# Patient Record
Sex: FEMALE | Race: OTHER | HISPANIC OR LATINO | URBAN - METROPOLITAN AREA
[De-identification: names, ages, dates, MRNs, and addresses within clinical notes are randomized per-mention and may not be internally consistent; named-entity substitution may affect disease eponyms.]

---

## 2017-01-09 ENCOUNTER — OUTPATIENT (OUTPATIENT)
Dept: OUTPATIENT SERVICES | Facility: HOSPITAL | Age: 52
LOS: 1 days | Discharge: HOME | End: 2017-01-09
Payer: MEDICARE

## 2017-01-09 DIAGNOSIS — Z98.51 TUBAL LIGATION STATUS: Chronic | ICD-10-CM

## 2017-01-09 DIAGNOSIS — R11.2 NAUSEA WITH VOMITING, UNSPECIFIED: ICD-10-CM

## 2017-01-09 DIAGNOSIS — Z98.89 OTHER SPECIFIED POSTPROCEDURAL STATES: Chronic | ICD-10-CM

## 2017-01-09 DIAGNOSIS — Z12.11 ENCOUNTER FOR SCREENING FOR MALIGNANT NEOPLASM OF COLON: ICD-10-CM

## 2017-01-09 DIAGNOSIS — D64.9 ANEMIA, UNSPECIFIED: ICD-10-CM

## 2017-01-09 PROCEDURE — 45380 COLONOSCOPY AND BIOPSY: CPT

## 2017-01-09 PROCEDURE — 88305 TISSUE EXAM BY PATHOLOGIST: CPT | Mod: 26

## 2017-01-09 PROCEDURE — 88312 SPECIAL STAINS GROUP 1: CPT

## 2017-01-09 PROCEDURE — 43239 EGD BIOPSY SINGLE/MULTIPLE: CPT

## 2017-01-09 PROCEDURE — 88305 TISSUE EXAM BY PATHOLOGIST: CPT

## 2017-01-09 PROCEDURE — 88312 SPECIAL STAINS GROUP 1: CPT | Mod: 26

## 2017-01-12 ENCOUNTER — EMERGENCY (EMERGENCY)
Facility: HOSPITAL | Age: 52
LOS: 1 days | Discharge: HOME | End: 2017-01-12
Attending: EMERGENCY MEDICINE
Payer: MEDICARE

## 2017-01-12 VITALS
HEIGHT: 60 IN | OXYGEN SATURATION: 98 % | HEART RATE: 100 BPM | WEIGHT: 143.08 LBS | DIASTOLIC BLOOD PRESSURE: 88 MMHG | SYSTOLIC BLOOD PRESSURE: 135 MMHG | TEMPERATURE: 99 F | RESPIRATION RATE: 20 BRPM

## 2017-01-12 DIAGNOSIS — M79.7 FIBROMYALGIA: ICD-10-CM

## 2017-01-12 DIAGNOSIS — Z98.51 TUBAL LIGATION STATUS: Chronic | ICD-10-CM

## 2017-01-12 DIAGNOSIS — R19.7 DIARRHEA, UNSPECIFIED: ICD-10-CM

## 2017-01-12 DIAGNOSIS — D64.9 ANEMIA, UNSPECIFIED: ICD-10-CM

## 2017-01-12 DIAGNOSIS — Z98.89 OTHER SPECIFIED POSTPROCEDURAL STATES: Chronic | ICD-10-CM

## 2017-01-12 DIAGNOSIS — M32.9 SYSTEMIC LUPUS ERYTHEMATOSUS, UNSPECIFIED: ICD-10-CM

## 2017-01-12 DIAGNOSIS — K64.8 OTHER HEMORRHOIDS: ICD-10-CM

## 2017-01-12 DIAGNOSIS — K29.50 UNSPECIFIED CHRONIC GASTRITIS WITHOUT BLEEDING: ICD-10-CM

## 2017-01-12 DIAGNOSIS — F32.9 MAJOR DEPRESSIVE DISORDER, SINGLE EPISODE, UNSPECIFIED: ICD-10-CM

## 2017-01-12 DIAGNOSIS — K52.9 NONINFECTIVE GASTROENTERITIS AND COLITIS, UNSPECIFIED: ICD-10-CM

## 2017-01-12 DIAGNOSIS — F41.9 ANXIETY DISORDER, UNSPECIFIED: ICD-10-CM

## 2017-01-12 DIAGNOSIS — K57.30 DIVERTICULOSIS OF LARGE INTESTINE WITHOUT PERFORATION OR ABSCESS WITHOUT BLEEDING: ICD-10-CM

## 2017-01-12 DIAGNOSIS — I25.2 OLD MYOCARDIAL INFARCTION: ICD-10-CM

## 2017-01-12 DIAGNOSIS — E86.0 DEHYDRATION: ICD-10-CM

## 2017-01-12 DIAGNOSIS — I10 ESSENTIAL (PRIMARY) HYPERTENSION: ICD-10-CM

## 2017-01-12 PROCEDURE — 99285 EMERGENCY DEPT VISIT HI MDM: CPT

## 2017-01-12 NOTE — ED ADULT TRIAGE NOTE - CHIEF COMPLAINT QUOTE
biba for c/o severe abdominal pain,diarrhea and states that the diarrhea is bloody  pt had gastric bypass last monday

## 2017-01-13 LAB
ALBUMIN SERPL ELPH-MCNC: 4.1 G/DL — SIGNIFICANT CHANGE UP (ref 3.5–5)
ALP SERPL-CCNC: 55 U/L — SIGNIFICANT CHANGE UP (ref 40–120)
ALT FLD-CCNC: 26 U/L DA — SIGNIFICANT CHANGE UP (ref 10–60)
ANION GAP SERPL CALC-SCNC: 8 MMOL/L — SIGNIFICANT CHANGE UP (ref 5–17)
APPEARANCE UR: CLEAR — SIGNIFICANT CHANGE UP
APTT BLD: 33.6 SEC — SIGNIFICANT CHANGE UP (ref 27.5–37.4)
AST SERPL-CCNC: 23 U/L — SIGNIFICANT CHANGE UP (ref 10–40)
BASOPHILS # BLD AUTO: 0.1 K/UL — SIGNIFICANT CHANGE UP (ref 0–0.2)
BASOPHILS NFR BLD AUTO: 2.7 % — HIGH (ref 0–2)
BILIRUB SERPL-MCNC: 0.5 MG/DL — SIGNIFICANT CHANGE UP (ref 0.2–1.2)
BILIRUB UR-MCNC: NEGATIVE — SIGNIFICANT CHANGE UP
BUN SERPL-MCNC: 5 MG/DL — LOW (ref 7–18)
CALCIUM SERPL-MCNC: 8.7 MG/DL — SIGNIFICANT CHANGE UP (ref 8.4–10.5)
CHLORIDE SERPL-SCNC: 111 MMOL/L — HIGH (ref 96–108)
CO2 SERPL-SCNC: 25 MMOL/L — SIGNIFICANT CHANGE UP (ref 22–31)
COLOR SPEC: YELLOW — SIGNIFICANT CHANGE UP
CREAT SERPL-MCNC: 0.64 MG/DL — SIGNIFICANT CHANGE UP (ref 0.5–1.3)
DIFF PNL FLD: NEGATIVE — SIGNIFICANT CHANGE UP
EOSINOPHIL # BLD AUTO: 0.1 K/UL — SIGNIFICANT CHANGE UP (ref 0–0.5)
EOSINOPHIL NFR BLD AUTO: 1.9 % — SIGNIFICANT CHANGE UP (ref 0–6)
GLUCOSE SERPL-MCNC: 80 MG/DL — SIGNIFICANT CHANGE UP (ref 70–99)
GLUCOSE UR QL: NEGATIVE — SIGNIFICANT CHANGE UP
HCT VFR BLD CALC: 30 % — LOW (ref 34.5–45)
HGB BLD-MCNC: 10.2 G/DL — LOW (ref 11.5–15.5)
INR BLD: 1.23 RATIO — HIGH (ref 0.88–1.16)
KETONES UR-MCNC: ABNORMAL
LACTATE SERPL-SCNC: 0.8 MMOL/L — SIGNIFICANT CHANGE UP (ref 0.7–2)
LEUKOCYTE ESTERASE UR-ACNC: ABNORMAL
LIDOCAIN IGE QN: 73 U/L — SIGNIFICANT CHANGE UP (ref 73–393)
LYMPHOCYTES # BLD AUTO: 2 K/UL — SIGNIFICANT CHANGE UP (ref 1–3.3)
LYMPHOCYTES # BLD AUTO: 53.2 % — HIGH (ref 13–44)
MCHC RBC-ENTMCNC: 31.8 PG — SIGNIFICANT CHANGE UP (ref 27–34)
MCHC RBC-ENTMCNC: 33.9 GM/DL — SIGNIFICANT CHANGE UP (ref 32–36)
MCV RBC AUTO: 93.8 FL — SIGNIFICANT CHANGE UP (ref 80–100)
MONOCYTES # BLD AUTO: 0.4 K/UL — SIGNIFICANT CHANGE UP (ref 0–0.9)
MONOCYTES NFR BLD AUTO: 9.7 % — SIGNIFICANT CHANGE UP (ref 2–14)
NEUTROPHILS # BLD AUTO: 1.2 K/UL — LOW (ref 1.8–7.4)
NEUTROPHILS NFR BLD AUTO: 32.6 % — LOW (ref 43–77)
NITRITE UR-MCNC: NEGATIVE — SIGNIFICANT CHANGE UP
PH UR: 7 — SIGNIFICANT CHANGE UP (ref 4.8–8)
PLATELET # BLD AUTO: 233 K/UL — SIGNIFICANT CHANGE UP (ref 150–400)
POTASSIUM SERPL-MCNC: 2.7 MMOL/L — CRITICAL LOW (ref 3.5–5.3)
POTASSIUM SERPL-SCNC: 2.7 MMOL/L — CRITICAL LOW (ref 3.5–5.3)
PROT SERPL-MCNC: 6.9 G/DL — SIGNIFICANT CHANGE UP (ref 6–8.3)
PROT UR-MCNC: NEGATIVE — SIGNIFICANT CHANGE UP
PROTHROM AB SERPL-ACNC: 13.8 SEC — HIGH (ref 10–13.1)
RBC # BLD: 3.2 M/UL — LOW (ref 3.8–5.2)
RBC # FLD: 11.3 % — SIGNIFICANT CHANGE UP (ref 10.3–14.5)
SODIUM SERPL-SCNC: 144 MMOL/L — SIGNIFICANT CHANGE UP (ref 135–145)
SP GR SPEC: 1.01 — SIGNIFICANT CHANGE UP (ref 1.01–1.02)
UROBILINOGEN FLD QL: NEGATIVE — SIGNIFICANT CHANGE UP
WBC # BLD: 3.8 K/UL — SIGNIFICANT CHANGE UP (ref 3.8–10.5)
WBC # FLD AUTO: 3.8 K/UL — SIGNIFICANT CHANGE UP (ref 3.8–10.5)

## 2017-01-13 PROCEDURE — 99284 EMERGENCY DEPT VISIT MOD MDM: CPT | Mod: 25

## 2017-01-13 PROCEDURE — 83690 ASSAY OF LIPASE: CPT

## 2017-01-13 PROCEDURE — 85610 PROTHROMBIN TIME: CPT

## 2017-01-13 PROCEDURE — 96375 TX/PRO/DX INJ NEW DRUG ADDON: CPT

## 2017-01-13 PROCEDURE — 85027 COMPLETE CBC AUTOMATED: CPT

## 2017-01-13 PROCEDURE — 85730 THROMBOPLASTIN TIME PARTIAL: CPT

## 2017-01-13 PROCEDURE — 80053 COMPREHEN METABOLIC PANEL: CPT

## 2017-01-13 PROCEDURE — 81001 URINALYSIS AUTO W/SCOPE: CPT

## 2017-01-13 PROCEDURE — 96374 THER/PROPH/DIAG INJ IV PUSH: CPT

## 2017-01-13 PROCEDURE — 83605 ASSAY OF LACTIC ACID: CPT

## 2017-01-13 RX ORDER — SODIUM CHLORIDE 9 MG/ML
1000 INJECTION INTRAMUSCULAR; INTRAVENOUS; SUBCUTANEOUS ONCE
Qty: 0 | Refills: 0 | Status: COMPLETED | OUTPATIENT
Start: 2017-01-13 | End: 2017-01-13

## 2017-01-13 RX ORDER — MORPHINE SULFATE 50 MG/1
4 CAPSULE, EXTENDED RELEASE ORAL ONCE
Qty: 0 | Refills: 0 | Status: DISCONTINUED | OUTPATIENT
Start: 2017-01-13 | End: 2017-01-13

## 2017-01-13 RX ORDER — POTASSIUM CHLORIDE 20 MEQ
20 PACKET (EA) ORAL ONCE
Qty: 0 | Refills: 0 | Status: COMPLETED | OUTPATIENT
Start: 2017-01-13 | End: 2017-01-13

## 2017-01-13 RX ORDER — SODIUM CHLORIDE 9 MG/ML
3 INJECTION INTRAMUSCULAR; INTRAVENOUS; SUBCUTANEOUS ONCE
Qty: 0 | Refills: 0 | Status: COMPLETED | OUTPATIENT
Start: 2017-01-13 | End: 2017-01-13

## 2017-01-13 RX ORDER — POTASSIUM CHLORIDE 20 MEQ
10 PACKET (EA) ORAL ONCE
Qty: 0 | Refills: 0 | Status: COMPLETED | OUTPATIENT
Start: 2017-01-13 | End: 2017-01-13

## 2017-01-13 RX ADMIN — SODIUM CHLORIDE 4000 MILLILITER(S): 9 INJECTION INTRAMUSCULAR; INTRAVENOUS; SUBCUTANEOUS at 04:33

## 2017-01-13 RX ADMIN — SODIUM CHLORIDE 4000 MILLILITER(S): 9 INJECTION INTRAMUSCULAR; INTRAVENOUS; SUBCUTANEOUS at 06:19

## 2017-01-13 RX ADMIN — MORPHINE SULFATE 4 MILLIGRAM(S): 50 CAPSULE, EXTENDED RELEASE ORAL at 06:19

## 2017-01-13 RX ADMIN — Medication 100 MILLIEQUIVALENT(S): at 05:22

## 2017-01-13 RX ADMIN — Medication 20 MILLIEQUIVALENT(S): at 05:22

## 2017-01-13 RX ADMIN — SODIUM CHLORIDE 3 MILLILITER(S): 9 INJECTION INTRAMUSCULAR; INTRAVENOUS; SUBCUTANEOUS at 04:33

## 2017-01-13 NOTE — ED PROVIDER NOTE - MEDICAL DECISION MAKING DETAILS
51f with lupus, chronic diarrhea being worked up, dehydrated on exam. labs similar to prior. stool was not black or red. hemoglobin at pt's baseline. discussed with GI doctor Lavelle who did the pt's scope. He shall call her in the morning. Pt feels better. Dc with anticipatory guidance and return precautions.

## 2017-01-13 NOTE — ED PROVIDER NOTE - PMH
Anxiety    Chronic pain    Depression    Fibromyalgia    HTN (hypertension)    Lupus    Myocardial infarction

## 2017-01-13 NOTE — ED PROVIDER NOTE - NS ED MD SCRIBE ATTENDING SCRIBE SECTIONS
VITAL SIGNS( Pullset)/HISTORY OF PRESENT ILLNESS/REVIEW OF SYSTEMS/HIV/PHYSICAL EXAM/PAST MEDICAL/SURGICAL/SOCIAL HISTORY

## 2017-01-13 NOTE — ED PROVIDER NOTE - OBJECTIVE STATEMENT
50 y/o F pt w/ PMHx of chronic abd pain presents to ED c/o diarrhea (black, bean sized clumps) since yesterday night; pt is worried that it may be blood and decided to report to ED. Pt states that she has been previously worked up by GI, her latest intervention was colonoscopy. Pt states that she has been bloated and crampy in abd w/ nausea and diarrhea x6 months. Pt denies fever, chills, or any other complaints. Pt is allergic to estrogen (rash). 52 y/o F pt w/ PMHx of chronic abd pain presents to ED c/o diarrhea (dark green/brown bean sized clumps) since yesterday night; pt is worried that it may be blood and decided to report to ED. Pt states that she has had these sx x 6 months and has been previously worked up by GI, her latest intervention was colonoscopy. Pt states that she has been bloated and crampy in abd w/ nausea and diarrhea x6 months. Pt denies fever, chills, or any other complaints. Pt is allergic to estrogen (rash).

## 2017-01-17 ENCOUNTER — APPOINTMENT (OUTPATIENT)
Dept: SURGERY | Facility: CLINIC | Age: 52
End: 2017-01-17

## 2017-01-31 ENCOUNTER — APPOINTMENT (OUTPATIENT)
Dept: GASTROENTEROLOGY | Facility: CLINIC | Age: 52
End: 2017-01-31

## 2017-01-31 VITALS
DIASTOLIC BLOOD PRESSURE: 72 MMHG | TEMPERATURE: 97.9 F | SYSTOLIC BLOOD PRESSURE: 112 MMHG | BODY MASS INDEX: 27.68 KG/M2 | WEIGHT: 141 LBS | HEIGHT: 60 IN | HEART RATE: 60 BPM | RESPIRATION RATE: 14 BRPM

## 2017-02-07 ENCOUNTER — APPOINTMENT (OUTPATIENT)
Dept: SURGERY | Facility: CLINIC | Age: 52
End: 2017-02-07

## 2017-02-07 VITALS
DIASTOLIC BLOOD PRESSURE: 85 MMHG | HEART RATE: 59 BPM | WEIGHT: 142 LBS | BODY MASS INDEX: 27.88 KG/M2 | HEIGHT: 60 IN | SYSTOLIC BLOOD PRESSURE: 132 MMHG

## 2017-04-25 ENCOUNTER — APPOINTMENT (OUTPATIENT)
Dept: SURGERY | Facility: CLINIC | Age: 52
End: 2017-04-25

## 2017-04-25 VITALS
TEMPERATURE: 98.5 F | HEART RATE: 85 BPM | RESPIRATION RATE: 17 BRPM | WEIGHT: 134 LBS | DIASTOLIC BLOOD PRESSURE: 70 MMHG | BODY MASS INDEX: 26.31 KG/M2 | SYSTOLIC BLOOD PRESSURE: 102 MMHG | HEIGHT: 60 IN

## 2017-07-13 ENCOUNTER — APPOINTMENT (OUTPATIENT)
Dept: GASTROENTEROLOGY | Facility: CLINIC | Age: 52
End: 2017-07-13

## 2017-07-13 VITALS
WEIGHT: 134.03 LBS | DIASTOLIC BLOOD PRESSURE: 64 MMHG | HEART RATE: 94 BPM | SYSTOLIC BLOOD PRESSURE: 132 MMHG | HEIGHT: 60 IN | OXYGEN SATURATION: 98 % | TEMPERATURE: 99.7 F | BODY MASS INDEX: 26.31 KG/M2

## 2017-07-17 LAB
ALBUMIN SERPL ELPH-MCNC: 4.9 G/DL
ALP BLD-CCNC: 74 U/L
ALT SERPL-CCNC: 23 U/L
ANION GAP SERPL CALC-SCNC: 17 MMOL/L
AST SERPL-CCNC: 29 U/L
BASOPHILS # BLD AUTO: 0.07 K/UL
BASOPHILS NFR BLD AUTO: 2.1 %
BILIRUB SERPL-MCNC: 0.2 MG/DL
BUN SERPL-MCNC: 9 MG/DL
CALCIUM SERPL-MCNC: 9.3 MG/DL
CHLORIDE SERPL-SCNC: 104 MMOL/L
CO2 SERPL-SCNC: 23 MMOL/L
CREAT SERPL-MCNC: 0.89 MG/DL
EOSINOPHIL # BLD AUTO: 0.04 K/UL
EOSINOPHIL NFR BLD AUTO: 1.2 %
GLUCOSE SERPL-MCNC: 77 MG/DL
HCT VFR BLD CALC: 34.3 %
HGB BLD-MCNC: 11.2 G/DL
IMM GRANULOCYTES NFR BLD AUTO: 0 %
LYMPHOCYTES # BLD AUTO: 1.24 K/UL
LYMPHOCYTES NFR BLD AUTO: 37.2 %
MAN DIFF?: NORMAL
MCHC RBC-ENTMCNC: 31.3 PG
MCHC RBC-ENTMCNC: 32.7 GM/DL
MCV RBC AUTO: 95.8 FL
MONOCYTES # BLD AUTO: 0.35 K/UL
MONOCYTES NFR BLD AUTO: 10.5 %
NEUTROPHILS # BLD AUTO: 1.63 K/UL
NEUTROPHILS NFR BLD AUTO: 49 %
PLATELET # BLD AUTO: 307 K/UL
POTASSIUM SERPL-SCNC: 4.6 MMOL/L
PROT SERPL-MCNC: 8 G/DL
RBC # BLD: 3.58 M/UL
RBC # FLD: 12 %
SODIUM SERPL-SCNC: 144 MMOL/L
WBC # FLD AUTO: 3.33 K/UL

## 2017-07-27 LAB
C DIFF TOX GENS STL QL NAA+PROBE: NORMAL
CDIFF BY PCR: NOT DETECTED

## 2017-08-01 LAB
BACTERIA STL CULT: NORMAL
DEPRECATED O AND P PREP STL: NORMAL

## 2017-08-03 ENCOUNTER — APPOINTMENT (OUTPATIENT)
Dept: GASTROENTEROLOGY | Facility: CLINIC | Age: 52
End: 2017-08-03
Payer: MEDICARE

## 2017-08-03 VITALS
RESPIRATION RATE: 16 BRPM | DIASTOLIC BLOOD PRESSURE: 86 MMHG | SYSTOLIC BLOOD PRESSURE: 118 MMHG | HEART RATE: 92 BPM | WEIGHT: 132 LBS | BODY MASS INDEX: 25.91 KG/M2 | HEIGHT: 60 IN | TEMPERATURE: 99.1 F

## 2017-08-03 PROCEDURE — 99214 OFFICE O/P EST MOD 30 MIN: CPT

## 2017-09-27 ENCOUNTER — EMERGENCY (EMERGENCY)
Facility: HOSPITAL | Age: 52
LOS: 1 days | Discharge: HOME | End: 2017-09-27
Attending: EMERGENCY MEDICINE
Payer: COMMERCIAL

## 2017-09-27 VITALS
HEART RATE: 91 BPM | TEMPERATURE: 98 F | RESPIRATION RATE: 16 BRPM | OXYGEN SATURATION: 100 % | DIASTOLIC BLOOD PRESSURE: 79 MMHG | SYSTOLIC BLOOD PRESSURE: 102 MMHG | HEIGHT: 60 IN | WEIGHT: 130.07 LBS

## 2017-09-27 DIAGNOSIS — Z98.89 OTHER SPECIFIED POSTPROCEDURAL STATES: Chronic | ICD-10-CM

## 2017-09-27 DIAGNOSIS — Z98.51 TUBAL LIGATION STATUS: Chronic | ICD-10-CM

## 2017-09-27 PROCEDURE — 99284 EMERGENCY DEPT VISIT MOD MDM: CPT

## 2017-09-27 PROCEDURE — 96372 THER/PROPH/DIAG INJ SC/IM: CPT

## 2017-09-27 PROCEDURE — 99283 EMERGENCY DEPT VISIT LOW MDM: CPT | Mod: 25

## 2017-09-27 RX ORDER — KETOROLAC TROMETHAMINE 30 MG/ML
60 SYRINGE (ML) INJECTION ONCE
Qty: 0 | Refills: 0 | Status: DISCONTINUED | OUTPATIENT
Start: 2017-09-27 | End: 2017-09-27

## 2017-09-27 RX ADMIN — Medication 60 MILLIGRAM(S): at 20:10

## 2017-09-27 RX ADMIN — Medication 60 MILLIGRAM(S): at 20:38
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
## 2017-09-27 NOTE — ED PROVIDER NOTE - MEDICAL DECISION MAKING DETAILS
Patient with generalized back pain, was hoping to get spinal injection. I explained she needs to see orthopedics spine specialist for that. received toradol injection. orthopedics followup. ambulatory in Emergency Department.
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
## 2017-10-01 DIAGNOSIS — M32.9 SYSTEMIC LUPUS ERYTHEMATOSUS, UNSPECIFIED: ICD-10-CM

## 2017-10-01 DIAGNOSIS — F41.9 ANXIETY DISORDER, UNSPECIFIED: ICD-10-CM

## 2017-10-01 DIAGNOSIS — M54.89 OTHER DORSALGIA: ICD-10-CM

## 2017-10-01 DIAGNOSIS — I10 ESSENTIAL (PRIMARY) HYPERTENSION: ICD-10-CM

## 2017-10-01 DIAGNOSIS — M79.7 FIBROMYALGIA: ICD-10-CM

## 2017-10-01 DIAGNOSIS — F32.89 OTHER SPECIFIED DEPRESSIVE EPISODES: ICD-10-CM

## 2017-10-01 DIAGNOSIS — G89.29 OTHER CHRONIC PAIN: ICD-10-CM

## 2017-10-01 DIAGNOSIS — I25.2 OLD MYOCARDIAL INFARCTION: ICD-10-CM

## 2017-10-23 ENCOUNTER — EMERGENCY (EMERGENCY)
Facility: HOSPITAL | Age: 52
LOS: 1 days | Discharge: HOME | End: 2017-10-23
Attending: EMERGENCY MEDICINE
Payer: MEDICARE

## 2017-10-23 VITALS
SYSTOLIC BLOOD PRESSURE: 136 MMHG | OXYGEN SATURATION: 99 % | TEMPERATURE: 98 F | RESPIRATION RATE: 17 BRPM | DIASTOLIC BLOOD PRESSURE: 91 MMHG | HEIGHT: 60 IN | WEIGHT: 134.92 LBS | HEART RATE: 80 BPM

## 2017-10-23 DIAGNOSIS — Z98.89 OTHER SPECIFIED POSTPROCEDURAL STATES: Chronic | ICD-10-CM

## 2017-10-23 DIAGNOSIS — Z98.51 TUBAL LIGATION STATUS: Chronic | ICD-10-CM

## 2017-10-23 LAB
ANION GAP SERPL CALC-SCNC: 4 MMOL/L — LOW (ref 5–17)
APPEARANCE UR: CLEAR — SIGNIFICANT CHANGE UP
BILIRUB UR-MCNC: NEGATIVE — SIGNIFICANT CHANGE UP
BUN SERPL-MCNC: 12 MG/DL — SIGNIFICANT CHANGE UP (ref 7–18)
CALCIUM SERPL-MCNC: 8.8 MG/DL — SIGNIFICANT CHANGE UP (ref 8.4–10.5)
CHLORIDE SERPL-SCNC: 108 MMOL/L — SIGNIFICANT CHANGE UP (ref 96–108)
CO2 SERPL-SCNC: 28 MMOL/L — SIGNIFICANT CHANGE UP (ref 22–31)
COLOR SPEC: YELLOW — SIGNIFICANT CHANGE UP
CREAT SERPL-MCNC: 0.83 MG/DL — SIGNIFICANT CHANGE UP (ref 0.5–1.3)
DIFF PNL FLD: NEGATIVE — SIGNIFICANT CHANGE UP
ERYTHROCYTE [SEDIMENTATION RATE] IN BLOOD: 25 MM/HR — HIGH (ref 0–20)
GLUCOSE SERPL-MCNC: 72 MG/DL — SIGNIFICANT CHANGE UP (ref 70–99)
GLUCOSE UR QL: NEGATIVE — SIGNIFICANT CHANGE UP
HCT VFR BLD CALC: 34.4 % — LOW (ref 34.5–45)
HGB BLD-MCNC: 10.8 G/DL — LOW (ref 11.5–15.5)
KETONES UR-MCNC: NEGATIVE — SIGNIFICANT CHANGE UP
LEUKOCYTE ESTERASE UR-ACNC: NEGATIVE — SIGNIFICANT CHANGE UP
MCHC RBC-ENTMCNC: 31.3 PG — SIGNIFICANT CHANGE UP (ref 27–34)
MCHC RBC-ENTMCNC: 31.5 GM/DL — LOW (ref 32–36)
MCV RBC AUTO: 99.1 FL — SIGNIFICANT CHANGE UP (ref 80–100)
NITRITE UR-MCNC: NEGATIVE — SIGNIFICANT CHANGE UP
PH UR: 8 — SIGNIFICANT CHANGE UP (ref 5–8)
PLATELET # BLD AUTO: 271 K/UL — SIGNIFICANT CHANGE UP (ref 150–400)
POTASSIUM SERPL-MCNC: 3.9 MMOL/L — SIGNIFICANT CHANGE UP (ref 3.5–5.3)
POTASSIUM SERPL-SCNC: 3.9 MMOL/L — SIGNIFICANT CHANGE UP (ref 3.5–5.3)
PROT UR-MCNC: NEGATIVE — SIGNIFICANT CHANGE UP
RBC # BLD: 3.47 M/UL — LOW (ref 3.8–5.2)
RBC # FLD: 11 % — SIGNIFICANT CHANGE UP (ref 10.3–14.5)
SODIUM SERPL-SCNC: 140 MMOL/L — SIGNIFICANT CHANGE UP (ref 135–145)
SP GR SPEC: 1.01 — SIGNIFICANT CHANGE UP (ref 1.01–1.02)
UROBILINOGEN FLD QL: NEGATIVE — SIGNIFICANT CHANGE UP
WBC # BLD: 3.4 K/UL — LOW (ref 3.8–10.5)
WBC # FLD AUTO: 3.4 K/UL — LOW (ref 3.8–10.5)

## 2017-10-23 PROCEDURE — 99283 EMERGENCY DEPT VISIT LOW MDM: CPT

## 2017-10-23 PROCEDURE — 85652 RBC SED RATE AUTOMATED: CPT

## 2017-10-23 PROCEDURE — 85027 COMPLETE CBC AUTOMATED: CPT

## 2017-10-23 PROCEDURE — 86038 ANTINUCLEAR ANTIBODIES: CPT

## 2017-10-23 PROCEDURE — 99285 EMERGENCY DEPT VISIT HI MDM: CPT

## 2017-10-23 PROCEDURE — 81003 URINALYSIS AUTO W/O SCOPE: CPT

## 2017-10-23 PROCEDURE — 80048 BASIC METABOLIC PNL TOTAL CA: CPT

## 2017-10-23 RX ORDER — TRAMADOL HYDROCHLORIDE 50 MG/1
50 TABLET ORAL ONCE
Qty: 0 | Refills: 0 | Status: DISCONTINUED | OUTPATIENT
Start: 2017-10-23 | End: 2017-10-23

## 2017-10-23 RX ADMIN — TRAMADOL HYDROCHLORIDE 50 MILLIGRAM(S): 50 TABLET ORAL at 15:07

## 2017-10-23 RX ADMIN — TRAMADOL HYDROCHLORIDE 50 MILLIGRAM(S): 50 TABLET ORAL at 13:46

## 2017-10-23 NOTE — ED ADULT NURSE NOTE - OBJECTIVE STATEMENT
C/O GENERALIZED BODY PAIN , JOINT PAINS X1 MONTH. SEEN BY PAIN MANAGEMENT DOCTOR AND WAS TOLD BLOOD GLUCOSE IS HIGH.STATES SHE FEELS DEPRESSED BUT REFUSED  CONSULT, STATES SHE HAS BEEN SEEING , COUNSELLING

## 2017-10-23 NOTE — ED PROVIDER NOTE - CONSTITUTIONAL, MLM
normal... Chronically ill appearing, well nourished, awake, alert, oriented to person, place, time/situation

## 2017-10-23 NOTE — ED PROVIDER NOTE - MEDICAL DECISION MAKING DETAILS
53 y/o F pt with hx of SLE and fibromyalgia presents with generalized body pain. Will check CBC, Chem 7, and administer analgesics. Will coordinate pt appointment with rheumatologist and pain management doctor. 51 y/o F pt with hx of SLE and fibromyalgia presents with generalized body pain. Will check labs, and administer analgesics. Will coordinate pt appointment with rheumatologist. Pt made appointment with her pain management doctor for 2pm today. 53 y/o F pt with hx of SLE and fibromyalgia presents with generalized body pain. Will check labs, and administer analgesics. Will coordinate pt appointment with rheumatologist- made for this saturday oct 28. Pt made appointment with her pain management doctor for 2pm today.
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
## 2017-10-23 NOTE — ED PROVIDER NOTE - PMH
Anemia    Anxiety    Asthma    Chronic pain    Depression    Fibromyalgia    HTN (hypertension)    Lupus    Myocardial infarction

## 2017-10-25 LAB
ANA PAT FLD IF-IMP: ABNORMAL
ANA TITR SER: ABNORMAL

## 2017-10-27 DIAGNOSIS — M32.9 SYSTEMIC LUPUS ERYTHEMATOSUS, UNSPECIFIED: ICD-10-CM

## 2017-10-27 DIAGNOSIS — F32.89 OTHER SPECIFIED DEPRESSIVE EPISODES: ICD-10-CM

## 2017-10-27 DIAGNOSIS — R52 PAIN, UNSPECIFIED: ICD-10-CM

## 2017-10-27 DIAGNOSIS — I25.2 OLD MYOCARDIAL INFARCTION: ICD-10-CM

## 2017-10-27 DIAGNOSIS — F41.9 ANXIETY DISORDER, UNSPECIFIED: ICD-10-CM

## 2017-10-27 DIAGNOSIS — M79.7 FIBROMYALGIA: ICD-10-CM

## 2017-10-27 DIAGNOSIS — J45.909 UNSPECIFIED ASTHMA, UNCOMPLICATED: ICD-10-CM

## 2017-10-27 DIAGNOSIS — I10 ESSENTIAL (PRIMARY) HYPERTENSION: ICD-10-CM

## 2017-10-27 DIAGNOSIS — R05 COUGH: ICD-10-CM

## 2017-10-27 DIAGNOSIS — Z98.51 TUBAL LIGATION STATUS: ICD-10-CM

## 2017-12-28 ENCOUNTER — APPOINTMENT (OUTPATIENT)
Dept: RHEUMATOLOGY | Facility: CLINIC | Age: 52
End: 2017-12-28
Payer: MEDICARE

## 2017-12-28 VITALS
TEMPERATURE: 98 F | WEIGHT: 137 LBS | HEIGHT: 60 IN | OXYGEN SATURATION: 98 % | BODY MASS INDEX: 26.9 KG/M2 | SYSTOLIC BLOOD PRESSURE: 121 MMHG | HEART RATE: 88 BPM | RESPIRATION RATE: 16 BRPM | DIASTOLIC BLOOD PRESSURE: 84 MMHG

## 2017-12-28 PROCEDURE — 99205 OFFICE O/P NEW HI 60 MIN: CPT

## 2017-12-29 LAB
25(OH)D3 SERPL-MCNC: 78.6 NG/ML
ALBUMIN SERPL ELPH-MCNC: 5 G/DL
ALP BLD-CCNC: 71 U/L
ALT SERPL-CCNC: 8 U/L
ANION GAP SERPL CALC-SCNC: 20 MMOL/L
APPEARANCE: CLEAR
APTT BLD: 25.9 SEC
AST SERPL-CCNC: 23 U/L
B2 GLYCOPROT1 AB SER QL: NEGATIVE
BACTERIA: ABNORMAL
BASOPHILS # BLD AUTO: 0.05 K/UL
BASOPHILS NFR BLD AUTO: 0.9 %
BILIRUB SERPL-MCNC: <0.2 MG/DL
BILIRUBIN URINE: NEGATIVE
BLOOD URINE: NEGATIVE
BUN SERPL-MCNC: 8 MG/DL
C3 SERPL-MCNC: 88 MG/DL
C4 SERPL-MCNC: 18 MG/DL
CALCIUM SERPL-MCNC: 10.3 MG/DL
CARDIOLIPIN AB SER IA-ACNC: NEGATIVE
CHLORIDE SERPL-SCNC: 102 MMOL/L
CK SERPL-CCNC: 161 U/L
CO2 SERPL-SCNC: 22 MMOL/L
COLOR: YELLOW
CREAT SERPL-MCNC: 0.85 MG/DL
CREAT SPEC-SCNC: 80 MG/DL
CREAT/PROT UR: 0.1 RATIO
DEPRECATED KAPPA LC FREE/LAMBDA SER: 1 RATIO
DIRECT COOMBS: NORMAL
DSDNA AB SER-ACNC: <12 IU/ML
ENA RNP AB SER IA-ACNC: <0.2 AL
ENA SM AB SER IA-ACNC: 0.2 AL
ENA SS-A AB SER IA-ACNC: 5.3 AL
ENA SS-B AB SER IA-ACNC: <0.2 AL
EOSINOPHIL # BLD AUTO: 0.02 K/UL
EOSINOPHIL NFR BLD AUTO: 0.3 %
FERRITIN SERPL-MCNC: 99 NG/ML
FOLATE SERPL-MCNC: >20 NG/ML
GLUCOSE QUALITATIVE U: NEGATIVE MG/DL
GLUCOSE SERPL-MCNC: 69 MG/DL
HCT VFR BLD CALC: 31.5 %
HGB BLD-MCNC: 10.2 G/DL
HYALINE CASTS: 7 /LPF
IGA SER QL IEP: 251 MG/DL
IGG SER QL IEP: 1400 MG/DL
IGM SER QL IEP: 94 MG/DL
IMM GRANULOCYTES NFR BLD AUTO: 0 %
INR PPP: 0.98 RATIO
KAPPA LC CSF-MCNC: 1.56 MG/DL
KAPPA LC SERPL-MCNC: 1.56 MG/DL
KETONES URINE: NEGATIVE
LDH SERPL-CCNC: 213 U/L
LEUKOCYTE ESTERASE URINE: NEGATIVE
LYMPHOCYTES # BLD AUTO: 2.17 K/UL
LYMPHOCYTES NFR BLD AUTO: 37.7 %
MAN DIFF?: NORMAL
MCHC RBC-ENTMCNC: 31.1 PG
MCHC RBC-ENTMCNC: 32.4 GM/DL
MCV RBC AUTO: 96 FL
MICROSCOPIC-UA: NORMAL
MONOCYTES # BLD AUTO: 0.52 K/UL
MONOCYTES NFR BLD AUTO: 9 %
NEUTROPHILS # BLD AUTO: 2.99 K/UL
NEUTROPHILS NFR BLD AUTO: 52.1 %
NITRITE URINE: NEGATIVE
PH URINE: 7
PLATELET # BLD AUTO: 342 K/UL
POTASSIUM SERPL-SCNC: 3.8 MMOL/L
PROT SERPL-MCNC: 8.2 G/DL
PROT UR-MCNC: 6 MG/DL
PROTEIN URINE: NEGATIVE MG/DL
PT BLD: 11.1 SEC
RBC # BLD: 3.28 M/UL
RBC # BLD: 3.28 M/UL
RBC # FLD: 11.9 %
RED BLOOD CELLS URINE: 2 /HPF
RETICS # AUTO: 1.3 %
RETICS AGGREG/RBC NFR: 42.6 K/UL
SODIUM SERPL-SCNC: 144 MMOL/L
SPECIFIC GRAVITY URINE: 1.01
SQUAMOUS EPITHELIAL CELLS: 12 /HPF
TSH SERPL-ACNC: 2.86 UIU/ML
UROBILINOGEN URINE: NEGATIVE MG/DL
VIT B12 SERPL-MCNC: 1896 PG/ML
WBC # FLD AUTO: 5.75 K/UL
WHITE BLOOD CELLS URINE: 6 /HPF

## 2017-12-30 LAB
THYROGLOB AB SERPL-ACNC: 3388 IU/ML
THYROPEROXIDASE AB SERPL IA-ACNC: 719 IU/ML

## 2017-12-31 LAB
ANA PAT FLD IF-IMP: ABNORMAL
ANA SER IF-ACNC: ABNORMAL
B2 GLYCOPROT1 IGA SERPL IA-ACNC: 6.7 SAU

## 2018-01-22 ENCOUNTER — APPOINTMENT (OUTPATIENT)
Dept: RHEUMATOLOGY | Facility: CLINIC | Age: 53
End: 2018-01-22
Payer: MEDICARE

## 2018-01-22 VITALS
HEART RATE: 73 BPM | WEIGHT: 137 LBS | BODY MASS INDEX: 26.9 KG/M2 | DIASTOLIC BLOOD PRESSURE: 90 MMHG | RESPIRATION RATE: 13 BRPM | SYSTOLIC BLOOD PRESSURE: 135 MMHG | OXYGEN SATURATION: 97 % | HEIGHT: 60 IN

## 2018-01-22 PROCEDURE — 99213 OFFICE O/P EST LOW 20 MIN: CPT

## 2018-01-22 RX ORDER — METHOCARBAMOL 500 MG/1
500 TABLET, FILM COATED ORAL
Qty: 90 | Refills: 0 | Status: DISCONTINUED | COMMUNITY
Start: 2016-09-27 | End: 2018-01-22

## 2018-01-22 RX ORDER — HYDROXYCHLOROQUINE SULFATE 200 MG/1
200 TABLET, FILM COATED ORAL
Qty: 60 | Refills: 0 | Status: DISCONTINUED | COMMUNITY
Start: 2016-08-30 | End: 2018-01-22

## 2018-01-22 RX ORDER — TIZANIDINE 2 MG/1
2 TABLET ORAL
Qty: 60 | Refills: 0 | Status: DISCONTINUED | COMMUNITY
Start: 2016-10-28 | End: 2018-01-22

## 2018-01-22 RX ORDER — OXYCODONE HYDROCHLORIDE 20 MG/1
20 TABLET, FILM COATED, EXTENDED RELEASE ORAL
Qty: 60 | Refills: 0 | Status: DISCONTINUED | COMMUNITY
Start: 2016-09-29 | End: 2018-01-22

## 2018-01-22 RX ORDER — TOPIRAMATE 50 MG/1
50 TABLET, FILM COATED ORAL
Qty: 60 | Refills: 0 | Status: DISCONTINUED | COMMUNITY
Start: 2016-12-12 | End: 2018-01-22

## 2018-01-22 RX ORDER — TRAZODONE HYDROCHLORIDE 300 MG/1
300 TABLET ORAL
Qty: 30 | Refills: 0 | Status: DISCONTINUED | COMMUNITY
Start: 2016-09-16 | End: 2018-01-22

## 2018-01-22 RX ORDER — MELOXICAM 7.5 MG/1
7.5 TABLET ORAL
Qty: 60 | Refills: 0 | Status: DISCONTINUED | COMMUNITY
Start: 2016-09-27 | End: 2018-01-22

## 2018-01-22 RX ORDER — OXYCODONE HYDROCHLORIDE 30 MG/1
30 TABLET, FILM COATED, EXTENDED RELEASE ORAL
Qty: 30 | Refills: 0 | Status: DISCONTINUED | COMMUNITY
Start: 2016-10-25 | End: 2018-01-22

## 2018-02-19 ENCOUNTER — EMERGENCY (EMERGENCY)
Facility: HOSPITAL | Age: 53
LOS: 1 days | Discharge: HOME | End: 2018-02-19
Attending: EMERGENCY MEDICINE
Payer: MEDICARE

## 2018-02-19 VITALS
DIASTOLIC BLOOD PRESSURE: 87 MMHG | TEMPERATURE: 98 F | HEART RATE: 78 BPM | OXYGEN SATURATION: 100 % | SYSTOLIC BLOOD PRESSURE: 133 MMHG | WEIGHT: 134.04 LBS | RESPIRATION RATE: 16 BRPM

## 2018-02-19 VITALS
TEMPERATURE: 98 F | SYSTOLIC BLOOD PRESSURE: 129 MMHG | OXYGEN SATURATION: 99 % | RESPIRATION RATE: 17 BRPM | HEART RATE: 69 BPM | DIASTOLIC BLOOD PRESSURE: 77 MMHG

## 2018-02-19 DIAGNOSIS — Z98.89 OTHER SPECIFIED POSTPROCEDURAL STATES: Chronic | ICD-10-CM

## 2018-02-19 DIAGNOSIS — Z98.51 TUBAL LIGATION STATUS: Chronic | ICD-10-CM

## 2018-02-19 LAB
ALBUMIN SERPL ELPH-MCNC: 4 G/DL — SIGNIFICANT CHANGE UP (ref 3.5–5)
ALP SERPL-CCNC: 67 U/L — SIGNIFICANT CHANGE UP (ref 40–120)
ALT FLD-CCNC: 25 U/L DA — SIGNIFICANT CHANGE UP (ref 10–60)
ANION GAP SERPL CALC-SCNC: 7 MMOL/L — SIGNIFICANT CHANGE UP (ref 5–17)
APPEARANCE UR: CLEAR — SIGNIFICANT CHANGE UP
AST SERPL-CCNC: 27 U/L — SIGNIFICANT CHANGE UP (ref 10–40)
BASOPHILS # BLD AUTO: 0.1 K/UL — SIGNIFICANT CHANGE UP (ref 0–0.2)
BASOPHILS NFR BLD AUTO: 2.5 % — HIGH (ref 0–2)
BILIRUB SERPL-MCNC: 0.3 MG/DL — SIGNIFICANT CHANGE UP (ref 0.2–1.2)
BILIRUB UR-MCNC: NEGATIVE — SIGNIFICANT CHANGE UP
BUN SERPL-MCNC: 8 MG/DL — SIGNIFICANT CHANGE UP (ref 7–18)
CALCIUM SERPL-MCNC: 8.6 MG/DL — SIGNIFICANT CHANGE UP (ref 8.4–10.5)
CHLORIDE SERPL-SCNC: 108 MMOL/L — SIGNIFICANT CHANGE UP (ref 96–108)
CO2 SERPL-SCNC: 26 MMOL/L — SIGNIFICANT CHANGE UP (ref 22–31)
COLOR SPEC: YELLOW — SIGNIFICANT CHANGE UP
CREAT SERPL-MCNC: 0.87 MG/DL — SIGNIFICANT CHANGE UP (ref 0.5–1.3)
DIFF PNL FLD: NEGATIVE — SIGNIFICANT CHANGE UP
EOSINOPHIL # BLD AUTO: 0.1 K/UL — SIGNIFICANT CHANGE UP (ref 0–0.5)
EOSINOPHIL NFR BLD AUTO: 3.6 % — SIGNIFICANT CHANGE UP (ref 0–6)
GLUCOSE SERPL-MCNC: 73 MG/DL — SIGNIFICANT CHANGE UP (ref 70–99)
GLUCOSE UR QL: NEGATIVE — SIGNIFICANT CHANGE UP
HCG UR QL: NEGATIVE — SIGNIFICANT CHANGE UP
HCT VFR BLD CALC: 32.2 % — LOW (ref 34.5–45)
HGB BLD-MCNC: 10.4 G/DL — LOW (ref 11.5–15.5)
KETONES UR-MCNC: NEGATIVE — SIGNIFICANT CHANGE UP
LEUKOCYTE ESTERASE UR-ACNC: NEGATIVE — SIGNIFICANT CHANGE UP
LYMPHOCYTES # BLD AUTO: 1.7 K/UL — SIGNIFICANT CHANGE UP (ref 1–3.3)
LYMPHOCYTES # BLD AUTO: 46.1 % — HIGH (ref 13–44)
MCHC RBC-ENTMCNC: 31.7 PG — SIGNIFICANT CHANGE UP (ref 27–34)
MCHC RBC-ENTMCNC: 32.3 GM/DL — SIGNIFICANT CHANGE UP (ref 32–36)
MCV RBC AUTO: 98.1 FL — SIGNIFICANT CHANGE UP (ref 80–100)
MONOCYTES # BLD AUTO: 0.4 K/UL — SIGNIFICANT CHANGE UP (ref 0–0.9)
MONOCYTES NFR BLD AUTO: 10.1 % — SIGNIFICANT CHANGE UP (ref 2–14)
NEUTROPHILS # BLD AUTO: 1.4 K/UL — LOW (ref 1.8–7.4)
NEUTROPHILS NFR BLD AUTO: 37.7 % — LOW (ref 43–77)
NITRITE UR-MCNC: NEGATIVE — SIGNIFICANT CHANGE UP
PH UR: 8 — SIGNIFICANT CHANGE UP (ref 5–8)
PLATELET # BLD AUTO: 284 K/UL — SIGNIFICANT CHANGE UP (ref 150–400)
POTASSIUM SERPL-MCNC: 3.6 MMOL/L — SIGNIFICANT CHANGE UP (ref 3.5–5.3)
POTASSIUM SERPL-SCNC: 3.6 MMOL/L — SIGNIFICANT CHANGE UP (ref 3.5–5.3)
PROT SERPL-MCNC: 7.9 G/DL — SIGNIFICANT CHANGE UP (ref 6–8.3)
PROT UR-MCNC: NEGATIVE — SIGNIFICANT CHANGE UP
RBC # BLD: 3.28 M/UL — LOW (ref 3.8–5.2)
RBC # FLD: 11.1 % — SIGNIFICANT CHANGE UP (ref 10.3–14.5)
SODIUM SERPL-SCNC: 141 MMOL/L — SIGNIFICANT CHANGE UP (ref 135–145)
SP GR SPEC: 1.01 — SIGNIFICANT CHANGE UP (ref 1.01–1.02)
UROBILINOGEN FLD QL: NEGATIVE — SIGNIFICANT CHANGE UP
WBC # BLD: 3.7 K/UL — LOW (ref 3.8–10.5)
WBC # FLD AUTO: 3.7 K/UL — LOW (ref 3.8–10.5)

## 2018-02-19 PROCEDURE — 96375 TX/PRO/DX INJ NEW DRUG ADDON: CPT

## 2018-02-19 PROCEDURE — 36415 COLL VENOUS BLD VENIPUNCTURE: CPT

## 2018-02-19 PROCEDURE — 81003 URINALYSIS AUTO W/O SCOPE: CPT

## 2018-02-19 PROCEDURE — 93005 ELECTROCARDIOGRAM TRACING: CPT

## 2018-02-19 PROCEDURE — 81025 URINE PREGNANCY TEST: CPT

## 2018-02-19 PROCEDURE — 80053 COMPREHEN METABOLIC PANEL: CPT

## 2018-02-19 PROCEDURE — 85027 COMPLETE CBC AUTOMATED: CPT

## 2018-02-19 PROCEDURE — 96374 THER/PROPH/DIAG INJ IV PUSH: CPT

## 2018-02-19 PROCEDURE — 71045 X-RAY EXAM CHEST 1 VIEW: CPT

## 2018-02-19 PROCEDURE — 71045 X-RAY EXAM CHEST 1 VIEW: CPT | Mod: 26

## 2018-02-19 PROCEDURE — 99285 EMERGENCY DEPT VISIT HI MDM: CPT | Mod: 25

## 2018-02-19 PROCEDURE — 99284 EMERGENCY DEPT VISIT MOD MDM: CPT | Mod: 25

## 2018-02-19 RX ORDER — MORPHINE SULFATE 50 MG/1
6 CAPSULE, EXTENDED RELEASE ORAL ONCE
Qty: 0 | Refills: 0 | Status: DISCONTINUED | OUTPATIENT
Start: 2018-02-19 | End: 2018-02-19

## 2018-02-19 RX ORDER — SODIUM CHLORIDE 9 MG/ML
1000 INJECTION INTRAMUSCULAR; INTRAVENOUS; SUBCUTANEOUS
Qty: 0 | Refills: 0 | Status: DISCONTINUED | OUTPATIENT
Start: 2018-02-19 | End: 2018-02-23

## 2018-02-19 RX ORDER — SODIUM CHLORIDE 9 MG/ML
3 INJECTION INTRAMUSCULAR; INTRAVENOUS; SUBCUTANEOUS ONCE
Qty: 0 | Refills: 0 | Status: COMPLETED | OUTPATIENT
Start: 2018-02-19 | End: 2018-02-19

## 2018-02-19 RX ORDER — KETOROLAC TROMETHAMINE 30 MG/ML
15 SYRINGE (ML) INJECTION ONCE
Qty: 0 | Refills: 0 | Status: DISCONTINUED | OUTPATIENT
Start: 2018-02-19 | End: 2018-02-19

## 2018-02-19 RX ADMIN — SODIUM CHLORIDE 125 MILLILITER(S): 9 INJECTION INTRAMUSCULAR; INTRAVENOUS; SUBCUTANEOUS at 08:59

## 2018-02-19 RX ADMIN — Medication 125 MILLIGRAM(S): at 10:55

## 2018-02-19 RX ADMIN — Medication 15 MILLIGRAM(S): at 10:15

## 2018-02-19 RX ADMIN — SODIUM CHLORIDE 3 MILLILITER(S): 9 INJECTION INTRAMUSCULAR; INTRAVENOUS; SUBCUTANEOUS at 08:59

## 2018-02-19 RX ADMIN — MORPHINE SULFATE 6 MILLIGRAM(S): 50 CAPSULE, EXTENDED RELEASE ORAL at 13:04

## 2018-02-19 RX ADMIN — Medication 15 MILLIGRAM(S): at 09:46

## 2018-02-19 RX ADMIN — MORPHINE SULFATE 6 MILLIGRAM(S): 50 CAPSULE, EXTENDED RELEASE ORAL at 10:56

## 2018-02-19 NOTE — ED PROVIDER NOTE - OBJECTIVE STATEMENT
51 y/o F pt w/ PMHx of Anemia, Asthma, MI, HTN, Anxiety, Fibromyalgia, Depression and Lupus presents to ED c/o body aches today. Pt states that it has been years since her last Lupus flare. Pt denies fever, chills, nausea/vomiting, or any other complaints. Pt is allergic to Estrogen (Rash).

## 2018-02-19 NOTE — ED PROVIDER NOTE - MEDICAL DECISION MAKING DETAILS
History of lupus w/ generalized body aches. Suspicious for flare. Will get labs, Toradol, steroids, reassess.

## 2018-05-01 ENCOUNTER — APPOINTMENT (OUTPATIENT)
Dept: GASTROENTEROLOGY | Facility: CLINIC | Age: 53
End: 2018-05-01
Payer: MEDICARE

## 2018-05-01 VITALS
HEIGHT: 60 IN | BODY MASS INDEX: 28.07 KG/M2 | DIASTOLIC BLOOD PRESSURE: 82 MMHG | SYSTOLIC BLOOD PRESSURE: 124 MMHG | RESPIRATION RATE: 14 BRPM | TEMPERATURE: 97.9 F | WEIGHT: 143 LBS | HEART RATE: 76 BPM

## 2018-05-01 PROCEDURE — 99214 OFFICE O/P EST MOD 30 MIN: CPT

## 2018-05-22 ENCOUNTER — APPOINTMENT (OUTPATIENT)
Dept: GASTROENTEROLOGY | Facility: CLINIC | Age: 53
End: 2018-05-22
Payer: MEDICARE

## 2018-05-22 VITALS
TEMPERATURE: 98.2 F | SYSTOLIC BLOOD PRESSURE: 124 MMHG | WEIGHT: 143 LBS | DIASTOLIC BLOOD PRESSURE: 78 MMHG | RESPIRATION RATE: 16 BRPM | BODY MASS INDEX: 28.07 KG/M2 | HEIGHT: 60 IN | HEART RATE: 90 BPM

## 2018-05-22 PROCEDURE — 99214 OFFICE O/P EST MOD 30 MIN: CPT

## 2018-05-25 ENCOUNTER — MOBILE ON CALL (OUTPATIENT)
Age: 53
End: 2018-05-25

## 2018-06-02 ENCOUNTER — EMERGENCY (EMERGENCY)
Facility: HOSPITAL | Age: 53
LOS: 1 days | Discharge: HOME | End: 2018-06-02
Attending: EMERGENCY MEDICINE
Payer: MEDICARE

## 2018-06-02 VITALS
TEMPERATURE: 98 F | WEIGHT: 138.01 LBS | DIASTOLIC BLOOD PRESSURE: 81 MMHG | SYSTOLIC BLOOD PRESSURE: 132 MMHG | OXYGEN SATURATION: 98 % | RESPIRATION RATE: 18 BRPM | HEART RATE: 74 BPM | HEIGHT: 59 IN

## 2018-06-02 DIAGNOSIS — Z98.51 TUBAL LIGATION STATUS: Chronic | ICD-10-CM

## 2018-06-02 DIAGNOSIS — Z98.89 OTHER SPECIFIED POSTPROCEDURAL STATES: Chronic | ICD-10-CM

## 2018-06-02 PROCEDURE — 99284 EMERGENCY DEPT VISIT MOD MDM: CPT

## 2018-06-02 PROCEDURE — 73130 X-RAY EXAM OF HAND: CPT | Mod: 26,RT

## 2018-06-02 PROCEDURE — 99284 EMERGENCY DEPT VISIT MOD MDM: CPT | Mod: 25

## 2018-06-02 PROCEDURE — 73130 X-RAY EXAM OF HAND: CPT

## 2018-06-02 RX ORDER — ACETAMINOPHEN 500 MG
975 TABLET ORAL ONCE
Qty: 0 | Refills: 0 | Status: COMPLETED | OUTPATIENT
Start: 2018-06-02 | End: 2018-06-02

## 2018-06-02 RX ADMIN — Medication 975 MILLIGRAM(S): at 21:36

## 2018-06-02 NOTE — ED ADULT NURSE NOTE - CHIEF COMPLAINT QUOTE
as per pt right hand lump since september saw an oncologist  and said it was nothing for the past 3 days its been hurting and it feels hard.

## 2018-06-02 NOTE — ED ADULT NURSE NOTE - OBJECTIVE STATEMENT
pt from home c/o of Rt hand pain with palpable lump x1 year worsening in the last 3 days pt reports pain when moving the Rt 3rd and 4th finger skin dry and intact denies any recent injury
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
## 2018-06-02 NOTE — ED PROVIDER NOTE - MUSCULOSKELETAL, MLM
R hand: full range of motion, tenderness over R MCP, lateral aspect. Not able to note lump, swelling, or masses.

## 2018-06-19 ENCOUNTER — EMERGENCY (EMERGENCY)
Facility: HOSPITAL | Age: 53
LOS: 1 days | Discharge: HOME | End: 2018-06-19
Attending: EMERGENCY MEDICINE
Payer: MEDICARE

## 2018-06-19 VITALS
SYSTOLIC BLOOD PRESSURE: 123 MMHG | TEMPERATURE: 98 F | RESPIRATION RATE: 20 BRPM | OXYGEN SATURATION: 97 % | DIASTOLIC BLOOD PRESSURE: 81 MMHG | HEART RATE: 87 BPM

## 2018-06-19 VITALS
DIASTOLIC BLOOD PRESSURE: 71 MMHG | OXYGEN SATURATION: 100 % | HEART RATE: 72 BPM | RESPIRATION RATE: 16 BRPM | SYSTOLIC BLOOD PRESSURE: 115 MMHG | TEMPERATURE: 98 F

## 2018-06-19 DIAGNOSIS — Z98.51 TUBAL LIGATION STATUS: Chronic | ICD-10-CM

## 2018-06-19 DIAGNOSIS — Z98.89 OTHER SPECIFIED POSTPROCEDURAL STATES: Chronic | ICD-10-CM

## 2018-06-19 PROCEDURE — 96375 TX/PRO/DX INJ NEW DRUG ADDON: CPT

## 2018-06-19 PROCEDURE — 96374 THER/PROPH/DIAG INJ IV PUSH: CPT

## 2018-06-19 PROCEDURE — 99284 EMERGENCY DEPT VISIT MOD MDM: CPT | Mod: 25

## 2018-06-19 PROCEDURE — 96376 TX/PRO/DX INJ SAME DRUG ADON: CPT

## 2018-06-19 PROCEDURE — 99284 EMERGENCY DEPT VISIT MOD MDM: CPT

## 2018-06-19 RX ORDER — MAGNESIUM SULFATE 500 MG/ML
1 VIAL (ML) INJECTION ONCE
Qty: 0 | Refills: 0 | Status: COMPLETED | OUTPATIENT
Start: 2018-06-19 | End: 2018-06-19

## 2018-06-19 RX ORDER — METOCLOPRAMIDE HCL 10 MG
10 TABLET ORAL ONCE
Qty: 0 | Refills: 0 | Status: COMPLETED | OUTPATIENT
Start: 2018-06-19 | End: 2018-06-19

## 2018-06-19 RX ORDER — KETOROLAC TROMETHAMINE 30 MG/ML
30 SYRINGE (ML) INJECTION ONCE
Qty: 0 | Refills: 0 | Status: DISCONTINUED | OUTPATIENT
Start: 2018-06-19 | End: 2018-06-19

## 2018-06-19 RX ORDER — SUMATRIPTAN SUCCINATE 4 MG/.5ML
1 INJECTION, SOLUTION SUBCUTANEOUS
Qty: 14 | Refills: 0 | OUTPATIENT
Start: 2018-06-19 | End: 2018-06-25

## 2018-06-19 RX ORDER — SUMATRIPTAN SUCCINATE 4 MG/.5ML
0 INJECTION, SOLUTION SUBCUTANEOUS
Qty: 0 | Refills: 0 | COMMUNITY

## 2018-06-19 RX ORDER — DIPHENHYDRAMINE HCL 50 MG
25 CAPSULE ORAL ONCE
Qty: 0 | Refills: 0 | Status: COMPLETED | OUTPATIENT
Start: 2018-06-19 | End: 2018-06-19

## 2018-06-19 RX ADMIN — Medication 25 MILLIGRAM(S): at 20:34

## 2018-06-19 RX ADMIN — Medication 30 MILLIGRAM(S): at 18:16

## 2018-06-19 RX ADMIN — Medication 100 GRAM(S): at 20:34

## 2018-06-19 RX ADMIN — Medication 10 MILLIGRAM(S): at 20:34

## 2018-06-19 RX ADMIN — Medication 10 MILLIGRAM(S): at 18:16

## 2018-06-19 RX ADMIN — Medication 30 MILLIGRAM(S): at 20:28

## 2018-06-19 NOTE — ED ADULT NURSE REASSESSMENT NOTE - NS ED NURSE REASSESS COMMENT FT1
RESTING IN STRETCHER , STILL WITH HEADACHE , ALERT AND ORIENTED X3 , SPEECH CLEAR .FULL ROM OF ALL EXTREMITIES.NO FAXCIAL ASYMMETRY.

## 2018-06-19 NOTE — ED PROVIDER NOTE - PROGRESS NOTE DETAILS
feels better after toradol and reglan, but still has residual headache headache resolved after second dose of meds (reglan, benadryl and magnesium). pt eating chinese food. asking to go home

## 2018-06-19 NOTE — ED PROVIDER NOTE - MEDICAL DECISION MAKING DETAILS
analgesia, reassess  pt reports this headache is very similar to previous episodes. ran out of sumatriptan, has appointment for 6/27 with neuro  ha resolved.
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
## 2018-07-24 ENCOUNTER — APPOINTMENT (OUTPATIENT)
Dept: GASTROENTEROLOGY | Facility: CLINIC | Age: 53
End: 2018-07-24
Payer: MEDICARE

## 2018-07-24 VITALS
BODY MASS INDEX: 26.31 KG/M2 | OXYGEN SATURATION: 98 % | DIASTOLIC BLOOD PRESSURE: 76 MMHG | RESPIRATION RATE: 14 BRPM | HEIGHT: 60 IN | HEART RATE: 94 BPM | TEMPERATURE: 98.4 F | WEIGHT: 134 LBS | SYSTOLIC BLOOD PRESSURE: 110 MMHG

## 2018-07-24 PROBLEM — D64.9 ANEMIA, UNSPECIFIED: Chronic | Status: ACTIVE | Noted: 2017-10-23

## 2018-07-24 PROCEDURE — 99214 OFFICE O/P EST MOD 30 MIN: CPT

## 2018-08-06 ENCOUNTER — APPOINTMENT (OUTPATIENT)
Dept: INTERNAL MEDICINE | Facility: CLINIC | Age: 53
End: 2018-08-06
Payer: MEDICARE

## 2018-08-06 ENCOUNTER — LABORATORY RESULT (OUTPATIENT)
Age: 53
End: 2018-08-06

## 2018-08-06 ENCOUNTER — NON-APPOINTMENT (OUTPATIENT)
Age: 53
End: 2018-08-06

## 2018-08-06 VITALS
HEIGHT: 59 IN | OXYGEN SATURATION: 97 % | WEIGHT: 134 LBS | HEART RATE: 92 BPM | DIASTOLIC BLOOD PRESSURE: 80 MMHG | SYSTOLIC BLOOD PRESSURE: 120 MMHG | BODY MASS INDEX: 27.01 KG/M2 | TEMPERATURE: 98.3 F | RESPIRATION RATE: 16 BRPM

## 2018-08-06 DIAGNOSIS — Z86.2 PERSONAL HISTORY OF DISEASES OF THE BLOOD AND BLOOD-FORMING ORGANS AND CERTAIN DISORDERS INVOLVING THE IMMUNE MECHANISM: ICD-10-CM

## 2018-08-06 DIAGNOSIS — Z86.79 PERSONAL HISTORY OF OTHER DISEASES OF THE CIRCULATORY SYSTEM: ICD-10-CM

## 2018-08-06 DIAGNOSIS — Z82.49 FAMILY HISTORY OF ISCHEMIC HEART DISEASE AND OTHER DISEASES OF THE CIRCULATORY SYSTEM: ICD-10-CM

## 2018-08-06 DIAGNOSIS — Z92.89 PERSONAL HISTORY OF OTHER MEDICAL TREATMENT: ICD-10-CM

## 2018-08-06 DIAGNOSIS — Z87.898 PERSONAL HISTORY OF OTHER SPECIFIED CONDITIONS: ICD-10-CM

## 2018-08-06 DIAGNOSIS — Z98.51 TUBAL LIGATION STATUS: ICD-10-CM

## 2018-08-06 DIAGNOSIS — G47.00 INSOMNIA, UNSPECIFIED: ICD-10-CM

## 2018-08-06 PROCEDURE — G0438: CPT

## 2018-08-06 PROCEDURE — 93000 ELECTROCARDIOGRAM COMPLETE: CPT

## 2018-08-06 RX ORDER — OXYCODONE AND ACETAMINOPHEN 5; 325 MG/1; MG/1
5-325 TABLET ORAL
Qty: 10 | Refills: 0 | Status: DISCONTINUED | COMMUNITY
Start: 2018-02-19 | End: 2018-08-06

## 2018-08-06 RX ORDER — ATORVASTATIN CALCIUM 80 MG/1
TABLET, FILM COATED ORAL
Refills: 0 | Status: DISCONTINUED | COMMUNITY
End: 2018-08-06

## 2018-08-06 RX ORDER — RIFAXIMIN 550 MG/1
550 TABLET ORAL EVERY 8 HOURS
Qty: 42 | Refills: 0 | Status: DISCONTINUED | COMMUNITY
Start: 2018-05-01 | End: 2018-08-06

## 2018-08-06 RX ORDER — CONJUGATED ESTROGENS 0.62 MG/G
0.62 CREAM VAGINAL
Qty: 30 | Refills: 0 | Status: DISCONTINUED | COMMUNITY
Start: 2018-02-08 | End: 2018-08-06

## 2018-08-06 RX ORDER — VENLAFAXINE HYDROCHLORIDE 150 MG/1
150 CAPSULE, EXTENDED RELEASE ORAL
Qty: 60 | Refills: 0 | Status: DISCONTINUED | COMMUNITY
Start: 2018-01-12 | End: 2018-08-06

## 2018-08-06 RX ORDER — ASPIRIN 81 MG
81 TABLET, DELAYED RELEASE (ENTERIC COATED) ORAL
Refills: 0 | Status: DISCONTINUED | COMMUNITY
End: 2018-08-06

## 2018-08-06 RX ORDER — DEXLANSOPRAZOLE 30 MG/1
30 CAPSULE, DELAYED RELEASE ORAL
Qty: 30 | Refills: 2 | Status: DISCONTINUED | COMMUNITY
Start: 2018-05-25 | End: 2018-08-06

## 2018-08-06 RX ORDER — AMOXICILLIN 500 MG/1
500 CAPSULE ORAL
Qty: 21 | Refills: 0 | Status: DISCONTINUED | COMMUNITY
Start: 2018-01-19 | End: 2018-08-06

## 2018-08-06 RX ORDER — OXYCODONE HYDROCHLORIDE 10 MG/1
10 TABLET, EXTENDED RELEASE ORAL
Qty: 60 | Refills: 0 | Status: DISCONTINUED | COMMUNITY
Start: 2016-08-17 | End: 2018-08-06

## 2018-08-06 RX ORDER — LINACLOTIDE 145 UG/1
145 CAPSULE, GELATIN COATED ORAL
Qty: 30 | Refills: 2 | Status: DISCONTINUED | COMMUNITY
Start: 2017-01-31 | End: 2018-08-06

## 2018-08-06 NOTE — HISTORY OF PRESENT ILLNESS
[de-identified] : 53 year old female patient with history of stable Hypertension, Hypercholesterolemia with Hypertriglyceridemia, Hypothyroidism, Bipolar Disorder, Fibromyalgia, Migraine, Insomnia, CVA, SLE, history as stated, presented for an initial annual preventative examination. Patient denies any associated symptoms of shortness of breath, chest pain, abdominal pain at this time.\par \par

## 2018-08-06 NOTE — REVIEW OF SYSTEMS
[Fatigue] : fatigue [Muscle Pain] : muscle pain [Dizziness] : dizziness [Depression] : depression [Negative] : Heme/Lymph [FreeTextEntry7] : Possible Sx of IBS

## 2018-08-06 NOTE — HEALTH RISK ASSESSMENT
[Good] : ~his/her~  mood as  good [No falls in past year] : Patient reported no falls in the past year [1] : 2) Feeling down, depressed, or hopeless for several days (1) [Patient reported mammogram was normal] : Patient reported mammogram was normal [Patient reported PAP Smear was normal] : Patient reported PAP Smear was normal [Patient reported colonoscopy was normal] : Patient reported colonoscopy was normal [HIV Test offered] : HIV Test offered [Hepatitis C test offered] : Hepatitis C test offered [None] : None [Single] : single [Feels Safe at Home] : Feels safe at home [Fully functional (bathing, dressing, toileting, transferring, walking, feeding)] : Fully functional (bathing, dressing, toileting, transferring, walking, feeding) [Fully functional (using the telephone, shopping, preparing meals, housekeeping, doing laundry, using] : Fully functional and needs no help or supervision to perform IADLs (using the telephone, shopping, preparing meals, housekeeping, doing laundry, using transportation, managing medications and managing finances) [Smoke Detector] : smoke detector [Carbon Monoxide Detector] : carbon monoxide detector [Seat Belt] :  uses seat belt [Sunscreen] : uses sunscreen [Discussed at today's visit] : Advance Directives Discussed at today's visit [] : No [de-identified] : GI [FreeTextEntry1] : Non-suicidal at this time, Psychiatric f/u as counseled and scheduled. [[id number]] : 2 [Change in mental status noted] : No change in mental status noted [Reports changes in hearing] : Reports no changes in hearing [Reports changes in vision] : Reports no changes in vision [Reports changes in dental health] : Reports no changes in dental health [MammogramDate] : 05/17 [PapSmearDate] : 04/17 [ColonoscopyDate] : 01/17

## 2018-08-06 NOTE — ASSESSMENT
[FreeTextEntry1] : 53 year old female found to have stable Hypertension, Hypercholesterolemia with Hypertriglyceridemia, Hypothyroidism, Bipolar Disorder, Fibromyalgia, Migraine, Insomnia, CVA, without residual Sx, possible SLE,with the current regimen, diet and life style modifications, as counseled. Prior results reviewed and discussed with the patient during today's examination. Plan as ordered.\par EKG showed NSR at the rate of 80 BPM, LAD, known RBBB, non-sp ST-T changes noted.\par TDAp/PNA vaccinations as feasible in the future, as per prior history of immunization.\par

## 2018-08-09 LAB
25(OH)D3 SERPL-MCNC: 42 NG/ML
ALBUMIN SERPL ELPH-MCNC: 4.7 G/DL
ALP BLD-CCNC: 80 U/L
ALT SERPL-CCNC: 15 U/L
ANION GAP SERPL CALC-SCNC: 14 MMOL/L
AST SERPL-CCNC: 24 U/L
BASOPHILS # BLD AUTO: 0.02 K/UL
BASOPHILS NFR BLD AUTO: 0.5 %
BILIRUB SERPL-MCNC: 0.2 MG/DL
BUN SERPL-MCNC: 13 MG/DL
CALCIUM SERPL-MCNC: 9.3 MG/DL
CHLORIDE SERPL-SCNC: 106 MMOL/L
CHOLEST SERPL-MCNC: 159 MG/DL
CHOLEST/HDLC SERPL: 2.4 RATIO
CO2 SERPL-SCNC: 23 MMOL/L
CREAT SERPL-MCNC: 0.98 MG/DL
CRP SERPL-MCNC: <0.1 MG/DL
DSDNA AB SER-ACNC: <12 IU/ML
EOSINOPHIL # BLD AUTO: 0.1 K/UL
EOSINOPHIL NFR BLD AUTO: 2.4 %
ERYTHROCYTE [SEDIMENTATION RATE] IN BLOOD BY WESTERGREN METHOD: 26 MM/HR
FOLATE SERPL-MCNC: 12.1 NG/ML
GGT SERPL-CCNC: 23 U/L
GLUCOSE SERPL-MCNC: 48 MG/DL
HBA1C MFR BLD HPLC: 5.6 %
HBV CORE IGG+IGM SER QL: NONREACTIVE
HBV SURFACE AB SER QL: NONREACTIVE
HBV SURFACE AG SER QL: NONREACTIVE
HCT VFR BLD CALC: 35 %
HCV AB SER QL: NONREACTIVE
HCV S/CO RATIO: 0.12 S/CO
HDLC SERPL-MCNC: 65 MG/DL
HEPATITIS A IGG ANTIBODY: REACTIVE
HGB BLD-MCNC: 11.4 G/DL
HIV1+2 AB SPEC QL IA.RAPID: NONREACTIVE
IMM GRANULOCYTES NFR BLD AUTO: 0.2 %
IRON SATN MFR SERPL: 25 %
IRON SERPL-MCNC: 83 UG/DL
LDLC SERPL CALC-MCNC: 80 MG/DL
LYMPHOCYTES # BLD AUTO: 1.63 K/UL
LYMPHOCYTES NFR BLD AUTO: 39.9 %
MAN DIFF?: NORMAL
MCHC RBC-ENTMCNC: 31.3 PG
MCHC RBC-ENTMCNC: 32.6 GM/DL
MCV RBC AUTO: 96.2 FL
MONOCYTES # BLD AUTO: 0.31 K/UL
MONOCYTES NFR BLD AUTO: 7.6 %
NEUTROPHILS # BLD AUTO: 2.02 K/UL
NEUTROPHILS NFR BLD AUTO: 49.4 %
PLATELET # BLD AUTO: 348 K/UL
POTASSIUM SERPL-SCNC: 3.2 MMOL/L
PROT SERPL-MCNC: 8.2 G/DL
RBC # BLD: 3.64 M/UL
RBC # FLD: 12.2 %
RHEUMATOID FACT SER QL: <10 IU/ML
SODIUM SERPL-SCNC: 143 MMOL/L
T3 SERPL-MCNC: 74 NG/DL
T4 FREE SERPL-MCNC: 1 NG/DL
TIBC SERPL-MCNC: 334 UG/DL
TRIGL SERPL-MCNC: 71 MG/DL
TSH SERPL-ACNC: 5.13 UIU/ML
UIBC SERPL-MCNC: 251 UG/DL
URATE SERPL-MCNC: 2.7 MG/DL
VIT B12 SERPL-MCNC: 766 PG/ML
WBC # FLD AUTO: 4.09 K/UL

## 2018-08-10 DIAGNOSIS — R76.8 OTHER SPECIFIED ABNORMAL IMMUNOLOGICAL FINDINGS IN SERUM: ICD-10-CM

## 2018-08-10 LAB
ANA PAT FLD IF-IMP: ABNORMAL
ANA PATTERN: ABNORMAL
ANA SER IF-ACNC: ABNORMAL
ANA TITER: ABNORMAL

## 2018-08-15 ENCOUNTER — MOBILE ON CALL (OUTPATIENT)
Age: 53
End: 2018-08-15

## 2018-08-15 LAB
APPEARANCE: CLEAR
BILIRUBIN URINE: NEGATIVE
BLOOD URINE: NEGATIVE
COLOR: YELLOW
CREAT SPEC-SCNC: 95 MG/DL
GLUCOSE QUALITATIVE U: NEGATIVE MG/DL
HEMOCCULT STL QL IA: POSITIVE
KETONES URINE: NEGATIVE
LEUKOCYTE ESTERASE URINE: NEGATIVE
MICROALBUMIN 24H UR DL<=1MG/L-MCNC: <1.2 MG/DL
MICROALBUMIN/CREAT 24H UR-RTO: NORMAL
NITRITE URINE: NEGATIVE
PH URINE: 6.5
PROTEIN URINE: NEGATIVE MG/DL
SPECIFIC GRAVITY URINE: 1.01
UROBILINOGEN URINE: NEGATIVE MG/DL

## 2018-08-16 ENCOUNTER — RX RENEWAL (OUTPATIENT)
Age: 53
End: 2018-08-16

## 2018-08-17 ENCOUNTER — CLINICAL ADVICE (OUTPATIENT)
Age: 53
End: 2018-08-17

## 2018-08-27 ENCOUNTER — CLINICAL ADVICE (OUTPATIENT)
Age: 53
End: 2018-08-27

## 2018-08-31 RX ORDER — IBUPROFEN 600 MG/1
600 TABLET, FILM COATED ORAL
Qty: 60 | Refills: 0 | Status: DISCONTINUED | COMMUNITY
Start: 2018-02-12 | End: 2018-08-31

## 2018-08-31 RX ORDER — OMEPRAZOLE 20 MG/1
20 TABLET, DELAYED RELEASE ORAL
Qty: 30 | Refills: 5 | Status: DISCONTINUED | COMMUNITY
End: 2018-08-31

## 2018-09-14 ENCOUNTER — APPOINTMENT (OUTPATIENT)
Dept: INTERNAL MEDICINE | Facility: CLINIC | Age: 53
End: 2018-09-14
Payer: MEDICARE

## 2018-09-14 VITALS
RESPIRATION RATE: 16 BRPM | BODY MASS INDEX: 27.21 KG/M2 | HEIGHT: 59 IN | TEMPERATURE: 98.3 F | OXYGEN SATURATION: 98 % | SYSTOLIC BLOOD PRESSURE: 120 MMHG | HEART RATE: 78 BPM | WEIGHT: 135 LBS | DIASTOLIC BLOOD PRESSURE: 78 MMHG

## 2018-09-14 PROCEDURE — 99214 OFFICE O/P EST MOD 30 MIN: CPT | Mod: 25

## 2018-09-14 PROCEDURE — 90471 IMMUNIZATION ADMIN: CPT

## 2018-09-14 PROCEDURE — 90715 TDAP VACCINE 7 YRS/> IM: CPT

## 2018-09-14 NOTE — ASSESSMENT
[FreeTextEntry1] : 53 year old female found to have stable Hypertension, Hypothyroidism, Hypercholesterolemia with Hypertriglyceridemia, CVA, GERD, Insomnia, Migraine,with the current regimen, diet and life style modifications, as counseled. Prior results reviewed and discussed with the patient during today's examination. Plan as ordered.\par

## 2018-09-14 NOTE — HEALTH RISK ASSESSMENT
[No falls in past year] : Patient reported no falls in the past year [0] : 2) Feeling down, depressed, or hopeless: Not at all (0) [] : No [de-identified] : None [EIC2Lnlmz] : 0

## 2018-09-14 NOTE — HISTORY OF PRESENT ILLNESS
[de-identified] : 53 year old  female patient with history of stable Hypertension, Hypothyroidism, Hypercholesterolemia with Hypertriglyceridemia, CVA, Insomnia, Migraine, GERD, history as stated, presented for follow up examination of Elevated BP checked. Patient is compliant with all medications. Denies shortness of breath, chest pain or abdominal pains at this time. ROS as stated.\par

## 2018-09-14 NOTE — REVIEW OF SYSTEMS
[Diarrhea] : diarrhea [FreeTextEntry7] : Discussed with Dr. Virk, no colonoscopy at this time, Pepto-Bismal PRN, as counseled.

## 2018-10-01 ENCOUNTER — INPATIENT (INPATIENT)
Facility: HOSPITAL | Age: 53
LOS: 0 days | Discharge: HOME | DRG: 313 | End: 2018-10-02
Attending: INTERNAL MEDICINE | Admitting: INTERNAL MEDICINE
Payer: MEDICARE

## 2018-10-01 VITALS
SYSTOLIC BLOOD PRESSURE: 120 MMHG | WEIGHT: 130.07 LBS | HEIGHT: 59 IN | HEART RATE: 88 BPM | OXYGEN SATURATION: 100 % | RESPIRATION RATE: 20 BRPM | TEMPERATURE: 98 F | DIASTOLIC BLOOD PRESSURE: 86 MMHG

## 2018-10-01 DIAGNOSIS — Z98.89 OTHER SPECIFIED POSTPROCEDURAL STATES: Chronic | ICD-10-CM

## 2018-10-01 DIAGNOSIS — M79.7 FIBROMYALGIA: ICD-10-CM

## 2018-10-01 DIAGNOSIS — G43.909 MIGRAINE, UNSPECIFIED, NOT INTRACTABLE, WITHOUT STATUS MIGRAINOSUS: ICD-10-CM

## 2018-10-01 DIAGNOSIS — I10 ESSENTIAL (PRIMARY) HYPERTENSION: ICD-10-CM

## 2018-10-01 DIAGNOSIS — Z29.9 ENCOUNTER FOR PROPHYLACTIC MEASURES, UNSPECIFIED: ICD-10-CM

## 2018-10-01 DIAGNOSIS — D64.9 ANEMIA, UNSPECIFIED: ICD-10-CM

## 2018-10-01 DIAGNOSIS — E78.5 HYPERLIPIDEMIA, UNSPECIFIED: ICD-10-CM

## 2018-10-01 DIAGNOSIS — F41.9 ANXIETY DISORDER, UNSPECIFIED: ICD-10-CM

## 2018-10-01 DIAGNOSIS — R07.9 CHEST PAIN, UNSPECIFIED: ICD-10-CM

## 2018-10-01 DIAGNOSIS — Z98.51 TUBAL LIGATION STATUS: Chronic | ICD-10-CM

## 2018-10-01 LAB
ALBUMIN SERPL ELPH-MCNC: 3.9 G/DL — SIGNIFICANT CHANGE UP (ref 3.5–5)
ALP SERPL-CCNC: 89 U/L — SIGNIFICANT CHANGE UP (ref 40–120)
ALT FLD-CCNC: 32 U/L DA — SIGNIFICANT CHANGE UP (ref 10–60)
ANION GAP SERPL CALC-SCNC: 6 MMOL/L — SIGNIFICANT CHANGE UP (ref 5–17)
APTT BLD: 29 SEC — SIGNIFICANT CHANGE UP (ref 27.5–37.4)
AST SERPL-CCNC: 30 U/L — SIGNIFICANT CHANGE UP (ref 10–40)
BILIRUB SERPL-MCNC: 0.2 MG/DL — SIGNIFICANT CHANGE UP (ref 0.2–1.2)
BUN SERPL-MCNC: 12 MG/DL — SIGNIFICANT CHANGE UP (ref 7–18)
CALCIUM SERPL-MCNC: 8.3 MG/DL — LOW (ref 8.4–10.5)
CHLORIDE SERPL-SCNC: 110 MMOL/L — HIGH (ref 96–108)
CO2 SERPL-SCNC: 28 MMOL/L — SIGNIFICANT CHANGE UP (ref 22–31)
CREAT SERPL-MCNC: 0.9 MG/DL — SIGNIFICANT CHANGE UP (ref 0.5–1.3)
D DIMER BLD IA.RAPID-MCNC: <150 NG/ML DDU — SIGNIFICANT CHANGE UP
GLUCOSE SERPL-MCNC: 77 MG/DL — SIGNIFICANT CHANGE UP (ref 70–99)
HCT VFR BLD CALC: 31.4 % — LOW (ref 34.5–45)
HGB BLD-MCNC: 10 G/DL — LOW (ref 11.5–15.5)
INR BLD: 1.02 RATIO — SIGNIFICANT CHANGE UP (ref 0.88–1.16)
MCHC RBC-ENTMCNC: 31.2 PG — SIGNIFICANT CHANGE UP (ref 27–34)
MCHC RBC-ENTMCNC: 31.9 GM/DL — LOW (ref 32–36)
MCV RBC AUTO: 97.6 FL — SIGNIFICANT CHANGE UP (ref 80–100)
PLATELET # BLD AUTO: 320 K/UL — SIGNIFICANT CHANGE UP (ref 150–400)
POTASSIUM SERPL-MCNC: 4 MMOL/L — SIGNIFICANT CHANGE UP (ref 3.5–5.3)
POTASSIUM SERPL-SCNC: 4 MMOL/L — SIGNIFICANT CHANGE UP (ref 3.5–5.3)
PROT SERPL-MCNC: 7.6 G/DL — SIGNIFICANT CHANGE UP (ref 6–8.3)
PROTHROM AB SERPL-ACNC: 11.1 SEC — SIGNIFICANT CHANGE UP (ref 9.8–12.7)
RBC # BLD: 3.22 M/UL — LOW (ref 3.8–5.2)
RBC # FLD: 11.6 % — SIGNIFICANT CHANGE UP (ref 10.3–14.5)
SODIUM SERPL-SCNC: 144 MMOL/L — SIGNIFICANT CHANGE UP (ref 135–145)
TROPONIN I SERPL-MCNC: <0.015 NG/ML — SIGNIFICANT CHANGE UP (ref 0–0.04)
WBC # BLD: 4.2 K/UL — SIGNIFICANT CHANGE UP (ref 3.8–10.5)
WBC # FLD AUTO: 4.2 K/UL — SIGNIFICANT CHANGE UP (ref 3.8–10.5)

## 2018-10-01 PROCEDURE — 71046 X-RAY EXAM CHEST 2 VIEWS: CPT | Mod: 26

## 2018-10-01 PROCEDURE — 99285 EMERGENCY DEPT VISIT HI MDM: CPT

## 2018-10-01 PROCEDURE — 99223 1ST HOSP IP/OBS HIGH 75: CPT | Mod: GC

## 2018-10-01 RX ORDER — PANTOPRAZOLE SODIUM 20 MG/1
40 TABLET, DELAYED RELEASE ORAL
Qty: 0 | Refills: 0 | Status: DISCONTINUED | OUTPATIENT
Start: 2018-10-01 | End: 2018-10-02

## 2018-10-01 RX ORDER — CARVEDILOL PHOSPHATE 80 MG/1
3.12 CAPSULE, EXTENDED RELEASE ORAL EVERY 12 HOURS
Qty: 0 | Refills: 0 | Status: DISCONTINUED | OUTPATIENT
Start: 2018-10-01 | End: 2018-10-02

## 2018-10-01 RX ORDER — NORTRIPTYLINE HYDROCHLORIDE 10 MG/1
75 CAPSULE ORAL AT BEDTIME
Qty: 0 | Refills: 0 | Status: DISCONTINUED | OUTPATIENT
Start: 2018-10-01 | End: 2018-10-02

## 2018-10-01 RX ORDER — ATORVASTATIN CALCIUM 80 MG/1
20 TABLET, FILM COATED ORAL AT BEDTIME
Qty: 0 | Refills: 0 | Status: DISCONTINUED | OUTPATIENT
Start: 2018-10-01 | End: 2018-10-01

## 2018-10-01 RX ORDER — ATORVASTATIN CALCIUM 80 MG/1
80 TABLET, FILM COATED ORAL AT BEDTIME
Qty: 0 | Refills: 0 | Status: DISCONTINUED | OUTPATIENT
Start: 2018-10-01 | End: 2018-10-02

## 2018-10-01 RX ORDER — RANITIDINE HYDROCHLORIDE 150 MG/1
0 TABLET, FILM COATED ORAL
Qty: 0 | Refills: 0 | COMMUNITY

## 2018-10-01 RX ORDER — ACETAMINOPHEN 500 MG
650 TABLET ORAL ONCE
Qty: 0 | Refills: 0 | Status: COMPLETED | OUTPATIENT
Start: 2018-10-01 | End: 2018-10-01

## 2018-10-01 RX ORDER — ASPIRIN/CALCIUM CARB/MAGNESIUM 324 MG
162 TABLET ORAL ONCE
Qty: 0 | Refills: 0 | Status: DISCONTINUED | OUTPATIENT
Start: 2018-10-01 | End: 2018-10-01

## 2018-10-01 RX ORDER — ASPIRIN/CALCIUM CARB/MAGNESIUM 324 MG
81 TABLET ORAL DAILY
Qty: 0 | Refills: 0 | Status: DISCONTINUED | OUTPATIENT
Start: 2018-10-01 | End: 2018-10-02

## 2018-10-01 RX ORDER — TRAZODONE HCL 50 MG
1 TABLET ORAL
Qty: 0 | Refills: 0 | COMMUNITY

## 2018-10-01 RX ORDER — SUMATRIPTAN SUCCINATE 4 MG/.5ML
50 INJECTION, SOLUTION SUBCUTANEOUS
Qty: 0 | Refills: 0 | Status: DISCONTINUED | OUTPATIENT
Start: 2018-10-01 | End: 2018-10-02

## 2018-10-01 RX ORDER — TOPIRAMATE 25 MG
100 TABLET ORAL
Qty: 0 | Refills: 0 | Status: DISCONTINUED | OUTPATIENT
Start: 2018-10-01 | End: 2018-10-02

## 2018-10-01 RX ORDER — VENLAFAXINE HCL 75 MG
75 CAPSULE, EXT RELEASE 24 HR ORAL
Qty: 0 | Refills: 0 | Status: DISCONTINUED | OUTPATIENT
Start: 2018-10-01 | End: 2018-10-01

## 2018-10-01 RX ORDER — TOPIRAMATE 25 MG
0 TABLET ORAL
Qty: 0 | Refills: 0 | COMMUNITY

## 2018-10-01 RX ORDER — TRAZODONE HCL 50 MG
100 TABLET ORAL AT BEDTIME
Qty: 0 | Refills: 0 | Status: DISCONTINUED | OUTPATIENT
Start: 2018-10-01 | End: 2018-10-02

## 2018-10-01 RX ORDER — CLONAZEPAM 1 MG
0.5 TABLET ORAL
Qty: 0 | Refills: 0 | Status: DISCONTINUED | OUTPATIENT
Start: 2018-10-01 | End: 2018-10-02

## 2018-10-01 RX ORDER — SODIUM CHLORIDE 9 MG/ML
3 INJECTION INTRAMUSCULAR; INTRAVENOUS; SUBCUTANEOUS ONCE
Qty: 0 | Refills: 0 | Status: COMPLETED | OUTPATIENT
Start: 2018-10-01 | End: 2018-10-01

## 2018-10-01 RX ORDER — VENLAFAXINE HCL 75 MG
0 CAPSULE, EXT RELEASE 24 HR ORAL
Qty: 0 | Refills: 0 | COMMUNITY

## 2018-10-01 RX ORDER — VENLAFAXINE HCL 75 MG
75 CAPSULE, EXT RELEASE 24 HR ORAL EVERY 12 HOURS
Qty: 0 | Refills: 0 | Status: DISCONTINUED | OUTPATIENT
Start: 2018-10-01 | End: 2018-10-02

## 2018-10-01 RX ORDER — CYCLOBENZAPRINE HYDROCHLORIDE 10 MG/1
0 TABLET, FILM COATED ORAL
Qty: 0 | Refills: 0 | COMMUNITY

## 2018-10-01 RX ORDER — ATORVASTATIN CALCIUM 80 MG/1
0 TABLET, FILM COATED ORAL
Qty: 0 | Refills: 0 | COMMUNITY

## 2018-10-01 RX ADMIN — Medication 0.5 MILLIGRAM(S): at 22:19

## 2018-10-01 RX ADMIN — ATORVASTATIN CALCIUM 20 MILLIGRAM(S): 80 TABLET, FILM COATED ORAL at 21:25

## 2018-10-01 RX ADMIN — NORTRIPTYLINE HYDROCHLORIDE 75 MILLIGRAM(S): 10 CAPSULE ORAL at 22:19

## 2018-10-01 RX ADMIN — Medication 650 MILLIGRAM(S): at 22:19

## 2018-10-01 RX ADMIN — ATORVASTATIN CALCIUM 80 MILLIGRAM(S): 80 TABLET, FILM COATED ORAL at 22:16

## 2018-10-01 RX ADMIN — SODIUM CHLORIDE 3 MILLILITER(S): 9 INJECTION INTRAMUSCULAR; INTRAVENOUS; SUBCUTANEOUS at 16:16

## 2018-10-01 RX ADMIN — Medication 100 MILLIGRAM(S): at 22:19

## 2018-10-01 NOTE — H&P ADULT - NSHPPHYSICALEXAM_GEN_ALL_CORE
CONSTITUTIONAL: Well appearing, well nourished, awake, alert and in no apparent distress  ENMT: Airway patent, Nasal mucosa clear. Mouth with normal mucosa. Throat has no vesicles, no oropharyngeal exudates and uvula is midline.  EYES: Clear bilaterally, pupils equal, round and reactive to light. EOMI.  CARDIAC: Normal rate, regular rhythm.  Heart sounds S1, S2.  No murmurs, rubs or gallops   RESPIRATORY: Breath sounds clear and equal bilaterally. No wheezes, rhales or rhonchi  MUSCULOSKELETAL: Spine appears normal, range of motion is not limited, no muscle or joint tenderness  EXTREMITIES: No edema, cyanosis or deformity   NEUROLOGICAL: Alert and oriented, no focal deficits, no motor or sensory deficits.  SKIN: No rash, skin turgor

## 2018-10-01 NOTE — ED ADULT NURSE NOTE - ED STAT RN HANDOFF DETAILS
Pt AOX3, in stable condition, placed on cardiac monitor, no signs of distress noted. Endorsed to nurse Dodd

## 2018-10-01 NOTE — H&P ADULT - ATTENDING COMMENTS
53 year old Female patient with PMHx significant for Hypertension, Fibromyalgia, Depression, Insomnia, Asthma, anxiety, HLD, migraine and CAD in 2012 no stents. She came in with c/o chest pain off and on since Friday. Patient reports that she has been having sub-sternal, pressure like  chest pain, 6/10 in intensity, approx. 4-5 episodes in a day lasting for about 30-45 minutes with radiation to the back, associated with sob, dizziness, sweating and palpitations with no aggravating or relieving factors.    FH: MI  SH: Lives with Son, Non smoker, No alcohol use    Vital Signs Last 24 Hrs  T(C): 36.9 (01 Oct 2018 16:45), Max: 36.9 (01 Oct 2018 13:36)  T(F): 98.5 (01 Oct 2018 16:45), Max: 98.5 (01 Oct 2018 16:45)  HR: 84 (01 Oct 2018 16:45) (84 - 88)  BP: 105/67 (01 Oct 2018 16:45) (105/67 - 120/86)  RR: 18 (01 Oct 2018 16:45) (18 - 20)  SpO2: 100% (01 Oct 2018 16:45) (100% - 100%)    P/E: As above  Gen: Middle aged female, in no acute distress with somewhat depressed mood  CVS: S1S2 present, Regular  resp: BLAE+, No wheeze or Rhonchi  GI: Soft, BS+, NT, ND  Extr: No edema or calf tenderness B/L LE    Labs:                        10.0   4.2   )-----------( 320      ( 01 Oct 2018 16:44 )             31.4   10-01    144  |  110<H>  |  12  ----------------------------<  77  4.0   |  28  |  0.90    Ca    8.3<L>      01 Oct 2018 16:33    TPro  7.6  /  Alb  3.9  /  TBili  0.2  /  DBili  x   /  AST  30  /  ALT  32  /  AlkPhos  89  10-01    D/D:  Chest pain in post menopausal women with FH concern for Ischemic heart disease, less likely ACS  Hx Depression with Anxiety and Insomnia  hx Migraine stable currently  Hx Fibromyalgia    Plan: 53 year old Female patient with PMHx significant for Hypertension, Fibromyalgia, Depression, Insomnia, Asthma, anxiety, HLD, migraine and CAD in 2012 no stents. She came in with c/o chest pain off and on since Friday. Patient reports that she has been having sub-sternal, pressure like  chest pain, 6/10 in intensity, approx. 4-5 episodes in a day lasting for about 30-45 minutes with radiation to the back, associated with sob, dizziness, sweating and palpitations with no aggravating or relieving factors.    FH: MI  SH: Lives with Son, Non smoker, No alcohol use    Vital Signs Last 24 Hrs  T(C): 36.9 (01 Oct 2018 16:45), Max: 36.9 (01 Oct 2018 13:36)  T(F): 98.5 (01 Oct 2018 16:45), Max: 98.5 (01 Oct 2018 16:45)  HR: 84 (01 Oct 2018 16:45) (84 - 88)  BP: 105/67 (01 Oct 2018 16:45) (105/67 - 120/86)  RR: 18 (01 Oct 2018 16:45) (18 - 20)  SpO2: 100% (01 Oct 2018 16:45) (100% - 100%)    P/E: As above  Gen: Middle aged female, in no acute distress with somewhat depressed mood  CVS: S1S2 present, Regular  resp: BLAE+, No wheeze or Rhonchi  GI: Soft, BS+, NT, ND  Extr: No edema or calf tenderness B/L LE    Labs:                        10.0   4.2   )-----------( 320      ( 01 Oct 2018 16:44 )             31.4   10-01    144  |  110<H>  |  12  ----------------------------<  77  4.0   |  28  |  0.90    Ca    8.3<L>      01 Oct 2018 16:33    TPro  7.6  /  Alb  3.9  /  TBili  0.2  /  DBili  x   /  AST  30  /  ALT  32  /  AlkPhos  89  10-01    D/D:  Chest pain in post menopausal women with FH concern for Ischemic heart disease, less likely ACS  Possible NSAID induced Gastritis  Normocytic Anemia etiology unclear ?? B12 deficiency  Hx Depression with Anxiety and Insomnia  hx Migraine stable currently  Hx Fibromyalgia/ Migraine Headaches    Plan:  Tele; Serial cardiac enzymes and EKG repeat in AM  2D Echo; Ecotrin and Statin  Cardio eval Dr. Patel in AM  TSH, Lipid panel in AM; HgbA1c; Anemia panel with Iron studies and B12 and folate levels  If Echo WNL, Patient may need stress test Inpatient Vs Outpatient as deemed necessary by Cardio  Patient on multiple medications for her Anxiety and depression, will continue home dose for now.   Continue Topamax  Trail of PPI; If symptoms persist may need EGD to evaluate for H. Pylori as patient also has Anemia      Discussed with Patient findings and likely etiology and also recommended repeat EGD as outpatient if Cardiac w/u negative and symptoms persist  Discussed with PGY1 DEENA Wade and PGY3

## 2018-10-01 NOTE — ED PROVIDER NOTE - PR
tcm call completed in office.  Please see telephone note dated 8/3/17.  TCM is applicable for 8/8/17 appt.  
170

## 2018-10-01 NOTE — H&P ADULT - PROBLEM SELECTOR PLAN 4
-Patient has hx of anxiety and depression.  -She takes venlafaxine, clonazepam, trazodone and  nortriptyline  -Will continue her home medications. -patient has hx of hyperlipidemia.  -She takes statin at home, will continue with atorvastatin 20 mg daily.  -Follow lipid panel  -Diet; DASH

## 2018-10-01 NOTE — ED PROVIDER NOTE - MEDICAL DECISION MAKING DETAILS
Patient with chest discomfort. Cardiac workup, X-ray, labs, admit. Patient with chest discomfort. Cardiac workup, X-ray, labs, admit as HEART score elevated

## 2018-10-01 NOTE — ED PROVIDER NOTE - OBJECTIVE STATEMENT
54 y/o F patient with a significant PMHx of Anemia, Anxiety, Asthma, Chronic pain, Depression, Fibromyalgia, HTN, High Cholesterol, Lupus Myocardial infarction and a significant PSHx of  section x4 and tubal ligation presents to the ED with chest tightness and chest pressure for a couple of days. Patient states she experiences chest tightness and chest pressure without warning. Patient describes chest tightness and chest pressures as "an elephant on my chest". Patient also endorses b/l leg swelling which occurs at night. Patient notes she took x2 doses of Aspirin today to no relief. Patient denies any other complaints. Patient's PCP is Dr. Saba Sales. Allergies: Estrogen (Rash). 54 y/o F patient with a significant PMHx of Anemia, Anxiety, Asthma, Chronic pain, Depression, Fibromyalgia, HTN, High Cholesterol, Lupus Myocardial infarction and a significant PSHx of  section x4 and tubal ligation presents to the ED with intermittent chest tightness and chest pressure for a couple of days. Patient states she experiences chest tightness and chest pressure without warning. Patient describes chest tightness and chest pressures as "an elephant on my chest". Patient also endorses b/l leg swelling which occurs at night. Patient notes she took 2 Aspirin earlier today to no relief. Patient denies any other complaints. Patient's PCP is Dr. Saba Sales. Allergies: Estrogen (Rash).

## 2018-10-01 NOTE — H&P ADULT - ASSESSMENT
53 year old Female patient with PMHx significant for Hypertension, Fibromyalgia, Depression, Insomnia, Asthma, anxiety, HLD, migraine and CAD in 2012 no stents. She came in with c/o chest pain off and on since Friday. Patient reports that she has been having sub-sternal, pressure like  chest pain, 6/10 in intensity, approx. 4-5 episodes in a day lasting for about 30-45 minutes with radiation to the back, associated with sob, dizziness, sweating and palpitations with no aggravating or relieving factors. Patient said that yesterday afternoon, she had another episode of chest pain, called her PCP who recommended her to visit ED. She took 2 aspirins with no relief in her symptoms. She reports that she has not had such symptoms before. She also c/o of sob on climbing 6-7 steps of stairs and can walk 1-2 blocks without being short of breath for past one month. She further added that her last stress test was 7 years ago and is not following with any cardiologist at present.      In ED, patient was seen at bedside had no chest pain at that time, was sitting comfortably.   VS: HR BP TEMP RR SPO2    EKG:     Patient is admitted to r/o ACS 53 year old Female patient with PMHx significant for Hypertension, Fibromyalgia, Depression, Insomnia, Asthma, anxiety, HLD, migraine and CAD in 2012 no stents. She came in with c/o chest pain off and on since Friday. Patient reports that she has been having sub-sternal, pressure like  chest pain, 6/10 in intensity, approx. 4-5 episodes in a day lasting for about 30-45 minutes with radiation to the back, associated with sob, dizziness, sweating and palpitations with no aggravating or relieving factors. Patient said that yesterday afternoon, she had another episode of chest pain, called her PCP who recommended her to visit ED. She took 2 aspirins with no relief in her symptoms. She reports that she has not had such symptoms before. She also c/o of sob on climbing 6-7 steps of stairs and can walk 1-2 blocks without being short of breath for past one month. She further added that her last stress test was 7 years ago and is not following with any cardiologist at present.      In ED, patient was seen at bedside had no chest pain at that time, was sitting comfortably.   VS: HR BP TEMP RR SPO2    EKG: NSR @ 82 bpm with RBB (no change from previous Feb'2018)    Patient is admitted to r/o ACS

## 2018-10-01 NOTE — H&P ADULT - PROBLEM SELECTOR PLAN 3
-patient has hx of hyperlipidemia.  -She takes statin at home, will continue with atorvastatin 20 mg daily.  -Follow lipid panel  -Diet; DASH -Patient has history of hypertension, she takes no medication at home.  -BP is wnl at this time.  -Monitor BP

## 2018-10-01 NOTE — H&P ADULT - PROBLEM SELECTOR PLAN 1
-Patient came in with typical chest pain with significant family history of CAD.  -EKG: NSR with RBBB 9no change from previous EKG Feb'2018  -USHA Score   -HEART Score   -Cardiac enzymes x 1 negative  -Will trend cardiac enzymes X 2  -Continue with aspirin   -ECHO to r/o any structural hear defect.   -Cardiology consult- Dr. Patel -Patient came in with typical chest pain with significant family history of CAD.  -EKG: NSR 2 82bpm with RBBB 9no change from previous EKG Feb'2018  -USHA Score; 3 -13% risk at 14 days of: all-cause mortality, new or recurrent MI, or severe recurrent ischemia requiring urgent revascularization.  -HEART Score:5 Risk of MACE of 12-16.6%.  -Cardiac enzymes x 1 negative  -Will trend cardiac enzymes X 2  -Continue with aspirin   -ECHO to r/o any structural hear defect.   -Cardiology consult- Dr. Patel -Patient came in with typical chest pain with significant family history of CAD.  -EKG: NSR 2 82bpm with RBBB 9no change from previous EKG Feb'2018  -USHA Score; 3 -13% risk at 14 days of: all-cause mortality, new or recurrent MI, or severe recurrent ischemia requiring urgent revascularization.  -HEART Score:5 Risk of MACE of 12-16.6%.  -USHA Score- 3   -chest pain could be secondary to ACS/unstable angina VS gastritis   -Cardiac enzymes x 1 negative  -Will trend cardiac enzymes X 2  -Continue with aspirin   -ECHO to r/o any structural heart defect.   -Cardiology consult- Dr. Patel

## 2018-10-01 NOTE — H&P ADULT - PROBLEM SELECTOR PLAN 6
IMPROVE VTE score:  [ ] Previous VTE                                                3  [ ] Thrombophilia                                             2  [ ] Lower limb paralysis                                  2        (unable to hold up >15 seconds)    [ ] Current Cancer (within 6 months)            2   [x] Immobilization > 24 hrs                              1  [ ] ICU/CCU stay > 24 hours                            1  [] Age > 60                                                         1  IMPROVE Score-1. No need of VTE prophylaxis -patient has hx of migraine headache.  -She takes sumatriptan and topiramate at home.  -Will continue with her home meds.

## 2018-10-01 NOTE — ED ADULT NURSE NOTE - OBJECTIVE STATEMENT
Pt AOx3, ambulatory, intermittent chest pain x 2 days, pt endorses SOB, and dizziness. Denies syncope, LOC, n/v. EKG done, pt placed on cardiac monitor

## 2018-10-01 NOTE — H&P ADULT - PROBLEM SELECTOR PLAN 5
-patient has hx of migraine headache.  -She takes sumatriptan and topiramate at home.  -Will continue with her home meds. -Patient has hx of anxiety and depression.  -She takes venlafaxine, clonazepam, trazodone and  nortriptyline  -Will continue her home medications.

## 2018-10-01 NOTE — H&P ADULT - FAMILY HISTORY
Sibling  Still living? Unknown  Family history of breast cancer in first degree relative, Age at diagnosis: Age Unknown     Father  Still living? No  Family history of acute myocardial infarction, Age at diagnosis: Age Unknown

## 2018-10-01 NOTE — H&P ADULT - NSHPREVIEWOFSYSTEMS_GEN_ALL_CORE
REVIEW OF SYSTEMS:    CONSTITUTIONAL: No weakness, fevers or chills  EYES/ENT: No visual changes;  No vertigo or throat pain   NECK: No pain or stiffness  RESPIRATORY: No cough, wheezing, hemoptysis; shortness of breath +  CARDIOVASCULAR: Chest pain, palpitations.+  GASTROINTESTINAL: No abdominal or epigastric pain. No nausea, vomiting, or hematemesis; No diarrhea or constipation. No melena or hematochezia.  GENITOURINARY: No dysuria, frequency or hematuria  NEUROLOGICAL: No numbness or weakness  SKIN: No itching, burning, rashes, or lesions   All other review of systems is negative unless indicated above.

## 2018-10-01 NOTE — ED ADULT NURSE NOTE - NSIMPLEMENTINTERV_GEN_ALL_ED
Implemented All Fall Risk Interventions:  [place] to call system. Call bell, personal items and telephone within reach. Instruct patient to call for assistance. Room bathroom lighting operational. Non-slip footwear when patient is off stretcher. Physically safe environment: no spills, clutter or unnecessary equipment. Stretcher in lowest position, wheels locked, appropriate side rails in place. Provide visual cue, wrist band, yellow gown, etc. Monitor gait and stability. Monitor for mental status changes and reorient to person, place, and time. Review medications for side effects contributing to fall risk. Reinforce activity limits and safety measures with patient and family.

## 2018-10-01 NOTE — H&P ADULT - PROBLEM SELECTOR PLAN 2
-Patient has history of hypertension, she takes no medication at home.  -BP is wnl at this time.  -Monitor BP -patient's Hb is 10 , her baseline is around 10.9-11  -She denies any black stools, is post-menopausal  -Concern for gastritis in light of chronic use of meloxicam and ibuprofen   -Will follow anemia panel  -Vitamin B12 and folate level  -C/w PPI for now

## 2018-10-01 NOTE — H&P ADULT - HISTORY OF PRESENT ILLNESS
53 year old Female patient with PMHx significant for Hypertension, Fibromyalgia, Depression, Insomnia, Asthma, anxiety, HLD, migraine and CAD in 2012 no stents. She came in with c/o chest pain off and on since Friday. Patient reports that she has been having sub-sternal, pressure like  chest pain, 6/10 in intensity, approx. 4-5 episodes in a day lasting for about 30-45 minutes with radiation to the back, associated with sob, dizziness, sweating and palpitations with no aggravating or relieving factors. Patient said that yesterday afternoon, she had another episode of chest pain, called her PCP who recommended her to visit ED. She took 2 aspirins with no relief in her symptoms. She reports that she has not had such symptoms before. She also c/o of sob on climbing 6-7 steps of stairs and can walk 1-2 blocks without being short of breath for past one month. She further added that her last stress test was 7 years ago and is not following with any cardiologist at present.      Allergy: estrogen (hives)  PMHx: Hypertension, asthma, fibromyalgia, CAD, anxiety, depression, insomnia, HLD  PSH: , tube ligation  Family Hx: Father had MI at the age of 52.   Social Hx; Patient denies using alcohol smoking tobacco use of recreation drugs.

## 2018-10-01 NOTE — H&P ADULT - PROBLEM SELECTOR PLAN 7
IMPROVE VTE score:  [ ] Previous VTE                                                3  [ ] Thrombophilia                                             2  [ ] Lower limb paralysis                                  2        (unable to hold up >15 seconds)    [ ] Current Cancer (within 6 months)            2   [x] Immobilization > 24 hrs                              1  [ ] ICU/CCU stay > 24 hours                            1  [] Age > 60                                                         1  IMPROVE Score-1. No need of VTE prophylaxis

## 2018-10-01 NOTE — ED ADULT NURSE NOTE - PMH
Anemia    Anxiety    Asthma    Chronic pain    Depression    Fibromyalgia    High cholesterol    HTN (hypertension)    Lupus    Myocardial infarction

## 2018-10-02 ENCOUNTER — TRANSCRIPTION ENCOUNTER (OUTPATIENT)
Age: 53
End: 2018-10-02

## 2018-10-02 VITALS
TEMPERATURE: 98 F | DIASTOLIC BLOOD PRESSURE: 69 MMHG | SYSTOLIC BLOOD PRESSURE: 109 MMHG | HEART RATE: 92 BPM | RESPIRATION RATE: 18 BRPM | OXYGEN SATURATION: 100 %

## 2018-10-02 LAB
ANION GAP SERPL CALC-SCNC: 4 MMOL/L — LOW (ref 5–17)
BUN SERPL-MCNC: 13 MG/DL — SIGNIFICANT CHANGE UP (ref 7–18)
CALCIUM SERPL-MCNC: 8.2 MG/DL — LOW (ref 8.4–10.5)
CHLORIDE SERPL-SCNC: 109 MMOL/L — HIGH (ref 96–108)
CHOLEST SERPL-MCNC: 151 MG/DL — SIGNIFICANT CHANGE UP (ref 10–199)
CK MB BLD-MCNC: <0.7 % — SIGNIFICANT CHANGE UP (ref 0–3.5)
CK MB BLD-MCNC: <1 % — SIGNIFICANT CHANGE UP (ref 0–3.5)
CK MB BLD-MCNC: <1 % — SIGNIFICANT CHANGE UP (ref 0–3.5)
CK MB CFR SERPL CALC: <1 NG/ML — SIGNIFICANT CHANGE UP (ref 0–3.6)
CK SERPL-CCNC: 104 U/L — SIGNIFICANT CHANGE UP (ref 21–215)
CK SERPL-CCNC: 105 U/L — SIGNIFICANT CHANGE UP (ref 21–215)
CK SERPL-CCNC: 143 U/L — SIGNIFICANT CHANGE UP (ref 21–215)
CO2 SERPL-SCNC: 27 MMOL/L — SIGNIFICANT CHANGE UP (ref 22–31)
CREAT SERPL-MCNC: 0.88 MG/DL — SIGNIFICANT CHANGE UP (ref 0.5–1.3)
FERRITIN SERPL-MCNC: 49 NG/ML — SIGNIFICANT CHANGE UP (ref 15–150)
FOLATE SERPL-MCNC: 11.8 NG/ML — SIGNIFICANT CHANGE UP
GLUCOSE SERPL-MCNC: 69 MG/DL — LOW (ref 70–99)
HBA1C BLD-MCNC: 5.3 % — SIGNIFICANT CHANGE UP (ref 4–5.6)
HCT VFR BLD CALC: 30 % — LOW (ref 34.5–45)
HDLC SERPL-MCNC: 63 MG/DL — SIGNIFICANT CHANGE UP
HGB BLD-MCNC: 9.6 G/DL — LOW (ref 11.5–15.5)
IRON SATN MFR SERPL: 112 UG/DL — SIGNIFICANT CHANGE UP (ref 40–150)
IRON SATN MFR SERPL: 36 % — SIGNIFICANT CHANGE UP (ref 15–50)
LIPID PNL WITH DIRECT LDL SERPL: 69 MG/DL — SIGNIFICANT CHANGE UP
MCHC RBC-ENTMCNC: 31 PG — SIGNIFICANT CHANGE UP (ref 27–34)
MCHC RBC-ENTMCNC: 32 GM/DL — SIGNIFICANT CHANGE UP (ref 32–36)
MCV RBC AUTO: 96.8 FL — SIGNIFICANT CHANGE UP (ref 80–100)
PLATELET # BLD AUTO: 292 K/UL — SIGNIFICANT CHANGE UP (ref 150–400)
POTASSIUM SERPL-MCNC: 3.8 MMOL/L — SIGNIFICANT CHANGE UP (ref 3.5–5.3)
POTASSIUM SERPL-SCNC: 3.8 MMOL/L — SIGNIFICANT CHANGE UP (ref 3.5–5.3)
RBC # BLD: 3.1 M/UL — LOW (ref 3.8–5.2)
RBC # FLD: 11.8 % — SIGNIFICANT CHANGE UP (ref 10.3–14.5)
SODIUM SERPL-SCNC: 140 MMOL/L — SIGNIFICANT CHANGE UP (ref 135–145)
T3 SERPL-MCNC: 72 NG/DL — LOW (ref 80–200)
T3/T3 UPTAKE INDEX SERPL-RTO: 72 NG/DL — LOW (ref 80–200)
T4 AB SER-ACNC: 7 UG/DL — SIGNIFICANT CHANGE UP (ref 4.6–12)
TIBC SERPL-MCNC: 311 UG/DL — SIGNIFICANT CHANGE UP (ref 250–450)
TOTAL CHOLESTEROL/HDL RATIO MEASUREMENT: 2.4 RATIO — LOW (ref 3.3–7.1)
TRANSFERRIN SERPL-MCNC: 230 MG/DL — SIGNIFICANT CHANGE UP (ref 200–360)
TRIGL SERPL-MCNC: 95 MG/DL — SIGNIFICANT CHANGE UP (ref 10–149)
TROPONIN I SERPL-MCNC: <0.015 NG/ML — SIGNIFICANT CHANGE UP (ref 0–0.04)
TROPONIN I SERPL-MCNC: <0.015 NG/ML — SIGNIFICANT CHANGE UP (ref 0–0.04)
TSH SERPL-MCNC: 7.56 UU/ML — HIGH (ref 0.34–4.82)
UIBC SERPL-MCNC: 199 UG/DL — SIGNIFICANT CHANGE UP (ref 110–370)
VIT B12 SERPL-MCNC: 668 PG/ML — SIGNIFICANT CHANGE UP (ref 232–1245)
WBC # BLD: 3.7 K/UL — LOW (ref 3.8–10.5)
WBC # FLD AUTO: 3.7 K/UL — LOW (ref 3.8–10.5)

## 2018-10-02 PROCEDURE — 99222 1ST HOSP IP/OBS MODERATE 55: CPT | Mod: 25

## 2018-10-02 PROCEDURE — 78452 HT MUSCLE IMAGE SPECT MULT: CPT | Mod: 26

## 2018-10-02 PROCEDURE — 99239 HOSP IP/OBS DSCHRG MGMT >30: CPT

## 2018-10-02 PROCEDURE — 93016 CV STRESS TEST SUPVJ ONLY: CPT

## 2018-10-02 PROCEDURE — 93018 CV STRESS TEST I&R ONLY: CPT

## 2018-10-02 RX ORDER — INFLUENZA VIRUS VACCINE 15; 15; 15; 15 UG/.5ML; UG/.5ML; UG/.5ML; UG/.5ML
0.5 SUSPENSION INTRAMUSCULAR ONCE
Qty: 0 | Refills: 0 | Status: COMPLETED | OUTPATIENT
Start: 2018-10-02 | End: 2018-10-02

## 2018-10-02 RX ORDER — IBUPROFEN 200 MG
600 TABLET ORAL
Qty: 0 | Refills: 0 | COMMUNITY

## 2018-10-02 RX ORDER — LEVOTHYROXINE SODIUM 125 MCG
1 TABLET ORAL
Qty: 30 | Refills: 0 | OUTPATIENT
Start: 2018-10-02 | End: 2018-10-31

## 2018-10-02 RX ORDER — MELOXICAM 15 MG/1
7.5 TABLET ORAL
Qty: 0 | Refills: 0 | COMMUNITY

## 2018-10-02 RX ADMIN — Medication 100 MILLIGRAM(S): at 07:24

## 2018-10-02 RX ADMIN — Medication 75 MILLIGRAM(S): at 07:24

## 2018-10-02 RX ADMIN — PANTOPRAZOLE SODIUM 40 MILLIGRAM(S): 20 TABLET, DELAYED RELEASE ORAL at 07:14

## 2018-10-02 RX ADMIN — CARVEDILOL PHOSPHATE 3.12 MILLIGRAM(S): 80 CAPSULE, EXTENDED RELEASE ORAL at 07:14

## 2018-10-02 RX ADMIN — INFLUENZA VIRUS VACCINE 0.5 MILLILITER(S): 15; 15; 15; 15 SUSPENSION INTRAMUSCULAR at 16:27

## 2018-10-02 RX ADMIN — Medication 0.5 MILLIGRAM(S): at 07:14

## 2018-10-02 RX ADMIN — Medication 650 MILLIGRAM(S): at 01:43

## 2018-10-02 NOTE — CHART NOTE - NSCHARTNOTEFT_GEN_A_CORE
Patient seen and examined earlier today; Had stress test and Echo earlier today.No new complaints. No chest pain or SOB. has a depressed mood at baseline    Vital Signs Last 24 Hrs  T(C): 36.8 (02 Oct 2018 15:55), Max: 36.8 (01 Oct 2018 20:31)  T(F): 98.2 (02 Oct 2018 15:55), Max: 98.3 (01 Oct 2018 20:31)  HR: 92 (02 Oct 2018 15:55) (68 - 92)  BP: 109/69 (02 Oct 2018 15:55) (109/69 - 126/79)  RR: 18 (02 Oct 2018 15:55) (16 - 18)  SpO2: 100% (02 Oct 2018 15:55) (100% - 100%)    P/E: Clinically unchanged from yesterday    Lans:                        9.6    3.7   )-----------( 292      ( 02 Oct 2018 05:34 )             30.0   10-02    140  |  109<H>  |  13  ----------------------------<  69<L>  3.8   |  27  |  0.88    Ca    8.2<L>      02 Oct 2018 05:34    TPro  7.6  /  Alb  3.9  /  TBili  0.2  /  DBili  x   /  AST  30  /  ALT  32  /  AlkPhos  89  10-01    Lipid Profile (10.02.18 @ 05:34)    Total Cholesterol/HDL Ratio Measurement: 2.4 RATIO    Cholesterol, Serum: 151 mg/dL    Triglycerides, Serum: 95 mg/dL    HDL Cholesterol, Serum: 63:     Direct LDL: 69  Thyroid Stimulating Hormone, Serum (10.02.18 @ 05:34)    Thyroid Stimulating Hormone, Serum: 7.56 uU/mL    D/D:  Chest pain negative for ACS/Ischemia  Likely NSAID induced gastritis  Hypothyroid  Normocytic anemia  Depression with Anxiety  Hx Fibromyalgia and Insomnia    Plan:  D/C Tele; d/w Cardio will f/u Echo official report  D/C Home; Continue PPI;   If symptoms persist consider f/u with GI for an EGD to rule out H. Pylori; may also consider a Colonoscopy as patient has some degree of Anemia and had a Colonoscopy in Jn'2018 which was poor prep.  Anemia panel with no significant Iron deficiency but patient was on it in the past and stopped 2 months ago. hence recommended to resume Slow-Fe once daily which has less side effect  Also encouraged green leafy vegetables  Repeat TFTs. patient at discharge said on Synthroid 25 microgm; Advised to continue same dose, empty stomach. If still elevated in 6-8 weeks recommended increase dose to 50 microgm daily    Discussed with Patient all the above  See discharge note for details    Discussed with PGY2 Dr. Richards and RN alesha    (Spend 38 mins with >50% spend counseling and cordination of care and d/w Patient and Inpatient and Outpatient providers)

## 2018-10-02 NOTE — DISCHARGE NOTE ADULT - PATIENT PORTAL LINK FT
You can access the [facility], offered by [facility], by registering with the following website: [email or web address]

## 2018-10-02 NOTE — DISCHARGE NOTE ADULT - ADDITIONAL INSTRUCTIONS
Follow up with PCP Dr. Roe Sales in 1-2 weeks  Repeat Thyroid Function tests in 6-8 weeks  Follow up with Psychiatrist in 2 weeks;

## 2018-10-02 NOTE — DISCHARGE NOTE ADULT - CARE PROVIDER_API CALL
Roe Sales (MD), Internal Medicine  [address] [place] [address]  [address]rd [place] [address]  Phone: [phone]  Fax: [phone] Roe Sales), Internal Medicine  [address] [place] [address]  3rd [place] [address]  Phone: [phone]  Fax: [phone]    Kunal Virk), Gastroenterology; Internal Medicine  [address] [place] [address]  Phone: [phone]  Fax: [phone]

## 2018-10-02 NOTE — DISCHARGE NOTE ADULT - CARE PROVIDERS DIRECT ADDRESSES
[email or web address]

## 2018-10-02 NOTE — DISCHARGE NOTE ADULT - MEDICATION SUMMARY - MEDICATIONS TO TAKE
I will START or STAY ON the medications listed below when I get home from the hospital:    SUMAtriptan  -- 100  by mouth 2 times a day, As Needed  -- Indication: For Migraine    aspirin 81 mg oral delayed release tablet  -- 1 tab(s) by mouth once a day  -- Indication: For Cardiovascular protection    topiramate 100 mg oral tablet  -- 1 tab(s) by mouth 2 times a day  -- Indication: For Migraine    clonazePAM 0.5 mg oral tablet  --  by mouth 2 times a day  -- Indication: For Anxiety    traZODone 150 mg oral tablet, extended release  -- 1 tab(s) by mouth once a day (at bedtime)  -- Indication: For Depression    venlafaxine 75 mg oral capsule, extended release  -- 2 tab(s) by mouth 2 times a day  -- Indication: For Depression    nortriptyline  -- 100 milligram(s) by mouth once a day (at bedtime)  -- Indication: For Depression    atorvastatin 20 mg oral tablet  -- 1 tab(s) by mouth once a day  -- Indication: For Cardiovascular protection    omeprazole 20 mg oral delayed release capsule  -- 1 cap(s) by mouth once a day  -- Indication: For GERD I will START or STAY ON the medications listed below when I get home from the hospital:    SUMAtriptan  -- 100  by mouth 2 times a day, As Needed  -- Indication: For Migraine    aspirin 81 mg oral delayed release tablet  -- 1 tab(s) by mouth once a day  -- Indication: For Cardiovascular protection    topiramate 100 mg oral tablet  -- 1 tab(s) by mouth 2 times a day  -- Indication: For Migraine    clonazePAM 0.5 mg oral tablet  --  by mouth 2 times a day  -- Indication: For Anxiety    traZODone 150 mg oral tablet, extended release  -- 1 tab(s) by mouth once a day (at bedtime)  -- Indication: For Depression    venlafaxine 75 mg oral capsule, extended release  -- 2 tab(s) by mouth 2 times a day  -- Indication: For Depression    nortriptyline  -- 100 milligram(s) by mouth once a day (at bedtime)  -- Indication: For Depression    atorvastatin 20 mg oral tablet  -- 1 tab(s) by mouth once a day  -- Indication: For Cardiovascular protection    omeprazole 20 mg oral delayed release capsule  -- 1 cap(s) by mouth once a day  -- Indication: For GERD    Synthroid 25 mcg (0.025 mg) oral tablet  -- 1 tab(s) by mouth once a day   -- It is very important that you take or use this exactly as directed.  Do not skip doses or discontinue unless directed by your doctor.  Medication should be taken with plenty of water.  Some non-prescription drugs may aggravate your condition.  Read all labels carefully.  If a warning appears, check with your doctor before taking.  Take medication on an empty stomach 1 hour before or 2 to 3 hours after a meal unless otherwise directed by your doctor.    -- Indication: For Thyroid disorder

## 2018-10-02 NOTE — DISCHARGE NOTE ADULT - CARE PLAN
Principal Discharge DX:	ACS (acute coronary syndrome)  Goal:	Prevent chest pain and atherosclerotic heart disease  Assessment and plan of treatment:	You were admitted for chest pain and underwent stress test which was normal. You were also monitored on Telemetry which didn't show any rhythm problems. Keep taking Aspirin and Lipitor 20 mg as your home dose. Echocardiogram was also performed which preliminary ready was normal. Follow up with Dr Sales (PMD) for the official report of your Echo.  Secondary Diagnosis:	Fibromyalgia  Goal:	Prevent tender ponits  Assessment and plan of treatment:	Keep taking Trazodone and Effexor for your tender points secondary to Fibromyalgia  Secondary Diagnosis:	Migraine  Goal:	Prevent vision problems with headache  Assessment and plan of treatment:	Stay compliant with your migraine medications and follow up with your PMD in 2 weeks  Secondary Diagnosis:	Thyroid disorder  Goal:	Prevent constipation and temp insensitivities  Assessment and plan of treatment:	Your TSH level was high around 7.5. Follow up with your PMD for complete thyroid profile in 2 weeks. Principal Discharge DX:	ACS (acute coronary syndrome)  Goal:	Prevent chest pain and atherosclerotic heart disease  Assessment and plan of treatment:	You were admitted for chest pain intermittent and ongoing for 3 days. serial troponin x3 negative. You underwent Nuclear stress test which was normal. You were also monitored on Telemetry which didn't show any rhythm problems. Keep taking Aspirin and Lipitor 20 mg as your home dose. Echocardiogram was also performed which preliminary ready was normal. Follow up with Dr Sales (PMD) for the official report of your Echo. If any significant findings myself or Cardiologist Dr. Patel will call you.  Secondary Diagnosis:	Fibromyalgia  Goal:	Relief of pain  Assessment and plan of treatment:	Continue Amitriptyline  Secondary Diagnosis:	Migraine  Goal:	Prevent vision problems with headache  Assessment and plan of treatment:	Stay compliant with your migraine medications (Topamax)  and follow up with your PMD in 2 weeks  If worsens may follow up with Dr. Rankin at -[address] [place] [address]  Secondary Diagnosis:	Thyroid disorder  Goal:	Prevent constipation and temp insensitivities  Assessment and plan of treatment:	Your TSH level was mildly high around 7.5 which is likely non specific and subclinical. T3 and T4 levels were done and  if abnormal will contact you. Recommend PCP to repeat full Thyroid function test in 6 to 8 weeks.  Secondary Diagnosis:	Depression with anxiety  Goal:	Maintain stable mood.  Assessment and plan of treatment:	Continue Wellbutrin. Follow up with psychiatrist and adjust medication as needed.  Secondary Diagnosis:	Insomnia  Goal:	Get adequate sleep  Assessment and plan of treatment:	Continue Trazadone. Follow up with psychiatrist and adjust medication as needed. Principal Discharge DX:	ACS (acute coronary syndrome)  Goal:	Prevent chest pain and atherosclerotic heart disease  Assessment and plan of treatment:	You were admitted for chest pain intermittent and ongoing for 3 days. serial troponin x3 negative. You underwent Nuclear stress test which was normal. You were also monitored on Telemetry which didn't show any rhythm problems. Keep taking Aspirin and Lipitor 20 mg as your home dose. Echocardiogram was also performed which preliminary ready was normal. Follow up with Dr Sales (PMD) for the official report of your Echo. If any significant findings myself or Cardiologist Dr. Patel will call you.  Secondary Diagnosis:	Fibromyalgia  Goal:	Relief of pain  Assessment and plan of treatment:	Continue Amitriptyline  Secondary Diagnosis:	Migraine  Goal:	Prevent vision problems with headache  Assessment and plan of treatment:	Stay compliant with your migraine medications (Topamax)  and follow up with your PMD in 2 weeks  If worsens may follow up with Dr. Rankin at -[address] [place] [address]  Secondary Diagnosis:	Thyroid disorder  Goal:	Prevent constipation and temp insensitivities  Assessment and plan of treatment:	Your TSH level was mildly high around 7.5 which is likely non specific. You later informed you take Synthroid 25 microgm daily before Breakfast. You will continue to take the same dose.  Recommend PCP to repeat full Thyroid function test in 6 to 8 weeks. If TSH abnormal may consider increasing to 50 microgm daily  Secondary Diagnosis:	Depression with anxiety  Goal:	Maintain stable mood.  Assessment and plan of treatment:	Continue Wellbutrin. Follow up with psychiatrist and adjust medication as needed.  Secondary Diagnosis:	Insomnia  Goal:	Get adequate sleep  Assessment and plan of treatment:	Continue Trazadone. Follow up with psychiatrist and adjust medication as needed. Principal Discharge DX:	ACS (acute coronary syndrome)  Goal:	Prevent chest pain and atherosclerotic heart disease  Assessment and plan of treatment:	You were admitted for chest pain intermittent and ongoing for 3 days. serial troponin x3 negative. You underwent Nuclear stress test which was normal. You were also monitored on Telemetry which didn't show any rhythm problems. Keep taking Aspirin and Lipitor 20 mg as your home dose. Echocardiogram was also performed which preliminary ready was normal. Follow up with Dr Sales (PMD) for the official report of your Echo. If any significant findings myself or Cardiologist Dr. Patel will call you.  Secondary Diagnosis:	Fibromyalgia  Goal:	Relief of pain  Assessment and plan of treatment:	Continue Amitriptyline  Secondary Diagnosis:	Migraine  Goal:	Prevent vision problems with headache  Assessment and plan of treatment:	Stay compliant with your migraine medications (Topamax)  and follow up with your PMD in 2 weeks  If worsens may follow up with Dr. Rankin at -[address] [place] [address]  Secondary Diagnosis:	Thyroid disorder  Goal:	Prevent constipation and temp insensitivities  Assessment and plan of treatment:	Your TSH level was mildly high around 7.5 which is likely non specific. You later informed you take Synthroid 25 microgm daily before Breakfast. You will continue to take the same dose.  Recommend PCP to repeat full Thyroid function test in 6 to 8 weeks. If TSH abnormal may consider increasing to 50 microgm daily  Secondary Diagnosis:	Depression with anxiety  Goal:	Maintain stable mood.  Assessment and plan of treatment:	Continue Wellbutrin. Follow up with psychiatrist and adjust medication as needed.  Secondary Diagnosis:	Insomnia  Goal:	Get adequate sleep  Assessment and plan of treatment:	Continue Trazadone. Follow up with psychiatrist and adjust medication as needed.  Secondary Diagnosis:	Normocytic anemia  Goal:	Keep Hb > 10gm  Assessment and plan of treatment:	You have known history of anemia. You have been taking Iron pills. You can keep taking Slow Fe once daily. Your B12 levels and Iron studies were normal. Follow up with hematologist after discharge. May consider for repeat colonoscopy with GI as you have a prior history of poor prep 2 years ago.

## 2018-10-02 NOTE — CONSULT NOTE ADULT - ASSESSMENT
52 yo F with noted PMH including HTN who presents with chest pain    1. Chest pain: Echocardiogram pending  -Will discuss with patient regarding stress test    2. HTN:     3. HLD:     ***Note that this is a preliminary note and any recommendations should NOT be carried out until this note is finalized. *** 52 yo F with noted PMH including HTN who presents with atypical chest pain    1. Chest pain: Sounds atypical in nature; patient has negative cardiac enzymes and no EKG changes. She is a non-smoker, and her risk factors for CAD are HTN as well as her FH, though she already had negative angiogram in the past (had different chest pain at that time).   -Patient reports she is very anxious about her symptoms; I told her that given her low-intermediate pretest probability of CAD, a nuclear stress test would be reasonable for further risk stratification, as well as reassurance in the case that the test is negative.   -Echocardiogram pending  -Patient already had GI work-up including colonoscopy and EGD  -If stress is negative, would suspect musculoskeletal etiology vs. fibromyalgia  -No events on telemetry and (-) cardiac enzymes; would discontinue telemetry monitoring    2. HTN: Does not appear to be on antihypertensives at home; if the carvedilol is new for patient would discontinue as she ruled out for MI    3. HLD: On atorvastatin

## 2018-10-02 NOTE — DISCHARGE NOTE ADULT - HOSPITAL COURSE
HPI: 53 year old Female patient with PMHx significant for Hypertension, Fibromyalgia, Depression, Insomnia, Asthma, anxiety, HLD, migraine and CAD in 2012 no stents. She came in with c/o chest pain off and on since Friday. Patient reports that she has been having sub-sternal, pressure like chest pain, 6/10 in intensity, approx. 4-5 episodes in a day lasting for about 30-45 minutes with radiation to the back, associated with sob, dizziness, sweating and palpitations with no aggravating or relieving factors.    Pt was admitted for Chest pain which was atypical in nature; patient had negative cardiac enzymes and no EKG changes. Patient reported she was very anxious about her symptoms. She underwent stress test which was normal. Echocardiogram was also performed. Patient already had GI work-up including colonoscopy and EGD which was benign. Pt was monitor on tele monitor for 24 hours which was uneventful. Pt had mildly elevated TSH of 7.5 and she was advised to follow up with her PMD after discharge for complete thyroid profile.     Pt is stable for discharge as per Attending.

## 2018-10-02 NOTE — CONSULT NOTE ADULT - SUBJECTIVE AND OBJECTIVE BOX
CHIEF COMPLAINT: Chest Pain    HPI: 52 yo F with HTN, HLD, and ? CAD (no stents in 2012) who presented with chest pain. Patient reports     PAST MEDICAL & SURGICAL HISTORY:  As above, also Anemia, Asthma, Anxiety, Fibromyalgia, Depression, Lupus  S/P tubal ligation, S/P  section: X 4    Allergies    estrogen (Rash)    MEDICATIONS  (STANDING):  aspirin enteric coated 81 milliGRAM(s) Oral daily  atorvastatin 80 milliGRAM(s) Oral at bedtime  carvedilol 3.125 milliGRAM(s) Oral every 12 hours  clonazePAM Tablet 0.5 milliGRAM(s) Oral two times a day  nortriptyline 75 milliGRAM(s) Oral at bedtime  pantoprazole    Tablet 40 milliGRAM(s) Oral before breakfast  topiramate 100 milliGRAM(s) Oral two times a day  traZODone 100 milliGRAM(s) Oral at bedtime  venlafaxine 75 milliGRAM(s) Oral every 12 hours    MEDICATIONS  (PRN):  SUMAtriptan 50 milliGRAM(s) Oral two times a day PRN Migraine      FAMILY HISTORY:  Family history of breast cancer in first degree relative (Sibling)  Family history of acute myocardial infarction (Father)    No family history of premature coronary artery disease or sudden cardiac death    SOCIAL HISTORY:  Smoking-  Alcohol-  Illicit Drug use-    REVIEW OF SYSTEMS:  Constitutional: [ ] fever, [ ]weight loss,  [ ]fatigue  Eyes: [ ] visual changes  Respiratory: [ ]shortness of breath;  [ ] cough, [ ]wheezing, [ ]chills, [ ]hemoptysis  Cardiovascular: [ ] chest pain, [ ]palpitations, [ ]dizziness,  [ ]leg swelling [ ]syncope  Gastrointestinal: [ ] abdominal pain, [ ]nausea, [ ]vomiting,  [ ]diarrhea   Genitourinary: [ ] dysuria, [ ] hematuria  Neurologic: [ ] headaches [ ] tremors  [ ] weakness [ ] lightheadedness  Skin: [ ] itching, [ ]burning, [ ] rashes  Endocrine: [ ] heat or cold intolerance  Musculoskeletal: [ ] joint pain or swelling; [ ] muscle, back, or extremity pain  Psychiatric: [ ] depression, [ ]anxiety, [ ]mood swings, or [ ]difficulty sleeping  Hematologic: [ ] easy bruising, [ ] bleeding gums       [ x] All others negative	  [ ] Unable to obtain    Vital Signs Last 24 Hrs  T(C): 36.4 (02 Oct 2018 07:27), Max: 36.9 (01 Oct 2018 13:36)  T(F): 97.5 (02 Oct 2018 07:27), Max: 98.5 (01 Oct 2018 16:45)  HR: 74 (02 Oct 2018 07:27) (68 - 88)  BP: 110/69 (02 Oct 2018 07:27) (105/67 - 126/79)  BP(mean): --  RR: 16 (02 Oct 2018 05:25) (16 - 20)  SpO2: 100% (02 Oct 2018 05:25) (100% - 100%)  I&O's Summary      PHYSICAL EXAM:  General: No acute distress  HEENT: EOMI, PERRL  Neck: Supple, No JVD  Lungs: Clear to auscultation bilaterally; No rales or wheezing  Heart: Regular rate and rhythm; No murmurs, rubs, or gallops  Abdomen: Nontender, bowel sounds present  Extremities: No clubbing, cyanosis, or edema  Nervous system:  Alert & Oriented X3, no focal deficits  Psychiatric: Normal affect  Skin: No rashes or lesions      LABS:  10-02    140  |  109<H>  |  13  ----------------------------<  69<L>  3.8   |  27  |  0.88    Ca    8.2<L>      02 Oct 2018 05:34    TPro  7.6  /  Alb  3.9  /  TBili  0.2  /  DBili  x   /  AST  30  /  ALT  32  /  AlkPhos  89  10-01    Creatinine Trend: 0.88<--, 0.90<--                        9.6    3.7   )-----------( 292      ( 02 Oct 2018 05:34 )             30.0     PT/INR - ( 01 Oct 2018 16:33 )   PT: 11.1 sec;   INR: 1.02 ratio         PTT - ( 01 Oct 2018 16:33 )  PTT:29.0 sec    Lipid Panel: Cholesterol, Serum 151  Direct LDL 69  HDL Cholesterol, Serum 63  Triglycerides, Serum 95    Cardiac Enzymes: CARDIAC MARKERS ( 02 Oct 2018 05:34 )  x     / x     / 105 U/L / x     / <1.0 ng/mL  CARDIAC MARKERS ( 02 Oct 2018 02:42 )  <0.015 ng/mL / x     / 143 U/L / x     / <1.0 ng/mL  CARDIAC MARKERS ( 01 Oct 2018 16:33 )  <0.015 ng/mL / x     / x     / x     / x        RADIOLOGY: < from: Xray Chest 2 Views PA/Lat (10.01.18 @ 14:33) >  Impression: No radiographic evidence foracute cardiopulmonary disease.   Stable examination.    < end of copied text >    ECG [my interpretation]:    TELEMETRY:    ECHO: Pending CHIEF COMPLAINT: Chest Pain    HPI: 52 yo F with HTN, HLD, and no known CAD (had reportedly negative angiogram in 2012) who presented with chest pain. Patient reports she was at home yesterday morning watching TV and laughing when she started feeling midsternal chest pain, pressure-like in nature, radiating to her back. This was associated with dyspnea and lightheadedness, no palpitations or syncope. She had similar episodes over past few weeks that would resolve on their own after meditation or listening to music. No relationship between symptoms and activity. Currently, patient reports she still has some chest pressure.      PAST MEDICAL & SURGICAL HISTORY:  As above, also Anemia, Asthma, Anxiety, Fibromyalgia, Depression, equivocal serologies for Lupus but no formal diagnosis per patient  S/P tubal ligation, S/P  section: X 4    Allergies    estrogen (Rash)    MEDICATIONS  (STANDING):  aspirin enteric coated 81 milliGRAM(s) Oral daily  atorvastatin 80 milliGRAM(s) Oral at bedtime  carvedilol 3.125 milliGRAM(s) Oral every 12 hours  clonazePAM Tablet 0.5 milliGRAM(s) Oral two times a day  nortriptyline 75 milliGRAM(s) Oral at bedtime  pantoprazole    Tablet 40 milliGRAM(s) Oral before breakfast  topiramate 100 milliGRAM(s) Oral two times a day  traZODone 100 milliGRAM(s) Oral at bedtime  venlafaxine 75 milliGRAM(s) Oral every 12 hours    MEDICATIONS  (PRN):  SUMAtriptan 50 milliGRAM(s) Oral two times a day PRN Migraine      FAMILY HISTORY:  Family history of breast cancer in first degree relative (Sibling)  Family history of acute myocardial infarction (Father, age 52)    SOCIAL HISTORY:  Smoking-Never  Alcohol-Denies  Illicit Drug use-Denies    REVIEW OF SYSTEMS:  Constitutional: [ ] fever, [ ]weight loss,  [ ]fatigue  Eyes: [ ] visual changes  Respiratory: [x ]shortness of breath;  [ ] cough, [ ]wheezing, [ ]chills, [ ]hemoptysis  Cardiovascular: [x ] chest pain, [ ]palpitations, [ ]dizziness,  [ ]leg swelling [ ]syncope  Gastrointestinal: [ ] abdominal pain, [ ]nausea, [ ]vomiting,  [ ]diarrhea   Genitourinary: [ ] dysuria, [ ] hematuria  Neurologic: [ ] headaches [ ] tremors  [ ] weakness [ ] lightheadedness  Skin: [ ] itching, [ ]burning, [ ] rashes  Endocrine: [ ] heat or cold intolerance  Musculoskeletal: [ ] joint pain or swelling; [ ] muscle, back, or extremity pain  Psychiatric: [ ] depression, [ ]anxiety, [ ]mood swings, or [ ]difficulty sleeping  Hematologic: [ ] easy bruising, [ ] bleeding gums       [ x] All others negative	  [ ] Unable to obtain    Vital Signs Last 24 Hrs  T(C): 36.4 (02 Oct 2018 07:27), Max: 36.9 (01 Oct 2018 13:36)  T(F): 97.5 (02 Oct 2018 07:27), Max: 98.5 (01 Oct 2018 16:45)  HR: 74 (02 Oct 2018 07:27) (68 - 88)  BP: 110/69 (02 Oct 2018 07:27) (105/67 - 126/79)  BP(mean): --  RR: 16 (02 Oct 2018 05:25) (16 - 20)  SpO2: 100% (02 Oct 2018 05:25) (100% - 100%)  I&O's Summary      PHYSICAL EXAM:  General: No acute distress  HEENT: EOMI, PERRL  Neck: Supple, No JVD  Lungs: Clear to auscultation bilaterally; No rales or wheezing  Heart: Regular rate and rhythm; No murmurs, rubs, or gallops  Abdomen: Nontender, bowel sounds present  Extremities: No clubbing, cyanosis, or edema  Nervous system:  Alert & Oriented X3, no focal deficits  Psychiatric: Normal affect  Skin: No rashes or lesions      LABS:  10-02    140  |  109<H>  |  13  ----------------------------<  69<L>  3.8   |  27  |  0.88    Ca    8.2<L>      02 Oct 2018 05:34    TPro  7.6  /  Alb  3.9  /  TBili  0.2  /  DBili  x   /  AST  30  /  ALT  32  /  AlkPhos  89  10-01    Creatinine Trend: 0.88<--, 0.90<--                        9.6    3.7   )-----------( 292      ( 02 Oct 2018 05:34 )             30.0     PT/INR - ( 01 Oct 2018 16:33 )   PT: 11.1 sec;   INR: 1.02 ratio         PTT - ( 01 Oct 2018 16:33 )  PTT:29.0 sec    Lipid Panel: Cholesterol, Serum 151  Direct LDL 69  HDL Cholesterol, Serum 63  Triglycerides, Serum 95    Cardiac Enzymes: CARDIAC MARKERS ( 02 Oct 2018 05:34 )  x     / x     / 105 U/L / x     / <1.0 ng/mL  CARDIAC MARKERS ( 02 Oct 2018 02:42 )  <0.015 ng/mL / x     / 143 U/L / x     / <1.0 ng/mL  CARDIAC MARKERS ( 01 Oct 2018 16:33 )  <0.015 ng/mL / x     / x     / x     / x        RADIOLOGY: < from: Xray Chest 2 Views PA/Lat (10.01.18 @ 14:33) >  Impression: No radiographic evidence foracute cardiopulmonary disease.   Stable examination.    < end of copied text >    ECG [my interpretation]: 10/1/2018 @ 13:41: Sinus rhythm with normal axis, RBBB, unchanged from 2018    TELEMETRY: Occasional PVCs    ECHO: Pending

## 2018-10-02 NOTE — DISCHARGE NOTE ADULT - PLAN OF CARE
Prevent chest pain and atherosclerotic heart disease You were admitted for chest pain and underwent stress test which was normal. You were also monitored on Telemetry which didn't show any rhythm problems. Keep taking Aspirin and Lipitor 20 mg as your home dose. Echocardiogram was also performed which preliminary ready was normal. Follow up with Dr Sales (PMD) for the official report of your Echo. Prevent tender ponits Keep taking Trazodone and Effexor for your tender points secondary to Fibromyalgia Prevent vision problems with headache Stay compliant with your migraine medications and follow up with your PMD in 2 weeks Prevent constipation and temp insensitivities Your TSH level was high around 7.5. Follow up with your PMD for complete thyroid profile in 2 weeks. You were admitted for chest pain intermittent and ongoing for 3 days. serial troponin x3 negative. You underwent Nuclear stress test which was normal. You were also monitored on Telemetry which didn't show any rhythm problems. Keep taking Aspirin and Lipitor 20 mg as your home dose. Echocardiogram was also performed which preliminary ready was normal. Follow up with Dr Sales (PMD) for the official report of your Echo. If any significant findings myself or Cardiologist Dr. Patel will call you. Relief of pain Continue Amitriptyline Stay compliant with your migraine medications (Topamax)  and follow up with your PMD in 2 weeks  If worsens may follow up with Dr. Rankin at [phone], [facility]. Shanelle [place] Your TSH level was mildly high around 7.5 which is likely non specific and subclinical. T3 and T4 levels were done and  if abnormal will contact you. Recommend PCP to repeat full Thyroid function test in 6 to 8 weeks. Maintain stable mood. Continue Wellbutrin. Follow up with psychiatrist and adjust medication as needed. Get adequate sleep Continue Trazadone. Follow up with psychiatrist and adjust medication as needed. Your TSH level was mildly high around 7.5 which is likely non specific. You later informed you take Synthroid 25 microgm daily before Breakfast. You will continue to take the same dose.  Recommend PCP to repeat full Thyroid function test in 6 to 8 weeks. If TSH abnormal may consider increasing to 50 microgm daily Keep Hb > 10gm You have known history of anemia. You have been taking Iron pills. You can keep taking Slow Fe once daily. Your B12 levels and Iron studies were normal. Follow up with hematologist after discharge. May consider for repeat colonoscopy with GI as you have a prior history of poor prep 2 years ago.

## 2018-10-02 NOTE — DISCHARGE NOTE ADULT - MEDICATION SUMMARY - MEDICATIONS TO STOP TAKING
I will STOP taking the medications listed below when I get home from the hospital:    ondansetron 4 mg oral tablet, disintegrating  -- 1 tab(s) by mouth 2 times a day, As Needed -for nausea    oxyCODONE 30 mg oral tablet, extended release  -- 1 tab(s) by mouth every 12 hours    Metoprolol Succinate ER 25 mg oral tablet, extended release  -- 1 tab(s) by mouth once a day    oxyCODONE-acetaminophen 5 mg-325 mg oral tablet  -- 1 tab(s) by mouth every 8 hours, As Needed pain MDD:3  -- Caution federal law prohibits the transfer of this drug to any person other  than the person for whom it was prescribed.  May cause drowsiness.  Alcohol may intensify this effect.  Use care when operating dangerous machinery.  This prescription cannot be refilled.  This product contains acetaminophen.  Do not use  with any other product containing acetaminophen to prevent possible liver damage.  Using more of this medication than prescribed may cause serious breathing problems.    Percocet 5/325 325 mg-5 mg oral tablet  -- 1 tab(s) by mouth every 4 to 6 hours for severe pain as needed MDD:4  -- Caution federal law prohibits the transfer of this drug to any person other  than the person for whom it was prescribed.  May cause drowsiness.  Alcohol may intensify this effect.  Use care when operating dangerous machinery.  This prescription cannot be refilled.  This product contains acetaminophen.  Do not use  with any other product containing acetaminophen to prevent possible liver damage.  Using more of this medication than prescribed may cause serious breathing problems.    raNITIdine    cyclobenzaprine    ibuprofen 600 mg oral tablet  -- 600 milligram(s) by mouth 2 times a day    meloxicam 7.5 mg oral tablet  -- 7.5 milligram(s) by mouth 2 times a day

## 2018-10-03 LAB — T3 SERPL-MCNC: SIGNIFICANT CHANGE UP

## 2018-10-04 ENCOUNTER — OTHER (OUTPATIENT)
Age: 53
End: 2018-10-04

## 2018-10-04 ENCOUNTER — CLINICAL ADVICE (OUTPATIENT)
Age: 53
End: 2018-10-04

## 2018-10-18 PROBLEM — E78.00 PURE HYPERCHOLESTEROLEMIA, UNSPECIFIED: Chronic | Status: ACTIVE | Noted: 2018-10-01

## 2018-10-25 ENCOUNTER — APPOINTMENT (OUTPATIENT)
Dept: DERMATOLOGY | Facility: CLINIC | Age: 53
End: 2018-10-25

## 2018-10-29 ENCOUNTER — APPOINTMENT (OUTPATIENT)
Dept: INTERNAL MEDICINE | Facility: CLINIC | Age: 53
End: 2018-10-29
Payer: MEDICARE

## 2018-10-29 VITALS
TEMPERATURE: 98.1 F | HEART RATE: 86 BPM | RESPIRATION RATE: 16 BRPM | BODY MASS INDEX: 29.03 KG/M2 | WEIGHT: 144 LBS | HEIGHT: 59 IN | SYSTOLIC BLOOD PRESSURE: 120 MMHG | OXYGEN SATURATION: 98 % | DIASTOLIC BLOOD PRESSURE: 80 MMHG

## 2018-10-29 DIAGNOSIS — E04.2 NONTOXIC MULTINODULAR GOITER: ICD-10-CM

## 2018-10-29 PROCEDURE — 90686 IIV4 VACC NO PRSV 0.5 ML IM: CPT

## 2018-10-29 PROCEDURE — G0008: CPT

## 2018-10-29 PROCEDURE — 99214 OFFICE O/P EST MOD 30 MIN: CPT | Mod: 25

## 2018-10-29 RX ORDER — OMEPRAZOLE 20 MG/1
20 CAPSULE, DELAYED RELEASE ORAL
Qty: 30 | Refills: 0 | Status: DISCONTINUED | COMMUNITY
Start: 2018-08-16 | End: 2018-10-29

## 2018-10-29 NOTE — HISTORY OF PRESENT ILLNESS
[de-identified] : 53 year old  female patient with history of stable Hypertension, Hypothyroidism, Hypercholesterolemia with Hypertriglyceridemia, GERD, Bipolar Disorder, Fibromyalgia, Migraine, IBS with Diarrhea,  history as stated, presented for follow up examination of Elevated BP checked. Patient is compliant with all medications. Denies shortness of breath, chest pain or abdominal pains at this time. ROS as stated.\par Patient submitted thyroid sono/labs/Attempted Nuclear Thyroid scan from the past, findings reviewed, Endo f/u, as scheduled and counseled for further evaluation possible interventions.

## 2018-10-29 NOTE — ASSESSMENT
[FreeTextEntry1] : 53 year old female found to have stable  Hypertension, Hypothyroidism, Hypercholesterolemia with Hypertriglyceridemia, GERD, Bipolar Disorder, Fibromyalgia, resolved Multinodular goiter with prominent lymph node on the right side,  Endo f/u, with the current regimen, diet and life style modifications, as counseled. Prior results reviewed and discussed with the patient during today's examination. Plan as ordered.\par Patient was recently hospitalized, findings and recommendations reviewed and discussed with the patient during today's examination.\par

## 2018-10-29 NOTE — HEALTH RISK ASSESSMENT
[Intercurrent hospitalizations] : was admitted to the hospital  [No falls in past year] : Patient reported no falls in the past year [0] : 2) Feeling down, depressed, or hopeless: Not at all (0) [] : No [de-identified] : 10/18 [de-identified] : None [[id number]] : 0

## 2018-10-29 NOTE — PHYSICAL EXAM
[No JVD] : no jugular venous distention [No Lymphadenopathy] : no lymphadenopathy [Thyroid Normal, No Nodules] : the thyroid was normal and there were no nodules present

## 2018-10-30 ENCOUNTER — RX RENEWAL (OUTPATIENT)
Age: 53
End: 2018-10-30

## 2018-10-30 LAB
ALBUMIN SERPL ELPH-MCNC: 4.7 G/DL
ALP BLD-CCNC: 83 U/L
ALT SERPL-CCNC: 16 U/L
ANION GAP SERPL CALC-SCNC: 11 MMOL/L
AST SERPL-CCNC: 26 U/L
BASOPHILS # BLD AUTO: 0.03 K/UL
BASOPHILS NFR BLD AUTO: 0.8 %
BILIRUB SERPL-MCNC: 0.2 MG/DL
BUN SERPL-MCNC: 13 MG/DL
CALCIUM SERPL-MCNC: 9.1 MG/DL
CHLORIDE SERPL-SCNC: 107 MMOL/L
CHOLEST SERPL-MCNC: 147 MG/DL
CHOLEST/HDLC SERPL: 2.5 RATIO
CO2 SERPL-SCNC: 24 MMOL/L
CREAT SERPL-MCNC: 0.87 MG/DL
EOSINOPHIL # BLD AUTO: 0.04 K/UL
EOSINOPHIL NFR BLD AUTO: 1 %
GGT SERPL-CCNC: 34 U/L
GLUCOSE SERPL-MCNC: 91 MG/DL
HBA1C MFR BLD HPLC: 5.3 %
HCT VFR BLD CALC: 30.9 %
HDLC SERPL-MCNC: 59 MG/DL
HGB BLD-MCNC: 9.7 G/DL
IMM GRANULOCYTES NFR BLD AUTO: 0 %
LDLC SERPL CALC-MCNC: 69 MG/DL
LYMPHOCYTES # BLD AUTO: 1.31 K/UL
LYMPHOCYTES NFR BLD AUTO: 34.2 %
MAN DIFF?: NORMAL
MCHC RBC-ENTMCNC: 30.4 PG
MCHC RBC-ENTMCNC: 31.4 GM/DL
MCV RBC AUTO: 96.9 FL
MONOCYTES # BLD AUTO: 0.31 K/UL
MONOCYTES NFR BLD AUTO: 8.1 %
NEUTROPHILS # BLD AUTO: 2.14 K/UL
NEUTROPHILS NFR BLD AUTO: 55.9 %
PLATELET # BLD AUTO: 331 K/UL
POTASSIUM SERPL-SCNC: 4.6 MMOL/L
PROT SERPL-MCNC: 7.7 G/DL
RBC # BLD: 3.19 M/UL
RBC # FLD: 12.9 %
SODIUM SERPL-SCNC: 142 MMOL/L
T3 SERPL-MCNC: 60 NG/DL
T4 FREE SERPL-MCNC: 1 NG/DL
TRIGL SERPL-MCNC: 94 MG/DL
TSH SERPL-ACNC: 4.86 UIU/ML
WBC # FLD AUTO: 3.83 K/UL

## 2018-11-11 PROCEDURE — 99285 EMERGENCY DEPT VISIT HI MDM: CPT | Mod: 25

## 2018-11-11 PROCEDURE — 71046 X-RAY EXAM CHEST 2 VIEWS: CPT

## 2018-11-11 PROCEDURE — 85730 THROMBOPLASTIN TIME PARTIAL: CPT

## 2018-11-11 PROCEDURE — 93005 ELECTROCARDIOGRAM TRACING: CPT

## 2018-11-11 PROCEDURE — 83036 HEMOGLOBIN GLYCOSYLATED A1C: CPT

## 2018-11-11 PROCEDURE — 93017 CV STRESS TEST TRACING ONLY: CPT

## 2018-11-11 PROCEDURE — 80053 COMPREHEN METABOLIC PANEL: CPT

## 2018-11-11 PROCEDURE — 84466 ASSAY OF TRANSFERRIN: CPT

## 2018-11-11 PROCEDURE — A9502: CPT

## 2018-11-11 PROCEDURE — 82746 ASSAY OF FOLIC ACID SERUM: CPT

## 2018-11-11 PROCEDURE — 84436 ASSAY OF TOTAL THYROXINE: CPT

## 2018-11-11 PROCEDURE — 82553 CREATINE MB FRACTION: CPT

## 2018-11-11 PROCEDURE — 84443 ASSAY THYROID STIM HORMONE: CPT

## 2018-11-11 PROCEDURE — 85610 PROTHROMBIN TIME: CPT

## 2018-11-11 PROCEDURE — 80061 LIPID PANEL: CPT

## 2018-11-11 PROCEDURE — 82607 VITAMIN B-12: CPT

## 2018-11-11 PROCEDURE — 85027 COMPLETE CBC AUTOMATED: CPT

## 2018-11-11 PROCEDURE — 84484 ASSAY OF TROPONIN QUANT: CPT

## 2018-11-11 PROCEDURE — 80048 BASIC METABOLIC PNL TOTAL CA: CPT

## 2018-11-11 PROCEDURE — 93306 TTE W/DOPPLER COMPLETE: CPT

## 2018-11-11 PROCEDURE — 78452 HT MUSCLE IMAGE SPECT MULT: CPT

## 2018-11-11 PROCEDURE — 85379 FIBRIN DEGRADATION QUANT: CPT

## 2018-11-11 PROCEDURE — 82550 ASSAY OF CK (CPK): CPT

## 2018-11-11 PROCEDURE — 82728 ASSAY OF FERRITIN: CPT

## 2018-11-11 PROCEDURE — 83550 IRON BINDING TEST: CPT

## 2018-11-11 PROCEDURE — 84480 ASSAY TRIIODOTHYRONINE (T3): CPT

## 2018-11-11 PROCEDURE — 90686 IIV4 VACC NO PRSV 0.5 ML IM: CPT

## 2018-11-14 ENCOUNTER — CLINICAL ADVICE (OUTPATIENT)
Age: 53
End: 2018-11-14

## 2018-11-16 ENCOUNTER — CLINICAL ADVICE (OUTPATIENT)
Age: 53
End: 2018-11-16

## 2019-01-01 NOTE — ED PROVIDER NOTE - CONSTITUTIONAL, MLM
normal... Well appearing, well nourished, awake, alert, oriented to person, place, time/situation and in no apparent distress. Carina Garcia (GAYATRI)

## 2019-01-07 ENCOUNTER — APPOINTMENT (OUTPATIENT)
Dept: INTERNAL MEDICINE | Facility: CLINIC | Age: 54
End: 2019-01-07
Payer: MEDICARE

## 2019-01-07 VITALS
DIASTOLIC BLOOD PRESSURE: 78 MMHG | SYSTOLIC BLOOD PRESSURE: 120 MMHG | HEART RATE: 81 BPM | HEIGHT: 59 IN | TEMPERATURE: 98 F | WEIGHT: 145 LBS | RESPIRATION RATE: 16 BRPM | OXYGEN SATURATION: 98 % | BODY MASS INDEX: 29.23 KG/M2

## 2019-01-07 PROCEDURE — 99214 OFFICE O/P EST MOD 30 MIN: CPT

## 2019-01-07 NOTE — HISTORY OF PRESENT ILLNESS
[de-identified] : 53 year old  female patient with history of stable Hypertension, Hypothyroidism, Hypercholesterolemia with Hypertriglyceridemia, CVA, Insomnia, Migraine, GERD, history as stated, presented for follow up examination of Elevated BP checked. Patient is compliant with all medications. Denies shortness of breath, chest pain or abdominal pains at this time. ROS as stated.\par

## 2019-01-07 NOTE — ASSESSMENT
[FreeTextEntry1] : 53 year old female found to have stable Hypertension, Hypothyroidism, Hypercholesterolemia with Hypertriglyceridemia, CVA, Insomnia, Migraine, GERD,with the current regimen, diet and life style modifications, as counseled. Prior results reviewed and discussed with the patient during today's examination. Plan as ordered.\par Hematology f/u for Anemia, as insisted and counseled.

## 2019-01-07 NOTE — HEALTH RISK ASSESSMENT
[No falls in past year] : Patient reported no falls in the past year [0] : 2) Feeling down, depressed, or hopeless: Not at all (0) [] : No [de-identified] : None [[id number]] : 0

## 2019-01-09 LAB
ALBUMIN SERPL ELPH-MCNC: 4.9 G/DL
ALP BLD-CCNC: 82 U/L
ALT SERPL-CCNC: 14 U/L
ANION GAP SERPL CALC-SCNC: 11 MMOL/L
AST SERPL-CCNC: 23 U/L
BASOPHILS # BLD AUTO: 0.04 K/UL
BASOPHILS NFR BLD AUTO: 1.3 %
BILIRUB SERPL-MCNC: 0.2 MG/DL
BUN SERPL-MCNC: 7 MG/DL
CALCIUM SERPL-MCNC: 9.1 MG/DL
CHLORIDE SERPL-SCNC: 106 MMOL/L
CHOLEST SERPL-MCNC: 151 MG/DL
CHOLEST/HDLC SERPL: 2.5 RATIO
CO2 SERPL-SCNC: 26 MMOL/L
CREAT SERPL-MCNC: 0.99 MG/DL
EOSINOPHIL # BLD AUTO: 0.01 K/UL
EOSINOPHIL NFR BLD AUTO: 0.3 %
GGT SERPL-CCNC: 33 U/L
GLUCOSE SERPL-MCNC: 70 MG/DL
HBA1C MFR BLD HPLC: 5.3 %
HCT VFR BLD CALC: 34.7 %
HDLC SERPL-MCNC: 60 MG/DL
HGB BLD-MCNC: 10.8 G/DL
IMM GRANULOCYTES NFR BLD AUTO: 0 %
IRON SATN MFR SERPL: 17 %
IRON SERPL-MCNC: 63 UG/DL
LDLC SERPL CALC-MCNC: 74 MG/DL
LYMPHOCYTES # BLD AUTO: 1.54 K/UL
LYMPHOCYTES NFR BLD AUTO: 49.2 %
MAN DIFF?: NORMAL
MCHC RBC-ENTMCNC: 30.3 PG
MCHC RBC-ENTMCNC: 31.1 GM/DL
MCV RBC AUTO: 97.5 FL
MONOCYTES # BLD AUTO: 0.27 K/UL
MONOCYTES NFR BLD AUTO: 8.6 %
NEUTROPHILS # BLD AUTO: 1.27 K/UL
NEUTROPHILS NFR BLD AUTO: 40.6 %
PLATELET # BLD AUTO: 309 K/UL
POTASSIUM SERPL-SCNC: 4.2 MMOL/L
PROT SERPL-MCNC: 7.7 G/DL
RBC # BLD: 3.56 M/UL
RBC # FLD: 12.5 %
SODIUM SERPL-SCNC: 143 MMOL/L
T3 SERPL-MCNC: 78 NG/DL
T4 FREE SERPL-MCNC: 0.9 NG/DL
TIBC SERPL-MCNC: 371 UG/DL
TRIGL SERPL-MCNC: 84 MG/DL
TSH SERPL-ACNC: 7.46 UIU/ML
UIBC SERPL-MCNC: 308 UG/DL
WBC # FLD AUTO: 3.13 K/UL

## 2019-01-18 ENCOUNTER — APPOINTMENT (OUTPATIENT)
Dept: ORTHOPEDIC SURGERY | Facility: CLINIC | Age: 54
End: 2019-01-18

## 2019-01-22 ENCOUNTER — APPOINTMENT (OUTPATIENT)
Dept: GASTROENTEROLOGY | Facility: CLINIC | Age: 54
End: 2019-01-22

## 2019-01-26 ENCOUNTER — RX RENEWAL (OUTPATIENT)
Age: 54
End: 2019-01-26

## 2019-01-28 ENCOUNTER — APPOINTMENT (OUTPATIENT)
Dept: ORTHOPEDIC SURGERY | Facility: CLINIC | Age: 54
End: 2019-01-28
Payer: MEDICARE

## 2019-01-28 VITALS — HEIGHT: 59 IN | BODY MASS INDEX: 28.83 KG/M2 | WEIGHT: 143 LBS

## 2019-01-28 PROCEDURE — 73564 X-RAY EXAM KNEE 4 OR MORE: CPT | Mod: LT

## 2019-01-28 PROCEDURE — 99204 OFFICE O/P NEW MOD 45 MIN: CPT

## 2019-01-28 NOTE — PHYSICAL EXAM
[de-identified] : Physical Examination\par General: well nourished, in no acute distress, alert and oriented to person, place and time\par Psychiatric: normal mood and affect, no abnormal movements or speech patterns\par Eyes: vision intact + glasses\par Throat: no thyromegaly\par Lymph: no enlarged nodes, no lymphedema in extremity\par Respiratory: no wheezing, no shortness of breath with ambulation\par Cardiac: no cardiac leg swelling, 2+ peripheral pulses\par Neurology: normal gross sensation in extremities to light touch\par Abdomen: soft, non-tender, tympanic, no masses\par \par Musculoskeletal Examination\par Ambulation	+ antalgic gait, - assistive devices\par \par Knee			Right			Left\par General\par      Swelling/Deformity	normal			normal	\par      Skin			normal			normal\par      Erythema		-			-\par      Standing Alignment	neutral			neutral\par      Effusion		none			none\par Range of Motion\par      Hip			full painless ROM		full painless ROM\par      Knee Flexion		130			130\par      Knee Extension	0			0\par Patella\par      J Sign		-			-\par      Quad Medial/Lateral	1/1 1/1\par      Apprehension		-			-\par      Gualberto's		-			+\par      Grind Sign		-			+\par      Crepitus		-			+\par Palpation\par      Medial Joint Line	-			+\par      Medial Fem Condyle	-			-\par      Lateral Joint Line	-			-\par      Quad Tendon		-			-\par      Patella Tendon	-			-\par      Medial Patella		-			-\par      Lateral Patella 	-			-\par      Posterior Knee	-			+\par Ligamentous\par      Varus @ 0° / 30°	-/-			-/-\par      Valgus @ 0° / 30°	-/-			-/-\par      Lachman		-			-\par      Pivot Shift		-			-\par      Anterior Drawer	-			-\par      Posterior Drawer	-			-\par Meniscus\par      Julian		-			+\par      Flexion Pinch		-			+\par Strength Examination/Atrophy\par      Hip Flexors 		5+			5+\par      Quadriceps		5+			5+\par      Hamstring		5+			5+\par      Tibialis Anterior	5+			5+\par      Achilles/Soleus	5+			5+\par Sensation\par      Deep Peroneal	normal			normal\par      Superficial Peroneal 	normal			normal\par      Sural  		normal			normal\par      Posterior Tibial 	normal			normal\par      Saphneous 		normal			normal\par Pulses\par      DP			2+			2+\par  [de-identified] : 5 views of the affected left knee (standing AP, flexing standing AP, 30degree flexed lateral, 0degree lateral, sunrise view)\par were ordered, obtained and evaluated by myself today and\par demonstrate:\par There is mild lateral weight very asymmetric narrowing\par Trace osteophytic lipping\par Trace suprapatellar effusion\par No patellofemoral joint space loss without evidence of tilt [or] subluxation on sunrise view\par Normal soft tissue density\par Otherwise normal osseous bone structure without fracture or dislocation

## 2019-01-28 NOTE — DISCUSSION/SUMMARY
[de-identified] : Left knee internal derangement\par Left knee patellofemoral syndrome\par \par I discussed my findings on history, exam and radiology.\par \par I reviewed the anatomy and function of the periarticular muscles and tendons, patella, ligaments, menisci and cartilage of the knee using models, images and diagrams. Given the current findings for the patient, I recommend proceeding with advanced imaging to better characterize and diagnose the problem to aid patient care, management and treatment guidance as I suspect an internal injury to the knee not diagnosed on non-diagnostic plain radiographs causing the patients symptoms and physical examination to help provide surgical management planning.\par \par Therefore given the patients continued symptoms despite physical therapy, nonsteroidal anti-inflammatories, activity modification, with continued pain affecting ADLs and inability to do activities limiting quality of life as well as locking and instability significant for patient falls potentially placing the patient at increased risk for exacerbating the injury or causing further injury to the knee, an examination and history consistent with injury to an internal knee derangement and a non-diagnostic radiograph I recommend obtaining an MRI to assess the knee's internal structures for preoperative planning. I discussed with the patient that I am ordering an MRI to assess the soft tissue structures in the joint such as ligaments and tendons, as well as to assess the cartilage and menisci as well as for any bony edema. The test will need insurance approval and will take place at a [facility] or outside facility. If the patient elects to obtain the MRI from an outside facility, the patient understands they have been instructed to bring in both the final radiologist read and a CD/DVD with the MRI images to allow review of the imaging test by myself during the follow up visit. I do not recommend obtaining an open standing MRI as the quality of the images is subpar and makes it difficult to diagnose intra-articular derangements and guide care discussion and decision making. Татьяна Peterson\par The patient was prescribed Diclofenac PO non-steroidal anti-inflammatory medication. 50mg tablets twice daily to be taken for at least 1-2 weeks in a row and then PRN afterwards. Risks and benefits were discussed and include but not limited to renal damage and GI ulceration and bleeding.  They were advised to take with food to limit stomach upset as well as warned to stop the medication if worsening gastric pain or dizziness or other side effects. Also to immediately stop the medication and seek appriate medical attention if any severe stomach ache, gastritis, black/red vomit, black/red stools or any other medical concern.\par \par The patient verifies their understanding the the visit, diagnosis and plan. They agree with the treatment plan and will contact the office with any questions or problems.\par \par FU after MRI is obtained
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
## 2019-01-31 ENCOUNTER — APPOINTMENT (OUTPATIENT)
Dept: RHEUMATOLOGY | Facility: CLINIC | Age: 54
End: 2019-01-31
Payer: MEDICARE

## 2019-01-31 ENCOUNTER — LABORATORY RESULT (OUTPATIENT)
Age: 54
End: 2019-01-31

## 2019-01-31 VITALS
BODY MASS INDEX: 29.43 KG/M2 | HEART RATE: 93 BPM | RESPIRATION RATE: 16 BRPM | DIASTOLIC BLOOD PRESSURE: 83 MMHG | TEMPERATURE: 98.1 F | OXYGEN SATURATION: 99 % | SYSTOLIC BLOOD PRESSURE: 131 MMHG | WEIGHT: 146 LBS | HEIGHT: 59 IN

## 2019-01-31 PROCEDURE — 99205 OFFICE O/P NEW HI 60 MIN: CPT

## 2019-01-31 RX ORDER — LEVOTHYROXINE SODIUM 0.07 MG/1
75 TABLET ORAL
Qty: 90 | Refills: 0 | Status: DISCONTINUED | COMMUNITY
Start: 2019-01-29
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
## 2019-01-31 NOTE — PHYSICAL EXAM
[General Appearance - Alert] : alert [General Appearance - In No Acute Distress] : in no acute distress [Sclera] : the sclera and conjunctiva were normal [Outer Ear] : the ears and nose were normal in appearance [Respiration, Rhythm And Depth] : normal respiratory rhythm and effort [Auscultation Breath Sounds / Voice Sounds] : lungs were clear to auscultation bilaterally [Heart Rate And Rhythm] : heart rate was normal and rhythm regular [Heart Sounds] : normal S1 and S2 [Abdomen Soft] : soft [Abdomen Tenderness] : non-tender [No CVA Tenderness] : no ~M costovertebral angle tenderness [] : no rash [FreeTextEntry1] : left paraspinal tenderness; bilateral costochondral tenderness on upper anterior chest; No significant joint tenderness, pain or swelling on bilateral shoulders, elbows, wrists, hands, or ankles  [Skin Lesions] : no skin lesions [Motor Exam] : the motor exam was normal [Oriented To Time, Place, And Person] : oriented to person, place, and time [Impaired Insight] : insight and judgment were intact

## 2019-01-31 NOTE — REVIEW OF SYSTEMS
[Arthralgias] : arthralgias [Joint Pain] : joint pain [Joint Stiffness] : joint stiffness [Negative] : Endocrine [Fever] : no fever [Chills] : no chills [Eye Pain] : no eye pain [Sore Throat] : no sore throat [Hoarseness] : no hoarseness [Chest Pain] : no chest pain [Palpitations] : no palpitations [Shortness Of Breath] : no shortness of breath [Wheezing] : no wheezing [Cough] : no cough [SOB on Exertion] : no shortness of breath during exertion [Abdominal Pain] : no abdominal pain [Vomiting] : no vomiting [Diarrhea] : no diarrhea [Joint Swelling] : no joint swelling [Dizziness] : no dizziness [Limb Weakness] : no limb weakness [Difficulty Walking] : no difficulty walking [Anxiety] : no anxiety [Depression] : no depression [Muscle Weakness] : no muscle weakness [Feelings Of Weakness] : feelings of weakness [Easy Bleeding] : no tendency for easy bleeding [Easy Bruising] : no tendency for easy bruising

## 2019-01-31 NOTE — CONSULT LETTER
[Dear  ___] : Dear  [unfilled], [Consult Letter:] : I had the pleasure of evaluating your patient, [unfilled]. [Please see my note below.] : Please see my note below. [Consult Closing:] : Thank you very much for allowing me to participate in the care of this patient.  If you have any questions, please do not hesitate to contact me. [Sincerely,] : Sincerely, [FreeTextEntry2] : Roe Sales MD\par [id number] [facility] [FreeTextEntry3] : Tamiko Duff M.D.\par  of Medicine \par [facility] [employer] [facility]/[place]\par \par
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
## 2019-02-01 LAB
C3 SERPL-MCNC: 118 MG/DL
C4 SERPL-MCNC: 24 MG/DL
CRP SERPL-MCNC: <0.1 MG/DL
DSDNA AB SER-ACNC: <12 IU/ML

## 2019-02-02 LAB
ENA RNP AB SER IA-ACNC: <0.2 AL
ENA SM AB SER IA-ACNC: <0.2 AL
ENA SS-A AB SER IA-ACNC: 6.2 AL
ENA SS-B AB SER IA-ACNC: <0.2 AL

## 2019-02-04 ENCOUNTER — MESSAGE (OUTPATIENT)
Age: 54
End: 2019-02-04

## 2019-02-07 LAB
ANA PAT FLD IF-IMP: ABNORMAL
ANA PATTERN: ABNORMAL
ANA SER IF-ACNC: ABNORMAL
ANA TITER: ABNORMAL

## 2019-02-11 ENCOUNTER — INPATIENT (INPATIENT)
Facility: HOSPITAL | Age: 54
LOS: 2 days | Discharge: HOME | DRG: 149 | End: 2019-02-14
Attending: STUDENT IN AN ORGANIZED HEALTH CARE EDUCATION/TRAINING PROGRAM | Admitting: STUDENT IN AN ORGANIZED HEALTH CARE EDUCATION/TRAINING PROGRAM
Payer: MEDICARE

## 2019-02-11 VITALS
HEIGHT: 59 IN | HEART RATE: 77 BPM | TEMPERATURE: 98 F | SYSTOLIC BLOOD PRESSURE: 136 MMHG | WEIGHT: 145.06 LBS | DIASTOLIC BLOOD PRESSURE: 87 MMHG | RESPIRATION RATE: 16 BRPM | OXYGEN SATURATION: 99 %

## 2019-02-11 DIAGNOSIS — Z98.89 OTHER SPECIFIED POSTPROCEDURAL STATES: Chronic | ICD-10-CM

## 2019-02-11 DIAGNOSIS — Z98.51 TUBAL LIGATION STATUS: Chronic | ICD-10-CM

## 2019-02-11 LAB
ALBUMIN SERPL ELPH-MCNC: 3.9 G/DL — SIGNIFICANT CHANGE UP (ref 3.5–5)
ALP SERPL-CCNC: 74 U/L — SIGNIFICANT CHANGE UP (ref 40–120)
ALT FLD-CCNC: 23 U/L DA — SIGNIFICANT CHANGE UP (ref 10–60)
ANION GAP SERPL CALC-SCNC: 5 MMOL/L — SIGNIFICANT CHANGE UP (ref 5–17)
APTT BLD: 30.4 SEC — SIGNIFICANT CHANGE UP (ref 27.5–36.3)
APTT BLD: 33 SEC — SIGNIFICANT CHANGE UP (ref 27.5–36.3)
AST SERPL-CCNC: 17 U/L — SIGNIFICANT CHANGE UP (ref 10–40)
BASOPHILS # BLD AUTO: 0 K/UL — SIGNIFICANT CHANGE UP (ref 0–0.2)
BASOPHILS NFR BLD AUTO: 1.1 % — SIGNIFICANT CHANGE UP (ref 0–2)
BILIRUB SERPL-MCNC: 0.2 MG/DL — SIGNIFICANT CHANGE UP (ref 0.2–1.2)
BUN SERPL-MCNC: 11 MG/DL — SIGNIFICANT CHANGE UP (ref 7–18)
CALCIUM SERPL-MCNC: 9.3 MG/DL — SIGNIFICANT CHANGE UP (ref 8.4–10.5)
CHLORIDE SERPL-SCNC: 108 MMOL/L — SIGNIFICANT CHANGE UP (ref 96–108)
CO2 SERPL-SCNC: 29 MMOL/L — SIGNIFICANT CHANGE UP (ref 22–31)
CREAT SERPL-MCNC: 0.86 MG/DL — SIGNIFICANT CHANGE UP (ref 0.5–1.3)
EOSINOPHIL # BLD AUTO: 0 K/UL — SIGNIFICANT CHANGE UP (ref 0–0.5)
EOSINOPHIL NFR BLD AUTO: 0.2 % — SIGNIFICANT CHANGE UP (ref 0–6)
GLUCOSE SERPL-MCNC: 94 MG/DL — SIGNIFICANT CHANGE UP (ref 70–99)
HCT VFR BLD CALC: 35 % — SIGNIFICANT CHANGE UP (ref 34.5–45)
HCT VFR BLD CALC: 41.2 % — SIGNIFICANT CHANGE UP (ref 34.5–45)
HGB BLD-MCNC: 10.9 G/DL — LOW (ref 11.5–15.5)
HGB BLD-MCNC: 12.9 G/DL — SIGNIFICANT CHANGE UP (ref 11.5–15.5)
INR BLD: 0.96 RATIO — SIGNIFICANT CHANGE UP (ref 0.88–1.16)
INR BLD: 1.17 RATIO — HIGH (ref 0.88–1.16)
LYMPHOCYTES # BLD AUTO: 1.8 K/UL — SIGNIFICANT CHANGE UP (ref 1–3.3)
LYMPHOCYTES # BLD AUTO: 40.5 % — SIGNIFICANT CHANGE UP (ref 13–44)
MCHC RBC-ENTMCNC: 27.6 PG — SIGNIFICANT CHANGE UP (ref 27–34)
MCHC RBC-ENTMCNC: 30.2 PG — SIGNIFICANT CHANGE UP (ref 27–34)
MCHC RBC-ENTMCNC: 31.3 GM/DL — LOW (ref 32–36)
MCHC RBC-ENTMCNC: 31.4 GM/DL — LOW (ref 32–36)
MCV RBC AUTO: 88 FL — SIGNIFICANT CHANGE UP (ref 80–100)
MCV RBC AUTO: 96.7 FL — SIGNIFICANT CHANGE UP (ref 80–100)
MONOCYTES # BLD AUTO: 0.4 K/UL — SIGNIFICANT CHANGE UP (ref 0–0.9)
MONOCYTES NFR BLD AUTO: 9.7 % — SIGNIFICANT CHANGE UP (ref 2–14)
NEUTROPHILS # BLD AUTO: 2.1 K/UL — SIGNIFICANT CHANGE UP (ref 1.8–7.4)
NEUTROPHILS NFR BLD AUTO: 48.5 % — SIGNIFICANT CHANGE UP (ref 43–77)
PLATELET # BLD AUTO: 238 K/UL — SIGNIFICANT CHANGE UP (ref 150–400)
PLATELET # BLD AUTO: 299 K/UL — SIGNIFICANT CHANGE UP (ref 150–400)
POTASSIUM SERPL-MCNC: 3.9 MMOL/L — SIGNIFICANT CHANGE UP (ref 3.5–5.3)
POTASSIUM SERPL-SCNC: 3.9 MMOL/L — SIGNIFICANT CHANGE UP (ref 3.5–5.3)
PROT SERPL-MCNC: 8.2 G/DL — SIGNIFICANT CHANGE UP (ref 6–8.3)
PROTHROM AB SERPL-ACNC: 10.6 SEC — SIGNIFICANT CHANGE UP (ref 10–12.9)
PROTHROM AB SERPL-ACNC: 13 SEC — HIGH (ref 10–12.9)
RBC # BLD: 3.61 M/UL — LOW (ref 3.8–5.2)
RBC # BLD: 4.68 M/UL — SIGNIFICANT CHANGE UP (ref 3.8–5.2)
RBC # FLD: 11.6 % — SIGNIFICANT CHANGE UP (ref 10.3–14.5)
RBC # FLD: 11.6 % — SIGNIFICANT CHANGE UP (ref 10.3–14.5)
SODIUM SERPL-SCNC: 142 MMOL/L — SIGNIFICANT CHANGE UP (ref 135–145)
TROPONIN I SERPL-MCNC: <0.015 NG/ML — SIGNIFICANT CHANGE UP (ref 0–0.04)
WBC # BLD: 4.4 K/UL — SIGNIFICANT CHANGE UP (ref 3.8–10.5)
WBC # BLD: 5.8 K/UL — SIGNIFICANT CHANGE UP (ref 3.8–10.5)
WBC # FLD AUTO: 4.4 K/UL — SIGNIFICANT CHANGE UP (ref 3.8–10.5)
WBC # FLD AUTO: 5.8 K/UL — SIGNIFICANT CHANGE UP (ref 3.8–10.5)

## 2019-02-11 PROCEDURE — 71045 X-RAY EXAM CHEST 1 VIEW: CPT | Mod: 26

## 2019-02-11 RX ORDER — MECLIZINE HCL 12.5 MG
25 TABLET ORAL ONCE
Qty: 0 | Refills: 0 | Status: COMPLETED | OUTPATIENT
Start: 2019-02-11 | End: 2019-02-11

## 2019-02-11 NOTE — ED PROVIDER NOTE - MEDICAL DECISION MAKING DETAILS
3:30a- Pt with persistent vertigo. MAR and Dr. Peralta endorsed. Pt agrees with admission for possible MRI and neuro consult. I had a detailed discussion with the patient and/or guardian regarding the historical points, exam findings, and any diagnostic results supporting the admit diagnosis.

## 2019-02-11 NOTE — ED ADULT NURSE NOTE - ED STAT RN HANDOFF DETAILS
pt.remained   stable.denies  pain.transfer torm 617 B report given to henry armando.pt.not in distress

## 2019-02-11 NOTE — ED PROVIDER NOTE - CRANIAL NERVE AND PUPILLARY EXAM
cranial nerves 2-12 intact/no nystagmus, unsteady gait, reproducible vertigo from upright to supine positions

## 2019-02-11 NOTE — ED PROVIDER NOTE - OBJECTIVE STATEMENT
53 y/o F with a significant PMHx of vertigo and a significant PSHx of C-sections presents to the ED with c/o gradual onset dizziness x 5 days and chest and back pain x 3 days. Pt states she has been having unsteady gait and describes the dizziness as if the room is spinning. Pt notes she has had similar sxs in the past but not that have lasted this long. Pt reports her last episode of vertigo was 1 year ago. Pt is taking Effexor 150 mg, Clonazepam, Topiramate, Omeprazole 20 mg, Trazadone 150 mg, Ibuprofen 600 mg, Meloxicam, Atorvastatin 20 mg, Nortriptyline, and ASA 81 mg. Pt denies fever, vomiting, SOB, or any other complaints. Allergies: estrogen.

## 2019-02-12 DIAGNOSIS — R42 DIZZINESS AND GIDDINESS: ICD-10-CM

## 2019-02-12 DIAGNOSIS — F41.9 ANXIETY DISORDER, UNSPECIFIED: ICD-10-CM

## 2019-02-12 DIAGNOSIS — M79.7 FIBROMYALGIA: ICD-10-CM

## 2019-02-12 DIAGNOSIS — G89.29 OTHER CHRONIC PAIN: ICD-10-CM

## 2019-02-12 DIAGNOSIS — Z29.9 ENCOUNTER FOR PROPHYLACTIC MEASURES, UNSPECIFIED: ICD-10-CM

## 2019-02-12 DIAGNOSIS — D64.9 ANEMIA, UNSPECIFIED: ICD-10-CM

## 2019-02-12 DIAGNOSIS — R53.1 WEAKNESS: ICD-10-CM

## 2019-02-12 LAB
ABO + RH PNL BLD: NORMAL
ALBUMIN SERPL ELPH-MCNC: 4 G/DL — SIGNIFICANT CHANGE UP (ref 3.5–5)
ALP SERPL-CCNC: 79 U/L — SIGNIFICANT CHANGE UP (ref 40–120)
ALT FLD-CCNC: 19 U/L DA — SIGNIFICANT CHANGE UP (ref 10–60)
ANION GAP SERPL CALC-SCNC: 6 MMOL/L — SIGNIFICANT CHANGE UP (ref 5–17)
ANION GAP SERPL CALC-SCNC: 8 MMOL/L — SIGNIFICANT CHANGE UP (ref 5–17)
AST SERPL-CCNC: 24 U/L — SIGNIFICANT CHANGE UP (ref 10–40)
BILIRUB SERPL-MCNC: 0.2 MG/DL — SIGNIFICANT CHANGE UP (ref 0.2–1.2)
BUN SERPL-MCNC: 10 MG/DL — SIGNIFICANT CHANGE UP (ref 7–18)
BUN SERPL-MCNC: 10 MG/DL — SIGNIFICANT CHANGE UP (ref 7–18)
CALCIUM SERPL-MCNC: 8.7 MG/DL — SIGNIFICANT CHANGE UP (ref 8.4–10.5)
CALCIUM SERPL-MCNC: 8.7 MG/DL — SIGNIFICANT CHANGE UP (ref 8.4–10.5)
CHLORIDE SERPL-SCNC: 110 MMOL/L — HIGH (ref 96–108)
CHLORIDE SERPL-SCNC: 110 MMOL/L — HIGH (ref 96–108)
CK MB BLD-MCNC: 0.9 % — SIGNIFICANT CHANGE UP (ref 0–3.5)
CK MB CFR SERPL CALC: 1 NG/ML — SIGNIFICANT CHANGE UP (ref 0–3.6)
CK SERPL-CCNC: 109 U/L — SIGNIFICANT CHANGE UP (ref 21–215)
CO2 SERPL-SCNC: 23 MMOL/L — SIGNIFICANT CHANGE UP (ref 22–31)
CO2 SERPL-SCNC: 24 MMOL/L — SIGNIFICANT CHANGE UP (ref 22–31)
CREAT SERPL-MCNC: 0.91 MG/DL — SIGNIFICANT CHANGE UP (ref 0.5–1.3)
CREAT SERPL-MCNC: 0.95 MG/DL — SIGNIFICANT CHANGE UP (ref 0.5–1.3)
GLUCOSE SERPL-MCNC: 65 MG/DL — LOW (ref 70–99)
GLUCOSE SERPL-MCNC: 74 MG/DL — SIGNIFICANT CHANGE UP (ref 70–99)
HCT VFR BLD CALC: 33.6 % — LOW (ref 34.5–45)
HGB BLD-MCNC: 10.8 G/DL — LOW (ref 11.5–15.5)
MCHC RBC-ENTMCNC: 31.4 PG — SIGNIFICANT CHANGE UP (ref 27–34)
MCHC RBC-ENTMCNC: 32.3 GM/DL — SIGNIFICANT CHANGE UP (ref 32–36)
MCV RBC AUTO: 97.2 FL — SIGNIFICANT CHANGE UP (ref 80–100)
PLATELET # BLD AUTO: 263 K/UL — SIGNIFICANT CHANGE UP (ref 150–400)
POTASSIUM SERPL-MCNC: 3.4 MMOL/L — LOW (ref 3.5–5.3)
POTASSIUM SERPL-MCNC: 3.5 MMOL/L — SIGNIFICANT CHANGE UP (ref 3.5–5.3)
POTASSIUM SERPL-SCNC: 3.4 MMOL/L — LOW (ref 3.5–5.3)
POTASSIUM SERPL-SCNC: 3.5 MMOL/L — SIGNIFICANT CHANGE UP (ref 3.5–5.3)
PROT SERPL-MCNC: 7.8 G/DL — SIGNIFICANT CHANGE UP (ref 6–8.3)
RBC # BLD: 3.45 M/UL — LOW (ref 3.8–5.2)
RBC # FLD: 11.6 % — SIGNIFICANT CHANGE UP (ref 10.3–14.5)
SODIUM SERPL-SCNC: 140 MMOL/L — SIGNIFICANT CHANGE UP (ref 135–145)
SODIUM SERPL-SCNC: 141 MMOL/L — SIGNIFICANT CHANGE UP (ref 135–145)
T3FREE SERPL-MCNC: 2.11 PG/ML — SIGNIFICANT CHANGE UP (ref 1.8–4.6)
T4 FREE SERPL-MCNC: 0.9 NG/DL — SIGNIFICANT CHANGE UP (ref 0.9–1.8)
TROPONIN I SERPL-MCNC: <0.015 NG/ML — SIGNIFICANT CHANGE UP (ref 0–0.04)
TROPONIN I SERPL-MCNC: <0.015 NG/ML — SIGNIFICANT CHANGE UP (ref 0–0.04)
TSH SERPL-MCNC: 3.3 UU/ML — SIGNIFICANT CHANGE UP (ref 0.34–4.82)
WBC # BLD: 3.1 K/UL — LOW (ref 3.8–10.5)
WBC # FLD AUTO: 3.1 K/UL — LOW (ref 3.8–10.5)

## 2019-02-12 PROCEDURE — 99222 1ST HOSP IP/OBS MODERATE 55: CPT

## 2019-02-12 PROCEDURE — 70450 CT HEAD/BRAIN W/O DYE: CPT | Mod: 26

## 2019-02-12 PROCEDURE — 99285 EMERGENCY DEPT VISIT HI MDM: CPT | Mod: 25

## 2019-02-12 RX ORDER — PANTOPRAZOLE SODIUM 20 MG/1
40 TABLET, DELAYED RELEASE ORAL
Qty: 0 | Refills: 0 | Status: DISCONTINUED | OUTPATIENT
Start: 2019-02-12 | End: 2019-02-14

## 2019-02-12 RX ORDER — ACETAMINOPHEN 500 MG
650 TABLET ORAL EVERY 6 HOURS
Qty: 0 | Refills: 0 | Status: DISCONTINUED | OUTPATIENT
Start: 2019-02-12 | End: 2019-02-14

## 2019-02-12 RX ORDER — SUMATRIPTAN SUCCINATE 4 MG/.5ML
100 INJECTION, SOLUTION SUBCUTANEOUS
Qty: 0 | Refills: 0 | COMMUNITY

## 2019-02-12 RX ORDER — MECLIZINE HCL 12.5 MG
12.5 TABLET ORAL EVERY 8 HOURS
Qty: 0 | Refills: 0 | Status: DISCONTINUED | OUTPATIENT
Start: 2019-02-12 | End: 2019-02-14

## 2019-02-12 RX ORDER — POTASSIUM CHLORIDE 20 MEQ
40 PACKET (EA) ORAL ONCE
Qty: 0 | Refills: 0 | Status: COMPLETED | OUTPATIENT
Start: 2019-02-12 | End: 2019-02-12

## 2019-02-12 RX ORDER — ASPIRIN/CALCIUM CARB/MAGNESIUM 324 MG
1 TABLET ORAL
Qty: 0 | Refills: 0 | COMMUNITY

## 2019-02-12 RX ORDER — ATORVASTATIN CALCIUM 80 MG/1
40 TABLET, FILM COATED ORAL AT BEDTIME
Qty: 0 | Refills: 0 | Status: DISCONTINUED | OUTPATIENT
Start: 2019-02-12 | End: 2019-02-14

## 2019-02-12 RX ORDER — ENOXAPARIN SODIUM 100 MG/ML
40 INJECTION SUBCUTANEOUS DAILY
Qty: 0 | Refills: 0 | Status: DISCONTINUED | OUTPATIENT
Start: 2019-02-12 | End: 2019-02-14

## 2019-02-12 RX ORDER — NORTRIPTYLINE HYDROCHLORIDE 10 MG/1
75 CAPSULE ORAL DAILY
Qty: 0 | Refills: 0 | Status: DISCONTINUED | OUTPATIENT
Start: 2019-02-12 | End: 2019-02-14

## 2019-02-12 RX ORDER — TOPIRAMATE 25 MG
100 TABLET ORAL
Qty: 0 | Refills: 0 | Status: DISCONTINUED | OUTPATIENT
Start: 2019-02-12 | End: 2019-02-14

## 2019-02-12 RX ORDER — CLONAZEPAM 1 MG
0.5 TABLET ORAL DAILY
Qty: 0 | Refills: 0 | Status: DISCONTINUED | OUTPATIENT
Start: 2019-02-12 | End: 2019-02-14

## 2019-02-12 RX ORDER — TRAZODONE HCL 50 MG
150 TABLET ORAL DAILY
Qty: 0 | Refills: 0 | Status: DISCONTINUED | OUTPATIENT
Start: 2019-02-12 | End: 2019-02-13

## 2019-02-12 RX ADMIN — Medication 100 MILLIGRAM(S): at 21:13

## 2019-02-12 RX ADMIN — Medication 12.5 MILLIGRAM(S): at 13:45

## 2019-02-12 RX ADMIN — Medication 100 MILLIGRAM(S): at 07:36

## 2019-02-12 RX ADMIN — PANTOPRAZOLE SODIUM 40 MILLIGRAM(S): 20 TABLET, DELAYED RELEASE ORAL at 07:36

## 2019-02-12 RX ADMIN — Medication 40 MILLIEQUIVALENT(S): at 21:17

## 2019-02-12 RX ADMIN — ENOXAPARIN SODIUM 40 MILLIGRAM(S): 100 INJECTION SUBCUTANEOUS at 12:37

## 2019-02-12 RX ADMIN — NORTRIPTYLINE HYDROCHLORIDE 75 MILLIGRAM(S): 10 CAPSULE ORAL at 23:57

## 2019-02-12 RX ADMIN — Medication 25 MILLIGRAM(S): at 00:02

## 2019-02-12 RX ADMIN — Medication 12.5 MILLIGRAM(S): at 21:13

## 2019-02-12 RX ADMIN — Medication 0.5 MILLIGRAM(S): at 13:43

## 2019-02-12 RX ADMIN — Medication 150 MILLIGRAM(S): at 21:16

## 2019-02-12 RX ADMIN — ATORVASTATIN CALCIUM 40 MILLIGRAM(S): 80 TABLET, FILM COATED ORAL at 21:13

## 2019-02-12 NOTE — H&P ADULT - PROBLEM SELECTOR PLAN 3
p/w generalized weakness Motor strength 4/5 could be due to hypothyroidism as patient was discharged on synthyroid but medication list given by patient does not have synthryroid  will start synthyroid  F/u TSH/ T4

## 2019-02-12 NOTE — H&P ADULT - ASSESSMENT
53 year old Female ambulates independently with PMHx significant for Hypertension, Fibromyalgia, Depression, Insomnia, Asthma, anxiety, HLD, migraine and CAD in 2012 no stents, chronic vertigo, thyroid nodule/ hypothyroidism?? with c/o gradual onset dizziness x 5 days and chest and back pain x 3 days.

## 2019-02-12 NOTE — H&P ADULT - PROBLEM SELECTOR PLAN 1
p/w headache worsening dizziness x 5 days, have hx of chronic vertigo aggravated on moving head likely could be due to BPPV/ complicated migraine  has similar complains in past  CT head unremarkable for stroke  started meclizine

## 2019-02-12 NOTE — H&P ADULT - ATTENDING COMMENTS
This is a 53 year old woman with PMH of chronic vertigo, Hypertension, fibromyalgia, clinical depression /anxiety, migraine, CAD, presenting with 5 days of persistent spinning  within the room for the past 5 days. This is in addition to some other nonspecific symptoms. IT is similar to her prior presentation for vertigo but is more severe and lasting longer than expected. She denies any flu like illness or trauma preceding this admission.  Of note, she does not remember the name of the meds used for her vertigo the last time she was admitted for this and appears non compliant with it.  Of note, she was started on treatment for hypothyroidism last admission but has not taken it yet.  ROS not contributory    Vital Signs Last 24 Hrs  T(C): 36.6 (12 Feb 2019 05:56), Max: 36.7 (11 Feb 2019 20:36)  T(F): 97.8 (12 Feb 2019 05:56), Max: 98 (11 Feb 2019 20:36)  HR: 65 (12 Feb 2019 05:56) (65 - 77)  BP: 106/64 (12 Feb 2019 05:56) (106/64 - 136/87)  RR: 16 (12 Feb 2019 05:56) (16 - 16)  SpO2: 97% (12 Feb 2019 05:56) (97% - 99%)    NAD eyes closed,   CTA B/L RRR S1S2  Soft NT ND BS +  NO pedal edema  No focal deficits     Labs   02-11    141  |  110<H>  |  10  ----------------------------<  65<L>  3.5   |  23  |  0.95    Ca    8.7      11 Feb 2019 23:45    T Pro  7.8  /  Alb  4.0  /  TBili  0.2  /  DBili  x   /  AST  24  /  ALT  19  /  Alk Phos  79  02-11                          10.9   4.4   )-----------( 299      ( 11 Feb 2019 23:45 )             35.0     CT  head - no acute finding    Impression  53 year old with multiple medical problems including vertigo ( ?BPPV) presenting with an acute episode which has persisted longer than it normally does.  Will admit and continue meclizine  Fall risk /safety precaution  Gentle IVF infusion X 6 hours  Continue home meds f    Hx of hypothyroidism  -  Recheck tsh/FT4   - Start on low dose synthroid given at discharge

## 2019-02-12 NOTE — H&P ADULT - NSHPPHYSICALEXAM_GEN_ALL_CORE
ICU Vital Signs Last 24 Hrs  T(C): 36.7 (11 Feb 2019 23:45), Max: 36.7 (11 Feb 2019 20:36)  T(F): 98 (11 Feb 2019 23:45), Max: 98 (11 Feb 2019 20:36)  HR: 74 (11 Feb 2019 23:45) (74 - 77)  BP: 117/74 (11 Feb 2019 23:45) (117/74 - 136/87)  BP(mean): --  ABP: --  ABP(mean): --  RR: 16 (11 Feb 2019 23:45) (16 - 16)  SpO2: 98% (11 Feb 2019 23:45) (98% - 99%)

## 2019-02-12 NOTE — H&P ADULT - HISTORY OF PRESENT ILLNESS
53 year old Female patient with PMHx significant for Hypertension, Fibromyalgia, Depression, Insomnia, Asthma, anxiety, HLD, migraine and CAD in 2012 no stents, chronic vertigo with c/o gradual onset dizziness x 5 days and chest and back pain x 3 days. Pt states she has been having unsteady gait and describes the dizziness as if the room is spinning. Pt notes she has had similar sxs in the past but not that have lasted this long. Pt reports her last episode of vertigo was 1 year ago. Pt is taking Effexor 150 mg, Clonazepam, Topiramate, Omeprazole 20 mg, Trazadone 150 mg, Ibuprofen 600 mg, Meloxicam, Atorvastatin 20 mg, Nortriptyline, and ASA 81 mg. Pt denies fever, vomiting, SOB, or any other 53 year old Female ambulates independently with PMHx significant for Hypertension, Fibromyalgia, Depression, Insomnia, Asthma, anxiety, HLD, migraine and CAD in 2012 no stents, chronic vertigo with c/o gradual onset dizziness x 5 days and chest and back pain x 3 days.     Pt states she developed gradual worsening of her vertigo she has been having unsteady gait and describes the dizziness as if the room is spinning. Pt notes she has had similar sxs in the past but not that have lasted this long. Pt reports her last episode of vertigo was 1 year ago. Pt is taking Effexor 150 mg, Clonazepam, Topiramate, Omeprazole 20 mg, Trazadone 150 mg, Ibuprofen 600 mg, Meloxicam, Atorvastatin 20 mg, Nortriptyline, and ASA 81 mg. Pt denies fever, vomiting, SOB, or any other 53 year old Female ambulates independently with PMHx significant for Hypertension, Fibromyalgia, Depression, Insomnia, Asthma, anxiety, HLD, migraine and CAD in 2012 no stents, chronic vertigo, thyroid nodule/ hypothyroidism?? with c/o gradual onset dizziness x 5 days and chest and back pain x 3 days.     Pt states she developed gradual worsening of her vertigo ( room spinning sensation) since 5 days associated with unsteady gait, blurry vision, headache, nausea, weakness ( unable to tightly grab things in hand). Since 3 days she started having intermittent chest pain, back pain and heartburn. She endorsed of having dyspnea on exertion gita after walking 1 block, climbing stairs since 2 weeks. She also has severe chronic cold intolerance x few years.  She denies fever, chills, diarrhea, UR symptoms, hearing loss, dry skin or any other complains.  Pt notes she has had similar sxs in the past but not that have lasted this long. Pt reports her last episode of vertigo was 1 year ago.      In ED, vital signs were stable. Labs were H/H:10.9/35.0. CT head shows " No acute intracranial hemorrhage, mass effect, or CT evidence of a large vascular territory infarct". 53 year old Female ambulates independently with PMHx significant for Hypertension, Fibromyalgia, Depression, Insomnia, Asthma, anxiety, HLD, migraine and CAD in 2012 no stents, chronic vertigo, thyroid nodule/ hypothyroidism?? with c/o gradual onset dizziness x 5 days and chest and back pain x 3 days.     Pt states she developed gradual worsening of her vertigo ( room spinning sensation) since 5 days associated with unsteady gait, blurry vision, headache, nausea, weakness ( unable to tightly grab things in hand). Since 3 days she started having intermittent chest pain, back pain and heartburn. She endorsed of having dyspnea on exertion gita after walking 1 block, climbing stairs since 2 weeks. She also has severe chronic cold intolerance x few years.  She denies fever, chills, diarrhea, UR symptoms, hearing loss, dry skin or any other complains.  Pt notes she has had similar sxs in the past but not that have lasted this long. Pt reports her last episode of vertigo was 1 year ago.  Patient used to do exercise in past but now unable to do due to weakness and fatigue    In ED, vital signs were stable. Labs were H/H:10.9/35.0. CT head shows " No acute intracranial hemorrhage, mass effect, or CT evidence of a large vascular territory infarct".

## 2019-02-12 NOTE — H&P ADULT - PROBLEM SELECTOR PLAN 2
p/w chronic pain of chest pain and abdominal pain likely due to multiple comorbidities of Fibromyalgia, depression,   c/w tyelnol

## 2019-02-12 NOTE — H&P ADULT - FAMILY HISTORY
Family history of acute myocardial infarction     Sibling  Still living? Unknown  Family history of breast cancer in first degree relative, Age at diagnosis: Age Unknown

## 2019-02-13 LAB
ALBUMIN SERPL ELPH-MCNC: 3.8 G/DL — SIGNIFICANT CHANGE UP (ref 3.5–5)
ALP SERPL-CCNC: 77 U/L — SIGNIFICANT CHANGE UP (ref 40–120)
ALT FLD-CCNC: 18 U/L DA — SIGNIFICANT CHANGE UP (ref 10–60)
ANION GAP SERPL CALC-SCNC: 5 MMOL/L — SIGNIFICANT CHANGE UP (ref 5–17)
AST SERPL-CCNC: 20 U/L — SIGNIFICANT CHANGE UP (ref 10–40)
BASOPHILS # BLD AUTO: 0.03 K/UL — SIGNIFICANT CHANGE UP (ref 0–0.2)
BASOPHILS NFR BLD AUTO: 0.6 % — SIGNIFICANT CHANGE UP (ref 0–2)
BILIRUB SERPL-MCNC: 0.2 MG/DL — SIGNIFICANT CHANGE UP (ref 0.2–1.2)
BUN SERPL-MCNC: 9 MG/DL — SIGNIFICANT CHANGE UP (ref 7–18)
CALCIUM SERPL-MCNC: 8.4 MG/DL — SIGNIFICANT CHANGE UP (ref 8.4–10.5)
CHLORIDE SERPL-SCNC: 113 MMOL/L — HIGH (ref 96–108)
CO2 SERPL-SCNC: 24 MMOL/L — SIGNIFICANT CHANGE UP (ref 22–31)
CREAT SERPL-MCNC: 0.94 MG/DL — SIGNIFICANT CHANGE UP (ref 0.5–1.3)
EOSINOPHIL # BLD AUTO: 0.03 K/UL — SIGNIFICANT CHANGE UP (ref 0–0.5)
EOSINOPHIL NFR BLD AUTO: 0.6 % — SIGNIFICANT CHANGE UP (ref 0–6)
GLUCOSE SERPL-MCNC: 85 MG/DL — SIGNIFICANT CHANGE UP (ref 70–99)
HCT VFR BLD CALC: 33.7 % — LOW (ref 34.5–45)
HGB BLD-MCNC: 10.8 G/DL — LOW (ref 11.5–15.5)
IMM GRANULOCYTES NFR BLD AUTO: 0.2 % — SIGNIFICANT CHANGE UP (ref 0–1.5)
LYMPHOCYTES # BLD AUTO: 1.64 K/UL — SIGNIFICANT CHANGE UP (ref 1–3.3)
LYMPHOCYTES # BLD AUTO: 33.6 % — SIGNIFICANT CHANGE UP (ref 13–44)
MAGNESIUM SERPL-MCNC: 2.2 MG/DL — SIGNIFICANT CHANGE UP (ref 1.6–2.6)
MCHC RBC-ENTMCNC: 30.9 PG — SIGNIFICANT CHANGE UP (ref 27–34)
MCHC RBC-ENTMCNC: 32 GM/DL — SIGNIFICANT CHANGE UP (ref 32–36)
MCV RBC AUTO: 96.6 FL — SIGNIFICANT CHANGE UP (ref 80–100)
MONOCYTES # BLD AUTO: 0.42 K/UL — SIGNIFICANT CHANGE UP (ref 0–0.9)
MONOCYTES NFR BLD AUTO: 8.6 % — SIGNIFICANT CHANGE UP (ref 2–14)
NEUTROPHILS # BLD AUTO: 2.75 K/UL — SIGNIFICANT CHANGE UP (ref 1.8–7.4)
NEUTROPHILS NFR BLD AUTO: 56.4 % — SIGNIFICANT CHANGE UP (ref 43–77)
NRBC # BLD: 0 /100 WBCS — SIGNIFICANT CHANGE UP (ref 0–0)
PHOSPHATE SERPL-MCNC: 3.9 MG/DL — SIGNIFICANT CHANGE UP (ref 2.5–4.5)
PLATELET # BLD AUTO: 310 K/UL — SIGNIFICANT CHANGE UP (ref 150–400)
POTASSIUM SERPL-MCNC: 3.8 MMOL/L — SIGNIFICANT CHANGE UP (ref 3.5–5.3)
POTASSIUM SERPL-SCNC: 3.8 MMOL/L — SIGNIFICANT CHANGE UP (ref 3.5–5.3)
PROT SERPL-MCNC: 7.3 G/DL — SIGNIFICANT CHANGE UP (ref 6–8.3)
RBC # BLD: 3.49 M/UL — LOW (ref 3.8–5.2)
RBC # FLD: 12 % — SIGNIFICANT CHANGE UP (ref 10.3–14.5)
SODIUM SERPL-SCNC: 142 MMOL/L — SIGNIFICANT CHANGE UP (ref 135–145)
WBC # BLD: 4.88 K/UL — SIGNIFICANT CHANGE UP (ref 3.8–10.5)
WBC # FLD AUTO: 4.88 K/UL — SIGNIFICANT CHANGE UP (ref 3.8–10.5)

## 2019-02-13 PROCEDURE — 99232 SBSQ HOSP IP/OBS MODERATE 35: CPT | Mod: GC

## 2019-02-13 PROCEDURE — 70553 MRI BRAIN STEM W/O & W/DYE: CPT | Mod: 26

## 2019-02-13 RX ORDER — VENLAFAXINE HCL 75 MG
150 CAPSULE, EXT RELEASE 24 HR ORAL ONCE
Qty: 0 | Refills: 0 | Status: COMPLETED | OUTPATIENT
Start: 2019-02-13 | End: 2019-02-13

## 2019-02-13 RX ORDER — VENLAFAXINE HCL 75 MG
150 CAPSULE, EXT RELEASE 24 HR ORAL DAILY
Qty: 0 | Refills: 0 | Status: DISCONTINUED | OUTPATIENT
Start: 2019-02-13 | End: 2019-02-14

## 2019-02-13 RX ORDER — TRAZODONE HCL 50 MG
150 TABLET ORAL AT BEDTIME
Qty: 0 | Refills: 0 | Status: DISCONTINUED | OUTPATIENT
Start: 2019-02-13 | End: 2019-02-14

## 2019-02-13 RX ORDER — VENLAFAXINE HCL 75 MG
150 CAPSULE, EXT RELEASE 24 HR ORAL ONCE
Qty: 0 | Refills: 0 | Status: DISCONTINUED | OUTPATIENT
Start: 2019-02-13 | End: 2019-02-13

## 2019-02-13 RX ORDER — CELECOXIB 200 MG/1
200 CAPSULE ORAL
Qty: 0 | Refills: 0 | Status: DISCONTINUED | OUTPATIENT
Start: 2019-02-13 | End: 2019-02-14

## 2019-02-13 RX ADMIN — ENOXAPARIN SODIUM 40 MILLIGRAM(S): 100 INJECTION SUBCUTANEOUS at 12:15

## 2019-02-13 RX ADMIN — Medication 100 MILLIGRAM(S): at 18:27

## 2019-02-13 RX ADMIN — Medication 100 MILLIGRAM(S): at 05:28

## 2019-02-13 RX ADMIN — Medication 150 MILLIGRAM(S): at 21:45

## 2019-02-13 RX ADMIN — Medication 150 MILLIGRAM(S): at 22:13

## 2019-02-13 RX ADMIN — Medication 12.5 MILLIGRAM(S): at 05:28

## 2019-02-13 RX ADMIN — PANTOPRAZOLE SODIUM 40 MILLIGRAM(S): 20 TABLET, DELAYED RELEASE ORAL at 05:29

## 2019-02-13 RX ADMIN — NORTRIPTYLINE HYDROCHLORIDE 75 MILLIGRAM(S): 10 CAPSULE ORAL at 13:27

## 2019-02-13 RX ADMIN — ATORVASTATIN CALCIUM 40 MILLIGRAM(S): 80 TABLET, FILM COATED ORAL at 21:45

## 2019-02-13 RX ADMIN — Medication 12.5 MILLIGRAM(S): at 21:45

## 2019-02-13 RX ADMIN — Medication 0.5 MILLIGRAM(S): at 13:28

## 2019-02-13 RX ADMIN — Medication 2 MILLIGRAM(S): at 15:20

## 2019-02-13 RX ADMIN — Medication 12.5 MILLIGRAM(S): at 13:27

## 2019-02-13 NOTE — PROGRESS NOTE ADULT - SUBJECTIVE AND OBJECTIVE BOX
Patient is a 53y old  Female who presents with a chief complaint of Dizziness x 5 days  Chest and back pain x 3 days (12 Feb 2019 03:45)      INTERVAL HPI/OVERNIGHT EVENTS: seen and examined. Vertigo improved.   Patient with stage 2 breat CA currently on chemotherapy. Planing b/l mastectomy as outpatient.      MEDICATIONS  (STANDING):  atorvastatin 40 milliGRAM(s) Oral at bedtime  enoxaparin Injectable 40 milliGRAM(s) SubCutaneous daily  meclizine 12.5 milliGRAM(s) Oral every 8 hours  nortriptyline 75 milliGRAM(s) Oral daily  pantoprazole    Tablet 40 milliGRAM(s) Oral before breakfast  topiramate 100 milliGRAM(s) Oral two times a day  traZODone 150 milliGRAM(s) Oral daily    MEDICATIONS  (PRN):  acetaminophen   Tablet .. 650 milliGRAM(s) Oral every 6 hours PRN Mild Pain (1 - 3), Moderate Pain (4 - 6)  clonazePAM Tablet 0.5 milliGRAM(s) Oral daily PRN Anxiety      Allergies    estrogen (Rash)    Intolerances        REVIEW OF SYSTEMS:  CONSTITUTIONAL: No fever, weight loss, or fatigue  RESPIRATORY: No cough, wheezing, chills or hemoptysis; No shortness of breath  CARDIOVASCULAR: No chest pain, palpitations, dizziness, or leg swelling  GASTROINTESTINAL: No abdominal or epigastric pain. No nausea, vomiting, or hematemesis; No diarrhea or constipation. No melena or hematochezia.  NEUROLOGICAL: No headaches, memory loss, loss of strength, numbness, or tremors  MUSCULOSKELETAL: No joint pain or swelling; No muscle, back, or extremity pain      Vital Signs Last 24 Hrs  T(C): 37.2 (13 Feb 2019 14:25), Max: 37.2 (13 Feb 2019 14:25)  T(F): 98.9 (13 Feb 2019 14:25), Max: 98.9 (13 Feb 2019 14:25)  HR: 88 (13 Feb 2019 14:25) (70 - 96)  BP: 103/70 (13 Feb 2019 14:25) (93/57 - 131/78)  BP(mean): --  RR: 18 (13 Feb 2019 14:25) (16 - 18)  SpO2: 100% (13 Feb 2019 14:25) (97% - 100%)    PHYSICAL EXAM:  GENERAL: NAD, well-groomed, well-developed  HEAD:  Atraumatic, Normocephalic  EYES: EOMI, PERRLA, conjunctiva and sclera clear  NECK: Supple, No JVD, Normal thyroid  NERVOUS SYSTEM:  Alert & Oriented X3, No gross focal deficits  CHEST/LUNG: Clear to percussion bilaterally; No rales, rhonchi, wheezing, or rubs  HEART: Regular rate and rhythm; No murmurs, rubs, or gallops  ABDOMEN: Soft, Nontender, Nondistended; Bowel sounds present  EXTREMITIES:  No clubbing, cyanosis, or edema  SKIN: No rashes or lesions    LABS:                        10.8   4.88  )-----------( 310      ( 13 Feb 2019 06:05 )             33.7     02-13    142  |  113<H>  |  9   ----------------------------<  85  3.8   |  24  |  0.94    Ca    8.4      13 Feb 2019 06:05  Phos  3.9     02-13  Mg     2.2     02-13    TPro  7.3  /  Alb  3.8  /  TBili  0.2  /  DBili  x   /  AST  20  /  ALT  18  /  AlkPhos  77  02-13    PT/INR - ( 11 Feb 2019 23:45 )   PT: 13.0 sec;   INR: 1.17 ratio         PTT - ( 11 Feb 2019 23:45 )  PTT:33.0 sec    CAPILLARY BLOOD GLUCOSE          RADIOLOGY & ADDITIONAL TESTS:    Imaging Personally Reviewed:  [ ] YES  [ ] NO    Consultant(s) Notes Reviewed:  [ ] YES  [ ] NO    Care Discussed with Consultants/Other Providers [ ] YES  [ ] NO    Plan of Care discussed with Housestaff [ ]YES [ ] NO

## 2019-02-13 NOTE — PROGRESS NOTE ADULT - PROBLEM SELECTOR PLAN 3
Normocytic anemia likely secondary to underlying SLE and breast CA   (reports having seronegative lupus)

## 2019-02-13 NOTE — PHARMACOTHERAPY INTERVENTION NOTE - COMMENTS
As ped med rec - patient takes synthroid 25 mcg qd but pt's own med list does not include synthroid. Unknown if pt was really on it or not. TSH/t3/t4 are WNL.   MD said will look into it.

## 2019-02-13 NOTE — PROGRESS NOTE ADULT - PROBLEM SELECTOR PLAN 1
p/w headache worsening dizziness x 5 days, have hx of chronic vertigo   Vertigo improved   Given patients history of Breast ca, will get MRI of brain to rule out any mets  PT evaluation done

## 2019-02-13 NOTE — PHYSICAL THERAPY INITIAL EVALUATION ADULT - DIAGNOSIS, PT EVAL
Overall decline in functional mobility due to generalized pain, weakness, decreased balance and endurance.

## 2019-02-14 ENCOUNTER — TRANSCRIPTION ENCOUNTER (OUTPATIENT)
Age: 54
End: 2019-02-14

## 2019-02-14 VITALS
DIASTOLIC BLOOD PRESSURE: 75 MMHG | HEART RATE: 105 BPM | OXYGEN SATURATION: 96 % | TEMPERATURE: 98 F | RESPIRATION RATE: 18 BRPM | SYSTOLIC BLOOD PRESSURE: 118 MMHG

## 2019-02-14 PROCEDURE — G0378: CPT

## 2019-02-14 PROCEDURE — 80048 BASIC METABOLIC PNL TOTAL CA: CPT

## 2019-02-14 PROCEDURE — 85730 THROMBOPLASTIN TIME PARTIAL: CPT

## 2019-02-14 PROCEDURE — 82550 ASSAY OF CK (CPK): CPT

## 2019-02-14 PROCEDURE — 99285 EMERGENCY DEPT VISIT HI MDM: CPT | Mod: 25

## 2019-02-14 PROCEDURE — 82553 CREATINE MB FRACTION: CPT

## 2019-02-14 PROCEDURE — 83735 ASSAY OF MAGNESIUM: CPT

## 2019-02-14 PROCEDURE — 70553 MRI BRAIN STEM W/O & W/DYE: CPT

## 2019-02-14 PROCEDURE — 85610 PROTHROMBIN TIME: CPT

## 2019-02-14 PROCEDURE — 84484 ASSAY OF TROPONIN QUANT: CPT

## 2019-02-14 PROCEDURE — 80053 COMPREHEN METABOLIC PANEL: CPT

## 2019-02-14 PROCEDURE — 85027 COMPLETE CBC AUTOMATED: CPT

## 2019-02-14 PROCEDURE — 70450 CT HEAD/BRAIN W/O DYE: CPT

## 2019-02-14 PROCEDURE — 86803 HEPATITIS C AB TEST: CPT

## 2019-02-14 PROCEDURE — 84100 ASSAY OF PHOSPHORUS: CPT

## 2019-02-14 PROCEDURE — 84443 ASSAY THYROID STIM HORMONE: CPT

## 2019-02-14 PROCEDURE — 82962 GLUCOSE BLOOD TEST: CPT

## 2019-02-14 PROCEDURE — 93005 ELECTROCARDIOGRAM TRACING: CPT

## 2019-02-14 PROCEDURE — 84481 FREE ASSAY (FT-3): CPT

## 2019-02-14 PROCEDURE — 36415 COLL VENOUS BLD VENIPUNCTURE: CPT

## 2019-02-14 PROCEDURE — 84439 ASSAY OF FREE THYROXINE: CPT

## 2019-02-14 PROCEDURE — 71045 X-RAY EXAM CHEST 1 VIEW: CPT

## 2019-02-14 PROCEDURE — 97161 PT EVAL LOW COMPLEX 20 MIN: CPT

## 2019-02-14 RX ORDER — NORTRIPTYLINE HYDROCHLORIDE 10 MG/1
100 CAPSULE ORAL
Qty: 0 | Refills: 0 | COMMUNITY

## 2019-02-14 RX ORDER — TRAZODONE HCL 50 MG
1 TABLET ORAL
Qty: 0 | Refills: 0 | COMMUNITY
Start: 2019-02-14

## 2019-02-14 RX ORDER — CLONAZEPAM 1 MG
1 TABLET ORAL
Qty: 0 | Refills: 0 | COMMUNITY
Start: 2019-02-14

## 2019-02-14 RX ORDER — VENLAFAXINE HCL 75 MG
1 CAPSULE, EXT RELEASE 24 HR ORAL
Qty: 0 | Refills: 0 | COMMUNITY
Start: 2019-02-14

## 2019-02-14 RX ORDER — NORTRIPTYLINE HYDROCHLORIDE 10 MG/1
1 CAPSULE ORAL
Qty: 0 | Refills: 0 | COMMUNITY
Start: 2019-02-14

## 2019-02-14 RX ORDER — ATORVASTATIN CALCIUM 80 MG/1
1 TABLET, FILM COATED ORAL
Qty: 0 | Refills: 0 | COMMUNITY
Start: 2019-02-14

## 2019-02-14 RX ORDER — ATORVASTATIN CALCIUM 80 MG/1
1 TABLET, FILM COATED ORAL
Qty: 0 | Refills: 0 | COMMUNITY

## 2019-02-14 RX ORDER — TOPIRAMATE 25 MG
1 TABLET ORAL
Qty: 0 | Refills: 0 | COMMUNITY
Start: 2019-02-14

## 2019-02-14 RX ORDER — MECLIZINE HCL 12.5 MG
1 TABLET ORAL
Qty: 60 | Refills: 0 | OUTPATIENT
Start: 2019-02-14 | End: 2019-03-05

## 2019-02-14 RX ADMIN — ENOXAPARIN SODIUM 40 MILLIGRAM(S): 100 INJECTION SUBCUTANEOUS at 11:26

## 2019-02-14 RX ADMIN — NORTRIPTYLINE HYDROCHLORIDE 75 MILLIGRAM(S): 10 CAPSULE ORAL at 11:25

## 2019-02-14 RX ADMIN — PANTOPRAZOLE SODIUM 40 MILLIGRAM(S): 20 TABLET, DELAYED RELEASE ORAL at 05:51

## 2019-02-14 RX ADMIN — Medication 12.5 MILLIGRAM(S): at 05:51

## 2019-02-14 RX ADMIN — Medication 150 MILLIGRAM(S): at 11:25

## 2019-02-14 RX ADMIN — Medication 100 MILLIGRAM(S): at 05:51

## 2019-02-14 RX ADMIN — CELECOXIB 200 MILLIGRAM(S): 200 CAPSULE ORAL at 10:14

## 2019-02-14 RX ADMIN — CELECOXIB 200 MILLIGRAM(S): 200 CAPSULE ORAL at 09:14

## 2019-02-14 RX ADMIN — Medication 0.5 MILLIGRAM(S): at 00:26

## 2019-02-14 RX ADMIN — Medication 12.5 MILLIGRAM(S): at 13:16

## 2019-02-14 NOTE — DISCHARGE NOTE ADULT - CARE PLAN
Principal Discharge DX:	Vertigo  Goal:	resolution of symptoms  Assessment and plan of treatment:	please continue with medications as prescribed   please follow up  with PMD within  2weeks of discharge   please follow up  with out-patient vestibular services  Secondary Diagnosis:	Fibromyalgia  Assessment and plan of treatment:	please continue with medications as prescribed  avoid over use of nsaids  Secondary Diagnosis:	Anemia  Assessment and plan of treatment:	likely chronic   likely secondary  to  underlying lupus

## 2019-02-14 NOTE — DISCHARGE NOTE ADULT - MEDICATION SUMMARY - MEDICATIONS TO TAKE
I will START or STAY ON the medications listed below when I get home from the hospital:    topiramate 100 mg oral tablet  -- 1 tab(s) by mouth 2 times a day  -- Indication: For migrAINE     clonazePAM 0.5 mg oral tablet  -- 1 tab(s) by mouth once a day, As needed, Anxiety  -- Indication: For Anxiety     nortriptyline 75 mg oral capsule  -- 1 cap(s) by mouth once a day  -- Indication: For MIGRAINE     traZODone 150 mg oral tablet  -- 1 tab(s) by mouth once a day (at bedtime)  -- Indication: For AnTIDEPRESSANT     venlafaxine 150 mg oral capsule, extended release  -- 1 cap(s) by mouth once a day  -- Indication: For Anxiety     meclizine 12.5 mg oral tablet  -- 1 tab(s) by mouth every 8 hours  -- Indication: For Vertigo     atorvastatin 40 mg oral tablet  -- 1 tab(s) by mouth once a day (at bedtime)  -- Indication: For hld     omeprazole 20 mg oral delayed release capsule  -- 1 cap(s) by mouth once a day  -- Indication: For Gi  prophylaxsis     Synthroid 25 mcg (0.025 mg) oral tablet  -- 1 tab(s) by mouth once a day   -- It is very important that you take or use this exactly as directed.  Do not skip doses or discontinue unless directed by your doctor.  Medication should be taken with plenty of water.  Some non-prescription drugs may aggravate your condition.  Read all labels carefully.  If a warning appears, check with your doctor before taking.  Take medication on an empty stomach 1 hour before or 2 to 3 hours after a meal unless otherwise directed by your doctor.    -- Indication: For hypothyroidism

## 2019-02-14 NOTE — DISCHARGE NOTE ADULT - PATIENT PORTAL LINK FT
You can access the [facility] Patient Portal, offered by [facility], by registering with the following website: [email or web address]

## 2019-02-14 NOTE — DISCHARGE NOTE ADULT - HOSPITAL COURSE
53 year old Female ambulates independently with PMHx significant for Hypertension, Fibromyalgia, Depression, Insomnia, Asthma, anxiety, HLD, migraine and CAD in 2012 no stents, chronic vertigo, thyroid nodule/ hypothyroidism with c/o gradual onset dizziness and chest and back pain  and was admitted for worsening vertigo and unsteady gait .   ct  scan : . CT head shows " No acute intracranial hemorrhage, mass effect, or CT evidence of a large vascular territory infarct".  MRI  :  no evidence of metastatic disease {  patient has h/o  breast  cancer }  patient had similar complaints in the past and also non -complaint with medications . patient had physical therapy evaluation in house and recommended home with out-patient vestibular services

## 2019-02-14 NOTE — DISCHARGE NOTE ADULT - PLAN OF CARE
resolution of symptoms please continue with medications as prescribed   please follow up  with PMD within  2weeks of discharge   please follow up  with out-patient vestibular services please continue with medications as prescribed  avoid over use of nsaids likely chronic   likely secondary  to  underlying lupus

## 2019-02-15 ENCOUNTER — OTHER (OUTPATIENT)
Age: 54
End: 2019-02-15

## 2019-02-20 ENCOUNTER — APPOINTMENT (OUTPATIENT)
Dept: RHEUMATOLOGY | Facility: CLINIC | Age: 54
End: 2019-02-20
Payer: MEDICARE

## 2019-02-20 VITALS
SYSTOLIC BLOOD PRESSURE: 117 MMHG | HEIGHT: 59 IN | HEART RATE: 79 BPM | OXYGEN SATURATION: 94 % | WEIGHT: 144 LBS | RESPIRATION RATE: 16 BRPM | TEMPERATURE: 98.2 F | DIASTOLIC BLOOD PRESSURE: 82 MMHG | BODY MASS INDEX: 29.03 KG/M2

## 2019-02-20 PROCEDURE — 99215 OFFICE O/P EST HI 40 MIN: CPT

## 2019-02-20 NOTE — PHYSICAL EXAM
[General Appearance - Alert] : alert [General Appearance - In No Acute Distress] : in no acute distress [Sclera] : the sclera and conjunctiva were normal [Examination Of The Oral Cavity] : the lips and gums were normal [Oropharynx] : the oropharynx was normal [Neck Appearance] : the appearance of the neck was normal [No Spinal Tenderness] : no spinal tenderness [Abnormal Walk] : normal gait [Nail Clubbing] : no clubbing  or cyanosis of the fingernails [Musculoskeletal - Swelling] : no joint swelling seen [Motor Tone] : muscle strength and tone were normal [] : no rash [Skin Lesions] : no skin lesions [Motor Exam] : the motor exam was normal [Oriented To Time, Place, And Person] : oriented to person, place, and time [Impaired Insight] : insight and judgment were intact

## 2019-02-21 LAB
APTT BLD: 30.9 SEC
CONFIRM: 26.7 SEC
DRVVT IMM 1:2 NP PPP: NORMAL
DRVVT SCREEN TO CONFIRM RATIO: 0.92 RATIO
SCREEN DRVVT: 29.7 SEC

## 2019-02-22 LAB
CARDIOLIPIN IGM SER-MCNC: 8.7 MPL
CARDIOLIPIN IGM SER-MCNC: <5 GPL
DEPRECATED CARDIOLIPIN IGA SER: <5 APL

## 2019-02-22 RX ORDER — LEVOTHYROXINE SODIUM 0.03 MG/1
25 TABLET ORAL
Qty: 30 | Refills: 0 | Status: DISCONTINUED | COMMUNITY
Start: 2018-08-27

## 2019-02-22 NOTE — HISTORY OF PRESENT ILLNESS
[FreeTextEntry1] : Patient returns for f/u with blood testing reflecting YOKO+ 1:320, homogenous pattern +Ro Abs seen in the past,  with nl inflammatory markers and additional negative YOKO subsets.  She explains recent discharge from hospital for vertigo and tachycardia.  She explains at the time of admission, she felt an ongoing h/a, dizziness and a sense of imbalance.  CT and MR imaging of the brain were non revealing; she c/w baby ASA and statin drug.  She reports hx of BBB in the past and has not had a cardiology f/u for some time.  \par She also notes red streaked sputum , most notable in the am over the last one month.  She denies shortness of breath, cough, fever or systemic symptoms.  She further denies rash, joint swelling or Raynaud's.

## 2019-02-22 NOTE — REVIEW OF SYSTEMS
[Depression] : depression [Fever] : no fever [Chills] : no chills [Eye Pain] : no eye pain [Sore Throat] : no sore throat [Hoarseness] : no hoarseness [Chest Pain] : no chest pain [Palpitations] : no palpitations [Cough] : no cough [SOB on Exertion] : no shortness of breath during exertion [Heartburn] : no heartburn [Arthralgias] : no arthralgias [Joint Pain] : no joint pain [Joint Swelling] : no joint swelling [Joint Stiffness] : no joint stiffness [Skin Lesions] : no skin lesions [Skin Wound] : no skin wound [Limb Weakness] : no limb weakness [Difficulty Walking] : no difficulty walking [Muscle Weakness] : no muscle weakness [Feelings Of Weakness] : no feelings of weakness [Easy Bleeding] : no tendency for easy bleeding [Easy Bruising] : no tendency for easy bruising

## 2019-02-22 NOTE — ASSESSMENT
[FreeTextEntry1] : Patient with +serologies resolving around CTD, blood tinged  recent hospitalization for vertigo and tachycardia:\par \par Cardiology referral given; additional APL Abs drawn today.   Patient w/o typical inflammatory symptoms of CTD at this time.  As for blood tinged sputum, QF gold testing ordered along with cxray.  Patient without  systemic symptoms at this time. Rec decreasing Ibuprofen due to increased risk of bleeding.  \par Patient will benefit from physical therapy to help with joint mobility and muscle strengthening.  Quadriceps strengthening exercises rec.  Weight loss has been encouraged to reduce load over the medial joint line.  Viscosupplementation has been encouraged to provide additional lubrication and joint support. She is in agreement with the above plan and will return in three months' time.\par

## 2019-02-22 NOTE — CONSULT LETTER
[Dear  ___] : Dear  [unfilled], [Courtesy Letter:] : I had the pleasure of seeing your patient, [unfilled], in my office today. [Please see my note below.] : Please see my note below. [Consult Closing:] : Thank you very much for allowing me to participate in the care of this patient.  If you have any questions, please do not hesitate to contact me. [Sincerely,] : Sincerely, [FreeTextEntry2] : Roe Sales MD\par [facility] [FreeTextEntry3] : Tamiko Duff M.D.\par  of Medicine \par [facility] [employer] [facility]\par \par

## 2019-02-25 ENCOUNTER — APPOINTMENT (OUTPATIENT)
Dept: INTERNAL MEDICINE | Facility: CLINIC | Age: 54
End: 2019-02-25
Payer: MEDICARE

## 2019-02-25 ENCOUNTER — APPOINTMENT (OUTPATIENT)
Dept: ORTHOPEDIC SURGERY | Facility: CLINIC | Age: 54
End: 2019-02-25

## 2019-02-25 VITALS
HEART RATE: 94 BPM | TEMPERATURE: 97 F | SYSTOLIC BLOOD PRESSURE: 120 MMHG | WEIGHT: 145 LBS | DIASTOLIC BLOOD PRESSURE: 80 MMHG | BODY MASS INDEX: 29.23 KG/M2 | OXYGEN SATURATION: 99 % | RESPIRATION RATE: 16 BRPM | HEIGHT: 59 IN

## 2019-02-25 LAB
M TB IFN-G BLD-IMP: NEGATIVE
QUANTIFERON TB PLUS MITOGEN MINUS NIL: 6.91 IU/ML
QUANTIFERON TB PLUS NIL: 0.04 IU/ML
QUANTIFERON TB PLUS TB1 MINUS NIL: 0 IU/ML
QUANTIFERON TB PLUS TB2 MINUS NIL: 0.03 IU/ML

## 2019-02-25 PROCEDURE — 99214 OFFICE O/P EST MOD 30 MIN: CPT

## 2019-02-25 NOTE — HEALTH RISK ASSESSMENT
[Intercurrent hospitalizations] : was admitted to the hospital  [No falls in past year] : Patient reported no falls in the past year [0] : 2) Feeling down, depressed, or hopeless: Not at all (0) [] : No [de-identified] : As documented in a message section. [de-identified] : RHEUM [[id number]] : 0

## 2019-02-25 NOTE — HISTORY OF PRESENT ILLNESS
[Post-hospitalization from ___ Hospital] : Post-hospitalization from [unfilled] Hospital [Admitted on: ___] : The patient was admitted on [unfilled] [Discharged on ___] : discharged on [unfilled] [Patient Contacted By: ____] : and contacted by [unfilled] [FreeTextEntry2] : As documented in a message section.\par \par 53 year old  female patient with history of stable Hypertension, Hypothyroidism, Hypercholesterolemia with Hypertriglyceridemia, CVA, Insomnia, Migraine, GERD, history as stated, presented for follow up examination after hospital discharge. Patient is compliant with all medications. Denies shortness of breath, chest pain or abdominal pains at this time. ROS as stated.\par

## 2019-02-25 NOTE — ASSESSMENT
[FreeTextEntry1] : 53 year old female found to have stable Hypertension, Hypothyroidism, Hypercholesterolemia with Hypertriglyceridemia, CVA, Insomnia, Migraine, GERD,with the current regimen, diet and life style modifications, as counseled. Prior results reviewed and discussed with the patient during today's examination. Plan as ordered.\par Patient was recently hospitalized, findings and recommendations reviewed and discussed with the patient during today's examination. Current symptoms are consistent with resolving Vertigo , prescription management and further followup as ordered.\par Patient was recently evaluated by RHEUM , findings and recommendations reviewed with the patient during today's examination.\par

## 2019-02-27 LAB
B2 GLYCOPROT1 IGA SERPL IA-ACNC: 5.8 SAU
B2 GLYCOPROT1 IGG SER-ACNC: <5 SGU
B2 GLYCOPROT1 IGM SER-ACNC: <5 SMU

## 2019-03-01 ENCOUNTER — APPOINTMENT (OUTPATIENT)
Dept: ORTHOPEDIC SURGERY | Facility: CLINIC | Age: 54
End: 2019-03-01
Payer: MEDICARE

## 2019-03-01 PROCEDURE — 99214 OFFICE O/P EST MOD 30 MIN: CPT
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
## 2019-03-01 NOTE — DISCUSSION/SUMMARY
[de-identified] : Left knee complex lateral meniscus tear\par Left knee lateral osteoarthritis\par Left knee patellofemoral syndrome\par \par I discussed the nature of meniscal tears using models, diagrams and drawings as well as the mensci's function. The meniscus is a fibrocartilage that cushions and spreads the contact stresses between the femur and the tibia. It becomes less pliable and more prone to tearing with age. The torn meniscus can be painful. We discussed both the risks and benefits of both operative versus non-operative treatment. Non-operative treatment including activity modification, oral NSAIDS, therapy and corticosteroid injections can also be attempted. In patient failing non-operative conservative treatment, the definitive surgical treatment, depending upon manypatient factors, including but not limited to age, activity level, tear acuity, tear pattern, tissue quality, etc, is arthroscopic debridement versus repair. However, certain a certain subset of patients, they are candidates for a meniscal repair which should be done expediently to maximize reparability of the torn meniscus. There is a chance of progression of knee degenerative arthrosis with a meniscus tear due to the loss of function of the meniscus. Unfortunately there is frequently superimposed degenerative chondromalacia of the chondral surfaces in the knee. These symptoms are frequently not experienced preoperatively or are in addition to the meniscal symptoms. It is very difficult to differentiate the two clinically based on imaging studies, physical exam and history, therefore there are no guarantees as to the outcome, that ultimate improvement is largely based on the extent of degenerative chondromalacia present as well as the extent of meniscal pathology. The patient was instructed to avoid deep knee bends or squatting as this may exacerbate the knee's internal derangement.\par \par no response to non-op. given recent fall recommend surgery vs gel injection.\par will do lateral menisectomy, discussed local OA also means cannot predict pain improvement she understands and wants to proceed\par \par will schedule March 26th lateral menisectomy\par will need medical clearance\par \par \par The patient verifies their understanding the the visit, diagnosis and plan. They agree with the treatment plan and will contact the office with any questions or problems.\par \par Follow up\par 1 week preop

## 2019-03-01 NOTE — PHYSICAL EXAM
[de-identified] : Physical Examination\par General: well nourished, in no acute distress, alert and oriented to person, place and time\par Psychiatric: normal mood and affect, no abnormal movements or speech patterns\par Eyes: vision intact + glasses\par Throat: no thyromegaly\par Lymph: no enlarged nodes, no lymphedema in extremity\par Respiratory: no wheezing, no shortness of breath with ambulation\par Cardiac: no cardiac leg swelling, 2+ peripheral pulses\par Neurology: normal gross sensation in extremities to light touch\par Abdomen: soft, non-tender, tympanic, no masses\par \par Musculoskeletal Examination\par Ambulation	+ antalgic gait, - assistive devices\par \par Knee			Right			Left\par General\par      Swelling/Deformity	normal			normal	\par      Skin			normal			normal\par      Erythema		-			-\par      Standing Alignment	neutral			neutral\par      Effusion		none			none\par Range of Motion\par      Hip			full painless ROM		full painless ROM\par      Knee Flexion		130			130\par      Knee Extension	0			0\par Patella\par      J Sign		-			-\par      Quad Medial/Lateral	1/1 1/1\par      Apprehension		-			-\par      Gualberto's		-			+\par      Grind Sign		-			+\par      Crepitus		-			+\par Palpation\par      Medial Joint Line	-			+\par      Medial Fem Condyle	-			-\par      Lateral Joint Line	-			-\par      Quad Tendon		-			-\par      Patella Tendon	-			-\par      Medial Patella		-			-\par      Lateral Patella 	-			-\par      Posterior Knee	-			+\par Ligamentous\par      Varus @ 0° / 30°	-/-			-/-\par      Valgus @ 0° / 30°	-/-			-/-\par      Lachman		-			-\par      Pivot Shift		-			-\par      Anterior Drawer	-			-\par      Posterior Drawer	-			-\par Meniscus\par      Julian		-			+\par      Flexion Pinch		-			+\par Strength Examination/Atrophy\par      Hip Flexors 		5+			5+\par      Quadriceps		5+			5+\par      Hamstring		5+			5+\par      Tibialis Anterior	5+			5+\par      Achilles/Soleus	5+			5+\par Sensation\par      Deep Peroneal	normal			normal\par      Superficial Peroneal 	normal			normal\par      Sural  		normal			normal\par      Posterior Tibial 	normal			normal\par      Saphneous 		normal			normal\par Pulses\par      DP			2+			2+\par  [de-identified] : 5 views of the affected left knee (standing AP, flexing standing AP, 30degree flexed lateral, 0degree lateral, sunrise view)\par \par demonstrate:\par There is mild lateral weight very asymmetric narrowing\par Trace osteophytic lipping\par Trace suprapatellar effusion\par No patellofemoral joint space loss without evidence of tilt [or] subluxation on sunrise view\par Normal soft tissue density\par Otherwise normal osseous bone structure without fracture or dislocation

## 2019-03-07 ENCOUNTER — APPOINTMENT (OUTPATIENT)
Dept: RHEUMATOLOGY | Facility: CLINIC | Age: 54
End: 2019-03-07

## 2019-03-17 PROBLEM — M17.12 ARTHROPATHY OF LEFT KNEE: Status: RESOLVED | Noted: 2019-01-14 | Resolved: 2019-03-17

## 2019-03-17 NOTE — COUNSELING
[Weight management counseling provided] : Weight management [Healthy eating counseling provided] : healthy eating [None] : None [Activity counseling provided] : activity [Good understanding] : Patient has a good understanding of lifestyle changes and the steps needed to achieve self management goals

## 2019-03-19 ENCOUNTER — APPOINTMENT (OUTPATIENT)
Dept: INTERNAL MEDICINE | Facility: CLINIC | Age: 54
End: 2019-03-19
Payer: MEDICARE

## 2019-03-19 VITALS
BODY MASS INDEX: 28.83 KG/M2 | WEIGHT: 143 LBS | HEIGHT: 59 IN | TEMPERATURE: 97.9 F | RESPIRATION RATE: 16 BRPM | DIASTOLIC BLOOD PRESSURE: 80 MMHG | HEART RATE: 80 BPM | SYSTOLIC BLOOD PRESSURE: 130 MMHG | OXYGEN SATURATION: 98 %

## 2019-03-19 DIAGNOSIS — M17.12 UNILATERAL PRIMARY OSTEOARTHRITIS, LEFT KNEE: ICD-10-CM

## 2019-03-19 PROCEDURE — 99214 OFFICE O/P EST MOD 30 MIN: CPT

## 2019-03-19 NOTE — ASSESSMENT
[Procedure Low Risk] : the procedure risk is low [Patient Intermediate Risk] : the patient is an intermediate risk [As per surgery] : as per surgery [Optimized for Surgery] : the patient is optimized for surgery [FreeTextEntry3] : Hold Aspirin for one week prior to surgery and to restart next day after surgery, as per Surgeon [FreeTextEntry4] : 53 year old female found to have stable Hypertension, Hypothyroidism, Hypercholesterolemia with Hypertriglyceridemia, CVA, Insomnia, Migraine, GERD,with the current regimen, diet and life style modifications, as counseled. Prior results reviewed and discussed with the patient during today's examination. Plan as ordered.\par Possible Left knee meniscectomy for complex tear of left knee lateral meniscus, as per ORTHO.\par 2/11/19 - CXR/EKG and 2/13/19 Labs noted, discussed with the patient and PST department at [facility].

## 2019-03-19 NOTE — HISTORY OF PRESENT ILLNESS
[No Pertinent Cardiac History] : no history of aortic stenosis, atrial fibrillation, coronary artery disease, recent myocardial infarction, or implantable device/pacemaker [Asthma] : asthma [No Adverse Anesthesia Reaction] : no adverse anesthesia reaction in self or family member [(Patient denies any chest pain, claudication, dyspnea on exertion, orthopnea, palpitations or syncope)] : Patient denies any chest pain, claudication, dyspnea on exertion, orthopnea, palpitations or syncope [COPD] : no COPD [Sleep Apnea] : no sleep apnea [Smoker] : not a smoker [Chronic Anticoagulation] : no chronic anticoagulation [Chronic Kidney Disease] : no chronic kidney disease [Diabetes] : no diabetes [FreeTextEntry1] : Left knee meniscectomy [FreeTextEntry2] : 3/26/19 [FreeTextEntry3] : Dr. Loi Fischer [FreeTextEntry4] : 53 year old female with history of stable Hypertension, Hypothyroidism, Hypercholesterolemia with Hypertriglyceridemia, CVA, Insomnia, Migraine, GERD, history as stated, presented for clearance evaluation prior to possible Left knee meniscectomy for complex tear of left knee lateral meniscus, as per ORTHO.\par

## 2019-03-20 ENCOUNTER — OUTPATIENT (OUTPATIENT)
Dept: OUTPATIENT SERVICES | Facility: HOSPITAL | Age: 54
LOS: 1 days | End: 2019-03-20
Payer: MEDICARE

## 2019-03-20 VITALS
WEIGHT: 139.99 LBS | HEART RATE: 88 BPM | HEIGHT: 59 IN | RESPIRATION RATE: 16 BRPM | OXYGEN SATURATION: 98 % | TEMPERATURE: 99 F | DIASTOLIC BLOOD PRESSURE: 80 MMHG | SYSTOLIC BLOOD PRESSURE: 120 MMHG

## 2019-03-20 DIAGNOSIS — Z98.89 OTHER SPECIFIED POSTPROCEDURAL STATES: Chronic | ICD-10-CM

## 2019-03-20 DIAGNOSIS — S83.272A COMPLEX TEAR OF LATERAL MENISCUS, CURRENT INJURY, LEFT KNEE, INITIAL ENCOUNTER: ICD-10-CM

## 2019-03-20 DIAGNOSIS — M17.12 UNILATERAL PRIMARY OSTEOARTHRITIS, LEFT KNEE: ICD-10-CM

## 2019-03-20 DIAGNOSIS — Z98.51 TUBAL LIGATION STATUS: Chronic | ICD-10-CM

## 2019-03-20 DIAGNOSIS — Z01.818 ENCOUNTER FOR OTHER PREPROCEDURAL EXAMINATION: ICD-10-CM

## 2019-03-20 DIAGNOSIS — Z98.890 OTHER SPECIFIED POSTPROCEDURAL STATES: Chronic | ICD-10-CM

## 2019-03-20 PROCEDURE — G0463: CPT

## 2019-03-20 RX ORDER — SODIUM CHLORIDE 9 MG/ML
3 INJECTION INTRAMUSCULAR; INTRAVENOUS; SUBCUTANEOUS EVERY 8 HOURS
Qty: 0 | Refills: 0 | Status: DISCONTINUED | OUTPATIENT
Start: 2019-03-20 | End: 2019-03-28

## 2019-03-20 RX ORDER — OMEPRAZOLE 10 MG/1
1 CAPSULE, DELAYED RELEASE ORAL
Qty: 0 | Refills: 0 | COMMUNITY

## 2019-03-20 RX ORDER — CELECOXIB 200 MG/1
200 CAPSULE ORAL ONCE
Qty: 0 | Refills: 0 | Status: DISCONTINUED | OUTPATIENT
Start: 2019-03-20 | End: 2019-03-28

## 2019-03-20 RX ORDER — ACETAMINOPHEN 500 MG
975 TABLET ORAL ONCE
Qty: 0 | Refills: 0 | Status: DISCONTINUED | OUTPATIENT
Start: 2019-03-20 | End: 2019-03-28

## 2019-03-20 NOTE — H&P PST ADULT - HISTORY OF PRESENT ILLNESS
53 year old Female ambulates independently with PMHx significant for Hypertension, Fibromyalgia, Depression, Insomnia, Lupus, Asthma ( last love ck in January, never intubated or hospitalized) , anxiety, HLD, migraine and CAD in 2012 no stents, chronic vertigo, thyroid nodule/ hypothyroidism, CVA 10/2018 and 2/2019 with loosing balance residual in  presented to PST for evaluation before planned surgery. Patient was diagnosed with complex tear of lateral meniscus current injury, left knee initial encounter and unilateral primary OA of left knee and is scheduled for left knee arthroscopy and lateral meniscus debridement and corticosteroid injection on 3/26/19.
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
## 2019-03-20 NOTE — H&P PST ADULT - NSICDXFAMILYHX_GEN_ALL_CORE_FT
FAMILY HISTORY:  Family history of acute myocardial infarction, father-     Sibling  Still living? Unknown  Family history of breast cancer in first degree relative, Age at diagnosis: Age Unknown

## 2019-03-20 NOTE — H&P PST ADULT - NEGATIVE ENMT SYMPTOMS
no throat pain/no dysphagia/no hearing difficulty no recurrent cold sores/no throat pain/no nasal congestion/no dysphagia/no nasal discharge/no ear pain/no tinnitus/no nasal obstruction/no hearing difficulty/no nose bleeds/no post-nasal discharge/no sinus symptoms/no abnormal taste sensation/no gum bleeding/no dry mouth

## 2019-03-20 NOTE — H&P PST ADULT - NSICDXPROBLEM_GEN_ALL_CORE_FT
PROBLEM DIAGNOSES  Problem: Unilateral primary osteoarthritis, left knee  Assessment and Plan: Patient is scheduled for left knee arthroscopy and lateral meniscus debridement and corticosteroid injection on 3/26/19.       Problem: Complex tear of lateral meniscus of left knee, initial encounter  Assessment and Plan: Patient is scheduled for left knee arthroscopy and lateral meniscus debridement and corticosteroid injection on 3/26/19.

## 2019-03-20 NOTE — H&P PST ADULT - NEGATIVE RESPIRATORY AND THORAX SYMPTOMS
no wheezing/no cough/no pleuritic chest pain no hemoptysis/no wheezing/no cough/no pleuritic chest pain

## 2019-03-20 NOTE — H&P PST ADULT - MAMMOGRAM, RESULTS OF LAST, PROFILE
Principal Discharge DX:	False labor after 37 completed weeks of gestation  Goal:	r/o labor, no cervical change  Instructions for follow-up, activity and diet:	continue prenatal vitamins, f/u with Dr. Olanescu tomorrow as discussed, labor precautions given, kick count instructions given, return to labor room with any maternal.fetal concerns, d/w Dr. Olanescu at bedside normal

## 2019-03-20 NOTE — H&P PST ADULT - ASSESSMENT
53 year old Female presents with complex tear of lateral meniscus current injury, left knee initial encounter and unilateral primary OA of left knee .
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
## 2019-03-20 NOTE — H&P PST ADULT - NEGATIVE OPHTHALMOLOGIC SYMPTOMS
no pain L/no photophobia/no pain R/no lacrimation R/no blurred vision L/no blurred vision R/no diplopia/no discharge R/no lacrimation L/no discharge L

## 2019-03-20 NOTE — H&P PST ADULT - NSICDXPASTSURGICALHX_GEN_ALL_CORE_FT
PAST SURGICAL HISTORY:  History of vascular access device left chest bardport insetion - , removal-    S/P  section X 4- , , ,     S/P lumpectomy, left breast     S/P tubal ligation  and  with  reversal in

## 2019-03-20 NOTE — H&P PST ADULT - MUSCULOSKELETAL
details… detailed exam no calf tenderness/no joint erythema/no joint warmth/decreased ROM due to pain/no joint swelling/diminished strength

## 2019-03-20 NOTE — H&P PST ADULT - NEGATIVE GASTROINTESTINAL SYMPTOMS
no abdominal pain/no melena/no vomiting/no constipation/no diarrhea no melena/no vomiting/no nausea/no flatulence/no abdominal pain/no change in bowel habits/no diarrhea/no constipation

## 2019-03-20 NOTE — H&P PST ADULT - NEGATIVE GENERAL GENITOURINARY SYMPTOMS
no bladder infections/no urinary hesitancy/no flank pain L/no gas in urine/no urine discoloration/no dysuria/no nocturia/no incontinence/normal urinary frequency/no flank pain R/no hematuria/no renal colic

## 2019-03-20 NOTE — H&P PST ADULT - NS PRO AD PATIENT TYPE
Health Care Proxy (HCP) DNR is included by pt in HCP/Do Not Resuscitate (DNR)/Health Care Proxy (HCP)

## 2019-03-20 NOTE — H&P PST ADULT - SKIN/BREAST COMMENTS
hx of left breast cancer 1994 with chemo and lumpectomy, hx of insertion and removal of left chest bardport

## 2019-03-20 NOTE — H&P PST ADULT - MS GEN HX ROS MEA POS PC
arthralgia/arthritis/muscle weakness/myalgia myalgia/muscle weakness/arthralgia/arthritis/leg pain L/leg pain R

## 2019-03-20 NOTE — H&P PST ADULT - NEUROLOGICAL SYMPTOMS
vertigo/headache/weakness/difficulty walking vertigo/difficulty walking/hx of CVA 2018, 02/2019/headache/weakness

## 2019-03-20 NOTE — H&P PST ADULT - NSANTHOSAYNRD_GEN_A_CORE
No. SEAN screening performed.  STOP BANG Legend: 0-2 = LOW Risk; 3-4 = INTERMEDIATE Risk; 5-8 = HIGH Risk

## 2019-03-20 NOTE — H&P PST ADULT - NEGATIVE CARDIOVASCULAR SYMPTOMS
no orthopnea/no paroxysmal nocturnal dyspnea/no palpitations/no peripheral edema no peripheral edema/no orthopnea/no paroxysmal nocturnal dyspnea/no palpitations/no chest pain

## 2019-03-21 PROBLEM — J45.909 UNSPECIFIED ASTHMA, UNCOMPLICATED: Chronic | Status: ACTIVE | Noted: 2017-10-23

## 2019-03-21 PROBLEM — G89.29 OTHER CHRONIC PAIN: Chronic | Status: INACTIVE | Noted: 2017-01-14 | Resolved: 2019-03-20

## 2019-03-21 PROBLEM — H26.9 UNSPECIFIED CATARACT: Chronic | Status: ACTIVE | Noted: 2019-03-20

## 2019-03-21 PROBLEM — I10 ESSENTIAL (PRIMARY) HYPERTENSION: Chronic | Status: ACTIVE | Noted: 2019-03-20

## 2019-03-21 PROBLEM — C50.912 MALIGNANT NEOPLASM OF UNSPECIFIED SITE OF LEFT FEMALE BREAST: Chronic | Status: ACTIVE | Noted: 2019-03-20

## 2019-03-21 PROBLEM — S83.279A COMPLEX TEAR OF LATERAL MENISCUS, CURRENT INJURY, UNSPECIFIED KNEE, INITIAL ENCOUNTER: Chronic | Status: ACTIVE | Noted: 2019-03-20
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
## 2019-03-21 PROBLEM — I63.9 CEREBRAL INFARCTION, UNSPECIFIED: Chronic | Status: ACTIVE | Noted: 2019-03-20

## 2019-03-25 ENCOUNTER — TRANSCRIPTION ENCOUNTER (OUTPATIENT)
Age: 54
End: 2019-03-25

## 2019-03-25 PROBLEM — E04.2 NONTOXIC MULTINODULAR GOITER: Chronic | Status: ACTIVE | Noted: 2019-03-20

## 2019-03-25 PROBLEM — R42 DIZZINESS AND GIDDINESS: Chronic | Status: ACTIVE | Noted: 2019-03-20

## 2019-03-25 PROBLEM — G47.00 INSOMNIA, UNSPECIFIED: Chronic | Status: ACTIVE | Noted: 2019-03-20

## 2019-03-25 PROBLEM — F12.90 CANNABIS USE, UNSPECIFIED, UNCOMPLICATED: Chronic | Status: ACTIVE | Noted: 2019-03-20

## 2019-03-25 PROBLEM — K58.9 IRRITABLE BOWEL SYNDROME, UNSPECIFIED: Chronic | Status: ACTIVE | Noted: 2019-03-20

## 2019-03-25 PROBLEM — F31.9 BIPOLAR DISORDER, UNSPECIFIED: Chronic | Status: ACTIVE | Noted: 2019-03-20

## 2019-03-25 PROBLEM — E03.9 HYPOTHYROIDISM, UNSPECIFIED: Chronic | Status: ACTIVE | Noted: 2019-03-20

## 2019-03-25 PROBLEM — K58.9 IRRITABLE BOWEL SYNDROME WITHOUT DIARRHEA: Chronic | Status: ACTIVE | Noted: 2019-03-20

## 2019-03-25 PROBLEM — G43.909 MIGRAINE, UNSPECIFIED, NOT INTRACTABLE, WITHOUT STATUS MIGRAINOSUS: Chronic | Status: ACTIVE | Noted: 2019-03-20

## 2019-03-25 PROBLEM — M17.12 UNILATERAL PRIMARY OSTEOARTHRITIS, LEFT KNEE: Chronic | Status: ACTIVE | Noted: 2019-03-20

## 2019-03-25 RX ORDER — ACETAMINOPHEN 500 MG
975 TABLET ORAL ONCE
Qty: 0 | Refills: 0 | Status: COMPLETED | OUTPATIENT
Start: 2019-03-26 | End: 2019-03-26

## 2019-03-25 RX ORDER — SODIUM CHLORIDE 9 MG/ML
3 INJECTION INTRAMUSCULAR; INTRAVENOUS; SUBCUTANEOUS EVERY 8 HOURS
Qty: 0 | Refills: 0 | Status: DISCONTINUED | OUTPATIENT
Start: 2019-03-26 | End: 2019-04-03

## 2019-03-25 RX ORDER — CELECOXIB 200 MG/1
200 CAPSULE ORAL ONCE
Qty: 0 | Refills: 0 | Status: COMPLETED | OUTPATIENT
Start: 2019-03-26 | End: 2019-03-26

## 2019-03-26 ENCOUNTER — APPOINTMENT (OUTPATIENT)
Dept: GASTROENTEROLOGY | Facility: CLINIC | Age: 54
End: 2019-03-26

## 2019-03-26 ENCOUNTER — OUTPATIENT (OUTPATIENT)
Dept: OUTPATIENT SERVICES | Facility: HOSPITAL | Age: 54
LOS: 1 days | End: 2019-03-26
Payer: MEDICARE

## 2019-03-26 ENCOUNTER — APPOINTMENT (OUTPATIENT)
Dept: ORTHOPEDIC SURGERY | Facility: HOSPITAL | Age: 54
End: 2019-03-26

## 2019-03-26 ENCOUNTER — RESULT REVIEW (OUTPATIENT)
Age: 54
End: 2019-03-26

## 2019-03-26 VITALS
HEART RATE: 84 BPM | DIASTOLIC BLOOD PRESSURE: 71 MMHG | WEIGHT: 139.99 LBS | OXYGEN SATURATION: 100 % | RESPIRATION RATE: 14 BRPM | TEMPERATURE: 98 F | SYSTOLIC BLOOD PRESSURE: 102 MMHG | HEIGHT: 59 IN

## 2019-03-26 VITALS
SYSTOLIC BLOOD PRESSURE: 115 MMHG | OXYGEN SATURATION: 100 % | HEART RATE: 80 BPM | DIASTOLIC BLOOD PRESSURE: 67 MMHG | RESPIRATION RATE: 15 BRPM | TEMPERATURE: 98 F

## 2019-03-26 DIAGNOSIS — Z98.890 OTHER SPECIFIED POSTPROCEDURAL STATES: Chronic | ICD-10-CM

## 2019-03-26 DIAGNOSIS — M17.12 UNILATERAL PRIMARY OSTEOARTHRITIS, LEFT KNEE: ICD-10-CM

## 2019-03-26 DIAGNOSIS — Z98.89 OTHER SPECIFIED POSTPROCEDURAL STATES: Chronic | ICD-10-CM

## 2019-03-26 DIAGNOSIS — M94.262 CHONDROMALACIA, LEFT KNEE: ICD-10-CM

## 2019-03-26 DIAGNOSIS — Z01.818 ENCOUNTER FOR OTHER PREPROCEDURAL EXAMINATION: ICD-10-CM

## 2019-03-26 DIAGNOSIS — Z98.51 TUBAL LIGATION STATUS: Chronic | ICD-10-CM

## 2019-03-26 DIAGNOSIS — S83.272A COMPLEX TEAR OF LATERAL MENISCUS, CURRENT INJURY, LEFT KNEE, INITIAL ENCOUNTER: ICD-10-CM

## 2019-03-26 PROCEDURE — 29880 ARTHRS KNE SRG MNISECTMY M&L: CPT | Mod: LT

## 2019-03-26 PROCEDURE — 29881 ARTHRS KNE SRG MNISECTMY M/L: CPT | Mod: AS,LT

## 2019-03-26 PROCEDURE — 29881 ARTHRS KNE SRG MNISECTMY M/L: CPT | Mod: LT

## 2019-03-26 PROCEDURE — 97162 PT EVAL MOD COMPLEX 30 MIN: CPT

## 2019-03-26 RX ORDER — OXYCODONE AND ACETAMINOPHEN 5; 325 MG/1; MG/1
1 TABLET ORAL EVERY 4 HOURS
Qty: 0 | Refills: 0 | Status: DISCONTINUED | OUTPATIENT
Start: 2019-03-26 | End: 2019-03-26

## 2019-03-26 RX ORDER — SODIUM CHLORIDE 9 MG/ML
1000 INJECTION INTRAMUSCULAR; INTRAVENOUS; SUBCUTANEOUS
Qty: 0 | Refills: 0 | Status: DISCONTINUED | OUTPATIENT
Start: 2019-03-26 | End: 2019-04-03

## 2019-03-26 RX ORDER — ASPIRIN/CALCIUM CARB/MAGNESIUM 324 MG
1 TABLET ORAL
Qty: 0 | Refills: 0 | COMMUNITY

## 2019-03-26 RX ORDER — SODIUM CHLORIDE 9 MG/ML
1000 INJECTION, SOLUTION INTRAVENOUS
Qty: 0 | Refills: 0 | Status: DISCONTINUED | OUTPATIENT
Start: 2019-03-26 | End: 2019-03-26

## 2019-03-26 RX ORDER — OXYCODONE AND ACETAMINOPHEN 5; 325 MG/1; MG/1
2 TABLET ORAL EVERY 6 HOURS
Qty: 0 | Refills: 0 | Status: DISCONTINUED | OUTPATIENT
Start: 2019-03-26 | End: 2019-03-26

## 2019-03-26 RX ORDER — FENTANYL CITRATE 50 UG/ML
25 INJECTION INTRAVENOUS
Qty: 0 | Refills: 0 | Status: DISCONTINUED | OUTPATIENT
Start: 2019-03-26 | End: 2019-03-26

## 2019-03-26 RX ORDER — IBUPROFEN 200 MG
1 TABLET ORAL
Qty: 0 | Refills: 0 | COMMUNITY

## 2019-03-26 RX ORDER — SODIUM CHLORIDE 9 MG/ML
500 INJECTION INTRAMUSCULAR; INTRAVENOUS; SUBCUTANEOUS
Qty: 0 | Refills: 0 | Status: DISCONTINUED | OUTPATIENT
Start: 2019-03-26 | End: 2019-04-03

## 2019-03-26 RX ORDER — ACETAMINOPHEN 500 MG
650 TABLET ORAL EVERY 6 HOURS
Qty: 0 | Refills: 0 | Status: DISCONTINUED | OUTPATIENT
Start: 2019-03-26 | End: 2019-04-03

## 2019-03-26 RX ORDER — ASPIRIN/CALCIUM CARB/MAGNESIUM 324 MG
1 TABLET ORAL
Qty: 30 | Refills: 0
Start: 2019-03-26 | End: 2019-04-24

## 2019-03-26 RX ADMIN — FENTANYL CITRATE 25 MICROGRAM(S): 50 INJECTION INTRAVENOUS at 12:49

## 2019-03-26 RX ADMIN — SODIUM CHLORIDE 500 MILLILITER(S): 9 INJECTION INTRAMUSCULAR; INTRAVENOUS; SUBCUTANEOUS at 14:51

## 2019-03-26 RX ADMIN — Medication 975 MILLIGRAM(S): at 10:17

## 2019-03-26 RX ADMIN — FENTANYL CITRATE 25 MICROGRAM(S): 50 INJECTION INTRAVENOUS at 12:41

## 2019-03-26 RX ADMIN — SODIUM CHLORIDE 3 MILLILITER(S): 9 INJECTION INTRAMUSCULAR; INTRAVENOUS; SUBCUTANEOUS at 10:18

## 2019-03-26 RX ADMIN — CELECOXIB 200 MILLIGRAM(S): 200 CAPSULE ORAL at 10:18

## 2019-03-26 NOTE — PHYSICAL THERAPY INITIAL EVALUATION ADULT - PASSIVE RANGE OF MOTION EXAMINATION, REHAB EVAL
Lt. knee ~ 0 -90 deg flx/bilateral lower extremity Passive ROM was WFL (within functional limits)/bilateral upper extremity Passive ROM was WFL (within functional limits)

## 2019-03-26 NOTE — PHYSICAL THERAPY INITIAL EVALUATION ADULT - ACTIVE RANGE OF MOTION EXAMINATION, REHAB EVAL
bilateral upper extremity Active ROM was WFL (within functional limits)/bilateral  lower extremity Active ROM was WFL (within functional limits)/lt. knee 0 -85 deg flx, lt. hip 3/4 range

## 2019-03-26 NOTE — ASU PATIENT PROFILE, ADULT - PSH
History of vascular access device  left chest bardport insetion - , removal-  S/P  section  X 4- , , ,   S/P lumpectomy, left breast    S/P tubal ligation   and  with  reversal in

## 2019-03-26 NOTE — BRIEF OPERATIVE NOTE - NSICDXBRIEFPOSTOP_GEN_ALL_CORE_FT
POST-OP DIAGNOSIS:  Chondromalacia, left knee 26-Mar-2019 12:49:38  Loi Fischer  Lateral meniscus derangement, left 26-Mar-2019 12:49:24  Loi Fischer

## 2019-03-26 NOTE — ASU DISCHARGE PLAN (ADULT/PEDIATRIC) - CALL YOUR DOCTOR IF YOU HAVE ANY OF THE FOLLOWING:
Bleeding that does not stop/Fever greater than (need to indicate Fahrenheit or Celsius)/Wound/Surgical Site with redness, or foul smelling discharge or pus/Numbness, tingling, color or temperature change to extremity Swelling that gets worse/Numbness, tingling, color or temperature change to extremity/Bleeding that does not stop/Fever greater than (need to indicate Fahrenheit or Celsius)/Wound/Surgical Site with redness, or foul smelling discharge or pus

## 2019-03-26 NOTE — BRIEF OPERATIVE NOTE - NSICDXBRIEFPREOP_GEN_ALL_CORE_FT
PRE-OP DIAGNOSIS:  Chondromalacia, left knee 26-Mar-2019 12:48:24  Loi Fischer  Degenerative tear of lateral meniscus, left 26-Mar-2019 12:48:07  Loi Fischer

## 2019-03-26 NOTE — PHYSICAL THERAPY INITIAL EVALUATION ADULT - CRITERIA FOR SKILLED THERAPEUTIC INTERVENTIONS
impairments found/functional limitations in following categories/risk reduction/prevention/anticipated discharge recommendation/anticipated equipment needs at discharge/rehab potential/predicted duration of therapy intervention/therapy frequency

## 2019-03-26 NOTE — PHYSICAL THERAPY INITIAL EVALUATION ADULT - GENERAL OBSERVATIONS, REHAB EVAL
Consult received, chart reviewed. Patient received seated at EOB, NAD. Patient agreed to EVALUATION from Physical Therapist.

## 2019-03-26 NOTE — ASU DISCHARGE PLAN (ADULT/PEDIATRIC) - CARE PROVIDER_API CALL
Loi Fischer)  Surgery  Orthopedic  [address] [facility] [address], [address] [place] [address]  Phone: [phone]  Fax: [phone]  Follow Up Time:
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
## 2019-04-15 ENCOUNTER — APPOINTMENT (OUTPATIENT)
Age: 54
End: 2019-04-15

## 2019-04-22 ENCOUNTER — APPOINTMENT (OUTPATIENT)
Dept: ORTHOPEDIC SURGERY | Facility: CLINIC | Age: 54
End: 2019-04-22

## 2019-04-25 NOTE — ED PROVIDER NOTE - ATTESTATION, MLM
Detail Level: Detailed I have reviewed and confirmed nurses' notes for patient's medications, allergies, medical history, and surgical history.

## 2019-05-04 ENCOUNTER — INPATIENT (INPATIENT)
Facility: HOSPITAL | Age: 54
LOS: 1 days | Discharge: HOME | DRG: 313 | End: 2019-05-06
Attending: HOSPITALIST | Admitting: HOSPITALIST
Payer: MEDICARE

## 2019-05-04 VITALS
WEIGHT: 145.06 LBS | TEMPERATURE: 98 F | SYSTOLIC BLOOD PRESSURE: 134 MMHG | OXYGEN SATURATION: 98 % | DIASTOLIC BLOOD PRESSURE: 90 MMHG | RESPIRATION RATE: 17 BRPM | HEART RATE: 88 BPM

## 2019-05-04 DIAGNOSIS — Z98.51 TUBAL LIGATION STATUS: Chronic | ICD-10-CM

## 2019-05-04 DIAGNOSIS — Z98.890 OTHER SPECIFIED POSTPROCEDURAL STATES: Chronic | ICD-10-CM

## 2019-05-04 DIAGNOSIS — Z98.89 OTHER SPECIFIED POSTPROCEDURAL STATES: Chronic | ICD-10-CM

## 2019-05-04 LAB
ALBUMIN SERPL ELPH-MCNC: 4.2 G/DL — SIGNIFICANT CHANGE UP (ref 3.5–5)
ALP SERPL-CCNC: 73 U/L — SIGNIFICANT CHANGE UP (ref 40–120)
ALT FLD-CCNC: 20 U/L DA — SIGNIFICANT CHANGE UP (ref 10–60)
ANION GAP SERPL CALC-SCNC: 6 MMOL/L — SIGNIFICANT CHANGE UP (ref 5–17)
AST SERPL-CCNC: 32 U/L — SIGNIFICANT CHANGE UP (ref 10–40)
BILIRUB SERPL-MCNC: 0.4 MG/DL — SIGNIFICANT CHANGE UP (ref 0.2–1.2)
BUN SERPL-MCNC: 11 MG/DL — SIGNIFICANT CHANGE UP (ref 7–18)
CALCIUM SERPL-MCNC: 8.6 MG/DL — SIGNIFICANT CHANGE UP (ref 8.4–10.5)
CHLORIDE SERPL-SCNC: 109 MMOL/L — HIGH (ref 96–108)
CO2 SERPL-SCNC: 24 MMOL/L — SIGNIFICANT CHANGE UP (ref 22–31)
CREAT SERPL-MCNC: 0.9 MG/DL — SIGNIFICANT CHANGE UP (ref 0.5–1.3)
GLUCOSE SERPL-MCNC: 82 MG/DL — SIGNIFICANT CHANGE UP (ref 70–99)
HCT VFR BLD CALC: 31.2 % — LOW (ref 34.5–45)
HGB BLD-MCNC: 10 G/DL — LOW (ref 11.5–15.5)
LIDOCAIN IGE QN: 78 U/L — SIGNIFICANT CHANGE UP (ref 73–393)
MCHC RBC-ENTMCNC: 31.4 PG — SIGNIFICANT CHANGE UP (ref 27–34)
MCHC RBC-ENTMCNC: 32.1 GM/DL — SIGNIFICANT CHANGE UP (ref 32–36)
MCV RBC AUTO: 98.1 FL — SIGNIFICANT CHANGE UP (ref 80–100)
NRBC # BLD: 0 /100 WBCS — SIGNIFICANT CHANGE UP (ref 0–0)
NT-PROBNP SERPL-SCNC: 21 PG/ML — SIGNIFICANT CHANGE UP (ref 0–125)
PLATELET # BLD AUTO: 296 K/UL — SIGNIFICANT CHANGE UP (ref 150–400)
POTASSIUM SERPL-MCNC: 4.2 MMOL/L — SIGNIFICANT CHANGE UP (ref 3.5–5.3)
POTASSIUM SERPL-SCNC: 4.2 MMOL/L — SIGNIFICANT CHANGE UP (ref 3.5–5.3)
PROT SERPL-MCNC: 7.9 G/DL — SIGNIFICANT CHANGE UP (ref 6–8.3)
RBC # BLD: 3.18 M/UL — LOW (ref 3.8–5.2)
RBC # FLD: 12.2 % — SIGNIFICANT CHANGE UP (ref 10.3–14.5)
SODIUM SERPL-SCNC: 139 MMOL/L — SIGNIFICANT CHANGE UP (ref 135–145)
TROPONIN I SERPL-MCNC: <0.015 NG/ML — SIGNIFICANT CHANGE UP (ref 0–0.04)
TROPONIN I SERPL-MCNC: <0.015 NG/ML — SIGNIFICANT CHANGE UP (ref 0–0.04)
WBC # BLD: 4.65 K/UL — SIGNIFICANT CHANGE UP (ref 3.8–10.5)
WBC # FLD AUTO: 4.65 K/UL — SIGNIFICANT CHANGE UP (ref 3.8–10.5)

## 2019-05-04 PROCEDURE — 99285 EMERGENCY DEPT VISIT HI MDM: CPT | Mod: 25

## 2019-05-04 PROCEDURE — 93970 EXTREMITY STUDY: CPT | Mod: 26

## 2019-05-04 PROCEDURE — 71275 CT ANGIOGRAPHY CHEST: CPT | Mod: 26

## 2019-05-04 RX ORDER — IBUPROFEN 200 MG
600 TABLET ORAL ONCE
Qty: 0 | Refills: 0 | Status: COMPLETED | OUTPATIENT
Start: 2019-05-04 | End: 2019-05-04

## 2019-05-04 RX ADMIN — Medication 600 MILLIGRAM(S): at 23:42

## 2019-05-04 NOTE — ED PROVIDER NOTE - PHYSICAL EXAMINATION
General: pt lying in stretcher, appears stated age and is not in distress  HEENT: AT/NC, pink conjunctiva, anicteric sclerae, EOMI, PERRLA, TMs smooth, grey, intact, with normal landmarks, nasal mucosa pink, no discharge, turbinates not enlarged; moist mucus membranes, tongue well-papillated, good dentition; posterior pharynx shows no erythema or exudates;   Neck: supple, full ROM, trachea midline, no JVD, no cervical LAD, no midline ttp or stepoffs  Lungs: symmetric excursion, b/l clear vesicular breath sounds with no wheezes, crackles, or rhonchi  Heart: rrr, S1, S2 normal; no S3 or S4; no murmurs or rubs  Abd: normal bowel sounds; soft, nontender; negative McBurney's point tenderness, negative Giraldo's sign, no rebound, no guarding, spleen non-palpable; no hepatomegaly, no masses  Back: no midline spinal tenderness or stepoffs, no costovertebral angle tenderness  Extremities: no clubbing, cyanosis, or edema; no palpable deformities or fractures  Skin: good turgor; no rashes, petechiae, ecchymoses, or jaundice  Pulses: radial, posterior tibialis (PT), dorsalis pedis (DP) all 2+ & symmetric  Neuro: awake, alert, responsive; oriented to person, place and time; cranial nerves intact, EOMI, intact jaw movement, intact facial sensation, no facial asymmetry, hearing intact; no nystagmus, tongue midline; Motor: Normal tone in upper and lower extremities bilaterally strength 5/5; Sensory: intact to pinprick and light touch; Cerebellar: finger-to-nose intact; normal steady gait; negative Romberg’s sign; DTR: biceps, triceps, patellar, 2+, no pronator drift General: pt lying in stretcher, appears stated age and is not in distress, speaking w/ full clear sentences  HEENT: AT/NC, pink conjunctiva, anicteric sclerae, EOMI, PERRLA, TMs smooth, grey, intact, with normal landmarks, nasal mucosa pink, no discharge, turbinates not enlarged; moist mucus membranes, tongue well-papillated, good dentition; posterior pharynx shows no erythema or exudates;   Neck: supple, full ROM, trachea midline, no JVD, no cervical LAD, no midline ttp or stepoffs  Lungs: symmetric excursion, b/l clear vesicular breath sounds with no wheezes, crackles, or rhonchi  Heart: rrr, S1, S2 normal; no S3 or S4; no murmurs or rubs  Abd: normal bowel sounds; soft, nontender; negative McBurney's point tenderness, negative Giraldo's sign, no rebound, no guarding, spleen non-palpable; no hepatomegaly, no masses  Back: no midline spinal tenderness or stepoffs, no costovertebral angle tenderness  Extremities: no clubbing, cyanosis, or edema; no palpable deformities or fractures; left knee w/ no swelling or tenderness and old surgical scars healing no redness or sign of infection  Skin: good turgor; no rashes, petechiae, ecchymoses, or jaundice  Pulses: radial, posterior tibialis (PT), dorsalis pedis (DP) all 2+ & symmetric  Neuro: awake, alert, responsive; oriented to person, place and time; cranial nerves intact, EOMI, intact jaw movement, intact facial sensation, no facial asymmetry, hearing intact; no nystagmus, tongue midline; Motor: Normal tone in upper and lower extremities bilaterally; Sensory: intact

## 2019-05-04 NOTE — ED PROVIDER NOTE - CLINICAL SUMMARY MEDICAL DECISION MAKING FREE TEXT BOX
Pt w/ aforementioned presentation concerning for but not limited to PE versus ACS versus infection. Will get labs, CT, monitor and likely admit.

## 2019-05-04 NOTE — ED PROVIDER NOTE - OBJECTIVE STATEMENT
52 y/o F patient w/ PMHx asthma, thyroid disease, fibromyalgia, CVA w/ right handed weakness, and lupus and a significant PSHx of L knee surgery on 4/26/2019 presents to the ED w/ intermittent shortness of breath that began x3 days ago. Patient takes prescribed aspirin for CVA w/ right handed weakness. Patient reports L knee and L leg are increasingly stiff. Patient states she has a decrease exercise tolerance. Patient denies chills, fever, night sweats, cough, and any other complaints. Allergies: Estrogen (Hives and Swelling). 54 y/o F patient w/ PMHx asthma, thyroid disease, fibromyalgia, CVA w/ right handed weakness, and lupus and a significant PSHx of L knee surgery on 4/26/2019 presents to the ED w/ intermittent shortness of breath that began x3 days ago. Patient takes prescribed aspirin and has CVA w/ residual right handed weakness. Patient reports L knee and L leg are increasingly stiff when she walks. Patient states she has a decrease exercise tolerance down to 2 blocks limited by dyspnea. Patient denies chills, fever, night sweats, cough, and any other complaints. Allergies: Estrogen (Hives and Swelling).

## 2019-05-04 NOTE — ED ADULT NURSE REASSESSMENT NOTE - NS ED NURSE REASSESS COMMENT FT1
Pt remains stable, AOX4, no s/s of any distress noted. IV line in place, no signs of infiltration noted. Tolerating diet. VS WNL. Call bell in reach, bed in lowest position. Safety maintained, hourly rounding performed. Endorsed to nurse Ju./

## 2019-05-05 DIAGNOSIS — G43.909 MIGRAINE, UNSPECIFIED, NOT INTRACTABLE, WITHOUT STATUS MIGRAINOSUS: ICD-10-CM

## 2019-05-05 DIAGNOSIS — F41.9 ANXIETY DISORDER, UNSPECIFIED: ICD-10-CM

## 2019-05-05 DIAGNOSIS — J45.909 UNSPECIFIED ASTHMA, UNCOMPLICATED: ICD-10-CM

## 2019-05-05 DIAGNOSIS — Z29.9 ENCOUNTER FOR PROPHYLACTIC MEASURES, UNSPECIFIED: ICD-10-CM

## 2019-05-05 DIAGNOSIS — R07.89 OTHER CHEST PAIN: ICD-10-CM

## 2019-05-05 DIAGNOSIS — F32.9 MAJOR DEPRESSIVE DISORDER, SINGLE EPISODE, UNSPECIFIED: ICD-10-CM

## 2019-05-05 DIAGNOSIS — M79.606 PAIN IN LEG, UNSPECIFIED: ICD-10-CM

## 2019-05-05 DIAGNOSIS — R06.02 SHORTNESS OF BREATH: ICD-10-CM

## 2019-05-05 LAB
ANION GAP SERPL CALC-SCNC: 5 MMOL/L — SIGNIFICANT CHANGE UP (ref 5–17)
APPEARANCE UR: CLEAR — SIGNIFICANT CHANGE UP
BASOPHILS # BLD AUTO: 0.06 K/UL — SIGNIFICANT CHANGE UP (ref 0–0.2)
BASOPHILS NFR BLD AUTO: 1.5 % — SIGNIFICANT CHANGE UP (ref 0–2)
BILIRUB UR-MCNC: NEGATIVE — SIGNIFICANT CHANGE UP
BUN SERPL-MCNC: 10 MG/DL — SIGNIFICANT CHANGE UP (ref 7–18)
CALCIUM SERPL-MCNC: 8.3 MG/DL — LOW (ref 8.4–10.5)
CHLORIDE SERPL-SCNC: 110 MMOL/L — HIGH (ref 96–108)
CHOLEST SERPL-MCNC: 216 MG/DL — HIGH (ref 10–199)
CK MB BLD-MCNC: <0.9 % — SIGNIFICANT CHANGE UP (ref 0–3.5)
CK MB CFR SERPL CALC: <1 NG/ML — SIGNIFICANT CHANGE UP (ref 0–3.6)
CK SERPL-CCNC: 113 U/L — SIGNIFICANT CHANGE UP (ref 21–215)
CO2 SERPL-SCNC: 27 MMOL/L — SIGNIFICANT CHANGE UP (ref 22–31)
COLOR SPEC: YELLOW — SIGNIFICANT CHANGE UP
CREAT SERPL-MCNC: 0.9 MG/DL — SIGNIFICANT CHANGE UP (ref 0.5–1.3)
DIFF PNL FLD: NEGATIVE — SIGNIFICANT CHANGE UP
EOSINOPHIL # BLD AUTO: 0.14 K/UL — SIGNIFICANT CHANGE UP (ref 0–0.5)
EOSINOPHIL NFR BLD AUTO: 3.6 % — SIGNIFICANT CHANGE UP (ref 0–6)
FERRITIN SERPL-MCNC: 54 NG/ML — SIGNIFICANT CHANGE UP (ref 15–150)
FLU A RESULT: SIGNIFICANT CHANGE UP
FLU A RESULT: SIGNIFICANT CHANGE UP
FLUAV AG NPH QL: SIGNIFICANT CHANGE UP
FLUBV AG NPH QL: SIGNIFICANT CHANGE UP
GLUCOSE SERPL-MCNC: 77 MG/DL — SIGNIFICANT CHANGE UP (ref 70–99)
GLUCOSE UR QL: NEGATIVE — SIGNIFICANT CHANGE UP
HBA1C BLD-MCNC: 5.3 % — SIGNIFICANT CHANGE UP (ref 4–5.6)
HCT VFR BLD CALC: 32.4 % — LOW (ref 34.5–45)
HDLC SERPL-MCNC: 61 MG/DL — SIGNIFICANT CHANGE UP
HGB BLD-MCNC: 10 G/DL — LOW (ref 11.5–15.5)
IMM GRANULOCYTES NFR BLD AUTO: 0 % — SIGNIFICANT CHANGE UP (ref 0–1.5)
IRON SATN MFR SERPL: 28 % — SIGNIFICANT CHANGE UP (ref 15–50)
IRON SATN MFR SERPL: 93 UG/DL — SIGNIFICANT CHANGE UP (ref 40–150)
KETONES UR-MCNC: NEGATIVE — SIGNIFICANT CHANGE UP
LEUKOCYTE ESTERASE UR-ACNC: ABNORMAL
LIPID PNL WITH DIRECT LDL SERPL: 146 MG/DL — SIGNIFICANT CHANGE UP
LYMPHOCYTES # BLD AUTO: 1.7 K/UL — SIGNIFICANT CHANGE UP (ref 1–3.3)
LYMPHOCYTES # BLD AUTO: 43.8 % — SIGNIFICANT CHANGE UP (ref 13–44)
MAGNESIUM SERPL-MCNC: 2.4 MG/DL — SIGNIFICANT CHANGE UP (ref 1.6–2.6)
MCHC RBC-ENTMCNC: 30.3 PG — SIGNIFICANT CHANGE UP (ref 27–34)
MCHC RBC-ENTMCNC: 30.9 GM/DL — LOW (ref 32–36)
MCV RBC AUTO: 98.2 FL — SIGNIFICANT CHANGE UP (ref 80–100)
MONOCYTES # BLD AUTO: 0.36 K/UL — SIGNIFICANT CHANGE UP (ref 0–0.9)
MONOCYTES NFR BLD AUTO: 9.3 % — SIGNIFICANT CHANGE UP (ref 2–14)
NEUTROPHILS # BLD AUTO: 1.62 K/UL — LOW (ref 1.8–7.4)
NEUTROPHILS NFR BLD AUTO: 41.8 % — LOW (ref 43–77)
NITRITE UR-MCNC: NEGATIVE — SIGNIFICANT CHANGE UP
NRBC # BLD: 0 /100 WBCS — SIGNIFICANT CHANGE UP (ref 0–0)
PH UR: 7 — SIGNIFICANT CHANGE UP (ref 5–8)
PHOSPHATE SERPL-MCNC: 4.2 MG/DL — SIGNIFICANT CHANGE UP (ref 2.5–4.5)
PLATELET # BLD AUTO: 304 K/UL — SIGNIFICANT CHANGE UP (ref 150–400)
POTASSIUM SERPL-MCNC: 3.8 MMOL/L — SIGNIFICANT CHANGE UP (ref 3.5–5.3)
POTASSIUM SERPL-SCNC: 3.8 MMOL/L — SIGNIFICANT CHANGE UP (ref 3.5–5.3)
PROT UR-MCNC: NEGATIVE — SIGNIFICANT CHANGE UP
RBC # BLD: 3.3 M/UL — LOW (ref 3.8–5.2)
RBC # FLD: 12.2 % — SIGNIFICANT CHANGE UP (ref 10.3–14.5)
RSV RESULT: SIGNIFICANT CHANGE UP
RSV RNA RESP QL NAA+PROBE: SIGNIFICANT CHANGE UP
SODIUM SERPL-SCNC: 142 MMOL/L — SIGNIFICANT CHANGE UP (ref 135–145)
SP GR SPEC: 1 — LOW (ref 1.01–1.02)
TIBC SERPL-MCNC: 338 UG/DL — SIGNIFICANT CHANGE UP (ref 250–450)
TOTAL CHOLESTEROL/HDL RATIO MEASUREMENT: 3.5 RATIO — SIGNIFICANT CHANGE UP (ref 3.3–7.1)
TRANSFERRIN SERPL-MCNC: 266 MG/DL — SIGNIFICANT CHANGE UP (ref 200–360)
TRIGL SERPL-MCNC: 46 MG/DL — SIGNIFICANT CHANGE UP (ref 10–149)
TROPONIN I SERPL-MCNC: <0.015 NG/ML — SIGNIFICANT CHANGE UP (ref 0–0.04)
TSH SERPL-MCNC: 11.7 UU/ML — HIGH (ref 0.34–4.82)
UIBC SERPL-MCNC: 245 UG/DL — SIGNIFICANT CHANGE UP (ref 110–370)
UROBILINOGEN FLD QL: NEGATIVE — SIGNIFICANT CHANGE UP
WBC # BLD: 3.88 K/UL — SIGNIFICANT CHANGE UP (ref 3.8–10.5)
WBC # FLD AUTO: 3.88 K/UL — SIGNIFICANT CHANGE UP (ref 3.8–10.5)

## 2019-05-05 PROCEDURE — 99223 1ST HOSP IP/OBS HIGH 75: CPT | Mod: GC

## 2019-05-05 RX ORDER — ASPIRIN/CALCIUM CARB/MAGNESIUM 324 MG
81 TABLET ORAL DAILY
Qty: 0 | Refills: 0 | Status: DISCONTINUED | OUTPATIENT
Start: 2019-05-05 | End: 2019-05-06

## 2019-05-05 RX ORDER — IPRATROPIUM/ALBUTEROL SULFATE 18-103MCG
3 AEROSOL WITH ADAPTER (GRAM) INHALATION EVERY 6 HOURS
Qty: 0 | Refills: 0 | Status: DISCONTINUED | OUTPATIENT
Start: 2019-05-05 | End: 2019-05-06

## 2019-05-05 RX ORDER — VENLAFAXINE HCL 75 MG
100 CAPSULE, EXT RELEASE 24 HR ORAL EVERY 12 HOURS
Qty: 0 | Refills: 0 | Status: DISCONTINUED | OUTPATIENT
Start: 2019-05-05 | End: 2019-05-05

## 2019-05-05 RX ORDER — TRAZODONE HCL 50 MG
100 TABLET ORAL AT BEDTIME
Qty: 0 | Refills: 0 | Status: DISCONTINUED | OUTPATIENT
Start: 2019-05-05 | End: 2019-05-06

## 2019-05-05 RX ORDER — VENLAFAXINE HCL 75 MG
150 CAPSULE, EXT RELEASE 24 HR ORAL DAILY
Qty: 0 | Refills: 0 | Status: DISCONTINUED | OUTPATIENT
Start: 2019-05-05 | End: 2019-05-06

## 2019-05-05 RX ORDER — VENLAFAXINE HCL 75 MG
150 CAPSULE, EXT RELEASE 24 HR ORAL
Qty: 0 | Refills: 0 | Status: DISCONTINUED | OUTPATIENT
Start: 2019-05-05 | End: 2019-05-05

## 2019-05-05 RX ORDER — CLONAZEPAM 1 MG
1 TABLET ORAL AT BEDTIME
Qty: 0 | Refills: 0 | Status: DISCONTINUED | OUTPATIENT
Start: 2019-05-05 | End: 2019-05-06

## 2019-05-05 RX ORDER — NORTRIPTYLINE HYDROCHLORIDE 10 MG/1
50 CAPSULE ORAL AT BEDTIME
Qty: 0 | Refills: 0 | Status: DISCONTINUED | OUTPATIENT
Start: 2019-05-05 | End: 2019-05-06

## 2019-05-05 RX ORDER — METOPROLOL TARTRATE 50 MG
12.5 TABLET ORAL
Qty: 0 | Refills: 0 | Status: DISCONTINUED | OUTPATIENT
Start: 2019-05-05 | End: 2019-05-06

## 2019-05-05 RX ORDER — LEVOTHYROXINE SODIUM 125 MCG
75 TABLET ORAL DAILY
Qty: 0 | Refills: 0 | Status: DISCONTINUED | OUTPATIENT
Start: 2019-05-05 | End: 2019-05-06

## 2019-05-05 RX ORDER — CLONAZEPAM 1 MG
1 TABLET ORAL
Qty: 0 | Refills: 0 | COMMUNITY

## 2019-05-05 RX ORDER — ATORVASTATIN CALCIUM 80 MG/1
40 TABLET, FILM COATED ORAL AT BEDTIME
Qty: 0 | Refills: 0 | Status: DISCONTINUED | OUTPATIENT
Start: 2019-05-05 | End: 2019-05-06

## 2019-05-05 RX ORDER — ENOXAPARIN SODIUM 100 MG/ML
40 INJECTION SUBCUTANEOUS DAILY
Qty: 0 | Refills: 0 | Status: DISCONTINUED | OUTPATIENT
Start: 2019-05-05 | End: 2019-05-06

## 2019-05-05 RX ORDER — ACETAMINOPHEN 500 MG
650 TABLET ORAL EVERY 6 HOURS
Qty: 0 | Refills: 0 | Status: DISCONTINUED | OUTPATIENT
Start: 2019-05-05 | End: 2019-05-06

## 2019-05-05 RX ORDER — TOPIRAMATE 25 MG
100 TABLET ORAL
Qty: 0 | Refills: 0 | Status: DISCONTINUED | OUTPATIENT
Start: 2019-05-05 | End: 2019-05-06

## 2019-05-05 RX ORDER — TOPIRAMATE 25 MG
1 TABLET ORAL
Qty: 0 | Refills: 0 | COMMUNITY

## 2019-05-05 RX ADMIN — Medication 150 MILLIGRAM(S): at 11:35

## 2019-05-05 RX ADMIN — Medication 1 MILLIGRAM(S): at 22:27

## 2019-05-05 RX ADMIN — Medication 100 MILLIGRAM(S): at 17:16

## 2019-05-05 RX ADMIN — Medication 100 MILLIGRAM(S): at 06:07

## 2019-05-05 RX ADMIN — Medication 12.5 MILLIGRAM(S): at 06:06

## 2019-05-05 RX ADMIN — NORTRIPTYLINE HYDROCHLORIDE 50 MILLIGRAM(S): 10 CAPSULE ORAL at 22:27

## 2019-05-05 RX ADMIN — Medication 3 MILLILITER(S): at 06:06

## 2019-05-05 RX ADMIN — Medication 81 MILLIGRAM(S): at 11:35

## 2019-05-05 RX ADMIN — Medication 100 MILLIGRAM(S): at 22:30

## 2019-05-05 RX ADMIN — Medication 600 MILLIGRAM(S): at 01:20

## 2019-05-05 RX ADMIN — Medication 75 MICROGRAM(S): at 22:27

## 2019-05-05 RX ADMIN — ENOXAPARIN SODIUM 40 MILLIGRAM(S): 100 INJECTION SUBCUTANEOUS at 11:35

## 2019-05-05 RX ADMIN — ATORVASTATIN CALCIUM 40 MILLIGRAM(S): 80 TABLET, FILM COATED ORAL at 22:27

## 2019-05-05 RX ADMIN — Medication 12.5 MILLIGRAM(S): at 17:16

## 2019-05-05 RX ADMIN — Medication 3 MILLILITER(S): at 22:28

## 2019-05-05 NOTE — H&P ADULT - PROBLEM SELECTOR PLAN 4
Not currently on exacerbation  -Start medications as needed Not currently on exacerbation, no wheezing on physical exam.  -Start medications as needed; duoneb PRN.

## 2019-05-05 NOTE — H&P ADULT - HISTORY OF PRESENT ILLNESS
52 y/o F patient w/ PMHx asthma, thyroid disease, fibromyalgia, CVA w/ right handed weakness, and lupus and a significant PSHx of L knee surgery on 4/26/2019 presents to the ED w/ intermittent shortness of breath that began x3 days ago. Patient takes prescribed aspirin and has CVA w/ residual right handed weakness. Patient reports L knee and L leg are increasingly stiff when she walks. Patient states she has a decrease exercise tolerance down to 2 blocks limited by dyspnea. Patient denies chills, fever.    On a note, pt was admitted recently to [facility] on past February for dizziness, likely from BPPV, at the time showed anemia with Hb in 10s. In ED, vitals are stable. Labs are significant for Hb 10 (baseline Hb in 10s), Tr neg x2, PRo-BNP 21. CTA negative US BL LE neg. 52 y/o F patient w/ PMHx asthma, thyroid disease, fibromyalgia, CVA w/ right handed weakness, and lupus and a significant PSHx of L knee surgery on 4/26/2019 presents to the ED w/ progressive chronic shortness of breath that has been worsening for the past 3 days. CP when occurs usually over L breast, sharp/dull in quality, 9/10 in severity intermittent. SOB has worsened to the point where pt can only walk for 1-2 blocks before having to rest. Pt also has 3 pillow orthopnea. Started having progressively worsening SOB worsened with activities and lying flat since 2010 with intermittent chest pain, not related to activities, since 2010. Presented to hospital in 2011 for evaluation as symptoms worsened. TTE and stress test done, results were "abnormal," angiography was performed, which found blockage of "small coronary artery", but no stent placement was done per her cardiologist. She has been put on ASA-81 and statin since then and has been following up with her cardiologist at [facility] on a regular basis, last cardiologist f/u 1 month ago when stress test done for worsening SOB and CP, but test could not be completed because of CP.     On a note, pt admitted recently to [facility] on past February for dizziness, likely from BPPV, at the time showed anemia with Hb in 10s. . pt had L meniscectomy on 4/26/2019 and c/o stable knee pain, controlled with pain medications. Patient denies fever, chills, NS, N/V, abdominal pain, palpitations. In ED, vitals are stable. Labs are significant for Hb 10 (baseline Hb in 10s), Tr neg x2, Pro-BNP 21. CTA negative US BL LE neg. Allergic to estrogen. No home O2 use. 52 y/o F patient w/ PMHx asthma, thyroid disease, fibromyalgia, CVA w/ right handed weakness, and lupus and a significant PSHx of L knee surgery on 4/26/2019 presents to the ED w/ progressive chronic shortness of breath and intermittent CP that has been worsening for the past 3 days. CP when occurs usually over L breast, sharp/dull in quality, 9/10 in severity, intermittent, non-radiating. SOB has been worsening to the point where pt can only walk for 1-2 blocks before having to sit down to rest. Pt also reports 3 pillow orthopnea.     Started having progressive SOB worsened with activities and lying flat since 2010 with intermittent chest pain, not related to activities since 2010. Presented to hospital in 2011 for evaluation as  symptoms significantly worsened. TTE and stress test done, results were "abnormal." Angiography was performed, which found blockage of a "small coronary artery", but no stent placement was done per her cardiologist. She was started on ASA-81 and statin and has been following up with her cardiologist at [facility] on a regular basis, last cardiologist f/u 1 month ago when stress test done for worsening SOB and CP, but test could not be completed because of CP.     On a note, pt admitted recently to [facility] on past February for dizziness, likely from BPPV, at the time showed anemia with Hb in 10s. Pt had L meniscectomy on 4/26/2019 and c/o stable knee pain, controlled with pain medications. Patient denies fever, chills, NS, N/V, abdominal pain, palpitations. In ED, vitals are stable. Labs are significant for Hb 10 (baseline Hb in 10s), Tr neg x2, Pro-BNP 21. CTA negative US BL LE neg. Allergic to estrogen. No home O2 use. 54 y/o F patient w/ PMHx asthma, thyroid disease, fibromyalgia, CVA w/ right handed weakness, and lupus and a significant PSHx of L knee surgery on 4/26/2019 presents to the ED w/ progressive chronic shortness of breath and intermittent CP that have been worsening for the past 3 days. CP when occurs usually over L breast, sharp/dull in quality, 9/10 in severity, intermittent, non-radiating. SOB has been worsening to the point where pt can only walk for 1-2 blocks before having to sit down to rest. Pt also reports 3 pillow orthopnea.     Started having progressive SOB worsened with activities and when lying flat since 2010 with intermittent chest pain not related to activities since 2010. Presented to hospital in 2011 for evaluation as  symptoms significantly worsened. TTE and stress test done, results were "abnormal." Angiography was performed, found blockage of a "small coronary artery", but no stent placement was done per her cardiologist. She was started on ASA-81 and statin and has been following up with her cardiologist at [facility] on a regular basis, last cardiologist f/u 1 month ago when stress test done for worsening SOB and CP, but test could not be completed because of CP.     On a note, pt admitted recently to [facility] on past February for dizziness, likely from BPPV, at the time showed anemia with Hb in 10s. Pt had L meniscectomy on 4/26/2019 and currently c/o L knee pain, controlled with pain medications. Patient denies fever, chills, NS, N/V, abdominal pain, or palpitations. In ED, vitals are stable. Labs are significant for Hb 10 (baseline Hb in 10s per last admission in February), Tr neg x2, Pro-BNP 21. CTA negative. US BL LE neg. Allergic to estrogen. No home O2 use. 52 y/o F patient w/ PMHx asthma, thyroid disease, fibromyalgia, CVA w/ right handed weakness, and lupus and a significant PSHx of L knee surgery on 4/26/2019 presents to the ED w/ progressive chronic shortness of breath and intermittent CP that have been worsening for the past 3 days. CP when occurs usually over L breast, sharp/dull in quality, 9/10 in severity, intermittent, non-radiating. SOB has been worsening to the point where pt can only walk for 1-2 blocks before having to sit down to rest. Pt also reports 3 pillow orthopnea.     Started having progressive SOB worsened with activities and when lying flat since 2010 with intermittent chest pain not related to activities since 2010. Presented to hospital in 2011 for evaluation as  symptoms significantly worsened. TTE and stress test done, results were "abnormal." Angiography was performed, found blockage of a "small coronary artery", but no stent placement was done per her cardiologist. She was started on ASA-81 and statin and has been following up with her cardiologist at [facility] on a regular basis, last cardiologist f/u 1 month ago when stress test done for worsening SOB and CP, but test could not be completed because of CP.     On a note, pt admitted recently to [facility] on past February for dizziness, likely from BPPV, at the time showed anemia with Hb in 10s. Pt had L meniscectomy on 4/26/2019 and currently c/o L knee pain, controlled with pain medications. Patient denies fever, chills, NS, N/V, abdominal pain, or palpitations. In ED, vitals are stable. Labs are significant for Hb 10 (baseline Hb in 10s per last admission in February), Tr neg x2, Pro-BNP 21. CTA negative. US BL LE neg. Allergic to estrogen. No home O2 use.    ****Pt brought most of meds she is taking at home but not all. Pt gets her meds from [facility] [phone]. Primary team to please call the pharmacy to complete med recs.
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
## 2019-05-05 NOTE — H&P ADULT - COMMENTS
CONSTITUTIONAL: No fever  EYES: No acute visual disturbances  NECK: No pain or stiffness  RESPIRATORY: No cough; SOB per HPI  CARDIOVASCULAR: No chest pain, no palpitations  GASTROINTESTINAL: No pain. No nausea or vomiting; No diarrhea   NEUROLOGICAL: No headache or numbness, no tremors  MUSCULOSKELETAL: L knee, leg pain with stiffness  GENITOURINARY: No dysuria, no frequency, no hesitancy

## 2019-05-05 NOTE — H&P ADULT - ATTENDING COMMENTS
Patient seen and examined; Agree with PGY1 A/P above with editing as needed. Discussed with DEENA Rebollar earlier this morning    54 y/o F patient w/ PMHx asthma, thyroid disease, fibromyalgia, CVA w/ right handed weakness, and lupus and a significant PSHx of L knee surgery on 4/26/2019 presents to the ED w/ progressive chronic shortness of breath and intermittent CP that have been worsening for the past 3 days. CP when occurs usually over L breast, sharp/dull in quality, 9/10 in severity, intermittent, non-radiating. SOB has been worsening to the point where pt can only walk for 1-2 blocks before having to sit down to rest. Pt also reports 3 pillow orthopnea.     Hx Mild CAD in 2011 on Angiogram not needing stent. FH significant for Heart disease in Father's side multiple family members with MI and Mother had stroke in 70s. She was attempted a stress test in the recent past but could not complete as she felt heart racing     FH/SH/ROS: As above    Vital Signs Last 24 Hrs  T(C): 36.8 (05 May 2019 07:38), Max: 36.9 (04 May 2019 15:07)  T(F): 98.3 (05 May 2019 07:38), Max: 98.4 (04 May 2019 15:07)  HR: 70 (05 May 2019 07:38) (69 - 88)  BP: 142/82 (05 May 2019 07:38) (103/68 - 142/82)  RR: 18 (05 May 2019 07:38) (17 - 18)  SpO2: 100% (05 May 2019 07:38) (98% - 100%)    P/E:  Neuro: AAO x3; No focal deficits  CVS: S1S2 present, Regular  Resp: BLAE+, No wheeze or Rhonchi  GI: Soft, BS+, NT, ND  Extr: No edema or calf tenderness B/L LE    Labs:                        10.0   3.88  )-----------( 304      ( 05 May 2019 07:18 )             32.4   05-05    142  |  110<H>  |  10  ----------------------------<  77  3.8   |  27  |  0.90    Ca    8.3<L>      05 May 2019 07:18  Phos  4.2     05-05  Mg     2.4     05-05    TPro  7.9  /  Alb  4.2  /  TBili  0.4  /  DBili  x   /  AST  32  /  ALT  20  /  AlkPhos  73  05-04    EKG: Sinus Rhythm @ 80 bpm; RBBB; No acute St, T changes    CT Angio Chest w/ IV Cont (05.04.19 @ 19:33)    FINDINGS:    Pulmonary arteries: Normal. No pulmonary emboli.    Aorta: Normal. No aortic aneurysm. No aortic dissection.    Lungs: Normal. No consolidation. No masses.    Pleural space: Normal. No pneumothorax. No pleural effusion.    Heart: Normal. No cardiomegaly. No pericardial effusion.    Lymph nodes: Unremarkable. No enlarged lymph nodes.    Bones/joints: Unremarkable. No acute fracture.    Soft tissues: Unremarkable.     IMPRESSION:  No acute findings.  I have reviewed the examination and the above report. In addition to the above reported findings, there is a 7 mm sized low density within the   liver that cannot be characterized.    PATRICIO BARNES M.D., ATTENDING RADIOLOGIST  This document has been electronically signed. May  5 2019  8:23AM    D/D:  SOB and chest pain progressively worse with exertion concerning for Valvular Heart disease/ Ischemic heart disease  Hypothyroid inadequately Rx  Hx Chronic Migrainous Headaches/ Depression    Plan:  Tele; 2D Echo  Serial Cardiac enzymes  Ecotrin; Statin; Lovenox therapeutic for now  Will discuss with Cardio consult Dr. Jones to see if patient benefit from Cath once Echo results are available  Continue all home meds    Discussed with patient findings and plan of care  Discussed with PGY 1 MAR Dr. Rebollar  Discussed with ED Hold NP Patient seen and examined; Agree with PGY1 A/P above with editing as needed. Discussed with DEENA Rebollar earlier this morning    52 y/o F patient w/ PMHx asthma, thyroid disease, fibromyalgia, CVA w/ right handed weakness, and lupus and a significant PSHx of L knee surgery on 4/26/2019 presents to the ED w/ progressive chronic shortness of breath and intermittent CP that have been worsening for the past 3 days. CP when occurs usually over L breast, sharp/dull in quality, 9/10 in severity, intermittent, non-radiating. SOB has been worsening to the point where pt can only walk for 1-2 blocks before having to sit down to rest. Pt also reports 3 pillow orthopnea.     Hx Mild CAD in 2011 on Angiogram not needing stent. FH significant for Heart disease in Father's side multiple family members with MI and Mother had stroke in 70s. She was attempted a stress test in the recent past but could not complete as she felt heart racing     TSH found to be elevated; Patient says not taking Levothyroxine empty stomach.     FH/SH/ROS: As above    Vital Signs Last 24 Hrs  T(C): 36.8 (05 May 2019 07:38), Max: 36.9 (04 May 2019 15:07)  T(F): 98.3 (05 May 2019 07:38), Max: 98.4 (04 May 2019 15:07)  HR: 70 (05 May 2019 07:38) (69 - 88)  BP: 142/82 (05 May 2019 07:38) (103/68 - 142/82)  RR: 18 (05 May 2019 07:38) (17 - 18)  SpO2: 100% (05 May 2019 07:38) (98% - 100%)    P/E:  Neuro: AAO x3; No focal deficits  CVS: S1S2 present, Regular  Resp: BLAE+, No wheeze or Rhonchi  GI: Soft, BS+, NT, ND  Extr: No edema or calf tenderness B/L LE    Labs:                        10.0   3.88  )-----------( 304      ( 05 May 2019 07:18 )             32.4   05-05    142  |  110<H>  |  10  ----------------------------<  77  3.8   |  27  |  0.90    Ca    8.3<L>      05 May 2019 07:18  Phos  4.2     05-05  Mg     2.4     05-05    TPro  7.9  /  Alb  4.2  /  TBili  0.4  /  DBili  x   /  AST  32  /  ALT  20  /  AlkPhos  73  05-04    EKG: Sinus Rhythm @ 80 bpm; RBBB; No acute St, T changes    CT Angio Chest w/ IV Cont (05.04.19 @ 19:33)    FINDINGS:    Pulmonary arteries: Normal. No pulmonary emboli.    Aorta: Normal. No aortic aneurysm. No aortic dissection.    Lungs: Normal. No consolidation. No masses.    Pleural space: Normal. No pneumothorax. No pleural effusion.    Heart: Normal. No cardiomegaly. No pericardial effusion.    Lymph nodes: Unremarkable. No enlarged lymph nodes.    Bones/joints: Unremarkable. No acute fracture.    Soft tissues: Unremarkable.     IMPRESSION:  No acute findings.  I have reviewed the examination and the above report. In addition to the above reported findings, there is a 7 mm sized low density within the   liver that cannot be characterized.    PATRICIO BARNES M.D., ATTENDING RADIOLOGIST  This document has been electronically signed. May  5 2019  8:23AM    D/D:  SOB and chest pain progressively worse with exertion concerning for Valvular Heart disease/ Ischemic heart disease  Hypothyroid inadequately Rx  Hx Chronic Migrainous Headaches/ Depression    Plan:  Tele; 2D Echo  Serial Cardiac enzymes  Ecotrin; Statin; Lovenox therapeutic for now  Will discuss with Cardio consult Dr. Jones to see if patient benefit from Cath once Echo results are available  Continue all home meds    Discussed with patient findings and plan of care  Discussed with PGY 1 MAR Dr. Rebollar  Discussed with ED Hold NP Patient seen and examined; Agree with PGY1 A/P above with editing as needed. Discussed with DEENA Rebollar earlier this morning    52 y/o F patient w/ PMHx asthma, thyroid disease, fibromyalgia, CVA w/ right handed weakness, and lupus and a significant PSHx of L knee surgery on 4/26/2019 presents to the ED w/ progressive chronic shortness of breath and intermittent CP that have been worsening for the past 3 days. CP when occurs usually over L breast, sharp/dull in quality, 9/10 in severity, intermittent, non-radiating. SOB has been worsening to the point where pt can only walk for 1-2 blocks before having to sit down to rest. Pt also reports 3 pillow orthopnea.     Hx Mild CAD in 2011 on Angiogram not needing stent. FH significant for Heart disease in Father's side multiple family members with MI and Mother had stroke in 70s. She was attempted a stress test in the recent past but could not complete as she felt heart racing     TSH found to be elevated; Patient says not taking Levothyroxine empty stomach.     FH/SH/ROS: As above    Vital Signs Last 24 Hrs  T(C): 36.8 (05 May 2019 07:38), Max: 36.9 (04 May 2019 15:07)  T(F): 98.3 (05 May 2019 07:38), Max: 98.4 (04 May 2019 15:07)  HR: 70 (05 May 2019 07:38) (69 - 88)  BP: 142/82 (05 May 2019 07:38) (103/68 - 142/82)  RR: 18 (05 May 2019 07:38) (17 - 18)  SpO2: 100% (05 May 2019 07:38) (98% - 100%)    P/E:  Neuro: AAO x3; No focal deficits  CVS: S1S2 present, Regular  Resp: BLAE+, No wheeze or Rhonchi  GI: Soft, BS+, NT, ND  Extr: No edema or calf tenderness B/L LE    Labs:                        10.0   3.88  )-----------( 304      ( 05 May 2019 07:18 )             32.4   05-05    142  |  110<H>  |  10  ----------------------------<  77  3.8   |  27  |  0.90    Ca    8.3<L>      05 May 2019 07:18  Phos  4.2     05-05  Mg     2.4     05-05    TPro  7.9  /  Alb  4.2  /  TBili  0.4  /  DBili  x   /  AST  32  /  ALT  20  /  AlkPhos  73  05-04    EKG: Sinus Rhythm @ 80 bpm; RBBB; No acute St, T changes    CT Angio Chest w/ IV Cont (05.04.19 @ 19:33)    FINDINGS:    Pulmonary arteries: Normal. No pulmonary emboli.    Aorta: Normal. No aortic aneurysm. No aortic dissection.    Lungs: Normal. No consolidation. No masses.    Pleural space: Normal. No pneumothorax. No pleural effusion.    Heart: Normal. No cardiomegaly. No pericardial effusion.    Lymph nodes: Unremarkable. No enlarged lymph nodes.    Bones/joints: Unremarkable. No acute fracture.    Soft tissues: Unremarkable.     IMPRESSION:  No acute findings.  I have reviewed the examination and the above report. In addition to the above reported findings, there is a 7 mm sized low density within the   liver that cannot be characterized.    PATRICIO BARNES M.D., ATTENDING RADIOLOGIST  This document has been electronically signed. May  5 2019  8:23AM    D/D:  SOB and chest pain progressively worse with exertion concerning for Valvular Heart disease/ Ischemic heart disease  Hypothyroid inadequately Rx  Anemia etiology unclear  Hx Chronic Migrainous Headaches/ Depression    Plan:  Tele; 2D Echo  Serial Cardiac enzymes  Ecotrin; Statin; Lovenox therapeutic for now  Will discuss with Cardio consult Dr. Jones to see if patient benefit from Cath once Echo results are available  Iron studies; Vitamin D, B12 and folate levels  Continue all home meds    Discussed with patient findings and plan of care  Discussed with PGY 1 DEENA Rebollar  Discussed with ED Hold NP

## 2019-05-05 NOTE — H&P ADULT - ASSESSMENT
52 y/o F patient w/ PMHx asthma, thyroid disease, fibromyalgia, CVA w/ right handed weakness, and lupus and a significant PSHx of L knee surgery on 4/26/2019 presents to the ED w/ intermittent shortness of breath that began x3 days ago. Patient takes prescribed aspirin and has CVA w/ residual right handed weakness. Patient reports L knee and L leg are increasingly stiff when she walks. Patient states she has a decrease exercise tolerance down to 2 blocks limited by dyspnea. Patient denies chills, fever.    In ED, vitals are stable. Labs are significant for Hb 10 (baseline Hb in 10s), Tr neg x2, PRo-BNP 21. CTA negative US BL LE neg. 52 y/o F patient w/ PMHx asthma, thyroid disease, fibromyalgia, CVA w/ right handed weakness, and lupus and a significant PSHx of L knee surgery on 4/26/2019 presents to the ED w/ progressive chronic shortness of breath that has been worsening for the past 3 days. CP when occurs usually over L breast, sharp/dull in quality, 9/10 in severity intermittent. SOB has worsened to the point where pt can only walk for 1-2 blocks before having to rest. Pt also has 3 pillow orthopnea. Started having progressively worsening SOB worsened with activities and lying flat since 2010 with intermittent chest pain, not related to activities, since 2010. Presented to hospital in 2011 for evaluation as symptoms worsened. TTE and stress test done, results were "abnormal," angiography was performed, which found blockage of "small coronary artery", but no stent placement was done per her cardiologist. She has been put on ASA-81 and statin since then and has been following up with her cardiologist at [facility] on a regular basis, last cardiologist f/u 1 month ago when stress test done for worsening SOB and CP, but test could not be completed because of CP.     On a note, pt admitted recently to [facility] on past February for dizziness, likely from BPPV, at the time showed anemia with Hb in 10s. . pt had L meniscectomy on 4/26/2019 and c/o stable knee pain, controlled with pain medications. Patient denies fever, chills, NS, N/V, abdominal pain, palpitations. In ED, vitals are stable. Labs are significant for Hb 10 (baseline Hb in 10s), Tr neg x2, Pro-BNP 21. CTA negative US BL LE neg. Allergic to estrogen. No home O2 use. 54 y/o F patient w/ PMHx asthma, thyroid disease, fibromyalgia, CVA w/ right handed weakness, and lupus and a significant PSHx of L knee surgery on 4/26/2019 presents to the ED w/ progressive chronic shortness of breath and intermittent CP that has been worsening for the past 3 days. CP when occurs usually over L breast, sharp/dull in quality, 9/10 in severity, intermittent, non-radiating. SOB has been worsening to the point where pt can only walk for 1-2 blocks before having to sit down to rest. Pt also reports 3 pillow orthopnea.     Started having progressive SOB worsened with activities and lying flat since 2010 with intermittent chest pain, not related to activities since 2010. Presented to hospital in 2011 for evaluation as  symptoms significantly worsened. TTE and stress test done, results were "abnormal." Angiography was performed, which found blockage of a "small coronary artery", but no stent placement was done per her cardiologist. She was started on ASA-81 and statin and has been following up with her cardiologist at [facility] on a regular basis, last cardiologist f/u 1 month ago when stress test done for worsening SOB and CP, but test could not be completed because of CP.     On a note, pt admitted recently to [facility] on past February for dizziness, likely from BPPV, at the time showed anemia with Hb in 10s. Pt had L meniscectomy on 4/26/2019 and c/o stable knee pain, controlled with pain medications. Patient denies fever, chills, NS, N/V, abdominal pain, palpitations. In ED, vitals are stable. Labs are significant for Hb 10 (baseline Hb in 10s), Tr neg x2, Pro-BNP 21. CTA negative US BL LE neg. Allergic to estrogen. No home O2 use. 52 y/o F patient w/ PMHx asthma, thyroid disease, fibromyalgia, CVA w/ right handed weakness, and lupus and a significant PSHx of L knee surgery on 4/26/2019 presents to the ED w/ progressive chronic shortness of breath and intermittent CP that have been worsening for the past 3 days. CP when occurs usually over L breast, sharp/dull in quality, 9/10 in severity, intermittent, non-radiating. SOB has been worsening to the point where pt can only walk for 1-2 blocks before having to sit down to rest. Pt also reports 3 pillow orthopnea.     Started having progressive SOB worsened with activities and when lying flat since 2010 with intermittent chest pain not related to activities since 2010. Presented to hospital in 2011 for evaluation as  symptoms significantly worsened. TTE and stress test done, results were "abnormal." Angiography was performed, found blockage of a "small coronary artery", but no stent placement was done per her cardiologist. She was started on ASA-81 and statin and has been following up with her cardiologist at [facility] on a regular basis, last cardiologist f/u 1 month ago when stress test done for worsening SOB and CP, but test could not be completed because of CP.     On a note, pt admitted recently to [facility] on past February for dizziness, likely from BPPV, at the time showed anemia with Hb in 10s. Pt had L meniscectomy on 4/26/2019 and currently c/o L knee pain, controlled with pain medications. Patient denies fever, chills, NS, N/V, abdominal pain, or palpitations. In ED, vitals are stable. Labs are significant for Hb 10 (baseline Hb in 10s per last admission in February), Tr neg x2, Pro-BNP 21. CTA negative. US BL LE neg. Allergic to estrogen. No home O2 use.

## 2019-05-05 NOTE — H&P ADULT - PROBLEM SELECTOR PLAN 1
Pt admitted recently to [facility] on past February for dizziness, likely from BPPV, at the time showed anemia with Hb in 10s. No anemia panel done at the time as Hb remained stable. SOB likely 2/2 asthma exacerbation vs anemia. Given negative pro-BNP, CE, CXR, no significant past cardiac history, unlikely acute CHF.   -F/u Cardiology  -F/u Echocardiogram  -F/u anemia workup  -Telemetry monitoring not needed at this time Pt p/w progressive chronic shortness of breath since 2010 that has been particularly worsening for the past 3 days. SOB has worsened to the point where pt can only walk for 1-2 blocks before having to rest. Pt also has 3 pillow orthopnea. Pt also admitted recently to [facility] on past February for dizziness, likely from BPPV, at the time showed anemia with Hb in 10s. No anemia panel done at the time as Hb remained stable. Anemia/asthma could be contributing to SOB. Although negative pro-BNP, CE, CT chest, no significant past cardiac history, acute CHF cannot be ruled out given clinical presentations. CP could be from stable/unstable angina.  -F/u Cardiology  -F/u Echocardiogram  -F/u anemia panel  -Telemetry monitoring not needed at this time Pt p/w progressive chronic shortness of breath since 2010 that has been particularly worsening for the past 3 days. SOB has worsened to the point where pt can only walk for 1-2 blocks before having to rest. Pt also has 3 pillow orthopnea. Pt also admitted recently to [facility] on past February for dizziness, likely from BPPV, at the time showed anemia with Hb in 10s. No anemia panel done at the time as Hb remained stable. Anemia/asthma could be contributing to SOB. Although negative pro-BNP, CE, CT chest, no significant past cardiac history, acute CHF cannot be ruled out given clinical presentations. CP could be from stable/unstable angina.  -F/u Cardiology  -F/u Echocardiogram  -F/u anemia panel  -Telemetry monitoring not needed at this time  -Primary team to please get outpatient cardiac records from [facility] -Pt p/w progressive chronic shortness of breath since 2010 that has been particularly worsening for the past 3 days. Also has intermittent CP.   -Pt also admitted recently to [facility] on past February for dizziness, likely from BPPV, at the time showed anemia with Hb in 10s. No anemia panel done at the time as Hb remained stable(labs from 10/2018; not NII, B12/folate WNL, likely anemia of chronic disease). Anemia/asthma could be contributing to SOB.   -Although negative pro-BNP, CE, CT chest, no significant past cardiac history, acute CHF cannot be ruled out given clinical presentations. CP could be from stable/unstable angina.  -F/u Cardiology  -Prelim CT angio: negative for PE, f/u final result.  -Given low level of pro-BNP(21), low suspicion for CHF however as CHF is clinical diagnosis and patient has h/o orthopnea and CAD, will check w/ Echocardiogram.  -Telemetry monitoring, f/u 3rd sets of cardiac enzyme(first and 2nd set negative).  -Aspirin, statin, and low dose metoprolol until ACS is ruled out as a cause of worsening SOB/possible new onset CHF.  -Primary team to please get outpatient cardiac records from [facility] -Pt p/w progressive chronic shortness of breath since 2010 that has been particularly worsening for the past 3 days. Also has intermittent CP.   -Pt also admitted recently to [facility] on past February for dizziness, likely from BPPV, at the time showed anemia with Hb in 10s. No anemia panel done at the time as Hb remained stable(labs from 10/2018; not NII, B12/folate WNL, likely anemia of chronic disease). Anemia/asthma could be contributing to SOB.   -Although negative pro-BNP, CE, CT chest, no significant past cardiac history, acute CHF cannot be ruled out given clinical presentations. CP could be from stable/unstable angina.  -F/u Cardiology  -Prelim CT angio: negative for PE, f/u final result.  -Given low level of pro-BNP(21), low suspicion for CHF however as CHF is clinical diagnosis and patient has h/o orthopnea and CAD, will check w/ Echocardiogram.  -Telemetry monitoring, f/u 3rd sets of cardiac enzyme(first and 2nd set negative).  -Aspirin, statin, and low dose metoprolol until ACS is ruled out as a cause of worsening SOB/possible new onset CHF.  -Primary team to please get outpatient cardiac records from [facility]  -USHA score 2, 8% risk at 14 days of: all-cause mortality, new or recurrent MI, or severe recurrent ischemia requiring urgent revascularization.   -HEART score of 3, Risk of MACE of 0.9-1.7%. -Pt p/w progressive chronic shortness of breath since 2010 that has been particularly worsening for the past 3 days. Also has intermittent CP.   -Pt also admitted recently to [facility] on past February for dizziness, likely from BPPV, at the time showed anemia with Hb in 10s. No anemia panel done at the time as Hb remained stable(labs from 10/2018; not NII, B12/folate WNL, likely anemia of chronic disease). Anemia/asthma could be contributing to SOB.   -Although negative pro-BNP, CE, CT chest, no significant past cardiac history, acute CHF cannot be ruled out given clinical presentations. CP could be from stable/unstable angina.  -EKG: NSR with RBBB, no ST elevation  -F/u Cardiology  -Prelim CT angio: negative for PE, f/u final result.  -Given low level of pro-BNP(21), low suspicion for CHF however as CHF is clinical diagnosis and patient has h/o orthopnea and CAD, will check w/ Echocardiogram.  -Telemetry monitoring, f/u 3rd sets of cardiac enzyme(first and 2nd set negative).  -Aspirin, statin, and low dose metoprolol until ACS is ruled out as a cause of worsening SOB/possible new onset CHF.  -Primary team to please get outpatient cardiac records from [facility]  -USHA score 2, 8% risk at 14 days of: all-cause mortality, new or recurrent MI, or severe recurrent ischemia requiring urgent revascularization.   -HEART score of 3, Risk of MACE of 0.9-1.7%.  -Per last discharge note, pt was prescribed full dose aspirin, however pt is not sure if she is taking full/baby dose(poor historian). Likely post knee surgery prophylaxis temporarily. According to 2/2019 admission records, patient was on baby aspirin. Therefore will give low dose aspirin for now.

## 2019-05-05 NOTE — H&P ADULT - PROBLEM SELECTOR PLAN 7
[] Previous VTE                                           3  [] Thrombophilia                                        2  [] Lower limb paralysis                              2   [] Current Cancer                                       2   [x] Immobilization > 24 hrs                        1  [] ICU/CCU stay > 24 hours                       1  [x] Age > 60                                                   1    IMPROVE VTE Score: 2    Patient is indicated for chemical DVT prophylaxis; Lovenox 40mg subcu daily

## 2019-05-05 NOTE — H&P ADULT - PROBLEM SELECTOR PLAN 2
Patient reports L knee and L leg are increasingly stiff when she walks, likely from age-related chronic degenerative changes. No recent hx trauma.  -XR knee/tibia/fistula if pain becomes worse  -Pain management for now -Patient reports L knee and L leg are increasingly stiff when she walks, likely from age-related chronic degenerative changes. No recent hx trauma.  -XR knee/tibia/fistula if pain becomes worse  -PRN pain management for now

## 2019-05-05 NOTE — H&P ADULT - NSHPPHYSICALEXAM_GEN_ALL_CORE
GENERAL: NAD  HEENT: Normocephalic;  conjunctivae and sclerae clear; moist mucous membranes;   NECK : supple  CHEST/LUNG: Clear to auscultation bilaterally with good air entry   HEART: S1 S2  regular; no murmurs, gallops or rubs  ABDOMEN: Soft, Nontender, Nondistended; Bowel sounds present  EXTREMITIES: no cyanosis; no edema; no calf tenderness  SKIN: warm and dry; no rash  NERVOUS SYSTEM:  Awake and alert; no new deficits

## 2019-05-05 NOTE — H&P ADULT - PROBLEM SELECTOR PLAN 3
[] Previous VTE                                           3  [] Thrombophilia                                        2  [] Lower limb paralysis                              2   [] Current Cancer                                       2   [x] Immobilization > 24 hrs                        1  [] ICU/CCU stay > 24 hours                       1  [x] Age > 60                                                   1    IMPROVE VTE Score: 2    Patient is indicated for chemical DVT prophylaxis [] Previous VTE                                           3  [] Thrombophilia                                        2  [] Lower limb paralysis                              2   [] Current Cancer                                       2   [x] Immobilization > 24 hrs                        1  [] ICU/CCU stay > 24 hours                       1  [x] Age > 60                                                   1    IMPROVE VTE Score: 2    Patient is indicated for chemical DVT prophylaxis; Lovenox 40mg subcu daily Pt on Clonazepam  -C/w home meds

## 2019-05-05 NOTE — CONSULT NOTE ADULT - SUBJECTIVE AND OBJECTIVE BOX
CARDIOLOGY ATTENDING      HISTORY OF PRESENT ILLNESS: She is a pleasant 52 y/o female PMH lupus and prior stroke with recurrent admissions for atypical chest pain. An echo and NST in Oct 2018 here at  were unremarkable. In addition she had a cath at [facility] in  which she says showed minimal non-obstructive CAD. CTA unremarkable for PE. Here there are no new EKG changes (just old RBBB) and cardiac markers are negative.    PAST MEDICAL & SURGICAL HISTORY:  IBS (irritable bowel syndrome)  CVA  Migraine  Vertigo  Bipolar disorder  Multiple thyroid nodules  Hypothyroidism  Breast cancer, left: dx , s/p lumpectomy , last chemo   Hypertension: controlled with diet and lowering stress  Unilateral primary osteoarthritis, left knee  Complex tear of lateral meniscus of knee as current injury: left knee  Cataract: bilateral eyes  High cholesterol  Asthma: last attack January, never intubated , never hospitalized  Lupus    S/P lumpectomy, left breast:   History of vascular access device: left chest bardport insetion - , removal-  S/P tubal ligation:  and  with  reversal in   S/P  section: X 4- , , ,         MEDICATIONS  (STANDING):  aspirin  chewable 81 milliGRAM(s) Oral daily  atorvastatin 40 milliGRAM(s) Oral at bedtime  clonazePAM Tablet 1 milliGRAM(s) Oral at bedtime  enoxaparin Injectable 40 milliGRAM(s) SubCutaneous daily  levothyroxine 75 MICROGram(s) Oral daily  metoprolol tartrate 12.5 milliGRAM(s) Oral two times a day  nortriptyline 50 milliGRAM(s) Oral at bedtime  topiramate 100 milliGRAM(s) Oral two times a day  traZODone 100 milliGRAM(s) Oral at bedtime  venlafaxine XR. 150 milliGRAM(s) Oral daily      Allergies    estrogen (Hives; Swelling)    Intolerances        FAMILY HISTORY:  Family history of breast cancer in first degree relative (Sibling): sister-   Family history of acute myocardial infarction: father-     Non-contributary for premature coronary disease or sudden cardiac death    SOCIAL HISTORY:    [x ] Non-smoker  [ ] Smoker  [ ] Alcohol      REVIEW OF SYSTEMS:  [x ]chest pain  [ x ]shortness of breath  [  ]palpitations  [  ]syncope  [ ]near syncope [ ]upper extremity weakness   [ ] lower extremity weakness  [  ]diplopia  [  ]altered mental status   [  ]fevers  [ ]chills [ ]nausea  [ ]vomitting  [  ]dysphagia    [ ]abdominal pain  [ ]melena  [ ]BRBPR    [  ]epistaxis  [  ]rash    [ ]lower extremity edema        [x] All others negative	  [ ] Unable to obtain    PHYSICAL EXAM:  T(C): 37.1 (19 @ 11:45), Max: 37.1 (19 @ 11:45)  HR: 63 (19 @ 11:45) (63 - 88)  BP: 109/73 (19 @ 11:45) (103/68 - 142/82)  RR: 17 (19 @ 11:45) (17 - 18)  SpO2: 100% (19 @ 11:45) (98% - 100%)  Wt(kg): --    Appearance: Normal	  HEENT:   Normal oral mucosa, PERRL, EOMI	  Lymphatic: No lymphadenopathy , no edema  Cardiovascular: Normal S1 S2, No JVD, No murmurs , Peripheral pulses palpable 2+ bilaterally  Respiratory: Lungs clear to auscultation, normal effort 	  Gastrointestinal:  Soft, Non-tender, + BS	  Skin: No rashes, No ecchymoses, No cyanosis, warm to touch  Musculoskeletal: Normal range of motion, normal strength  Psychiatry:  Mood & affect appropriate      TELEMETRY: 	    ECG:  	    Echo:  NST:  Cath:  	  	  LABS:	 	                          10.0   3.88  )-----------( 304      ( 05 May 2019 07:18 )             32.4     -    142  |  110<H>  |  10  ----------------------------<  77  3.8   |  27  |  0.90    Ca    8.3<L>      05 May 2019 07:18  Phos  4.2     -  Mg     2.4         TPro  7.9  /  Alb  4.2  /  TBili  0.4  /  DBili  x   /  AST  32  /  ALT  20  /  AlkPhos  73      proBNP: Serum Pro-Brain Natriuretic Peptide: 21 pg/mL ( @ 18:17)    Lipid Profile:   HgA1c: Hemoglobin A1C, Whole Blood: 5.3 % ( @ 11:33)    TSH: Thyroid Stimulating Hormone, Serum: 11.70 uU/mL ( @ 07:18)      A/P) She is a pleasant 52 y/o female PMH lupus and prior stroke with recurrent admissions for atypical chest pain. An echo and NST in Oct 2018 here at  were unremarkable. In addition she had a cath at [facility] in  which she says showed minimal non-obstructive CAD. CTA unremarkable for PE. Here there are no new EKG changes (just old RBBB) and cardiac markers are negative.    -given her atypical symptoms, unchanged EKG and negative cardiac markers I do not suspect her symptoms are from cardiac ischemia  -f/u repeat echo  -final cardiology reccs regarding repeat NST pending echo  -no indication for urgent repeat cath at this time  -get prior cath report from [facility]      Kurt Jones M.D., [facility]  Cardiac Electrophysiology  [facility]  [address] [place] [address], [address] [place] [address]  [email or web address]    office [phone]  pager [phone]

## 2019-05-06 ENCOUNTER — TRANSCRIPTION ENCOUNTER (OUTPATIENT)
Age: 54
End: 2019-05-06

## 2019-05-06 ENCOUNTER — APPOINTMENT (OUTPATIENT)
Dept: INTERNAL MEDICINE | Facility: CLINIC | Age: 54
End: 2019-05-06

## 2019-05-06 VITALS
SYSTOLIC BLOOD PRESSURE: 105 MMHG | DIASTOLIC BLOOD PRESSURE: 85 MMHG | OXYGEN SATURATION: 100 % | RESPIRATION RATE: 18 BRPM | TEMPERATURE: 100 F | HEART RATE: 86 BPM

## 2019-05-06 DIAGNOSIS — R06.09 OTHER FORMS OF DYSPNEA: ICD-10-CM

## 2019-05-06 LAB
24R-OH-CALCIDIOL SERPL-MCNC: 28.2 NG/ML — LOW (ref 30–80)
ANION GAP SERPL CALC-SCNC: 6 MMOL/L — SIGNIFICANT CHANGE UP (ref 5–17)
BASOPHILS # BLD AUTO: 0.07 K/UL — SIGNIFICANT CHANGE UP (ref 0–0.2)
BASOPHILS NFR BLD AUTO: 1.8 % — SIGNIFICANT CHANGE UP (ref 0–2)
BUN SERPL-MCNC: 12 MG/DL — SIGNIFICANT CHANGE UP (ref 7–18)
CALCIUM SERPL-MCNC: 8.6 MG/DL — SIGNIFICANT CHANGE UP (ref 8.4–10.5)
CHLORIDE SERPL-SCNC: 110 MMOL/L — HIGH (ref 96–108)
CO2 SERPL-SCNC: 24 MMOL/L — SIGNIFICANT CHANGE UP (ref 22–31)
CREAT SERPL-MCNC: 0.83 MG/DL — SIGNIFICANT CHANGE UP (ref 0.5–1.3)
EOSINOPHIL # BLD AUTO: 0.13 K/UL — SIGNIFICANT CHANGE UP (ref 0–0.5)
EOSINOPHIL NFR BLD AUTO: 3.3 % — SIGNIFICANT CHANGE UP (ref 0–6)
GLUCOSE SERPL-MCNC: 69 MG/DL — LOW (ref 70–99)
HCT VFR BLD CALC: 35.1 % — SIGNIFICANT CHANGE UP (ref 34.5–45)
HGB BLD-MCNC: 11.3 G/DL — LOW (ref 11.5–15.5)
IMM GRANULOCYTES NFR BLD AUTO: 0 % — SIGNIFICANT CHANGE UP (ref 0–1.5)
LYMPHOCYTES # BLD AUTO: 2.15 K/UL — SIGNIFICANT CHANGE UP (ref 1–3.3)
LYMPHOCYTES # BLD AUTO: 54.3 % — HIGH (ref 13–44)
MAGNESIUM SERPL-MCNC: 2.3 MG/DL — SIGNIFICANT CHANGE UP (ref 1.6–2.6)
MCHC RBC-ENTMCNC: 31.4 PG — SIGNIFICANT CHANGE UP (ref 27–34)
MCHC RBC-ENTMCNC: 32.2 GM/DL — SIGNIFICANT CHANGE UP (ref 32–36)
MCV RBC AUTO: 97.5 FL — SIGNIFICANT CHANGE UP (ref 80–100)
MONOCYTES # BLD AUTO: 0.36 K/UL — SIGNIFICANT CHANGE UP (ref 0–0.9)
MONOCYTES NFR BLD AUTO: 9.1 % — SIGNIFICANT CHANGE UP (ref 2–14)
NEUTROPHILS # BLD AUTO: 1.25 K/UL — LOW (ref 1.8–7.4)
NEUTROPHILS NFR BLD AUTO: 31.5 % — LOW (ref 43–77)
NRBC # BLD: 0 /100 WBCS — SIGNIFICANT CHANGE UP (ref 0–0)
PHOSPHATE SERPL-MCNC: 4.2 MG/DL — SIGNIFICANT CHANGE UP (ref 2.5–4.5)
PLATELET # BLD AUTO: 303 K/UL — SIGNIFICANT CHANGE UP (ref 150–400)
POTASSIUM SERPL-MCNC: 3.4 MMOL/L — LOW (ref 3.5–5.3)
POTASSIUM SERPL-SCNC: 3.4 MMOL/L — LOW (ref 3.5–5.3)
RBC # BLD: 3.6 M/UL — LOW (ref 3.8–5.2)
RBC # FLD: 12.1 % — SIGNIFICANT CHANGE UP (ref 10.3–14.5)
SODIUM SERPL-SCNC: 140 MMOL/L — SIGNIFICANT CHANGE UP (ref 135–145)
WBC # BLD: 3.96 K/UL — SIGNIFICANT CHANGE UP (ref 3.8–10.5)
WBC # FLD AUTO: 3.96 K/UL — SIGNIFICANT CHANGE UP (ref 3.8–10.5)

## 2019-05-06 PROCEDURE — 80061 LIPID PANEL: CPT

## 2019-05-06 PROCEDURE — 85027 COMPLETE CBC AUTOMATED: CPT

## 2019-05-06 PROCEDURE — 83550 IRON BINDING TEST: CPT

## 2019-05-06 PROCEDURE — 93970 EXTREMITY STUDY: CPT

## 2019-05-06 PROCEDURE — 80048 BASIC METABOLIC PNL TOTAL CA: CPT

## 2019-05-06 PROCEDURE — 93306 TTE W/DOPPLER COMPLETE: CPT

## 2019-05-06 PROCEDURE — 82550 ASSAY OF CK (CPK): CPT

## 2019-05-06 PROCEDURE — 82306 VITAMIN D 25 HYDROXY: CPT

## 2019-05-06 PROCEDURE — 99239 HOSP IP/OBS DSCHRG MGMT >30: CPT | Mod: GC

## 2019-05-06 PROCEDURE — 83735 ASSAY OF MAGNESIUM: CPT

## 2019-05-06 PROCEDURE — G0378: CPT

## 2019-05-06 PROCEDURE — 84100 ASSAY OF PHOSPHORUS: CPT

## 2019-05-06 PROCEDURE — 71275 CT ANGIOGRAPHY CHEST: CPT

## 2019-05-06 PROCEDURE — 99285 EMERGENCY DEPT VISIT HI MDM: CPT | Mod: 25

## 2019-05-06 PROCEDURE — 82728 ASSAY OF FERRITIN: CPT

## 2019-05-06 PROCEDURE — 84484 ASSAY OF TROPONIN QUANT: CPT

## 2019-05-06 PROCEDURE — 83036 HEMOGLOBIN GLYCOSYLATED A1C: CPT

## 2019-05-06 PROCEDURE — 87631 RESP VIRUS 3-5 TARGETS: CPT

## 2019-05-06 PROCEDURE — 83690 ASSAY OF LIPASE: CPT

## 2019-05-06 PROCEDURE — 93005 ELECTROCARDIOGRAM TRACING: CPT

## 2019-05-06 PROCEDURE — 94640 AIRWAY INHALATION TREATMENT: CPT

## 2019-05-06 PROCEDURE — 36415 COLL VENOUS BLD VENIPUNCTURE: CPT

## 2019-05-06 PROCEDURE — 80053 COMPREHEN METABOLIC PANEL: CPT

## 2019-05-06 PROCEDURE — 83540 ASSAY OF IRON: CPT

## 2019-05-06 PROCEDURE — 84443 ASSAY THYROID STIM HORMONE: CPT

## 2019-05-06 PROCEDURE — 84466 ASSAY OF TRANSFERRIN: CPT

## 2019-05-06 PROCEDURE — 82553 CREATINE MB FRACTION: CPT

## 2019-05-06 PROCEDURE — 83880 ASSAY OF NATRIURETIC PEPTIDE: CPT

## 2019-05-06 PROCEDURE — 81001 URINALYSIS AUTO W/SCOPE: CPT

## 2019-05-06 RX ORDER — NORTRIPTYLINE HYDROCHLORIDE 10 MG/1
1 CAPSULE ORAL
Qty: 0 | Refills: 0 | COMMUNITY

## 2019-05-06 RX ORDER — TOPIRAMATE 25 MG
1 TABLET ORAL
Qty: 0 | Refills: 0 | DISCHARGE
Start: 2019-05-06

## 2019-05-06 RX ORDER — MECLIZINE HCL 12.5 MG
1 TABLET ORAL
Qty: 0 | Refills: 0 | COMMUNITY

## 2019-05-06 RX ORDER — PROCHLORPERAZINE MALEATE 5 MG
1 TABLET ORAL
Qty: 0 | Refills: 0 | COMMUNITY

## 2019-05-06 RX ORDER — POTASSIUM CHLORIDE 20 MEQ
40 PACKET (EA) ORAL ONCE
Qty: 0 | Refills: 0 | Status: COMPLETED | OUTPATIENT
Start: 2019-05-06 | End: 2019-05-06

## 2019-05-06 RX ORDER — CLONAZEPAM 1 MG
1 TABLET ORAL
Qty: 0 | Refills: 0 | COMMUNITY

## 2019-05-06 RX ORDER — TOPIRAMATE 25 MG
1 TABLET ORAL
Qty: 0 | Refills: 0 | COMMUNITY

## 2019-05-06 RX ORDER — ERGOCALCIFEROL 1.25 MG/1
50000 CAPSULE ORAL
Qty: 0 | Refills: 0 | Status: DISCONTINUED | OUTPATIENT
Start: 2019-05-06 | End: 2019-05-06

## 2019-05-06 RX ORDER — ERGOCALCIFEROL 1.25 MG/1
1 CAPSULE ORAL
Qty: 6 | Refills: 0
Start: 2019-05-06 | End: 2019-06-04

## 2019-05-06 RX ORDER — NORTRIPTYLINE HYDROCHLORIDE 10 MG/1
1 CAPSULE ORAL
Qty: 0 | Refills: 0 | DISCHARGE
Start: 2019-05-06

## 2019-05-06 RX ADMIN — Medication 75 MICROGRAM(S): at 05:32

## 2019-05-06 RX ADMIN — ENOXAPARIN SODIUM 40 MILLIGRAM(S): 100 INJECTION SUBCUTANEOUS at 11:54

## 2019-05-06 RX ADMIN — Medication 40 MILLIEQUIVALENT(S): at 11:53

## 2019-05-06 RX ADMIN — Medication 100 MILLIGRAM(S): at 05:32

## 2019-05-06 RX ADMIN — Medication 150 MILLIGRAM(S): at 11:53

## 2019-05-06 RX ADMIN — Medication 3 MILLILITER(S): at 05:32

## 2019-05-06 RX ADMIN — ERGOCALCIFEROL 50000 UNIT(S): 1.25 CAPSULE ORAL at 11:53

## 2019-05-06 RX ADMIN — Medication 81 MILLIGRAM(S): at 11:53

## 2019-05-06 RX ADMIN — Medication 30 MILLILITER(S): at 15:47

## 2019-05-06 NOTE — DISCHARGE NOTE NURSING/CASE MANAGEMENT/SOCIAL WORK - NSDCDPATPORTLINK_GEN_ALL_CORE
You can access the [facility] Patient Portal, offered by [facility], by registering with the following website: [email or web address]

## 2019-05-06 NOTE — DISCHARGE NOTE PROVIDER - CARE PROVIDER_API CALL
Kunal Virk)  Gastroenterology; Internal Medicine  [address] [place] [address]  Phone: [phone]  Fax: [phone]  Follow Up Time:     pati stover  Address: [address], [place] [address]    Phone: [phone]  Phone: (   )    -  Fax: (   )    -  Follow Up Time:

## 2019-05-06 NOTE — DISCHARGE NOTE PROVIDER - CARE PROVIDERS DIRECT ADDRESSES
[email or web address]

## 2019-05-06 NOTE — PROGRESS NOTE ADULT - SUBJECTIVE AND OBJECTIVE BOX
Patient is a 53y old  Female who presents with a chief complaint of SOB (06 May 2019 13:50)      INTERVAL HPI/OVERNIGHT EVENTS:    Allergies    estrogen (Hives; Swelling)    Intolerances        REVIEW OF SYSTEMS:  CONSTITUTIONAL: No fever, weight loss, or fatigue  EYES: No eye pain, visual disturbances, or discharge  RESPIRATORY: No cough, wheezing or shortness of breath  CARDIOVASCULAR: No chest pain, feeling of heart beats  GASTROINTESTINAL: No abdominal or epigastric pain. No nausea, vomiting; No diarrhea or constipation.  GENITOURINARY: No dysuria, frequency, hematuria  NEUROLOGICAL: No headaches  MUSCULOSKELETAL: No joint pain  PSYCHIATRIC: No depression or anxiety  HEME/LYMPH: No easy bruising, or bleeding gums  ALLERGY AND IMMUNOLOGIC: No hives or eczema    Medications:  acetaminophen   Tablet .. 650 milliGRAM(s) Oral every 6 hours PRN Mild Pain (1 - 3)  ALBUTerol/ipratropium for Nebulization 3 milliLiter(s) Nebulizer every 6 hours PRN Shortness of Breath and/or Wheezing  aspirin  chewable 81 milliGRAM(s) Oral daily  atorvastatin 40 milliGRAM(s) Oral at bedtime  clonazePAM Tablet 1 milliGRAM(s) Oral at bedtime  enoxaparin Injectable 40 milliGRAM(s) SubCutaneous daily  ergocalciferol 77068 Unit(s) Oral <User Schedule>  levothyroxine 75 MICROGram(s) Oral daily  metoprolol tartrate 12.5 milliGRAM(s) Oral two times a day  nortriptyline 50 milliGRAM(s) Oral at bedtime  topiramate 100 milliGRAM(s) Oral two times a day  traZODone 100 milliGRAM(s) Oral at bedtime  venlafaxine XR. 150 milliGRAM(s) Oral daily      PHYSICAL EXAM:  Vital Signs Last 24 Hrs  T(C): 37.1 (06 May 2019 11:30), Max: 37.3 (05 May 2019 15:00)  T(F): 98.7 (06 May 2019 11:30), Max: 99.2 (05 May 2019 15:00)  HR: 70 (06 May 2019 11:30) (57 - 73)  BP: 114/65 (06 May 2019 11:30) (93/57 - 150/86)  BP(mean): --  RR: 18 (06 May 2019 11:30) (17 - 18)  SpO2: 98% (06 May 2019 11:30) (98% - 100%)    GENERAL: NAD  HEAD:  Atraumatic, Normocephalic  EYES: EOMI, PERRLA, conjunctiva and sclera clear  ENT: moist mucous membranes  NECK: Supple, No JVD, Normal thyroid  NERVOUS SYSTEM:  Alert & Oriented X3, Good concentration; Motor Strength 5/5 B/L upper and lower extremities; DTRs 2+ intact and symmetric  CHEST/LUNG: No rales, rhonchi, wheezing, or rubs  HEART: Regular rate and rhythm; No murmurs, rubs, or gallops  ABDOMEN: Soft, Nontender, Nondistended; Bowel sounds present  EXTREMITIES:  2+ Peripheral Pulses, No clubbing, cyanosis, or edema  SKIN: No rashes or lesions    LABS:                        11.3   3.96  )-----------( 303      ( 06 May 2019 07:08 )             35.1     05-06    140  |  110<H>  |  12  ----------------------------<  69<L>  3.4<L>   |  24  |  0.83    Ca    8.6      06 May 2019 07:08  Phos  4.2     05-06  Mg     2.3     05-06    TPro  7.9  /  Alb  4.2  /  TBili  0.4  /  DBili  x   /  AST  32  /  ALT  20  /  AlkPhos  73  05-04    LIVER FUNCTIONS - ( 04 May 2019 18:17 )  Alb: 4.2 g/dL / Pro: 7.9 g/dL / ALK PHOS: 73 U/L / ALT: 20 U/L DA / AST: 32 U/L / GGT: x               CARDIAC MARKERS ( 05 May 2019 07:18 )  <0.015 ng/mL / x     / 113 U/L / x     / <1.0 ng/mL  CARDIAC MARKERS ( 04 May 2019 22:29 )  <0.015 ng/mL / x     / x     / x     / x      CARDIAC MARKERS ( 04 May 2019 18:17 )  <0.015 ng/mL / x     / x     / x     / x          Urinalysis Basic - ( 05 May 2019 03:08 )    Color: Yellow / Appearance: Clear / S.005 / pH: x  Gluc: x / Ketone: Negative  / Bili: Negative / Urobili: Negative   Blood: x / Protein: Negative / Nitrite: Negative   Leuk Esterase: Trace / RBC: 0-2 /HPF / WBC 3-5 /HPF   Sq Epi: x / Non Sq Epi: Moderate /HPF / Bacteria: Few /HPF      Culture & Sensitivity:   CAPILLARY BLOOD GLUCOSE            RADIOLOGY & ADDITIONAL TESTS:  Radiology testing independently reviewed:     Consultant(s) Notes Reviewed:  [ x] YES  [ ] NO    Care Discussed with Consultants/Other Providers [ x] YES  [ ] NO

## 2019-05-06 NOTE — PROGRESS NOTE ADULT - ASSESSMENT
52 y/o F patient w/ PMHx asthma, thyroid disease, fibromyalgia, CVA w/ right handed weakness, and lupus and a significant PSHx of L knee surgery on 4/26/2019 presents to the ED w/ progressive chronic shortness of breath and intermittent CP that have been worsening for the past 3 days. CP when occurs usually over L breast, sharp/dull in quality, 9/10 in severity, intermittent, non-radiating. SOB has been worsening to the point where pt can only walk for 1-2 blocks before having to sit down to rest. Pt also reports 3 pillow orthopnea.     Started having progressive SOB worsened with activities and when lying flat since 2010 with intermittent chest pain not related to activities since 2010. Presented to hospital in 2011 for evaluation as  symptoms significantly worsened. TTE and stress test done, results were "abnormal." Angiography was performed, found blockage of a "small coronary artery", but no stent placement was done per her cardiologist. She was started on ASA-81 and statin and has been following up with her cardiologist at [facility] on a regular basis, last cardiologist f/u 1 month ago when stress test done for worsening SOB and CP, but test could not be completed because of CP.     On a note, pt admitted recently to [facility] on past February for dizziness, likely from BPPV, at the time showed anemia with Hb in 10s. Pt had L meniscectomy on 4/26/2019 and currently c/o L knee pain, controlled with pain medications. Patient denies fever, chills, NS, N/V, abdominal pain, or palpitations. In ED, vitals are stable. Labs are significant for Hb 10 (baseline Hb in 10s per last admission in February), Tr neg x2, Pro-BNP 21. CTA negative. US BL LE neg. Allergic to estrogen. No home O2 use.

## 2019-05-06 NOTE — DISCHARGE NOTE PROVIDER - HOSPITAL COURSE
54 y/o F patient w/ PMHx asthma, thyroid disease, fibromyalgia, CVA w/ right handed weakness, and lupus and a significant PSHx of L knee surgery on 4/26/2019 presents to the ED w/ progressive chronic shortness of breath and intermittent CP that have been worsening for the past 3 days. CP when occurs usually over L breast, sharp/dull in quality, 9/10 in severity, intermittent, non-radiating. SOB has been worsening to the point where pt can only walk for 1-2 blocks before having to sit down to rest. Pt also reports 3 pillow orthopnea.         Started having progressive SOB worsened with activities and when lying flat since 2010 with intermittent chest pain not related to activities since 2010. Presented to hospital in 2011 for evaluation as  symptoms significantly worsened. TTE and stress test done, results were "abnormal." Angiography was performed, found blockage of a "small coronary artery", but no stent placement was done per her cardiologist. She was started on ASA-81 and statin and has been following up with her cardiologist at [facility] on a regular basis, last cardiologist f/u 1 month ago when stress test done for worsening SOB and CP, but test could not be completed because of CP.         On a note, pt admitted recently to [facility] on past February for dizziness, likely from BPPV, at the time showed anemia with Hb in 10s. Pt had L meniscectomy on 4/26/2019 and currently c/o L knee pain, controlled with pain medications. Patient denies fever, chills, NS, N/V, abdominal pain, or palpitations. In ED, vitals are stable. Labs are significant for Hb 10 (baseline Hb in 10s per last admission in February), Tr neg x2, Pro-BNP 21. CTA negative. US BL LE neg. Allergic to estrogen. No home O2 use.        ****Pt brought most of meds she is taking at home but not all. Pt gets her meds from [facility] [phone]. Primary team to please call the pharmacy to complete med recs. 52 y/o F patient w/ PMHx asthma, thyroid disease, fibromyalgia, CVA w/ right handed weakness, and lupus and a significant PSHx of L knee surgery on 4/26/2019 presents to the ED w/ progressive chronic shortness of breath and intermittent CP that have been worsening for the past 3 days. CP when occurs usually over L breast, sharp/dull in quality, 9/10 in severity, intermittent, non-radiating. SOB has been worsening to the point where pt can only walk for 1-2 blocks before having to sit down to rest. Pt also reports 3 pillow orthopnea.         Started having progressive SOB worsened with activities and when lying flat since 2010 with intermittent chest pain not related to activities since 2010. Presented to hospital in 2011 for evaluation as  symptoms significantly worsened. TTE and stress test done, results were "abnormal." Angiography was performed, found blockage of a "small coronary artery", but no stent placement was done per her cardiologist. She was started on ASA-81 and statin and has been following up with her cardiologist at [facility] on a regular basis, last cardiologist f/u 1 month ago when stress test done for worsening SOB and CP, but test could not be completed because of CP.         On a note, pt admitted recently to [facility] on past February for dizziness, likely from BPPV, at the time showed anemia with Hb in 10s. Pt had L meniscectomy on 4/26/2019 and currently c/o L knee pain, controlled with pain medications. Patient denies fever, chills, NS, N/V, abdominal pain, or palpitations. In ED, vitals are stable. Labs are significant for Hb 10 (baseline Hb in 10s per last admission in February), Tr neg x2, Pro-BNP 21. CTA negative. US BL LE neg. Allergic to estrogen. No home O2 use.        Admitted for cp r/o acs. Seen by cardiology. An echo and NST in Oct 2018 here at  were unremarkable. In addition she had a cath at [facility] in 2011 which she says showed minimal non-obstructive CAD. CTA unremarkable for PE. Here there are no new EKG changes (just old RBBB) and cardiac markers are negative. Given her non anginal sxs (the pain is not associated with exertion) and no interval changes in echo no need for repeat ischemic  w/u.    She has ongoing dyspepsia for which she is advised outpatient follow up with dr. Valderrama- already scheduled on 5/9/2019.         Discharge home with outpatient follow up and resumption of hha.

## 2019-05-06 NOTE — DISCHARGE NOTE PROVIDER - NSDCCPCAREPLAN_GEN_ALL_CORE_FT
PRINCIPAL DISCHARGE DIAGNOSIS  Diagnosis: Other chest pain  Assessment and Plan of Treatment: likely gastric related. NO cardiac work up indicated.  Follow up wt dr. Valderrama as previously scheduled on 5/9/2019.      SECONDARY DISCHARGE DIAGNOSES  Diagnosis: Depression  Assessment and Plan of Treatment: continue home medications. Follow up with your primary care doctor in 1 week. PRINCIPAL DISCHARGE DIAGNOSIS  Diagnosis: Other chest pain  Assessment and Plan of Treatment: likely gastric related. NO cardiac work up indicated.  Follow up [facility] dr. Valderrama as previously scheduled on 5/9/2019.      SECONDARY DISCHARGE DIAGNOSES  Diagnosis: Vitamin D deficiency  Assessment and Plan of Treatment: your vitamin D level are low. Supplementation provided.    Diagnosis: Depression  Assessment and Plan of Treatment: continue home medications. Follow up with your primary care doctor in 1 week.

## 2019-05-06 NOTE — PROGRESS NOTE ADULT - SUBJECTIVE AND OBJECTIVE BOX
Patient denies chest pain or shortness of breath.   Review of systems otherwise (-)  	  MEDICATIONS:  MEDICATIONS  (STANDING):  aspirin  chewable 81 milliGRAM(s) Oral daily  atorvastatin 40 milliGRAM(s) Oral at bedtime  clonazePAM Tablet 1 milliGRAM(s) Oral at bedtime  enoxaparin Injectable 40 milliGRAM(s) SubCutaneous daily  ergocalciferol 05686 Unit(s) Oral <User Schedule>  levothyroxine 75 MICROGram(s) Oral daily  metoprolol tartrate 12.5 milliGRAM(s) Oral two times a day  nortriptyline 50 milliGRAM(s) Oral at bedtime  topiramate 100 milliGRAM(s) Oral two times a day  traZODone 100 milliGRAM(s) Oral at bedtime  venlafaxine XR. 150 milliGRAM(s) Oral daily      LABS:	 	    CARDIAC MARKERS:  CARDIAC MARKERS ( 05 May 2019 07:18 )  <0.015 ng/mL / x     / 113 U/L / x     / <1.0 ng/mL  CARDIAC MARKERS ( 04 May 2019 22:29 )  <0.015 ng/mL / x     / x     / x     / x      CARDIAC MARKERS ( 04 May 2019 18:17 )  <0.015 ng/mL / x     / x     / x     / x                                    11.3   3.96  )-----------( 303      ( 06 May 2019 07:08 )             35.1     Hemoglobin: 11.3 g/dL (05-06 @ 07:08)  Hemoglobin: 10.0 g/dL (05-05 @ 07:18)  Hemoglobin: 10.0 g/dL (05-04 @ 18:17)      05-06    140  |  110<H>  |  12  ----------------------------<  69<L>  3.4<L>   |  24  |  0.83    Ca    8.6      06 May 2019 07:08  Phos  4.2     05-06  Mg     2.3     05-06    TPro  7.9  /  Alb  4.2  /  TBili  0.4  /  DBili  x   /  AST  32  /  ALT  20  /  AlkPhos  73  05-04    Creatinine Trend: 0.83<--, 0.90<--, 0.90<--      PHYSICAL EXAM:  T(C): 37.1 (05-06-19 @ 11:30), Max: 37.3 (05-05-19 @ 15:00)  HR: 70 (05-06-19 @ 11:30) (57 - 73)  BP: 114/65 (05-06-19 @ 11:30) (93/57 - 150/86)  RR: 18 (05-06-19 @ 11:30) (17 - 18)  SpO2: 98% (05-06-19 @ 11:30) (98% - 100%)  Wt(kg): --  I&O's Summary        Gen: Appears well in NAD  HEENT:  (-)icterus (-)pallor  CV: N S1 S2 1/6 VINITA (+)2 Pulses B/l  Resp:  Clear to ausculatation B/L, normal effort  GI: (+) BS Soft, NT, ND  Lymph:  (-)Edema, (-)obvious lymphadenopathy  Skin: Warm to touch, Normal turgor  Psych: Appropriate mood and affect      TELEMETRY: 	  sinus        ASSESSMENT/PLAN: 	53y  Female PMH fibromyalgia,  lupus and prior stroke with recurrent admissions for nonanginal chest pain. An echo and NST in Oct 2018 here at  were unremarkable. In addition she had a cath at [facility] in 2011 which she says showed minimal non-obstructive CAD. CTA unremarkable for PE. Here there are no new EKG changes (just old RBBB) and cardiac markers are negative.      - Given her non anginal sxs (the pain is not associuated with exertion) and no interval changes in echo no need for repeat ischemic  w/u  - Consider GI eval given heart burn Like sxs. She is known to Dr. Valderrama  - misael D/C tele    Shai Muñoz MD, [facility]  BEEPER [phone]

## 2019-05-06 NOTE — DISCHARGE NOTE PROVIDER - PROVIDER TOKENS
PROVIDER:[TOKEN:[[id number]]],FREE:[LAST:[stover],FIRST:[pati],PHONE:[(   )    -],FAX:[(   )    -],ADDRESS:[Address: [address] , [place] [address]    Phone: [phone]]]

## 2019-05-06 NOTE — PROGRESS NOTE ADULT - PROBLEM SELECTOR PLAN 1
atypical chest pain, radiating to the back, had history of gastric ulcer and follows with dr. Valderrama, scheduled to see him on May 9th.   Discussed with Dr. Muñoz, pain is atyical, patient had recent stress test which was negative, had a cath in 2011, pertinent for non obstructive CAD.   Cleared from cardio stand point.   Discussed with GI, recommending outpatient f.u

## 2019-05-07 ENCOUNTER — OTHER (OUTPATIENT)
Age: 54
End: 2019-05-07

## 2019-05-09 ENCOUNTER — APPOINTMENT (OUTPATIENT)
Dept: GASTROENTEROLOGY | Facility: CLINIC | Age: 54
End: 2019-05-09
Payer: MEDICARE

## 2019-05-09 VITALS
HEIGHT: 59 IN | OXYGEN SATURATION: 100 % | WEIGHT: 139 LBS | DIASTOLIC BLOOD PRESSURE: 84 MMHG | BODY MASS INDEX: 28.02 KG/M2 | HEART RATE: 99 BPM | TEMPERATURE: 98.4 F | RESPIRATION RATE: 14 BRPM | SYSTOLIC BLOOD PRESSURE: 129 MMHG

## 2019-05-09 PROCEDURE — 99215 OFFICE O/P EST HI 40 MIN: CPT

## 2019-05-09 NOTE — ASSESSMENT
[FreeTextEntry1] : 53 year old female with IBS-M- functional dyspepsia, now with atypical chest pain. Likely an extension of her functional dyspepsia. Will rule out an esophageal dysfunction with dynamic testing such  as esophagram. Consider manometry pending esophagram.  PPI daily \par Call for results.

## 2019-05-09 NOTE — PHYSICAL EXAM
[General Appearance - Alert] : alert [General Appearance - In No Acute Distress] : in no acute distress [Sclera] : the sclera and conjunctiva were normal [PERRL With Normal Accommodation] : pupils were equal in size, round, and reactive to light [Extraocular Movements] : extraocular movements were intact [Outer Ear] : the ears and nose were normal in appearance [Oropharynx] : the oropharynx was normal [Neck Appearance] : the appearance of the neck was normal [Neck Cervical Mass (___cm)] : no neck mass was observed [Thyroid Diffuse Enlargement] : the thyroid was not enlarged [Jugular Venous Distention Increased] : there was no jugular-venous distention [Thyroid Nodule] : there were no palpable thyroid nodules [Auscultation Breath Sounds / Voice Sounds] : lungs were clear to auscultation bilaterally [Heart Rate And Rhythm] : heart rate was normal and rhythm regular [Heart Sounds] : normal S1 and S2 [Murmurs] : no murmurs [Heart Sounds Gallop] : no gallops [Bowel Sounds] : normal bowel sounds [Heart Sounds Pericardial Friction Rub] : no pericardial rub [Abdomen Soft] : soft [Abdomen Tenderness] : non-tender [Abdomen Mass (___ Cm)] : no abdominal mass palpated [Cervical Lymph Nodes Enlarged Posterior Bilaterally] : posterior cervical [Cervical Lymph Nodes Enlarged Anterior Bilaterally] : anterior cervical [No CVA Tenderness] : no ~M costovertebral angle tenderness [Supraclavicular Lymph Nodes Enlarged Bilaterally] : supraclavicular [Nail Clubbing] : no clubbing  or cyanosis of the fingernails [] : no rash [Sensation] : the sensory exam was normal to light touch and pinprick [Impaired Insight] : insight and judgment were intact [Oriented To Time, Place, And Person] : oriented to person, place, and time [Affect] : the affect was normal

## 2019-05-09 NOTE — HISTORY OF PRESENT ILLNESS
[de-identified] : 53 year old female presents for follow up. She was recently hospitalized for atypical chest pain. Cardiac etiology was ruled out. She has had a wide range of workup up done by me including CT scans, EGD and lab workup. She also complains of nausea and episodes of vomiting when waking in the morning.

## 2019-05-12 RX ORDER — BUTALBITAL, ACETAMINOPHEN AND CAFFEINE 325; 50; 40 MG/1; MG/1; MG/1
50-325-40 TABLET ORAL
Qty: 5 | Refills: 0 | Status: DISCONTINUED | COMMUNITY
Start: 2018-11-14

## 2019-05-12 RX ORDER — LEVOTHYROXINE SODIUM 0.05 MG/1
50 TABLET ORAL
Qty: 30 | Refills: 0 | Status: DISCONTINUED | COMMUNITY
Start: 2018-10-30

## 2019-05-12 RX ORDER — PROCHLORPERAZINE MALEATE 10 MG/1
10 TABLET ORAL
Qty: 30 | Refills: 0 | Status: DISCONTINUED | COMMUNITY
Start: 2019-01-16

## 2019-05-12 RX ORDER — CLONAZEPAM 1 MG/1
1 TABLET ORAL
Qty: 60 | Refills: 0 | Status: DISCONTINUED | COMMUNITY
Start: 2019-01-15

## 2019-05-14 ENCOUNTER — APPOINTMENT (OUTPATIENT)
Dept: INTERNAL MEDICINE | Facility: CLINIC | Age: 54
End: 2019-05-14

## 2019-05-15 ENCOUNTER — APPOINTMENT (OUTPATIENT)
Dept: ORTHOPEDIC SURGERY | Facility: CLINIC | Age: 54
End: 2019-05-15
Payer: MEDICARE

## 2019-05-15 PROCEDURE — 99024 POSTOP FOLLOW-UP VISIT: CPT

## 2019-05-15 NOTE — HISTORY OF PRESENT ILLNESS
[de-identified] : Patient is status post Left knee Arthroscopy on 3-26-19\par Lateral meniscus meniscectomy and corticosteroid injection\par \par The patient is doing well with improved pain postoperatively, is not taking narcotics for pain. Pain is resolved. She now has soreness in the suprapatellar region and in the medial aspect of the knee when it rains and cold.\par They have been doing postoperative icing, elevation, compressive dressing and exercises as directed with improving swelling, pain and motion.\par They are taking ASA as directed for postoperative DVT ppx\par \par The patient denies shortness of breath, chest pain, numbness tingling, worsening calf pain or swelling.\par \par Left Lower Ext\par \par Incisions are intact and healed\par There is no erythema induration warmth or tenderness about the incisions\par There is no effusion\par Knee ROM is from [ 0 - 120 painless ]\par Motor is intact knee/ankle/toe flexor/extensor\par Sensory intact deep+superficial peroneal/posterior tibial/sural/saphenous\par Palpable DP with brisk capillary refill\par Mild quadriceps muscle weakness and decreased tone\par \par \par Assessment Plan\par 7 weeks status post Left knee Arthroscopy on 3-26-19\par Lateral meniscus meniscectomy and corticosteroid injection\par \par Continue WBAT LLE\par \par Begin Physical Therapy with Prescription and Protocol Provided\par Return to activities gradually as tolerated\par \par Surgery and arthroscopic images reviewed and provided for patient\par \par Follow up:\par After completion of physical therapy

## 2019-05-18 ENCOUNTER — APPOINTMENT (OUTPATIENT)
Dept: INTERNAL MEDICINE | Facility: CLINIC | Age: 54
End: 2019-05-18

## 2019-05-22 ENCOUNTER — APPOINTMENT (OUTPATIENT)
Dept: GASTROENTEROLOGY | Facility: CLINIC | Age: 54
End: 2019-05-22
Payer: MEDICARE

## 2019-05-22 VITALS
SYSTOLIC BLOOD PRESSURE: 112 MMHG | WEIGHT: 139 LBS | OXYGEN SATURATION: 99 % | HEIGHT: 59 IN | BODY MASS INDEX: 28.02 KG/M2 | HEART RATE: 95 BPM | TEMPERATURE: 98.3 F | DIASTOLIC BLOOD PRESSURE: 77 MMHG

## 2019-05-22 PROCEDURE — 99214 OFFICE O/P EST MOD 30 MIN: CPT

## 2019-05-22 PROCEDURE — 99204 OFFICE O/P NEW MOD 45 MIN: CPT

## 2019-05-22 NOTE — ASSESSMENT
[FreeTextEntry1] : Abdominal Pain: The patient complains of abdominal pain. The patient is to avoid nonsteroidal anti-inflammatory drugs and aspirin. I recommend a trial of Pantoprazole 40 mg once a day for 3 months for the symptoms.  \par Dyspepsia: The patient complains of dyspeptic symptoms.  The patient was advised to abide by an anti-gas diet.  The patient was given a pamphlet for anti-gas.  The patient and I reviewed the anti-gas diet at length. The patient is to start on a trial of Phazyme one tablet 3 times a day p.r.n. abdominal pain and gas.\par GERD: The patient was advised to avoid late-night meals and dietary indiscretions.  The patient was advised to avoid fried and fatty foods.  The patient was advised to abide by an anti-GERD diet. The patient was given a pamphlet for anti-GERD.  The patient and I reviewed the anti-GERD diet at length. I recommend a trial of Pantoprazole 40 mg once a day x 3 months for the symptoms.\par Dysphagia: The patient complains of dysphagia of unclear etiology.   The dysphagia is worse with meals but not with liquids.   If the symptoms persist despite medications and if the upper endoscopy is unremarkable, the patient may require motility studies to assess the etiology of the dysphagia.  The patient agrees and will follow-up for further work-up and treatment.\par Diarrhea: I recommend a low residue diet. The patient is to avoid fiber supplementation. The patient is to consider a trial of a probiotic such as Align once a day.  I recommend a colonoscopy to assess the diarrhea to rule out colitis. The patient was told of the risks and benefits of the procedure. The patient was told of the risks of perforation, emergency surgery, bleeding, infections and missed lesions. The patient agreed and will followup with a colonoscopy.\par Atypical Chest Pain: The patient complains of atypical chest pain of unclear etiology.  The patient was advised to follow up with the PMD regarding evaluation for the atypical chest pain. The patient was told of possible etiologies such as cardiac, pulmonary, GI, musculoskeletal, stress and other causes for the atypical chest pain.  The patient agrees and will follow-up with the PMD. \par Nausea: The patient complains of nausea. If the symptoms of nausea persists, the patient may require a trial of Zofran 8 mg twice a day. \par Occult Blood in the Stool: The patient was found to have occult blood in the stool on physical exam.  The patient denies any bright red blood per rectum, melena or hematemesis.  The patient was found to be guaiac-positive by her PMD.  I recommend an upper endoscopy and colonoscopy to assess the occult blood in the stool.  The patient was told of the risks and benefits of the procedure.  The patient was told of the risks of perforation, emergency surgery, bleeding, infections and missed lesions.  The patient agreed and will schedule for the procedure. The patient is to be n.p.o. after midnight and bowel prep was given.  The patient is to return for the procedure.  \par Follow-up: The patient is to follow-up in the office in 2 to 3 weeks to reassess the symptoms. The patient was told to call the office if any further problems.\par \par \par \par \par \par

## 2019-05-22 NOTE — HISTORY OF PRESENT ILLNESS
[Yellow Skin Or Eyes (Jaundice)] : denies jaundice [Rectal Pain] : denies rectal pain [Heartburn] : heartburn [Nausea] : nausea [Vomiting] : vomiting [Diarrhea] : diarrhea [Constipation] : constipation [Abdominal Pain] : abdominal pain [Abdominal Swelling] : abdominal swelling [GERD] : gastroesophageal reflux disease [Hiatus Hernia] : no hiatus hernia [Peptic Ulcer Disease] : no peptic ulcer disease [Pancreatitis] : no pancreatitis [Cholelithiasis] : no cholelithiasis [Kidney Stone] : no kidney stone [Inflammatory Bowel Disease] : no inflammatory bowel disease [Irritable Bowel Syndrome] : no irritable bowel syndrome [Diverticulitis] : no diverticulitis [Alcohol Abuse] : no alcohol abuse [Malignancy] : no malignancy [Abdominal Surgery] : no abdominal surgery [Appendectomy] : no appendectomy [Cholecystectomy] : no cholecystectomy [de-identified] : The patient is a 53-year-old  female with past medical history significant for Lupus, fibromyalgia, depression, hypertension, hypercholesterolemia, hyporthyroidism who was referred to my office by Dr. Roe Sales for abdominal pain, dyspepsia, gastroesophageal reflux disease, dysphagia, atypical chest pain, nausea/vomiting. The patient also admits to having  diarrhea/constipation, change in bowel habits and change in caliber of stool. I was asked to render an opinion for consultation for the above complaints.   The patient states that she is feeling uncomfortable.  The patient was recently hospitalized for atypical chest pain. The cardiac etiology was ruled out. She has had a wide range of workup up performed by Dr. Valderrama that  included CT scans, EGD and lab workup. The patient complains of abdominal pain.  The patient describes the abdominal pain as a sharp, crampy, constant midepigastric discomfort that radiates to the back.  The abdominal pain is unrelated to passing gas or having bowel movements.  The abdominal pain is worse with meals.  The abdominal pain is described as being mild to moderate in nature.  The abdominal pain occurs at night and in the morning.  The abdominal pain can occur at any time.   The abdominal pain has awakened the patient from sleep.  The abdominal pain is not relieved with any medications.  The abdominal pain is associated with abdominal gas and bloating.  The patient complains of nausea and vomiting.  The patient complains of gastroesophageal reflux disease and dysphagia. The dysphagia is worse with solids but unrelated to liquids. The gastroesophageal reflux disease is worse after meals and late at night and in the early morning. The gastroesophageal reflux disease is not improved with proton pump inhibitors, H2 blockers and antacids.  The patient complains of atypical chest pain but denies any shortness of breath or palpitations.  The chest pain is described as a sharp, crampy, burning, pressure, intermittent constant substernal discomfort that is nonradiating in nature that occasionally radiates to the back.  The patient admits to occasional episodes of diaphoresis.  The chest pain is described as being 9 out of 10 in intensity.  The chest pain can occur at any time.  The chest pain is worse at night and early morning.  The chest pain is worse after meals.  The chest pain is unrelated to passing gas.  The chest pain awakened the patient from sleep.  The patient complains of alternating diarrhea/constipation.  The patient has 1 to 4 bowel movements a day.  The patient complains of a change in bowel habits.  The patient complains of a change in caliber of stool.   The diarrhea is described as soft to watery in nature.  The patient admits to having mucus discharge with the bowel movements.  The patient denies any bright red blood per rectum, melena or hematemesis.  The patient was found to have occult blood in the stool.    The patient denies any rectal pain or rectal pruritus. The patient complains of anorexia but denies any weight loss.  She complains of chills but denies any fevers.  The patient complains of pruritus but denies any jaundice.  The patient complains of chronic lower back pain.  The patient admits to having a prior upper endoscopy and colonoscopy performed by another gastroenterologist.  The upper endoscopic findings were unknown to the patient.  The colonoscopic findings revealed a poor prep colonoscopy.  The patient's last menstrual period was age 41.  The patient is a .  The patient's first menstrual period was at age 7. The patient admits to a family history of GI problems.  The patient’s brother and father had a history of bleeding peptic ulcer disease.

## 2019-05-22 NOTE — REVIEW OF SYSTEMS
[Feeling Poorly] : feeling poorly [Feeling Tired] : feeling tired [Eyesight Problems] : eyesight problems [Earache] : earache [Chest Pain] : chest pain [Shortness Of Breath] : shortness of breath [Cough] : cough [SOB on Exertion] : shortness of breath during exertion [Heartburn] : heartburn [Sleep Disturbances] : sleep disturbances [Anxiety] : anxiety [Depression] : depression [Feelings Of Weakness] : feelings of weakness

## 2019-05-23 ENCOUNTER — INBOUND DOCUMENT (OUTPATIENT)
Age: 54
End: 2019-05-23

## 2019-05-23 LAB — HEMOCCULT STL QL: NEGATIVE

## 2019-06-12 ENCOUNTER — RX RENEWAL (OUTPATIENT)
Age: 54
End: 2019-06-12

## 2019-06-12 LAB
ALBUMIN SERPL ELPH-MCNC: 4.9 G/DL
ALP BLD-CCNC: 72 U/L
ALT SERPL-CCNC: 13 U/L
ANION GAP SERPL CALC-SCNC: 11 MMOL/L
AST SERPL-CCNC: 14 U/L
BASOPHILS # BLD AUTO: 0.07 K/UL
BASOPHILS NFR BLD AUTO: 1.6 %
BILIRUB SERPL-MCNC: 0.2 MG/DL
BUN SERPL-MCNC: 14 MG/DL
CALCIUM SERPL-MCNC: 9.4 MG/DL
CHLORIDE SERPL-SCNC: 106 MMOL/L
CHOLEST SERPL-MCNC: 224 MG/DL
CHOLEST/HDLC SERPL: 4.7 RATIO
CO2 SERPL-SCNC: 24 MMOL/L
CREAT SERPL-MCNC: 0.91 MG/DL
EOSINOPHIL # BLD AUTO: 0.08 K/UL
EOSINOPHIL NFR BLD AUTO: 1.8 %
ESTIMATED AVERAGE GLUCOSE: 108 MG/DL
GGT SERPL-CCNC: 32 U/L
GLUCOSE SERPL-MCNC: 84 MG/DL
HBA1C MFR BLD HPLC: 5.4 %
HCT VFR BLD CALC: 33.7 %
HDLC SERPL-MCNC: 48 MG/DL
HGB BLD-MCNC: 10.4 G/DL
IMM GRANULOCYTES NFR BLD AUTO: 0.2 %
LDLC SERPL CALC-MCNC: 144 MG/DL
LYMPHOCYTES # BLD AUTO: 1.4 K/UL
LYMPHOCYTES NFR BLD AUTO: 32 %
MAN DIFF?: NORMAL
MCHC RBC-ENTMCNC: 30.9 GM/DL
MCHC RBC-ENTMCNC: 31.5 PG
MCV RBC AUTO: 102.1 FL
MONOCYTES # BLD AUTO: 0.36 K/UL
MONOCYTES NFR BLD AUTO: 8.2 %
NEUTROPHILS # BLD AUTO: 2.45 K/UL
NEUTROPHILS NFR BLD AUTO: 56.2 %
PLATELET # BLD AUTO: 363 K/UL
POTASSIUM SERPL-SCNC: 4.5 MMOL/L
PROT SERPL-MCNC: 7.7 G/DL
RBC # BLD: 3.3 M/UL
RBC # FLD: 12.1 %
SODIUM SERPL-SCNC: 141 MMOL/L
T3 SERPL-MCNC: 50 NG/DL
T4 FREE SERPL-MCNC: 0.9 NG/DL
TRIGL SERPL-MCNC: 160 MG/DL
TSH SERPL-ACNC: 16 UIU/ML
WBC # FLD AUTO: 4.37 K/UL

## 2019-06-12 RX ORDER — PANTOPRAZOLE 20 MG/1
20 TABLET, DELAYED RELEASE ORAL DAILY
Qty: 30 | Refills: 3 | Status: DISCONTINUED | COMMUNITY
Start: 2019-05-09 | End: 2019-06-12

## 2019-06-18 ENCOUNTER — APPOINTMENT (OUTPATIENT)
Dept: INTERNAL MEDICINE | Facility: CLINIC | Age: 54
End: 2019-06-18
Payer: MEDICARE

## 2019-06-18 VITALS
TEMPERATURE: 98.9 F | SYSTOLIC BLOOD PRESSURE: 120 MMHG | RESPIRATION RATE: 16 BRPM | DIASTOLIC BLOOD PRESSURE: 80 MMHG | HEIGHT: 59 IN | HEART RATE: 107 BPM | OXYGEN SATURATION: 97 % | BODY MASS INDEX: 28.63 KG/M2 | WEIGHT: 142 LBS

## 2019-06-18 PROCEDURE — 99214 OFFICE O/P EST MOD 30 MIN: CPT

## 2019-06-18 RX ORDER — PANTOPRAZOLE 40 MG/1
40 TABLET, DELAYED RELEASE ORAL DAILY
Qty: 30 | Refills: 3 | Status: DISCONTINUED | COMMUNITY
Start: 2019-05-22 | End: 2019-06-18

## 2019-06-18 RX ORDER — DICLOFENAC SODIUM 50 MG/1
50 TABLET, DELAYED RELEASE ORAL
Qty: 30 | Refills: 0 | Status: DISCONTINUED | COMMUNITY
Start: 2019-01-28 | End: 2019-06-18

## 2019-06-18 NOTE — HISTORY OF PRESENT ILLNESS
[FreeTextEntry2] : As documented in a message section.\par \par 54 year old  female patient with history of stable , history as stated, presented for follow up examination after hospital discharge. Patient is compliant with all medications. Denies shortness of breath, chest pain or abdominal pains at this time. ROS as stated.\par

## 2019-06-18 NOTE — ASSESSMENT
[FreeTextEntry1] : 54 year old female found to have stable Hypertension, Hypothyroidism, Hypercholesterolemia with Hypertriglyceridemia, CVA, Insomnia, Migraine, improving, stable GERD,with the current regimen, diet and life style modifications, as counseled. Prior results reviewed and discussed with the patient during today's examination. Plan as ordered.\par Patient was recently hospitalized, findings and recommendations reviewed and discussed with the patient during today's examination.\par

## 2019-06-18 NOTE — HEALTH RISK ASSESSMENT
[] : No [de-identified] : GI [[id number]] : 0 [de-identified] : As documented in a message section.

## 2019-06-18 NOTE — PHYSICAL EXAM
[Normal Outer Ear/Nose] : the outer ears and nose were normal in appearance [Normal Oropharynx] : the oropharynx was normal [Normal TMs] : both tympanic membranes were normal

## 2019-06-24 ENCOUNTER — APPOINTMENT (OUTPATIENT)
Dept: ORTHOPEDIC SURGERY | Facility: CLINIC | Age: 54
End: 2019-06-24
Payer: MEDICARE

## 2019-06-24 PROCEDURE — 99214 OFFICE O/P EST MOD 30 MIN: CPT | Mod: 24

## 2019-06-24 PROCEDURE — 73564 X-RAY EXAM KNEE 4 OR MORE: CPT | Mod: RT

## 2019-06-24 NOTE — PHYSICAL EXAM
[de-identified] : Physical Examination\par General: well nourished, in no acute distress, alert and oriented to person, place and time\par Psychiatric: normal mood and affect, no abnormal movements or speech patterns\par Eyes: vision intact + glasses\par Throat: no thyromegaly\par Lymph: no enlarged nodes, no lymphedema in extremity\par Respiratory: no wheezing, no shortness of breath with ambulation\par Cardiac: no cardiac leg swelling, 2+ peripheral pulses\par Neurology: normal gross sensation in extremities to light touch\par Abdomen: soft, non-tender, tympanic, no masses\par \par Musculoskeletal Examination\par Ambulation	+ antalgic gait, - assistive devices\par \par Knee			Right			Left\par General\par      Swelling/Deformity	normal			normal	\par      Skin			normal			healed incisions\par      Erythema		-			-\par      Standing Alignment	neutral			neutral\par      Effusion		none			none\par Range of Motion\par      Hip			full painless ROM		full painless ROM\par      Knee Flexion		130			130\par      Knee Extension	0			0\par Patella\par      J Sign		-			-\par      Quad Medial/Lateral	1/1 1/1\par      Apprehension		-			-\par      Gualberto's		-			+\par      Grind Sign		-			+\par      Crepitus		-			+\par Palpation\par      Medial Joint Line	++			-\par      Medial Fem Condyle	-			-\par      Lateral Joint Line	-			-\par      Quad Tendon		-			-\par      Patella Tendon	-			-\par      Medial Patella		-			-\par      Lateral Patella 	-			-\par      Posterior Knee	-			-\par Ligamentous\par      Varus @ 0° / 30°	-/-			-/-\par      Valgus @ 0° / 30°	-/-			-/-\par      Lachman		-			-\par      Pivot Shift		-			-\par      Anterior Drawer	-			-\par      Posterior Drawer	-			-\par Meniscus\par      Julian		+ medial pain			-\par      Flexion Pinch		+ posterior pain			-\par Strength Examination/Atrophy\par      Hip Flexors 		5+			5+\par      Quadriceps		5+			5+\par      Hamstring		5+			5+\par      Tibialis Anterior	5+			5+\par      Achilles/Soleus	5+			5+\par Sensation\par      Deep Peroneal	normal			normal\par      Superficial Peroneal 	normal			normal\par      Sural  		normal			normal\par      Posterior Tibial 	normal			normal\par      Saphneous 		normal			normal\par Pulses\par      DP			2+			2+\par  [de-identified] : 5 views of the affected left knee (standing AP, flexing standing AP, 30degree flexed lateral, 0degree lateral, sunrise view)\par \par demonstrate:\par There is mild lateral weight very asymmetric narrowing\par Trace osteophytic lipping\par Trace suprapatellar effusion\par No patellofemoral joint space loss without evidence of tilt [or] subluxation on sunrise view\par Normal soft tissue density\par Otherwise normal osseous bone structure without fracture or dislocation\par \par \par MRI Left knee from [facility] on 2-16-19\par My impression of the images:\par Quality of the MRI is good\par Medial Meniscus mild degenerative tear\par Lateral Meniscus flipped body/anterior horn tear\par There is mild to moderate chondral loss in the trial compartments\par There is [Not] bone marrow edema[/subchondral cysts] in the tricompartments\par LCL is intact\par MCL is intact\par ACL is intact\par PCL is intact \par Quadriceps Tendon is intact\par Patella Tendon is intact\par \par The Final Radiologist Impression:\par Flap tear lateral meniscus body/anterior horn\par Tricompartmental degenerative arthritis most pronounced in the lateral tibiofemoral compartment.\par Zero point for similar lateral patellar position with respect to the trochlear groove, TT-TG distance approximately 1.6 cm\par Distal quadriceps, patellar and semimembranosus tendinopathy, popliteus tendinopathy.\par Evidence of remote prior MCL injury.\par Moderate joint effusion with popliteal cyst and edematous quadriceps fat pad\par \par 5 views of the affected Right knee (standing AP, flexing standing AP, 30degree flexed lateral, 0degree lateral, sunrise view)\par were ordered, obtained and evaluated by myself today and\par demonstrate:\par There is trace medial weightbearing asymmetric narrowing\par no osteophytic lipping\par Trace suprapatellar effusion\par no patellofemoral joint space loss without evidence of tilt [or] subluxation on sunrise view\par Normal soft tissue density\par Otherwise normal osseous bone structure without fracture or dislocation

## 2019-06-24 NOTE — DISCUSSION/SUMMARY
[de-identified] : 3 months status post Left knee Arthroscopy on 3-26-19\par Lateral meniscus meniscectomy and corticosteroid injection\par \par Right knee internal derangement\par \par left knee doing well\par \par in regards to the right knee I discussed my findings on history, exam and radiology.\par \par I reviewed the anatomy and function of the periarticular muscles and tendons, patella, ligaments, menisci and cartilage of the knee using models, images and diagrams. Given the current findings for the patient, I recommend proceeding with advanced imaging to better characterize and diagnose the problem to aid patient care, management and treatment guidance as I suspect an internal injury to the knee not diagnosed on non-diagnostic plain radiographs causing the patients symptoms and physical examination to help provide surgical management planning.\par \par Therefore given the patients continued symptoms despite 6 week home exercises, nsaids, activity modificaiton with continued pain affecting ADLs and inability to do activities limiting quality of life as well as locking and instability significant for patient falls potentially placing the patient at increased risk for exacerbating the injury or causing further injury to the knee, an examination and history consistent with injury to an internal knee derangement and a non-diagnostic radiograph I recommend obtaining an MRI to assess the knee's internal structures for preoperative planning. I discussed with the patient that I am ordering an MRI to assess the soft tissue structures in the joint such as ligaments and tendons, as well as to assess the cartilage and menisci as well as for any bony edema. The test will need insurance approval and will take place at a [facility] facility or outside facility. If the patient elects to obtain the MRI from an outside facility, the patient understands they have been instructed to bring in both the final radiologist read and a CD/DVD with the MRI images to allow review of the imaging test by myself during the follow up visit. I do not recommend obtaining an open standing MRI as the quality of the images is subpar and makes it difficult to diagnose intra-articular derangements and guide care discussion and decision making.\par \par The patient verifies their understanding the the visit, diagnosis and plan. They agree with the treatment plan and will contact the office with any questions or problems.\par \par FU after MRI is obtained

## 2019-06-24 NOTE — HISTORY OF PRESENT ILLNESS
[de-identified] : CC left knee postop right knee pain\par \par HPI 54 yo female left HD presents for acute onset of most 2 years of constant pain in the medial right knee. The pain is worse, and rated a 10 out of 10, described as throbbing, without radiation. Ice makes the pain better and walking especially upstairs makes the pain worse. The patient reports associated symptoms of pop click locking with instability causing falls swelling and weakness. Patient reports she walks unstably because she is so unstable. The patient - pain at night affecting sleep, and - similar pain previously.\par \par The patient has tried the following treatments:\par Activity modification	+\par Ice/Compression  	+\par Braces    		-\par Nsaids    		+ 6 weeks no improvement\par Physical Therapy 	+ 6weeks home exercises along with postop PT for left knee\par Cortisone Injection	-\par Visco Injection		-\par Arthroscopy		-\par Patient is status post Left knee Arthroscopy on 3-26-19\par Lateral meniscus meniscectomy and corticosteroid injection\par \par The patient is doing well with much improved pain. right knee worse\par \par \par Review of Systems is positive for the above musculoskeletal symptoms and is otherwise non-contributory for general, constitutional, psychiatric, neurologic, HEENT, cardiac, respiratory, gastrointestinal, reproductive, lymphatic, and dermatologic complaints.\par \par Consult by Dr Roe Sales\par \par

## 2019-07-18 ENCOUNTER — RESULT REVIEW (OUTPATIENT)
Age: 54
End: 2019-07-18

## 2019-07-18 ENCOUNTER — OUTPATIENT (OUTPATIENT)
Dept: OUTPATIENT SERVICES | Facility: HOSPITAL | Age: 54
LOS: 1 days | End: 2019-07-18
Payer: MEDICARE

## 2019-07-18 ENCOUNTER — APPOINTMENT (OUTPATIENT)
Dept: GASTROENTEROLOGY | Facility: HOSPITAL | Age: 54
End: 2019-07-18

## 2019-07-18 DIAGNOSIS — Z98.89 OTHER SPECIFIED POSTPROCEDURAL STATES: Chronic | ICD-10-CM

## 2019-07-18 DIAGNOSIS — Z98.890 OTHER SPECIFIED POSTPROCEDURAL STATES: Chronic | ICD-10-CM

## 2019-07-18 DIAGNOSIS — R19.7 DIARRHEA, UNSPECIFIED: ICD-10-CM

## 2019-07-18 DIAGNOSIS — K21.9 GASTRO-ESOPHAGEAL REFLUX DISEASE WITHOUT ESOPHAGITIS: ICD-10-CM

## 2019-07-18 DIAGNOSIS — D64.9 ANEMIA, UNSPECIFIED: ICD-10-CM

## 2019-07-18 DIAGNOSIS — Z98.51 TUBAL LIGATION STATUS: Chronic | ICD-10-CM

## 2019-07-18 PROCEDURE — 43239 EGD BIOPSY SINGLE/MULTIPLE: CPT

## 2019-07-18 PROCEDURE — 88305 TISSUE EXAM BY PATHOLOGIST: CPT

## 2019-07-18 PROCEDURE — 45378 DIAGNOSTIC COLONOSCOPY: CPT

## 2019-07-18 PROCEDURE — 88312 SPECIAL STAINS GROUP 1: CPT | Mod: 26

## 2019-07-18 PROCEDURE — 88312 SPECIAL STAINS GROUP 1: CPT

## 2019-07-18 PROCEDURE — 88305 TISSUE EXAM BY PATHOLOGIST: CPT | Mod: 26

## 2019-07-22 LAB — SURGICAL PATHOLOGY STUDY: SIGNIFICANT CHANGE UP

## 2019-07-24 ENCOUNTER — APPOINTMENT (OUTPATIENT)
Dept: GASTROENTEROLOGY | Facility: CLINIC | Age: 54
End: 2019-07-24
Payer: MEDICARE

## 2019-07-24 VITALS
WEIGHT: 142 LBS | HEIGHT: 59 IN | DIASTOLIC BLOOD PRESSURE: 88 MMHG | SYSTOLIC BLOOD PRESSURE: 135 MMHG | BODY MASS INDEX: 28.63 KG/M2 | TEMPERATURE: 99.1 F | OXYGEN SATURATION: 100 % | HEART RATE: 83 BPM

## 2019-07-24 PROCEDURE — 99213 OFFICE O/P EST LOW 20 MIN: CPT

## 2019-07-24 NOTE — ASSESSMENT
[FreeTextEntry1] : Abdominal Pain: The patient complains of abdominal pain. The patient is to avoid nonsteroidal anti-inflammatory drugs and aspirin.  I recommend a trial of Carafate 1 gram PO 4 times a day for 3 months for the symptoms.\par Dyspepsia: The patient complains of dyspeptic symptoms.  The patient was advised to abide by an anti-gas diet.  The patient was given a pamphlet for anti-gas.  The patient and I reviewed the anti-gas diet at length. The patient is to start on a trial of Phazyme one tablet 3 times a day p.r.n. abdominal pain and gas.\par GERD: The patient was advised to avoid late-night meals and dietary indiscretions.  The patient was advised to avoid fried and fatty foods.  The patient was advised to abide by an anti-GERD diet. The patient was given a pamphlet for anti-GERD.  The patient and I reviewed the anti-GERD diet at length. I recommend a trial of Carafate 1 gram 4 times a day x 3 months for the symptoms.\par Nausea: The patient complains of nausea. If the symptoms of nausea persists, the patient may require a trial of Zofran 8 mg twice a day. \par Constipation: The patient complains of constipation. I recommend a high-fiber diet. I recommend a trial of a probiotic such as Align once a day. I recommend a trial of Metamucil once a day for fiber supplementation. I recommend a trial of Linzess 145 mcg once a day for the constipation. If the symptoms persist, the patient may require a colonoscopy to assess the symptoms.  The patient was told of the risks and benefits of the procedure.  The patient was told of the risks of perforation, emergency surgery, bleeding, infections and missed lesions.  The patient agreed and will followup to reassess the symptoms.  \par Imaging Study: I recommend an imaging study to assess the symptoms. I recommend an abdominal and pelvic ultrasound to assess the liver parenchyma and for liver lesions.\par Hiatal Hernia:  The patient was advised to avoid late-night meals and dietary indiscretions.  The patient was advised to avoid fried and fatty foods.  The patient was advised to abide by an anti-GERD diet. The patient was given a pamphlet for anti-GERD.  The patient and I reviewed the anti-GERD diet at length. I recommend a trial of Pantoprazole 40 mg once a day for 3 months for the symptoms.\par \par Gastritis: The patient has a history of gastritis. The patient is to avoid nonsteroidal anti-inflammatory drugs and aspirin. I recommend a trial of pantoprazole 40 mg once a day for 3 months for the symptoms. \par Diverticulosis: I recommend a high-fiber diet and avoid seeds. The patient is to consider a trial of Metamucil once a day for fiber supplementation. The patient is to also consider a trial of a probiotic such as Align once a day. \par Internal Hemorrhoids: The patient is to be started on a trial of Anusol H. C. suppositories one per rectum nightly and Anusol HC2 .5% cream apply to affected area twice a day p.r.n. hemorrhoidal bleeding or pain. \par Poor Prep Colonoscopy: The patient had a recent poor prep colonoscopy. I recommend a repeat colonoscopy in < 6 months to 1 year to reassess for colonic polyps secondary to the poor prep.  The patient was told of the risks and benefits of the procedure.  The patient was told of the risks of perforation, emergency surgery, bleeding, infections and missed lesions. The patient agreed and will return for the procedure.\par Occult Blood in the Stool: The patient was found to have occult blood in the stool on physical exam. The patient denies any bright red blood per rectum, melena or hematemesis. The patient was found to be guaiac-positive by her PMD. \par Follow-up: The patient is to follow-up in the office in 2 to 3 weeks to reassess the symptoms. The patient was told to call the office if any further problems.\par \par \par \par

## 2019-07-24 NOTE — HISTORY OF PRESENT ILLNESS
[None] : had no significant interval events [Diarrhea] : denies diarrhea [Yellow Skin Or Eyes (Jaundice)] : denies jaundice [Rectal Pain] : denies rectal pain [Wt Gain ___ Lbs] : no recent weight gain [Wt Loss ___ Lbs] : no recent weight loss [Heartburn] : heartburn [Nausea] : nausea [Vomiting] : vomiting [Constipation] : constipation [Abdominal Pain] : abdominal pain [Abdominal Swelling] : abdominal swelling [GERD] : gastroesophageal reflux disease [Hiatus Hernia] : no hiatus hernia [Peptic Ulcer Disease] : no peptic ulcer disease [Pancreatitis] : no pancreatitis [Cholelithiasis] : no cholelithiasis [Kidney Stone] : no kidney stone [Inflammatory Bowel Disease] : no inflammatory bowel disease [Irritable Bowel Syndrome] : no irritable bowel syndrome [Diverticulitis] : no diverticulitis [Alcohol Abuse] : no alcohol abuse [Malignancy] : no malignancy [Abdominal Surgery] : no abdominal surgery [Appendectomy] : no appendectomy [Cholecystectomy] : no cholecystectomy [de-identified] : The patient states that she is feeling uncomfortable. The patient denies any jaundice or pruritus.  The patient complains of chronic lower back pain. The patient complains of abdominal pain.  The patient describes the abdominal pain as a crampy, intermittent diffuse abdominal discomfort that occasionally radiates to the back.  The abdominal pain is unrelated to passing gas or having bowel movements.  The abdominal pain is worse with meals.  The abdominal pain is described as being mild to moderate in nature.  The abdominal pain occurs at night and in the morning.  The abdominal pain can occur at any time.   The abdominal pain has awakened the patient from sleep.  The abdominal pain is not relieved with medication.  The abdominal pain is associated with abdominal gas and bloating.  The patient complains of nausea and vomiting.  The patient complains of gastroesophageal reflux disease but denies any dysphagia.  The gastroesophageal reflux disease is worse after meals and late at night and in the early morning. The gastroesophageal reflux disease is not improved with proton pump inhibitors, H2 blockers and antacids.   The patient complains of shortness of breath but denies any atypical chest pain or palpitations.  The patient admits to occasional episodes of diaphoresis.  The patient complains of constipation.  The patient has 1 to 4 bowel movements a day.  The patient complains of a change in bowel habits.  The patient complains of a change in caliber of stool.   The patient denies having mucus discharge with the bowel movements.  The patient denies any bright red blood per rectum, melena or hematemesis.  The patient denies any rectal pain or rectal pruritus.  The patient denies any weight loss or anorexia.  She denies any fevers or chills.  The patient had an upper endoscopy and colonoscopy to the cecum performed at the [facility] on July 18, 2019. The upper endoscopy was performed up to the level of the second portion of the duodenum. The upper endoscopy revealed a small hiatal hernia and mild diffuse bile gastritis. Biopsies were taken of the distal esophagus, antrum, body of stomach and duodenum to assess for esophagitis, gastritis and duodenitis. The pathology revealed distal esophagus with unremarkable squamous mucosa, gastric antral and body mucosa with chronic inactive gastritis with severe lymphoid follicles that was negative for Helicobacter pylori and unremarkable duodenal mucosa.  The colonoscopy to the cecum revealed moderate left sided diverticulosis, a poor prep colonoscopy and small internal hemorrhoids.  The study was limited secondary to retained liquid and solid stool scattered throughout the colon but no gross lesions were noted.  Unable to comment on small colonic polyps or masses secondary to the poor prep.  There were no polyps, masses, diverticulosis, AVMs or colitis noted.  The patient tolerated the procedures well.  The patient was found to have occult blood in the stool. The patient admits to having a prior upper endoscopy and colonoscopy performed by another gastroenterologist. The upper endoscopic findings were unknown to the patient. The colonoscopic findings revealed a poor prep colonoscopy. The patient admits to a family history of GI problems. The patient’s brother and father had a history of bleeding peptic ulcer disease.

## 2019-07-31 ENCOUNTER — APPOINTMENT (OUTPATIENT)
Dept: ORTHOPEDIC SURGERY | Facility: CLINIC | Age: 54
End: 2019-07-31

## 2019-08-05 ENCOUNTER — APPOINTMENT (OUTPATIENT)
Dept: ULTRASOUND IMAGING | Facility: HOSPITAL | Age: 54
End: 2019-08-05

## 2019-08-07 ENCOUNTER — APPOINTMENT (OUTPATIENT)
Dept: CARDIOLOGY | Facility: CLINIC | Age: 54
End: 2019-08-07

## 2019-08-26 ENCOUNTER — APPOINTMENT (OUTPATIENT)
Dept: ORTHOPEDIC SURGERY | Facility: CLINIC | Age: 54
End: 2019-08-26
Payer: MEDICARE

## 2019-08-26 PROCEDURE — 99214 OFFICE O/P EST MOD 30 MIN: CPT | Mod: 25

## 2019-08-26 PROCEDURE — 20611 DRAIN/INJ JOINT/BURSA W/US: CPT | Mod: RT

## 2019-08-28 ENCOUNTER — APPOINTMENT (OUTPATIENT)
Dept: ULTRASOUND IMAGING | Facility: HOSPITAL | Age: 54
End: 2019-08-28

## 2019-09-17 ENCOUNTER — APPOINTMENT (OUTPATIENT)
Dept: INTERNAL MEDICINE | Facility: CLINIC | Age: 54
End: 2019-09-17
Payer: MEDICARE

## 2019-09-17 VITALS
DIASTOLIC BLOOD PRESSURE: 80 MMHG | RESPIRATION RATE: 16 BRPM | SYSTOLIC BLOOD PRESSURE: 130 MMHG | HEART RATE: 110 BPM | TEMPERATURE: 98.4 F | WEIGHT: 139 LBS | BODY MASS INDEX: 28.02 KG/M2 | OXYGEN SATURATION: 98 % | HEIGHT: 59 IN

## 2019-09-17 PROCEDURE — 99214 OFFICE O/P EST MOD 30 MIN: CPT | Mod: 25

## 2019-09-17 PROCEDURE — G0008: CPT

## 2019-09-17 PROCEDURE — 90471 IMMUNIZATION ADMIN: CPT

## 2019-09-17 PROCEDURE — 90674 CCIIV4 VAC NO PRSV 0.5 ML IM: CPT

## 2019-09-17 NOTE — ASSESSMENT
[FreeTextEntry1] : 54 year old female found to have stable Hypertension, Hypothyroidism, Hypercholesterolemia with Hypertriglyceridemia, CVA, Insomnia, Migraine, GERD,with the current regimen, diet and life style modifications, as counseled. Prior results reviewed and discussed with the patient during today's examination. Plan as ordered.\par Hematology f/u for Anemia, as insisted and counseled.

## 2019-09-17 NOTE — HISTORY OF PRESENT ILLNESS
[de-identified] : 54 year old  female patient with history of stable Hypertension, Hypothyroidism, Hypercholesterolemia with Hypertriglyceridemia, CVA, Insomnia, Migraine, GERD, history as stated, presented for follow up examination of Elevated BP checked. Patient is compliant with all medications. Denies shortness of breath, chest pain or abdominal pains at this time. ROS as stated.\par

## 2019-09-17 NOTE — HEALTH RISK ASSESSMENT
[No] : In the past 12 months have you used drugs other than those required for medical reasons? No [No falls in past year] : Patient reported no falls in the past year [1] : 2) Feeling down, depressed, or hopeless for several days (1) [] : No [de-identified] : None [FreeTextEntry1] : Non-suicidal at this time. [[id number]] : 2

## 2019-09-27 NOTE — ED ADULT TRIAGE NOTE - MODE OF ARRIVAL
Patient:   JUNIOR            MRN: [id number]            FIN: [id number]              Age:   84 years     Sex:  FEMALE     :  30   Associated Diagnoses:   None   Author:   LAMAR SANTANA     Chief Complaint   Additional Info   Reason for Consult: Pneumonia     History of Present Illness             The patient presents with Patient reports an intermittent cough. Difficulty expectorating.  Reports trouble ambulating even with walker..       Review of Systems   Constitutional:  Weakness, Fatigue, No fever, No chills, No sweats.   Eye:  Negative.    Ear/Nose/Mouth/Throat:  Negative.    Cardiovascular:  Chest pain.    Respiratory:  Shortness of breath, Cough, Sputum production, No hemoptysis, No wheezing.   Gastrointestinal:  No nausea, No vomiting.    Genitourinary:  Negative except as documented in history of present illness.   Musculoskeletal:  Negative.    Integumentary:  Negative.    Hematology/Lymphatics:  Negative.    Neurologic:  Negative.    Endocrine:  Negative.    Allergy/Immunologic:  Negative.    Psychiatric:  Negative.      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  sigmoidoscopy in 2019 at 85 Years.  angioplasty in  the month of 8/2019 at 84 Years.  Angiogram ([id number]) on 01/29/2018 at 83 Years.  Comments:  03/02/2018 12:36 - Lisa Diaz  stent in Right SFA  03/02/2018 10:57 - Lisa Diaz  stent  CABG - Coronary artery bypass graft ([id number]) in 2009 at 75 Years.  hysteroscopy in 2009 at 75 Years.  ORIF - Open reduction and internal fixation of fracture ([id number]).  Comments:  05/08/2014 09:17 - Corrie Vinson  tibia  Cholecystectomy ([id number]).  Amputation of toe ([id number]).  Comments:  06/03/2019 10:38 - Bonnie Brooks  portion of right foot amputated  05/08/2014 09:19 - Corrie Vinson  3 left foot  Cataract extraction, insertion of intraocular lens and trabeculectomy ([id number]).  Tonsillectomy ([id number]).  Coronary angiography ([id number]).  Knee replacement ([id number]).  Colonoscopy ([id number]).  Comments:  10/19/2015 09:04 - Jayde Fisher  2-3 years ago per patient   Social History       Alcohol  Details: Use: None.  Details: Use: Past.  If current Alcohol user: More than 5 (M) or 4 (F) drinks within a couple of hours? No.  IF YES, have you consumed this quantity 5 times or more in the last 30 Days? No.  Details: Alcohol Abuse in Household: No.  Use: None.  Details: None  Exercise  Details: Exercise: Never.  Details: Excercise: Occasionally.  Home/Environment  Details: Alcohol Abuse in Household: No.  Substance Abuse in Household: No.  Smoker in Household: No.  Patient Lives With: Daughter.  Substance Abuse  Details: Use: None.  Details: None  Tobacco  Details: Smoked/Smokeless Tobacco Last 30 Days: No.  Smoking Tobacco Use: Never smoker.  Smokeless Tobacco Use Never.  Details: Smoked/Smokeless Tobacco Last 30 Days: No.  Smoking Tobacco Use: Former smoker.  Smokeless Tobacco Use Never.  Details: Smoked/Smokeless Tobacco Last 30 Days: No.  Smoking Tobacco Use: Former smoker.  Smokeless Tobacco Use Never.  Type: Cigarettes.  Started Age: 25.0 Years.  Stopped Age: 43 Years.   Details: Smoker in [facility]: No.  Smoked/Smokeless Tobacco Last 30 Days: No.  Smoking Tobacco Use: Former smoker.  Smokeless Tobacco Use Former smokeless tobacco user, quit more than 30 days ago.; Comment(s): Quit 40 years ago  Details: No, Never smoker  Cultural/Baptism Practices  Details: Baptism or Cultural Practices: Sabianist.  Baptism or Cultural Practices While in Hospital: Yes.  Details: Baptism or Cultural Practices While in Hospital: No.  .       Health Status   Allergies:    Allergic Reactions (All)  Severity Not Documented  Byetta Prefilled Pen- Itching.  Januvia- Action status unknown.  Sulfa drugs- Hives.  Nonallergic Reactions (All)  Severity Not Documented  Statins (HMG-CoA reductase inhibitors)- Paralysis/weakness.  Canceled/Inactive Reactions (All)  1  Alcohol- Dyspnea.  Severity Not Documented  Statins- No reactions were documented.   Current medications:  (Selected)   Inpatient Medications  Ordered  Protonix oral 40 mg DR tablet: 40 mg = 1 tab, Oral, Daily [before breakfast], Routine, Order Start: 09/19/19 7:00:00 CDT, Tab DR  Vancomycin IV Dosing - Pharmacist to Dose COMMUNICATION.: COMMUNICATION ORDER, Order Start: 09/19/19 8:00:00 CDT  [RESTRICTED] DAPTOmycin  IVPB (Cubicin): 350 mg, IVPB, As Directed PRN, PRN Other (see order comments), Rationale: Therapeutic (documented infection), Indication: Osteomyelitis/Prosthetic/Septic joint, RATE: 100 mL/hr, Infuse over 0.5 hr, 50 mL TOTAL Volume, Routine, Order Start: 09/06/19 9:...  acetaminophen oral 325 mg tablet (Tylenol): 650 mg = 2 tab, Oral, Q4H, PRN pain moderate, Routine, Order Start: 09/18/19 16:51:00 CDT, Tab  albuterol-ipratropium inhalation 2.5-0.5 mg/3 mL solution (DuoNeb).: 3 mL, Nebulizer, Q4H Awake x4, Routine, Order Start: 09/18/19 19:00:00 CDT, Inhalation  cefepime intermittent infusion [30 minute] (Maxipime).: 1 gm, IVPB, Q24H, Rationale: Therapeutic (documented infection), Indication: Pneumonia, RATE: 200 mL/hr, Infuse  over 30 minutes, mL TOTAL Volume 100, Routine, Order Start: 09/19/19 13:00:00 CDT, x 5 days, Order Stop: 09/23/19 13:00:00 CDT, CrCl LESS t...  clopidogrel oral 75 mg tablet: 75 mg = 1 tab, Oral, Daily, Routine, Order Start: 09/19/19 9:00:00 CDT, Tab  dextrose (glucose) injection 50%.: 12.5 gm = 25 mL, IV Push, As Directed PRN, PRN low blood glucose, Routine, Order Start: 09/20/19 9:41:00 CDT, Injection, Give 12.5 grams first.  If still needed give an additional 12.5 grams.  dextrose (glucose) oral.: 15 gm, Oral, As Directed PRN, PRN low blood glucose, Routine, Order Start: 09/20/19 9:41:00 CDT, Gel  donepezil oral 5 mg tablet: 5 mg = 1 tab, Oral, Q Bedtime, Routine, Order Start: 09/18/19 21:00:00 CDT, Tab  furosemide injection 10 mg/mL (Lasix): 20 mg = 2 mL, Slow IV Push, BID, Routine, Order Start: 09/19/19 17:00:00 CDT, Injection  glucagon (GlucaGen).: 1 mg = 1 mL, IM, As Directed PRN, PRN low blood glucose, Routine, Order Start: 09/20/19 9:41:00 CDT, Injection  guaiFENesin oral 100 mg/5 mL liquid (Robitussin): 100 mg = 5 mL, Oral, Q6H, PRN cough, Routine, Order Start: 09/18/19 17:45:00 CDT, Liquid  insulin lispro (HumaLOG) sliding scale: 2-10 unit, Subcutaneous, QID [with meals & HS], Routine, Order Start: 09/18/19 17:00:00 CDT, Injection  isosorbide mononitrate oral 30 mg ER tablet (Imdur): 15 mg = 0.5 tab, Oral, QAM, Routine, Order Start: 09/20/19 9:40:00 CDT, Tab ER  metFORMIN oral 500 mg tablet: 500 mg = 1 tab, Oral, BID [with breakfast & dinner], Routine, Order Start: 09/20/19 17:00:00 CDT, Tab  metoprolol succinate oral 25 mg XL tablet: 12.5 mg = 0.5 tab, Oral, Q12H, Hold for HR less than 50, and/or SBP less than 90, Routine, Order Start: 09/19/19 9:24:00 CDT, Tab XL  ondansetron oral 8 mg tablet: 4 mg = 0.5 tab, Oral, Q Bedtime, Routine, Order Start: 09/18/19 21:00:00 CDT, Tab  pramipexole oral 0.25 mg tablet: 0.75 mg = 1.5 tab, Oral, Q Bedtime, Routine, Order Start: 09/18/19 21:00:00 CDT, Tab   spironolactone oral 25 mg tablet: 25 mg = 1 tab, Oral, Daily, Routine, Order Start: 09/19/19 9:00:00 CDT, Tab  vancomycin: 1,250 mg, IVPB, Q24H, Rationale: Therapeutic (documented infection), Indication: Pneumonia, RATE: 125 mL/hr, Infuse over 2 hr, 250 mL TOTAL Volume, Routine, Order Start: 09/19/19 14:00:00 CDT  Documented Medications  Documented  DAPTOmycin 350 mg intravenous injection: 350 mg, IV, Daily, Maintenance  HumaLOG (insulin lispro) injection 100 unit/mL: 15 unit, Subcutaneous, BID [before lunch & dinner], Solution, Maintenance  HumuLIN N (insulin isophane) 100 units/mL subcutaneous suspension: 35 unit, Subcutaneous, BID [before breakfast & dinner], Suspension, Maintenance  Metoprolol Succinate ER 25 mg oral tablet, extended release: 12.5 mg = 0.5 tab, Oral, Daily, Tab ER, Maintenance  Protonix oral 40 mg DR tablet: 40 mg = 1 tab, Oral, Daily [before breakfast], Tab DR, Maintenance  Repatha 140 mg/mL subcutaneous solution: 140 mg, Subcutaneous, Q14 Days, Maintenance  clopidogrel oral 75 mg tablet: 75 mg = 1 tab, Oral, Daily, Tab, Maintenance  donepezil oral 5 mg tablet: 5 mg = 1 tab, Oral, Q Bedtime, Tab, Maintenance  furosemide oral 20 mg tablet: 10 mg = 0.5 tab, Oral, Daily, Tab, Maintenance  insulin lispro (HumaLOG) injection 100 unit/mL: 10 unit, Subcutaneous, Daily [before breakfast], Injection, Maintenance  metFORMIN oral 500 mg ER tablet: 500 mg = 1 tab, Oral, BID, Tab ER, Maintenance  ondansetron 4 mg oral tablet: 4 mg = 1 tab, Oral, Q Bedtime, Maintenance  pramipexole 0.75 mg oral tablet: 0.75 mg = 1 tab, Oral, Q Bedtime, Tab, Maintenance  spironolactone oral 25 mg tablet: 25 mg = 1 tab, Oral, Daily, Tab, Maintenance,   Medications (20) Active  Scheduled: (15)  *Antibiotic Dosing Communication  1 each, N/A, Daily  Albuterol-ipratropium 2.5-0.5 mg/3 mL nebulizer soln  3 mL, Nebulizer, Q4H Awake x4  cefepime  1 gm, IVPB, Q24H  Clopidogrel 75 mg tab  75 mg 1 tab, Oral, Daily  Donepezil 5 mg tab  5  mg 1 tab, Oral, Q Bedtime  Furosemide 20 mg/2 mL inj  20 mg 2 mL, Slow IV Push, BID  Insulin human lispro 1 unit/0.01 mL inj  2-10 unit, Subcutaneous, QID [with meals & HS]  Isosorbide mononitrate 30 mg ER tab  15 mg 0.5 tab, Oral, QAM  MetFORMIN 500 mg tab  500 mg 1 tab, Oral, BID [with breakfast & dinner]  Metoprolol succinate 25 mg XL tab  12.5 mg 0.5 tab, Oral, Q12H  Ondansetron 8 mg tab  4 mg 0.5 tab, Oral, Q Bedtime  Pantoprazole 40 mg DR tab  40 mg 1 tab, Oral, Daily [before breakfast]  Pramipexole 0.5 mg tab  0.75 mg 1.5 tab, Oral, Q Bedtime  Spironolactone 25 mg tab  25 mg 1 tab, Oral, Daily  vancomycin  1,250 mg, IVPB, Q24H  Continuous: (0)  PRN: (5)  Acetaminophen 325 mg tab  650 mg 2 tab, Oral, Q4H  Dextrose (glucose) 40% 15 gm/37.5 gm oral gel UD  15 gm, Oral, As Directed PRN  Dextrose (glucose) 50% 25 gm/50 mL syringe  12.5 gm 25 mL, IV Push, As Directed PRN  Glucagon 1 mg/1 mL emergency kit SDV  1 mg 1 mL, IM, As Directed PRN  GuaiFENesin 200 mg/10 mL oral liquid repack  100 mg 5 mL, Oral, Q6H      Physical Examination   VS/Measurements   Vital Signs   09/20/19 11:09 CDT Heart/Pulse Rate 78 beats/min  Normal    SpO2 100 %  Normal   09/20/19 11:09 CDT Respiration Rate 16 breaths/min  Normal   09/20/19 11:09 CDT Temperature - VS 36.4 deg_C  Normal    Temperature Source - VS Oral   09/20/19 11:09 CDT NIBP Systolic 126 mm Hg  Normal    NIBP Diastolic 51 mm Hg  LOW    NIBP Source Right Arm    NIBP MAP Manual Entry 76     ,    On 2L NC O2, Measurements from flowsheet : Height and Weight   09/20/19 05:46 CDT       CLINICALWEIGHT            81.0 kg       General:  Alert and oriented, No acute distress.    Eye:  Extraocular movements are intact, Normal conjunctiva.    HENT:  Oral mucosa is moist, No pharyngeal erythema.    Neck:  Supple, No jugular venous distention.    Respiratory:  Crackles at bases Left>right.         Breath sounds: Diminished.    Cardiovascular:  Normal rate, Regular rhythm, No murmur, No  significant edema.   Gastrointestinal:  Soft, Non-tender, Non-distended, Normal bowel sounds, No organomegaly, No masses, obese.   Genitourinary:  No costovertebral angle tenderness.    Musculoskeletal:  No tenderness, No swelling, Right foot amputation.   Integumentary:  Warm, Dry.    Neurologic:  Alert, Oriented, No focal deficits.    Cognition and Speech:  Speech clear and coherent.    Psychiatric:  Appropriate mood & affect.      Review / Management   Laboratory results:      , Last 3 Days Lab Results : LABORATORY   09/20/19 06:49 CDT CrCl (estimated) 40.10 mL/min   09/20/19 05:40 CDT Sodium 135 mmol/L    Potassium 4.1 mmol/L    Chloride 103 mmol/L    Carbon Dioxide (CO2) 25 mmol/L    Anion Gap 11 mmol/L    BUN 14 mg/dL    Creatinine 0.79 mg/dL    BUN/Creatinine Ratio 18    GFR ESTIMATE  79    GFR ESTIMATE NON  68    Calcium 8.7 mg/dL    Magnesium 1.8 mg/dL    Glucose Level 294 mg/dL  HI    GOT/AST 12 unit/L    GPT/ALT 16 unit/L    Alk Phosphatase 103 unit/L    Bilirubin Total 0.5 mg/dL    Protein, Total 7.2 gm/dL    Albumin 2.4 gm/dL  LOW    Globulin 4.8 gm/dL  HI    A/G Ratio 0.5  LOW    WBC 9.5 THOUSAND/mcL    RBC 2.71 MILLION/mcL  LOW    Hemoglobin 7.7 gm/dL  LOW    Hematocrit 25.7 %  LOW    MCV 94.8 fL    MCH 28.4 pg    MCHC 30.0 gm/dL  LOW    RDW-CV 16.5 %  HI    Platelet 308 THOUSAND/mcL    Neutrophils 78 %    Abs Neut 7.4 THOUSAND/mcL    Segs NOT APPLICABLE    Lymph 12 %    Absolute Lymph 1.1 THOUSAND/mcL    Monocytes 5 %    Abs Mono 0.4 THOUSAND/mcL    Eosinophils 4 %    Absolute Eos 0.4 THOUSAND/mcL    Basophils 0 %    Absolute Baso 0.0 THOUSAND/mcL    Analyzer ANC NOT APPLICABLE    Percent Immature Granulocytes 1 %    Absolute Immature Granulocytes 0.1 THOUSAND/mcL    NRBC 0 /100 WBC     .    Radiology results              Result Type: CT CHEST WO CON  Result Date: September 19, 2019 19:38 CDT  IMPRESSION:  1.  Small bilateral pleural effusions with changes of  intralobular septal thickening at the lung bases which are suggestive of mild pulmonary vascular congestion.  Recommend clinical correlation regarding signs/symptoms of congestive heart failure/fluid overload.  2.  New small pulmonary nodule involving the peripheral right upper lobe towards the lung apex measuring 7.7 mm.  Consider follow-up CT of the chest in 6-12 months to assess stability.  Result title:  XR CHEST 2V  Result status:  Final  Verified by:  LANEY ALVAREZ on 09/19/2019 08:19  IMPRESSION: Stable mild bibasilar opacities, which may represent atelectasis or consolidation.  Small underlying pleural effusions cannot be excluded.  Result title:  XR CHEST PORTABLE 1V  Result status:  Final  Verified by:  KASHIF NIÑO on 09/18/2019 12:54  IMPRESSION:  1.  Interval development of patchy bibasilar opacities.  Correlate for pneumonia.  2.  Suggestion of small right pleural effusion.  3.  Interval placement of a left upper extremity PICC with tip at the cavoatrial junction.     Charting performed by patricia James for Dr. Jonathan Porter   All medical record entries made by the scribe were at my direction. I have reviewed the chart and agree that the record accurately reflects my personal performance of the history, physical exam, hospital course, and assessment and plan. .           Lines and Tubes:    I & O between:  19-SEP-2019 15:28 TO 20-SEP-2019 15:28  Med Dosing Weight:  84.3  kg   18-SEP-2019  24 Hour Intake:   754.00  ( 8.94 mL/kg )  24 Hour Output:   1900.00           24 Hour Urine/Stool Output:   0.0  24 Hour Balance:   -1146.00           24 Hour Urine Output:   1900.00  ( 0.94 mL/kg/hr )  .      Impression and Plan   Dx and Plan:     Diagnosis     DIAGNOSES:  Possible pneumonia given recent chest imaging and clinical condition. Dyspnea, cough and fatigue onset one night PTA. Right foot amputation July 2019. Patient may have aspirated perioperatively and we are still seeing a mild  inflammatory response.  She has been relatively sedentary since surgery; this may be the contributing factor to atelectasis.  Possible underlying COPD given former 20 yr smoking history.  Mild cardiac decompensation mixed explain some of her radiographic findings of the chest  History of ISABELLA.  History of CAD, DM..     .         Course: Progressing as expected.    Orders     PLAN:  Patient may tolerate a little bit of extra diuretic temporarily.  Continue current course of ABX; On day 3 Cefepime, day 2 Vancomycin.  Bronchodilators by nebulizer.  Keep O2 sat 92-95%  Continue incentive spirometer.  DVT PPx: SCDs   .     Charting performed by patricia Elder for Dr. Jonathan Porter.  All medical record entries made by the patricia were at my direction. I have reviewed the chart and agree that the record accurately reflects my personal performance of the history, physical exam, hospital course, and assessment and plan. .    .    Education and Follow-up:       Counseled: Patient.   EMS

## 2019-10-01 ENCOUNTER — EMERGENCY (EMERGENCY)
Facility: HOSPITAL | Age: 54
LOS: 1 days | Discharge: HOME | End: 2019-10-01
Attending: EMERGENCY MEDICINE
Payer: MEDICARE

## 2019-10-01 VITALS
TEMPERATURE: 99 F | HEART RATE: 80 BPM | SYSTOLIC BLOOD PRESSURE: 124 MMHG | WEIGHT: 138.89 LBS | OXYGEN SATURATION: 100 % | DIASTOLIC BLOOD PRESSURE: 84 MMHG | RESPIRATION RATE: 16 BRPM

## 2019-10-01 VITALS
HEART RATE: 64 BPM | DIASTOLIC BLOOD PRESSURE: 82 MMHG | RESPIRATION RATE: 18 BRPM | SYSTOLIC BLOOD PRESSURE: 129 MMHG | TEMPERATURE: 99 F | OXYGEN SATURATION: 100 %

## 2019-10-01 DIAGNOSIS — Z98.890 OTHER SPECIFIED POSTPROCEDURAL STATES: Chronic | ICD-10-CM

## 2019-10-01 DIAGNOSIS — Z98.89 OTHER SPECIFIED POSTPROCEDURAL STATES: Chronic | ICD-10-CM

## 2019-10-01 DIAGNOSIS — Z98.51 TUBAL LIGATION STATUS: Chronic | ICD-10-CM

## 2019-10-01 LAB
ALBUMIN SERPL ELPH-MCNC: 3.8 G/DL — SIGNIFICANT CHANGE UP (ref 3.5–5)
ALP SERPL-CCNC: 86 U/L — SIGNIFICANT CHANGE UP (ref 40–120)
ALT FLD-CCNC: 17 U/L DA — SIGNIFICANT CHANGE UP (ref 10–60)
ANION GAP SERPL CALC-SCNC: 5 MMOL/L — SIGNIFICANT CHANGE UP (ref 5–17)
APPEARANCE UR: CLEAR — SIGNIFICANT CHANGE UP
APTT BLD: 29 SEC — SIGNIFICANT CHANGE UP (ref 27.5–36.3)
AST SERPL-CCNC: 18 U/L — SIGNIFICANT CHANGE UP (ref 10–40)
BASOPHILS # BLD AUTO: 0.06 K/UL — SIGNIFICANT CHANGE UP (ref 0–0.2)
BASOPHILS NFR BLD AUTO: 1.7 % — SIGNIFICANT CHANGE UP (ref 0–2)
BILIRUB SERPL-MCNC: 0.2 MG/DL — SIGNIFICANT CHANGE UP (ref 0.2–1.2)
BILIRUB UR-MCNC: NEGATIVE — SIGNIFICANT CHANGE UP
BUN SERPL-MCNC: 8 MG/DL — SIGNIFICANT CHANGE UP (ref 7–18)
CALCIUM SERPL-MCNC: 8.3 MG/DL — LOW (ref 8.4–10.5)
CHLORIDE SERPL-SCNC: 109 MMOL/L — HIGH (ref 96–108)
CO2 SERPL-SCNC: 26 MMOL/L — SIGNIFICANT CHANGE UP (ref 22–31)
COLOR SPEC: YELLOW — SIGNIFICANT CHANGE UP
CREAT SERPL-MCNC: 0.91 MG/DL — SIGNIFICANT CHANGE UP (ref 0.5–1.3)
D DIMER BLD IA.RAPID-MCNC: 180 NG/ML DDU — SIGNIFICANT CHANGE UP
DIFF PNL FLD: NEGATIVE — SIGNIFICANT CHANGE UP
EOSINOPHIL # BLD AUTO: 0.09 K/UL — SIGNIFICANT CHANGE UP (ref 0–0.5)
EOSINOPHIL NFR BLD AUTO: 2.5 % — SIGNIFICANT CHANGE UP (ref 0–6)
GLUCOSE SERPL-MCNC: 93 MG/DL — SIGNIFICANT CHANGE UP (ref 70–99)
GLUCOSE UR QL: NEGATIVE — SIGNIFICANT CHANGE UP
HCT VFR BLD CALC: 32.4 % — LOW (ref 34.5–45)
HGB BLD-MCNC: 10.1 G/DL — LOW (ref 11.5–15.5)
IMM GRANULOCYTES NFR BLD AUTO: 0 % — SIGNIFICANT CHANGE UP (ref 0–1.5)
INR BLD: 1.06 RATIO — SIGNIFICANT CHANGE UP (ref 0.88–1.16)
KETONES UR-MCNC: NEGATIVE — SIGNIFICANT CHANGE UP
LEUKOCYTE ESTERASE UR-ACNC: ABNORMAL
LYMPHOCYTES # BLD AUTO: 1.38 K/UL — SIGNIFICANT CHANGE UP (ref 1–3.3)
LYMPHOCYTES # BLD AUTO: 38.3 % — SIGNIFICANT CHANGE UP (ref 13–44)
MCHC RBC-ENTMCNC: 30.9 PG — SIGNIFICANT CHANGE UP (ref 27–34)
MCHC RBC-ENTMCNC: 31.2 GM/DL — LOW (ref 32–36)
MCV RBC AUTO: 99.1 FL — SIGNIFICANT CHANGE UP (ref 80–100)
MONOCYTES # BLD AUTO: 0.35 K/UL — SIGNIFICANT CHANGE UP (ref 0–0.9)
MONOCYTES NFR BLD AUTO: 9.7 % — SIGNIFICANT CHANGE UP (ref 2–14)
NEUTROPHILS # BLD AUTO: 1.72 K/UL — LOW (ref 1.8–7.4)
NEUTROPHILS NFR BLD AUTO: 47.8 % — SIGNIFICANT CHANGE UP (ref 43–77)
NITRITE UR-MCNC: NEGATIVE — SIGNIFICANT CHANGE UP
NRBC # BLD: 0 /100 WBCS — SIGNIFICANT CHANGE UP (ref 0–0)
PH UR: 8 — SIGNIFICANT CHANGE UP (ref 5–8)
PLATELET # BLD AUTO: 303 K/UL — SIGNIFICANT CHANGE UP (ref 150–400)
POTASSIUM SERPL-MCNC: 3.4 MMOL/L — LOW (ref 3.5–5.3)
POTASSIUM SERPL-SCNC: 3.4 MMOL/L — LOW (ref 3.5–5.3)
PROT SERPL-MCNC: 7.2 G/DL — SIGNIFICANT CHANGE UP (ref 6–8.3)
PROT UR-MCNC: NEGATIVE — SIGNIFICANT CHANGE UP
PROTHROM AB SERPL-ACNC: 11.8 SEC — SIGNIFICANT CHANGE UP (ref 10–12.9)
RBC # BLD: 3.27 M/UL — LOW (ref 3.8–5.2)
RBC # FLD: 11.9 % — SIGNIFICANT CHANGE UP (ref 10.3–14.5)
SODIUM SERPL-SCNC: 140 MMOL/L — SIGNIFICANT CHANGE UP (ref 135–145)
SP GR SPEC: 1.01 — SIGNIFICANT CHANGE UP (ref 1.01–1.02)
T4 FREE+ TSH PNL SERPL: 5.24 UU/ML — HIGH (ref 0.34–4.82)
TROPONIN I SERPL-MCNC: <0.015 NG/ML — SIGNIFICANT CHANGE UP (ref 0–0.04)
UROBILINOGEN FLD QL: NEGATIVE — SIGNIFICANT CHANGE UP
WBC # BLD: 3.6 K/UL — LOW (ref 3.8–10.5)
WBC # FLD AUTO: 3.6 K/UL — LOW (ref 3.8–10.5)

## 2019-10-01 PROCEDURE — 71046 X-RAY EXAM CHEST 2 VIEWS: CPT | Mod: 26

## 2019-10-01 PROCEDURE — 99285 EMERGENCY DEPT VISIT HI MDM: CPT

## 2019-10-01 RX ORDER — SODIUM CHLORIDE 9 MG/ML
3 INJECTION INTRAMUSCULAR; INTRAVENOUS; SUBCUTANEOUS ONCE
Refills: 0 | Status: COMPLETED | OUTPATIENT
Start: 2019-10-01 | End: 2019-10-01

## 2019-10-01 RX ORDER — KETOROLAC TROMETHAMINE 30 MG/ML
30 SYRINGE (ML) INJECTION ONCE
Refills: 0 | Status: DISCONTINUED | OUTPATIENT
Start: 2019-10-01 | End: 2019-10-01

## 2019-10-01 RX ADMIN — SODIUM CHLORIDE 3 MILLILITER(S): 9 INJECTION INTRAMUSCULAR; INTRAVENOUS; SUBCUTANEOUS at 22:43

## 2019-10-01 RX ADMIN — Medication 30 MILLIGRAM(S): at 23:40

## 2019-10-01 NOTE — ED PROVIDER NOTE - CLINICAL SUMMARY MEDICAL DECISION MAKING FREE TEXT BOX
Trop x 2 neg, EKG unchanged. Pt in no distress.    States + urinary frequency which is malodorous. I will rx Cefitn for UTI.   Pt is well appearing walking with normal gait, stable for discharge and follow up with medical doctor. Pt educated on care and need for follow up. Discussed anticipatory guidance and return precautions. Questions answered. I had a detailed discussion with the patient and/or guardian regarding the historical points, exam findings, and any diagnostic results supporting the discharge diagnosis.

## 2019-10-01 NOTE — ED PROVIDER NOTE - PATIENT PORTAL LINK FT
You can access the FollowMyHealth Patient Portal offered by [facility] by registering at the following website: [email or web address]. By joining [employer]’s FollowMyHealth portal, you will also be able to view your health information using other applications (apps) compatible with our system.

## 2019-10-01 NOTE — ED PROVIDER NOTE - CARE PROVIDERS DIRECT ADDRESSES
[email or web address]

## 2019-10-01 NOTE — ED PROVIDER NOTE - CARE PLAN
Principal Discharge DX:	Palpitation  Secondary Diagnosis:	Joint pain  Secondary Diagnosis:	UTI (urinary tract infection)

## 2019-10-01 NOTE — ED PROVIDER NOTE - CARE PROVIDER_API CALL
Roe Sales (MD)  Internal Medicine  [address], [address] [place] [address]  Phone: [phone]  Fax: [phone]  Follow Up Time:

## 2019-10-01 NOTE — ED PROVIDER NOTE - NSFOLLOWUPINSTRUCTIONS_ED_ALL_ED_FT
Return to ER immediately if your chest pain returns, if you have pain with radiation to arms, back or neck, if you feel short of breath, feel weak, feel fast or pounding heart beating, if you have any fever or vomiting.  If you need assistance with follow up appointments our Care Coordinator can be reached at [phone]. In addition the [facility] is located at [address] [place], [place] [address], tel: [phone]. Return to ER immediately if your chest pain returns, if you have pain with radiation to arms, back or neck, if you feel short of breath, feel weak, feel fast or pounding heart beating, if you have any fever or vomiting. Return if fever, pain, difficulty urinating, any concerns.  If you need assistance with follow up appointments our Care Coordinator can be reached at [phone]. In addition the [facility] is located at [address] [place] [address], tel: [phone].

## 2019-10-01 NOTE — ED PROVIDER NOTE - OBJECTIVE STATEMENT
Chief complaint chest palpitation and chest pain with associated shortness of breath x4 days. No recorded fever or vomiting. Current medication: Klonopin Chief complaint chest palpitation and chest pain with associated shortness of breath x4 days. No reported fever or vomiting. Current medication: Klonopin

## 2019-10-01 NOTE — ED PROVIDER NOTE - PROGRESS NOTE DETAILS
Pt complaining of joint pains states has hx of RA and fibromyalgia. Toradol ordered. Pt's Bun/Cr is wnl .

## 2019-10-02 ENCOUNTER — APPOINTMENT (OUTPATIENT)
Dept: GASTROENTEROLOGY | Facility: CLINIC | Age: 54
End: 2019-10-02

## 2019-10-02 LAB — TROPONIN I SERPL-MCNC: <0.015 NG/ML — SIGNIFICANT CHANGE UP (ref 0–0.04)

## 2019-10-02 PROCEDURE — 81001 URINALYSIS AUTO W/SCOPE: CPT

## 2019-10-02 PROCEDURE — 80053 COMPREHEN METABOLIC PANEL: CPT

## 2019-10-02 PROCEDURE — 85379 FIBRIN DEGRADATION QUANT: CPT

## 2019-10-02 PROCEDURE — 85730 THROMBOPLASTIN TIME PARTIAL: CPT

## 2019-10-02 PROCEDURE — 84484 ASSAY OF TROPONIN QUANT: CPT

## 2019-10-02 PROCEDURE — 71046 X-RAY EXAM CHEST 2 VIEWS: CPT

## 2019-10-02 PROCEDURE — 96374 THER/PROPH/DIAG INJ IV PUSH: CPT

## 2019-10-02 PROCEDURE — 99284 EMERGENCY DEPT VISIT MOD MDM: CPT | Mod: 25

## 2019-10-02 PROCEDURE — 84443 ASSAY THYROID STIM HORMONE: CPT

## 2019-10-02 PROCEDURE — 93005 ELECTROCARDIOGRAM TRACING: CPT

## 2019-10-02 PROCEDURE — 85610 PROTHROMBIN TIME: CPT

## 2019-10-02 PROCEDURE — 36415 COLL VENOUS BLD VENIPUNCTURE: CPT

## 2019-10-02 PROCEDURE — 85027 COMPLETE CBC AUTOMATED: CPT

## 2019-10-02 RX ORDER — CEFUROXIME AXETIL 250 MG
1 TABLET ORAL
Qty: 14 | Refills: 0
Start: 2019-10-02 | End: 2019-10-08

## 2019-10-02 RX ORDER — CEFUROXIME AXETIL 250 MG
500 TABLET ORAL ONCE
Refills: 0 | Status: COMPLETED | OUTPATIENT
Start: 2019-10-02 | End: 2019-10-02

## 2019-10-02 RX ADMIN — Medication 500 MILLIGRAM(S): at 01:55

## 2019-10-02 NOTE — ED ADULT NURSE NOTE - OBJECTIVE STATEMENT
pt c/o of palpitation and bilateral joint pain. Pt has hx of lupus. Currently denies dizziness, palpitation, and SOB

## 2019-10-07 ENCOUNTER — APPOINTMENT (OUTPATIENT)
Dept: ORTHOPEDIC SURGERY | Facility: CLINIC | Age: 54
End: 2019-10-07
Payer: MEDICARE

## 2019-10-07 PROCEDURE — 73564 X-RAY EXAM KNEE 4 OR MORE: CPT | Mod: LT

## 2019-10-07 PROCEDURE — 99214 OFFICE O/P EST MOD 30 MIN: CPT

## 2019-10-07 NOTE — HISTORY OF PRESENT ILLNESS
[de-identified] : CC left knee postop right knee pain\par \par HPI 52 yo female left HD presents for MRI FU of acute onset of most 2 years of constant pain in the medial right knee. The pain is worse, and rated a 10 out of 10, described as throbbing, without radiation. Ice makes the pain better and walking especially upstairs makes the pain worse. The patient reports associated symptoms of pop click locking with instability causing falls swelling and weakness. Patient reports she walks unstably because she is so unstable. The patient - pain at night affecting sleep, and - similar pain previously.\par \par The patient has tried the following treatments:\par Activity modification	+\par Ice/Compression  	+\par Braces    		-\par Nsaids    		+ 6 weeks no improvement\par Physical Therapy 	+ 6weeks home exercises along with postop PT for left knee\par Cortisone Injection	-\par Visco Injection		-\par Arthroscopy		-\par Patient is status post Left knee Arthroscopy on 3-26-19\par Lateral meniscus meniscectomy and corticosteroid injection\par \par The patient is doing well with much improved pain on L knee, now mostly anterior. right knee worse\par \par \par Review of Systems is positive for the above musculoskeletal symptoms and is otherwise non-contributory for general, constitutional, psychiatric, neurologic, HEENT, cardiac, respiratory, gastrointestinal, reproductive, lymphatic, and dermatologic complaints.\par \par Consult by Dr Roe Sales\par \par

## 2019-10-07 NOTE — PHYSICAL EXAM
[de-identified] : Physical Examination\par General: well nourished, in no acute distress, alert and oriented to person, place and time\par Psychiatric: normal mood and affect, no abnormal movements or speech patterns\par Eyes: vision intact + glasses\par Throat: no thyromegaly\par Lymph: no enlarged nodes, no lymphedema in extremity\par Respiratory: no wheezing, no shortness of breath with ambulation\par Cardiac: no cardiac leg swelling, 2+ peripheral pulses\par Neurology: normal gross sensation in extremities to light touch\par Abdomen: soft, non-tender, tympanic, no masses\par \par Musculoskeletal Examination\par Ambulation	+ antalgic gait, - assistive devices\par \par Knee			Right			Left\par General\par      Swelling/Deformity	normal			normal	\par      Skin			normal			healed incisions\par      Erythema		-			-\par      Standing Alignment	neutral			neutral\par      Effusion		none			none\par Range of Motion\par      Hip			full painless ROM		full painless ROM\par      Knee Flexion		130			130\par      Knee Extension	0			0\par Patella\par      J Sign		-			-\par      Quad Medial/Lateral	1/1 1/1\par      Apprehension		-			-\par      Gualberto's		-			+\par      Grind Sign		-			+\par      Crepitus		-			+\par Palpation\par      Medial Joint Line	++			-\par      Medial Fem Condyle	-			-\par      Lateral Joint Line	-			-\par      Quad Tendon		-			-\par      Patella Tendon	-			-\par      Medial Patella		-			-\par      Lateral Patella 	-			-\par      Posterior Knee	-			-\par Ligamentous\par      Varus @ 0° / 30°	-/-			-/-\par      Valgus @ 0° / 30°	-/-			-/-\par      Lachman		-			-\par      Pivot Shift		-			-\par      Anterior Drawer	-			-\par      Posterior Drawer	-			-\par Meniscus\par      Julian		+ medial pain			-\par      Flexion Pinch		+ posterior pain			-\par Strength Examination/Atrophy\par      Hip Flexors 		5+			5+\par      Quadriceps		5+			5+\par      Hamstring		5+			5+\par      Tibialis Anterior	5+			5+\par      Achilles/Soleus	5+			5+\par Sensation\par      Deep Peroneal	normal			normal\par      Superficial Peroneal 	normal			normal\par      Sural  		normal			normal\par      Posterior Tibial 	normal			normal\par      Saphneous 		normal			normal\par Pulses\par      DP			2+			2+\par  [de-identified] : 5 views of the affected left knee (standing AP, flexing standing AP, 30degree flexed lateral, 0degree lateral, sunrise view)\par \par demonstrate:\par There is mild lateral weight very asymmetric narrowing\par Trace osteophytic lipping\par Trace suprapatellar effusion\par No patellofemoral joint space loss without evidence of tilt [or] subluxation on sunrise view\par Normal soft tissue density\par Otherwise normal osseous bone structure without fracture or dislocation\par \par \par MRI Left knee from [facility] on 2-16-19\par My impression of the images:\par Quality of the MRI is good\par Medial Meniscus mild degenerative tear\par Lateral Meniscus flipped body/anterior horn tear\par There is mild to moderate chondral loss in the trial compartments\par There is [Not] bone marrow edema[/subchondral cysts] in the tricompartments\par LCL is intact\par MCL is intact\par ACL is intact\par PCL is intact \par Quadriceps Tendon is intact\par Patella Tendon is intact\par \par The Final Radiologist Impression:\par Flap tear lateral meniscus body/anterior horn\par Tricompartmental degenerative arthritis most pronounced in the lateral tibiofemoral compartment.\par Zero point for similar lateral patellar position with respect to the trochlear groove, TT-TG distance approximately 1.6 cm\par Distal quadriceps, patellar and semimembranosus tendinopathy, popliteus tendinopathy.\par Evidence of remote prior MCL injury.\par Moderate joint effusion with popliteal cyst and edematous quadriceps fat pad\par \par 5 views of the affected Right knee (standing AP, flexing standing AP, 30degree flexed lateral, 0degree lateral, sunrise view)\par 6-24-19\par demonstrate:\par There is trace medial weightbearing asymmetric narrowing\par no osteophytic lipping\par Trace suprapatellar effusion\par no patellofemoral joint space loss without evidence of tilt [or] subluxation on sunrise view\par Normal soft tissue density\par Otherwise normal osseous bone structure without fracture or dislocation\par \par \par MRI Right knee from [facility] on 7-19-19\par My impression of the images:\par Quality of the MRI is ok\par Medial Meniscus ok\par Lateral Meniscus intra-meniscal signal in the body with possible anterior edge extension\par There is mild to moderate chondral loss in the tricompartments\par There is [Not] bone marrow edema[/subchondral cysts] in the tricompartments\par LCL is intact\par MCL is intact\par ACL is intact\par PCL is intact \par Quadriceps Tendon is intact\par Patella Tendon is intact\par \par The Final Radiologist Impression:\par Small joint effusion with synovitis.\par Lateral tilt and 0.5 cm lateral patella positioning with respect to the trochlear groove. Mild-moderate tricompartmental osteoarthritis, including chondromalacia and osteophytosis. Subcentimeter subcortical cystic and edematous foci subjacent to the tibial spine, likely traction related. No recent fracture or marrow infiltrative process.\par Distal quadriceps tendon, patellar tendon, anterior cruciate ligament, posterior cruciate ligament, popliteus, medial collateral ligament, lateral collateral ligament, iliotibial band and biceps femoris intact. Origin tendinopathy medial head gastrocnemius.\par No evidence of medial meniscal tear. Oblique linear intrasubstance signal lateral meniscal body-posterior horn junction, approaching however not definitively communicating with the inferior meniscal surface.\par Small fluid popliteal bursa. No soft tissue mass.\par IMPRESSION:\par 1. Small joint effusion with synovitis and fluid popliteal bursa.\par 2. Mild-moderate tricompartmental osteoarthritis with lateral tilt and 0.5 cm lateral patella positioning with respect to the trochlear groove. Correlate clinically for patella subluxation.\par 3. Oblique linear intrasubstance signal lateral meniscal body-posterior horn junction (series 4 image 19), approaching however not definitively communicating with the inferior meniscal surface. Cannot include underlying closed flap tear.\par 4. Origin tendinopathy medial head gastrocnemius.\par

## 2019-10-07 NOTE — DISCUSSION/SUMMARY
[de-identified] : 3 months status post Left knee Arthroscopy on 3-26-19\par Lateral meniscus meniscectomy and corticosteroid injection\par left knee doing well\par \par Right knee moderate OA with possible lateral meniscal body tear\par \par \par The patient and I discussed the causes and progression of degenerative joint disease of the knee. Models, diagrams and drawings were used in the discussion. Treatment can include conservative non-operative management and surgical options. Conservative management includes weight loss, activity modification, physical therapy to improve motion and strength in the muscles around the knee and the body's core, PO and topical NSAIDs, corticosteroid and/or viscosupplementation intra-articular injections. If the patient fails to improve with non-operative management, surgical management is possible. Depending upon the patient's age, BMI, activity level, degree and location of arthrosis different surgical options are possible including arthroscopic debridement with chondroplasty, high-tibial osteotomy, unicondylar/partial arthroplasty, and total joint arthroplasty.\par \par Patient declined corticosteroid injection but she said multiple injections without lasting improvement\par \par Patient declining physical therapy as she is doing home exercises\par \par Due to failure of prior non-operative modalities of treatment including physical therapy, activity modification, non-steroidal anti-inflammatory medications, and weight-loss with failure of multiple prior corticosteroid injections without appropriate improvement in symptoms and concern for too many frequent injections causing increased risk for adverse events, I am ordering a viscosupplementation injection Synvisc one on the left knee and will obtain insurance authorization. The patient will be contacted by our authorization department over its status, copayment and when available for injection.\par \par The patient verifies their understanding the the visit, diagnosis and plan. They agree with the treatment plan and will contact the office with any questions or problems.\par Follow up\par When injection available

## 2019-10-07 NOTE — DISCUSSION/SUMMARY
[de-identified] : 3 months status post Left knee Arthroscopy on 3-26-19\par Lateral meniscus meniscectomy and corticosteroid injection\par left knee doing well\par \par Right knee moderate OA with possible lateral meniscal body tear\par \par \par I discussed the nature of meniscal tears using models, diagrams and drawings as well as the mensci's function. The meniscus is a fibrocartilage that cushions and spreads the contact stresses between the femur and the tibia. It becomes less pliable and more prone to tearing with age. The torn meniscus can be painful. We discussed both the risks and benefits of both operative versus non-operative treatment. Non-operative treatment including activity modification, oral NSAIDS, therapy and corticosteroid injections can also be attempted. In patient failing non-operative conservative treatment, the definitive surgical treatment, depending upon manypatient factors, including but not limited to age, activity level, tear acuity, tear pattern, tissue quality, etc, is arthroscopic debridement versus repair. However, certain a certain subset of patients, they are candidates for a meniscal repair which should be done expediently to maximize reparability of the torn meniscus. There is a chance of progression of knee degenerative arthrosis with a meniscus tear due to the loss of function of the meniscus. Unfortunately there is frequently superimposed degenerative chondromalacia of the chondral surfaces in the knee. These symptoms are frequently not experienced preoperatively or are in addition to the meniscal symptoms. It is very difficult to differentiate the two clinically based on imaging studies, physical exam and history, therefore there are no guarantees as to the outcome, that ultimate improvement is largely based on the extent of degenerative chondromalacia present as well as the extent of meniscal pathology. The patient was instructed to avoid deep knee bends or squatting as this may exacerbate the knee's internal derangement.\par \par Physical therapy prescribed for knee ROM exercises, quad/hamstring/vmo/hip abductor strengthening exercises, modalities PRN, home exercise program\par The patient was prescribed Diclofenac PO non-steroidal anti-inflammatory medication. 50mg tablets twice daily to be taken for at least 1-2 weeks in a row and then PRN afterwards. Risks and benefits were discussed and include but not limited to renal damage and GI ulceration and bleeding.  They were advised to take with food to limit stomach upset as well as warned to stop the medication if worsening gastric pain or dizziness or other side effects. Also to immediately stop the medication and seek appriate medical attention if any severe stomach ache, gastritis, black/red vomit, black/red stools or any other medical concern.\par \par Procedure Note:\par \par Verbal consent was obtained for an arthrocentesis and intra-articular corticosteroid injection of the RIGHT knee, after the risks and benefits were discussed with the patient. Potential adverse effects were discussed including but not limited to bleeding, skin/joint infection, local skin reactions including bleaching, bruising, stiffness, soreness, vasovagal episodes, transient hyperglycemia, avascular necrosis, pseudo-septic type reactions, post injection joint pain, allergic reaction to product or anesthetic and other rare but potential adverse effects along with benefits including decreased pain and improved stability prior to obtaining verbal informed consent. It was also discussed that for some patients the treatment is ineffective and there are no guarantees that the patient will experience improvement as the result of the injection. In rare occasions the injection can cause worsening of pain.\par \par The use of a Sonosite 15-6 MHz linear transducer with live ultrasound guidance of the knee was necessary given the patient's BMI and local body habitus overlying and obscuring the accurate identification of normal body bony anatomy used to identify the injection site and the depth of soft tissue envelope necessitating a longer than normal needle to reach the joint space, and to confirm the location of the needle tip and intra-articular delivery of the medication. Without the use of live ultrasound guidance the injection would have been more difficult and place the patient's neurovascular structures at risk from the longer needle needed to traverse the soft tissue envelope.\par \par A 6 inch bolster was placed underneath the knee to place the knee in a slightly flexed position. The superolateral injection site was identified using the ultrasound probe to identify the suprapatellar space and superior boarder of the patella first longitudinally and then transversely. The injection site was marked and prepped with a ChloraPrep swab and anesthetized with ethylchloride skin anesthesia. Using sterile technique a 20g 3 1/2 in spinal needle with 3 cc total of 1cc 1% lidocaine without epinephrine, 1cc 0.25% Marcaine without epinephrine and 1cc of 40mg/mL Kenalog was passed through the injection site towards the suprapatellar space under live ultrasound guidance and noted to penetrate the joint capsule. The medication was injected without resistance under live ultrasound visualization and noted to flow into the suprapatellar joint space. The injection site was sterilely dressed, there was minimal blood loss. The patient tolerated this procedure without any complications done by myself. Images were recorded and saved.\par \par The patient has been advised that if they notice any worsening of symptoms or any problems to contact me and seek care from a qualified medical professional. The patient was instructed to ice the knee and take NSAID medication on an as needed basis if the patient feels discomfort.\par \par The patient verifies their understanding the the visit, diagnosis and plan. They agree with the treatment plan and will contact the office with any questions or problems.\par \par Follow up\par After completion of PT

## 2019-10-07 NOTE — PHYSICAL EXAM
[de-identified] : 5 views of the affected left knee (standing AP, flexing standing AP, 30degree flexed lateral, 0degree lateral, sunrise view)\par \par demonstrate:\par There is mild lateral weight very asymmetric narrowing\par Trace osteophytic lipping\par Trace suprapatellar effusion\par No patellofemoral joint space loss without evidence of tilt [or] subluxation on sunrise view\par Normal soft tissue density\par Otherwise normal osseous bone structure without fracture or dislocation\par \par \par MRI Left knee from [facility] on 2-16-19\par My impression of the images:\par Quality of the MRI is good\par Medial Meniscus mild degenerative tear\par Lateral Meniscus flipped body/anterior horn tear\par There is mild to moderate chondral loss in the trial compartments\par There is [Not] bone marrow edema[/subchondral cysts] in the tricompartments\par LCL is intact\par MCL is intact\par ACL is intact\par PCL is intact \par Quadriceps Tendon is intact\par Patella Tendon is intact\par \par The Final Radiologist Impression:\par Flap tear lateral meniscus body/anterior horn\par Tricompartmental degenerative arthritis most pronounced in the lateral tibiofemoral compartment.\par Zero point for similar lateral patellar position with respect to the trochlear groove, TT-TG distance approximately 1.6 cm\par Distal quadriceps, patellar and semimembranosus tendinopathy, popliteus tendinopathy.\par Evidence of remote prior MCL injury.\par Moderate joint effusion with popliteal cyst and edematous quadriceps fat pad\par \par 5 views of the affected Right knee (standing AP, flexing standing AP, 30degree flexed lateral, 0degree lateral, sunrise view)\par 6-24-19\par demonstrate:\par There is trace medial weightbearing asymmetric narrowing\par no osteophytic lipping\par Trace suprapatellar effusion\par no patellofemoral joint space loss without evidence of tilt [or] subluxation on sunrise view\par Normal soft tissue density\par Otherwise normal osseous bone structure without fracture or dislocation\par \par \par MRI Right knee from [facility] on 7-19-19\par My impression of the images:\par Quality of the MRI is ok\par Medial Meniscus ok\par Lateral Meniscus intra-meniscal signal in the body with possible anterior edge extension\par There is mild to moderate chondral loss in the tricompartments\par There is [Not] bone marrow edema[/subchondral cysts] in the tricompartments\par LCL is intact\par MCL is intact\par ACL is intact\par PCL is intact \par Quadriceps Tendon is intact\par Patella Tendon is intact\par \par The Final Radiologist Impression:\par Small joint effusion with synovitis.\par Lateral tilt and 0.5 cm lateral patella positioning with respect to the trochlear groove. Mild-moderate tricompartmental osteoarthritis, including chondromalacia and osteophytosis. Subcentimeter subcortical cystic and edematous foci subjacent to the tibial spine, likely traction related. No recent fracture or marrow infiltrative process.\par Distal quadriceps tendon, patellar tendon, anterior cruciate ligament, posterior cruciate ligament, popliteus, medial collateral ligament, lateral collateral ligament, iliotibial band and biceps femoris intact. Origin tendinopathy medial head gastrocnemius.\par No evidence of medial meniscal tear. Oblique linear intrasubstance signal lateral meniscal body-posterior horn junction, approaching however not definitively communicating with the inferior meniscal surface.\par Small fluid popliteal bursa. No soft tissue mass.\par IMPRESSION:\par 1. Small joint effusion with synovitis and fluid popliteal bursa.\par 2. Mild-moderate tricompartmental osteoarthritis with lateral tilt and 0.5 cm lateral patella positioning with respect to the trochlear groove. Correlate clinically for patella subluxation.\par 3. Oblique linear intrasubstance signal lateral meniscal body-posterior horn junction (series 4 image 19), approaching however not definitively communicating with the inferior meniscal surface. Cannot include underlying closed flap tear.\par 4. Origin tendinopathy medial head gastrocnemius.\par \par \par 5 views of the affected Left knee (standing AP, flexing standing AP, 30degree flexed lateral, 0degree lateral, sunrise view)\par were ordered, obtained and evaluated by myself today and\par demonstrate:\par There is moderate to severe lateral weightbearing asymmetric narrowing\par Small to moderate osteophytic lipping\par Trace suprapatellar effusion\par Mild patellofemoral joint space loss without evidence of tilt [or] subluxation on sunrise view\par Normal soft tissue density\par Otherwise normal osseous bone structure without fracture or dislocation [de-identified] : Physical Examination\par General: well nourished, in no acute distress, alert and oriented to person, place and time\par Psychiatric: normal mood and affect, no abnormal movements or speech patterns\par Eyes: vision intact + glasses\par Throat: no thyromegaly\par Lymph: no enlarged nodes, no lymphedema in extremity\par Respiratory: no wheezing, no shortness of breath with ambulation\par Cardiac: no cardiac leg swelling, 2+ peripheral pulses\par Neurology: normal gross sensation in extremities to light touch\par Abdomen: soft, non-tender, tympanic, no masses\par \par Musculoskeletal Examination\par Ambulation	+ antalgic gait, - assistive devices\par \par Knee			Right			Left\par General\par      Swelling/Deformity	normal			normal	\par      Skin			normal			healed incisions\par      Erythema		-			-\par      Standing Alignment	neutral			valgus\par      Effusion		none			none\par Range of Motion\par      Hip			full painless ROM		full painless ROM\par      Knee Flexion		130			120\par      Knee Extension	0			0\par Patella\par      J Sign		-			-\par      Quad Medial/Lateral	1/1 1/1\par      Apprehension		-			-\par      Gualberto's		-			+\par      Grind Sign		-			+\par      Crepitus		-			+\par Palpation\par      Medial Joint Line	++			-\par      Medial Fem Condyle	-			-\par      Lateral Joint Line	-			+\par      Quad Tendon		-			-\par      Patella Tendon	-			-\par      Medial Patella		-			-\par      Lateral Patella 	-			-\par      Posterior Knee	-			-\par Ligamentous\par      Varus @ 0° / 30°	-/-			-/-\par      Valgus @ 0° / 30°	-/-			-/-\par      Lachman		-			-\par      Pivot Shift		-			-\par      Anterior Drawer	-			-\par      Posterior Drawer	-			-\par Meniscus\par      Julian		+ medial pain			-\par      Flexion Pinch		+ posterior pain			-\par Strength Examination/Atrophy\par      Hip Flexors 		5+			5+\par      Quadriceps		5+			5+\par      Hamstring		5+			5+\par      Tibialis Anterior	5+			5+\par      Achilles/Soleus	5+			5+\par Sensation\par      Deep Peroneal	normal			normal\par      Superficial Peroneal 	normal			normal\par      Sural  		normal			normal\par      Posterior Tibial 	normal			normal\par      Saphneous 		normal			normal\par Pulses\par      DP			2+			2+\par

## 2019-10-08 LAB
25(OH)D3 SERPL-MCNC: 29.7 NG/ML
ALBUMIN SERPL ELPH-MCNC: 4.7 G/DL
ALP BLD-CCNC: 88 U/L
ALT SERPL-CCNC: 13 U/L
ANION GAP SERPL CALC-SCNC: 13 MMOL/L
APPEARANCE: CLEAR
AST SERPL-CCNC: 22 U/L
BASOPHILS # BLD AUTO: 0.06 K/UL
BASOPHILS NFR BLD AUTO: 1.7 %
BILIRUB SERPL-MCNC: <0.2 MG/DL
BILIRUBIN URINE: NEGATIVE
BLOOD URINE: NEGATIVE
BUN SERPL-MCNC: 11 MG/DL
CALCIUM SERPL-MCNC: 9 MG/DL
CHLORIDE SERPL-SCNC: 105 MMOL/L
CHOLEST SERPL-MCNC: 166 MG/DL
CHOLEST/HDLC SERPL: 2.5 RATIO
CO2 SERPL-SCNC: 24 MMOL/L
COLOR: NORMAL
CREAT SERPL-MCNC: 0.89 MG/DL
CREAT SPEC-SCNC: 62 MG/DL
EOSINOPHIL # BLD AUTO: 0.1 K/UL
EOSINOPHIL NFR BLD AUTO: 2.9 %
ERYTHROCYTE [SEDIMENTATION RATE] IN BLOOD BY WESTERGREN METHOD: 28 MM/HR
ESTIMATED AVERAGE GLUCOSE: 108 MG/DL
FOLATE SERPL-MCNC: 11.1 NG/ML
GGT SERPL-CCNC: 25 U/L
GLUCOSE QUALITATIVE U: NEGATIVE
GLUCOSE SERPL-MCNC: 77 MG/DL
HBA1C MFR BLD HPLC: 5.4 %
HCT VFR BLD CALC: 31.6 %
HDLC SERPL-MCNC: 66 MG/DL
HEMOCCULT STL QL IA: POSITIVE
HGB BLD-MCNC: 9.7 G/DL
IMM GRANULOCYTES NFR BLD AUTO: 0.3 %
IRON SATN MFR SERPL: 12 %
IRON SERPL-MCNC: 38 UG/DL
KETONES URINE: NEGATIVE
LDLC SERPL CALC-MCNC: 82 MG/DL
LEUKOCYTE ESTERASE URINE: NEGATIVE
LYMPHOCYTES # BLD AUTO: 1.37 K/UL
LYMPHOCYTES NFR BLD AUTO: 39.7 %
MAN DIFF?: NORMAL
MCHC RBC-ENTMCNC: 30.7 GM/DL
MCHC RBC-ENTMCNC: 30.9 PG
MCV RBC AUTO: 100.6 FL
MICROALBUMIN 24H UR DL<=1MG/L-MCNC: <1.2 MG/DL
MICROALBUMIN/CREAT 24H UR-RTO: NORMAL MG/G
MONOCYTES # BLD AUTO: 0.4 K/UL
MONOCYTES NFR BLD AUTO: 11.6 %
NEUTROPHILS # BLD AUTO: 1.51 K/UL
NEUTROPHILS NFR BLD AUTO: 43.8 %
NITRITE URINE: NEGATIVE
PH URINE: 7
PLATELET # BLD AUTO: 302 K/UL
POTASSIUM SERPL-SCNC: 4 MMOL/L
PROT SERPL-MCNC: 7.1 G/DL
PROTEIN URINE: NEGATIVE
RBC # BLD: 3.14 M/UL
RBC # FLD: 12.2 %
SODIUM SERPL-SCNC: 141 MMOL/L
SPECIFIC GRAVITY URINE: 1.01
T3 SERPL-MCNC: 84 NG/DL
T4 FREE SERPL-MCNC: 0.9 NG/DL
TIBC SERPL-MCNC: 327 UG/DL
TRIGL SERPL-MCNC: 88 MG/DL
TSH SERPL-ACNC: 4.19 UIU/ML
UIBC SERPL-MCNC: 289 UG/DL
UROBILINOGEN URINE: NORMAL
VIT B12 SERPL-MCNC: 478 PG/ML
WBC # FLD AUTO: 3.45 K/UL

## 2019-10-10 ENCOUNTER — RX CHANGE (OUTPATIENT)
Age: 54
End: 2019-10-10

## 2019-10-11 ENCOUNTER — APPOINTMENT (OUTPATIENT)
Dept: ULTRASOUND IMAGING | Facility: HOSPITAL | Age: 54
End: 2019-10-11
Payer: MEDICARE

## 2019-10-16 ENCOUNTER — APPOINTMENT (OUTPATIENT)
Dept: ORTHOPEDIC SURGERY | Facility: CLINIC | Age: 54
End: 2019-10-16
Payer: MEDICARE

## 2019-10-16 PROCEDURE — 20611 DRAIN/INJ JOINT/BURSA W/US: CPT | Mod: LT

## 2019-10-16 NOTE — PROCEDURE
[de-identified] : Chief complaint:     Left knee pain\par \par Diagnosis:              Left knee osteoarthritis\par \par \par Injection:\par Synvisc one\par [id number] expiration July 2021\par \par \par Procedure Note:\par \par Risks and benefits of an arthrocentesis and intraarticular hyaluronic injection of the LEFT knee were discussed with the patient. Potential adverse effects were discussed including but not limited to bleeding, skin/ joint infection, local skin reactions including bleaching, bruising, stiffness, soreness, vasovagal episodes, transient hyperglycemia, avascular necrosis, pseudo-septic type reactions, post injection joint pain, allergic reaction to product or anesthetic and other rare but potential adverse effects along with benefits including decreased pain and improved stability prior to obtaining verbal informed consent. It was also discussed that for some patients the treatment is ineffective and there are no guarantees that the patient will experience improvement as the result of the injection. In rare occasions the injection can cause worsening of pain.\par \par The use of a Sonosite 15-6 MHz linear transducer with live ultrasound guidance of the knee was necessary given the patient's BMI and local body habitus overlying and obscuring the accurate identification of normal body bony anatomy used to identify the injection site and the depth of soft tissue envelope necessitating a longer than normal needle to reach the joint space, and to confirm the location of the needle tip and intra-articular delivery of the medication. Without the use of live ultrasound guidance the injection would have been more difficult and place the patient's neurovascular structures at risk from the longer needle needed to traverse the soft tissue envelope.\par \par A 6 inch bolster was placed underneath the knee to place the LEFT knee in a slightly flexed position. The superolateral injection site was identified using the ultrasound probe to identify the suprapatellar space and superior boarder of the patella first longitudinally and then transversely. The injection site was marked and prepped with a ChloraPrep swab and anesthetized with ethylchloride skin anesthesia. Under sterile technique a 20g 3 1/2 in spinal needle with a preloaded viscosupplementation syringe was passed through the injection site towards the suprapatellar space under live ultrasound guidance and noted to penetrate the joint capsule. The medication was injected without resistance under live ultrasound visualization and noted to flow into the suprapatellar joint space. The injection site was sterilely dressed, there was minimal blood loss.\par \par The patient tolerated this procedure without any complications done by myself. Images were recorded and saved.\par \par The patient has been advised that if they notice any worsening of symptoms or any problems to contact me and seek care from a qualified medical professional. The patient was instructed to ice the knee and take NSAID medication on an as needed basis if the patient feels discomfort.\par \par Followup injection [6 months]

## 2019-10-18 ENCOUNTER — OUTPATIENT (OUTPATIENT)
Dept: OUTPATIENT SERVICES | Facility: HOSPITAL | Age: 54
LOS: 1 days | End: 2019-10-18
Payer: MEDICARE

## 2019-10-18 ENCOUNTER — APPOINTMENT (OUTPATIENT)
Dept: ULTRASOUND IMAGING | Facility: HOSPITAL | Age: 54
End: 2019-10-18

## 2019-10-18 DIAGNOSIS — R10.84 GENERALIZED ABDOMINAL PAIN: ICD-10-CM

## 2019-10-18 DIAGNOSIS — Z98.89 OTHER SPECIFIED POSTPROCEDURAL STATES: Chronic | ICD-10-CM

## 2019-10-18 DIAGNOSIS — Z98.51 TUBAL LIGATION STATUS: Chronic | ICD-10-CM

## 2019-10-18 DIAGNOSIS — Z98.890 OTHER SPECIFIED POSTPROCEDURAL STATES: Chronic | ICD-10-CM

## 2019-10-18 PROCEDURE — 76830 TRANSVAGINAL US NON-OB: CPT | Mod: 26

## 2019-10-18 PROCEDURE — 76856 US EXAM PELVIC COMPLETE: CPT | Mod: 26

## 2019-10-18 PROCEDURE — 76856 US EXAM PELVIC COMPLETE: CPT

## 2019-10-18 PROCEDURE — 76700 US EXAM ABDOM COMPLETE: CPT | Mod: 26

## 2019-10-18 PROCEDURE — 76700 US EXAM ABDOM COMPLETE: CPT

## 2019-10-18 PROCEDURE — 76830 TRANSVAGINAL US NON-OB: CPT

## 2019-10-29 ENCOUNTER — APPOINTMENT (OUTPATIENT)
Dept: INTERNAL MEDICINE | Facility: CLINIC | Age: 54
End: 2019-10-29
Payer: MEDICARE

## 2019-10-29 VITALS
OXYGEN SATURATION: 98 % | HEART RATE: 86 BPM | DIASTOLIC BLOOD PRESSURE: 80 MMHG | TEMPERATURE: 97.6 F | BODY MASS INDEX: 28.22 KG/M2 | SYSTOLIC BLOOD PRESSURE: 130 MMHG | WEIGHT: 140 LBS | HEIGHT: 59 IN | RESPIRATION RATE: 16 BRPM

## 2019-10-29 PROCEDURE — 99214 OFFICE O/P EST MOD 30 MIN: CPT

## 2019-10-29 NOTE — HISTORY OF PRESENT ILLNESS
[de-identified] : 54 year old  female patient with history of stable Hypertension, Hypothyroidism, Hypercholesterolemia with Hypertriglyceridemia, CVA, Insomnia, Migraine, vertigo, GERD, Major Depression in partial remission, history as stated, presented for follow up examination of Elevated BP checked. Patient is compliant with all medications. Denies shortness of breath, chest pain or abdominal pains at this time. ROS as stated.\par

## 2019-10-29 NOTE — ASSESSMENT
[FreeTextEntry1] : 54 year old female found to have stable Hypertension, Hypothyroidism, Hypercholesterolemia with Hypertriglyceridemia, CVA, Insomnia, Migraine, vertigo,  GERD,with the current regimen, diet and life style modifications, as counseled. Prior results reviewed and discussed with the patient during today's examination. Plan as ordered.\par Supplemental history was obtained from [facility], who is accompanying the patient for today's examination.\par

## 2019-10-29 NOTE — HEALTH RISK ASSESSMENT
[No] : In the past 12 months have you used drugs other than those required for medical reasons? No [No falls in past year] : Patient reported no falls in the past year [1] : 2) Feeling down, depressed, or hopeless for several days (1) [] : No [de-identified] : None [FreeTextEntry1] : Non-suicidal at this time. [[id number]] : 2

## 2019-10-30 ENCOUNTER — EMERGENCY (EMERGENCY)
Facility: HOSPITAL | Age: 54
LOS: 1 days | Discharge: HOME | End: 2019-10-30
Attending: EMERGENCY MEDICINE
Payer: MEDICARE

## 2019-10-30 VITALS
TEMPERATURE: 99 F | HEART RATE: 106 BPM | SYSTOLIC BLOOD PRESSURE: 134 MMHG | RESPIRATION RATE: 18 BRPM | DIASTOLIC BLOOD PRESSURE: 86 MMHG | OXYGEN SATURATION: 100 % | WEIGHT: 138.01 LBS

## 2019-10-30 VITALS
SYSTOLIC BLOOD PRESSURE: 128 MMHG | TEMPERATURE: 98 F | OXYGEN SATURATION: 100 % | DIASTOLIC BLOOD PRESSURE: 78 MMHG | HEART RATE: 70 BPM | RESPIRATION RATE: 18 BRPM

## 2019-10-30 DIAGNOSIS — Z98.89 OTHER SPECIFIED POSTPROCEDURAL STATES: Chronic | ICD-10-CM

## 2019-10-30 DIAGNOSIS — Z98.890 OTHER SPECIFIED POSTPROCEDURAL STATES: Chronic | ICD-10-CM

## 2019-10-30 DIAGNOSIS — Z98.51 TUBAL LIGATION STATUS: Chronic | ICD-10-CM

## 2019-10-30 LAB
ALBUMIN SERPL ELPH-MCNC: 3.6 G/DL — SIGNIFICANT CHANGE UP (ref 3.5–5)
ALP SERPL-CCNC: 85 U/L — SIGNIFICANT CHANGE UP (ref 40–120)
ALT FLD-CCNC: 28 U/L DA — SIGNIFICANT CHANGE UP (ref 10–60)
ANION GAP SERPL CALC-SCNC: 3 MMOL/L — LOW (ref 5–17)
APPEARANCE UR: ABNORMAL
AST SERPL-CCNC: 26 U/L — SIGNIFICANT CHANGE UP (ref 10–40)
BACTERIA # UR AUTO: ABNORMAL /HPF
BASOPHILS # BLD AUTO: 0.06 K/UL — SIGNIFICANT CHANGE UP (ref 0–0.2)
BASOPHILS NFR BLD AUTO: 1.7 % — SIGNIFICANT CHANGE UP (ref 0–2)
BILIRUB SERPL-MCNC: 0.2 MG/DL — SIGNIFICANT CHANGE UP (ref 0.2–1.2)
BILIRUB UR-MCNC: NEGATIVE — SIGNIFICANT CHANGE UP
BUN SERPL-MCNC: 13 MG/DL — SIGNIFICANT CHANGE UP (ref 7–18)
CALCIUM SERPL-MCNC: 8.7 MG/DL — SIGNIFICANT CHANGE UP (ref 8.4–10.5)
CHLORIDE SERPL-SCNC: 114 MMOL/L — HIGH (ref 96–108)
CO2 SERPL-SCNC: 26 MMOL/L — SIGNIFICANT CHANGE UP (ref 22–31)
COLOR SPEC: YELLOW — SIGNIFICANT CHANGE UP
COMMENT - URINE: SIGNIFICANT CHANGE UP
CREAT SERPL-MCNC: 0.9 MG/DL — SIGNIFICANT CHANGE UP (ref 0.5–1.3)
DIFF PNL FLD: ABNORMAL
EOSINOPHIL # BLD AUTO: 0.12 K/UL — SIGNIFICANT CHANGE UP (ref 0–0.5)
EOSINOPHIL NFR BLD AUTO: 3.4 % — SIGNIFICANT CHANGE UP (ref 0–6)
EPI CELLS # UR: SIGNIFICANT CHANGE UP /HPF
ERYTHROCYTE [SEDIMENTATION RATE] IN BLOOD: 23 MM/HR — HIGH (ref 0–20)
GLUCOSE SERPL-MCNC: 74 MG/DL — SIGNIFICANT CHANGE UP (ref 70–99)
GLUCOSE UR QL: NEGATIVE — SIGNIFICANT CHANGE UP
HCT VFR BLD CALC: 33.3 % — LOW (ref 34.5–45)
HGB BLD-MCNC: 10.6 G/DL — LOW (ref 11.5–15.5)
IMM GRANULOCYTES NFR BLD AUTO: 0.3 % — SIGNIFICANT CHANGE UP (ref 0–1.5)
KETONES UR-MCNC: NEGATIVE — SIGNIFICANT CHANGE UP
LEUKOCYTE ESTERASE UR-ACNC: ABNORMAL
LYMPHOCYTES # BLD AUTO: 1.07 K/UL — SIGNIFICANT CHANGE UP (ref 1–3.3)
LYMPHOCYTES # BLD AUTO: 30.4 % — SIGNIFICANT CHANGE UP (ref 13–44)
MCHC RBC-ENTMCNC: 31.1 PG — SIGNIFICANT CHANGE UP (ref 27–34)
MCHC RBC-ENTMCNC: 31.8 GM/DL — LOW (ref 32–36)
MCV RBC AUTO: 97.7 FL — SIGNIFICANT CHANGE UP (ref 80–100)
MONOCYTES # BLD AUTO: 0.29 K/UL — SIGNIFICANT CHANGE UP (ref 0–0.9)
MONOCYTES NFR BLD AUTO: 8.2 % — SIGNIFICANT CHANGE UP (ref 2–14)
NEUTROPHILS # BLD AUTO: 1.97 K/UL — SIGNIFICANT CHANGE UP (ref 1.8–7.4)
NEUTROPHILS NFR BLD AUTO: 56 % — SIGNIFICANT CHANGE UP (ref 43–77)
NITRITE UR-MCNC: POSITIVE
NRBC # BLD: 0 /100 WBCS — SIGNIFICANT CHANGE UP (ref 0–0)
PH UR: 7 — SIGNIFICANT CHANGE UP (ref 5–8)
PLATELET # BLD AUTO: 345 K/UL — SIGNIFICANT CHANGE UP (ref 150–400)
POTASSIUM SERPL-MCNC: 3.8 MMOL/L — SIGNIFICANT CHANGE UP (ref 3.5–5.3)
POTASSIUM SERPL-SCNC: 3.8 MMOL/L — SIGNIFICANT CHANGE UP (ref 3.5–5.3)
PROT SERPL-MCNC: 7.3 G/DL — SIGNIFICANT CHANGE UP (ref 6–8.3)
PROT UR-MCNC: NEGATIVE — SIGNIFICANT CHANGE UP
RBC # BLD: 3.41 M/UL — LOW (ref 3.8–5.2)
RBC # FLD: 11.9 % — SIGNIFICANT CHANGE UP (ref 10.3–14.5)
RBC CASTS # UR COMP ASSIST: ABNORMAL /HPF (ref 0–2)
SODIUM SERPL-SCNC: 143 MMOL/L — SIGNIFICANT CHANGE UP (ref 135–145)
SP GR SPEC: 1.01 — SIGNIFICANT CHANGE UP (ref 1.01–1.02)
UROBILINOGEN FLD QL: NEGATIVE — SIGNIFICANT CHANGE UP
WBC # BLD: 3.52 K/UL — LOW (ref 3.8–10.5)
WBC # FLD AUTO: 3.52 K/UL — LOW (ref 3.8–10.5)
WBC UR QL: ABNORMAL /HPF (ref 0–5)

## 2019-10-30 PROCEDURE — 99285 EMERGENCY DEPT VISIT HI MDM: CPT | Mod: 25

## 2019-10-30 PROCEDURE — 99285 EMERGENCY DEPT VISIT HI MDM: CPT

## 2019-10-30 PROCEDURE — 70450 CT HEAD/BRAIN W/O DYE: CPT | Mod: 26

## 2019-10-30 PROCEDURE — 87186 SC STD MICRODIL/AGAR DIL: CPT

## 2019-10-30 PROCEDURE — 96374 THER/PROPH/DIAG INJ IV PUSH: CPT

## 2019-10-30 PROCEDURE — 87086 URINE CULTURE/COLONY COUNT: CPT

## 2019-10-30 PROCEDURE — 85652 RBC SED RATE AUTOMATED: CPT

## 2019-10-30 PROCEDURE — 36415 COLL VENOUS BLD VENIPUNCTURE: CPT

## 2019-10-30 PROCEDURE — 96375 TX/PRO/DX INJ NEW DRUG ADDON: CPT

## 2019-10-30 PROCEDURE — 80053 COMPREHEN METABOLIC PANEL: CPT

## 2019-10-30 PROCEDURE — 85027 COMPLETE CBC AUTOMATED: CPT

## 2019-10-30 PROCEDURE — 70450 CT HEAD/BRAIN W/O DYE: CPT

## 2019-10-30 PROCEDURE — 81001 URINALYSIS AUTO W/SCOPE: CPT

## 2019-10-30 RX ORDER — SODIUM CHLORIDE 9 MG/ML
1000 INJECTION INTRAMUSCULAR; INTRAVENOUS; SUBCUTANEOUS
Refills: 0 | Status: DISCONTINUED | OUTPATIENT
Start: 2019-10-30 | End: 2019-11-04

## 2019-10-30 RX ORDER — CEFTRIAXONE 500 MG/1
1000 INJECTION, POWDER, FOR SOLUTION INTRAMUSCULAR; INTRAVENOUS ONCE
Refills: 0 | Status: COMPLETED | OUTPATIENT
Start: 2019-10-30 | End: 2019-10-30

## 2019-10-30 RX ORDER — KETOROLAC TROMETHAMINE 30 MG/ML
30 SYRINGE (ML) INJECTION ONCE
Refills: 0 | Status: DISCONTINUED | OUTPATIENT
Start: 2019-10-30 | End: 2019-10-30

## 2019-10-30 RX ORDER — CEFUROXIME AXETIL 250 MG
1 TABLET ORAL
Qty: 14 | Refills: 0
Start: 2019-10-30 | End: 2019-11-05

## 2019-10-30 RX ORDER — DIPHENHYDRAMINE HCL 50 MG
25 CAPSULE ORAL ONCE
Refills: 0 | Status: COMPLETED | OUTPATIENT
Start: 2019-10-30 | End: 2019-10-30

## 2019-10-30 RX ORDER — MAGNESIUM SULFATE 500 MG/ML
1 VIAL (ML) INJECTION ONCE
Refills: 0 | Status: COMPLETED | OUTPATIENT
Start: 2019-10-30 | End: 2019-10-30

## 2019-10-30 RX ORDER — METOCLOPRAMIDE HCL 10 MG
10 TABLET ORAL ONCE
Refills: 0 | Status: COMPLETED | OUTPATIENT
Start: 2019-10-30 | End: 2019-10-30

## 2019-10-30 RX ORDER — ACETAMINOPHEN 500 MG
1000 TABLET ORAL ONCE
Refills: 0 | Status: COMPLETED | OUTPATIENT
Start: 2019-10-30 | End: 2019-10-30

## 2019-10-30 RX ADMIN — Medication 30 MILLIGRAM(S): at 20:25

## 2019-10-30 RX ADMIN — Medication 100 GRAM(S): at 20:25

## 2019-10-30 RX ADMIN — Medication 25 MILLIGRAM(S): at 16:55

## 2019-10-30 RX ADMIN — Medication 104 MILLIGRAM(S): at 16:55

## 2019-10-30 RX ADMIN — CEFTRIAXONE 100 MILLIGRAM(S): 500 INJECTION, POWDER, FOR SOLUTION INTRAMUSCULAR; INTRAVENOUS at 20:25

## 2019-10-30 RX ADMIN — SODIUM CHLORIDE 150 MILLILITER(S): 9 INJECTION INTRAMUSCULAR; INTRAVENOUS; SUBCUTANEOUS at 17:13

## 2019-10-30 RX ADMIN — Medication 400 MILLIGRAM(S): at 16:54

## 2019-10-30 RX ADMIN — Medication 1000 MILLIGRAM(S): at 17:07

## 2019-10-30 NOTE — ED PROVIDER NOTE - PHYSICAL EXAMINATION
.  GENERAL: Well developed, Middle aged female, in moderate pain   HEENT:  Normocephalic/Atraumatic, reactive light reflex, moist mucous membranes  NECK: Supple, no JVD  RESP: Symmetric movement of the chest, clear to auscultation bilaterally  CVS: + tachy, S1 and S2 audible, no murmur, rubs or gallops noted  GI: Normal active bowel sounds present, abdomen soft, lower abdominal tender  EXTREMITIES:  No edema, no clubbing, cyanosis  MSK: 5/5 strength bilateral upper and lower extremities, intact sensations to light & crude touch  PSYCH: Normal mood, anxious   NEURO: Alert and oriented x 3

## 2019-10-30 NOTE — ED PROVIDER NOTE - CLINICAL SUMMARY MEDICAL DECISION MAKING FREE TEXT BOX
54 Female with history of migraines, depression and anxiety came to hospital for severe migraine attack with photophobia.

## 2019-10-30 NOTE — ED PROVIDER NOTE - OBJECTIVE STATEMENT
54 female with PMH of Migraines, Anxiety, depression came to hospital for severe headache associated with photophobia for 3 days. Pt usually gets these attacks every few months when she gets angry and resolve with medications. This episode lasted longer without improvement with home meds. Her pain is sharp, starts from the back of neck, goes to frontal side and then bilaterally. Exacerbates with noise and alleviates with dark room. She lives alone and has HHA six hours a day. Denies any sick contacts, recent travel, problems with bowel. Her refractory episodes resolve with IV shot of medications given to her by her primary neurologist in clinic (not sure of name).

## 2019-10-30 NOTE — ED PROVIDER NOTE - ATTENDING CONTRIBUTION TO CARE
54 y.o. female with h/o migraine HA BIBA c/o pain behind his ulisses ears radiated to bitemporal area constant for past 3 days, photophobia, phonophobia, nausea, no vomiting, fever, pt also c/o foul smelling urine for past 2 days, urinary frequency, pt was treated for UTI beginning of mo. with improvement.  PE: Moderate distress, EOMI, Heart- S1S2RR, Lungs- clear, Abd- soft NT, neuro- no nuchal rigidity, sinus-NT to percussion.  Pt with recurrent migraine HA, will get labs, ESR to r/o Temporal arteritis which is unlikely.  Will give treatment

## 2019-10-30 NOTE — ED PROVIDER NOTE - PATIENT PORTAL LINK FT
You can access the FollowMyHealth Patient Portal offered by [facility] by registering at the following website: [email or web address]. By joining [facility]’s FollowMyHealth portal, you will also be able to view your health information using other applications (apps) compatible with our system.

## 2019-11-22 ENCOUNTER — APPOINTMENT (OUTPATIENT)
Dept: GASTROENTEROLOGY | Facility: CLINIC | Age: 54
End: 2019-11-22
Payer: MEDICARE

## 2019-11-22 VITALS
SYSTOLIC BLOOD PRESSURE: 136 MMHG | HEIGHT: 59 IN | BODY MASS INDEX: 29.03 KG/M2 | WEIGHT: 144 LBS | TEMPERATURE: 97.8 F | OXYGEN SATURATION: 100 % | HEART RATE: 80 BPM | DIASTOLIC BLOOD PRESSURE: 93 MMHG

## 2019-11-22 DIAGNOSIS — K64.8 OTHER HEMORRHOIDS: ICD-10-CM

## 2019-11-22 PROCEDURE — 99213 OFFICE O/P EST LOW 20 MIN: CPT

## 2019-11-22 NOTE — ASSESSMENT
[FreeTextEntry1] : Abdominal Pain: The patient complains of abdominal pain. The patient is to avoid nonsteroidal anti-inflammatory drugs and aspirin.  I recommend a trial of Carafate 1 gram PO 4 times a day for 3 months for the symptoms.\par Dyspepsia: The patient complains of dyspeptic symptoms.  The patient was advised to abide by an anti-gas diet.  The patient was given a pamphlet for anti-gas.  The patient and I reviewed the anti-gas diet at length. The patient is to start on a trial of Phazyme one tablet 3 times a day p.r.n. abdominal pain and gas.\par Dysphagia: The patient complains of dysphagia of unclear etiology.   The dysphagia is worse with meals but not with liquids.   If the symptoms persist despite medications and if the upper endoscopy is unremarkable, the patient may require motility studies to assess the etiology of the dysphagia.  The patient agrees and will follow-up for further work-up and treatment.\par GERD: The patient was advised to avoid late-night meals and dietary indiscretions.  The patient was advised to avoid fried and fatty foods.  The patient was advised to abide by an anti-GERD diet. The patient was given a pamphlet foranti-GERD.  The patient and I reviewed the anti-GERD diet at length. I recommend a trial of Carafate 1 gram 4 times a day x 3 months for the symptoms.\par Diarrhea: The patient complains of diarrhea.  I recommend a low residue diet. The patient is to avoid fiber supplementation. The patient is to consider starting a trial of a probiotic such as Align once a day.   The symptoms are worse after meals.  The patient has a history of cholecystectomy.  I recommend a trial of cholestyramine one packet once a day for possible bile induced diarrhea.  The patient agreed and will follow-up to reassess the symptoms.  \par Blood Work: I recommend blood work to assess the patient's symptoms. I recommend a CBC, SMA 24, amylase, lipase, ESR, TFTs, YOKO, rheumatoid factor, celiac sprue panel, IgA, profile for hepatitis A, B, C. ,iron, TIBC, ferritin level and lipid profile.  I also recommend obtaining the recent blood work performed by the patient's PMD.\par Imaging Study: I recommend an imaging study to assess the symptoms. I recommend a CAT scan of the abdomen and pelvis with and without IV contrast to assess the liver parenchyma and for liver lesions. to assess the pancreas for pancreatic cystic lesions.\par Diverticulitis: The patient has symptoms suggestive of acute diverticulitis. The patient may require an emergency room evaluation for diverticulitis if the symptoms persist.  The patient may require treatment with antibiotics if the symptoms persist. The patient may require treatment with a trial of Metronidazole 500 mg 3 times a day and Ciprofloxin 500 mg twice a day for 2 weeks for the presumed acute diverticulitis.     I recommend a low residue diet.  I recommend a CAT scan of the abdomen and pelvis with IV contrast to assess the acute diverticulitis and possible complications. The patient was advised to go to the hospital if fever, chills, worsening abdominal pain or GI bleeding recurs.  The patient agrees.\par I reviewed the abdominal and pelvic ultrasound to assess the liver parenchyma and for liver lesions.\par Hiatal Hernia: The patient was advised to avoid late-night meals and dietary indiscretions. The patient was advised to avoid fried and fatty foods. The patient was advised to abide by an anti-GERD diet. The patient was given a pamphlet for anti-GERD. The patient and I reviewed the anti-GERD diet at length.  I recommend a trial of Carafate 1 gram PO 4 times a day for 3 months for the symptoms.\par Gastritis: The patient has a history of gastritis. The patient is to avoid nonsteroidal anti-inflammatory drugs and aspirin.  I recommend a trial of Carafate 1 gram PO 4 times a day for 3 months for the symptoms.\par Diverticulosis: I recommend a high-fiber diet and avoid seeds. The patient is to consider a trial of Metamucil once a day for fiber supplementation. The patient is to also consider a trial of a probiotic such as Align once a day. \par Internal Hemorrhoids: The patient is to be started on a trial of Anusol H. C. suppositories one per rectum nightly and Anusol HC2.5% cream apply to affected area twice a day p.r.n. hemorrhoidal bleeding or pain. \par Poor Prep Colonoscopy: The patient had a recent poor prep colonoscopy. I recommend a repeat colonoscopy in < 6 months to reassess for colonic polyps secondary to the poor prep. The patient was told of the risks and benefits of the procedure. The patient was told of the risks of perforation, emergency surgery, bleeding, infections and missed lesions. The patient agreed and will return for the procedure.\par Occult Blood in the Stool: The patient was found to have occult blood in the stool on physical exam. The patient denies any bright red blood per rectum, melena or hematemesis. The patient was previously found to be guaiac-positive by her PMD. \par Follow-up: The patient is to follow-up in the office in 4 weeks to reassess the symptoms. The patient was told to call the office if any further problems.\par

## 2019-11-22 NOTE — HISTORY OF PRESENT ILLNESS
[None] : had no significant interval events [Vomiting] : denies vomiting [Yellow Skin Or Eyes (Jaundice)] : denies jaundice [Abdominal Pain] : denies abdominal pain [Rectal Pain] : denies rectal pain [Heartburn] : heartburn [Nausea] : nausea [Diarrhea] : diarrhea [Constipation] : constipation [Abdominal Swelling] : abdominal swelling [GERD] : gastroesophageal reflux disease [Hiatus Hernia] : hiatus hernia [Wt Gain ___ Lbs] : no recent weight gain [Pancreatitis] : no pancreatitis [Wt Loss ___ Lbs] : no recent weight loss [Peptic Ulcer Disease] : no peptic ulcer disease [Cholelithiasis] : no cholelithiasis [Kidney Stone] : no kidney stone [Inflammatory Bowel Disease] : no inflammatory bowel disease [Irritable Bowel Syndrome] : no irritable bowel syndrome [Malignancy] : no malignancy [Alcohol Abuse] : no alcohol abuse [Diverticulitis] : no diverticulitis [Abdominal Surgery] : no abdominal surgery [Appendectomy] : no appendectomy [Cholecystectomy] : no cholecystectomy [de-identified] : The patient states that she is feeling uncomfortable. The patient denies any jaundice or pruritus.  The patient complains of chronic lower back pain. The patient complains of abdominal pain.  The patient describes the abdominal pain as a crampy, intermittent diffuse abdominal discomfort that occasionally radiates to the back.  The abdominal pain is worse with stress.  The abdominal pain is worse with meals and improves with passing gas or having a bowel movement.  The abdominal pain is described as being severe in nature.  The abdominal pain occurs at night and in the morning.  The abdominal pain can occur at any time.   The abdominal pain has awakened the patient from sleep.  The abdominal pain is not relieved with medication.  The abdominal pain is associated with abdominal gas and bloating.   The patient denies any nausea or vomiting.  The patient complains of gastroesophageal reflux disease and dysphagia.  The dysphagia is worse with solids but unrelated to liquids. The gastroesophageal reflux disease is worse after meals and late at night and in the early morning. The gastroesophageal reflux disease is slightly improved with proton pump inhibitors, H2 blockers and antacids.   The patient complains of occasional shortness of breath and palpitations but denies any atypical chest pain.  The patient denies any diaphoresis. The patient complains of alternating diarrhea/constipation.  The patient has 3 to 5 bowel movements a day.  The patient complains of a change in bowel habits.  The patient complains of a change in caliber of stool.   The diarrhea is described as soft to watery in nature.  The patient denies having mucus discharge with the bowel movements.  The patient denies any bright red blood per rectum, melena or hematemesis.  The patient complains of rectal pruritus but denies any rectal pain.  The patient complains of fluctuating weight but denies any anorexia.   She denies any fevers or chills.  The patient had an upper endoscopy and colonoscopy to the cecum performed at the [facility] end[facility] on July 18, 2019. The upper endoscopy was performed up to the level of the second portion of the duodenum. The upper endoscopy revealed a small hiatal hernia and mild diffuse bile gastritis. Biopsies were taken of the distal esophagus, antrum, body of stomach and duodenum to assess for esophagitis, gastritis and duodenitis. The pathology revealed distal esophagus with unremarkable squamous mucosa, gastric antral and body mucosa with chronic inactive gastritis with severe lymphoid follicles that was negative for Helicobacter pylori and unremarkable duodenal mucosa. The colonoscopy to the cecum revealed moderate left sided diverticulosis, a poor prep colonoscopy and small internal hemorrhoids. The study was limited secondary to retained liquid and solid stool scattered throughout the colon but no gross lesions were noted. Unable to comment on small colonic polyps or masses secondary to the poor prep. There were no polyps, masses, diverticulosis, AVMs or colitis noted. The patient tolerated the procedures well. The patient was found to have occult blood in the stool.  The patient had an abdominal and pelvic ultrasound performed on October 18, 2019 to assess the symptoms. The abdominal ultrasound performed on October 18, 2019 revealed no gallstones or pericholecystic fluid. The contracted state of the gallbladder limits assessment for gallbladder wall thickening. There is no evidence for intrahepatic or extrahepatic biliary duct dilatation. The pelvic ultrasound performed on October 18, 2019 revealed no evidence for an ovarian lesion.  The patient admits to having a prior upper endoscopy and colonoscopy performed by another gastroenterologist. The upper endoscopic findings were unknown to the patient. The colonoscopic findings revealed a poor prep colonoscopy. The patient admits to a family history of GI problems. The patient’s brother and father had a history of bleeding peptic ulcer disease.

## 2019-11-26 ENCOUNTER — LABORATORY RESULT (OUTPATIENT)
Age: 54
End: 2019-11-26

## 2019-11-26 ENCOUNTER — MESSAGE (OUTPATIENT)
Age: 54
End: 2019-11-26

## 2019-11-26 LAB
ALBUMIN SERPL ELPH-MCNC: 5 G/DL
ALP BLD-CCNC: 84 U/L
ALT SERPL-CCNC: 10 U/L
AMYLASE/CREAT SERPL: 58 U/L
ANION GAP SERPL CALC-SCNC: 12 MMOL/L
AST SERPL-CCNC: 18 U/L
BASOPHILS # BLD AUTO: 0.05 K/UL
BASOPHILS NFR BLD AUTO: 1.5 %
BILIRUB SERPL-MCNC: 0.2 MG/DL
BUN SERPL-MCNC: 10 MG/DL
CALCIUM SERPL-MCNC: 9.6 MG/DL
CANCER AG19-9 SERPL-ACNC: 15 U/ML
CEA SERPL-MCNC: 4.8 NG/ML
CHLORIDE SERPL-SCNC: 105 MMOL/L
CO2 SERPL-SCNC: 25 MMOL/L
CREAT SERPL-MCNC: 0.8 MG/DL
EOSINOPHIL # BLD AUTO: 0.09 K/UL
EOSINOPHIL NFR BLD AUTO: 2.7 %
GGT SERPL-CCNC: 34 U/L
GLUCOSE SERPL-MCNC: 93 MG/DL
HCT VFR BLD CALC: 34.6 %
HGB BLD-MCNC: 10.5 G/DL
IMM GRANULOCYTES NFR BLD AUTO: 0.3 %
LPL SERPL-CCNC: 37 U/L
LYMPHOCYTES # BLD AUTO: 1.09 K/UL
LYMPHOCYTES NFR BLD AUTO: 33.1 %
MAN DIFF?: NORMAL
MCHC RBC-ENTMCNC: 30.3 GM/DL
MCHC RBC-ENTMCNC: 30.5 PG
MCV RBC AUTO: 100.6 FL
MONOCYTES # BLD AUTO: 0.28 K/UL
MONOCYTES NFR BLD AUTO: 8.5 %
NEUTROPHILS # BLD AUTO: 1.77 K/UL
NEUTROPHILS NFR BLD AUTO: 53.9 %
PLATELET # BLD AUTO: 363 K/UL
POTASSIUM SERPL-SCNC: 4.4 MMOL/L
PROT SERPL-MCNC: 7.5 G/DL
RBC # BLD: 3.44 M/UL
RBC # FLD: 12.4 %
SODIUM SERPL-SCNC: 142 MMOL/L
WBC # FLD AUTO: 3.29 K/UL

## 2019-11-27 ENCOUNTER — MESSAGE (OUTPATIENT)
Age: 54
End: 2019-11-27

## 2019-11-27 LAB — ERYTHROCYTE [SEDIMENTATION RATE] IN BLOOD BY WESTERGREN METHOD: 22 MM/HR

## 2019-12-04 LAB
CELIAC DISEASE INTERPRETATION: NORMAL
CELIAC GENE PAIRS PRESENT: YES
DQ ALPHA 1: NORMAL
DQ BETA 1: NORMAL
IMMUNOGLOBULIN A (IGA): 253 MG/DL

## 2019-12-09 ENCOUNTER — APPOINTMENT (OUTPATIENT)
Dept: ORTHOPEDIC SURGERY | Facility: CLINIC | Age: 54
End: 2019-12-09
Payer: MEDICARE

## 2019-12-09 PROCEDURE — 99214 OFFICE O/P EST MOD 30 MIN: CPT | Mod: 25

## 2019-12-09 PROCEDURE — 20611 DRAIN/INJ JOINT/BURSA W/US: CPT | Mod: LT

## 2019-12-09 NOTE — DISCUSSION/SUMMARY
[de-identified] : 8+ months status post Left knee Arthroscopy on 3-26-19\par Lateral meniscus meniscectomy and corticosteroid injection\par with increased swelling and pain\par \par Right knee moderate OA with possible lateral meniscal body tear - doing well post injection\par \par The patient and I discussed the causes and progression of degenerative joint disease of the knee. Models, diagrams and drawings were used in the discussion. Treatment can include conservative non-operative management and surgical options. Conservative management includes weight loss, activity modification, physical therapy to improve motion and strength in the muscles around the knee and the body's core, PO and topical NSAIDs, corticosteroid and/or viscosupplementation intra-articular injections. If the patient fails to improve with non-operative management, surgical management is possible. Depending upon the patient's age, BMI, activity level, degree and location of arthrosis different surgical options are possible including arthroscopic debridement with chondroplasty, high-tibial osteotomy, unicondylar/partial arthroplasty, and total joint arthroplasty.\par \par Due to failure of prior non-operative modalities of treatment including physical therapy, activity modification, non-steroidal anti-inflammatory medications, and weight-loss with failure of multiple prior corticosteroid injections without appropriate improvement in symptoms and concern for too many frequent injections causing increased risk for adverse events, I am ordering a time release cortisone injection Zilretta LEFT and will obtain insurance authorization. The patient will be contacted by our authorization department over its status, copayment and when available for injection.\par \par Patient declining physical therapy as she is doing home exercises\par \par Procedure Note:\par \par Verbal consent was obtained for an arthrocentesis and intra-articular corticosteroid injection of the LEFT knee, after the risks and benefits were discussed with the patient. Potential adverse effects were discussed including but not limited to bleeding, skin/joint infection, local skin reactions including bleaching, bruising, stiffness, soreness, vasovagal episodes, transient hyperglycemia, avascular necrosis, pseudo-septic type reactions, post injection joint pain, allergic reaction to product or anesthetic and other rare but potential adverse effects along with benefits including decreased pain and improved stability prior to obtaining verbal informed consent. It was also discussed that for some patients the treatment is ineffective and there are no guarantees that the patient will experience improvement as the result of the injection. In rare occasions the injection can cause worsening of pain.\par \par The use of a Sonosite 15-6 MHz linear transducer with live ultrasound guidance of the knee was necessary given the patient's BMI and local body habitus overlying and obscuring the accurate identification of normal body bony anatomy used to identify the injection site and the depth of soft tissue envelope necessitating a longer than normal needle to reach the joint space, and to confirm the location of the needle tip and intra-articular delivery of the medication. Without the use of live ultrasound guidance the injection would have been more difficult and place the patient's neurovascular structures at risk from the longer needle needed to traverse the soft tissue envelope.\par \par A 6 inch bolster was placed underneath the knee to place the knee in a slightly flexed position. The superolateral injection site was identified using the ultrasound probe to identify the suprapatellar space and superior boarder of the patella first longitudinally and then transversely. The injection site was marked and prepped with a ChloraPrep swab and anesthetized with 3cc lidocaine for skin anesthesia using a 22 g needle. Using sterile technique a 20g 3 1/2 in spinal needle was passed through the injection site towards the suprapatellar space under live ultrasound guidance and noted to penetrate the joint capsule. 40 cc of clear straw colored fluid was aspirated under live ultrasound visualization with resolution of joint effusion. The medication, 3 cc total of 1cc 1% lidocaine without epinephrine, 1cc 0.25% Marcaine without epinephrine and 1cc of 40mg/mL Kenalog, was injected without resistance under live ultrasound visualization and noted to flow into the suprapatellar joint space. The injection site was sterilely dressed, there was minimal blood loss. The patient tolerated this procedure without any complications done by myself. Images were recorded and saved.\par \par Aspirated fluid was sent for cell count, crystal and culture.\par \par The patient has been advised that if they notice any worsening of symptoms or any problems to contact me and seek care from a qualified medical professional. The patient was instructed to ice the knee and take NSAID medication on an as needed basis if the patient feels discomfort.\par \par The patient verifies their understanding the the visit, diagnosis and plan. They agree with the treatment plan and will contact the office with any questions or problems.\par \par Follow up\par When injection available

## 2019-12-09 NOTE — PHYSICAL EXAM
[de-identified] : Physical Examination\par General: well nourished, in no acute distress, alert and oriented to person, place and time\par Psychiatric: normal mood and affect, no abnormal movements or speech patterns\par Eyes: vision intact + glasses\par Throat: no thyromegaly\par Lymph: no enlarged nodes, no lymphedema in extremity\par Respiratory: no wheezing, no shortness of breath with ambulation\par Cardiac: no cardiac leg swelling, 2+ peripheral pulses\par Neurology: normal gross sensation in extremities to light touch\par Abdomen: soft, non-tender, tympanic, no masses\par \par Musculoskeletal Examination\par Ambulation	+ antalgic gait, - assistive devices\par \par Knee			Right			Left\par General\par      Swelling/Deformity	normal			normal	\par      Skin			normal			healed incisions\par      Erythema		-			-\par      Standing Alignment	neutral			valgus\par      Effusion		none			moderate\par Range of Motion\par      Hip			full painless ROM		full painless ROM\par      Knee Flexion		130			120\par      Knee Extension	0			0\par Patella\par      J Sign		-			-\par      Quad Medial/Lateral	1/1 1/1\par      Apprehension		-			-\par      Gualberto's		-			+\par      Grind Sign		-			+\par      Crepitus		-			+\par Palpation\par      Medial Joint Line	++			-\par      Medial Fem Condyle	-			-\par      Lateral Joint Line	-			+\par      Quad Tendon		-			-\par      Patella Tendon	-			-\par      Medial Patella		-			-\par      Lateral Patella 	-			-\par      Posterior Knee	-			-\par Ligamentous\par      Varus @ 0° / 30°	-/-			-/-\par      Valgus @ 0° / 30°	-/-			-/-\par      Lachman		-			-\par      Pivot Shift		-			-\par      Anterior Drawer	-			-\par      Posterior Drawer	-			-\par Meniscus\par      Julian		+ medial pain			-\par      Flexion Pinch		+ posterior pain			+anterior pain\par Strength Examination/Atrophy\par      Hip Flexors 		5+			5+\par      Quadriceps		5+			5+\par      Hamstring		5+			5+\par      Tibialis Anterior	5+			5+\par      Achilles/Soleus	5+			5+\par Sensation\par      Deep Peroneal	normal			normal\par      Superficial Peroneal 	normal			normal\par      Sural  		normal			normal\par      Posterior Tibial 	normal			normal\par      Saphneous 		normal			normal\par Pulses\par      DP			2+			2+\par  [de-identified] : 5 views of the affected left knee (standing AP, flexing standing AP, 30degree flexed lateral, 0degree lateral, sunrise view)\par \par demonstrate:\par There is mild lateral weight very asymmetric narrowing\par Trace osteophytic lipping\par Trace suprapatellar effusion\par No patellofemoral joint space loss without evidence of tilt [or] subluxation on sunrise view\par Normal soft tissue density\par Otherwise normal osseous bone structure without fracture or dislocation\par \par \par MRI Left knee from [facility] on 2-16-19\par My impression of the images:\par Quality of the MRI is good\par Medial Meniscus mild degenerative tear\par Lateral Meniscus flipped body/anterior horn tear\par There is mild to moderate chondral loss in the trial compartments\par There is [Not] bone marrow edema[/subchondral cysts] in the tricompartments\par LCL is intact\par MCL is intact\par ACL is intact\par PCL is intact \par Quadriceps Tendon is intact\par Patella Tendon is intact\par \par The Final Radiologist Impression:\par Flap tear lateral meniscus body/anterior horn\par Tricompartmental degenerative arthritis most pronounced in the lateral tibiofemoral compartment.\par Zero point for similar lateral patellar position with respect to the trochlear groove, TT-TG distance approximately 1.6 cm\par Distal quadriceps, patellar and semimembranosus tendinopathy, popliteus tendinopathy.\par Evidence of remote prior MCL injury.\par Moderate joint effusion with popliteal cyst and edematous quadriceps fat pad\par \par 5 views of the affected Right knee (standing AP, flexing standing AP, 30degree flexed lateral, 0degree lateral, sunrise view)\par 6-24-19\par demonstrate:\par There is trace medial weightbearing asymmetric narrowing\par no osteophytic lipping\par Trace suprapatellar effusion\par no patellofemoral joint space loss without evidence of tilt [or] subluxation on sunrise view\par Normal soft tissue density\par Otherwise normal osseous bone structure without fracture or dislocation\par \par \par MRI Right knee from [facility] on 7-19-19\par My impression of the images:\par Quality of the MRI is ok\par Medial Meniscus ok\par Lateral Meniscus intra-meniscal signal in the body with possible anterior edge extension\par There is mild to moderate chondral loss in the tricompartments\par There is [Not] bone marrow edema[/subchondral cysts] in the tricompartments\par LCL is intact\par MCL is intact\par ACL is intact\par PCL is intact \par Quadriceps Tendon is intact\par Patella Tendon is intact\par \par The Final Radiologist Impression:\par Small joint effusion with synovitis.\par Lateral tilt and 0.5 cm lateral patella positioning with respect to the trochlear groove. Mild-moderate tricompartmental osteoarthritis, including chondromalacia and osteophytosis. Subcentimeter subcortical cystic and edematous foci subjacent to the tibial spine, likely traction related. No recent fracture or marrow infiltrative process.\par Distal quadriceps tendon, patellar tendon, anterior cruciate ligament, posterior cruciate ligament, popliteus, medial collateral ligament, lateral collateral ligament, iliotibial band and biceps femoris intact. Origin tendinopathy medial head gastrocnemius.\par No evidence of medial meniscal tear. Oblique linear intrasubstance signal lateral meniscal body-posterior horn junction, approaching however not definitively communicating with the inferior meniscal surface.\par Small fluid popliteal bursa. No soft tissue mass.\par IMPRESSION:\par 1. Small joint effusion with synovitis and fluid popliteal bursa.\par 2. Mild-moderate tricompartmental osteoarthritis with lateral tilt and 0.5 cm lateral patella positioning with respect to the trochlear groove. Correlate clinically for patella subluxation.\par 3. Oblique linear intrasubstance signal lateral meniscal body-posterior horn junction (series 4 image 19), approaching however not definitively communicating with the inferior meniscal surface. Cannot include underlying closed flap tear.\par 4. Origin tendinopathy medial head gastrocnemius.\par \par \par 5 views of the affected Left knee (standing AP, flexing standing AP, 30degree flexed lateral, 0degree lateral, sunrise view)\par were ordered, obtained and evaluated by myself today and\par demonstrate:\par There is moderate to severe lateral weightbearing asymmetric narrowing\par Small to moderate osteophytic lipping\par Trace suprapatellar effusion\par Mild patellofemoral joint space loss without evidence of tilt [or] subluxation on sunrise view\par Normal soft tissue density\par Otherwise normal osseous bone structure without fracture or dislocation

## 2019-12-09 NOTE — HISTORY OF PRESENT ILLNESS
[de-identified] : CC left knee postop right knee pain\par \par HPI 52 yo female left HD presents for 8 weeks Synvisc 1 FU of acute onset of most 2 years of constant pain in the medial right knee. The pain is worse, and rated a 10 out of 10, described as throbbing, without radiation. Ice makes the pain better and walking especially upstairs makes the pain worse. The patient reports associated symptoms of pop click locking with instability causing falls swelling and weakness. Patient reports she walks unstably because she is so unstable. The patient - pain at night affecting sleep, and - similar pain previously.\par \par The patient has tried the following treatments:\par Activity modification	+\par Ice/Compression  	+\par Braces    		-\par Nsaids    		+ 6 weeks no improvement\par Physical Therapy 	+ 6 weeks home exercises along with postop PT for left knee\par Cortisone Injection	+\par Visco Injection		+ Synvisc 1 10- 25% relief for 3 weeks\par Arthroscopy		-\par Patient is status post Left knee Arthroscopy on 3-26-19 Lateral meniscus meniscectomy and corticosteroid injection\par \par left knee medial pain 8/10 also complaining of swelling.  denies any recent falls.\par \par right knee pain resolved post injection\par \par Review of Systems is positive for the above musculoskeletal symptoms and is otherwise non-contributory for general, constitutional, psychiatric, neurologic, HEENT, cardiac, respiratory, gastrointestinal, reproductive, lymphatic, and dermatologic complaints.\par \par Consult by Dr Roe Sales\par \par

## 2019-12-10 LAB
B PERT IGG+IGM PNL SER: CLEAR
COLOR FLD: YELLOW
FLUID INTAKE SUBSTANCE CLASS: NORMAL
LYMPHOCYTES # FLD MANUAL: 11 %
MESOTHL CELL NFR FLD: 4 %
MONOS+MACROS NFR FLD MANUAL: 77 %
NEUTS SEG # FLD MANUAL: 8 %
RBC # FLD MANUAL: 1000 /UL
SYCRY CLARITY: CLEAR
SYCRY COLOR: YELLOW
SYCRY ID: NORMAL
SYCRY TUBE: NORMAL
TOTAL CELLS COUNTED FLD: 435 /UL
TUBE TYPE: NORMAL

## 2019-12-16 ENCOUNTER — APPOINTMENT (OUTPATIENT)
Dept: CT IMAGING | Facility: HOSPITAL | Age: 54
End: 2019-12-16
Payer: MEDICARE

## 2019-12-16 ENCOUNTER — APPOINTMENT (OUTPATIENT)
Dept: INTERNAL MEDICINE | Facility: CLINIC | Age: 54
End: 2019-12-16
Payer: MEDICARE

## 2019-12-16 ENCOUNTER — OUTPATIENT (OUTPATIENT)
Dept: OUTPATIENT SERVICES | Facility: HOSPITAL | Age: 54
LOS: 1 days | End: 2019-12-16
Payer: MEDICARE

## 2019-12-16 VITALS
BODY MASS INDEX: 28.83 KG/M2 | DIASTOLIC BLOOD PRESSURE: 80 MMHG | HEART RATE: 90 BPM | OXYGEN SATURATION: 98 % | HEIGHT: 59 IN | SYSTOLIC BLOOD PRESSURE: 130 MMHG | RESPIRATION RATE: 12 BRPM | TEMPERATURE: 98.4 F | WEIGHT: 143 LBS

## 2019-12-16 DIAGNOSIS — Z98.51 TUBAL LIGATION STATUS: Chronic | ICD-10-CM

## 2019-12-16 DIAGNOSIS — Z98.89 OTHER SPECIFIED POSTPROCEDURAL STATES: Chronic | ICD-10-CM

## 2019-12-16 DIAGNOSIS — Z98.890 OTHER SPECIFIED POSTPROCEDURAL STATES: Chronic | ICD-10-CM

## 2019-12-16 DIAGNOSIS — R10.84 GENERALIZED ABDOMINAL PAIN: ICD-10-CM

## 2019-12-16 PROCEDURE — 74177 CT ABD & PELVIS W/CONTRAST: CPT

## 2019-12-16 PROCEDURE — 99214 OFFICE O/P EST MOD 30 MIN: CPT

## 2019-12-16 PROCEDURE — 74177 CT ABD & PELVIS W/CONTRAST: CPT | Mod: 26

## 2019-12-16 RX ORDER — POLYETHYLENE GLYCOL 3350, SODIUM CHLORIDE, SODIUM BICARBONATE AND POTASSIUM CHLORIDE WITH LEMON FLAVOR 420; 11.2; 5.72; 1.48 G/4L; G/4L; G/4L; G/4L
420 POWDER, FOR SOLUTION ORAL
Qty: 1 | Refills: 0 | Status: DISCONTINUED | COMMUNITY
Start: 2019-05-22 | End: 2019-12-16

## 2019-12-16 RX ORDER — HYLAN G-F 20 16MG/2ML
48 SYRINGE (ML) INTRAARTICULAR
Qty: 1 | Refills: 0 | Status: DISCONTINUED | OUTPATIENT
Start: 2019-10-07 | End: 2019-12-16

## 2019-12-16 RX ORDER — DICLOFENAC SODIUM 50 MG/1
50 TABLET, DELAYED RELEASE ORAL
Qty: 60 | Refills: 1 | Status: DISCONTINUED | COMMUNITY
Start: 2019-05-15 | End: 2019-12-16

## 2019-12-16 RX ORDER — OMEPRAZOLE 40 MG/1
40 CAPSULE, DELAYED RELEASE ORAL
Qty: 30 | Refills: 5 | Status: DISCONTINUED | COMMUNITY
Start: 2018-08-16 | End: 2019-12-16

## 2019-12-16 RX ORDER — CHOLESTYRAMINE 4 G/9G
4 POWDER, FOR SUSPENSION ORAL DAILY
Qty: 30 | Refills: 3 | Status: DISCONTINUED | COMMUNITY
Start: 2019-11-22 | End: 2019-12-16

## 2019-12-16 RX ORDER — DICYCLOMINE HYDROCHLORIDE 10 MG/1
10 CAPSULE ORAL 3 TIMES DAILY
Qty: 90 | Refills: 3 | Status: DISCONTINUED | COMMUNITY
Start: 2019-07-24 | End: 2019-12-16

## 2019-12-16 RX ORDER — SUCRALFATE 1 G/1
1 TABLET ORAL 4 TIMES DAILY
Qty: 120 | Refills: 3 | Status: DISCONTINUED | COMMUNITY
Start: 2019-07-24 | End: 2019-12-16

## 2019-12-16 NOTE — HISTORY OF PRESENT ILLNESS
[de-identified] : 54 year old  female patient with history of stable Hypertension, Hypothyroidism, Hypercholesterolemia with Hypertriglyceridemia, CVA, Insomnia, Migraine, vertigo, GERD, Major Depression in partial remission, history as stated, presented for follow up examination with C/O Abdominal discomfort, S/P GI F/U, as counseled. Patient is compliant with all medications. Denies shortness of breath, chest pain at this time. ROS as stated.\par

## 2019-12-16 NOTE — ASSESSMENT
[FreeTextEntry1] : 54 year old female found to have stable Hypertension, Hypothyroidism, Hypercholesterolemia with Hypertriglyceridemia, Insomnia, Migraine, vertigo,  GERD,with the current regimen, diet and life style modifications, as counseled. Prior results reviewed and discussed with the patient during today's examination. Plan as ordered.\par Patient was recently evaluated by ORTHO/GI , findings and recommendations reviewed with the patient during today's examination.\par

## 2019-12-16 NOTE — HEALTH RISK ASSESSMENT
[Intercurrent ED visits] : went to ED [No] : In the past 12 months have you used drugs other than those required for medical reasons? No [No falls in past year] : Patient reported no falls in the past year [1] : 2) Feeling down, depressed, or hopeless for several days (1) [] : No [[id number]] : 2 [de-identified] : 10/19 [de-identified] : ORTHO/GI [FreeTextEntry1] : Non-suicidal at this time.

## 2019-12-18 ENCOUNTER — OTHER (OUTPATIENT)
Age: 54
End: 2019-12-18

## 2019-12-19 NOTE — ED PROVIDER NOTE - NS ED MD DISPO DISCHARGE CCDA
Rifampin Counseling: I discussed with the patient the risks of rifampin including but not limited to liver damage, kidney damage, red-orange body fluids, nausea/vomiting and severe allergy. Patient/Caregiver provided printed discharge information.

## 2019-12-24 LAB — BACTERIA FLD CULT: NORMAL

## 2019-12-27 ENCOUNTER — APPOINTMENT (OUTPATIENT)
Dept: ULTRASOUND IMAGING | Facility: HOSPITAL | Age: 54
End: 2019-12-27

## 2019-12-27 ENCOUNTER — OUTPATIENT (OUTPATIENT)
Dept: OUTPATIENT SERVICES | Facility: HOSPITAL | Age: 54
LOS: 1 days | End: 2019-12-27
Payer: MEDICARE

## 2019-12-27 DIAGNOSIS — R93.89 ABNORMAL FINDINGS ON DIAGNOSTIC IMAGING OF OTHER SPECIFIED BODY STRUCTURES: ICD-10-CM

## 2019-12-27 DIAGNOSIS — Z98.89 OTHER SPECIFIED POSTPROCEDURAL STATES: Chronic | ICD-10-CM

## 2019-12-27 DIAGNOSIS — Z98.51 TUBAL LIGATION STATUS: Chronic | ICD-10-CM

## 2019-12-27 DIAGNOSIS — Z98.890 OTHER SPECIFIED POSTPROCEDURAL STATES: Chronic | ICD-10-CM

## 2019-12-27 PROCEDURE — 93970 EXTREMITY STUDY: CPT

## 2019-12-27 PROCEDURE — 93970 EXTREMITY STUDY: CPT | Mod: 26

## 2020-01-03 ENCOUNTER — APPOINTMENT (OUTPATIENT)
Dept: GASTROENTEROLOGY | Facility: CLINIC | Age: 55
End: 2020-01-03
Payer: MEDICARE

## 2020-01-03 VITALS
TEMPERATURE: 98.2 F | BODY MASS INDEX: 28.83 KG/M2 | DIASTOLIC BLOOD PRESSURE: 78 MMHG | OXYGEN SATURATION: 100 % | HEART RATE: 82 BPM | WEIGHT: 143 LBS | HEIGHT: 59 IN | SYSTOLIC BLOOD PRESSURE: 111 MMHG

## 2020-01-03 PROCEDURE — 99213 OFFICE O/P EST LOW 20 MIN: CPT

## 2020-01-03 NOTE — HISTORY OF PRESENT ILLNESS
[Heartburn] : denies heartburn [None] : had no significant interval events [Vomiting] : denies vomiting [Nausea] : denies nausea [Constipation] : denies constipation [Yellow Skin Or Eyes (Jaundice)] : denies jaundice [Abdominal Pain] : denies abdominal pain [Diarrhea] : diarrhea [Abdominal Swelling] : abdominal swelling [Rectal Pain] : rectal pain [Hiatus Hernia] : hiatus hernia [Wt Gain ___ Lbs] : no recent weight gain [Wt Loss ___ Lbs] : no recent weight loss [Peptic Ulcer Disease] : no peptic ulcer disease [GERD] : no gastroesophageal reflux disease [Cholelithiasis] : no cholelithiasis [Pancreatitis] : no pancreatitis [Kidney Stone] : no kidney stone [Inflammatory Bowel Disease] : no inflammatory bowel disease [Irritable Bowel Syndrome] : no irritable bowel syndrome [Diverticulitis] : no diverticulitis [Malignancy] : no malignancy [Abdominal Surgery] : no abdominal surgery [Alcohol Abuse] : no alcohol abuse [Cholecystectomy] : no cholecystectomy [Appendectomy] : no appendectomy [de-identified] : The patient states that she is feeling the same. The patient denies any jaundice or pruritus.  The patient complains of chronic lower back pain. The patient complains of abdominal pain.  The patient describes the abdominal pain as a crampy, intermittent midepigastric and left upper quadrant discomfort that occasionally radiates to the back.  The abdominal pain is unrelated to meals or passing gas or having bowel movements.  The abdominal pain is worse with stress.  The abdominal pain is described as being mild to moderate in nature.  The abdominal pain occurs at night and in the morning.  The abdominal pain can occur at any time.   The abdominal pain has awakened the patient from sleep.  The abdominal pain is not relieved with medication.  The abdominal pain is associated with abdominal gas and bloating.  The patient denies any nausea or vomiting.  The patient complains of dysphagia but denies any gastroesophageal reflux disease.  The dysphagia is worse with solids but unrelated to liquids. The patient denies taking medications for the dysphagia.  The patient denies any atypical chest pain, shortness of breath or palpitations.  The patient denies any diaphoresis. The patient  complains of occasional diarrhea but  denies any constipation.  The patient has 3 to 6 bowel movements a day.  The patient complains of a change in bowel habits.  The patient complains of a change in caliber of stool.   The diarrhea is described as soft to watery in nature.  The patient admits to having mucus discharge with the bowel movements.  The patient denies any bright red blood per rectum, melena or hematemesis.  The patient denies any rectal pain or rectal pruritus.  The patient complains of fluctuating weight but denies any anorexia.  She denies any fevers or chills.  The patient had an upper endoscopy and colonoscopy to the cecum performed at the [facility] GI endoscopy suite on July 18, 2019. The upper endoscopy was performed up to the level of the second portion of the duodenum. The upper endoscopy revealed a small hiatal hernia and mild diffuse bile gastritis. Biopsies were taken of the distal esophagus, antrum, body of stomach and duodenum to assess for esophagitis, gastritis and duodenitis. The pathology revealed distal esophagus with unremarkable squamous mucosa, gastric antral and body mucosa with chronic inactive gastritis with severe lymphoid follicles that was negative for Helicobacter pylori and unremarkable duodenal mucosa. The colonoscopy to the cecum revealed moderate left sided diverticulosis, a poor prep colonoscopy and small internal hemorrhoids. The study was limited secondary to retained liquid and solid stool scattered throughout the colon but no gross lesions were noted. Unable to comment on small colonic polyps or masses secondary to the poor prep. There were no polyps, masses, diverticulosis, AVMs or colitis noted. The patient tolerated the procedures well. The patient was found to have occult blood in the stool.  The CAT scan of the abdomen and pelvis with IV contrast performed on December 16, 2019 revealed heterogeneous attenuation in the external iliac and common femoral veins is likely related to mixing artifact. Also noted was constipation, sigmoid diverticulosis without diverticulitis, trace bilateral hydronephrosis likely related to distended urinary bladder.  The doppler ultrasound of the lower extremities performed on December 27, 2019 revealed no evidence of deep venous thrombosis in either lower extremity. The patient had an abdominal and pelvic ultrasound performed on October 18, 2019 to assess the symptoms. The abdominal ultrasound performed on October 18, 2019 revealed no gallstones or pericholecystic fluid. The contracted state of the gallbladder limits assessment for gallbladder wall thickening. There is no evidence for intrahepatic or extrahepatic biliary duct dilatation. The pelvic ultrasound performed on October 18, 2019 revealed no evidence for an ovarian lesion.   The patient had blood work performed to assess the symptoms. The blood work performed on November 25, 2019 revealed an elevated CEA of 4.8 ng/ml, an elevated ESR of 22 mm/hr, anemia with Hgb of 10.5 and low WBC count of 3.29.   The patient admits to having a prior upper endoscopy and colonoscopy performed by another gastroenterologist. The upper endoscopic findings were unknown to the patient. The colonoscopic findings revealed a poor prep colonoscopy. The patient admits to a family history of GI problems. The patient’s brother and father had a history of bleeding peptic ulcer disease.

## 2020-01-03 NOTE — ASSESSMENT
[FreeTextEntry1] : Abdominal Pain: The patient complains of abdominal pain. The patient is to avoid nonsteroidal anti-inflammatory drugs and aspirin.  I recommend a low FOD-MAP diet.  I recommend restarting on a trial of Dicyclomine 10 mg tablet PO 3 times a day PRN for the abdominal pain.\par Dyspepsia: The patient complains of dyspeptic symptoms.  The patient was advised to abide by an anti-gas diet.  The patient was given a pamphlet for anti-gas.  The patient and I reviewed the anti-gas diet at length. The patient is to restart on a trial of Phazyme one tablet 3 times a day p.r.n. abdominal pain and gas.\par Diarrhea: The patient complains of diarrhea.  I recommend a low residue diet. The patient is to avoid fiber supplementation. The patient is to consider starting a trial of a probiotic such as Align once a day.  The patient agreed and will follow-up to reassess the symptoms.  \par History of Diverticulitis: The patient has symptoms suggestive of acute diverticulitis. The patient may require an emergency room evaluation for diverticulitis if the symptoms persist. The patient may require treatment with antibiotics if the symptoms persist. The patient may require treatment with a trial of Metronidazole 500 mg 3 times a day and Ciprofloxin 500 mg twice a day for 2 weeks for the presumed acute diverticulitis. I recommend a low residue diet. The CAT scan of the abdomen and pelvis with IV contrast performed on December 16, 2019 revealed heterogeneous attenuation in the external iliac and common femoral veins is likely related to mixing artifact. Also noted was constipation, sigmoid diverticulosis without diverticulitis, trace bilateral hydronephrosis likely related to distended urinary bladder.   The patient was advised to go to the hospital if fever, chills, worsening abdominal pain or GI bleeding recurs. The patient agrees.\par Abnormal Imaging Study: The CAT scan of the abdomen and pelvis with IV contrast performed on December 16, 2019 revealed heterogeneous attenuation in the external iliac and common femoral veins is likely related to mixing artifact. Also noted was constipation, sigmoid diverticulosis without diverticulitis, trace bilateral hydronephrosis likely related to distended urinary bladder.  The doppler ultrasound of the lower extremities performed on December 27, 2019 revealed no evidence of deep venous thrombosis in either lower extremity. \par Hiatal Hernia: The patient was advised to avoid late-night meals and dietary indiscretions. The patient was advised to avoid fried and fatty foods. The patient was advised to abide by an anti-GERD diet. The patient was given a pamphlet for anti-GERD. The patient and I reviewed the anti-GERD diet at length. I recommend restarting a trial of Pantoprazole 40 mg once a day for 3 months for the symptoms.\par Gastritis: The patient has a history of gastritis. The patient is to avoid nonsteroidal anti-inflammatory drugs and aspirin. I recommend restarting a trial of Pantoprazole 40 mg once a day for 3 months for the symptoms.\par Diverticulosis: I recommend a high-fiber diet and avoid seeds. The patient is to consider a trial of Metamucil once a day for fiber supplementation. The patient is to also consider a trial of a probiotic such as Align once a day. \par Internal Hemorrhoids: The patient is to be started on a trial of Anusol H. C. suppositories one per rectum nightly and Anusol HC2.5% cream apply to affected area twice a day p.r.n. hemorrhoidal bleeding or pain. \par Poor Prep Colonoscopy: The patient had a recent poor prep colonoscopy. I recommend a repeat colonoscopy to reassess for colonic polyps secondary to the poor prep. The patient was told of the risks and benefits of the procedure. The patient was told of the risks of perforation, emergency surgery, bleeding, infections and missed lesions. The patient agreed and will return for the procedure.\par Occult Blood in the Stool: The patient was previously found to have occult blood in the stool. The patient denies any bright red blood per rectum, melena or hematemesis. The patient was previously found to be guaiac-positive by her PMD. I recommend proceed with the colonoscopy to assess the occult blood in the stool.\par Follow-up: The patient is to follow-up in the office in 4 weeks to reassess the symptoms. The patient was told to call the office if any further problems.\par \par \par \par \par

## 2020-01-08 ENCOUNTER — RX RENEWAL (OUTPATIENT)
Age: 55
End: 2020-01-08

## 2020-01-11 ENCOUNTER — EMERGENCY (EMERGENCY)
Facility: HOSPITAL | Age: 55
LOS: 1 days | Discharge: HOME | End: 2020-01-11
Attending: EMERGENCY MEDICINE
Payer: MEDICARE

## 2020-01-11 VITALS
OXYGEN SATURATION: 100 % | HEART RATE: 85 BPM | DIASTOLIC BLOOD PRESSURE: 77 MMHG | SYSTOLIC BLOOD PRESSURE: 119 MMHG | HEIGHT: 59 IN | RESPIRATION RATE: 19 BRPM | TEMPERATURE: 98 F | WEIGHT: 143.08 LBS

## 2020-01-11 DIAGNOSIS — Z98.89 OTHER SPECIFIED POSTPROCEDURAL STATES: Chronic | ICD-10-CM

## 2020-01-11 DIAGNOSIS — Z98.890 OTHER SPECIFIED POSTPROCEDURAL STATES: Chronic | ICD-10-CM

## 2020-01-11 DIAGNOSIS — Z98.51 TUBAL LIGATION STATUS: Chronic | ICD-10-CM

## 2020-01-11 LAB
ALBUMIN SERPL ELPH-MCNC: 4 G/DL — SIGNIFICANT CHANGE UP (ref 3.5–5)
ALP SERPL-CCNC: 102 U/L — SIGNIFICANT CHANGE UP (ref 40–120)
ALT FLD-CCNC: 39 U/L DA — SIGNIFICANT CHANGE UP (ref 10–60)
ANION GAP SERPL CALC-SCNC: 5 MMOL/L — SIGNIFICANT CHANGE UP (ref 5–17)
APPEARANCE UR: CLEAR — SIGNIFICANT CHANGE UP
AST SERPL-CCNC: 31 U/L — SIGNIFICANT CHANGE UP (ref 10–40)
BACTERIA # UR AUTO: ABNORMAL /HPF
BASOPHILS # BLD AUTO: 0.05 K/UL — SIGNIFICANT CHANGE UP (ref 0–0.2)
BASOPHILS NFR BLD AUTO: 1.2 % — SIGNIFICANT CHANGE UP (ref 0–2)
BILIRUB SERPL-MCNC: <0.1 MG/DL — LOW (ref 0.2–1.2)
BILIRUB UR-MCNC: NEGATIVE — SIGNIFICANT CHANGE UP
BUN SERPL-MCNC: 15 MG/DL — SIGNIFICANT CHANGE UP (ref 7–18)
CALCIUM SERPL-MCNC: 8.5 MG/DL — SIGNIFICANT CHANGE UP (ref 8.4–10.5)
CHLORIDE SERPL-SCNC: 110 MMOL/L — HIGH (ref 96–108)
CO2 SERPL-SCNC: 26 MMOL/L — SIGNIFICANT CHANGE UP (ref 22–31)
COLOR SPEC: YELLOW — SIGNIFICANT CHANGE UP
CREAT SERPL-MCNC: 1.02 MG/DL — SIGNIFICANT CHANGE UP (ref 0.5–1.3)
DIFF PNL FLD: NEGATIVE — SIGNIFICANT CHANGE UP
EOSINOPHIL # BLD AUTO: 0.11 K/UL — SIGNIFICANT CHANGE UP (ref 0–0.5)
EOSINOPHIL NFR BLD AUTO: 2.5 % — SIGNIFICANT CHANGE UP (ref 0–6)
EPI CELLS # UR: ABNORMAL /HPF
GLUCOSE SERPL-MCNC: 76 MG/DL — SIGNIFICANT CHANGE UP (ref 70–99)
GLUCOSE UR QL: NEGATIVE — SIGNIFICANT CHANGE UP
HCT VFR BLD CALC: 34.3 % — LOW (ref 34.5–45)
HGB BLD-MCNC: 10.5 G/DL — LOW (ref 11.5–15.5)
IMM GRANULOCYTES NFR BLD AUTO: 0.2 % — SIGNIFICANT CHANGE UP (ref 0–1.5)
KETONES UR-MCNC: NEGATIVE — SIGNIFICANT CHANGE UP
LEUKOCYTE ESTERASE UR-ACNC: ABNORMAL
LIDOCAIN IGE QN: 115 U/L — SIGNIFICANT CHANGE UP (ref 73–393)
LYMPHOCYTES # BLD AUTO: 1.76 K/UL — SIGNIFICANT CHANGE UP (ref 1–3.3)
LYMPHOCYTES # BLD AUTO: 40.7 % — SIGNIFICANT CHANGE UP (ref 13–44)
MCHC RBC-ENTMCNC: 30.2 PG — SIGNIFICANT CHANGE UP (ref 27–34)
MCHC RBC-ENTMCNC: 30.6 GM/DL — LOW (ref 32–36)
MCV RBC AUTO: 98.6 FL — SIGNIFICANT CHANGE UP (ref 80–100)
MONOCYTES # BLD AUTO: 0.38 K/UL — SIGNIFICANT CHANGE UP (ref 0–0.9)
MONOCYTES NFR BLD AUTO: 8.8 % — SIGNIFICANT CHANGE UP (ref 2–14)
NEUTROPHILS # BLD AUTO: 2.01 K/UL — SIGNIFICANT CHANGE UP (ref 1.8–7.4)
NEUTROPHILS NFR BLD AUTO: 46.6 % — SIGNIFICANT CHANGE UP (ref 43–77)
NITRITE UR-MCNC: NEGATIVE — SIGNIFICANT CHANGE UP
NRBC # BLD: 0 /100 WBCS — SIGNIFICANT CHANGE UP (ref 0–0)
PH UR: 6 — SIGNIFICANT CHANGE UP (ref 5–8)
PLATELET # BLD AUTO: 317 K/UL — SIGNIFICANT CHANGE UP (ref 150–400)
POTASSIUM SERPL-MCNC: 4.2 MMOL/L — SIGNIFICANT CHANGE UP (ref 3.5–5.3)
POTASSIUM SERPL-SCNC: 4.2 MMOL/L — SIGNIFICANT CHANGE UP (ref 3.5–5.3)
PROT SERPL-MCNC: 7.9 G/DL — SIGNIFICANT CHANGE UP (ref 6–8.3)
PROT UR-MCNC: NEGATIVE — SIGNIFICANT CHANGE UP
RBC # BLD: 3.48 M/UL — LOW (ref 3.8–5.2)
RBC # FLD: 12.8 % — SIGNIFICANT CHANGE UP (ref 10.3–14.5)
RBC CASTS # UR COMP ASSIST: SIGNIFICANT CHANGE UP /HPF (ref 0–2)
SODIUM SERPL-SCNC: 141 MMOL/L — SIGNIFICANT CHANGE UP (ref 135–145)
SP GR SPEC: 1.01 — SIGNIFICANT CHANGE UP (ref 1.01–1.02)
UROBILINOGEN FLD QL: NEGATIVE — SIGNIFICANT CHANGE UP
WBC # BLD: 4.32 K/UL — SIGNIFICANT CHANGE UP (ref 3.8–10.5)
WBC # FLD AUTO: 4.32 K/UL — SIGNIFICANT CHANGE UP (ref 3.8–10.5)
WBC UR QL: ABNORMAL /HPF (ref 0–5)

## 2020-01-11 PROCEDURE — 83690 ASSAY OF LIPASE: CPT

## 2020-01-11 PROCEDURE — 36415 COLL VENOUS BLD VENIPUNCTURE: CPT

## 2020-01-11 PROCEDURE — 96374 THER/PROPH/DIAG INJ IV PUSH: CPT

## 2020-01-11 PROCEDURE — 99284 EMERGENCY DEPT VISIT MOD MDM: CPT

## 2020-01-11 PROCEDURE — 99284 EMERGENCY DEPT VISIT MOD MDM: CPT | Mod: 25

## 2020-01-11 PROCEDURE — 85027 COMPLETE CBC AUTOMATED: CPT

## 2020-01-11 PROCEDURE — 96375 TX/PRO/DX INJ NEW DRUG ADDON: CPT

## 2020-01-11 PROCEDURE — 81001 URINALYSIS AUTO W/SCOPE: CPT

## 2020-01-11 PROCEDURE — 80053 COMPREHEN METABOLIC PANEL: CPT

## 2020-01-11 RX ORDER — SODIUM CHLORIDE 9 MG/ML
1000 INJECTION INTRAMUSCULAR; INTRAVENOUS; SUBCUTANEOUS ONCE
Refills: 0 | Status: COMPLETED | OUTPATIENT
Start: 2020-01-11 | End: 2020-01-11

## 2020-01-11 RX ORDER — FAMOTIDINE 10 MG/ML
20 INJECTION INTRAVENOUS ONCE
Refills: 0 | Status: COMPLETED | OUTPATIENT
Start: 2020-01-11 | End: 2020-01-11

## 2020-01-11 RX ORDER — LIDOCAINE 4 G/100G
10 CREAM TOPICAL ONCE
Refills: 0 | Status: COMPLETED | OUTPATIENT
Start: 2020-01-11 | End: 2020-01-11

## 2020-01-11 RX ORDER — ONDANSETRON 8 MG/1
4 TABLET, FILM COATED ORAL ONCE
Refills: 0 | Status: COMPLETED | OUTPATIENT
Start: 2020-01-11 | End: 2020-01-11

## 2020-01-11 RX ADMIN — ONDANSETRON 4 MILLIGRAM(S): 8 TABLET, FILM COATED ORAL at 23:25

## 2020-01-11 RX ADMIN — Medication 10 MILLILITER(S): at 23:25

## 2020-01-11 RX ADMIN — SODIUM CHLORIDE 1000 MILLILITER(S): 9 INJECTION INTRAMUSCULAR; INTRAVENOUS; SUBCUTANEOUS at 23:23

## 2020-01-11 RX ADMIN — FAMOTIDINE 20 MILLIGRAM(S): 10 INJECTION INTRAVENOUS at 23:25

## 2020-01-11 RX ADMIN — Medication 20 MILLIGRAM(S): at 23:25

## 2020-01-11 RX ADMIN — LIDOCAINE 10 MILLILITER(S): 4 CREAM TOPICAL at 23:26

## 2020-01-11 NOTE — ED PROVIDER NOTE - OBJECTIVE STATEMENT
54 year old female with PMHx of 4 c-sections and tubal ligation presenting to the ED with complaints of abd pain for the last couple of years. Patient follows up with GI doctor and has had multiple work ups that show a hernia. Patient was reported that he has a diaphragmatic hernia. Last CAT scan done in 2019 did not show a hernia at the time. Denies fevers, nausea, vomiting, chest pain, shortness of breath, dysuria, hematuria, diarrhea, constipation, or any other acute complaints. 54 year old female with PMHx of 4 c-sections and tubal ligation presenting to the ED with complaints of abd pain for the last couple of years. Patient follows up with GI doctor and has had multiple work ups that show a hernia. Patient was reported that she has a diaphragmatic hernia. Last CAT scan done in 2019 did not show a hernia at the time. Denies fevers, nausea, vomiting, chest pain, shortness of breath, dysuria, hematuria, diarrhea, constipation, or any other acute complaints.

## 2020-01-11 NOTE — ED PROVIDER NOTE - CARE PROVIDER_API CALL
Shan Lamar)  Gastroenterology  [id number] [address] [place] [id number]  Phone: [phone]  Fax: [phone]  Follow Up Time: 4-6 Days

## 2020-01-11 NOTE — ED PROVIDER NOTE - PATIENT PORTAL LINK FT
You can access the FollowMyHealth Patient Portal offered by [facility] by registering at the following website: [email or web address]. By joining [facility] [employer] [facility]’s FollowMyHealth portal, you will also be able to view your health information using other applications (apps) compatible with our system.

## 2020-01-11 NOTE — ED PROVIDER NOTE - CLINICAL SUMMARY MEDICAL DECISION MAKING FREE TEXT BOX
55 yo F with exacerbation of chronic abdominal pain. Minimal tenderness on exam. Known hernia. Pain improved with GI cocktail. Labs unremarkable. Patient tolerated po without difficulty. Has established GI doctor and will follow up. Nontoxic and medically stable for discharge. Return precautions provided and patient understands to return to the ED for worsening signs and symptoms. Instructed to follow up with primary care physician/specialist and agreeable. Patient's questions answered and given copies of lab results.

## 2020-01-11 NOTE — ED PROVIDER NOTE - NSFOLLOWUPINSTRUCTIONS_ED_ALL_ED_FT
You were seen today for your belly pain. Your labs did not show acute abnormalities. Please follow up with Dr. Lamar for further evaluation. Please return to the Emergency Department for worsening signs or symptoms.    Chronic Abdominal Pain    WHAT YOU NEED TO KNOW:    The cause of chronic abdominal pain may not be found. You may be given medicine to manage symptoms. You may need therapy to help manage anxiety or stress that is causing abdominal pain. Rarely, surgery is needed if there is a problem with an organ in your abdomen.     DISCHARGE INSTRUCTIONS:    Return to the emergency department if:     Your abdominal pain gets worse, and spreads to your back.       You vomit blood or what looks like coffee grounds.       You have blood or mucus in your bowel movement.       You cannot stop vomiting.       You have diarrhea for more than 1 week.       You feel weak, dizzy, or faint.       Your abdomen is larger than usual, more painful, and hard.     Contact your healthcare provider if:     You have a fever or chills.      You have new symptoms.       You lose weight without trying.       Your pain prevents you from doing your daily activities.       You have questions or concerns about your condition or care.    Medicines:     Medicines may be given to decrease pain and manage your symptoms. Medicines may also be given to manage anxiety.       Take your medicine as directed. Contact your healthcare provider if you think your medicine is not helping or if you have side effects. Tell him of her if you are allergic to any medicine. Keep a list of the medicines, vitamins, and herbs you take. Include the amounts, and when and why you take them. Bring the list or the pill bottles to follow-up visits. Carry your medicine list with you in case of an emergency.    Self-care:     Apply heat on your abdomen for 20 to 30 minutes every 2 hours for as many days as directed. Heat helps decrease pain and muscle spasms.       Make changes to the food you eat as directed. Do not eat foods that cause abdominal pain or other symptoms. Eat small meals more often.   Eat more high-fiber foods if you are constipated. High-fiber foods include fruits, vegetables, whole-grain foods, and legumes.       Do not eat foods that cause gas if you have bloating. Examples include broccoli, cabbage, and cauliflower. Do not drink soda or carbonated drinks, because these may also cause gas.       Do not eat foods or drinks that contain sorbitol or fructose if you have diarrhea and bloating. Some examples are fruit juices, candy, jelly, and sugar-free gum.       Do not eat high-fat foods, such as fried foods, cheeseburgers, hot dogs, and desserts.      Limit or do not drink caffeine. Caffeine may make symptoms, such as heart burn or nausea, worse.       Drink plenty of liquids to prevent dehydration from diarrhea or vomiting. Ask your healthcare provider how much liquid to drink each day and which liquids are best for you.       Keep a diary of your abdominal pain. A diary may help your healthcare provider learn what is causing your abdominal pain. Include when the pain happens, how long it lasts, and what the pain feels like. Write down any other symptoms you have with abdominal pain. Also write down what you eat, and what symptoms you have after you eat.       Manage your stress. Stress may cause abdominal pain. Your healthcare provider may recommend relaxation techniques and deep breathing exercises to help decrease your stress. Your healthcare provider may recommend you talk to someone about your stress or anxiety, such as a counselor or a trusted friend. Get plenty of sleep and exercise regularly.       Limit or do not drink alcohol. Alcohol can make your abdominal pain worse. Ask your healthcare provider if it is safe for you to drink alcohol. Also ask how much is safe for you to drink.       Do not smoke. Nicotine and other chemicals in cigarettes can damage your esophagus and stomach. Ask your healthcare provider for information if you currently smoke and need help to quit. E-cigarettes or smokeless tobacco still contain nicotine. Talk to your healthcare provider before you use these products.     Follow up with your healthcare provider as directed: You may need more tests. Write down your questions so you remember to ask them during your visits.

## 2020-01-11 NOTE — ED PROVIDER NOTE - CHPI ED SYMPTOMS NEG
no dysuria/no fever/no hematuria/no diarrhea/no nausea/no chest pain, no shortness of breath, no constipation/no vomiting

## 2020-01-11 NOTE — ED ADULT NURSE NOTE - NSIMPLEMENTINTERV_GEN_ALL_ED
Implemented All Universal Safety Interventions:  [place] to call system. Call bell, personal items and telephone within reach. Instruct patient to call for assistance. Room bathroom lighting operational. Non-slip footwear when patient is off stretcher. Physically safe environment: no spills, clutter or unnecessary equipment. Stretcher in lowest position, wheels locked, appropriate side rails in place.

## 2020-01-12 VITALS
OXYGEN SATURATION: 100 % | TEMPERATURE: 98 F | SYSTOLIC BLOOD PRESSURE: 121 MMHG | DIASTOLIC BLOOD PRESSURE: 76 MMHG | RESPIRATION RATE: 18 BRPM | HEART RATE: 78 BPM

## 2020-01-13 ENCOUNTER — APPOINTMENT (OUTPATIENT)
Dept: GASTROENTEROLOGY | Facility: CLINIC | Age: 55
End: 2020-01-13
Payer: MEDICARE

## 2020-01-13 VITALS — WEIGHT: 143 LBS | BODY MASS INDEX: 28.83 KG/M2 | HEIGHT: 59 IN

## 2020-01-13 VITALS
HEART RATE: 75 BPM | DIASTOLIC BLOOD PRESSURE: 77 MMHG | SYSTOLIC BLOOD PRESSURE: 121 MMHG | TEMPERATURE: 98.4 F | OXYGEN SATURATION: 100 %

## 2020-01-13 PROCEDURE — 99213 OFFICE O/P EST LOW 20 MIN: CPT

## 2020-01-13 NOTE — HISTORY OF PRESENT ILLNESS
[ER Visit] : was seen in the Emergency Department [Heartburn] : denies heartburn [Constipation] : denies constipation [Yellow Skin Or Eyes (Jaundice)] : denies jaundice [Rectal Pain] : denies rectal pain [Vomiting] : vomiting [Nausea] : nausea [Abdominal Pain] : abdominal pain [Diarrhea] : diarrhea [Hiatus Hernia] : hiatus hernia [Wt Gain ___ Lbs] : no recent weight gain [Wt Loss ___ Lbs] : no recent weight loss [Peptic Ulcer Disease] : no peptic ulcer disease [Pancreatitis] : no pancreatitis [GERD] : no gastroesophageal reflux disease [Inflammatory Bowel Disease] : no inflammatory bowel disease [Cholelithiasis] : no cholelithiasis [Kidney Stone] : no kidney stone [Diverticulitis] : no diverticulitis [Irritable Bowel Syndrome] : no irritable bowel syndrome [Alcohol Abuse] : no alcohol abuse [Malignancy] : no malignancy [Appendectomy] : no appendectomy [Cholecystectomy] : no cholecystectomy [Abdominal Surgery] : no abdominal surgery [de-identified] : The patient states that she is feeling poorly.   The patient went to dinner on Saturday night.  She ate chicken broth.  The patient had episode of nausea and vomiting.  The patient developed constant upper abdominal pain radiating to the back.  The patient developed diarrhea.   The patient was recently evaluated at the [facility] emergency room on January 10, 2020 for abdominal pain.  The patient was treated with medications and discharged.  The blood work performed on January 11, 2020 revealed an elevated chloride level of 110 mmol/L, anemia with a hemoglobin/hematocrit level 10.5/34.3, respectively and a normalization of the WBC count of 4.32.  The patient was observed with resolution of the symptoms and was discharged to followup in the office.   The patient denies any jaundice or pruritus.  The patient complains of chronic lower back pain. The patient complains of abdominal pain.  The patient describes the abdominal pain as a sharp, crampy, constant upper abdominal discomfort that radiates to the back.  The abdominal pain is unrelated to passing gas or having bowel movements.  The abdominal pain is worse with meals.  The abdominal pain is described as being moderate to severe in nature.  The abdominal pain occurs at night and in the morning.  The abdominal pain can occur at any time.   The abdominal pain has awakened the patient from sleep.  The abdominal pain is not relieved with medication.  The abdominal pain is associated with abdominal gas and bloating.  The patient complains of nausea and vomiting.  The patient denies any gastroesophageal reflux disease or dysphagia.  The patient denies any atypical chest pain, shortness of breath or palpitations.  The patient admits to occasional episodes of diaphoresis.  The patient complains of diarrhea but denies any constipation.  The patient has > 5 bowel movements a day.  The patient complains of a change in bowel habits.  The patient complains of a change in caliber of stool.   The diarrhea is described as soft in nature.  The patient denies having mucus discharge with the bowel movements.  The patient denies any bright red blood per rectum, melena or hematemesis.  The patient denies any rectal pain or rectal pruritus.  The patient complains of anorexia but denies any weight loss.  She denies any fevers or chills.  The patient had an upper endoscopy and colonoscopy to the cecum performed at the [facility] GI endoscopy suite on July 18, 2019. The upper endoscopy was performed up to the level of the second portion of the duodenum. The upper endoscopy revealed a small hiatal hernia and mild diffuse bile gastritis. Biopsies were taken of the distal esophagus, antrum, body of stomach and duodenum to assess for esophagitis, gastritis and duodenitis. The pathology revealed distal esophagus with unremarkable squamous mucosa, gastric antral and body mucosa with chronic inactive gastritis with severe lymphoid follicles that was negative for Helicobacter pylori and unremarkable duodenal mucosa. The colonoscopy to the cecum revealed moderate left sided diverticulosis, a poor prep colonoscopy and small internal hemorrhoids. The study was limited secondary to retained liquid and solid stool scattered throughout the colon but no gross lesions were noted. Unable to comment on small colonic polyps or masses secondary to the poor prep. There were no polyps, masses, diverticulosis, AVMs or colitis noted. The patient tolerated the procedures well. The patient was previously found to have occult blood in the stool. The CAT scan of the abdomen and pelvis with IV contrast performed on December 16, 2019 revealed heterogeneous attenuation in the external iliac and common femoral veins is likely related to mixing artifact. Also noted was constipation, sigmoid diverticulosis without diverticulitis, trace bilateral hydronephrosis likely related to distended urinary bladder. The doppler ultrasound of the lower extremities performed on December 27, 2019 revealed no evidence of deep venous thrombosis in either lower extremity. The patient had an abdominal and pelvic ultrasound performed on October 18, 2019 to assess the symptoms. The abdominal ultrasound performed on October 18, 2019 revealed no gallstones or pericholecystic fluid. The contracted state of the gallbladder limits assessment for gallbladder wall thickening. There is no evidence for intrahepatic or extrahepatic biliary duct dilatation. The pelvic ultrasound performed on October 18, 2019 revealed no evidence for an ovarian lesion. The blood work performed on November 25, 2019 revealed an elevated CEA of 4.8 ng/ml, an elevated ESR of 22 mm/hr, anemia with Hgb of 10.5 and low WBC count of 3.29. The patient admits to having a prior upper endoscopy and colonoscopy performed by another gastroenterologist. The upper endoscopic findings were unknown to the patient. The colonoscopic findings revealed a poor prep colonoscopy. The patient admits to a family history of GI problems. The patient’s brother and father had a history of bleeding peptic ulcer disease.

## 2020-01-13 NOTE — ASSESSMENT
[FreeTextEntry1] : Abdominal Pain: The patient complains of abdominal pain. The patient is to avoid nonsteroidal anti-inflammatory drugs and aspirin.  I recommend a low FOD-MAP diet.  I recommend a trial of Dicyclomine 10 mg tablet PO 3 times a day PRN for the abdominal pain. I recommend holding the pantoprazole at this time.  I recommend a trial of Carafate suspension 1 gram/10 cc PO 4 times a day for 3 months for the symptoms.\par Dyspepsia: The patient complains of dyspeptic symptoms.  The patient was advised to abide by an anti-gas diet.  The patient was given a pamphlet for anti-gas.  The patient and I reviewed the anti-gas diet at length. The patient is to start on a trial of Phazyme one tablet 3 times a day p.r.n. abdominal pain and gas.\par Diarrhea: The patient complains of diarrhea.  I recommend a low residue diet. The patient is to avoid fiber supplementation. The patient is to consider starting a trial of a probiotic such as Align once a day.   If the symptoms persist, the patient may require sending stool studies for C+S, O+P x3, and C. difficile to assess for an infectious etiology of the diarrhea.  The symptoms are worse after meals.   The patient agreed and will follow-up to reassess the symptoms.  \par Nausea/Vomiting:  The patient complains of nausea/vomiting. If the symptoms of nausea persists, the patient may require a trial of Zofran 4 mg twice a day. \par History of Diverticulitis: The patient had a prior history of acute diverticulitis. The patient may require an emergency room evaluation for diverticulitis if the symptoms persist. The patient may require treatment with antibiotics if the symptoms persist. The patient may require treatment with a trial of Metronidazole 500 mg 3 times a day and Ciprofloxin 500 mg twice a day for 2 weeks for the presumed acute diverticulitis. I recommend a low residue diet. The CAT scan of the abdomen and pelvis with IV contrast performed on December 16, 2019 revealed heterogeneous attenuation in the external iliac and common femoral veins is likely related to mixing artifact. Also noted was constipation, sigmoid diverticulosis without diverticulitis, trace bilateral hydronephrosis likely related to distended urinary bladder. The patient was advised to go to the hospital if fever, chills, worsening abdominal pain or GI bleeding recurs. The patient agrees.\par Abnormal Imaging Study: The CAT scan of the abdomen and pelvis with IV contrast performed on December 16, 2019 revealed heterogeneous attenuation in the external iliac and common femoral veins is likely related to mixing artifact. Also noted was constipation, sigmoid diverticulosis without diverticulitis, trace bilateral hydronephrosis likely related to distended urinary bladder. The doppler ultrasound of the lower extremities performed on December 27, 2019 revealed no evidence of deep venous thrombosis in either lower extremity. \par Hiatal Hernia: The patient was advised to avoid late-night meals and dietary indiscretions. The patient was advised to avoid fried and fatty foods. The patient was advised to abide by an anti-GERD diet. The patient was given a pamphlet for anti-GERD. The patient and I reviewed the anti-GERD diet at length. I recommend starting a trial of Carafate suspension 1 g per 10 cc 4 times a day for 3 months for the symptoms.\par Gastritis: The patient has a history of gastritis. The patient is to avoid nonsteroidal anti-inflammatory drugs and aspirin. I recommend restarting a trial of Carafate suspension 1 g per 10 cc 4 times a day for 3 months for the symptoms.\par Diverticulosis: I recommend a high-fiber diet and avoid seeds. The patient is to consider a trial of Metamucil once a day for fiber supplementation. The patient is to also consider a trial of a probiotic such as Align once a day. \par Internal Hemorrhoids: The patient is to consider a trial of Anusol H. C. suppositories one per rectum nightly and Anusol HC2.5% cream apply to affected area twice a day p.r.n. hemorrhoidal bleeding or pain. \par Poor Prep Colonoscopy: The patient had a recent poor prep colonoscopy. I recommend a repeat colonoscopy to reassess for colonic polyps secondary to the poor prep. The patient was told of the risks and benefits of the procedure. The patient was told of the risks of perforation, emergency surgery, bleeding, infections and missed lesions. The patient agreed and will return for the procedure.\par Occult Blood in the Stool: The patient was previously found to have occult blood in the stool. The patient denies any bright red blood per rectum, melena or hematemesis. The patient was previously found to be guaiac-positive by her PMD. I recommend proceed with the colonoscopy to assess the occult blood in the stool.\par Imaging Study: I recommend an imaging study to assess the symptoms. I recommend a repeat abdominal ultrasound to reassess the gallbladder because the prior study the gallbladder was contracted.\par Follow-up: The patient is to follow-up in the office in 4 weeks to reassess the symptoms. The patient was told to call the office if any further problems.\par \par \par

## 2020-01-24 ENCOUNTER — EMERGENCY (EMERGENCY)
Facility: HOSPITAL | Age: 55
LOS: 1 days | Discharge: HOME | End: 2020-01-24
Attending: STUDENT IN AN ORGANIZED HEALTH CARE EDUCATION/TRAINING PROGRAM
Payer: MEDICARE

## 2020-01-24 ENCOUNTER — APPOINTMENT (OUTPATIENT)
Dept: ULTRASOUND IMAGING | Facility: HOSPITAL | Age: 55
End: 2020-01-24

## 2020-01-24 VITALS
WEIGHT: 143.08 LBS | DIASTOLIC BLOOD PRESSURE: 86 MMHG | SYSTOLIC BLOOD PRESSURE: 131 MMHG | HEART RATE: 90 BPM | TEMPERATURE: 98 F | OXYGEN SATURATION: 98 % | RESPIRATION RATE: 17 BRPM

## 2020-01-24 DIAGNOSIS — Z98.890 OTHER SPECIFIED POSTPROCEDURAL STATES: Chronic | ICD-10-CM

## 2020-01-24 DIAGNOSIS — Z98.89 OTHER SPECIFIED POSTPROCEDURAL STATES: Chronic | ICD-10-CM

## 2020-01-24 DIAGNOSIS — Z98.51 TUBAL LIGATION STATUS: Chronic | ICD-10-CM

## 2020-01-24 LAB
ALBUMIN SERPL ELPH-MCNC: 4.1 G/DL — SIGNIFICANT CHANGE UP (ref 3.5–5)
ALP SERPL-CCNC: 109 U/L — SIGNIFICANT CHANGE UP (ref 40–120)
ALT FLD-CCNC: 39 U/L DA — SIGNIFICANT CHANGE UP (ref 10–60)
ANION GAP SERPL CALC-SCNC: 4 MMOL/L — LOW (ref 5–17)
APPEARANCE UR: CLEAR — SIGNIFICANT CHANGE UP
AST SERPL-CCNC: 33 U/L — SIGNIFICANT CHANGE UP (ref 10–40)
BASOPHILS # BLD AUTO: 0.06 K/UL — SIGNIFICANT CHANGE UP (ref 0–0.2)
BASOPHILS NFR BLD AUTO: 1.2 % — SIGNIFICANT CHANGE UP (ref 0–2)
BILIRUB SERPL-MCNC: 0.1 MG/DL — LOW (ref 0.2–1.2)
BILIRUB UR-MCNC: NEGATIVE — SIGNIFICANT CHANGE UP
BUN SERPL-MCNC: 9 MG/DL — SIGNIFICANT CHANGE UP (ref 7–18)
CALCIUM SERPL-MCNC: 8.6 MG/DL — SIGNIFICANT CHANGE UP (ref 8.4–10.5)
CHLORIDE SERPL-SCNC: 110 MMOL/L — HIGH (ref 96–108)
CO2 SERPL-SCNC: 26 MMOL/L — SIGNIFICANT CHANGE UP (ref 22–31)
COLOR SPEC: YELLOW — SIGNIFICANT CHANGE UP
CREAT SERPL-MCNC: 0.93 MG/DL — SIGNIFICANT CHANGE UP (ref 0.5–1.3)
DIFF PNL FLD: NEGATIVE — SIGNIFICANT CHANGE UP
EOSINOPHIL # BLD AUTO: 0.1 K/UL — SIGNIFICANT CHANGE UP (ref 0–0.5)
EOSINOPHIL NFR BLD AUTO: 2 % — SIGNIFICANT CHANGE UP (ref 0–6)
GLUCOSE SERPL-MCNC: 62 MG/DL — LOW (ref 70–99)
GLUCOSE UR QL: NEGATIVE — SIGNIFICANT CHANGE UP
HCG SERPL-ACNC: 4 MIU/ML — SIGNIFICANT CHANGE UP
HCT VFR BLD CALC: 32.5 % — LOW (ref 34.5–45)
HGB BLD-MCNC: 10.2 G/DL — LOW (ref 11.5–15.5)
IMM GRANULOCYTES NFR BLD AUTO: 0.2 % — SIGNIFICANT CHANGE UP (ref 0–1.5)
KETONES UR-MCNC: NEGATIVE — SIGNIFICANT CHANGE UP
LEUKOCYTE ESTERASE UR-ACNC: ABNORMAL
LIDOCAIN IGE QN: 107 U/L — SIGNIFICANT CHANGE UP (ref 73–393)
LYMPHOCYTES # BLD AUTO: 1.99 K/UL — SIGNIFICANT CHANGE UP (ref 1–3.3)
LYMPHOCYTES # BLD AUTO: 39.9 % — SIGNIFICANT CHANGE UP (ref 13–44)
MCHC RBC-ENTMCNC: 30.7 PG — SIGNIFICANT CHANGE UP (ref 27–34)
MCHC RBC-ENTMCNC: 31.4 GM/DL — LOW (ref 32–36)
MCV RBC AUTO: 97.9 FL — SIGNIFICANT CHANGE UP (ref 80–100)
MONOCYTES # BLD AUTO: 0.54 K/UL — SIGNIFICANT CHANGE UP (ref 0–0.9)
MONOCYTES NFR BLD AUTO: 10.8 % — SIGNIFICANT CHANGE UP (ref 2–14)
NEUTROPHILS # BLD AUTO: 2.29 K/UL — SIGNIFICANT CHANGE UP (ref 1.8–7.4)
NEUTROPHILS NFR BLD AUTO: 45.9 % — SIGNIFICANT CHANGE UP (ref 43–77)
NITRITE UR-MCNC: NEGATIVE — SIGNIFICANT CHANGE UP
NRBC # BLD: 0 /100 WBCS — SIGNIFICANT CHANGE UP (ref 0–0)
PH UR: 7 — SIGNIFICANT CHANGE UP (ref 5–8)
PLATELET # BLD AUTO: 331 K/UL — SIGNIFICANT CHANGE UP (ref 150–400)
POTASSIUM SERPL-MCNC: 3.7 MMOL/L — SIGNIFICANT CHANGE UP (ref 3.5–5.3)
POTASSIUM SERPL-SCNC: 3.7 MMOL/L — SIGNIFICANT CHANGE UP (ref 3.5–5.3)
PROT SERPL-MCNC: 7.9 G/DL — SIGNIFICANT CHANGE UP (ref 6–8.3)
PROT UR-MCNC: NEGATIVE — SIGNIFICANT CHANGE UP
RBC # BLD: 3.32 M/UL — LOW (ref 3.8–5.2)
RBC # FLD: 12.5 % — SIGNIFICANT CHANGE UP (ref 10.3–14.5)
SODIUM SERPL-SCNC: 140 MMOL/L — SIGNIFICANT CHANGE UP (ref 135–145)
SP GR SPEC: 1.01 — SIGNIFICANT CHANGE UP (ref 1.01–1.02)
UROBILINOGEN FLD QL: NEGATIVE — SIGNIFICANT CHANGE UP
WBC # BLD: 4.99 K/UL — SIGNIFICANT CHANGE UP (ref 3.8–10.5)
WBC # FLD AUTO: 4.99 K/UL — SIGNIFICANT CHANGE UP (ref 3.8–10.5)

## 2020-01-24 PROCEDURE — 80053 COMPREHEN METABOLIC PANEL: CPT

## 2020-01-24 PROCEDURE — 83690 ASSAY OF LIPASE: CPT

## 2020-01-24 PROCEDURE — 87086 URINE CULTURE/COLONY COUNT: CPT

## 2020-01-24 PROCEDURE — 84702 CHORIONIC GONADOTROPIN TEST: CPT

## 2020-01-24 PROCEDURE — 99284 EMERGENCY DEPT VISIT MOD MDM: CPT | Mod: 25

## 2020-01-24 PROCEDURE — 74177 CT ABD & PELVIS W/CONTRAST: CPT | Mod: 26

## 2020-01-24 PROCEDURE — 74177 CT ABD & PELVIS W/CONTRAST: CPT

## 2020-01-24 PROCEDURE — 85027 COMPLETE CBC AUTOMATED: CPT

## 2020-01-24 PROCEDURE — 96361 HYDRATE IV INFUSION ADD-ON: CPT

## 2020-01-24 PROCEDURE — 96375 TX/PRO/DX INJ NEW DRUG ADDON: CPT

## 2020-01-24 PROCEDURE — 99285 EMERGENCY DEPT VISIT HI MDM: CPT

## 2020-01-24 PROCEDURE — 96374 THER/PROPH/DIAG INJ IV PUSH: CPT | Mod: XU

## 2020-01-24 PROCEDURE — 36415 COLL VENOUS BLD VENIPUNCTURE: CPT

## 2020-01-24 PROCEDURE — 81001 URINALYSIS AUTO W/SCOPE: CPT

## 2020-01-24 RX ORDER — MORPHINE SULFATE 50 MG/1
2 CAPSULE, EXTENDED RELEASE ORAL ONCE
Refills: 0 | Status: DISCONTINUED | OUTPATIENT
Start: 2020-01-24 | End: 2020-01-24

## 2020-01-24 RX ORDER — FAMOTIDINE 10 MG/ML
20 INJECTION INTRAVENOUS ONCE
Refills: 0 | Status: COMPLETED | OUTPATIENT
Start: 2020-01-24 | End: 2020-01-24

## 2020-01-24 RX ORDER — KETOROLAC TROMETHAMINE 30 MG/ML
30 SYRINGE (ML) INJECTION ONCE
Refills: 0 | Status: DISCONTINUED | OUTPATIENT
Start: 2020-01-24 | End: 2020-01-24

## 2020-01-24 RX ORDER — SODIUM CHLORIDE 9 MG/ML
1000 INJECTION INTRAMUSCULAR; INTRAVENOUS; SUBCUTANEOUS ONCE
Refills: 0 | Status: COMPLETED | OUTPATIENT
Start: 2020-01-24 | End: 2020-01-24

## 2020-01-24 RX ADMIN — SODIUM CHLORIDE 1000 MILLILITER(S): 9 INJECTION INTRAMUSCULAR; INTRAVENOUS; SUBCUTANEOUS at 03:57

## 2020-01-24 RX ADMIN — MORPHINE SULFATE 2 MILLIGRAM(S): 50 CAPSULE, EXTENDED RELEASE ORAL at 04:53

## 2020-01-24 RX ADMIN — MORPHINE SULFATE 2 MILLIGRAM(S): 50 CAPSULE, EXTENDED RELEASE ORAL at 04:54

## 2020-01-24 RX ADMIN — Medication 30 MILLIGRAM(S): at 04:54

## 2020-01-24 RX ADMIN — SODIUM CHLORIDE 1000 MILLILITER(S): 9 INJECTION INTRAMUSCULAR; INTRAVENOUS; SUBCUTANEOUS at 04:54

## 2020-01-24 RX ADMIN — FAMOTIDINE 20 MILLIGRAM(S): 10 INJECTION INTRAVENOUS at 03:55

## 2020-01-24 RX ADMIN — Medication 30 MILLIGRAM(S): at 03:55

## 2020-01-24 NOTE — ED PROVIDER NOTE - CLINICAL SUMMARY MEDICAL DECISION MAKING FREE TEXT BOX
Patient presenting with abd pain, appears chronic. will obtain lab, ct r/o gall stone, kidney stone, appendicits, colitis. ed obs and reassess

## 2020-01-24 NOTE — ED PROVIDER NOTE - PROGRESS NOTE DETAILS
patient pain controlled. abd nontender on exam. ct negative. has appoitnment with her surgeon. instructed to f.u pmd, return precaution provided

## 2020-01-24 NOTE — ED PROVIDER NOTE - OBJECTIVE STATEMENT
54 y.o presenting with 2 month of abd pain, endorses right sided, radiating to flank. denies n, v, fever, diarrhea, dysuria. history hysterectomy. endorses she was seen 2 weeks ago for the same, pain persist, was told she needs a gb sono.

## 2020-01-24 NOTE — ED PROVIDER NOTE - PATIENT PORTAL LINK FT
You can access the FollowMyHealth Patient Portal offered by [facility] by registering at the following website: [email or web address]. By joining [employer]’s FollowMyHealth portal, you will also be able to view your health information using other applications (apps) compatible with our system.

## 2020-01-25 LAB
CULTURE RESULTS: SIGNIFICANT CHANGE UP
SPECIMEN SOURCE: SIGNIFICANT CHANGE UP

## 2020-02-05 ENCOUNTER — APPOINTMENT (OUTPATIENT)
Dept: ORTHOPEDIC SURGERY | Facility: CLINIC | Age: 55
End: 2020-02-05

## 2020-02-12 ENCOUNTER — APPOINTMENT (OUTPATIENT)
Dept: OTOLARYNGOLOGY | Facility: CLINIC | Age: 55
End: 2020-02-12
Payer: MEDICARE

## 2020-02-12 VITALS
HEART RATE: 81 BPM | SYSTOLIC BLOOD PRESSURE: 119 MMHG | BODY MASS INDEX: 27.82 KG/M2 | DIASTOLIC BLOOD PRESSURE: 82 MMHG | HEIGHT: 59 IN | WEIGHT: 138 LBS

## 2020-02-12 PROCEDURE — 31231 NASAL ENDOSCOPY DX: CPT

## 2020-02-12 PROCEDURE — 99203 OFFICE O/P NEW LOW 30 MIN: CPT | Mod: 25

## 2020-02-12 RX ORDER — SUCRALFATE 1 G/10ML
1 SUSPENSION ORAL
Qty: 1200 | Refills: 3 | Status: DISCONTINUED | COMMUNITY
Start: 2020-01-13 | End: 2020-02-12

## 2020-02-12 RX ORDER — CEFUROXIME AXETIL 250 MG/1
250 TABLET ORAL
Qty: 14 | Refills: 0 | Status: DISCONTINUED | COMMUNITY
Start: 2019-10-31 | End: 2020-02-12

## 2020-02-12 RX ORDER — SUCRALFATE 1 G/1
1 TABLET ORAL
Qty: 360 | Refills: 3 | Status: DISCONTINUED | COMMUNITY
Start: 2020-01-15 | End: 2020-02-12

## 2020-02-12 RX ORDER — B-COMPLEX WITH VITAMIN C
500-200 TABLET ORAL
Qty: 60 | Refills: 5 | Status: DISCONTINUED | COMMUNITY
Start: 2016-07-26 | End: 2020-02-12

## 2020-02-12 RX ORDER — IBUPROFEN 800 MG/1
800 TABLET, FILM COATED ORAL
Qty: 30 | Refills: 0 | Status: DISCONTINUED | COMMUNITY
Start: 2019-08-14 | End: 2020-02-12

## 2020-02-12 NOTE — PHYSICAL EXAM
[Nasal Endoscopy Performed] : nasal endoscopy was performed, see procedure section for findings [] : septum deviated to the right [Midline] : trachea located in midline position [Normal] : no rashes [de-identified] : poor dentition, missing several teeth

## 2020-02-12 NOTE — CONSULT LETTER
[Dear  ___] : Dear  [unfilled], [Consult Closing:] : Thank you very much for allowing me to participate in the care of this patient.  If you have any questions, please do not hesitate to contact me. [Consult Letter:] : I had the pleasure of evaluating your patient, [unfilled]. [Please see my note below.] : Please see my note below. [Sincerely,] : Sincerely, [FreeTextEntry3] : Boo Scott MD\par Department of Otolaryngology- Head & Neck Surgery\par [facility]\par [facility]\par \par Phone: [phone]\par Fax: [phone]\par

## 2020-02-12 NOTE — HISTORY OF PRESENT ILLNESS
[de-identified] : 54 year old female referred by Dr. Roe Sales, PCP for difficulty breathing through nose. Reports having deviated septum, unable to breath through the right nostril. First noticed 10yr ago. Has been getting worse for the past year. Reports sleeping on 4 pillows because she "palpitates when laying flat." Reportedly has been worked up by cardiologist. Reports constant nasal congestion, R>L, yellow nasal discharge from L nostril. Reports cough at night. Denies sinus infections. Use of nasal spays with temporary relief.  Reports intermittent voice hoarseness and throat clearing, worse in the morning. Reports loud snoring but denies being told she pauses, gasps. Last sleep study 15 years for RLS but was told she didn't have SEAN.\par \par PMH: HLD, SLE, RA, migraines (takes injectable medicine), fibromyalgia\par PSH: T&A, cardiac stent,  x 4, ex-lap

## 2020-02-12 NOTE — REASON FOR VISIT
[Initial Consultation] : an initial consultation for [FreeTextEntry2] : referred by Dr. Roe Sales, PCP for difficulty breathing through nose

## 2020-02-12 NOTE — REVIEW OF SYSTEMS
[Nasal Congestion] : nasal congestion [As Noted in HPI] : as noted in HPI [Hoarseness] : hoarseness [Throat Clearing] : throat clearing [Throat Dryness] : throat dryness [Negative] : Heme/Lymph

## 2020-02-13 ENCOUNTER — RESULT REVIEW (OUTPATIENT)
Age: 55
End: 2020-02-13

## 2020-02-13 ENCOUNTER — OUTPATIENT (OUTPATIENT)
Dept: OUTPATIENT SERVICES | Facility: HOSPITAL | Age: 55
LOS: 1 days | End: 2020-02-13
Payer: MEDICARE

## 2020-02-13 ENCOUNTER — APPOINTMENT (OUTPATIENT)
Dept: GASTROENTEROLOGY | Facility: HOSPITAL | Age: 55
End: 2020-02-13

## 2020-02-13 DIAGNOSIS — Z98.890 OTHER SPECIFIED POSTPROCEDURAL STATES: Chronic | ICD-10-CM

## 2020-02-13 DIAGNOSIS — Z98.89 OTHER SPECIFIED POSTPROCEDURAL STATES: Chronic | ICD-10-CM

## 2020-02-13 DIAGNOSIS — R19.7 DIARRHEA, UNSPECIFIED: ICD-10-CM

## 2020-02-13 DIAGNOSIS — Z98.51 TUBAL LIGATION STATUS: Chronic | ICD-10-CM

## 2020-02-13 DIAGNOSIS — K57.30 DIVERTICULOSIS OF LARGE INTESTINE WITHOUT PERFORATION OR ABSCESS WITHOUT BLEEDING: ICD-10-CM

## 2020-02-13 PROCEDURE — 45380 COLONOSCOPY AND BIOPSY: CPT

## 2020-02-13 PROCEDURE — 88305 TISSUE EXAM BY PATHOLOGIST: CPT

## 2020-02-13 PROCEDURE — 88305 TISSUE EXAM BY PATHOLOGIST: CPT | Mod: 26

## 2020-02-16 VITALS
OXYGEN SATURATION: 100 % | SYSTOLIC BLOOD PRESSURE: 125 MMHG | DIASTOLIC BLOOD PRESSURE: 77 MMHG | TEMPERATURE: 98 F | HEART RATE: 89 BPM | HEIGHT: 59 IN | WEIGHT: 143.08 LBS | RESPIRATION RATE: 16 BRPM

## 2020-02-16 LAB
ALBUMIN SERPL ELPH-MCNC: 3.7 G/DL — SIGNIFICANT CHANGE UP (ref 3.5–5)
ALP SERPL-CCNC: 90 U/L — SIGNIFICANT CHANGE UP (ref 40–120)
ALT FLD-CCNC: 29 U/L DA — SIGNIFICANT CHANGE UP (ref 10–60)
ANION GAP SERPL CALC-SCNC: 4 MMOL/L — LOW (ref 5–17)
APTT BLD: 32 SEC — SIGNIFICANT CHANGE UP (ref 27.5–36.3)
AST SERPL-CCNC: 54 U/L — HIGH (ref 10–40)
BILIRUB SERPL-MCNC: 0.1 MG/DL — LOW (ref 0.2–1.2)
BUN SERPL-MCNC: 13 MG/DL — SIGNIFICANT CHANGE UP (ref 7–18)
CALCIUM SERPL-MCNC: 8.6 MG/DL — SIGNIFICANT CHANGE UP (ref 8.4–10.5)
CHLORIDE SERPL-SCNC: 109 MMOL/L — HIGH (ref 96–108)
CO2 SERPL-SCNC: 23 MMOL/L — SIGNIFICANT CHANGE UP (ref 22–31)
CREAT SERPL-MCNC: 0.95 MG/DL — SIGNIFICANT CHANGE UP (ref 0.5–1.3)
GLUCOSE SERPL-MCNC: 75 MG/DL — SIGNIFICANT CHANGE UP (ref 70–99)
HCT VFR BLD CALC: 30.7 % — LOW (ref 34.5–45)
HGB BLD-MCNC: 9.9 G/DL — LOW (ref 11.5–15.5)
INR BLD: 1.12 RATIO — SIGNIFICANT CHANGE UP (ref 0.88–1.16)
LIDOCAIN IGE QN: 56 U/L — LOW (ref 73–393)
MCHC RBC-ENTMCNC: 30.9 PG — SIGNIFICANT CHANGE UP (ref 27–34)
MCHC RBC-ENTMCNC: 32.2 GM/DL — SIGNIFICANT CHANGE UP (ref 32–36)
MCV RBC AUTO: 95.9 FL — SIGNIFICANT CHANGE UP (ref 80–100)
NRBC # BLD: 0 /100 WBCS — SIGNIFICANT CHANGE UP (ref 0–0)
PLATELET # BLD AUTO: 307 K/UL — SIGNIFICANT CHANGE UP (ref 150–400)
POTASSIUM SERPL-MCNC: 5.2 MMOL/L — SIGNIFICANT CHANGE UP (ref 3.5–5.3)
POTASSIUM SERPL-SCNC: 5.2 MMOL/L — SIGNIFICANT CHANGE UP (ref 3.5–5.3)
PROT SERPL-MCNC: 7.8 G/DL — SIGNIFICANT CHANGE UP (ref 6–8.3)
PROTHROM AB SERPL-ACNC: 12.5 SEC — SIGNIFICANT CHANGE UP (ref 10–12.9)
RBC # BLD: 3.2 M/UL — LOW (ref 3.8–5.2)
RBC # FLD: 12.6 % — SIGNIFICANT CHANGE UP (ref 10.3–14.5)
SODIUM SERPL-SCNC: 136 MMOL/L — SIGNIFICANT CHANGE UP (ref 135–145)
TROPONIN I SERPL-MCNC: <0.015 NG/ML — SIGNIFICANT CHANGE UP (ref 0–0.04)
WBC # BLD: 3.98 K/UL — SIGNIFICANT CHANGE UP (ref 3.8–10.5)
WBC # FLD AUTO: 3.98 K/UL — SIGNIFICANT CHANGE UP (ref 3.8–10.5)

## 2020-02-16 PROCEDURE — 71046 X-RAY EXAM CHEST 2 VIEWS: CPT | Mod: 26

## 2020-02-16 PROCEDURE — 93010 ELECTROCARDIOGRAM REPORT: CPT

## 2020-02-16 RX ORDER — ONDANSETRON 8 MG/1
4 TABLET, FILM COATED ORAL ONCE
Refills: 0 | Status: COMPLETED | OUTPATIENT
Start: 2020-02-16 | End: 2020-02-16

## 2020-02-16 RX ORDER — DIPHENHYDRAMINE HCL 50 MG
25 CAPSULE ORAL ONCE
Refills: 0 | Status: COMPLETED | OUTPATIENT
Start: 2020-02-16 | End: 2020-02-16

## 2020-02-16 RX ORDER — MORPHINE SULFATE 50 MG/1
2 CAPSULE, EXTENDED RELEASE ORAL ONCE
Refills: 0 | Status: DISCONTINUED | OUTPATIENT
Start: 2020-02-16 | End: 2020-02-16

## 2020-02-16 RX ADMIN — Medication 25 MILLIGRAM(S): at 23:46

## 2020-02-16 RX ADMIN — ONDANSETRON 4 MILLIGRAM(S): 8 TABLET, FILM COATED ORAL at 23:19

## 2020-02-16 RX ADMIN — MORPHINE SULFATE 2 MILLIGRAM(S): 50 CAPSULE, EXTENDED RELEASE ORAL at 23:19

## 2020-02-16 NOTE — ED PROVIDER NOTE - CLINICAL SUMMARY MEDICAL DECISION MAKING FREE TEXT BOX
53 y/o F pt presents with chest pain radiating to RLQ abdomen. Will obtain CTA chest, abdomen and pelvis. Will check blood work, UA, give pain and nausea medication and reassess.

## 2020-02-16 NOTE — ED PROVIDER NOTE - PROGRESS NOTE DETAILS
Pt had slight swelling which appeared like urticarial wheals adjacent to the right antecubital IV heplock site, onset just after the CT tech flushed the IV to prepare for the CTA.  Pt had no itching or pain or any other symptoms.  It did appear to be allergic in nature though, so Benadryl PO given and it improved.  No shortness of breath, no swelling of any other areas.  IV flushed with normal saline after that by me and no resistance or signs of infiltration, so will go ahead with the CTA. patient signedout to me by DR. Mena at 12am. Awaiting CTA.  CTA shows No evidence of an aortic dissection or PE. Discussed above with patient. Patient agrees with plan for admission as had been recommended by Dr. Mena. MERA Simpson MD

## 2020-02-16 NOTE — ED ADULT TRIAGE NOTE - CHIEF COMPLAINT QUOTE
I have pain in my stomach and my chest since Friday, I threw up few times today and I got dizzy, I gt the pain in the groin. I had a colonoscopy on the Feb 13

## 2020-02-16 NOTE — ED ADULT TRIAGE NOTE - NS ED TRIAGE AVPU SCALE
Alert-The patient is alert, awake and responds to voice. The patient is oriented to time, place, and person. The triage nurse is able to obtain subjective information. Admitting MD

## 2020-02-16 NOTE — ED PROVIDER NOTE - OBJECTIVE STATEMENT
55 y/o F pt with an extensive PMHx including asthma, bipolar disorder, breast cancer (in 1994), CVA, HLD, HTN, lupus, and a significant PSHx of lumpectomy, tubal ligation, presents to the ED with complaints of substernal chest pain radiating to right abdominal pain start 2 days ago. Patient endorses vomiting 3 times today, feeling dizzy and chills. Reports her lupus symptoms included joint pain and her body freezing completely. Notes she had a colonoscopy on 2/13/2020 due to having stomach pain for 2 months. Patient denies fever, LOC, diarrhea, constipation, dysuria, hematuria or any other symptoms. States she ws a former drug user of marijuana. No hx of gallstones, DVT or PE. Allergies: Estrogen.

## 2020-02-17 ENCOUNTER — INPATIENT (INPATIENT)
Facility: HOSPITAL | Age: 55
LOS: 2 days | Discharge: HOME | DRG: 392 | End: 2020-02-20
Attending: STUDENT IN AN ORGANIZED HEALTH CARE EDUCATION/TRAINING PROGRAM | Admitting: STUDENT IN AN ORGANIZED HEALTH CARE EDUCATION/TRAINING PROGRAM
Payer: MEDICARE

## 2020-02-17 DIAGNOSIS — Z98.890 OTHER SPECIFIED POSTPROCEDURAL STATES: Chronic | ICD-10-CM

## 2020-02-17 DIAGNOSIS — F32.9 MAJOR DEPRESSIVE DISORDER, SINGLE EPISODE, UNSPECIFIED: ICD-10-CM

## 2020-02-17 DIAGNOSIS — Z98.89 OTHER SPECIFIED POSTPROCEDURAL STATES: Chronic | ICD-10-CM

## 2020-02-17 DIAGNOSIS — I10 ESSENTIAL (PRIMARY) HYPERTENSION: ICD-10-CM

## 2020-02-17 DIAGNOSIS — D64.9 ANEMIA, UNSPECIFIED: ICD-10-CM

## 2020-02-17 DIAGNOSIS — I20.0 UNSTABLE ANGINA: ICD-10-CM

## 2020-02-17 DIAGNOSIS — R10.13 EPIGASTRIC PAIN: ICD-10-CM

## 2020-02-17 DIAGNOSIS — M32.9 SYSTEMIC LUPUS ERYTHEMATOSUS, UNSPECIFIED: ICD-10-CM

## 2020-02-17 DIAGNOSIS — E03.9 HYPOTHYROIDISM, UNSPECIFIED: ICD-10-CM

## 2020-02-17 DIAGNOSIS — Z98.51 TUBAL LIGATION STATUS: Chronic | ICD-10-CM

## 2020-02-17 DIAGNOSIS — Z29.9 ENCOUNTER FOR PROPHYLACTIC MEASURES, UNSPECIFIED: ICD-10-CM

## 2020-02-17 DIAGNOSIS — M79.7 FIBROMYALGIA: ICD-10-CM

## 2020-02-17 LAB
ALBUMIN SERPL ELPH-MCNC: 3.8 G/DL — SIGNIFICANT CHANGE UP (ref 3.5–5)
ALP SERPL-CCNC: 90 U/L — SIGNIFICANT CHANGE UP (ref 40–120)
ALT FLD-CCNC: 27 U/L DA — SIGNIFICANT CHANGE UP (ref 10–60)
ANION GAP SERPL CALC-SCNC: 3 MMOL/L — LOW (ref 5–17)
AST SERPL-CCNC: 23 U/L — SIGNIFICANT CHANGE UP (ref 10–40)
BASOPHILS # BLD AUTO: 0.06 K/UL — SIGNIFICANT CHANGE UP (ref 0–0.2)
BASOPHILS NFR BLD AUTO: 1.6 % — SIGNIFICANT CHANGE UP (ref 0–2)
BILIRUB SERPL-MCNC: 0.3 MG/DL — SIGNIFICANT CHANGE UP (ref 0.2–1.2)
BUN SERPL-MCNC: 11 MG/DL — SIGNIFICANT CHANGE UP (ref 7–18)
CALCIUM SERPL-MCNC: 8.4 MG/DL — SIGNIFICANT CHANGE UP (ref 8.4–10.5)
CHLORIDE SERPL-SCNC: 109 MMOL/L — HIGH (ref 96–108)
CHOLEST SERPL-MCNC: 157 MG/DL — SIGNIFICANT CHANGE UP (ref 10–199)
CK MB BLD-MCNC: 1 % — SIGNIFICANT CHANGE UP (ref 0–3.5)
CK MB CFR SERPL CALC: 1.3 NG/ML — SIGNIFICANT CHANGE UP (ref 0–3.6)
CK SERPL-CCNC: 125 U/L — SIGNIFICANT CHANGE UP (ref 21–215)
CO2 SERPL-SCNC: 27 MMOL/L — SIGNIFICANT CHANGE UP (ref 22–31)
CREAT SERPL-MCNC: 0.92 MG/DL — SIGNIFICANT CHANGE UP (ref 0.5–1.3)
EOSINOPHIL # BLD AUTO: 0.13 K/UL — SIGNIFICANT CHANGE UP (ref 0–0.5)
EOSINOPHIL NFR BLD AUTO: 3.6 % — SIGNIFICANT CHANGE UP (ref 0–6)
FERRITIN SERPL-MCNC: 19 NG/ML — SIGNIFICANT CHANGE UP (ref 15–150)
GLUCOSE SERPL-MCNC: 64 MG/DL — LOW (ref 70–99)
HBA1C BLD-MCNC: 5.7 % — HIGH (ref 4–5.6)
HCT VFR BLD CALC: 32.3 % — LOW (ref 34.5–45)
HDLC SERPL-MCNC: 63 MG/DL — SIGNIFICANT CHANGE UP
HGB BLD-MCNC: 10 G/DL — LOW (ref 11.5–15.5)
IMM GRANULOCYTES NFR BLD AUTO: 0.3 % — SIGNIFICANT CHANGE UP (ref 0–1.5)
IRON SATN MFR SERPL: 13 % — LOW (ref 15–50)
IRON SATN MFR SERPL: 48 UG/DL — SIGNIFICANT CHANGE UP (ref 40–150)
LIPID PNL WITH DIRECT LDL SERPL: 83 MG/DL — SIGNIFICANT CHANGE UP
LYMPHOCYTES # BLD AUTO: 1.83 K/UL — SIGNIFICANT CHANGE UP (ref 1–3.3)
LYMPHOCYTES # BLD AUTO: 50.1 % — HIGH (ref 13–44)
MAGNESIUM SERPL-MCNC: 2.1 MG/DL — SIGNIFICANT CHANGE UP (ref 1.6–2.6)
MCHC RBC-ENTMCNC: 29.8 PG — SIGNIFICANT CHANGE UP (ref 27–34)
MCHC RBC-ENTMCNC: 31 GM/DL — LOW (ref 32–36)
MCV RBC AUTO: 96.1 FL — SIGNIFICANT CHANGE UP (ref 80–100)
MONOCYTES # BLD AUTO: 0.46 K/UL — SIGNIFICANT CHANGE UP (ref 0–0.9)
MONOCYTES NFR BLD AUTO: 12.6 % — SIGNIFICANT CHANGE UP (ref 2–14)
NEUTROPHILS # BLD AUTO: 1.16 K/UL — LOW (ref 1.8–7.4)
NEUTROPHILS NFR BLD AUTO: 31.8 % — LOW (ref 43–77)
NRBC # BLD: 0 /100 WBCS — SIGNIFICANT CHANGE UP (ref 0–0)
PHOSPHATE SERPL-MCNC: 3.6 MG/DL — SIGNIFICANT CHANGE UP (ref 2.5–4.5)
PLATELET # BLD AUTO: 296 K/UL — SIGNIFICANT CHANGE UP (ref 150–400)
POTASSIUM SERPL-MCNC: 3.9 MMOL/L — SIGNIFICANT CHANGE UP (ref 3.5–5.3)
POTASSIUM SERPL-SCNC: 3.9 MMOL/L — SIGNIFICANT CHANGE UP (ref 3.5–5.3)
PROT SERPL-MCNC: 7.5 G/DL — SIGNIFICANT CHANGE UP (ref 6–8.3)
RBC # BLD: 3.36 M/UL — LOW (ref 3.8–5.2)
RBC # FLD: 12.6 % — SIGNIFICANT CHANGE UP (ref 10.3–14.5)
SODIUM SERPL-SCNC: 139 MMOL/L — SIGNIFICANT CHANGE UP (ref 135–145)
TIBC SERPL-MCNC: 382 UG/DL — SIGNIFICANT CHANGE UP (ref 250–450)
TOTAL CHOLESTEROL/HDL RATIO MEASUREMENT: 2.5 RATIO — LOW (ref 3.3–7.1)
TRANSFERRIN SERPL-MCNC: 317 MG/DL — SIGNIFICANT CHANGE UP (ref 200–360)
TRIGL SERPL-MCNC: 54 MG/DL — SIGNIFICANT CHANGE UP (ref 10–149)
TROPONIN I SERPL-MCNC: <0.015 NG/ML — SIGNIFICANT CHANGE UP (ref 0–0.04)
TROPONIN I SERPL-MCNC: <0.015 NG/ML — SIGNIFICANT CHANGE UP (ref 0–0.04)
TSH SERPL-MCNC: 10.7 UU/ML — HIGH (ref 0.34–4.82)
UIBC SERPL-MCNC: 334 UG/DL — SIGNIFICANT CHANGE UP (ref 110–370)
VIT B12 SERPL-MCNC: 399 PG/ML — SIGNIFICANT CHANGE UP (ref 232–1245)
WBC # BLD: 3.65 K/UL — LOW (ref 3.8–10.5)
WBC # FLD AUTO: 3.65 K/UL — LOW (ref 3.8–10.5)

## 2020-02-17 PROCEDURE — 99223 1ST HOSP IP/OBS HIGH 75: CPT

## 2020-02-17 PROCEDURE — 71275 CT ANGIOGRAPHY CHEST: CPT | Mod: 26

## 2020-02-17 PROCEDURE — 74174 CTA ABD&PLVS W/CONTRAST: CPT | Mod: 26

## 2020-02-17 PROCEDURE — 99285 EMERGENCY DEPT VISIT HI MDM: CPT

## 2020-02-17 RX ORDER — PANTOPRAZOLE SODIUM 20 MG/1
40 TABLET, DELAYED RELEASE ORAL ONCE
Refills: 0 | Status: COMPLETED | OUTPATIENT
Start: 2020-02-17 | End: 2020-02-17

## 2020-02-17 RX ORDER — ACETAMINOPHEN 500 MG
650 TABLET ORAL EVERY 6 HOURS
Refills: 0 | Status: DISCONTINUED | OUTPATIENT
Start: 2020-02-17 | End: 2020-02-20

## 2020-02-17 RX ORDER — SENNA PLUS 8.6 MG/1
2 TABLET ORAL AT BEDTIME
Refills: 0 | Status: DISCONTINUED | OUTPATIENT
Start: 2020-02-17 | End: 2020-02-20

## 2020-02-17 RX ORDER — NORTRIPTYLINE HYDROCHLORIDE 10 MG/1
50 CAPSULE ORAL AT BEDTIME
Refills: 0 | Status: DISCONTINUED | OUTPATIENT
Start: 2020-02-17 | End: 2020-02-20

## 2020-02-17 RX ORDER — SODIUM CHLORIDE 9 MG/ML
1000 INJECTION INTRAMUSCULAR; INTRAVENOUS; SUBCUTANEOUS
Refills: 0 | Status: DISCONTINUED | OUTPATIENT
Start: 2020-02-17 | End: 2020-02-17

## 2020-02-17 RX ORDER — KETOROLAC TROMETHAMINE 30 MG/ML
30 SYRINGE (ML) INJECTION ONCE
Refills: 0 | Status: DISCONTINUED | OUTPATIENT
Start: 2020-02-17 | End: 2020-02-17

## 2020-02-17 RX ORDER — SIMETHICONE 80 MG/1
80 TABLET, CHEWABLE ORAL EVERY 6 HOURS
Refills: 0 | Status: DISCONTINUED | OUTPATIENT
Start: 2020-02-17 | End: 2020-02-20

## 2020-02-17 RX ORDER — TOPIRAMATE 25 MG
100 TABLET ORAL
Refills: 0 | Status: DISCONTINUED | OUTPATIENT
Start: 2020-02-17 | End: 2020-02-20

## 2020-02-17 RX ORDER — HEPARIN SODIUM 5000 [USP'U]/ML
5000 INJECTION INTRAVENOUS; SUBCUTANEOUS EVERY 12 HOURS
Refills: 0 | Status: DISCONTINUED | OUTPATIENT
Start: 2020-02-17 | End: 2020-02-20

## 2020-02-17 RX ORDER — SUCRALFATE 1 G
1 TABLET ORAL
Refills: 0 | Status: DISCONTINUED | OUTPATIENT
Start: 2020-02-17 | End: 2020-02-17

## 2020-02-17 RX ORDER — ATORVASTATIN CALCIUM 80 MG/1
40 TABLET, FILM COATED ORAL AT BEDTIME
Refills: 0 | Status: DISCONTINUED | OUTPATIENT
Start: 2020-02-17 | End: 2020-02-20

## 2020-02-17 RX ORDER — LEVOTHYROXINE SODIUM 125 MCG
75 TABLET ORAL DAILY
Refills: 0 | Status: DISCONTINUED | OUTPATIENT
Start: 2020-02-17 | End: 2020-02-20

## 2020-02-17 RX ORDER — ASPIRIN/CALCIUM CARB/MAGNESIUM 324 MG
81 TABLET ORAL DAILY
Refills: 0 | Status: DISCONTINUED | OUTPATIENT
Start: 2020-02-17 | End: 2020-02-20

## 2020-02-17 RX ORDER — POLYETHYLENE GLYCOL 3350 17 G/17G
17 POWDER, FOR SOLUTION ORAL DAILY
Refills: 0 | Status: DISCONTINUED | OUTPATIENT
Start: 2020-02-17 | End: 2020-02-17

## 2020-02-17 RX ORDER — PANTOPRAZOLE SODIUM 20 MG/1
40 TABLET, DELAYED RELEASE ORAL
Refills: 0 | Status: DISCONTINUED | OUTPATIENT
Start: 2020-02-17 | End: 2020-02-17

## 2020-02-17 RX ORDER — CLONAZEPAM 1 MG
0.5 TABLET ORAL
Refills: 0 | Status: DISCONTINUED | OUTPATIENT
Start: 2020-02-17 | End: 2020-02-20

## 2020-02-17 RX ORDER — SODIUM CHLORIDE 9 MG/ML
1000 INJECTION, SOLUTION INTRAVENOUS
Refills: 0 | Status: DISCONTINUED | OUTPATIENT
Start: 2020-02-17 | End: 2020-02-19

## 2020-02-17 RX ORDER — SUCRALFATE 1 G
1 TABLET ORAL
Refills: 0 | Status: DISCONTINUED | OUTPATIENT
Start: 2020-02-17 | End: 2020-02-20

## 2020-02-17 RX ORDER — VENLAFAXINE HCL 75 MG
150 CAPSULE, EXT RELEASE 24 HR ORAL DAILY
Refills: 0 | Status: DISCONTINUED | OUTPATIENT
Start: 2020-02-17 | End: 2020-02-20

## 2020-02-17 RX ORDER — PANTOPRAZOLE SODIUM 20 MG/1
40 TABLET, DELAYED RELEASE ORAL DAILY
Refills: 0 | Status: DISCONTINUED | OUTPATIENT
Start: 2020-02-17 | End: 2020-02-20

## 2020-02-17 RX ADMIN — Medication 1 GRAM(S): at 05:15

## 2020-02-17 RX ADMIN — Medication 100 MILLIGRAM(S): at 17:57

## 2020-02-17 RX ADMIN — SODIUM CHLORIDE 75 MILLILITER(S): 9 INJECTION, SOLUTION INTRAVENOUS at 18:21

## 2020-02-17 RX ADMIN — Medication 1 GRAM(S): at 12:08

## 2020-02-17 RX ADMIN — Medication 30 MILLIGRAM(S): at 18:17

## 2020-02-17 RX ADMIN — PANTOPRAZOLE SODIUM 40 MILLIGRAM(S): 20 TABLET, DELAYED RELEASE ORAL at 18:17

## 2020-02-17 RX ADMIN — Medication 81 MILLIGRAM(S): at 12:08

## 2020-02-17 RX ADMIN — Medication 1 GRAM(S): at 17:57

## 2020-02-17 RX ADMIN — Medication 10 MILLIGRAM(S): at 17:58

## 2020-02-17 RX ADMIN — Medication 30 MILLIGRAM(S): at 01:51

## 2020-02-17 RX ADMIN — SODIUM CHLORIDE 75 MILLILITER(S): 9 INJECTION, SOLUTION INTRAVENOUS at 05:25

## 2020-02-17 RX ADMIN — ATORVASTATIN CALCIUM 40 MILLIGRAM(S): 80 TABLET, FILM COATED ORAL at 21:52

## 2020-02-17 RX ADMIN — PANTOPRAZOLE SODIUM 40 MILLIGRAM(S): 20 TABLET, DELAYED RELEASE ORAL at 12:08

## 2020-02-17 RX ADMIN — HEPARIN SODIUM 5000 UNIT(S): 5000 INJECTION INTRAVENOUS; SUBCUTANEOUS at 05:15

## 2020-02-17 RX ADMIN — SENNA PLUS 2 TABLET(S): 8.6 TABLET ORAL at 21:52

## 2020-02-17 RX ADMIN — Medication 75 MICROGRAM(S): at 05:21

## 2020-02-17 RX ADMIN — Medication 30 MILLIGRAM(S): at 18:44

## 2020-02-17 RX ADMIN — Medication 150 MILLIGRAM(S): at 17:57

## 2020-02-17 RX ADMIN — NORTRIPTYLINE HYDROCHLORIDE 50 MILLIGRAM(S): 10 CAPSULE ORAL at 21:52

## 2020-02-17 NOTE — H&P ADULT - PROBLEM SELECTOR PLAN 6
pt. unsure of dose of synthroid  will start synthroid 75mcg as per previous record  PRIMARY TEAM TO GET MED LIST FROM PHARMACY   FU TSH PRIMARY TEAM TO GET MED LIST FROM PHARMACY  Home meds: pt. unsure of med  monitor BP and adjust meds

## 2020-02-17 NOTE — H&P ADULT - PROBLEM SELECTOR PLAN 1
p/w epigastric pain radiating to Rt. lower quadrant since friday with vomiting X 3 today with dizziness  vitals stable  CT angio chest/abd. pelvis only reveals Colonic diverticulosis without diverticulitis  EKG shows NSR@89bpm with RBBB(unchanged from prior)  NST on Oct. 2018 revealed no abnormalities  T1, T2 negative  with history of chronic abd. pain, epigastric pain likely GI origin like IBS vs. gastric ulcer vs due to severe constipation, unlikely cardiac  pt. unsure of her meds  as per previous records, will start dicyclomine, Protonix, sucralfate  senna and dulcolax for constipation  will give suppository as last BM was on wednesday  consider enema if pt. does not pass stool  FU orthostatics for dizziness p/w epigastric pain radiating to Rt. lower quadrant since friday with vomiting X 3 today with dizziness  vitals stable  CT angio chest/abd. pelvis only reveals Colonic diverticulosis without diverticulitis  EKG shows NSR@89bpm with RBBB(unchanged from prior)  NST on Oct. 2018 revealed no abnormalities  T1, T2 negative  with history of chronic abd. pain, epigastric pain likely GI origin like IBS vs. gastric ulcer vs due to severe constipation, unlikely cardiac  pt. unsure of her meds  as per previous records, will start dicyclomine, Protonix, sucralfate  senna and dulcolax for constipation  will give suppository as last BM was on wednesday  consider enema if pt. does not pass stool  FU ECHO  FU orthostatics for dizziness  Dr. Perez consulted

## 2020-02-17 NOTE — H&P ADULT - PROBLEM SELECTOR PLAN 2
pt. unsure of med  PRIMARY TEAM TO GET MED LIST FROM PHARMACY p/w Hb 9.9, normocytic normochromic  baseline Hb ~10 as per previous records  likely has anemia of chronic diease  FU iron profile

## 2020-02-17 NOTE — H&P ADULT - PROBLEM SELECTOR PLAN 5
PRIMARY TEAM TO GET MED LIST FROM PHARMACY  Home meds: pt. unsure of med  monitor BP and adjust meds pt. unsure of med  PRIMARY TEAM TO GET MED LIST FROM PHARMACY  not in flare

## 2020-02-17 NOTE — H&P ADULT - ASSESSMENT
54F from home, PMH of asthma, HTN, hypothyroid, lupus, , breast cancer s/p lumpectomy, depression, fibromyalgia presented to ED c/o epigastric pain radiating to Rt. lower quadrant since friday. Admitted for evaluation of epigastric pain.

## 2020-02-17 NOTE — H&P ADULT - PROBLEM SELECTOR PLAN 4
pt. unsure of med  PRIMARY TEAM TO GET MED LIST FROM PHARMACY  not in flare pt. unsure of med  PRIMARY TEAM TO GET MED LIST FROM PHARMACY

## 2020-02-17 NOTE — CONSULT NOTE ADULT - ASSESSMENT
54F from home, PMH of IBS, asthma, HTN, hypothyroid, lupus, CVA, breast cancer s/p lumpectomy, depression, fibromyalgia presented to ED c/o epigastric pain radiating to Rt. lower quadrant since friday.       Problem/Plan - 1:  ·  Problem: Epigastric pain.  Plan: p/w epigastric pain radiating to Rt. lower quadrant since friday with vomiting X 3 today with dizziness  vitals stable  CT angio chest/abd. pelvis only reveals Colonic diverticulosis without diverticulitis  EKG shows NSR@89bpm with RBBB(unchanged from prior)  NST on Oct. 2018 revealed no abnormalities  T1, T2,T3  negative  -Non cardiac pain  -Cardiac status stable to discharge        Problem/Plan - 2:  ·  Problem: Anemia.  Plan: p/w Hb 9.9, normocytic normochromic  baseline Hb ~10 as per previous records  likely has anemia of chronic diease        Problem/Plan - 3:  ·  Problem: Fibromyalgia.  Plan: pt. unsure of med
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
## 2020-02-17 NOTE — H&P ADULT - PROBLEM SELECTOR PLAN 7
IMPROVE VTE Individual Risk Assessment  RISK                                                                Points  [  ] Previous VTE                                                  3  [  ] Thrombophilia                                               2  [  ] Lower limb paralysis                                      2        (unable to hold up >15 seconds)    [  ] Current Cancer                                              2         (within 6 months)  [x  ] Immobilization > 24 hrs                                1  [  ] ICU/CCU stay > 24 hours                              1  [x  ] Age > 60                                                      1  IMPROVE VTE Score _________2, heparin for DVT proph pt. unsure of dose of synthroid  will start synthroid 75mcg as per previous record  PRIMARY TEAM TO GET MED LIST FROM PHARMACY   FU TSH

## 2020-02-17 NOTE — H&P ADULT - NSHPPHYSICALEXAM_GEN_ALL_CORE
Vital Signs Last 24 Hrs  T(C): 36.7 (17 Feb 2020 02:30), Max: 36.8 (16 Feb 2020 19:59)  T(F): 98 (17 Feb 2020 02:30), Max: 98.3 (16 Feb 2020 19:59)  HR: 70 (17 Feb 2020 02:30) (70 - 89)  BP: 137/86 (17 Feb 2020 02:30) (125/77 - 137/86)  BP(mean): --  RR: 16 (17 Feb 2020 02:30) (16 - 16)  SpO2: 100% (17 Feb 2020 02:30) (100% - 100%)    PHYSICAL EXAM:  GENERAL: NAD  HEENT: Normocephalic;  conjunctivae and sclerae clear; moist mucous membranes;   NECK : supple  CHEST/LUNG: Clear to auscultation bilaterally with good air entry   HEART: S1 S2  regular; no murmurs, gallops or rubs  ABDOMEN: Soft, + epigastric tender, Nondistended; Bowel sounds present  EXTREMITIES: no cyanosis; no edema; no calf tenderness  SKIN: warm and dry; no rash  NERVOUS SYSTEM:  Awake and alert; Oriented  to place, person and time ; no new deficits

## 2020-02-17 NOTE — CONSULT NOTE ADULT - ATTENDING COMMENTS
Patient was seen and examined ,interim events noted,Laboratory and Imaging studies were reviewed.  Thank you for the courtesy of the consultation,I would be available for any further discussion if needed.  Thomas Welsh MD,FACC.  [address] [place] [address] [phone]

## 2020-02-17 NOTE — H&P ADULT - NSHPREVIEWOFSYSTEMS_GEN_ALL_CORE
REVIEW OF SYSTEMS:  CONSTITUTIONAL: No fever,   EYES: no acute visual disturbances  NECK: No pain or stiffness  RESPIRATORY: No cough; No shortness of breath  CARDIOVASCULAR: No chest pain, no palpitations  GASTROINTESTINAL: +epigastric pain. No nausea or vomiting; No diarrhea   NEUROLOGICAL: No headache or numbness, no tremors  MUSCULOSKELETAL: No joint pain, no muscle pain  GENITOURINARY: no dysuria, no frequency, no hesitancy  PSYCHIATRY: no depression , no anxiety  ALL OTHER  ROS negative

## 2020-02-17 NOTE — H&P ADULT - HISTORY OF PRESENT ILLNESS
54F from home, PMH of asthma, HTN, hypothyroid, lupus, , breast cancer s/p lumpectomy, depression, fibromyalgia presented to ED c/o epigastric pain radiating to Rt. lower quadrant since friday. Pt. states she had colonoscopy on thursday in [facility] by Dr. Lamar and was told to have diverticulosis. Colonoscopy was done due to chronic abd. pain and constipation. She had some soup after colonoscopy and was in her usual state of health. On friday AM, at around 3:15AM , she states she woke up wiith 9/10 cramping epigastric pain, radiating to the Rt. lower quadrant 54F from home, PMH of asthma, HTN, hypothyroid, lupus, , breast cancer s/p lumpectomy, depression, fibromyalgia presented to ED c/o epigastric pain radiating to Rt. lower quadrant since friday. Pt. states she had colonoscopy on thursday in [facility] by Dr. Lamar and was told to have diverticulosis. Colonoscopy was done due to chronic abd. pain and constipation. She had some soup after colonoscopy and was in her usual state of health. On friday AM, at around 3:15AM , she states she woke up wiith 9/10 cramping epigastric pain, radiating to the Rt. lower quadrant, She went to bathroom to pass stool, but she could not and only urinated. She was having intermittent pain all friday and saturday, with mild relief with tums and tylenol. On sunday evening, she went out for shopping and vomited in the store, and felt dizzy, she ate some watermelon after that but she vomited again. She was very dizzy, explain as room spinning sensation, she could not stand on her own, hence the  called ambulance.   She states she has been having similar pain for 2 yrs and had colonoscopy on Jan 2018 which was incomplete due to improper prep and EGD on Jan 2020, which showed stomach antral ulcer, pt. unaware if she had H.pylori or the result of biopsy. She has next appointment with Dr. Lamar after 6weeks. She is usually constipated and passes stool every 3 days, last BM was on wednesday. She avoids dairy, meats and breads, has seen nutritionist and followed strict diet with no relief. 54F from home, PMH of asthma, HTN, hypothyroid, lupus, , breast cancer s/p lumpectomy, depression, fibromyalgia presented to ED c/o epigastric pain radiating to Rt. lower quadrant since friday. Pt. states she had colonoscopy on thursday in [facility] by Dr. Lamar and was told to have diverticulosis. Colonoscopy was done due to chronic abd. pain and constipation. She had some soup after colonoscopy and was in her usual state of health. On friday AM, at around 3:15AM , she states she woke up wiith 9/10 cramping epigastric pain, radiating to the Rt. lower quadrant, She went to bathroom to pass stool, but she could not and only urinated. She was having intermittent pain all friday and saturday, with mild relief with tums and tylenol. On sunday evening, she went out for shopping and vomited in the store, and felt dizzy, she ate some watermelon after that but she vomited again. She was very dizzy, explain as room spinning sensation, she could not stand on her own, hence the  called ambulance.     She states she has been having similar pain for 2 yrs and had colonoscopy on Jan 2018 which was incomplete due to improper prep and EGD on Jan 2020, which showed stomach antral ulcer, pt. unaware if she had H.pylori or the result of biopsy. She has next appointment with Dr. Lamar after 6weeks. She is usually constipated and passes stool every 3 days, last BM was on wednesday. She avoids dairy, meats and breads, has seen nutritionist and followed strict diet with no relief. She denies any significant changes in her weight. 54F from home, PMH of IBS, asthma, HTN, hypothyroid, lupus, CVA, breast cancer s/p lumpectomy, depression, fibromyalgia presented to ED c/o epigastric pain radiating to Rt. lower quadrant since friday. Pt. states she had colonoscopy on thursday in [facility] by Dr. Lamar and was told to have diverticulosis. Colonoscopy was done due to chronic abd. pain and constipation. She had some soup after colonoscopy and was in her usual state of health. On friday AM, at around 3:15AM , she states she woke up wiith 9/10 cramping epigastric pain, radiating to the Rt. lower quadrant, She went to bathroom to pass stool, but she could not and only urinated. She was having intermittent pain all friday and saturday, with mild relief with tums and tylenol. On sunday evening, she went out for shopping and vomited in the store, and felt dizzy, she ate some watermelon after that but she vomited again. She was very dizzy, explain as room spinning sensation, she could not stand on her own, hence the  called ambulance.     She states she has been having similar pain for 2 yrs and had colonoscopy on Jan 2018 which was incomplete due to improper prep and EGD on Jan 2020, which showed stomach antral ulcer, pt. unaware if she had H.pylori or the result of biopsy. She has next appointment with Dr. Lamar after 6weeks. She is usually constipated and passes stool every 3 days, last BM was on wednesday. She avoids dairy, meats and breads, has seen nutritionist and followed strict diet with no relief. She was diagnosed with IBS few years back by Lavelle Almanza, at that time she only had diarrhea, but no abd. pain or constipation. She denies any significant changes in her weight, any steroid/NSAID use, GI bleed. 54F from home, PMH of IBS, asthma, HTN, hypothyroid, lupus, CVA, breast cancer s/p lumpectomy, depression, fibromyalgia presented to ED c/o epigastric pain radiating to Rt. lower quadrant since friday. Pt. states she had colonoscopy on thursday in [facility] by Dr. Lamar and was told to have diverticulosis. Colonoscopy was done due to chronic abd. pain and constipation. She had some soup after colonoscopy and was in her usual state of health. On friday AM, at around 3:15AM , she states she woke up wiith 9/10 cramping epigastric pain, radiating to the Rt. lower quadrant, She went to bathroom to pass stool, but she could not and only urinated. She was having intermittent pain all friday and saturday, with mild relief with tums and tylenol. On sunday evening, she went out for shopping and vomited in the store, and felt dizzy, she ate some watermelon after that but she vomited again. She was very dizzy, explain as room spinning sensation, she could not stand on her own, hence the  called ambulance.     She states she has been having similar pain for 2 yrs and had colonoscopy on Jan 2018 which was incomplete due to improper prep and EGD on Jan 2020, which showed stomach antral ulcer, pt. unaware if she had H.pylori or the result of biopsy. She has next appointment with Dr. Lamar after 6weeks. She is usually constipated and passes stool every 3 days, last BM was on wednesday. She avoids dairy, meats and breads, has seen nutritionist and followed strict diet with no relief. She was diagnosed with IBS few years back by Lavelle Almanza, at that time she only had diarrhea, but no abd. pain or constipation. She denies any significant changes in her weight, any steroid/NSAID use, GI bleed, denies loss of appetite.

## 2020-02-17 NOTE — H&P ADULT - ATTENDING COMMENTS
Patient seen and examined ; case was discussed with the admitting resident    ROS: as in the HPI; all other ROS negative    SH and family history as above    Vital Signs Last 24 Hrs  T(C): 36.7 (17 Feb 2020 02:30), Max: 36.8 (16 Feb 2020 19:59)  T(F): 98 (17 Feb 2020 02:30), Max: 98.3 (16 Feb 2020 19:59)  HR: 70 (17 Feb 2020 02:30) (70 - 89)  BP: 137/86 (17 Feb 2020 02:30) (125/77 - 137/86)  BP(mean): --  RR: 16 (17 Feb 2020 02:30) (16 - 16)  SpO2: 100% (17 Feb 2020 02:30) (100% - 100%)    GEN: NAD  HEENT- normocephalic; mouth moist  CVS- S1S2+, precordium tender to palpation   LUNGS- clear to auscultation; no wheezing  ABD: Soft , nontender, nondistended, Bowel sounds are present  EXTREMITY: no calf tenderness, no cyanosis, no edema  NEURO: AAOx3; non focal neurologic exam; cranial nerves grossly intact  PSYCH: normal affect and behavior  BACK: no swelling or mass;   VASCULAR: ++ distal peripheral pulses  SKIN: warm and dry.       Labs Reviewed:                         10.0   3.65  )-----------( 296      ( 17 Feb 2020 05:56 )             32.3     02-16    136  |  109<H>  |  13  ----------------------------<  75  5.2   |  23  |  0.95    Ca    8.6      16 Feb 2020 21:58  Mg     2.1     02-17    TPro  7.8  /  Alb  3.7  /  TBili  0.1<L>  /  DBili  x   /  AST  54<H>  /  ALT  29  /  AlkPhos  90  02-16    CARDIAC MARKERS ( 17 Feb 2020 01:46 )  <0.015 ng/mL / x     / x     / x     / x      CARDIAC MARKERS ( 16 Feb 2020 21:58 )  <0.015 ng/mL / x     / x     / x     / x            PT/INR - ( 16 Feb 2020 23:19 )   PT: 12.5 sec;   INR: 1.12 ratio         PTT - ( 16 Feb 2020 23:19 )  PTT:32.0 sec  BNP:   MEDICATIONS  (STANDING):  dextrose 5% + sodium chloride 0.9%. 1000 milliLiter(s) (75 mL/Hr) IV Continuous <Continuous>  dicyclomine 10 milliGRAM(s) Oral daily  heparin  Injectable 5000 Unit(s) SubCutaneous every 12 hours  levothyroxine 75 MICROGram(s) Oral daily  pantoprazole  Injectable 40 milliGRAM(s) IV Push daily  senna 2 Tablet(s) Oral at bedtime  sucralfate 1 Gram(s) Oral four times a day    bisacodyl Suppository 10 milliGRAM(s) Rectal daily PRN Constipation  CXR reviewed  EKG Reviewed- rbb without significant changes   CTA Chest and abd reviewed   55 y/o F with SLE, IBS admitted with chest pain and gastritis.     1. Chest pain, atypical- trend trop, echo within last year, no ekg changes. Monitor on tele short term, cardiology eval appreciated   2. Gastritis- ppi, carafate, f/u with gi once acs r/o   3. Anemia of chronic disease- denies overt bleeding   4. SLE- denies sx of acute disease- no arthralgia, rash, joint pain, oral ulcers etc   5. Hypothyroid  6. HTN  7. Fibromyalgia     Plan of care discussed with patient ;  all questions and concerns were addressed.

## 2020-02-17 NOTE — ED ADULT NURSE NOTE - NSIMPLEMENTINTERV_GEN_ALL_ED
Implemented All Universal Safety Interventions:  [place] to call system. Call bell, personal items and telephone within reach. Instruct patient to call for assistance. Room bathroom lighting operational. Non-slip footwear when patient is off stretcher. Physically safe environment: no spills, clutter or unnecessary equipment. Stretcher in lowest position, wheels locked, appropriate side rails in place.

## 2020-02-18 ENCOUNTER — TRANSCRIPTION ENCOUNTER (OUTPATIENT)
Age: 55
End: 2020-02-18

## 2020-02-18 LAB
ANION GAP SERPL CALC-SCNC: 5 MMOL/L — SIGNIFICANT CHANGE UP (ref 5–17)
APPEARANCE UR: CLEAR — SIGNIFICANT CHANGE UP
BILIRUB UR-MCNC: NEGATIVE — SIGNIFICANT CHANGE UP
BUN SERPL-MCNC: 11 MG/DL — SIGNIFICANT CHANGE UP (ref 7–18)
CALCIUM SERPL-MCNC: 8.9 MG/DL — SIGNIFICANT CHANGE UP (ref 8.4–10.5)
CHLORIDE SERPL-SCNC: 110 MMOL/L — HIGH (ref 96–108)
CO2 SERPL-SCNC: 25 MMOL/L — SIGNIFICANT CHANGE UP (ref 22–31)
COLOR SPEC: YELLOW — SIGNIFICANT CHANGE UP
CREAT SERPL-MCNC: 0.8 MG/DL — SIGNIFICANT CHANGE UP (ref 0.5–1.3)
DIFF PNL FLD: NEGATIVE — SIGNIFICANT CHANGE UP
GLUCOSE SERPL-MCNC: 81 MG/DL — SIGNIFICANT CHANGE UP (ref 70–99)
GLUCOSE UR QL: NEGATIVE — SIGNIFICANT CHANGE UP
HCT VFR BLD CALC: 33.4 % — LOW (ref 34.5–45)
HGB BLD-MCNC: 10.6 G/DL — LOW (ref 11.5–15.5)
KETONES UR-MCNC: NEGATIVE — SIGNIFICANT CHANGE UP
LEUKOCYTE ESTERASE UR-ACNC: ABNORMAL
MCHC RBC-ENTMCNC: 30.1 PG — SIGNIFICANT CHANGE UP (ref 27–34)
MCHC RBC-ENTMCNC: 31.7 GM/DL — LOW (ref 32–36)
MCV RBC AUTO: 94.9 FL — SIGNIFICANT CHANGE UP (ref 80–100)
NITRITE UR-MCNC: NEGATIVE — SIGNIFICANT CHANGE UP
NRBC # BLD: 0 /100 WBCS — SIGNIFICANT CHANGE UP (ref 0–0)
PH UR: 7 — SIGNIFICANT CHANGE UP (ref 5–8)
PLATELET # BLD AUTO: 325 K/UL — SIGNIFICANT CHANGE UP (ref 150–400)
POTASSIUM SERPL-MCNC: 3.2 MMOL/L — LOW (ref 3.5–5.3)
POTASSIUM SERPL-SCNC: 3.2 MMOL/L — LOW (ref 3.5–5.3)
PROT UR-MCNC: NEGATIVE — SIGNIFICANT CHANGE UP
RBC # BLD: 3.52 M/UL — LOW (ref 3.8–5.2)
RBC # FLD: 12.6 % — SIGNIFICANT CHANGE UP (ref 10.3–14.5)
SODIUM SERPL-SCNC: 140 MMOL/L — SIGNIFICANT CHANGE UP (ref 135–145)
SP GR SPEC: 1 — LOW (ref 1.01–1.02)
SURGICAL PATHOLOGY STUDY: SIGNIFICANT CHANGE UP
UROBILINOGEN FLD QL: NEGATIVE — SIGNIFICANT CHANGE UP
WBC # BLD: 4.48 K/UL — SIGNIFICANT CHANGE UP (ref 3.8–10.5)
WBC # FLD AUTO: 4.48 K/UL — SIGNIFICANT CHANGE UP (ref 3.8–10.5)

## 2020-02-18 PROCEDURE — 99233 SBSQ HOSP IP/OBS HIGH 50: CPT | Mod: GC

## 2020-02-18 PROCEDURE — 99221 1ST HOSP IP/OBS SF/LOW 40: CPT

## 2020-02-18 RX ORDER — SENNA PLUS 8.6 MG/1
2 TABLET ORAL
Qty: 30 | Refills: 0
Start: 2020-02-18 | End: 2020-03-03

## 2020-02-18 RX ORDER — POTASSIUM CHLORIDE 20 MEQ
40 PACKET (EA) ORAL ONCE
Refills: 0 | Status: COMPLETED | OUTPATIENT
Start: 2020-02-18 | End: 2020-02-18

## 2020-02-18 RX ORDER — KETOROLAC TROMETHAMINE 30 MG/ML
15 SYRINGE (ML) INJECTION ONCE
Refills: 0 | Status: DISCONTINUED | OUTPATIENT
Start: 2020-02-18 | End: 2020-02-18

## 2020-02-18 RX ORDER — ONDANSETRON 8 MG/1
4 TABLET, FILM COATED ORAL ONCE
Refills: 0 | Status: COMPLETED | OUTPATIENT
Start: 2020-02-18 | End: 2020-02-18

## 2020-02-18 RX ORDER — ACETAMINOPHEN 500 MG
1000 TABLET ORAL ONCE
Refills: 0 | Status: COMPLETED | OUTPATIENT
Start: 2020-02-18 | End: 2020-02-18

## 2020-02-18 RX ADMIN — Medication 10 MILLIGRAM(S): at 06:04

## 2020-02-18 RX ADMIN — PANTOPRAZOLE SODIUM 40 MILLIGRAM(S): 20 TABLET, DELAYED RELEASE ORAL at 11:44

## 2020-02-18 RX ADMIN — Medication 1 GRAM(S): at 11:44

## 2020-02-18 RX ADMIN — HEPARIN SODIUM 5000 UNIT(S): 5000 INJECTION INTRAVENOUS; SUBCUTANEOUS at 06:04

## 2020-02-18 RX ADMIN — Medication 1000 MILLIGRAM(S): at 22:05

## 2020-02-18 RX ADMIN — NORTRIPTYLINE HYDROCHLORIDE 50 MILLIGRAM(S): 10 CAPSULE ORAL at 21:45

## 2020-02-18 RX ADMIN — Medication 1 GRAM(S): at 06:04

## 2020-02-18 RX ADMIN — Medication 10 MILLIGRAM(S): at 11:44

## 2020-02-18 RX ADMIN — Medication 1 GRAM(S): at 17:38

## 2020-02-18 RX ADMIN — Medication 100 MILLIGRAM(S): at 06:04

## 2020-02-18 RX ADMIN — Medication 100 MILLIGRAM(S): at 17:37

## 2020-02-18 RX ADMIN — ATORVASTATIN CALCIUM 40 MILLIGRAM(S): 80 TABLET, FILM COATED ORAL at 21:45

## 2020-02-18 RX ADMIN — SENNA PLUS 2 TABLET(S): 8.6 TABLET ORAL at 21:45

## 2020-02-18 RX ADMIN — ONDANSETRON 4 MILLIGRAM(S): 8 TABLET, FILM COATED ORAL at 22:08

## 2020-02-18 RX ADMIN — Medication 40 MILLIEQUIVALENT(S): at 11:44

## 2020-02-18 RX ADMIN — Medication 10 MILLIGRAM(S): at 17:37

## 2020-02-18 RX ADMIN — Medication 1 GRAM(S): at 00:31

## 2020-02-18 RX ADMIN — Medication 81 MILLIGRAM(S): at 11:44

## 2020-02-18 RX ADMIN — Medication 150 MILLIGRAM(S): at 17:37

## 2020-02-18 RX ADMIN — Medication 0.5 MILLIGRAM(S): at 21:56

## 2020-02-18 RX ADMIN — HEPARIN SODIUM 5000 UNIT(S): 5000 INJECTION INTRAVENOUS; SUBCUTANEOUS at 17:37

## 2020-02-18 RX ADMIN — Medication 75 MICROGRAM(S): at 06:04

## 2020-02-18 RX ADMIN — Medication 400 MILLIGRAM(S): at 21:44

## 2020-02-18 NOTE — CONSULT NOTE ADULT - SUBJECTIVE AND OBJECTIVE BOX
The patient is a 54-year-old  female with past medical history significant for Lupus, fibromyalgia, depression, hypertension, hypercholesterolemia, CVA, hypothyroidism, asthma, irritable bowel syndrome and left breast cancer, s/p lumpectomy who was admitted to [facility] on 2020 with 1 week history of abdominal pain.  I was asked to render an opinion for consultation for the above complaints. The patient states that she is feeling uncomfortable x 1 week. The patient was hospitalized at the [facility] on 2020 for abdominal pain.   The patient was in the normal state of health until 1 week prior to admission. The abdominal pain was described as severe crampy, pressure midepigastric discomfort radiating to the right lower quadrant and back.  The abdominal pain was worse with meals but unrelated to meals.  She complains of abdominal gas and bloating.  The patient also complains of nausea and vomiting x 3 and constipation.  The patient denies any gastroesophageal reflux disease, dysphagia, atypical chest pain, shortness of breath, palpitations, diarrhea, bright red blood per rectum, melena, hematemesis, weight loss, anorexia, fever or chills.  The blood work performed on 2020 revealed an elevated ALT of 56 U/L. anemia with Hgb/Hct level of 10.0/32.3, respectively, an elevated Hgb A-1c of 5.7%, an elevated chloride of 109 mmol/L, an elevated TSH of 10.7 uIU/ml.  The CAT scan of the chest, abdomen and pelvis with IV contrast performed on 2020 revealed no evidence of an aortic dissection.  The colonoscopy to the terminal ileum performed on 2020 revealed moderate left sided diverticulosis. The patient had an upper endoscopy and colonoscopy to the cecum performed at the [facility] GI endoscopy suite on 2019. The upper endoscopy revealed a small hiatal hernia and mild diffuse bile gastritis. The pathology revealed distal esophagus with unremarkable squamous mucosa, gastric antral and body mucosa with chronic inactive gastritis with severe lymphoid follicles that was negative for Helicobacter pylori and unremarkable duodenal mucosa. The colonoscopy to the cecum revealed moderate left sided diverticulosis, a poor prep colonoscopy and small internal hemorrhoids. The patient tolerated the procedures well. The patient was previously found to have occult blood in the stool. The CAT scan of the abdomen and pelvis with IV contrast performed on 2019 revealed heterogeneous attenuation in the external iliac and common femoral veins is likely related to mixing artifact. Also noted was constipation, sigmoid diverticulosis without diverticulitis, trace bilateral hydronephrosis likely related to distended urinary bladder. The doppler ultrasound of the lower extremities performed on 2019 revealed no evidence of deep venous thrombosis in either lower extremity. The patient had an abdominal and pelvic ultrasound performed on 2019 to assess the symptoms. The abdominal ultrasound performed on 2019 revealed no gallstones or pericholecystic fluid. The contracted state of the gallbladder limits assessment for gallbladder wall thickening. There is no evidence for intrahepatic or extrahepatic biliary duct dilatation. The pelvic ultrasound performed on 2019 revealed no evidence for an ovarian lesion. The blood work performed on 2019 revealed an elevated CEA of 4.8 ng/ml, an elevated ESR of 22 mm/hr, anemia with Hgb of 10.5 and low WBC count of 3.29. The patient admits to having a prior upper endoscopy and colonoscopy performed by another gastroenterologist. The upper endoscopic findings were unknown to the patient. The colonoscopic findings revealed a poor prep colonoscopy.  The patient's last menstrual period was age 41. The patient is a . The patient's first menstrual period was at age 7. The patient admits to a family history of GI problems. The patient’s brother and father had a history of bleeding peptic ulcer disease.   PMH: Lupus, fibromyalgia, depression, hypertension, hypercholesterolemia, CVA, hypothyroidism, asthma, irritable bowel syndrome and breast cancer, s/p lumpectomy  PSH: lumpectomy for left breast cancer, tubal ligation x 2,  x 4, insertion and removal of port  Allergies: NKDA  Social Hx: (-) smoking, (-) ETOH, (-) IVDA  Family Hx. Mother:  of dementia age 92  Father:  of MI age71  3 Brothers:   of AIDS, suicide and drug overdose, 4 Sisters:  breast cancer, ovarian cancer, thyroid cancer, unknown cause Maternal Grandparents grandmother  of dementia , grandfather  unknown cause:  Paternal Grandparents:  unknown causes  MEDICATIONS  (STANDING):  aspirin  chewable 81 milliGRAM(s) Oral daily  atorvastatin 40 milliGRAM(s) Oral at bedtime  dextrose 5% + sodium chloride 0.9%. 1000 milliLiter(s) (75 mL/Hr) IV Continuous <Continuous>  dicyclomine 10 milliGRAM(s) Oral three times a day before meals  heparin  Injectable 5000 Unit(s) SubCutaneous every 12 hours  levothyroxine 75 MICROGram(s) Oral daily  nortriptyline 50 milliGRAM(s) Oral at bedtime  pantoprazole  Injectable 40 milliGRAM(s) IV Push daily  senna 2 Tablet(s) Oral at bedtime  sucralfate 1 Gram(s) Oral four times a day  topiramate 100 milliGRAM(s) Oral two times a day  venlafaxine XR. 150 milliGRAM(s) Oral daily    MEDICATIONS  (PRN):  acetaminophen   Tablet .. 650 milliGRAM(s) Oral every 6 hours PRN Mild Pain (1 - 3)  bisacodyl Suppository 10 milliGRAM(s) Rectal daily PRN Constipation  clonazePAM  Tablet 0.5 milliGRAM(s) Oral two times a day PRN anxierty  simethicone 80 milliGRAM(s) Chew every 6 hours PRN Dyspepsia    Review of Symptoms:     	  	NORMAL	ABNORMAL	  Constitutional	?	?weight loss	?anorexia	?fever	?weakness	?sweats	  Eyes	?	?blurred vision	?diplopia	?eyepain	?red eye		  ENT	?	?hearing loss	?tinnitus	?vertigo	?tongue burning	?hoarseness	  CVS	?	?chest pain	?palpitations	?syncope	?dizziness		  Respir	?	?cough	?breathless	?wheezing	?sputum		  GI	?	?nausea	?heartburn	?dysphagia	?abd distension		  	?	?dysuria	?polyuria	?hematuria	?nocturia	?impotence	  Musculo	?	?joint pain	?muscle pain	?joint stiffness	?joint swelling		  Integument	?	?rash	?lesion	?photosensitivity			  Neurol	?	?headache	?dizziness	?seizure	?syncope		  Psychiatry	?	?depression	?suicidal	?anxiety disorder	?sleep disorder		  Endocrin	?	?heat intol	?goiter	?irrad neck	?polyuria		  Hematol	?	?anemia	?spont bleed	?post-op bleeding	?LN swelling		  Allergy	?	?seasonal allergies	?rhin	?itchy eyes			  	    Vital Signs Last 24 Hrs  T(C): 36.9 (2020 04:50), Max: 37.4 (2020 19:51)  T(F): 98.4 (2020 04:50), Max: 99.4 (2020 19:51)  HR: 75 (2020 04:50) (66 - 77)  BP: 121/80 (2020 04:50) (109/68 - 132/76)  BP(mean): --  RR: 17 (2020 04:50) (16 - 18)  SpO2: 97% (2020 04:50) (97% - 100%)    Physical Examination:   General: well developed, well-nourished  female in no acute distress  Eyes: No icteric sclera, conjunctiva clear.  Ears/Nose/Throat: Oropharynx not erythematous, no enlarged tonsils, nose not congested, ears unremarkable  Head: normal cephalic, nontraumatic  Neck: Neck supple, no masses, thyroid not palpable, no JVD  Chest: normal appearance, normal symmetrical,  Respiratory: Coarse breath sounds bilaterally, no wheezing no rales or rhonchi   Cardiovascular: S1-S2 audible, no murmur, no rubs or gallops, no palpable masses  Abdomen: (+) BS, soft, tender diffusely, no rebound or guarding or masses noted. No hepatosplenomegaly  Back: (-) CVAT, no spinal tenderness  Rectal: guaiac (-) no masses  Extremities: full range of motion in all extremities, (-) c, (-) c, (-) e  Musculoskeletal: Good motor strength, good range of motion, normal appearing lower extremities  Neuro: A+O x 3, no ataxia,  Psych: Flat affect, (+) depressed, (+) anxiety  Skin: normal, no jaundice    CBC Full  -  ( 2020 05:56 )  WBC Count : 3.65 K/uL  RBC Count : 3.36 M/uL  Hemoglobin : 10.0 g/dL  Hematocrit : 32.3 %  Platelet Count - Automated : 296 K/uL  Mean Cell Volume : 96.1 fl  Mean Cell Hemoglobin : 29.8 pg  Mean Cell Hemoglobin Concentration : 31.0 gm/dL  Auto Neutrophil # : 1.16 K/uL  Auto Lymphocyte # : 1.83 K/uL  Auto Monocyte # : 0.46 K/uL  Auto Eosinophil # : 0.13 K/uL  Auto Basophil # : 0.06 K/uL  Auto Neutrophil % : 31.8 %  Auto Lymphocyte % : 50.1 %  Auto Monocyte % : 12.6 %  Auto Eosinophil % : 3.6 %  Auto Basophil % : 1.6 %  Transferrin, Serum (20 @ 11:11)    Transferrin, Serum: 317 mg/dL    Ferritin, Serum (20 @ 11:11)    Ferritin, Serum: 19 ng/mL    Hemoglobin A1C, Whole Blood (20 @ 11:08)    Hemoglobin A1C, Whole Blood: 5.7: Method: Immunoassay       Reference Range                4.0-5.6%       High risk (prediabetic)        5.7-6.4%       Diabetic, diagnostic             >=6.5%       ADA diabetic treatment goal       <7.0%  The Hemoglobin A1c testing is [facility]-certified.Reference ranges are based  upon the 2010 recommendations of  the American Diabetes Association.  Interpretation may vary for children  and adolescents. %    Iron with Total Binding Capacity (20 @ 05:56)    Iron - Total Binding Capacity.: 382 ug/dL    % Saturation, Iron: 13 %    Iron Total, Serum: 48 ug/dL    Unsaturated Iron Binding Capacity: 334 ug/dL    Troponin I, Serum (20 05:)    Troponin I, Serum: <0.015: The new reference range for Troponin-I performed on the Siemens Vista  system is 0.015-0.045 ng/mL, which includes the 99th percentile of a  healthy reference population. Studies have shown that elevated troponin  levels above the 99th percentile cutoff are associated with an increased  risk for adverse cardiac events, with the risk increasing as troponin  levels increase. As per a joint committee of the American College of  Cardiology and European Society of Cardiology, diagnosis of classic MI is  based upon the detection of a rise or fall of cardiac troponin values,  with at least one value above the 99th percentile upper reference limit,  in the appropriate clinical context.  Troponin-I (ng/mL) Interpretation  0.00-0.045 Normal range (includes the 99th percentile of a healthy  reference population)  >0.045 Elevated troponin level indicating increased risk  Note: Troponin-I and Troponin-T cannot be used interchangeably in serial  measurements. Minimally elevated Troponin results should be interpreted  in the context of clinical findings and risk factors. ng/mL    Creatine Kinase, Serum (20 @ 05:56)    Creatine Kinase, Serum: 125 U/L  Magnesium, Serum (20 @ 05:56)    Magnesium, Serum: 2.1 mg/dL    Lipid Profile (20 05:56)    Total Cholesterol/HDL Ratio Measurement: 2.5 RATIO    Cholesterol, Serum: 157 mg/dL    Triglycerides, Serum: 54 mg/dL    HDL Cholesterol, Serum: 63: HDL Levels >/= 60 mg/dL are considered beneficial and a "negative" risk  factor.  Effective 08/15/2018: New reference range and interpretive comment. mg/dL    Direct LDL: 83: LDL Cholesterol (mg/dL) --- Interpretive Comment (for adults 18 and over)  Optimal LDL Level may vary based on clinical situation  Below 70                   Ideal for people at very high risk of heart  disease  Below 100                  Ideal for people at risk of heart disease  100 - 129                    [place]  130 - 159                    Borderline high  160 - 189                    High  190 and Above           Very high mg/dL    Comprehensive Metabolic Panel (20 @ 05:56)    Sodium, Serum: 139 mmol/L    Potassium, Serum: 3.9: Results verified.  Not hemolyzed. mmol/L    Chloride, Serum: 109 mmol/L    Carbon Dioxide, Serum: 27 mmol/L    Anion Gap, Serum: 3 mmol/L    Blood Urea Nitrogen, Serum: 11 mg/dL    Creatinine, Serum: 0.92 mg/dL    Glucose, Serum: 64 mg/dL    Calcium, Total Serum: 8.4 mg/dL    Protein Total, Serum: 7.5 g/dL    Albumin, Serum: 3.8 g/dL    Bilirubin Total, Serum: 0.3 mg/dL    Alkaline Phosphatase, Serum: 90 U/L    Aspartate Aminotransferase (AST/SGOT): 23 U/L    Alanine Aminotransferase (ALT/SGPT): 27 U/L DA    eGFR if Non : 70: Interpretative comment  The units for eGFR are mL/min/1.73M2 (normalized body surface area). The  eGFR is calculated from a serum creatinine using the CKD-EPI equation.  Other variables required for calculation are race, age and sex. Among  patients with chronic kidney disease (CKD), the eGFR is useful in  determining the stage of disease according to KDOQI CKD classification.  All eGFR results are reported numerically with the following  interpretation.          GFR                    With                 Without     (ml/min/1.73 m2)    Kidney Damage       Kidney Damage        >= 90                    Stage 1                     Normal        60-89                    Stage 2                     Decreased GFR        30-59     Stage 3                     Stage 3        15-29                    Stage 4                     Stage 4        < 15                      Stage 5                     Stage 5  Each stage of CKD assumes that the associated GFR level has been in  effect for at least 3 months. Determination of stages one and two (with  eGFR > 59 ml/min/m2) requires estimation of kidney damage for at least 3  months as defined by structural or functional abnormalities.  Limitations: All estimates of GFR will be less accurate for patients at  extremes of muscle mass (including but not limited to frail elderly,  critically ill, or cancer patients), those with unusual diets, and those  with conditions associated with reduced secretion or extrarenal  elimination of creatinine. The eGFR equation is not recommended for use  in patients with unstable creatinine levels. mL/min/1.73M2    eGFR if African American: 81 mL/min/1.73M2    Activated Partial Thromboplastin Time in AM (20 @ 23:19)    Activated Partial Thromboplastin Time: 32.0: The recommended therapeutic heparin range (full dose) is 58-99 seconds.  Recommended therapeutic Argatroban range is 1.5 to 3.0 times the baseline  APTT value, not to exceed 100 seconds. Recommended therapeutic Refludan  range is 1.5 to 2.5 times thebaseline APTT.  Effective 2018 the reference range has changed. sec    Prothrombin Time and INR, Plasma in AM (20 @ 23:19)    Prothrombin Time, Plasma: 12.5: Effective 2018 the reference range for PT has changed. sec    INR: 1.12: Recommended ranges for therapeutic INR:    2.0-3.0 for most medical and surgical thromboembolic states    2.0-3.0 for atrial fibrillation    2.0-3.0 for bileaflet mechanical valve in aortic position    2.5-3.5 for mechanical heart valves    Chest 2004;[id number]  The presence of direct thrombin inhibitors (argatroban, refludan)  may falsely increase results. ratio    Lipase, Serum: 56 U/L (20 @ 21:58)    Culture Results:   <10,000 CFU/mL Normal Urogenital Uma (20 @ 08:29)      EXAM:  CT ANGIO ABD PELV (W)AW IC                          EXAM:  CT ANGIO CHEST (W)AW IC                            PROCEDURE DATE:  2020          INTERPRETATION:  CLINICAL INFORMATION: Chest and right abdominal pain. Rule out aortic dissection.    COMPARISON: CT pulmonary angiogram from 2019. CT of the abdomen and pelvis from 2020.    PROCEDURE:   CT Angiography of the Chest, Abdomen and Pelvis.   Precontrast imaging was performed through the chest followed by arterial phase imaging of the chest, abdomen and pelvis.  Intravenous contrast: 90 ml Omnipaque 350. 10 ml discarded.  Oral contrast: None.  Sagittal and coronal reformats were performed as well as 3D (MIP) reconstructions.    FINDINGS:    CHEST:     LUNGS AND LARGE AIRWAYS: Patent central airways. No pulmonary nodules, masses or consolidation. Subsegmental, dependent atelectasis.  PLEURA: No pleural effusion.  VESSELS: No pulmonary embolism. No aortic dissection. No intramural hematoma.  HEART: Heart size is normal. No pericardial effusion.  MEDIASTINUM AND JACQUELINE: No lymphadenopathy.  CHEST WALL AND LOWER NECK: Within normal limits.    ABDOMEN AND PELVIS:    LIVER: Within normal limits.  BILE DUCTS: Normal caliber.  GALLBLADDER: Within normal limits.  SPLEEN: Within normal limits.  PANCREAS: Within normal limits.  ADRENALS: Within normal limits.  KIDNEYS/URETERS: Within normal limits.    BLADDER: Within normal limits.  REPRODUCTIVE ORGANS: Uterus and ovaries within normal limits. Bilateral fallopian tube closure devices.    BOWEL: No bowel obstruction or inflammation. Colonic diverticulosis without diverticulitis. Cecum is located in the right upper quadrant. Appendix within normal limits.  PERITONEUM: No ascites.  VESSELS: Within normal limits.  RETROPERITONEUM/LYMPH NODES: No lymphadenopathy.    ABDOMINAL WALL: Within normal limits.  BONES: Mild degenerative changes of the spine.    IMPRESSION:     No evidence of an aortic dissection.                    PATRICIA PATEL M.D., ATTENDING RADIOLOGIST  This document has been electronically signed. 2020  2:08AM        EXAM:  XR CHEST PA LAT 2V                            PROCEDURE DATE:  2020          INTERPRETATION:  Chest radiographs     CPT 62026    CLINICAL INFORMATION: Chest pain of unknown severity since today.    TECHNIQUE:  Frontal and lateral viewsof the chest were obtained.    FINDINGS:  Prior study dated 10/1/2019 was available for review.    The lungs are clear.  No pleural abnormality is seen.      The heart and mediastinum appear intact.      IMPRESSION:  No evidence of active chest disease.                  NELIDA GROSSMAN M.D., ATTENDING RADIOLOGIST  This document has been electronically signed. 2020  9:44AM

## 2020-02-18 NOTE — PROGRESS NOTE ADULT - SUBJECTIVE AND OBJECTIVE BOX
PGY1 Note discussed with supervising resident and primary attending.    Patient is a 54y old  Female who presents with a chief complaint of Epigastric pain with vomiting and dizziness (18 Feb 2020 06:49)      INTERVAL HPI/OVERNIGHT EVENTS: Patient seen and examined at bedside. Pt c/o epigastric pain and constipation.     MEDICATIONS  (STANDING):  aspirin  chewable 81 milliGRAM(s) Oral daily  atorvastatin 40 milliGRAM(s) Oral at bedtime  dextrose 5% + sodium chloride 0.9%. 1000 milliLiter(s) (75 mL/Hr) IV Continuous <Continuous>  dicyclomine 10 milliGRAM(s) Oral three times a day before meals  heparin  Injectable 5000 Unit(s) SubCutaneous every 12 hours  levothyroxine 75 MICROGram(s) Oral daily  nortriptyline 50 milliGRAM(s) Oral at bedtime  pantoprazole  Injectable 40 milliGRAM(s) IV Push daily  senna 2 Tablet(s) Oral at bedtime  sucralfate 1 Gram(s) Oral four times a day  topiramate 100 milliGRAM(s) Oral two times a day  venlafaxine XR. 150 milliGRAM(s) Oral daily    MEDICATIONS  (PRN):  acetaminophen   Tablet .. 650 milliGRAM(s) Oral every 6 hours PRN Mild Pain (1 - 3)  bisacodyl Suppository 10 milliGRAM(s) Rectal daily PRN Constipation  clonazePAM  Tablet 0.5 milliGRAM(s) Oral two times a day PRN anxierty  simethicone 80 milliGRAM(s) Chew every 6 hours PRN Dyspepsia      Allergies    estrogen (Hives; Swelling)    Intolerances        REVIEW OF SYSTEMS:  CONSTITUTIONAL: No fever, weight loss, or fatigue  RESPIRATORY: No cough, wheezing, chills or hemoptysis; No shortness of breath  CARDIOVASCULAR: No chest pain, palpitations, dizziness, or leg swelling  GASTROINTESTINAL: Epigastric pain and nausea present. Constipation present.  No vomiting, or hematemesis  NEUROLOGICAL: No headaches, memory loss, loss of strength, numbness, or tremors  SKIN: No itching, burning, rashes, or lesions     Vital Signs Last 24 Hrs  T(C): 36.6 (18 Feb 2020 07:26), Max: 37.4 (17 Feb 2020 19:51)  T(F): 97.8 (18 Feb 2020 07:26), Max: 99.4 (17 Feb 2020 19:51)  HR: 62 (18 Feb 2020 07:26) (62 - 77)  BP: 107/68 (18 Feb 2020 07:26) (107/68 - 132/76)  BP(mean): --  RR: 16 (18 Feb 2020 07:26) (16 - 18)  SpO2: 99% (18 Feb 2020 07:26) (97% - 99%)    PHYSICAL EXAM:  GENERAL: NAD, well-groomed, well-developed  HEAD:  Atraumatic, Normocephalic  EYES: EOMI, PERRLA, conjunctiva and sclera clear  NECK: Supple, No JVD, Normal thyroid  CHEST/LUNG: Clear to percussion bilaterally; No rales, rhonchi, wheezing, or rubs  HEART: Regular rate and rhythm; No murmurs, rubs, or gallops  ABDOMEN: Soft, Nontender, Nondistended; Bowel sounds present  NERVOUS SYSTEM:  Alert & Oriented X3, Good concentration; Motor Strength 5/5 B/L   EXTREMITIES:  2+ Peripheral Pulses, No clubbing, cyanosis, or edema  SKIN;    LABS:                        10.6   4.48  )-----------( 325      ( 18 Feb 2020 07:55 )             33.4     02-18    140  |  110<H>  |  11  ----------------------------<  81  3.2<L>   |  25  |  0.80    Ca    8.9      18 Feb 2020 07:55  Phos  3.6     02-17  Mg     2.1     02-17    TPro  7.5  /  Alb  3.8  /  TBili  0.3  /  DBili  x   /  AST  23  /  ALT  27  /  AlkPhos  90  02-17    PT/INR - ( 16 Feb 2020 23:19 )   PT: 12.5 sec;   INR: 1.12 ratio         PTT - ( 16 Feb 2020 23:19 )  PTT:32.0 sec    CAPILLARY BLOOD GLUCOSE          RADIOLOGY & ADDITIONAL TESTS:    Imaging Personally Reviewed:  [ ] YES  [ ] NO    Consultant(s) Notes Reviewed:  [ ] YES  [ ] NO

## 2020-02-18 NOTE — CONSULT NOTE ADULT - ASSESSMENT
A/P: Abdominal Pain, Nausea/Vomiting, Anemia, constipation. Irritable Bowel Syndrome,   The patient is a 54-year-old  female with past medical history significant for Lupus, fibromyalgia, depression, hypertension, hypercholesterolemia, CVA, hypothyroidism, asthma, irritable bowel syndrome and left breast cancer, s/p lumpectomy who was admitted to [facility] [place] on February 16, 2020 with 1 week history of abdominal pain.  The patient has a history of abdominal pain of unclear etiology. The etiology of the abdominal pain may be secondary to irritable bowel syndrome. The prior workup that included upper endoscopy, colonoscopy, CAT scan of the abdomen and pelvis with IV contrast and abdominal ultrasound have been inconclusive in identifying the cause of the abdominal pain.   I recommend following to hemoglobin/hematocrit level. . The last hemoglobin/hematocrit level was 10/32.3, respectively.  I recommend continue on a trial of pantoprazole 40 mg once a day for the symptoms.  I recommend a trial of Dicyclomine 10 mg TID PRN abdominal pain.  I recommend an outpatient MR enterography to assess for small bowel pathology.  I also recommend an abdominal ultrasound to assess for biliary colic.  The case was discussed with the patient at length.  The case was also discussed with the emergency room physician.  Continue present care.  Will follow the patient.

## 2020-02-18 NOTE — DISCHARGE NOTE PROVIDER - NSDCFUSCHEDAPPT_GEN_ALL_CORE_FT
CONSUELO GUTIERREZ ; 02/19/2020 ; NPP Orthosurg [address] [facility]  CONSUELO GUTIERREZ ; 03/16/2020 ; NPP Med [address] [facility]  COSNUELO GUTIERREZ ; 04/03/2020 ; NPP OtoLaryng [address] [place] [address] CONSUELO GUTIERREZ ; 03/16/2020 ; NPP Med [address] [facility]  CONSUELO GUTIERREZ ; 04/03/2020 ; NPP OtoLaryng [address] [place] [address] CONSUELO GUTIERREZ ; 03/16/2020 ; NPP Med [address]  CONSUELO GUTIERREZ ; 04/03/2020 ; NPP OtoLaryng [address] [place] [address]

## 2020-02-18 NOTE — DISCHARGE NOTE PROVIDER - HOSPITAL COURSE
54F from home, PMH of IBS, asthma, HTN, hypothyroid, lupus, CVA, breast cancer s/p lumpectomy, depression, fibromyalgia presented to ED c/o epigastric pain radiating to Rt. lower quadrant since friday.    The patient has a history of abdominal pain of unclear etiology. The etiology of the abdominal pain may be secondary to irritable bowel syndrome. The prior workup that included upper endoscopy, colonoscopy, CAT scan of the abdomen and pelvis with IV contrast and abdominal ultrasound have been inconclusive in identifying the cause of the abdominal pain.    Pt is on a trial of pantoprazole 40 mg once a day for the symptoms and a trial of Dicyclomine 10 mg TID PRN for abdominal pain. Gastroenterologist Dr. Lamar recommended outpatient MR enterography to assess for small bowel pathology.  The CAT scan of the chest, abdomen and pelvis with IV contrast performed on February 17, 2020 revealed no evidence of an aortic dissection.  The colonoscopy to the terminal ileum performed on February 13, 2020 revealed moderate left sided diverticulosis. The patient had an upper endoscopy and colonoscopy to the cecum performed on July 18, 2019. The upper endoscopy revealed a small hiatal hernia and mild diffuse bile gastritis. The pathology revealed distal esophagus with unremarkable squamous mucosa, gastric antral and body mucosa with chronic inactive gastritis with severe lymphoid follicles that was negative for Helicobacter pylori and unremarkable duodenal mucosa. The colonoscopy to the cecum revealed moderate left sided diverticulosis, a poor prep colonoscopy and small internal hemorrhoids. The patient was previously found to have occult blood in the stool. The CAT scan of the abdomen and pelvis with IV contrast performed on December 16, 2019 revealed heterogeneous attenuation in the external iliac and common femoral veins is likely related to mixing artifact. Also noted was constipation, sigmoid diverticulosis without diverticulitis, trace bilateral hydronephrosis likely related to distended urinary bladder. The doppler ultrasound of the lower extremities performed on December 27, 2019 revealed no evidence of deep venous thrombosis in either lower extremity. The patient had an abdominal and pelvic ultrasound performed on October 18, 2019 to assess the symptoms. The abdominal ultrasound performed on October 18, 2019 revealed no gallstones or pericholecystic fluid. There is no evidence for intrahepatic or extrahepatic biliary duct dilatation. The pelvic ultrasound performed on October 18, 2019 revealed no evidence for an ovarian lesion. The blood work performed on November 25, 2019 revealed an elevated CEA of 4.8 ng/ml, an elevated ESR of 22 mm/hr, anemia with Hgb of 10.5 and low WBC count of 3.29. The upper endoscopic findings were unknown to the patient. The colonoscopic findings revealed a poor prep colonoscopy.    Cardiac causes of epigastric pain were ruled out. EKG showed NSR@89bpm with RBBB(unchanged from prior)    NST on Oct. 2018 revealed no abnormalities    T1, T2,T3  negative    Overall condition improved. Pt ready for discharge .Discussed with the attending.

## 2020-02-18 NOTE — PROGRESS NOTE ADULT - PROBLEM SELECTOR PLAN 2
p/w Hb 9.9, normocytic normochromic  baseline Hb ~10 as per previous records  likely has anemia of chronic diease  FU iron profile p/w Hb 9.9, normocytic normochromic  baseline Hb ~10 as per previous records  likely has anemia of chronic diease  Iron studies normal except for low saturation

## 2020-02-18 NOTE — DISCHARGE NOTE PROVIDER - NSDCMRMEDTOKEN_GEN_ALL_CORE_FT
albuterol 2.5 mg/3 mL (0.083%) inhalation solution: 3 milliliter(s) inhaled every 6 hours, As Needed  aspirin 325 mg oral tablet: 1 tab(s) orally once a day   atorvastatin 20 mg oral tablet: 1 tab(s) orally once a day (at bedtime)  Bentyl 10 mg oral capsule: 1 cap(s) orally 3 times a day  Carafate 1 g oral tablet: 1 tab(s) orally 4 times a day (before meals and at bedtime)  clonazePAM 1 mg oral tablet: 1 tab(s) orally 2 times a day, As Needed  Effexor  mg oral capsule, extended release: 1 cap(s) orally 2 times a day  ergocalciferol 50,000 intl units (1.25 mg) oral capsule: 1 cap(s) orally once a week  levothyroxine 75 mcg (0.075 mg) oral tablet: 1 tab(s) orally once a day  Mobic 15 mg oral tablet: 1 tab(s) orally once a day  nortriptyline 50 mg oral capsule: 1 cap(s) orally once a day (at bedtime)  Protonix 40 mg oral delayed release tablet: 1 tab(s) orally once a day  senna oral tablet: 2 tab(s) orally once a day (at bedtime)  topiramate 100 mg oral tablet: 1 tab(s) orally 2 times a day  traZODone 150 mg oral tablet: 1 tab(s) orally once a day (at bedtime)

## 2020-02-18 NOTE — PROGRESS NOTE ADULT - PROBLEM SELECTOR PLAN 1
p/w epigastric pain radiating to Rt. lower quadrant since friday with vomiting X 3 today with dizziness  vitals stable  CT angio chest/abd. pelvis only reveals Colonic diverticulosis without diverticulitis  EKG shows NSR@89bpm with RBBB(unchanged from prior)  NST on Oct. 2018 revealed no abnormalities  T1, T2 negative  with history of chronic abd. pain, epigastric pain likely GI origin like IBS vs. gastric ulcer vs due to severe constipation, unlikely cardiac  pt. unsure of her meds  as per previous records, will start dicyclomine, Protonix, sucralfate  senna and dulcolax for constipation  will give suppository as last BM was on wednesday  consider enema if pt. does not pass stool  FU ECHO  FU orthostatics for dizziness  Dr. Perez consulted  Dr. Lamar consulted p/w epigastric pain radiating to Rt. lower quadrant since friday with vomiting X 3 and dizziness  Pt has H/o Irritable bowel syndrome and has episodes of chronic abdo pain since 2 years  vitals stable  CT angio chest/abd. pelvis :Colonic diverticulosis without diverticulitis  EKG shows NSR@89bpm with RBBB(unchanged from prior)  NST on Oct. 2018 revealed no abnormalities  T1, T2 negative  epigastric pain likely GI origin like IBS vs. gastric ulcer vs due to severe constipation, unlikely cardiac  EGD in July 2019: chronic antral gastritis  on dicyclomine, Protonix, sucralfate  senna and dulcolax for constipation  consider enema if pt. does not pass stool  f/u ECHO  f/u US abdomen to r/o biliary colic  Dr. Perez consulted  Dr. Lamar consulted

## 2020-02-18 NOTE — DISCHARGE NOTE PROVIDER - NSDCCPCAREPLAN_GEN_ALL_CORE_FT
PRINCIPAL DISCHARGE DIAGNOSIS  Diagnosis: Epigastric pain  Assessment and Plan of Treatment: You came to the hospital due to epigastric pain radiating to the right lower quarant and nausea and dizziness.  Your epigastric pain is probably due to Irritrable bowel syndrome and gastritis. CT Angio Chest/abdomen showed left colonic diverticulossis. Cardiac causes of chest pain were ruled out. Echocardiogram was normal. Stress test in October 2018 was normal. Cardiac enzymes i.e troponins were normal. Colonoscopy performed on February 13, 2020 revealed moderate left sided diverticulosis. Endoscopy performed on July 18, 2019 revealed a small hiatal hernia and mild diffuse bile gastritis. Gastroenterologist Dr. Lamar advised trial of protonix and dicylomine for abdominal pain. You are advised to follow iwth your gastroenterologist as an outppatient within 1 week of discharge.      SECONDARY DISCHARGE DIAGNOSES  Diagnosis: Irritable bowel syndrome  Assessment and Plan of Treatment: Continue with carafate and bentyl. Follow with your gastroenetrologist as an outpatient.    Diagnosis: Depression  Assessment and Plan of Treatment: Continue with clonazepam, norttriptyline, topiramate and venlafaxine.    Diagnosis: Lupus (systemic lupus erythematosus)  Assessment and Plan of Treatment: Your lupus is not in flare. Follow with your PCP if you get any flare up of lupus symptoms.    Diagnosis: Fibromyalgia  Assessment and Plan of Treatment: Continue with your medications. Follow with your PCP if you get any worsening of your symptoms.    Diagnosis: Hypothyroidism  Assessment and Plan of Treatment: Continue witrh levothyroxine. Get your TSH levels checked regularly. Follow ith your PCP. PRINCIPAL DISCHARGE DIAGNOSIS  Diagnosis: Epigastric pain  Assessment and Plan of Treatment: You came to the hospital due to epigastric pain radiating to the right lower quarant and nausea and dizziness.  Your epigastric pain is probably due to Irritrable bowel syndrome and gastritis. CT Angio Chest/abdomen showed left colonic diverticulossis. Cardiac causes of chest pain were ruled out. Echocardiogram was normal. Stress test in October 2018 was normal. Cardiac enzymes i.e troponins were normal. Colonoscopy performed on February 13, 2020 revealed moderate left sided diverticulosis. Endoscopy performed on July 18, 2019 revealed a small hiatal hernia and mild diffuse bile gastritis. Endoscopy done on 2/20/2020 showed mild diffuse bile gastritis and small hiatal hernia. Gastroenterologist Dr. Lamar advised trial of protonix and dicylomine for abdominal pain. You are advised to follow iwth your gastroenterologist as an outppatient within 1 week of discharge.      SECONDARY DISCHARGE DIAGNOSES  Diagnosis: Irritable bowel syndrome  Assessment and Plan of Treatment: Continue with carafate and bentyl. Follow with your gastroenetrologist as an outpatient.    Diagnosis: Depression  Assessment and Plan of Treatment: Continue with clonazepam, norttriptyline, topiramate and venlafaxine.    Diagnosis: Lupus (systemic lupus erythematosus)  Assessment and Plan of Treatment: Your lupus is not in flare. Follow with your PCP if you get any flare up of lupus symptoms.    Diagnosis: Fibromyalgia  Assessment and Plan of Treatment: Continue with your medications. Follow with your PCP if you get any worsening of your symptoms.    Diagnosis: Hypothyroidism  Assessment and Plan of Treatment: Continue witrh levothyroxine. Get your TSH levels checked regularly. Follow ith your PCP.

## 2020-02-19 ENCOUNTER — APPOINTMENT (OUTPATIENT)
Dept: ORTHOPEDIC SURGERY | Facility: CLINIC | Age: 55
End: 2020-02-19

## 2020-02-19 LAB
ALBUMIN SERPL ELPH-MCNC: 3.9 G/DL — SIGNIFICANT CHANGE UP (ref 3.5–5)
ALP SERPL-CCNC: 87 U/L — SIGNIFICANT CHANGE UP (ref 40–120)
ALT FLD-CCNC: 21 U/L DA — SIGNIFICANT CHANGE UP (ref 10–60)
ANION GAP SERPL CALC-SCNC: 5 MMOL/L — SIGNIFICANT CHANGE UP (ref 5–17)
AST SERPL-CCNC: 16 U/L — SIGNIFICANT CHANGE UP (ref 10–40)
BASOPHILS # BLD AUTO: 0.05 K/UL — SIGNIFICANT CHANGE UP (ref 0–0.2)
BASOPHILS NFR BLD AUTO: 1.3 % — SIGNIFICANT CHANGE UP (ref 0–2)
BILIRUB SERPL-MCNC: 0.3 MG/DL — SIGNIFICANT CHANGE UP (ref 0.2–1.2)
BUN SERPL-MCNC: 11 MG/DL — SIGNIFICANT CHANGE UP (ref 7–18)
CALCIUM SERPL-MCNC: 9.2 MG/DL — SIGNIFICANT CHANGE UP (ref 8.4–10.5)
CHLORIDE SERPL-SCNC: 110 MMOL/L — HIGH (ref 96–108)
CO2 SERPL-SCNC: 24 MMOL/L — SIGNIFICANT CHANGE UP (ref 22–31)
CREAT SERPL-MCNC: 0.93 MG/DL — SIGNIFICANT CHANGE UP (ref 0.5–1.3)
EOSINOPHIL # BLD AUTO: 0.11 K/UL — SIGNIFICANT CHANGE UP (ref 0–0.5)
EOSINOPHIL NFR BLD AUTO: 2.8 % — SIGNIFICANT CHANGE UP (ref 0–6)
GLUCOSE BLDC GLUCOMTR-MCNC: 82 MG/DL — SIGNIFICANT CHANGE UP (ref 70–99)
GLUCOSE SERPL-MCNC: 77 MG/DL — SIGNIFICANT CHANGE UP (ref 70–99)
HCT VFR BLD CALC: 34.4 % — LOW (ref 34.5–45)
HGB BLD-MCNC: 10.9 G/DL — LOW (ref 11.5–15.5)
IMM GRANULOCYTES NFR BLD AUTO: 0.3 % — SIGNIFICANT CHANGE UP (ref 0–1.5)
LYMPHOCYTES # BLD AUTO: 2.23 K/UL — SIGNIFICANT CHANGE UP (ref 1–3.3)
LYMPHOCYTES # BLD AUTO: 56.3 % — HIGH (ref 13–44)
MCHC RBC-ENTMCNC: 29.9 PG — SIGNIFICANT CHANGE UP (ref 27–34)
MCHC RBC-ENTMCNC: 31.7 GM/DL — LOW (ref 32–36)
MCV RBC AUTO: 94.5 FL — SIGNIFICANT CHANGE UP (ref 80–100)
MONOCYTES # BLD AUTO: 0.37 K/UL — SIGNIFICANT CHANGE UP (ref 0–0.9)
MONOCYTES NFR BLD AUTO: 9.3 % — SIGNIFICANT CHANGE UP (ref 2–14)
NEUTROPHILS # BLD AUTO: 1.19 K/UL — LOW (ref 1.8–7.4)
NEUTROPHILS NFR BLD AUTO: 30 % — LOW (ref 43–77)
NRBC # BLD: 0 /100 WBCS — SIGNIFICANT CHANGE UP (ref 0–0)
PLATELET # BLD AUTO: 352 K/UL — SIGNIFICANT CHANGE UP (ref 150–400)
POTASSIUM SERPL-MCNC: 3.4 MMOL/L — LOW (ref 3.5–5.3)
POTASSIUM SERPL-SCNC: 3.4 MMOL/L — LOW (ref 3.5–5.3)
PROT SERPL-MCNC: 7.6 G/DL — SIGNIFICANT CHANGE UP (ref 6–8.3)
RBC # BLD: 3.64 M/UL — LOW (ref 3.8–5.2)
RBC # FLD: 12.8 % — SIGNIFICANT CHANGE UP (ref 10.3–14.5)
SODIUM SERPL-SCNC: 139 MMOL/L — SIGNIFICANT CHANGE UP (ref 135–145)
T3FREE SERPL-MCNC: 2.13 PG/ML — SIGNIFICANT CHANGE UP (ref 1.8–4.6)
T4 FREE SERPL-MCNC: 1 NG/DL — SIGNIFICANT CHANGE UP (ref 0.9–1.8)
WBC # BLD: 3.96 K/UL — SIGNIFICANT CHANGE UP (ref 3.8–10.5)
WBC # FLD AUTO: 3.96 K/UL — SIGNIFICANT CHANGE UP (ref 3.8–10.5)

## 2020-02-19 PROCEDURE — 76700 US EXAM ABDOM COMPLETE: CPT | Mod: 26

## 2020-02-19 PROCEDURE — 99232 SBSQ HOSP IP/OBS MODERATE 35: CPT | Mod: GC

## 2020-02-19 PROCEDURE — 99231 SBSQ HOSP IP/OBS SF/LOW 25: CPT

## 2020-02-19 RX ORDER — POTASSIUM CHLORIDE 20 MEQ
10 PACKET (EA) ORAL
Refills: 0 | Status: COMPLETED | OUTPATIENT
Start: 2020-02-19 | End: 2020-02-19

## 2020-02-19 RX ORDER — SODIUM CHLORIDE 9 MG/ML
1000 INJECTION, SOLUTION INTRAVENOUS
Refills: 0 | Status: COMPLETED | OUTPATIENT
Start: 2020-02-19 | End: 2020-02-19

## 2020-02-19 RX ORDER — POLYETHYLENE GLYCOL 3350 17 G/17G
17 POWDER, FOR SOLUTION ORAL
Refills: 0 | Status: COMPLETED | OUTPATIENT
Start: 2020-02-19 | End: 2020-02-20

## 2020-02-19 RX ORDER — TRAZODONE HCL 50 MG
150 TABLET ORAL AT BEDTIME
Refills: 0 | Status: DISCONTINUED | OUTPATIENT
Start: 2020-02-19 | End: 2020-02-20

## 2020-02-19 RX ADMIN — Medication 1 GRAM(S): at 00:42

## 2020-02-19 RX ADMIN — Medication 1 GRAM(S): at 23:21

## 2020-02-19 RX ADMIN — Medication 150 MILLIGRAM(S): at 01:05

## 2020-02-19 RX ADMIN — Medication 81 MILLIGRAM(S): at 12:25

## 2020-02-19 RX ADMIN — Medication 150 MILLIGRAM(S): at 21:03

## 2020-02-19 RX ADMIN — Medication 150 MILLIGRAM(S): at 12:25

## 2020-02-19 RX ADMIN — Medication 100 MILLIGRAM(S): at 06:26

## 2020-02-19 RX ADMIN — Medication 100 MILLIGRAM(S): at 17:45

## 2020-02-19 RX ADMIN — SENNA PLUS 2 TABLET(S): 8.6 TABLET ORAL at 21:03

## 2020-02-19 RX ADMIN — ATORVASTATIN CALCIUM 40 MILLIGRAM(S): 80 TABLET, FILM COATED ORAL at 21:03

## 2020-02-19 RX ADMIN — Medication 10 MILLIGRAM(S): at 06:26

## 2020-02-19 RX ADMIN — Medication 1 GRAM(S): at 17:45

## 2020-02-19 RX ADMIN — POLYETHYLENE GLYCOL 3350 17 GRAM(S): 17 POWDER, FOR SOLUTION ORAL at 17:44

## 2020-02-19 RX ADMIN — Medication 10 MILLIGRAM(S): at 12:25

## 2020-02-19 RX ADMIN — Medication 75 MICROGRAM(S): at 06:26

## 2020-02-19 RX ADMIN — SIMETHICONE 80 MILLIGRAM(S): 80 TABLET, CHEWABLE ORAL at 17:52

## 2020-02-19 RX ADMIN — SODIUM CHLORIDE 1000 MILLILITER(S): 9 INJECTION, SOLUTION INTRAVENOUS at 09:30

## 2020-02-19 RX ADMIN — Medication 1 GRAM(S): at 06:26

## 2020-02-19 RX ADMIN — PANTOPRAZOLE SODIUM 40 MILLIGRAM(S): 20 TABLET, DELAYED RELEASE ORAL at 12:25

## 2020-02-19 RX ADMIN — Medication 0.5 MILLIGRAM(S): at 17:51

## 2020-02-19 RX ADMIN — Medication 1 GRAM(S): at 12:25

## 2020-02-19 RX ADMIN — NORTRIPTYLINE HYDROCHLORIDE 50 MILLIGRAM(S): 10 CAPSULE ORAL at 21:03

## 2020-02-19 NOTE — PROGRESS NOTE ADULT - ASSESSMENT
A/P: Abdominal Pain, Nausea/Vomiting, Anemia, constipation. Irritable Bowel Syndrome  The patient is a 54-year-old  female with past medical history significant for Lupus, fibromyalgia, depression, hypertension, hypercholesterolemia, CVA, hypothyroidism, asthma, irritable bowel syndrome and left breast cancer, s/p lumpectomy who was admitted to [facility] on February 16, 2020 with 1 week history of abdominal pain.  The patient has a history of abdominal pain of unclear etiology. The etiology of the abdominal pain may be secondary to irritable bowel syndrome. The prior workup that included upper endoscopy, colonoscopy, CAT scan of the abdomen and pelvis with IV contrast and abdominal ultrasound have been inconclusive in identifying the cause of the abdominal pain.   I recommend continue following the hemoglobin/hematocrit level. . The last hemoglobin/hematocrit level was 10/32.3, respectively.  I recommend continue on a trial of pantoprazole 40 mg once a day for the symptoms.  I recommend continue on a trial of Dicyclomine 10 mg TID PRN abdominal pain.  The patient is s/p abdominal ultrasound yesterday.  I am awaiting for the results.  If the abdominal ultrasound is inconclusive, I recommend a repeat upper endoscopy To assess for peptic ulcer disease.  The patient was told of the risks and benefits of the procedure.  The patient was told of the risks of perforation, emergency surgery, bleeding, infections and missed lesions.  The patient agreed and will proceed with the  procedure.  I recommend keeping the patient n.p.o. after midnight for possible upper endoscopy tomorrow February 20, 2020.  I also recommend an outpatient MR enterography to assess for small bowel pathology.   The case was again discussed with the patient at length.  Continue present care.  Will continue to follow the patient.

## 2020-02-19 NOTE — PROGRESS NOTE ADULT - PROBLEM SELECTOR PLAN 1
p/w epigastric pain radiating to Rt. lower quadrant since friday with vomiting X 3 and dizziness  Pt has H/o Irritable bowel syndrome and has episodes of chronic abdo pain since 2 years  vitals stable  CT angio chest/abd. pelvis :Colonic diverticulosis without diverticulitis  EKG shows NSR@89bpm with RBBB(unchanged from prior)  NST on Oct. 2018 revealed no abnormalities  T1, T2 negative  epigastric pain likely GI origin like IBS vs. gastric ulcer vs due to severe constipation, unlikely cardiac  EGD in July 2019: chronic antral gastritis  on dicyclomine, Protonix, sucralfate  senna and dulcolax for constipation  consider enema if pt. does not pass stool  ECHO: normal  f/u US abdomen to r/o biliary colic  f/u EGD  Dr. Perez consulted  Dr. Lamar consulted

## 2020-02-19 NOTE — PROGRESS NOTE ADULT - SUBJECTIVE AND OBJECTIVE BOX
PGY1 Note discussed with supervising resident and primary attending.    Patient is a 54y old  Female who presents with a chief complaint of Epigastric pain with vomiting and dizziness (2020 06:30)      INTERVAL HPI/OVERNIGHT EVENTS: Patient seen and examined at bedside. Pt c/o mild abdominal Pian. EGD today    MEDICATIONS  (STANDING):  aspirin  chewable 81 milliGRAM(s) Oral daily  atorvastatin 40 milliGRAM(s) Oral at bedtime  dicyclomine 10 milliGRAM(s) Oral three times a day before meals  heparin  Injectable 5000 Unit(s) SubCutaneous every 12 hours  lactated ringers. 1000 milliLiter(s) (1000 mL/Hr) IV Continuous <Continuous>  levothyroxine 75 MICROGram(s) Oral daily  nortriptyline 50 milliGRAM(s) Oral at bedtime  pantoprazole  Injectable 40 milliGRAM(s) IV Push daily  senna 2 Tablet(s) Oral at bedtime  sucralfate 1 Gram(s) Oral four times a day  topiramate 100 milliGRAM(s) Oral two times a day  traZODone 150 milliGRAM(s) Oral at bedtime  venlafaxine XR. 150 milliGRAM(s) Oral daily    MEDICATIONS  (PRN):  acetaminophen   Tablet .. 650 milliGRAM(s) Oral every 6 hours PRN Mild Pain (1 - 3)  bisacodyl Suppository 10 milliGRAM(s) Rectal daily PRN Constipation  clonazePAM  Tablet 0.5 milliGRAM(s) Oral two times a day PRN anxierty  simethicone 80 milliGRAM(s) Chew every 6 hours PRN Dyspepsia      Allergies    estrogen (Hives; Swelling)    Intolerances        REVIEW OF SYSTEMS:  CONSTITUTIONAL: No fever, weight loss, or fatigue  RESPIRATORY: No cough, wheezing, chills or hemoptysis; No shortness of breath  CARDIOVASCULAR: No chest pain, palpitations, dizziness, or leg swelling  GASTROINTESTINAL: Epigastric pain present. No nausea, vomiting, or hematemesis; No diarrhea or constipation. No melena or hematochezia.  NEUROLOGICAL: No headaches, memory loss, loss of strength, numbness, or tremors  SKIN: No itching, burning, rashes, or lesions     Vital Signs Last 24 Hrs  T(C): 36.7 (2020 07:27), Max: 36.9 (2020 15:12)  T(F): 98 (2020 07:27), Max: 98.5 (2020 19:03)  HR: 72 (2020 07:27) (72 - 88)  BP: 90/57 (2020 07:27) (90/57 - 117/79)  BP(mean): --  RR: 18 (2020 07:27) (16 - 18)  SpO2: 98% (2020 07:27) (95% - 100%)    PHYSICAL EXAM:  GENERAL: NAD, well-groomed, well-developed  HEAD:  Atraumatic, Normocephalic  EYES: EOMI, PERRLA, conjunctiva and sclera clear  NECK: Supple, No JVD, Normal thyroid  CHEST/LUNG: Clear to percussion bilaterally; No rales, rhonchi, wheezing, or rubs  HEART: Regular rate and rhythm; No murmurs, rubs, or gallops  ABDOMEN: Soft, Nontender, Nondistended; Bowel sounds present  NERVOUS SYSTEM:  Alert & Oriented X3, Good concentration; Motor Strength 5/5 B/L   EXTREMITIES:  2+ Peripheral Pulses, No clubbing, cyanosis, or edema  SKIN;    LABS:                        10.9   3.96  )-----------( 352      ( 2020 07:43 )             34.4     02-19    139  |  110<H>  |  11  ----------------------------<  77  3.4<L>   |  24  |  0.93    Ca    9.2      2020 07:43    TPro  7.6  /  Alb  3.9  /  TBili  0.3  /  DBili  x   /  AST  16  /  ALT  21  /  AlkPhos  87  02-19      Urinalysis Basic - ( 2020 18:48 )    Color: Yellow / Appearance: Clear / S.005 / pH: x  Gluc: x / Ketone: Negative  / Bili: Negative / Urobili: Negative   Blood: x / Protein: Negative / Nitrite: Negative   Leuk Esterase: Trace / RBC: 0-2 /HPF / WBC 6-10 /HPF   Sq Epi: x / Non Sq Epi: Moderate /HPF / Bacteria: Few /HPF      CAPILLARY BLOOD GLUCOSE          RADIOLOGY & ADDITIONAL TESTS:    Imaging Personally Reviewed:  [ ] YES  [ ] NO    Consultant(s) Notes Reviewed:  [ ] YES  [ ] NO

## 2020-02-19 NOTE — PROGRESS NOTE ADULT - SUBJECTIVE AND OBJECTIVE BOX
The patient is a 54-year-old  female with past medical history significant for Lupus, fibromyalgia, depression, hypertension, hypercholesterolemia, CVA, hypothyroidism, asthma, irritable bowel syndrome and left breast cancer, s/p lumpectomy who [place] [facility] [place] 1 week history of abdominal pain.  The patient is lying in bed, resting comfortably, in no acute distress.  The patient states that she still has abdominal pain in the midepigastric area.  The patient also complains of , abdominal gas and bloating, nausea, , gastroesophageal reflux disease and constipation.  The patient denies any vomiting, dysphagia, atypical chest pain, shortness of breath, palpitations, diarrhea, bright red blood per rectum, melena, hematemesis, weight loss, anorexia, fever or chills.   The blood work performed on 2020 revealed anemia with Hgb/Hct level of 10.6/33.4, respectively,an elevated chloride of 110 mmol/L and low potassium of 3.2 mmol/L.  The CAT scan of the chest, abdomen and pelvis with IV contrast performed on 2020 revealed no evidence of an aortic dissection.  The colonoscopy to the terminal ileum performed on 2020 revealed moderate left sided diverticulosis. The patient had an upper endoscopy and colonoscopy to the cecum [place] [facility] [place] [place]. The upper endoscopy revealed a small hiatal hernia and mild diffuse bile gastritis. The pathology revealed distal esophagus with unremarkable squamous mucosa, gastric antral and body mucosa with chronic inactive gastritis with severe lymphoid follicles that was negative for Helicobacter pylori and unremarkable duodenal mucosa. The colonoscopy to the cecum revealed moderate left sided diverticulosis, a poor prep colonoscopy and small internal hemorrhoids. The patient tolerated the procedures well. The patient admits to a family history of GI problems. The patient’s brother and father had a history of bleeding peptic ulcer disease.   PMH: Lupus, fibromyalgia, depression, hypertension, hypercholesterolemia, CVA, hypothyroidism, asthma, irritable bowel syndrome and breast cancer, s/p lumpectomy  PSH: lumpectomy for left breast cancer, tubal ligation x 2,  x 4, insertion and removal of port  Allergies: NKDA  Social Hx: (-) smoking, (-) ETOH, (-) IVDA  Family Hx. Mother:  of dementia age 92  Father:  of MI age71  3 Brothers:   of AIDS, suicide and drug overdose, 4 Sisters:  breast cancer, ovarian cancer, thyroid cancer, unknown cause Maternal Grandparents grandmother  of dementia , grandfather  unknown cause:  Paternal Grandparents:  unknown causes  MEDICATIONS  (STANDING):  aspirin  chewable 81 milliGRAM(s) Oral daily  atorvastatin 40 milliGRAM(s) Oral at bedtime  dextrose 5% + sodium chloride 0.9%. 1000 milliLiter(s) (75 mL/Hr) IV Continuous <Continuous>  dicyclomine 10 milliGRAM(s) Oral three times a day before meals  heparin  Injectable 5000 Unit(s) SubCutaneous every 12 hours  levothyroxine 75 MICROGram(s) Oral daily  nortriptyline 50 milliGRAM(s) Oral at bedtime  pantoprazole  Injectable 40 milliGRAM(s) IV Push daily  senna 2 Tablet(s) Oral at bedtime  sucralfate 1 Gram(s) Oral four times a day  topiramate 100 milliGRAM(s) Oral two times a day  traZODone 150 milliGRAM(s) Oral at bedtime  venlafaxine XR. 150 milliGRAM(s) Oral daily    MEDICATIONS  (PRN):  acetaminophen   Tablet .. 650 milliGRAM(s) Oral every 6 hours PRN Mild Pain (1 - 3)  bisacodyl Suppository 10 milliGRAM(s) Rectal daily PRN Constipation  clonazePAM  Tablet 0.5 milliGRAM(s) Oral two times a day PRN anxierty  simethicone 80 milliGRAM(s) Chew every 6 hours PRN Dyspepsia    Vital Signs Last 24 Hrs  T(C): 36.7 (2020 04:58), Max: 36.9 (2020 15:12)  T(F): 98.1 (2020 04:58), Max: 98.5 (2020 19:03)  HR: 88 (2020 04:58) (62 - 88)  BP: 95/62 (2020 04:58) (95/62 - 117/79)  BP(mean): --  RR: 18 (2020 04:58) (16 - 18)  SpO2: 95% (2020 04:58) (95% - 100%)    Physical Examination:   General: well developed, well-nourished ,  white fe-male in no acute distress  Eyes: No icteric sclera, conjunctiva clear.  Ears/Nose/Throat: Oropharynx not erythematous, no enlarged tonsils, nose not congested, ears unremarkable  Head: normal cephalic, nontraumatic  Chest: normal appearance, normal symmetrical,  Respiratory: Coarse breath sounds bilaterally, no wheezing no rales or rhonchi   Cardiovascular: S1-S2 audible, no murmur, no rubs or gallops, no palpable masses  Abdomen: (+) BS, soft, tender in the midepigastric and right upper quadrant, no rebound or guarding or masses noted. No hepatosplenomegaly  Back: (-) CVAT, no spinal tenderness  Extremities: full range of motion in all extremities, (-) c, (-) c, (-) e  Musculoskeletal: Good motor strength, good range of motion, normal appearing lower extremities  Neuro: A+O x 3, no ataxia,  Psych: good affect, not depressed, no anxiety  Skin: normal, no jaundice  CBC Full  -  ( 2020 07:55 )  WBC Count : 4.48 K/uL  RBC Count : 3.52 M/uL  Hemoglobin : 10.6 g/dL  Hematocrit : 33.4 %  Platelet Count - Automated : 325 K/uL  Mean Cell Volume : 94.9 fl  Mean Cell Hemoglobin : 30.1 pg  Mean Cell Hemoglobin Concentration : 31.7 gm/dL  Auto Neutrophil # : x  Auto Lymphocyte # : x  Auto Monocyte # : x  Auto Eosinophil # : x  Auto Basophil # : x  Auto Neutrophil % : x  Auto Lymphocyte % : x  Auto Monocyte % : x  Auto Eosinophil % : x  Auto Basophil % : x    Urine Microscopic-Add On (NC) (20 @ 18:48)    Comment - Urine: Few Transitional Epithelial Cells Present 20 19:08    Epithelial Cells: Moderate /HPF    Bacteria: Few /HPF    White Blood Cell - Urine: 6-10 /HPF    Red Blood Cell - Urine: 0-2 /HPF    Urinalysis (20 @ 18:48)    pH Urine: 7.0    Glucose Qualitative, Urine: Negative    Blood, Urine: Negative    Color: Yellow    Urine Appearance: Clear    Bilirubin: Negative    Ketone - Urine: Negative    Specific Gravity: 1.005    Protein, Urine: Negative    Urobilinogen: Negative    Nitrite: Negative    Leukocyte Esterase Concentration: Trace    Basic Metabolic Panel in AM (20 @ 07:55)    Sodium, Serum: 140 mmol/L    Potassium, Serum: 3.2 mmol/L    Chloride, Serum: 110 mmol/L    Carbon Dioxide, Serum: 25 mmol/L    Anion Gap, Serum: 5 mmol/L    Blood Urea Nitrogen, Serum: 11 mg/dL    Creatinine, Serum: 0.80 mg/dL    Glucose, Serum: 81 mg/dL    Calcium, Total Serum: 8.9 mg/dL    eGFR if Non : 82: Interpretative comment  The units for eGFR are mL/min/1.73M2 (normalized body surface area). The  eGFR is calculated from a serum creatinine using the CKD-EPI equation.  Other variables required for calculation are race, age and sex. Among  patients with chronic kidney disease (CKD), the eGFR is useful in  determining the stage of disease according to KDOQI CKD classification.  All eGFR results are reported numerically with the following  interpretation.          GFR                    With                 Without     (ml/min/1.73 m2)    Kidney Damage       Kidney Damage        >= 90                    Stage 1                     Normal        60-89                    Stage 2                     Decreased GFR        30-59     Stage 3                     Stage 3        15-29                    Stage 4                     Stage 4        < 15                      Stage 5                     Stage 5  Each stage of CKD assumes that the associated GFR level has been in  effect for at least 3 months. Determination of stages one and two (with  eGFR > 59 ml/min/m2) requires estimation of kidney damage for at least 3  months as defined by structural or functional abnormalities.  Limitations: All estimates of GFR will be less accurate for patients at  extremes of muscle mass (including but not limited to frail elderly,  critically ill, or cancer patients), those with unusual diets, and those  with conditions associated with reduced secretion or extrarenal  elimination of creatinine. The eGFR equation is not recommended for use  in patients with unstable creatinine levels. mL/min/1.73M2    eGFR if African American: 96 mL/min/1.73M2

## 2020-02-19 NOTE — PROGRESS NOTE ADULT - PROBLEM SELECTOR PLAN 6
pt. unsure of dose of synthroid  will start synthroid 75mcg as per previous record  TSH: 10  f/u T3 and T4

## 2020-02-19 NOTE — PROGRESS NOTE ADULT - PROBLEM SELECTOR PLAN 2
p/w Hb 9.9, normocytic normochromic  baseline Hb ~10 as per previous records  likely has anemia of chronic diease  Iron studies normal except for low saturation

## 2020-02-20 ENCOUNTER — TRANSCRIPTION ENCOUNTER (OUTPATIENT)
Age: 55
End: 2020-02-20

## 2020-02-20 VITALS
RESPIRATION RATE: 16 BRPM | HEART RATE: 72 BPM | TEMPERATURE: 98 F | DIASTOLIC BLOOD PRESSURE: 70 MMHG | OXYGEN SATURATION: 96 % | SYSTOLIC BLOOD PRESSURE: 99 MMHG

## 2020-02-20 LAB
ANION GAP SERPL CALC-SCNC: 8 MMOL/L — SIGNIFICANT CHANGE UP (ref 5–17)
BASOPHILS # BLD AUTO: 0.05 K/UL — SIGNIFICANT CHANGE UP (ref 0–0.2)
BASOPHILS NFR BLD AUTO: 1.2 % — SIGNIFICANT CHANGE UP (ref 0–2)
BUN SERPL-MCNC: 12 MG/DL — SIGNIFICANT CHANGE UP (ref 7–18)
CALCIUM SERPL-MCNC: 9.1 MG/DL — SIGNIFICANT CHANGE UP (ref 8.4–10.5)
CHLORIDE SERPL-SCNC: 109 MMOL/L — HIGH (ref 96–108)
CO2 SERPL-SCNC: 24 MMOL/L — SIGNIFICANT CHANGE UP (ref 22–31)
CREAT SERPL-MCNC: 0.91 MG/DL — SIGNIFICANT CHANGE UP (ref 0.5–1.3)
EOSINOPHIL # BLD AUTO: 0.08 K/UL — SIGNIFICANT CHANGE UP (ref 0–0.5)
EOSINOPHIL NFR BLD AUTO: 1.9 % — SIGNIFICANT CHANGE UP (ref 0–6)
GLUCOSE SERPL-MCNC: 82 MG/DL — SIGNIFICANT CHANGE UP (ref 70–99)
HCT VFR BLD CALC: 33 % — LOW (ref 34.5–45)
HGB BLD-MCNC: 10.4 G/DL — LOW (ref 11.5–15.5)
IMM GRANULOCYTES NFR BLD AUTO: 0.2 % — SIGNIFICANT CHANGE UP (ref 0–1.5)
LYMPHOCYTES # BLD AUTO: 1.99 K/UL — SIGNIFICANT CHANGE UP (ref 1–3.3)
LYMPHOCYTES # BLD AUTO: 46.1 % — HIGH (ref 13–44)
MCHC RBC-ENTMCNC: 29.9 PG — SIGNIFICANT CHANGE UP (ref 27–34)
MCHC RBC-ENTMCNC: 31.5 GM/DL — LOW (ref 32–36)
MCV RBC AUTO: 94.8 FL — SIGNIFICANT CHANGE UP (ref 80–100)
MONOCYTES # BLD AUTO: 0.38 K/UL — SIGNIFICANT CHANGE UP (ref 0–0.9)
MONOCYTES NFR BLD AUTO: 8.8 % — SIGNIFICANT CHANGE UP (ref 2–14)
NEUTROPHILS # BLD AUTO: 1.81 K/UL — SIGNIFICANT CHANGE UP (ref 1.8–7.4)
NEUTROPHILS NFR BLD AUTO: 41.8 % — LOW (ref 43–77)
NRBC # BLD: 0 /100 WBCS — SIGNIFICANT CHANGE UP (ref 0–0)
PLATELET # BLD AUTO: 321 K/UL — SIGNIFICANT CHANGE UP (ref 150–400)
POTASSIUM SERPL-MCNC: 3.7 MMOL/L — SIGNIFICANT CHANGE UP (ref 3.5–5.3)
POTASSIUM SERPL-SCNC: 3.7 MMOL/L — SIGNIFICANT CHANGE UP (ref 3.5–5.3)
RBC # BLD: 3.48 M/UL — LOW (ref 3.8–5.2)
RBC # FLD: 12.5 % — SIGNIFICANT CHANGE UP (ref 10.3–14.5)
SODIUM SERPL-SCNC: 141 MMOL/L — SIGNIFICANT CHANGE UP (ref 135–145)
WBC # BLD: 4.32 K/UL — SIGNIFICANT CHANGE UP (ref 3.8–10.5)
WBC # FLD AUTO: 4.32 K/UL — SIGNIFICANT CHANGE UP (ref 3.8–10.5)

## 2020-02-20 PROCEDURE — 71275 CT ANGIOGRAPHY CHEST: CPT

## 2020-02-20 PROCEDURE — 84100 ASSAY OF PHOSPHORUS: CPT

## 2020-02-20 PROCEDURE — 81001 URINALYSIS AUTO W/SCOPE: CPT

## 2020-02-20 PROCEDURE — 82553 CREATINE MB FRACTION: CPT

## 2020-02-20 PROCEDURE — 36415 COLL VENOUS BLD VENIPUNCTURE: CPT

## 2020-02-20 PROCEDURE — 84481 FREE ASSAY (FT-3): CPT

## 2020-02-20 PROCEDURE — 85610 PROTHROMBIN TIME: CPT

## 2020-02-20 PROCEDURE — 83540 ASSAY OF IRON: CPT

## 2020-02-20 PROCEDURE — 76700 US EXAM ABDOM COMPLETE: CPT

## 2020-02-20 PROCEDURE — 84443 ASSAY THYROID STIM HORMONE: CPT

## 2020-02-20 PROCEDURE — 84484 ASSAY OF TROPONIN QUANT: CPT

## 2020-02-20 PROCEDURE — 99285 EMERGENCY DEPT VISIT HI MDM: CPT

## 2020-02-20 PROCEDURE — 82607 VITAMIN B-12: CPT

## 2020-02-20 PROCEDURE — 80048 BASIC METABOLIC PNL TOTAL CA: CPT

## 2020-02-20 PROCEDURE — 85027 COMPLETE CBC AUTOMATED: CPT

## 2020-02-20 PROCEDURE — 80053 COMPREHEN METABOLIC PANEL: CPT

## 2020-02-20 PROCEDURE — 83690 ASSAY OF LIPASE: CPT

## 2020-02-20 PROCEDURE — 84466 ASSAY OF TRANSFERRIN: CPT

## 2020-02-20 PROCEDURE — 96375 TX/PRO/DX INJ NEW DRUG ADDON: CPT

## 2020-02-20 PROCEDURE — 71046 X-RAY EXAM CHEST 2 VIEWS: CPT

## 2020-02-20 PROCEDURE — 93306 TTE W/DOPPLER COMPLETE: CPT

## 2020-02-20 PROCEDURE — 80061 LIPID PANEL: CPT

## 2020-02-20 PROCEDURE — 83735 ASSAY OF MAGNESIUM: CPT

## 2020-02-20 PROCEDURE — 96374 THER/PROPH/DIAG INJ IV PUSH: CPT

## 2020-02-20 PROCEDURE — 43239 EGD BIOPSY SINGLE/MULTIPLE: CPT

## 2020-02-20 PROCEDURE — 85730 THROMBOPLASTIN TIME PARTIAL: CPT

## 2020-02-20 PROCEDURE — 83550 IRON BINDING TEST: CPT

## 2020-02-20 PROCEDURE — 82728 ASSAY OF FERRITIN: CPT

## 2020-02-20 PROCEDURE — 74174 CTA ABD&PLVS W/CONTRAST: CPT

## 2020-02-20 PROCEDURE — 84439 ASSAY OF FREE THYROXINE: CPT

## 2020-02-20 PROCEDURE — 82550 ASSAY OF CK (CPK): CPT

## 2020-02-20 PROCEDURE — 93005 ELECTROCARDIOGRAM TRACING: CPT

## 2020-02-20 PROCEDURE — 83036 HEMOGLOBIN GLYCOSYLATED A1C: CPT

## 2020-02-20 PROCEDURE — 82962 GLUCOSE BLOOD TEST: CPT

## 2020-02-20 RX ORDER — MAGNESIUM HYDROXIDE 400 MG/1
30 TABLET, CHEWABLE ORAL DAILY
Refills: 0 | Status: DISCONTINUED | OUTPATIENT
Start: 2020-02-20 | End: 2020-02-20

## 2020-02-20 RX ADMIN — Medication 150 MILLIGRAM(S): at 11:19

## 2020-02-20 RX ADMIN — Medication 100 MILLIGRAM(S): at 05:05

## 2020-02-20 RX ADMIN — Medication 10 MILLIGRAM(S): at 11:19

## 2020-02-20 RX ADMIN — Medication 1 GRAM(S): at 11:19

## 2020-02-20 RX ADMIN — Medication 75 MICROGRAM(S): at 05:05

## 2020-02-20 RX ADMIN — Medication 81 MILLIGRAM(S): at 11:19

## 2020-02-20 RX ADMIN — POLYETHYLENE GLYCOL 3350 17 GRAM(S): 17 POWDER, FOR SOLUTION ORAL at 05:05

## 2020-02-20 RX ADMIN — PANTOPRAZOLE SODIUM 40 MILLIGRAM(S): 20 TABLET, DELAYED RELEASE ORAL at 11:19

## 2020-02-20 RX ADMIN — Medication 1 GRAM(S): at 05:05

## 2020-02-20 NOTE — DISCHARGE NOTE NURSING/CASE MANAGEMENT/SOCIAL WORK - PATIENT PORTAL LINK FT
You can access the FollowMyHealth Patient Portal offered by [facility] by registering at the following website: [email or web address]. By joining [employer]’s FollowMyHealth portal, you will also be able to view your health information using other applications (apps) compatible with our system.

## 2020-02-20 NOTE — PROGRESS NOTE ADULT - PROBLEM SELECTOR PLAN 6
pt. unsure of dose of synthroid  will start synthroid 75mcg as per previous record  TSH: 10  T3 and T4 normal

## 2020-02-20 NOTE — PROGRESS NOTE ADULT - SUBJECTIVE AND OBJECTIVE BOX
PGY1 Note discussed with supervising resident and primary attending.    Patient is a 54y old  Female who presents with a chief complaint of Epigastric pain with vomiting and dizziness (2020 06:45)      INTERVAL HPI/OVERNIGHT EVENTS: Patient seen and examined at bedside. Pt c/o abdominal pain and constipation.     MEDICATIONS  (STANDING):  aspirin  chewable 81 milliGRAM(s) Oral daily  atorvastatin 40 milliGRAM(s) Oral at bedtime  dicyclomine 10 milliGRAM(s) Oral three times a day before meals  heparin  Injectable 5000 Unit(s) SubCutaneous every 12 hours  levothyroxine 75 MICROGram(s) Oral daily  nortriptyline 50 milliGRAM(s) Oral at bedtime  pantoprazole  Injectable 40 milliGRAM(s) IV Push daily  senna 2 Tablet(s) Oral at bedtime  sucralfate 1 Gram(s) Oral four times a day  topiramate 100 milliGRAM(s) Oral two times a day  traZODone 150 milliGRAM(s) Oral at bedtime  venlafaxine XR. 150 milliGRAM(s) Oral daily    MEDICATIONS  (PRN):  acetaminophen   Tablet .. 650 milliGRAM(s) Oral every 6 hours PRN Mild Pain (1 - 3)  bisacodyl Suppository 10 milliGRAM(s) Rectal daily PRN Constipation  clonazePAM  Tablet 0.5 milliGRAM(s) Oral two times a day PRN anxierty  simethicone 80 milliGRAM(s) Chew every 6 hours PRN Dyspepsia      Allergies    estrogen (Hives; Swelling)    Intolerances        REVIEW OF SYSTEMS:  CONSTITUTIONAL: No fever, weight loss, or fatigue  RESPIRATORY: No cough, wheezing, chills or hemoptysis; No shortness of breath  CARDIOVASCULAR: No chest pain, palpitations, dizziness, or leg swelling  GASTROINTESTINAL: Epigastric pain present. No nausea, vomiting, or hematemesis; No diarrhea or constipation. No melena or hematochezia.  NEUROLOGICAL: No headaches, memory loss, loss of strength, numbness, or tremors  SKIN: No itching, burning, rashes, or lesions     Vital Signs Last 24 Hrs  T(C): 36.7 (2020 07:15), Max: 37 (2020 15:23)  T(F): 98.1 (2020 07:15), Max: 98.6 (2020 15:23)  HR: 73 (2020 07:15) (63 - 86)  BP: 96/84 (2020 07:15) (96/84 - 117/72)  BP(mean): --  RR: 16 (2020 07:15) (16 - 18)  SpO2: 99% (2020 07:15) (96% - 99%)    PHYSICAL EXAM:  GENERAL: NAD, well-groomed, well-developed  HEAD:  Atraumatic, Normocephalic  EYES: EOMI, PERRLA, conjunctiva and sclera clear  NECK: Supple, No JVD, Normal thyroid  CHEST/LUNG: Clear to percussion bilaterally; No rales, rhonchi, wheezing, or rubs  HEART: Regular rate and rhythm; No murmurs, rubs, or gallops  ABDOMEN: Soft, Nontender, Nondistended; Bowel sounds present  NERVOUS SYSTEM:  Alert & Oriented X3, Good concentration; Motor Strength 5/5 B/L   EXTREMITIES:  2+ Peripheral Pulses, No clubbing, cyanosis, or edema  SKIN;    LABS:                        10.4   4.32  )-----------( 321      ( 2020 06:22 )             33.0     02-20    141  |  109<H>  |  12  ----------------------------<  82  3.7   |  24  |  0.91    Ca    9.1      2020 06:22    TPro  7.6  /  Alb  3.9  /  TBili  0.3  /  DBili  x   /  AST  16  /  ALT  21  /  AlkPhos  87  02-19      Urinalysis Basic - ( 2020 18:48 )    Color: Yellow / Appearance: Clear / S.005 / pH: x  Gluc: x / Ketone: Negative  / Bili: Negative / Urobili: Negative   Blood: x / Protein: Negative / Nitrite: Negative   Leuk Esterase: Trace / RBC: 0-2 /HPF / WBC 6-10 /HPF   Sq Epi: x / Non Sq Epi: Moderate /HPF / Bacteria: Few /HPF      CAPILLARY BLOOD GLUCOSE      POCT Blood Glucose.: 82 mg/dL (2020 10:23)      RADIOLOGY & ADDITIONAL TESTS:    Imaging Personally Reviewed:  [ ] YES  [ ] NO    Consultant(s) Notes Reviewed:  [ ] YES  [ ] NO

## 2020-02-20 NOTE — PROGRESS NOTE ADULT - ASSESSMENT
A/P: Abdominal Pain, Nausea/Vomiting, Anemia, constipation. Irritable Bowel Syndrome  The patient is a 54-year-old  female with past medical history significant for Lupus, fibromyalgia, depression, hypertension, hypercholesterolemia, CVA, hypothyroidism, asthma, irritable bowel syndrome and left breast cancer, s/p lumpectomy who was admitted to [facility] [place] on February 16, 2020 with 1 week history of abdominal pain.  The etiology of the abdominal pain remains etiology but may be secondary to irritable bowel syndrome. The prior workup that included upper endoscopy, colonoscopy, CAT scan of the abdomen and pelvis with IV contrast and abdominal ultrasound had been inconclusive in identifying the cause of the abdominal pain.   I recommend continue following the hemoglobin/hematocrit level.  The blood work performed on February 19, 2020 revealed anemia with Hgb/Hct level of 10.9/34.4, respectively, an elevated chloride of 110 mmol/L and low potassium of 3.4 mmol/L.  The abdominal ultrasound performed on February 19, 2020 revealed no gallstones, gallbladder wall thickening or pericholecystic fluid.    I recommend continue on a trial of pantoprazole 40 mg once a day for the symptoms.  I recommend continue on a trial of Dicyclomine 10 mg TID PRN abdominal pain.  I recommend a repeat upper endoscopy to reassess for peptic ulcer disease.  The patient was told of the risks and benefits of the procedure.  The patient was told of the risks of perforation, emergency surgery, bleeding, infections and missed lesions.  The patient agreed and signed the consent for the procedure.  I will proceed with the procedure.  The patient remains n.p.o. since midnight for possible upper endoscopy today, February 20, 2020.  If the upper endoscopy is incomnclusive, the patient will require an outpatient MR enterography to assess for small bowel pathology.   The case was again discussed with the patient at length.  The case was also discussed with the medical housestaff over the phone, yesterday. Continue present care.  I will continue to follow the patient.

## 2020-02-20 NOTE — PROGRESS NOTE ADULT - REASON FOR ADMISSION
Epigastric pain with vomiting and dizziness

## 2020-02-20 NOTE — PROGRESS NOTE ADULT - SUBJECTIVE AND OBJECTIVE BOX
The patient is a 54-year-old  female with past medical history significant for Lupus, fibromyalgia, depression, hypertension, hypercholesterolemia, CVA, hypothyroidism, asthma, irritable bowel syndrome and left breast cancer, s/p lumpectomy who was admitted to [facility] on 2020 with 1 week history of abdominal pain.  The patient is lying in bed, resting comfortably, in no acute distress.  The patient states that she has abdominal pain in the midepigastric area and right upper quadrant.  The patient also complains of abdominal gas and bloating and constipation.  The patient denies any , nausea,  vomiting, gastroesophageal reflux disease, dysphagia, atypical chest pain, shortness of breath, palpitations, diarrhea, bright red blood per rectum, melena, hematemesis, weight loss, anorexia, fever or chills.   The blood work performed on 2020 revealed anemia with Hgb/Hct level of 10.9/34.4, respectively, an elevated chloride of 110 mmol/L and low potassium of 3.4 mmol/L.  The abdominal ultrasound performed on 2020 revealed no gallstones, gallbladder wall thickening or pericholecystic fluid.  The CAT scan of the chest, abdomen and pelvis with IV contrast performed on 2020 revealed no evidence of an aortic dissection.  The colonoscopy to the terminal ileum performed on 2020 revealed moderate left sided diverticulosis.  The pathology revealed mild patchy colitis without architectural changes or evidence of chronicity (cecum), small intestinal mucosa with preserved villous pattern bruce lympocytic aggregates (terminal ileum) and benign colonic mucosa with focal hyperplastic change (rectum).   The patient had an upper endoscopy and colonoscopy to the cecum performed at the [facility] GI endoscopy suite on 2019. The upper endoscopy revealed a small hiatal hernia and mild diffuse bile gastritis. The pathology revealed distal esophagus with unremarkable squamous mucosa, gastric antral and body mucosa with chronic inactive gastritis with severe lymphoid follicles that was negative for Helicobacter pylori and unremarkable duodenal mucosa. The colonoscopy to the cecum revealed moderate left sided diverticulosis, a poor prep colonoscopy and small internal hemorrhoids. The patient tolerated the procedures well. The patient admits to a family history of GI problems. The patient’s brother and father had a history of bleeding peptic ulcer disease.  The patient is currently NPO for upper endoscopy today.      PMH: Lupus, fibromyalgia, depression, hypertension, hypercholesterolemia, CVA, hypothyroidism, asthma, irritable bowel syndrome and breast cancer, s/p lumpectomy  PSH: lumpectomy for left breast cancer, tubal ligation x 2,  x 4, insertion and removal of port  Allergies: NKDA  Social Hx: (-) smoking, (-) ETOH, (-) IVDA  Family Hx. Mother:  of dementia age 92  Father:  of MI age71  3 Brothers:   of AIDS, suicide and drug overdose, 4 Sisters:  breast cancer, ovarian cancer, thyroid cancer, unknown cause Maternal Grandparents grandmother  of dementia , grandfather  unknown cause:  Paternal Grandparents:  unknown causes  MEDICATIONS  (STANDING):  aspirin  chewable 81 milliGRAM(s) Oral daily  atorvastatin 40 milliGRAM(s) Oral at bedtime  dicyclomine 10 milliGRAM(s) Oral three times a day before meals  heparin  Injectable 5000 Unit(s) SubCutaneous every 12 hours  levothyroxine 75 MICROGram(s) Oral daily  nortriptyline 50 milliGRAM(s) Oral at bedtime  pantoprazole  Injectable 40 milliGRAM(s) IV Push daily  senna 2 Tablet(s) Oral at bedtime  sucralfate 1 Gram(s) Oral four times a day  topiramate 100 milliGRAM(s) Oral two times a day  traZODone 150 milliGRAM(s) Oral at bedtime  venlafaxine XR. 150 milliGRAM(s) Oral daily    MEDICATIONS  (PRN):  acetaminophen   Tablet .. 650 milliGRAM(s) Oral every 6 hours PRN Mild Pain (1 - 3)  bisacodyl Suppository 10 milliGRAM(s) Rectal daily PRN Constipation  clonazePAM  Tablet 0.5 milliGRAM(s) Oral two times a day PRN anxierty  simethicone 80 milliGRAM(s) Chew every 6 hours PRN Dyspepsia    Vital Signs Last 24 Hrs  T(C): 36.8 (2020 04:46), Max: 37 (2020 15:23)  T(F): 98.2 (2020 04:46), Max: 98.6 (2020 15:23)  HR: 72 (2020 04:46) (63 - 86)  BP: 113/74 (2020 04:46) (90/57 - 117/72)  BP(mean): --  RR: 18 (2020 04:46) (16 - 18)  SpO2: 97% (2020 04:46) (96% - 98%)    Physical Examination:   General: well developed, well-nourished , female in no acute distress  Eyes: No icteric sclera, conjunctiva clear.  Ears/Nose/Throat: nose not congested, ears unremarkable  Head: normal cephalic, nontraumatic  Chest: normal appearance, normal symmetrical,  Respiratory: Coarse breath sounds bilaterally, no wheezing no rales or rhonchi   Cardiovascular: S1-S2 audible, no murmur, no rubs or gallops, no palpable masses  Abdomen: (+) BS, soft, tender in the midepigastric and right upper quadrant, no rebound or guarding or masses noted. No hepatosplenomegaly  Back: (-) CVAT, no spinal tenderness  Extremities: full range of motion in all extremities, (-) c, (-) c, (-) e  Musculoskeletal: Good motor strength, good range of motion, normal appearing lower extremities  Neuro: A+O x 3, no ataxia,  Psych: good affect, not depressed, no anxiety  Skin: normal, no jaundice  CBC Full  -  ( 2020 07:43 )  WBC Count : 3.96 K/uL  RBC Count : 3.64 M/uL  Hemoglobin : 10.9 g/dL  Hematocrit : 34.4 %  Platelet Count - Automated : 352 K/uL  Mean Cell Volume : 94.5 fl  Mean Cell Hemoglobin : 29.9 pg  Mean Cell Hemoglobin Concentration : 31.7 gm/dL  Auto Neutrophil # : 1.19 K/uL  Auto Lymphocyte # : 2.23 K/uL  Auto Monocyte # : 0.37 K/uL  Auto Eosinophil # : 0.11 K/uL  Auto Basophil # : 0.05 K/uL  Auto Neutrophil % : 30.0 %  Auto Lymphocyte % : 56.3 %  Auto Monocyte % : 9.3 %  Auto Eosinophil % : 2.8 %  Auto Basophil % : 1.3 %    POCT  Blood Glucose (20 @ 10:23)    POCT Blood Glucose.: 82 mg/dL    Free Thyroxine, Serum in AM (20 @ 09:08)    Free Thyroxine, Serum: 1.0 ng/dL    Free Triiodothyronine, Serum in AM (20 @ 09:08)    Free Triiodothyronine, Serum: 2.13 pg/mL    Comprehensive Metabolic Panel in AM (20 @ 07:43)    Sodium, Serum: 139 mmol/L    Potassium, Serum: 3.4 mmol/L    Chloride, Serum: 110 mmol/L    Carbon Dioxide, Serum: 24 mmol/L    Anion Gap, Serum: 5 mmol/L    Blood Urea Nitrogen, Serum: 11 mg/dL    Creatinine, Serum: 0.93 mg/dL    Glucose, Serum: 77 mg/dL    Calcium, Total Serum: 9.2 mg/dL    Protein Total, Serum: 7.6 g/dL    Albumin, Serum: 3.9 g/dL    Bilirubin Total, Serum: 0.3 mg/dL    Alkaline Phosphatase, Serum: 87 U/L    Aspartate Aminotransferase (AST/SGOT): 16 U/L    Alanine Aminotransferase (ALT/SGPT): 21 U/L DA    eGFR if Non : 69: Interpretative comment  The units for eGFR are mL/min/1.73M2 (normalized body surface area). The  eGFR is calculated from a serum creatinine using the CKD-EPI equation.  Other variables required for calculation are race, age and sex. Among  patients with chronic kidney disease (CKD), the eGFR is useful in  determining the stage of disease according to KDOQI CKD classification.  All eGFR results are reported numerically with the following  interpretation.          GFR                    With                 Without     (ml/min/1.73 m2)    Kidney Damage       Kidney Damage        >= 90                    Stage 1                     Normal        60-89                    Stage 2                     Decreased GFR        30-59     Stage 3                     Stage 3        15-29                    Stage 4                     Stage 4        < 15                      Stage 5                     Stage 5  Each stage of CKD assumes that the associated GFR level has been in  effect for at least 3 months. Determination of stages one and two (with  eGFR > 59 ml/min/m2) requires estimation of kidney damage for at least 3  months as defined by structural or functional abnormalities.  Limitations: All estimates of GFR will be less accurate for patients at  extremes of muscle mass (including but not limited to frail elderly,  critically ill, or cancer patients), those with unusual diets, and those  with conditions associated with reduced secretion or extrarenal  elimination of creatinine. The eGFR equation is not recommended for use  in patients with unstable creatinine levels. mL/min/1.73M2    eGFR if African American: 80 mL/min/1.73M2    EXAM:  US ABDOMEN COMPLETE                            PROCEDURE DATE:  2020          INTERPRETATION:  CLINICAL INFORMATION: Abdominal pain. Biliary colic.    COMPARISON: CTA abdomen/pelvis 2020.    TECHNIQUE: Sonography of the abdomen.     FINDINGS:    Liver: Normal in size. Increased echogenicity of the hepatic parenchyma suggests hepatic parenchymal fatty infiltration.    Bile ducts: Normal caliber. Common bile duct measures 5 mm.     Gallbladder: Within normal limits. No gallstones, gallbladder wall thickening or pericholecystic fluid.    Pancreas: Visualized portions are within normal limits.    Spleen: 8.9 cm. Within normal limits.    Right kidney: 10.8 cm. No hydronephrosis. No renal calculi. No space-occupying lesion.    Left kidney: 10.4 cm.  No hydronephrosis. No renal calculi. No space-occupying lesion.    Ascites: None.    Aorta and IVC: Visualized portions are within normal limits.    IMPRESSION:     No gallstones, gallbladder wall thickening or pericholecystic fluid.                  RUSTY ATWOOD   This document has been electronically signed. 2020  2:57PM

## 2020-02-20 NOTE — PROGRESS NOTE ADULT - ATTENDING COMMENTS
Patient left home before I saw her.   She had EGD today, found to have gastritis and gastroparesis.   She will follow up with Dr. Willard CANO as outpatient.
Patient seen and examined. Still complaining of pain which is not improved by pantoprazole
Patient seen and examined. Still c/o epigastric pain.   All cardiac workup negative   The pain is likely gastric etiology.   Vital signs and labs reviewed   Vital Signs Last 24 Hrs  T(C): 36.9 (18 Feb 2020 15:12), Max: 37.4 (17 Feb 2020 19:51)  T(F): 98.4 (18 Feb 2020 15:12), Max: 99.4 (17 Feb 2020 19:51)  HR: 84 (18 Feb 2020 15:12) (62 - 84)  BP: 102/79 (18 Feb 2020 15:12) (102/79 - 132/76)  BP(mean): --  RR: 16 (18 Feb 2020 15:12) (16 - 17)  SpO2: 100% (18 Feb 2020 15:12) (97% - 100%)    1. Epigastric pain is likely gastric etiology. Pt has history of gastritis.   Spoke to Dr. Virk: plan for EGD in AM   Keep NPO after midnight   c/w Pantoprazole     2. Normocytic anemia is likely anemia of chronic disease   3. Hx of Sepsis   4. HTN: BP controlled off medications   5. Depression   6. DVT prophylaxis

## 2020-03-03 ENCOUNTER — APPOINTMENT (OUTPATIENT)
Dept: CT IMAGING | Facility: IMAGING CENTER | Age: 55
End: 2020-03-03

## 2020-03-03 ENCOUNTER — OUTPATIENT (OUTPATIENT)
Dept: OUTPATIENT SERVICES | Facility: HOSPITAL | Age: 55
LOS: 1 days | End: 2020-03-03
Payer: MEDICARE

## 2020-03-03 DIAGNOSIS — Z98.890 OTHER SPECIFIED POSTPROCEDURAL STATES: Chronic | ICD-10-CM

## 2020-03-03 DIAGNOSIS — J32.9 CHRONIC SINUSITIS, UNSPECIFIED: ICD-10-CM

## 2020-03-03 DIAGNOSIS — Z98.89 OTHER SPECIFIED POSTPROCEDURAL STATES: Chronic | ICD-10-CM

## 2020-03-03 DIAGNOSIS — Z98.51 TUBAL LIGATION STATUS: Chronic | ICD-10-CM

## 2020-03-03 PROCEDURE — 70486 CT MAXILLOFACIAL W/O DYE: CPT

## 2020-03-03 PROCEDURE — 70486 CT MAXILLOFACIAL W/O DYE: CPT | Mod: 26

## 2020-03-06 ENCOUNTER — APPOINTMENT (OUTPATIENT)
Dept: CT IMAGING | Facility: IMAGING CENTER | Age: 55
End: 2020-03-06

## 2020-03-16 ENCOUNTER — APPOINTMENT (OUTPATIENT)
Dept: INTERNAL MEDICINE | Facility: CLINIC | Age: 55
End: 2020-03-16
Payer: MEDICARE

## 2020-03-16 VITALS
RESPIRATION RATE: 16 BRPM | BODY MASS INDEX: 29.64 KG/M2 | OXYGEN SATURATION: 99 % | SYSTOLIC BLOOD PRESSURE: 116 MMHG | HEIGHT: 59 IN | DIASTOLIC BLOOD PRESSURE: 79 MMHG | TEMPERATURE: 97.9 F | WEIGHT: 147 LBS | HEART RATE: 94 BPM

## 2020-03-16 DIAGNOSIS — G62.9 POLYNEUROPATHY, UNSPECIFIED: ICD-10-CM

## 2020-03-16 PROCEDURE — 99214 OFFICE O/P EST MOD 30 MIN: CPT

## 2020-03-16 RX ORDER — DICYCLOMINE HYDROCHLORIDE 10 MG/1
10 CAPSULE ORAL 3 TIMES DAILY
Qty: 90 | Refills: 3 | Status: DISCONTINUED | COMMUNITY
Start: 2019-11-22 | End: 2020-03-16

## 2020-03-16 RX ORDER — TRAZODONE HYDROCHLORIDE 50 MG/1
50 TABLET ORAL
Qty: 30 | Refills: 0 | Status: DISCONTINUED | COMMUNITY
Start: 2019-10-18 | End: 2020-03-16

## 2020-03-16 RX ORDER — POLYETHYLENE GLYCOL 3350, SODIUM CHLORIDE, SODIUM BICARBONATE AND POTASSIUM CHLORIDE WITH LEMON FLAVOR 420; 11.2; 5.72; 1.48 G/4L; G/4L; G/4L; G/4L
420 POWDER, FOR SOLUTION ORAL
Qty: 1 | Refills: 0 | Status: DISCONTINUED | COMMUNITY
Start: 2020-01-03 | End: 2020-03-16

## 2020-03-16 RX ORDER — CLOTRIMAZOLE AND BETAMETHASONE DIPROPIONATE 10; .5 MG/G; MG/G
1-0.05 CREAM TOPICAL
Qty: 45 | Refills: 0 | Status: DISCONTINUED | COMMUNITY
Start: 2019-07-25 | End: 2020-03-16

## 2020-03-16 RX ORDER — PANTOPRAZOLE 40 MG/1
40 TABLET, DELAYED RELEASE ORAL DAILY
Qty: 30 | Refills: 5 | Status: DISCONTINUED | COMMUNITY
Start: 2019-12-16 | End: 2020-03-16

## 2020-03-16 RX ORDER — GALCANEZUMAB 120 MG/ML
120 INJECTION, SOLUTION SUBCUTANEOUS
Qty: 1 | Refills: 0 | Status: DISCONTINUED | COMMUNITY
Start: 2019-12-17 | End: 2020-03-16

## 2020-03-16 RX ORDER — DICYCLOMINE HYDROCHLORIDE 10 MG/1
10 CAPSULE ORAL 3 TIMES DAILY
Qty: 90 | Refills: 3 | Status: DISCONTINUED | COMMUNITY
Start: 2020-01-03 | End: 2020-03-16

## 2020-03-16 RX ORDER — FLUCONAZOLE 150 MG/1
150 TABLET ORAL
Qty: 1 | Refills: 0 | Status: DISCONTINUED | COMMUNITY
Start: 2019-07-25 | End: 2020-03-16

## 2020-03-16 NOTE — ASSESSMENT
[FreeTextEntry1] : 54 year old female found to have stable Hypertension, Hypothyroidism, Hypercholesterolemia with Hypertriglyceridemia, Insomnia, Migraine, vertigo,  GERD,with the current regimen, diet and life style modifications, as counseled. Prior results reviewed and discussed with the patient during today's examination. Plan as ordered.\par Patient was recently hospitalized, findings and recommendations reviewed and discussed with the patient during today's examination.\par

## 2020-03-16 NOTE — HISTORY OF PRESENT ILLNESS
[de-identified] : 54 year old  female patient with history of stable Hypertension, Hypothyroidism, Hypercholesterolemia with Hypertriglyceridemia, CVA, Insomnia, Migraine, vertigo, GERD, Major Depression in partial remission, history as stated, presented for follow up examination. Patient is compliant with all medications. Denies shortness of breath, chest pain at this time. ROS as stated.\par

## 2020-03-16 NOTE — HEALTH RISK ASSESSMENT
[Intercurrent hospitalizations] : was admitted to the hospital  [No] : In the past 12 months have you used drugs other than those required for medical reasons? No [No falls in past year] : Patient reported no falls in the past year [1] : 2) Feeling down, depressed, or hopeless for several days (1) [] : No [de-identified] : As documented in a message section. [de-identified] : GI [FreeTextEntry1] : Non-suicidal at this time. [[id number]] : 2

## 2020-04-03 ENCOUNTER — APPOINTMENT (OUTPATIENT)
Dept: OTOLARYNGOLOGY | Facility: CLINIC | Age: 55
End: 2020-04-03

## 2020-04-08 ENCOUNTER — APPOINTMENT (OUTPATIENT)
Dept: GASTROENTEROLOGY | Facility: CLINIC | Age: 55
End: 2020-04-08
Payer: MEDICARE

## 2020-04-08 PROCEDURE — 99443: CPT

## 2020-04-17 ENCOUNTER — APPOINTMENT (OUTPATIENT)
Dept: ORTHOPEDIC SURGERY | Facility: CLINIC | Age: 55
End: 2020-04-17

## 2020-04-24 ENCOUNTER — APPOINTMENT (OUTPATIENT)
Dept: OTOLARYNGOLOGY | Facility: CLINIC | Age: 55
End: 2020-04-24
Payer: MEDICARE

## 2020-04-24 ENCOUNTER — APPOINTMENT (OUTPATIENT)
Dept: OTOLARYNGOLOGY | Facility: CLINIC | Age: 55
End: 2020-04-24

## 2020-04-24 PROCEDURE — 99442: CPT

## 2020-04-25 NOTE — BRIEF OPERATIVE NOTE - ANTIBIOTIC PROTOCOL
Followed protocol Epistaxis    Epistaxis is the medical term for a nosebleed. Nosebleeds are common and can be caused by many conditions, such as injury, infections, dry environments, medicines, nose picking, and home heating and cooling systems. Try controlling your nosebleed by pinching your nose continuously for at least 10 minutes. Avoid lying down while you are having a nosebleed. Sit up and lean forward. Avoid blowing or sniffing your nose for a number of hours after having a nosebleed. Resume your normal activities as you are able, but avoid straining, lifting, or bending at the waist for several days. Maintain humidity in your home by using less air conditioning or by using a humidifier.     If your nose was packed by your health care provider, keep the packing inside of your nose until a health care provider removes it. If a balloon catheter was used to pack your nose, do not cut or remove it unless your health care provider has instructed you to do that.     Aspirin and blood thinners make bleeding more likely. If you are prescribed these medicines and you suffer from nosebleeds, ask your health care provider if you should stop taking the medicines or adjust the dose. Do not stop medicines unless directed by your health care provider.    SEEK IMMEDIATE MEDICAL CARE IF YOU HAVE ANY OF THE FOLLOWING SYMPTOMS: nosebleed lasting longer than 20 minutes, unusual bleeding from or bruising on other parts of your body, dizziness or lightheadedness, fainting, nosebleed occurring after a head injury, or fever.

## 2020-05-11 ENCOUNTER — EMERGENCY (EMERGENCY)
Facility: HOSPITAL | Age: 55
LOS: 1 days | Discharge: HOME | End: 2020-05-11
Attending: EMERGENCY MEDICINE
Payer: COMMERCIAL

## 2020-05-11 VITALS
HEART RATE: 78 BPM | DIASTOLIC BLOOD PRESSURE: 83 MMHG | RESPIRATION RATE: 18 BRPM | TEMPERATURE: 99 F | OXYGEN SATURATION: 100 % | SYSTOLIC BLOOD PRESSURE: 124 MMHG

## 2020-05-11 VITALS
HEIGHT: 59 IN | DIASTOLIC BLOOD PRESSURE: 74 MMHG | OXYGEN SATURATION: 100 % | SYSTOLIC BLOOD PRESSURE: 129 MMHG | TEMPERATURE: 98 F | RESPIRATION RATE: 18 BRPM | WEIGHT: 138.01 LBS | HEART RATE: 80 BPM

## 2020-05-11 DIAGNOSIS — Z98.890 OTHER SPECIFIED POSTPROCEDURAL STATES: Chronic | ICD-10-CM

## 2020-05-11 DIAGNOSIS — Z98.51 TUBAL LIGATION STATUS: Chronic | ICD-10-CM

## 2020-05-11 DIAGNOSIS — Z98.89 OTHER SPECIFIED POSTPROCEDURAL STATES: Chronic | ICD-10-CM

## 2020-05-11 LAB
ALBUMIN SERPL ELPH-MCNC: 4.9 G/DL — SIGNIFICANT CHANGE UP (ref 3.3–5)
ALP SERPL-CCNC: 88 U/L — SIGNIFICANT CHANGE UP (ref 40–120)
ALT FLD-CCNC: 14 U/L — SIGNIFICANT CHANGE UP (ref 10–45)
ANION GAP SERPL CALC-SCNC: 14 MMOL/L — SIGNIFICANT CHANGE UP (ref 5–17)
APTT BLD: 30.4 SEC — SIGNIFICANT CHANGE UP (ref 27.5–36.3)
AST SERPL-CCNC: 23 U/L — SIGNIFICANT CHANGE UP (ref 10–40)
BASOPHILS # BLD AUTO: 0.05 K/UL — SIGNIFICANT CHANGE UP (ref 0–0.2)
BASOPHILS NFR BLD AUTO: 1.1 % — SIGNIFICANT CHANGE UP (ref 0–2)
BILIRUB SERPL-MCNC: 0.2 MG/DL — SIGNIFICANT CHANGE UP (ref 0.2–1.2)
BUN SERPL-MCNC: 11 MG/DL — SIGNIFICANT CHANGE UP (ref 7–23)
CALCIUM SERPL-MCNC: 9.3 MG/DL — SIGNIFICANT CHANGE UP (ref 8.4–10.5)
CHLORIDE SERPL-SCNC: 104 MMOL/L — SIGNIFICANT CHANGE UP (ref 96–108)
CO2 SERPL-SCNC: 24 MMOL/L — SIGNIFICANT CHANGE UP (ref 22–31)
CREAT SERPL-MCNC: 0.81 MG/DL — SIGNIFICANT CHANGE UP (ref 0.5–1.3)
EOSINOPHIL # BLD AUTO: 0.04 K/UL — SIGNIFICANT CHANGE UP (ref 0–0.5)
EOSINOPHIL NFR BLD AUTO: 0.9 % — SIGNIFICANT CHANGE UP (ref 0–6)
GLUCOSE SERPL-MCNC: 108 MG/DL — HIGH (ref 70–99)
HCT VFR BLD CALC: 34.3 % — LOW (ref 34.5–45)
HGB BLD-MCNC: 10.8 G/DL — LOW (ref 11.5–15.5)
IMM GRANULOCYTES NFR BLD AUTO: 0.2 % — SIGNIFICANT CHANGE UP (ref 0–1.5)
INR BLD: 1.02 RATIO — SIGNIFICANT CHANGE UP (ref 0.88–1.16)
LYMPHOCYTES # BLD AUTO: 2.15 K/UL — SIGNIFICANT CHANGE UP (ref 1–3.3)
LYMPHOCYTES # BLD AUTO: 47.5 % — HIGH (ref 13–44)
MCHC RBC-ENTMCNC: 30.8 PG — SIGNIFICANT CHANGE UP (ref 27–34)
MCHC RBC-ENTMCNC: 31.5 GM/DL — LOW (ref 32–36)
MCV RBC AUTO: 97.7 FL — SIGNIFICANT CHANGE UP (ref 80–100)
MONOCYTES # BLD AUTO: 0.49 K/UL — SIGNIFICANT CHANGE UP (ref 0–0.9)
MONOCYTES NFR BLD AUTO: 10.8 % — SIGNIFICANT CHANGE UP (ref 2–14)
NEUTROPHILS # BLD AUTO: 1.79 K/UL — LOW (ref 1.8–7.4)
NEUTROPHILS NFR BLD AUTO: 39.5 % — LOW (ref 43–77)
NRBC # BLD: 0 /100 WBCS — SIGNIFICANT CHANGE UP (ref 0–0)
PLATELET # BLD AUTO: 363 K/UL — SIGNIFICANT CHANGE UP (ref 150–400)
POTASSIUM SERPL-MCNC: 3.4 MMOL/L — LOW (ref 3.5–5.3)
POTASSIUM SERPL-SCNC: 3.4 MMOL/L — LOW (ref 3.5–5.3)
PROT SERPL-MCNC: 7.9 G/DL — SIGNIFICANT CHANGE UP (ref 6–8.3)
PROTHROM AB SERPL-ACNC: 11.7 SEC — SIGNIFICANT CHANGE UP (ref 10–12.9)
RBC # BLD: 3.51 M/UL — LOW (ref 3.8–5.2)
RBC # FLD: 13.1 % — SIGNIFICANT CHANGE UP (ref 10.3–14.5)
SARS-COV-2 RNA SPEC QL NAA+PROBE: SIGNIFICANT CHANGE UP
SODIUM SERPL-SCNC: 142 MMOL/L — SIGNIFICANT CHANGE UP (ref 135–145)
TROPONIN T, HIGH SENSITIVITY RESULT: <6 NG/L — SIGNIFICANT CHANGE UP (ref 0–51)
TSH SERPL-MCNC: 16.5 UIU/ML — HIGH (ref 0.27–4.2)
WBC # BLD: 4.53 K/UL — SIGNIFICANT CHANGE UP (ref 3.8–10.5)
WBC # FLD AUTO: 4.53 K/UL — SIGNIFICANT CHANGE UP (ref 3.8–10.5)

## 2020-05-11 PROCEDURE — 93005 ELECTROCARDIOGRAM TRACING: CPT

## 2020-05-11 PROCEDURE — 96374 THER/PROPH/DIAG INJ IV PUSH: CPT

## 2020-05-11 PROCEDURE — 85730 THROMBOPLASTIN TIME PARTIAL: CPT

## 2020-05-11 PROCEDURE — 71045 X-RAY EXAM CHEST 1 VIEW: CPT

## 2020-05-11 PROCEDURE — 99284 EMERGENCY DEPT VISIT MOD MDM: CPT | Mod: 25

## 2020-05-11 PROCEDURE — 71045 X-RAY EXAM CHEST 1 VIEW: CPT | Mod: 26

## 2020-05-11 PROCEDURE — 99285 EMERGENCY DEPT VISIT HI MDM: CPT

## 2020-05-11 PROCEDURE — 85027 COMPLETE CBC AUTOMATED: CPT

## 2020-05-11 PROCEDURE — 71275 CT ANGIOGRAPHY CHEST: CPT

## 2020-05-11 PROCEDURE — 93010 ELECTROCARDIOGRAM REPORT: CPT | Mod: NC

## 2020-05-11 PROCEDURE — 80053 COMPREHEN METABOLIC PANEL: CPT

## 2020-05-11 PROCEDURE — 85610 PROTHROMBIN TIME: CPT

## 2020-05-11 PROCEDURE — 84443 ASSAY THYROID STIM HORMONE: CPT

## 2020-05-11 PROCEDURE — 71275 CT ANGIOGRAPHY CHEST: CPT | Mod: 26

## 2020-05-11 PROCEDURE — 84484 ASSAY OF TROPONIN QUANT: CPT

## 2020-05-11 RX ORDER — ONDANSETRON 8 MG/1
4 TABLET, FILM COATED ORAL ONCE
Refills: 0 | Status: COMPLETED | OUTPATIENT
Start: 2020-05-11 | End: 2020-05-11

## 2020-05-11 RX ORDER — ASPIRIN/CALCIUM CARB/MAGNESIUM 324 MG
243 TABLET ORAL ONCE
Refills: 0 | Status: COMPLETED | OUTPATIENT
Start: 2020-05-11 | End: 2020-05-11

## 2020-05-11 RX ADMIN — ONDANSETRON 4 MILLIGRAM(S): 8 TABLET, FILM COATED ORAL at 10:47

## 2020-05-11 RX ADMIN — Medication 243 MILLIGRAM(S): at 08:33

## 2020-05-11 NOTE — ED PROVIDER NOTE - PROGRESS NOTE DETAILS
Maicol Dumont, PGY 2: results discussed with the patient, VSS, low suspicion for dissection w.  and will d/c home. will refer to our cardiologist

## 2020-05-11 NOTE — ED ADULT NURSE NOTE - CAS DISCH CONDITION
Subjective    no chest pain still c/o weakness       MEDICATIONS  (STANDING):  spironolactone 12.5 milliGRAM(s) Oral daily  aspirin enteric coated 81 milliGRAM(s) Oral daily  losartan 25 milliGRAM(s) Oral daily  gabapentin 300 milliGRAM(s) Oral every 8 hours  allopurinol 300 milliGRAM(s) Oral daily  atorvastatin 20 milliGRAM(s) Oral at bedtime  metoprolol succinate ER 25 milliGRAM(s) Oral daily  buDESOnide 160 MICROgram(s)/formoterol 4.5 MICROgram(s) Inhaler 2 Puff(s) Inhalation two times a day  tiotropium 18 MICROgram(s) Capsule 1 Capsule(s) Inhalation daily  furosemide    Tablet 40 milliGRAM(s) Oral daily  dicyclomine 10 milliGRAM(s) Oral two times a day before meals  docusate sodium 100 milliGRAM(s) Oral three times a day  senna 2 Tablet(s) Oral at bedtime  sucralfate 1 Gram(s) Oral four times a day  latanoprost 0.005% Ophthalmic Solution 1 Drop(s) Both EYES at bedtime  pantoprazole    Tablet 40 milliGRAM(s) Oral before breakfast  levothyroxine 50 MICROGram(s) Oral daily  enoxaparin Injectable 40 milliGRAM(s) SubCutaneous daily  amiodarone    Tablet 400 milliGRAM(s) Oral daily  nitrofurantoin (MACROBID) 100 milliGRAM(s) Oral two times a day with meals  amitriptyline 25 milliGRAM(s) Oral at bedtime    MEDICATIONS  (PRN):  polyethylene glycol 3350 17 Gram(s) Oral daily PRN Constipation  acetaminophen   Tablet. 650 milliGRAM(s) Oral every 6 hours PRN Mild and Moderate pain  oxyCODONE    5 mG/acetaminophen 325 mG 1 Tablet(s) Oral every 6 hours PRN Severe Pain (7 - 10)      LABS:                        11.8   6.08  )-----------( 224      ( 20 Aug 2017 06:44 )             36.7     Hemoglobin: 11.8 g/dL (08-20 @ 06:44)  Hemoglobin: 12.6 g/dL (08-19 @ 06:24)  Hemoglobin: 12.6 g/dL (08-17 @ 22:25)    08-20    138  |  100  |  14  ----------------------------<  84  4.1   |  27  |  0.57    Ca    9.8      20 Aug 2017 06:44    TPro  6.4  /  Alb  3.4  /  TBili  0.3  /  DBili  x   /  AST  27  /  ALT  18  /  AlkPhos  94  08-20    Creatinine Trend: 0.57<--, 0.66<--, 0.67<--, 0.76<--     CARDIAC MARKERS ( 17 Aug 2017 22:25 )  x     / < 0.06 ng/mL / 223 u/L / 1.42 ng/mL / x            PHYSICAL EXAM  Vital Signs Last 24 Hrs  T(C): 36.6 (20 Aug 2017 06:55), Max: 37.5 (19 Aug 2017 21:12)  T(F): 97.9 (20 Aug 2017 06:55), Max: 99.5 (19 Aug 2017 21:12)  HR: 62 (20 Aug 2017 06:55) (62 - 75)  BP: 109/56 (20 Aug 2017 06:55) (97/54 - 120/53)  BP(mean): --  RR: 18 (20 Aug 2017 06:55) (18 - 18)  SpO2: 98% (20 Aug 2017 06:55) (97% - 100%)      Cardiovascular: Normal S1 S2 RRR , No JVD, soft systolic murmur, No edema  Respiratory: Lungs clear to auscultation	  Gastrointestinal:  Soft, Non-tender, + BS	  Extremities: , No clubbing, cyanosis or edema        A/P) 76 y/o female PMH NICM s/p medtronic BiV-ICD, VT managed with amio, hypothyroidism, lymphoma, admitted with neuropathy.    -continue current medical therapy for stable NICM and prior VT  -no further cardiac workup needed at this time Stable

## 2020-05-11 NOTE — ED PROVIDER NOTE - CARE PLAN
Assessment/Plan:    36 y/o male with: hypertension, migraines, CKD and hyperlipidemia along with vitamin-D deficiency  Discussed supportive care and return parameters  Continue current meds  Restart anti-hypertensives, PAPDMP reviewed and refilled  Follow-up in 1 month  Encouraged continued lifestyle modifications  No problem-specific Assessment & Plan notes found for this encounter  Diagnoses and all orders for this visit:    Vitamin D deficiency  -     Cholecalciferol (VITAMIN D3) 1 25 MG (93638 UT) CAPS; Take 1 capsule (50,000 Units total) by mouth once a week    Essential hypertension  -     lisinopril-hydrochlorothiazide (PRINZIDE,ZESTORETIC) 10-12 5 MG per tablet; Take 1 tablet by mouth daily    Abdominal migraine, not intractable  -     traMADol (ULTRAM) 50 mg tablet; Take 1 tablet (50 mg total) by mouth daily as needed for moderate pain    Chronic kidney disease, stage 3 ([address] )  -     Ambulatory referral to Nephrology; Future    Cervicalgia  -     Ambulatory referral to Physical Therapy; Future          Subjective:     Chief Complaint   Patient presents with    Follow-up     on general health conditions and go over blood work   Medication Refill     Patient requesting Tramadol for headaches  Patient ID: Neida Strickland is a 37 y o  male  Patient is a 25-year-old male who presents for follow-up on hypertension, migraines, CKD and hyperlipidemia along with vitamin-D deficiency  He admits that he is having some headaches in general but overall he says he is feeling well  No fevers chills nausea vomiting  Tolerating p o  Intake  No chest pain shortness of breath      The following portions of the patient's history were reviewed and updated as appropriate: allergies, current medications, past family history, past medical history, past social history, past surgical history and problem list     Review of Systems   Constitutional: Negative  HENT: Negative  Eyes: Negative  Respiratory: Negative  Cardiovascular: Negative  Gastrointestinal: Negative  Endocrine: Negative  Genitourinary: Negative  Musculoskeletal: Negative  Allergic/Immunologic: Negative  Neurological: Positive for headaches  Hematological: Negative  Psychiatric/Behavioral: Negative  All other systems reviewed and are negative  Objective:      BP (!) 150/104   Temp 97 6 °F (36 4 °C)   Ht 5' 3" (1 6 m)   Wt 51 3 kg (113 lb)   BMI 20 02 kg/m²          Physical Exam   Constitutional: He is oriented to person, place, and time  He appears well-developed and well-nourished  HENT:   Head: Atraumatic  Right Ear: External ear normal    Left Ear: External ear normal    Eyes: Pupils are equal, round, and reactive to light  Conjunctivae and EOM are normal    Neck: Normal range of motion  Cardiovascular: Normal rate, regular rhythm and normal heart sounds  Pulmonary/Chest: Effort normal and breath sounds normal  No respiratory distress  Abdominal: Soft  He exhibits no distension  There is no tenderness  There is no rebound and no guarding  Musculoskeletal: Normal range of motion  Neurological: He is alert and oriented to person, place, and time  No cranial nerve deficit  Skin: Skin is warm and dry  Psychiatric: He has a normal mood and affect   His behavior is normal  Judgment and thought content normal  Principal Discharge DX:	Chest pain

## 2020-05-11 NOTE — ED ADULT NURSE NOTE - OBJECTIVE STATEMENT
54 y.o. Female presents to the ED c/o midsternal chest pain radiating to back and throat. Hx L breast CA with L mastectomy in 1994, MI 4 years ago, fibromyalgia. Pt reports pain woke her up at 0158am today. Pt reports taking 81mg aspiring today. Pt states she felt palpitations and thought she had a panic attack. Pt currently states the chest pain is a dull ache. A&Ox3. Ambulatory. NSR on cardiac monitor. VSS. Dr. Dumont at bedside for assessment.

## 2020-05-11 NOTE — ED PROVIDER NOTE - PATIENT PORTAL LINK FT
You can access the FollowMyHealth Patient Portal offered by [facility] by registering at the following website: [email or web address]. By joining [facility] [employer]’s FollowMyHealth portal, you will also be able to view your health information using other applications (apps) compatible with our system.

## 2020-05-11 NOTE — ED PROVIDER NOTE - NSFOLLOWUPINSTRUCTIONS_ED_ALL_ED_FT
Thank you for visiting our Emergency Department, it has been a pleasure taking part in your healthcare. Please follow up with your primary doctor within x48 hours.    Your discharge diagnosis is: chest pain   Please see your cardiologist and continue all your medication.     Return precautions to the Emergency Department include but are not limited to: unrelenting nausea, vomiting, fever, chills, chest pain, shortness of breath, dizziness, abdominal pain, worsening pain, syncope, blood in urine or stool, headache that doesn't resolve, numbness or tingling, loss of sensation, loss of motor function, or any other concerning symptoms.

## 2020-05-11 NOTE — ED PROVIDER NOTE - NS ED ROS FT
GENERAL: No fever or chills, weight changes, nightsweats  EYES: no change in vision  HEENT: no dysplasia, odynophagia, ear pain, rhinorrhea, epistasis   CARDIAC: HPI   PULMONARY: no productive cough or SOB  GI: no abdominal pain, no nausea or no vomiting, no diarrhea or constipation  : No changes in urination for pain/freq.   SKIN: no rashes, abnormal bruising or bleeding  NEURO: no headache, numbness/tingling, extremity weakness   MSK: LE joint pain chronic

## 2020-05-11 NOTE — ED ADULT TRIAGE NOTE - ISOLATION PROVIDED EDUCATION
[de-identified] : 89 year old female presents today for an initial postop visit, status post extended right colectomy and excision of liver lesion on 4/23/19.  Final pathology was 5.5 cm adenocarcinoma with 14 benign regional lymph nodes and negative margins (T4aN0, MMR- Deficient).  Final pathology of the liver lesion was benign.\par \par Today she is feeling well.  She is tolerating diet without nausea/vomiting, moving her bowels without difficulty and denies fever, chills or abdominal pain. \par \par Previous pertinent history is as follows:\par \par She was found to be anemic and was referred for a CT of the abdomen and pelvis on 3/21/19 which revealed at least 6 cm long segment of ascending colon extending to the hepatic flexure with wall thickening and luminal narrowing concerning for mass with extracolonic spread.  A right hepatic lesion and sclerotic area in the right iliac bone may represent metastatic disease. There was gallstone with possible thickening at the fundus which may be further evaluated with ultrasound. \par \par On 4/4/19 Dr. Abhijit Solitario performed a colonoscopy which revealed a infiltrative partially obstructing mass at the hepatic flexure at least 5 cm in length.  The mass was malignant appearing.  Pathology was consistent with adenocarcinoma.   \par \par Her past medical history includes COPD, HTN and HLD. She reports dyspnea on exertion (able to climb a flight of stairs most of the time) but no dyspnea at rest.  Prior surgeries include a unilateral corneal transplant.  She has a family history of colon cancer involving her father diagnosed in his 70's.  Delmy has never had a screening colonoscopy prior to this point. \par \par PCP: Dr. Jose Armando Natarajan\par Referring MD/Vascular: Dr. Anton Saul\par GI: Dr. Abhijit Solitario
Patient

## 2020-05-11 NOTE — ED ADULT TRIAGE NOTE - CHIEF COMPLAINT QUOTE
woken out of sleep at 158 AM with chest pain radiating through to back and into throat. MI 4 yrs ago. COVID19 positive 4/5/2020. Took aspirin at home. CVA 2018.

## 2020-05-11 NOTE — ED PROVIDER NOTE - CLINICAL SUMMARY MEDICAL DECISION MAKING FREE TEXT BOX
54F, h.o lupus, breast ca, fibromyalgia, MI 4 yrs ago, p.w acute chest pain last night w.o shortness of breath, covid +, not tachycardiac, and sat 100% on RA, concerning for ACS vs PE due to multiple risk factors for hypercoagulability. low suspicion for dissection at this time. will get acs workup and CTA for PE. soft abdomen, no guarding or distension, unlikely surgical abdomen at this time. 54F, h.o lupus, breast ca, fibromyalgia, MI 4 yrs ago, p.w acute chest pain last night w.o shortness of breath, covid +, not tachycardiac, and sat 100% on RA, concerning for ACS vs PE due to multiple risk factors for hypercoagulability. low suspicion for dissection at this time. will get acs workup and CTA for PE. soft abdomen, no guarding or distension, unlikely surgical abdomen at this time.    Attending MD Mason: 54F from home, PMH of IBS, asthma, HTN, hypothyroid, lupus, CVA, breast cancer s/p lumpectomy, depression, fibromyalgia presented to ED c/o chest pain waking her from sleep in middle of night.  Exam: A & O x 3, NAD, HEENT WNL and no facial asymmetry; lungs CTAB, heart with reg rhythm without murmur; abdomen soft NTND; extremities with no edema; skin with no rashes, neuro exam non focal with no motor or sensory deficits. Rule out ACS, doubt PE/dissection.

## 2020-05-11 NOTE — ED PROVIDER NOTE - ATTENDING CONTRIBUTION TO CARE
Attending MD Mason:  I personally have seen and examined this patient.  Resident note reviewed and agree on plan of care and except where noted.

## 2020-05-11 NOTE — ED PROVIDER NOTE - OBJECTIVE STATEMENT
54F, h.o lupus, left breast ca s/p lumpectomy, CVA, hypothyroid, asthma, HTN, fibromyalgia, p.w 7 hr of acute dull chest pain radiating to the back and the neck w.o shortness of breath. Pt reports palpitations and took aspirin at home. similar symptoms 2 yrs ago but had more shortness of breath but resolved with HTN medication. Pt has nausea w.o emesis and intermittent scant/spot of blood on coughing. No travel or change in weights. Covid + last month. No fever, chill, abdominal pain, melena, or any trauma.

## 2020-05-12 NOTE — ED POST DISCHARGE NOTE - DETAILS
5/12/2020: left message with 1522 call back - SEBLE Cannon 5/13/20: No answer, left voice message for pt to call back admin line. - Dallin Maguire PA-C 5/14/20: Spoke with patient, results given with teach back. Patient states that she was planning on calling her PCP today and will speak with practitioner about results. Patient denies SOB and chest pain. Very strict return precautions given to patient. Patient agrees to plan. -Taylor Mcginnis PA-C

## 2020-05-26 ENCOUNTER — APPOINTMENT (OUTPATIENT)
Dept: PODIATRY | Facility: CLINIC | Age: 55
End: 2020-05-26

## 2020-06-05 ENCOUNTER — APPOINTMENT (OUTPATIENT)
Dept: OTOLARYNGOLOGY | Facility: CLINIC | Age: 55
End: 2020-06-05
Payer: MEDICARE

## 2020-06-05 VITALS
HEART RATE: 92 BPM | DIASTOLIC BLOOD PRESSURE: 90 MMHG | HEIGHT: 59 IN | WEIGHT: 136 LBS | TEMPERATURE: 98.7 F | BODY MASS INDEX: 27.42 KG/M2 | SYSTOLIC BLOOD PRESSURE: 137 MMHG

## 2020-06-05 PROCEDURE — 31231 NASAL ENDOSCOPY DX: CPT

## 2020-06-05 PROCEDURE — 99213 OFFICE O/P EST LOW 20 MIN: CPT | Mod: 25

## 2020-06-05 NOTE — HISTORY OF PRESENT ILLNESS
[de-identified] : 55F presents for follow-up for nasal obstruction, crusting\par Prior CT with S-shaped septal deformity, severe caudally on R\par Now with increased crusting in nose\par Some improvemenet with daily vaseline use\par Nasal congestion if stable if not a bit improved\par Denies other sinonasal symptoms

## 2020-06-05 NOTE — PHYSICAL EXAM
[Nasal Endoscopy Performed] : nasal endoscopy was performed, see procedure section for findings [] : septum deviated bilaterally [Normal] : no rashes

## 2020-06-05 NOTE — REASON FOR VISIT
[Subsequent Evaluation] : a subsequent evaluation for [FreeTextEntry2] : nasal obstruction f/u CT sinus 03/03/2020

## 2020-06-16 ENCOUNTER — APPOINTMENT (OUTPATIENT)
Dept: INTERNAL MEDICINE | Facility: CLINIC | Age: 55
End: 2020-06-16
Payer: MEDICARE

## 2020-06-16 VITALS
WEIGHT: 148 LBS | TEMPERATURE: 98.9 F | OXYGEN SATURATION: 98 % | HEIGHT: 59 IN | SYSTOLIC BLOOD PRESSURE: 133 MMHG | HEART RATE: 92 BPM | BODY MASS INDEX: 29.84 KG/M2 | DIASTOLIC BLOOD PRESSURE: 84 MMHG | RESPIRATION RATE: 16 BRPM

## 2020-06-16 PROCEDURE — 99214 OFFICE O/P EST MOD 30 MIN: CPT

## 2020-06-16 RX ORDER — SUMATRIPTAN 100 MG/1
100 TABLET, FILM COATED ORAL
Refills: 0 | Status: DISCONTINUED | COMMUNITY
Start: 2018-02-12 | End: 2020-06-16

## 2020-06-16 RX ORDER — PANTOPRAZOLE 40 MG/1
40 TABLET, DELAYED RELEASE ORAL
Qty: 30 | Refills: 3 | Status: DISCONTINUED | COMMUNITY
Start: 2020-01-03 | End: 2020-06-16

## 2020-06-16 RX ORDER — BENZONATATE 100 MG/1
100 CAPSULE ORAL 3 TIMES DAILY
Qty: 30 | Refills: 5 | Status: DISCONTINUED | COMMUNITY
Start: 2020-04-20 | End: 2020-06-16

## 2020-06-16 RX ORDER — ACETAMINOPHEN 500 MG/1
500 TABLET, COATED ORAL
Qty: 30 | Refills: 0 | Status: DISCONTINUED | COMMUNITY
Start: 2020-04-20 | End: 2020-06-16

## 2020-06-16 NOTE — HISTORY OF PRESENT ILLNESS
[de-identified] : 55 year old  female patient with history of stable Hypertension, Hypothyroidism, Hypercholesterolemia with Hypertriglyceridemia, CVA, Insomnia, Migraine, vertigo, GERD, Reactive Airway Disease, Major Depression in partial remission, history as stated, presented for follow up examination. Patient is compliant with all medications. Denies shortness of breath, chest pain at this time. ROS as stated.\par

## 2020-06-16 NOTE — HEALTH RISK ASSESSMENT
[No] : In the past 12 months have you used drugs other than those required for medical reasons? No [No falls in past year] : Patient reported no falls in the past year [] : No [0] : 2) Feeling down, depressed, or hopeless: Not at all (0) [de-identified] : TAVO [FreeTextEntry1] : Non-suicidal at this time.  [[id number]] : 0

## 2020-06-16 NOTE — ASSESSMENT
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 Prior results reviewed and discussed with the patient during today's examination. Plan as ordered.\par

## 2020-06-21 ENCOUNTER — EMERGENCY (EMERGENCY)
Facility: HOSPITAL | Age: 55
LOS: 1 days | Discharge: HOME | End: 2020-06-21
Attending: EMERGENCY MEDICINE
Payer: MEDICARE

## 2020-06-21 VITALS
TEMPERATURE: 98 F | OXYGEN SATURATION: 99 % | SYSTOLIC BLOOD PRESSURE: 126 MMHG | WEIGHT: 143.08 LBS | HEART RATE: 85 BPM | RESPIRATION RATE: 18 BRPM | DIASTOLIC BLOOD PRESSURE: 85 MMHG | HEIGHT: 59 IN

## 2020-06-21 DIAGNOSIS — Z98.89 OTHER SPECIFIED POSTPROCEDURAL STATES: Chronic | ICD-10-CM

## 2020-06-21 DIAGNOSIS — Z98.890 OTHER SPECIFIED POSTPROCEDURAL STATES: Chronic | ICD-10-CM

## 2020-06-21 DIAGNOSIS — Z98.51 TUBAL LIGATION STATUS: Chronic | ICD-10-CM

## 2020-06-21 PROCEDURE — 99283 EMERGENCY DEPT VISIT LOW MDM: CPT

## 2020-06-21 RX ORDER — IBUPROFEN 200 MG
600 TABLET ORAL ONCE
Refills: 0 | Status: COMPLETED | OUTPATIENT
Start: 2020-06-21 | End: 2020-06-21

## 2020-06-21 RX ORDER — IBUPROFEN 200 MG
1 TABLET ORAL
Qty: 12 | Refills: 0
Start: 2020-06-21

## 2020-06-21 RX ADMIN — Medication 600 MILLIGRAM(S): at 23:11

## 2020-06-21 NOTE — ED PROVIDER NOTE - OBJECTIVE STATEMENT
55y F on daily ASA here with pain to R forearm. Pt states she reached her arm into a cracked car window to get back into her car after getting locked out. She noted that hours later she had pain and swelling to her R mid forearm volar aspect. ALso with superficial scratches to dorsal surface mid R forearm. No weakness, numbness, tingling. Has not taken anything for pain.

## 2020-06-21 NOTE — ED PROVIDER NOTE - NSFOLLOWUPINSTRUCTIONS_ED_ALL_ED_FT
You were seen for right forearm pain and swelling. You were diagnosed with a forearm contusion. There were no signs of any serious underlying injury at the time of evaluation. We recommend that you take Tylenol and/or Ibuprofen as needed for pain and swelling. You may also apply ice to the area for up to 15 minutes at a time. Take care not to apply ice directly to skin. You can elevate the extremity as well to decrease any swelling. Do not exceed 4 grams of tylenol per day from all sources. Take Ibuprofen with food. Follow up with your primary care doctor as needed.

## 2020-06-21 NOTE — ED ADULT NURSE NOTE - OBJECTIVE STATEMENT
Pt 56 y/o female, AxOx3 presents to ED complaining of left arm pain after reaching in car window today pinching arm. Pt is well appearing, speaking full sentences without difficulty. Breathing spontaneous and unlabored. Upon assessment, bruise noted to left inner forearm, no obvious injury noted. Pt moving all extremities, in no acute distress. Pt provided cold packs for pain relief at this time. Safety and comfort measures initiated- bed placed in lowest position and side rails raised. Pt oriented to call bell system.

## 2020-06-21 NOTE — ED PROVIDER NOTE - PATIENT PORTAL LINK FT
You can access the FollowMyHealth Patient Portal offered by [facility] by registering at the following website: [email or web address]. By joining [facility]’s FollowMyHealth portal, you will also be able to view your health information using other applications (apps) compatible with our system.

## 2020-06-21 NOTE — ED PROVIDER NOTE - CLINICAL SUMMARY MEDICAL DECISION MAKING FREE TEXT BOX
55y F on ASA here with R forearm contusion. No signs of compartment syndrome. NV intact, compartments soft. NO deformity to suggest underlying fracture. Pain control. Ice, elevate. FU PCP.

## 2020-06-21 NOTE — ED PROVIDER NOTE - PHYSICAL EXAMINATION
Gen: WNWD NAD  HEENT: NCAT   Skin: Ecchymosis and mild swelling to volar surface mid R forearm, superficial abrasions to dorsal surface R mid forearm   Ext: wwp, palp pulses, FROMx4, opposition and flex/ex, abd/add of fingers intact, compartments soft, no deformities  Neuro: Awake and alert, CN grossly intact, sensation intact, motor 5/5 throughout

## 2020-06-21 NOTE — ED PROVIDER NOTE - CARE PLAN
Principal Discharge DX:	Pain of right upper extremity  Secondary Diagnosis:	Contusion of right forearm, initial encounter

## 2020-06-23 ENCOUNTER — APPOINTMENT (OUTPATIENT)
Dept: ORTHOPEDIC SURGERY | Facility: CLINIC | Age: 55
End: 2020-06-23

## 2020-06-30 ENCOUNTER — APPOINTMENT (OUTPATIENT)
Dept: ORTHOPEDIC SURGERY | Facility: CLINIC | Age: 55
End: 2020-06-30
Payer: MEDICARE

## 2020-06-30 ENCOUNTER — LABORATORY RESULT (OUTPATIENT)
Age: 55
End: 2020-06-30

## 2020-06-30 VITALS — TEMPERATURE: 96.4 F

## 2020-06-30 VITALS
WEIGHT: 145 LBS | BODY MASS INDEX: 29.23 KG/M2 | SYSTOLIC BLOOD PRESSURE: 119 MMHG | HEART RATE: 89 BPM | DIASTOLIC BLOOD PRESSURE: 80 MMHG | HEIGHT: 59 IN

## 2020-06-30 PROCEDURE — 99214 OFFICE O/P EST MOD 30 MIN: CPT | Mod: 25

## 2020-06-30 PROCEDURE — 73564 X-RAY EXAM KNEE 4 OR MORE: CPT | Mod: LT

## 2020-06-30 NOTE — HISTORY OF PRESENT ILLNESS
[de-identified] : CC left knee postop right knee pain\par \par HPI 54 yo female left HD presents for CSI and PT FU of acute onset of most 2 years of constant pain in the medial right knee. The pain is worse, and rated a 10 out of 10, described as throbbing, without radiation. Ice makes the pain better and walking especially upstairs makes the pain worse. The patient reports associated symptoms of pop click locking with instability causing falls swelling and weakness. Patient reports she walks unstably because she is so unstable. The patient - pain at night affecting sleep, and - similar pain previously.\par \par The patient has tried the following treatments:\par Activity modification	+\par Ice/Compression  	+\par Braces    		-\par Nsaids    		+ 6 weeks no improvement\par Physical Therapy 	+ 6 weeks home exercises along with postop PT for left knee\par Cortisone Injection	+ great help L knee but wore off\par Visco Injection		-\par Arthroscopy		-\par Patient is status post Left knee Arthroscopy on 3-26-19 Lateral meniscus meniscectomy and corticosteroid injection\par \par right knee pain resolved post injection\par \par left knee worsening.\par \par patient repeatedly swearing in office\par \par Review of Systems is positive for the above musculoskeletal symptoms and is otherwise non-contributory for general, constitutional, psychiatric, neurologic, HEENT, cardiac, respiratory, gastrointestinal, reproductive, lymphatic, and dermatologic complaints.\par \par Consult by Dr Roe Sales\par \par

## 2020-06-30 NOTE — DISCUSSION/SUMMARY
[de-identified] : Status post Left knee Arthroscopy on 3-26-19\par Lateral meniscus meniscectomy and corticosteroid injection\par with increased swelling and pain\par \par Right knee moderate OA with possible lateral meniscal body tear - doing well post injection\par \par The patient and I discussed the causes and progression of degenerative joint disease of the knee. Models, diagrams and drawings were used in the discussion. Treatment can include conservative non-operative management and surgical options. Conservative management includes weight loss, activity modification, physical therapy to improve motion and strength in the muscles around the knee and the body's core, PO and topical NSAIDs, corticosteroid and/or viscosupplementation intra-articular injections. If the patient fails to improve with non-operative management, surgical management is possible. Depending upon the patient's age, BMI, activity level, degree and location of arthrosis different surgical options are possible including arthroscopic debridement with chondroplasty, high-tibial osteotomy, unicondylar/partial arthroplasty, and total joint arthroplasty.\par \par Due to failure of prior non-operative modalities of treatment including physical therapy, activity modification, non-steroidal anti-inflammatory medications, and weight-loss with failure of multiple prior corticosteroid injections without appropriate improvement in symptoms and concern for too many frequent injections causing increased risk for adverse events, I am ordering a time release cortisone injection Zilretta LEFT and will obtain insurance authorization. The patient will be contacted by our authorization department over its status, copayment and when available for injection.\par \par Patient declining physical therapy as she is doing home exercises\par \par Procedure Note:\par \par Verbal consent was obtained for an arthrocentesis and intra-articular corticosteroid injection of the LEFT knee, after the risks and benefits were discussed with the patient. Potential adverse effects were discussed including but not limited to bleeding, skin/joint infection, local skin reactions including bleaching, bruising, stiffness, soreness, vasovagal episodes, transient hyperglycemia, avascular necrosis, pseudo-septic type reactions, post injection joint pain, allergic reaction to product or anesthetic and other rare but potential adverse effects along with benefits including decreased pain and improved stability prior to obtaining verbal informed consent. It was also discussed that for some patients the treatment is ineffective and there are no guarantees that the patient will experience improvement as the result of the injection. In rare occasions the injection can cause worsening of pain.\par \par The use of a Sonosite 15-6 MHz linear transducer with live ultrasound guidance of the knee was necessary given the patient's BMI and local body habitus overlying and obscuring the accurate identification of normal body bony anatomy used to identify the injection site and the depth of soft tissue envelope necessitating a longer than normal needle to reach the joint space, and to confirm the location of the needle tip and intra-articular delivery of the medication. Without the use of live ultrasound guidance the injection would have been more difficult and place the patient's neurovascular structures at risk from the longer needle needed to traverse the soft tissue envelope.\par \par A 6 inch bolster was placed underneath the knee to place the knee in a slightly flexed position. The superolateral injection site was identified using the ultrasound probe to identify the suprapatellar space and superior boarder of the patella first longitudinally and then transversely. The injection site was marked and prepped with a ChloraPrep swab and anesthetized with 3cc lidocaine for skin anesthesia using a 22 g needle. Using sterile technique a 20g 3 1/2 in spinal needle was passed through the injection site towards the suprapatellar space under live ultrasound guidance and noted to penetrate the joint capsule. 40 cc of clear straw colored fluid was aspirated under live ultrasound visualization with resolution of joint effusion. The medication, 3 cc total of 1cc 1% lidocaine without epinephrine, 1cc 0.25% Marcaine without epinephrine and 1cc of 40mg/mL Kenalog, was injected without resistance under live ultrasound visualization and noted to flow into the suprapatellar joint space. The injection site was sterilely dressed, there was minimal blood loss. The patient tolerated this procedure without any complications done by myself. Images were recorded and saved.\par \par Aspirated fluid was sent for cell count, crystal and culture.\par \par The patient has been advised that if they notice any worsening of symptoms or any problems to contact me and seek care from a qualified medical professional. The patient was instructed to ice the knee and take NSAID medication on an as needed basis if the patient feels discomfort.\par \par Due to failure of prior non-operative modalities of treatment including physical therapy, activity modification, non-steroidal anti-inflammatory medications, and weight-loss with failure of multiple prior corticosteroid injections without appropriate improvement in symptoms and concern for too many frequent injections causing increased risk for adverse events, I am ordering a viscosupplementation injection Orthovisc left knee and will obtain insurance authorization. The patient will be contacted by our authorization department over its status, copayment and when available for injection.\par \par Patient again reminded to refrain from cursing in office.\par \par The patient verifies their understanding the the visit, diagnosis and plan. They agree with the treatment plan and will contact the office with any questions or problems.\par \par Follow up\par When injection available

## 2020-06-30 NOTE — PHYSICAL EXAM
[de-identified] : Physical Examination\par General: well nourished, in no acute distress, alert and oriented to person, place and time\par Psychiatric: normal mood and affect, no abnormal movements or speech patterns\par Eyes: vision intact + glasses\par Throat: no thyromegaly\par Lymph: no enlarged nodes, no lymphedema in extremity\par Respiratory: no wheezing, no shortness of breath with ambulation\par Cardiac: no cardiac leg swelling, 2+ peripheral pulses\par Neurology: normal gross sensation in extremities to light touch\par Abdomen: soft, non-tender, tympanic, no masses\par \par Musculoskeletal Examination\par Ambulation	+ antalgic gait, - assistive devices\par \par Knee			Right			Left\par General\par      Swelling/Deformity	normal			normal	\par      Skin			normal			healed incisions\par      Erythema		-			-\par      Standing Alignment	neutral			valgus\par      Effusion		none			none\par Range of Motion\par      Hip			full painless ROM		full painless ROM\par      Knee Flexion		130			120\par      Knee Extension	0			0\par Patella\par      J Sign		-			-\par      Quad Medial/Lateral	1/1 1/1\par      Apprehension		-			-\par      Gualberto's		-			+\par      Grind Sign		-			+\par      Crepitus		-			+\par Palpation\par      Medial Joint Line	++			-\par      Medial Fem Condyle	-			-\par      Lateral Joint Line	-			+\par      Quad Tendon		-			-\par      Patella Tendon	-			-\par      Medial Patella		-			-\par      Lateral Patella 	-			-\par      Posterior Knee	-			-\par Ligamentous\par      Varus @ 0° / 30°	-/-			-/-\par      Valgus @ 0° / 30°	-/-			-/-\par      Lachman		-			-\par      Pivot Shift		-			-\par      Anterior Drawer	-			-\par      Posterior Drawer	-			-\par Meniscus\par      Julian		+ medial pain			-\par      Flexion Pinch		+ posterior pain			-\par Strength Examination/Atrophy\par      Hip Flexors 		5+			5+\par      Quadriceps		5+			5+\par      Hamstring		5+			5+\par      Tibialis Anterior	5+			5+\par      Achilles/Soleus	5+			5+\par Sensation\par      Deep Peroneal	normal			normal\par      Superficial Peroneal 	normal			normal\par      Sural  		normal			normal\par      Posterior Tibial 	normal			normal\par      Saphneous 		normal			normal\par Pulses\par      DP			2+			2+\par  [de-identified] : 5 views of the affected left knee (standing AP, flexing standing AP, 30degree flexed lateral, 0degree lateral, sunrise view)\par \par demonstrate:\par There is mild lateral weight very asymmetric narrowing\par Trace osteophytic lipping\par Trace suprapatellar effusion\par No patellofemoral joint space loss without evidence of tilt [or] subluxation on sunrise view\par Normal soft tissue density\par Otherwise normal osseous bone structure without fracture or dislocation\par \par \par MRI Left knee from [facility] on 2-16-19\par My impression of the images:\par Quality of the MRI is good\par Medial Meniscus mild degenerative tear\par Lateral Meniscus flipped body/anterior horn tear\par There is mild to moderate chondral loss in the trial compartments\par There is [Not] bone marrow edema[/subchondral cysts] in the tricompartments\par LCL is intact\par MCL is intact\par ACL is intact\par PCL is intact \par Quadriceps Tendon is intact\par Patella Tendon is intact\par \par The Final Radiologist Impression:\par Flap tear lateral meniscus body/anterior horn\par Tricompartmental degenerative arthritis most pronounced in the lateral tibiofemoral compartment.\par Zero point for similar lateral patellar position with respect to the trochlear groove, TT-TG distance approximately 1.6 cm\par Distal quadriceps, patellar and semimembranosus tendinopathy, popliteus tendinopathy.\par Evidence of remote prior MCL injury.\par Moderate joint effusion with popliteal cyst and edematous quadriceps fat pad\par \par 5 views of the affected Right knee (standing AP, flexing standing AP, 30degree flexed lateral, 0degree lateral, sunrise view)\par 6-24-19\par demonstrate:\par There is trace medial weightbearing asymmetric narrowing\par no osteophytic lipping\par Trace suprapatellar effusion\par no patellofemoral joint space loss without evidence of tilt [or] subluxation on sunrise view\par Normal soft tissue density\par Otherwise normal osseous bone structure without fracture or dislocation\par \par \par MRI Right knee from [facility] on 7-19-19\par My impression of the images:\par Quality of the MRI is ok\par Medial Meniscus ok\par Lateral Meniscus intra-meniscal signal in the body with possible anterior edge extension\par There is mild to moderate chondral loss in the tricompartments\par There is [Not] bone marrow edema[/subchondral cysts] in the tricompartments\par LCL is intact\par MCL is intact\par ACL is intact\par PCL is intact \par Quadriceps Tendon is intact\par Patella Tendon is intact\par \par The Final Radiologist Impression:\par Small joint effusion with synovitis.\par Lateral tilt and 0.5 cm lateral patella positioning with respect to the trochlear groove. Mild-moderate tricompartmental osteoarthritis, including chondromalacia and osteophytosis. Subcentimeter subcortical cystic and edematous foci subjacent to the tibial spine, likely traction related. No recent fracture or marrow infiltrative process.\par Distal quadriceps tendon, patellar tendon, anterior cruciate ligament, posterior cruciate ligament, popliteus, medial collateral ligament, lateral collateral ligament, iliotibial band and biceps femoris intact. Origin tendinopathy medial head gastrocnemius.\par No evidence of medial meniscal tear. Oblique linear intrasubstance signal lateral meniscal body-posterior horn junction, approaching however not definitively communicating with the inferior meniscal surface.\par Small fluid popliteal bursa. No soft tissue mass.\par IMPRESSION:\par 1. Small joint effusion with synovitis and fluid popliteal bursa.\par 2. Mild-moderate tricompartmental osteoarthritis with lateral tilt and 0.5 cm lateral patella positioning with respect to the trochlear groove. Correlate clinically for patella subluxation.\par 3. Oblique linear intrasubstance signal lateral meniscal body-posterior horn junction (series 4 image 19), approaching however not definitively communicating with the inferior meniscal surface. Cannot include underlying closed flap tear.\par 4. Origin tendinopathy medial head gastrocnemius.\par \par \par 5 views of the affected Left knee (standing AP, flexing standing AP, 30degree flexed lateral, 0degree lateral, sunrise view)\par 12-9-19\par demonstrate:\par There is moderate to severe lateral weightbearing asymmetric narrowing\par Small to moderate osteophytic lipping\par Trace suprapatellar effusion\par Mild patellofemoral joint space loss without evidence of tilt [or] subluxation on sunrise view\par Normal soft tissue density\par Otherwise normal osseous bone structure without fracture or dislocation\par \par 5 views of the affected Left knee (standing AP, flexing standing AP, 30degree flexed lateral, 0degree lateral, sunrise view)\par were ordered, obtained and evaluated by myself today and\par demonstrate:\par There is moderate to severe lateral weightbearing asymmetric narrowing\par Small to moderate osteophytic lipping\par Trace suprapatellar effusion\par Mild patellofemoral joint space loss without evidence of tilt [or] subluxation on sunrise view\par Normal soft tissue density\par Otherwise normal osseous bone structure without fracture or dislocation

## 2020-07-02 LAB
APPEARANCE: ABNORMAL
BACTERIA UR CULT: ABNORMAL
BILIRUBIN URINE: NEGATIVE
BLOOD URINE: NEGATIVE
COLOR: NORMAL
GLUCOSE QUALITATIVE U: NEGATIVE
KETONES URINE: NEGATIVE
LEUKOCYTE ESTERASE URINE: ABNORMAL
NITRITE URINE: NEGATIVE
PH URINE: 7.5
PROTEIN URINE: NEGATIVE
SPECIFIC GRAVITY URINE: 1.01
UROBILINOGEN URINE: NORMAL

## 2020-07-05 NOTE — ED ADULT NURSE NOTE - PAIN: PRESENCE, MLM
complains of pain/discomfort Please note that Mr. Montrell Lovell was seen in our ER on 7/5/20 after an acute injury to the left foot.  Please allow him to wear open toe shoes at work due to current injury.  Thank you!

## 2020-07-14 ENCOUNTER — RX RENEWAL (OUTPATIENT)
Age: 55
End: 2020-07-14

## 2020-07-21 ENCOUNTER — APPOINTMENT (OUTPATIENT)
Dept: RHEUMATOLOGY | Facility: CLINIC | Age: 55
End: 2020-07-21
Payer: MEDICARE

## 2020-07-21 VITALS
HEIGHT: 59 IN | SYSTOLIC BLOOD PRESSURE: 117 MMHG | DIASTOLIC BLOOD PRESSURE: 79 MMHG | WEIGHT: 143 LBS | RESPIRATION RATE: 16 BRPM | TEMPERATURE: 97.9 F | OXYGEN SATURATION: 97 % | BODY MASS INDEX: 28.83 KG/M2 | HEART RATE: 83 BPM

## 2020-07-21 PROCEDURE — 99213 OFFICE O/P EST LOW 20 MIN: CPT | Mod: 25

## 2020-07-21 PROCEDURE — 20610 DRAIN/INJ JOINT/BURSA W/O US: CPT | Mod: RT

## 2020-07-21 RX ORDER — LIDOCAINE HYDROCHLORIDE 10 MG/ML
1 INJECTION, SOLUTION INFILTRATION; PERINEURAL
Qty: 0 | Refills: 0 | Status: COMPLETED | OUTPATIENT
Start: 2020-07-21

## 2020-07-21 RX ORDER — METHYLPRED ACET/NACL,ISO-OS/PF 40 MG/ML
40 VIAL (ML) INJECTION
Qty: 1 | Refills: 0 | Status: COMPLETED | OUTPATIENT
Start: 2020-07-21

## 2020-07-21 RX ADMIN — METHYLPREDNISOLONE ACETATE 0 MG/ML: 40 INJECTION, SUSPENSION INTRA-ARTICULAR; INTRALESIONAL; INTRAMUSCULAR; SOFT TISSUE at 00:00

## 2020-07-21 RX ADMIN — LIDOCAINE HYDROCHLORIDE 0 %: 10 INJECTION, SOLUTION INFILTRATION; PERINEURAL at 00:00

## 2020-07-21 NOTE — ASSESSMENT
[FreeTextEntry1] : Patient with arthralgias, R knee pain, plantar fasciitis, covid 19+  in March with prior CTD serologies:\par \par Patient to have inflammatory markers and serologies drawn to assess for an underlying inflammatory process lending to presentation in the setting of fatigue and joint pain.  Low-dose NSAID therapy given for plantar fasciitis. \par Arthralgias and fatigue may persist for months after covid infection. .Patient will benefit from physical therapy to help with joint mobility and muscle strengthening.  Quadriceps strengthening exercises rec.  Weight loss has been encouraged to reduce load over the medial joint line.  IAC given to the R knee.  Viscosupplementation has been encouraged to provide additional lubrication and joint support. \par She is in agreement with the above plan and will return in three months' time.\par

## 2020-07-21 NOTE — REVIEW OF SYSTEMS
[Depression] : depression [Fever] : no fever [Eye Pain] : no eye pain [Sore Throat] : no sore throat [Chills] : no chills [Hoarseness] : no hoarseness [Chest Pain] : no chest pain [Palpitations] : no palpitations [Cough] : no cough [SOB on Exertion] : no shortness of breath during exertion [Heartburn] : no heartburn [Joint Pain] : joint pain [Arthralgias] : arthralgias [Joint Stiffness] : joint stiffness [Skin Lesions] : no skin lesions [Limb Weakness] : no limb weakness [Skin Wound] : no skin wound [Difficulty Walking] : no difficulty walking [Muscle Weakness] : no muscle weakness [Feelings Of Weakness] : no feelings of weakness [Easy Bruising] : no tendency for easy bruising [Easy Bleeding] : no tendency for easy bleeding

## 2020-07-21 NOTE — PHYSICAL EXAM
[General Appearance - Alert] : alert [General Appearance - In No Acute Distress] : in no acute distress [Examination Of The Oral Cavity] : the lips and gums were normal [Sclera] : the sclera and conjunctiva were normal [Oropharynx] : the oropharynx was normal [Neck Appearance] : the appearance of the neck was normal [No Spinal Tenderness] : no spinal tenderness [Skin Lesions] : no skin lesions [] : no rash [Motor Tone] : muscle strength and tone were normal [Oriented To Time, Place, And Person] : oriented to person, place, and time [Motor Exam] : the motor exam was normal [Impaired Insight] : insight and judgment were intact [FreeTextEntry1] : No active tenosynovitis, tender over the medial joint line on the right with conserved range of motion; tender over the plantar fascia uponforced dorsiflexion bilaterally

## 2020-07-21 NOTE — HISTORY OF PRESENT ILLNESS
[FreeTextEntry1] : Patient returns after a long hiatus for complaints of right knee pain and burning sensation on the bottom of the feet. Patient complains right knee pain ongoing , with instability symptoms  secondary to increased stiffness and locking sensation;  especially when going up and down steps. She also finds a burning sensation on the bottoms of the feet and fine a minimal relief from current pain therapy, including neuropathic therapy. She reports walking on wood floors however wears slippers and has rugs around the home. She does not recall heavy lifting or recent trauma to the areas. She finds since adis covid-19 in March, she continues with fatigue and arthralgias.\par \par Patient with prior blood testing reflecting YOKO+ 1:320, homogenous pattern +Ro Abs seen in the past,  with nl inflammatory markers and additional negative YOKO subsets. \par She denies shortness of breath, cough, fever or systemic symptoms.  She further denies visual disturbances, rash, joint swelling or Raynaud's.

## 2020-07-21 NOTE — CONSULT LETTER
[Courtesy Letter:] : I had the pleasure of seeing your patient, [unfilled], in my office today. [Please see my note below.] : Please see my note below. [Dear  ___] : Dear  [unfilled], [Sincerely,] : Sincerely, [Consult Closing:] : Thank you very much for allowing me to participate in the care of this patient.  If you have any questions, please do not hesitate to contact me. [FreeTextEntry2] : Roe Sales MD\par [facility] [FreeTextEntry3] : Tamiko Duff M.D.\par  of Medicine \par [facility] [employer] [facility]/[place]\par \par

## 2020-07-21 NOTE — PROCEDURE
[Today's Date:] : Date: [unfilled] [Patient] : the patient [Risks] : risks [Consent Obtained] : written consent was obtained prior to the procedure and is detailed in the patient's record [Benefits] : benefits [Therapeutic] : therapeutic [#1 Site: ______] : #1 site identified in the [unfilled] [Betadine] : betadine solution [25 gauge 1.5 inch] : A 25 gauge 1.5 inch needle was used [___ml 1% Lidocaine] : [unfilled] ml of 1% lidocaine [Depomedrol ___ mg] : Depomedrol [unfilled] mg [Tolerated Well] : the patient tolerated the procedure well [No Complications] : there were no complications [Patient Instructed to Call] : patient was instructed to call if redness at site, a decrease in range of motion or an increase in pain is noted after procedure.

## 2020-07-22 ENCOUNTER — APPOINTMENT (OUTPATIENT)
Dept: ORTHOPEDIC SURGERY | Facility: CLINIC | Age: 55
End: 2020-07-22
Payer: MEDICARE

## 2020-07-22 VITALS — TEMPERATURE: 98 F

## 2020-07-22 LAB
CCP AB SER IA-ACNC: 11 UNITS
CRP SERPL-MCNC: <0.1 MG/DL
DSDNA AB SER-ACNC: <12 IU/ML
ENA RNP AB SER IA-ACNC: 0.3 AL
ENA SM AB SER IA-ACNC: <0.2 AL
ENA SS-A AB SER IA-ACNC: 6.7 AL
ENA SS-B AB SER IA-ACNC: <0.2 AL
ERYTHROCYTE [SEDIMENTATION RATE] IN BLOOD BY WESTERGREN METHOD: 31 MM/HR
RF+CCP IGG SER-IMP: NEGATIVE
RHEUMATOID FACT SER QL: <10 IU/ML
SARS-COV-2 IGG SERPL IA-ACNC: 0.1 INDEX
SARS-COV-2 IGG SERPL QL IA: NEGATIVE

## 2020-07-22 PROCEDURE — 20611 DRAIN/INJ JOINT/BURSA W/US: CPT | Mod: LT

## 2020-07-23 LAB
ANA PAT FLD IF-IMP: ABNORMAL
ANA SER IF-ACNC: ABNORMAL

## 2020-07-24 NOTE — ED PROVIDER NOTE - NS HIV RISK FACTOR YES
Sinusitis (No Antibiotics)    The sinuses are air-filled spaces within the bones of the face. They connect to the inside of the nose. Sinusitis is an inflammation of the tissue that lines the sinuses. Sinusitis can occur during a cold. It can also happen due to allergies to pollens and other particles in the air. It can cause symptoms such as sinus congestion, headache, sore throat, facial swelling, and a feeling of fullness. It may also cause a low-grade fever. Your sinusitis does not include an infection with bacteria. Because of this, antibiotics are not used to treat this problem.  Home care  · Drink plenty of water, hot tea, and other liquids. This may help thin nasal mucus. It also may help your sinuses drain fluids.  · Heat may help soothe painful areas of your face. Use a towel soaked in hot water. Or,  the shower and direct the warm spray onto your face. Using a vaporizer along with a menthol rub at night may also help soothe symptoms.   · An expectorant with guaifenesin may help thin nasal mucus and help your sinuses drain fluids.  · You can use an over-the-counter decongestant, unless a similar medicine was prescribed to you. Nasal sprays work the fastest. Use one that contains phenylephrine or oxymetazoline. First blow your nose gently. Then use the spray. Do not use these medicines more often than directed on the label. If you do, your symptoms may get worse. You may also take pills that contain pseudoephedrine. Don’t use products that combine multiple medicines. This is because side effects may be increased. Read all medicine labels. You can also ask the pharmacist for help. (People with high blood pressure should not use decongestants. They can raise blood pressure.)  · Over-the-counter antihistamines may help if allergies contributed to your sinusitis.    · Use acetaminophen or ibuprofen to control pain, unless another pain medicine was prescribed to you. If you have chronic liver or  kidney disease or ever had a stomach ulcer, talk with your healthcare provider before using these medicines. (Aspirin should never be taken by anyone under age 18 who is ill with a fever. It may cause severe liver damage.)  · Use nasal rinses or irrigation as instructed by your healthcare provider.  · Don't smoke. This can make symptoms worse.  Follow-up care  Follow up with your healthcare provider or our staff if you are better in 1 week.  When to seek medical advice  Call your healthcare provider if any of these occur:  · Green or yellow fluid draining from your nose or into your throat  · Facial pain or headache that gets worse  · Stiff neck  · Unusual drowsiness or confusion  · Swelling of your forehead or eyelids  · Vision problems, such as blurred or double vision  · Fever of 100.4ºF (38ºC) or higher, or as directed by your healthcare provider  · Seizure  · Breathing problems  · Symptoms that don't go away in 10 days  Date Last Reviewed: 11/1/2017  © 8425-5468 [employer] [address] [place] [address], [place] [address]. All rights reserved. This information is not intended as a substitute for professional medical care. Always follow your healthcare professional's instructions.         Declined

## 2020-08-01 NOTE — ED ADULT NURSE NOTE - NSSISCREENINGQ4_ED_A_ED
Pt to the ed with pain and states that as of this past Saturday pt moved a couch and now pt is having epigastric pain. Hx of rib fractured about three months ago. Pt is concerned that she may have \"cracked\" her ribs again. No

## 2020-08-24 NOTE — ED ADULT NURSE NOTE - NS ED NURSE RECORD ANOTHER HT AND WT
Yes
Implemented All Universal Safety Interventions:  [place] to call system. Call bell, personal items and telephone within reach. Instruct patient to call for assistance. Room bathroom lighting operational. Non-slip footwear when patient is off stretcher. Physically safe environment: no spills, clutter or unnecessary equipment. Stretcher in lowest position, wheels locked, appropriate side rails in place.

## 2020-09-01 ENCOUNTER — APPOINTMENT (OUTPATIENT)
Dept: RHEUMATOLOGY | Facility: CLINIC | Age: 55
End: 2020-09-01

## 2020-09-01 NOTE — ED ADULT TRIAGE NOTE - LOCATION:
OFFICE VISIT      Patient: Germán Howell   : 1975 MRN: [id number]    SUBJECTIVE:  Chief Complaint   Patient presents with   • Annual Exam       This 44 year old male is here for the annual physical exam.    HISTORY OF PRESENT ILLNESS:    Had not seen him since a year; done relatively well. No major issues, no consultation with any physician. No injuries. Weight is stable. Denies bowel, urinary, chest, lung, head issues.     Healthcare maintenance:  Depression screening: Manageable stressors. H/o of stress and depression. On Lexapro 20 mg with benefits.  Diet and exercise: Healthy diet. Alcohol consumption: 1 drink/night.  Sleep: Gets 6 + hours of sleep/night.  Labs: Last labs revealed normal electrolytes(anion gap at 9 mmol/L (10-20 mmol/L)), liver functions, kidney functions(BUN at 21 mg/dL). HDL level was low at 31 mg/dL, triglyceride at 229 mg/dL(elevated), TSH. The blood glucose level and vitamin D level were normal. Remainder of the labs were normal. The labs were improved from the previous one.    Allergies: Reports all year round congestion(allergies). Constantly needs to clear his throat; manageable. Does not take any medication for it.    Does not play hockey, has some knee related issues.  The blood pressure is normal.     PAST MEDICAL HISTORY:  Past Medical History:   Diagnosis Date   • Abnormal PFT    • Anxiety disorder    • Multiple allergies      MEDICATIONS:  Current Outpatient Medications   Medication Sig   • escitalopram (Lexapro) 20 MG tablet 1 TAB ORALLY ONCE DAILY     No current facility-administered medications for this visit.      ALLERGIES:  ALLERGIES:   Allergen Reactions   • Penicillins Other (See Comments)     Unknown     PAST SURGICAL HISTORY:  History reviewed. No pertinent surgical history.  FAMILY HISTORY:  Family History   Problem Relation Age of Onset   • Hypertension Father    • Other Father         High Cholestrol   • Osteoporosis Maternal Grandmother    • Other  Maternal Grandfather         Unknown   • Other Paternal Grandmother         Unknown   • Other Paternal Grandfather         Unknown     SOCIAL HISTORY:  Social History     Tobacco Use   Smoking Status Never Smoker   Smokeless Tobacco Never Used     Social History     Substance and Sexual Activity   Alcohol Use Yes   • Frequency: Never   • Drinks per session: 3 or 4   • Binge frequency: Never       Review of Systems    Constitutional: Per HPI.  HEENT: Per HPI.    Respiratory: Negative for shortness of breath, breathing issues on exertion.  Cardiovascular: Negative for chest pain.  Gastrointestinal: Per HPI.    Genitourinary: Per HPI.    Extremities:  Per HPI.    Neurologic: Per HPI.    Endocrine: Per HPI.    Psychiatric: Per HPI.    OBJECTIVE:  Vitals:    08/31/20 1456   BP: 106/70   Pulse: 72   Resp: 16   Temp: 97.8 °F (36.6 °C)   SpO2: 100%   Weight: 80.7 kg (178 lb)   Height: 5' 9\" (1.753 m)     Body mass index is 26.29 kg/m².    Physical Exam    Constitutional: Oriented to person, place, and time. Well-developed.  Head: Normocephalic and atraumatic.   Right Ear: External ear normal. Cerumen impaction.    Left Ear: External ear normal. Cerumen impaction.     Nose: Nose normal.  Mouth/Throat: Oropharynx is clear and moist.  Eyes: Pupils are equal, round, and reactive to light. Conjunctivae and EOM are normal.  Neck: Normal range of motion. Neck supple. No thyroidmegaly present.    Cardiovascular: Normal rate, regular rhythm, normal heart sounds and intact distal pulses. No masses  Pulmonary/Chest: Effort normal and breath sounds normal. No respiratory distress.  No wheezes or rales.  Abdominal: Soft. Bowel sounds are normal. Exhibits no distension. There is no tenderness. There is no rebound and no guarding. No masses or bruits.  : Exam was normal.  Musculoskeletal: Normal range of motion.   Lymphadenopathy:  No cervical adenopathy.   Neurological: Alert and oriented to person, place, and time. Normal reflexes. No  cranial nerve deficit. Recent and remote memory are intact No focal deficits Motor and sensory grossly intact.  Skin: Skin is warm and dry. No rash noted.  Psychiatric: Normal mood and affect.  Behavior is normal. Judgment and thought content normal.  Rectal exam: Normal prostate, normal sphincter tone and stool.    DIAGNOSTIC STUDIES:  LAB RESULTS:  Office Visit on 08/31/2020   Component Date Value Ref Range Status   • POCT Color 08/31/2020 Yellow   Final   • POCT Appearance 08/31/2020 Clear   Final   • POCT Glucose Urine 08/31/2020 Negative  Negative Final   • POCT Bilirubin 08/31/2020 Negative  Negative Final   • POCT Ketones 08/31/2020 Negative  Negative Final   • POCT Specific Gravity 08/31/2020 1.020  1.005 - 1.03 Final   • POCT Occult Blood 08/31/2020 Trace - Intact  Negative Final   • POCT pH 08/31/2020 7.0  5 - 7 Final   • POCT Protein 08/31/2020 Negative  Negative Final   • POCT Urobilinogen 08/31/2020 1.0  0 - 1 mg/dL Final   • Urine Nitrite 08/31/2020 Negative  Negative Final   • WBC (Leukocyte) Esterase POC 08/31/2020 Negative  Negative Final       ASSESSMENT AND PLAN:  This 44 year old male presents with :  1. Routine general medical examination at a health care facility    2. Environmental allergies    3. Screening for malignant neoplasm of the rectum    4. Hypertriglyceridemia    5. Anxiety disorder, unspecified type        Orders Placed This Encounter   • Occult Blood Test Tube   • POCT Urine Dip Auto   • escitalopram (Lexapro) 20 MG tablet       Plan:    Normal physical exam:  Vitals reviewed.  Medication list reviewed.  Previous reports reviewed and discussed.  Ordered POCT urine dip. Refer to orders.  Ordered stool for occult blood test. Kit provided. Refer to orders.  Offered removal of cerumen impaction. Patient deferred.  Recommended avoiding use of Q tip.   Advised use of Debrox and water for removal of cerumen.  Recommended adequate hydration.  Explained the reason for borderline elevation  of BUN likely due to inadequate hydration(fasting for blood work).  Educated on the anion gap in detail.   Explained the importance of anion gap if has renal ailments or infection.    H/O stress and depression:  Doing very well.  Refill provided. Refer to orders.  Continue current management.    Low HDL:  Low HDL likely due to lack of exercise.  Recommended good exercise regimen and consumption of tree nuts.  Explained the correlation of exercise with HDL levels.    Hypertriglyceridemia:  Recommended monitoring diet.     Follow-up in one year for a complete physical exam.    Refer to orders.  Medical compliance with plan discussed and risks of non-compliance reviewed.  Patient education completed on disease process, etiology & prognosis.  Proper usage and side effects of medications reviewed & discussed.  Patient understands and agrees with the plan.  Return to clinic as clinically indicated as discussed with patient who verbalized understanding of the plan and is in agreement with the plan.    Entered by Dr. Mimi Sawyer acting as scribe for Dr. Emmett Wagner MD     Left arm;

## 2020-09-02 ENCOUNTER — APPOINTMENT (OUTPATIENT)
Dept: RHEUMATOLOGY | Facility: CLINIC | Age: 55
End: 2020-09-02
Payer: MEDICARE

## 2020-09-02 PROCEDURE — 99442: CPT

## 2020-09-15 ENCOUNTER — LABORATORY RESULT (OUTPATIENT)
Age: 55
End: 2020-09-15

## 2020-09-15 ENCOUNTER — APPOINTMENT (OUTPATIENT)
Dept: INTERNAL MEDICINE | Facility: CLINIC | Age: 55
End: 2020-09-15
Payer: MEDICARE

## 2020-09-15 VITALS
DIASTOLIC BLOOD PRESSURE: 82 MMHG | BODY MASS INDEX: 29.64 KG/M2 | HEIGHT: 59 IN | TEMPERATURE: 98.3 F | RESPIRATION RATE: 16 BRPM | SYSTOLIC BLOOD PRESSURE: 131 MMHG | WEIGHT: 147 LBS | OXYGEN SATURATION: 100 % | HEART RATE: 75 BPM

## 2020-09-15 PROCEDURE — 90686 IIV4 VACC NO PRSV 0.5 ML IM: CPT

## 2020-09-15 PROCEDURE — G0008: CPT

## 2020-09-15 PROCEDURE — 99214 OFFICE O/P EST MOD 30 MIN: CPT | Mod: 25

## 2020-09-15 NOTE — ASSESSMENT
[FreeTextEntry1] : 55 year old female found to have stable Hypertension, Reactive Airway Disease, Hypothyroidism, Hypercholesterolemia with Hypertriglyceridemia, Insomnia, Migraine, vertigo, GERD,with the current regimen, diet and life style modifications, as counseled. Prior results reviewed and discussed with the patient during today's examination. Plan as ordered.\par

## 2020-09-15 NOTE — HEALTH RISK ASSESSMENT
[No] : In the past 12 months have you used drugs other than those required for medical reasons? No [No falls in past year] : Patient reported no falls in the past year [1] : 2) Feeling down, depressed, or hopeless for several days (1) [] : No [de-identified] : RHEUM [FreeTextEntry1] : Non-suicidal at this time. [PMD2Durmt] : 2

## 2020-09-15 NOTE — HISTORY OF PRESENT ILLNESS
[de-identified] : 55 year old  female patient with history of stable Hypertension, Reactive Airway Disease, Hypothyroidism, Hypercholesterolemia with Hypertriglyceridemia, Insomnia, Migraine, vertigo, GERD, history as stated, presented for follow up examination. Patient is compliant with all medications. Denies shortness of breath, chest pain or abdominal pains at this time. ROS as stated.\par

## 2020-09-16 LAB
25(OH)D3 SERPL-MCNC: 51.5 NG/ML
ALBUMIN SERPL ELPH-MCNC: 5.2 G/DL
ALP BLD-CCNC: 76 U/L
ALT SERPL-CCNC: 21 U/L
ANION GAP SERPL CALC-SCNC: 9 MMOL/L
APPEARANCE: ABNORMAL
AST SERPL-CCNC: 24 U/L
BASOPHILS # BLD AUTO: 0.07 K/UL
BASOPHILS NFR BLD AUTO: 1.7 %
BILIRUB SERPL-MCNC: <0.2 MG/DL
BILIRUBIN URINE: NEGATIVE
BLOOD URINE: NEGATIVE
BUN SERPL-MCNC: 13 MG/DL
CALCIUM SERPL-MCNC: 9.6 MG/DL
CHLORIDE SERPL-SCNC: 107 MMOL/L
CHOLEST SERPL-MCNC: 198 MG/DL
CHOLEST/HDLC SERPL: 3 RATIO
CO2 SERPL-SCNC: 25 MMOL/L
COLOR: COLORLESS
CREAT SERPL-MCNC: 0.99 MG/DL
CREAT SPEC-SCNC: 44 MG/DL
EOSINOPHIL # BLD AUTO: 0.08 K/UL
EOSINOPHIL NFR BLD AUTO: 2 %
ERYTHROCYTE [SEDIMENTATION RATE] IN BLOOD BY WESTERGREN METHOD: 51 MM/HR
ESTIMATED AVERAGE GLUCOSE: 108 MG/DL
FOLATE SERPL-MCNC: >20 NG/ML
GGT SERPL-CCNC: 46 U/L
GLUCOSE QUALITATIVE U: NEGATIVE
GLUCOSE SERPL-MCNC: 62 MG/DL
HBA1C MFR BLD HPLC: 5.4 %
HCT VFR BLD CALC: 35 %
HDLC SERPL-MCNC: 65 MG/DL
HEMOCCULT STL QL IA: NEGATIVE
HGB BLD-MCNC: 10.5 G/DL
IMM GRANULOCYTES NFR BLD AUTO: 0.2 %
IRON SATN MFR SERPL: 18 %
IRON SERPL-MCNC: 77 UG/DL
KETONES URINE: NEGATIVE
LDLC SERPL CALC-MCNC: 107 MG/DL
LEUKOCYTE ESTERASE URINE: NEGATIVE
LYMPHOCYTES # BLD AUTO: 1.64 K/UL
LYMPHOCYTES NFR BLD AUTO: 40.4 %
MAN DIFF?: NORMAL
MCHC RBC-ENTMCNC: 29.2 PG
MCHC RBC-ENTMCNC: 30 GM/DL
MCV RBC AUTO: 97.2 FL
MICROALBUMIN 24H UR DL<=1MG/L-MCNC: <1.2 MG/DL
MICROALBUMIN/CREAT 24H UR-RTO: NORMAL MG/G
MONOCYTES # BLD AUTO: 0.4 K/UL
MONOCYTES NFR BLD AUTO: 9.9 %
NEUTROPHILS # BLD AUTO: 1.86 K/UL
NEUTROPHILS NFR BLD AUTO: 45.8 %
NITRITE URINE: NEGATIVE
PH URINE: 6.5
PLATELET # BLD AUTO: 369 K/UL
POTASSIUM SERPL-SCNC: 4.4 MMOL/L
PROT SERPL-MCNC: 7.9 G/DL
PROTEIN URINE: NEGATIVE
RBC # BLD: 3.6 M/UL
RBC # FLD: 14.6 %
SODIUM SERPL-SCNC: 141 MMOL/L
SPECIFIC GRAVITY URINE: 1.01
T3 SERPL-MCNC: 63 NG/DL
T4 FREE SERPL-MCNC: 1.4 NG/DL
TIBC SERPL-MCNC: 434 UG/DL
TRIGL SERPL-MCNC: 126 MG/DL
TSH SERPL-ACNC: 0.54 UIU/ML
UIBC SERPL-MCNC: 357 UG/DL
UROBILINOGEN URINE: NORMAL
VIT B12 SERPL-MCNC: 691 PG/ML
WBC # FLD AUTO: 4.06 K/UL

## 2020-09-21 DIAGNOSIS — L81.9 DISORDER OF PIGMENTATION, UNSPECIFIED: ICD-10-CM

## 2020-09-29 ENCOUNTER — RX RENEWAL (OUTPATIENT)
Age: 55
End: 2020-09-29

## 2020-09-29 NOTE — ED PROVIDER NOTE - NS_ATTENDINGSCRIBE_ED_ALL_ED
I personally performed the service described in the documentation recorded by the scribe in my presence, and it accurately and completely records my words and actions.
No

## 2020-10-19 ENCOUNTER — APPOINTMENT (OUTPATIENT)
Dept: INTERNAL MEDICINE | Facility: CLINIC | Age: 55
End: 2020-10-19
Payer: MEDICARE

## 2020-10-19 PROCEDURE — G0009: CPT

## 2020-10-19 PROCEDURE — 90732 PPSV23 VACC 2 YRS+ SUBQ/IM: CPT

## 2020-10-19 NOTE — ASSESSMENT
[FreeTextEntry1] : Injection of Pneumonia vaccine, 0.5 ML, given intramuscularly in left deltoid today.\par

## 2020-10-21 NOTE — ED ADULT NURSE NOTE - CHPI ED NUR DURATION
Normal potassium, magnesium, vit D  Ionized calcium and PTH elevated  Recommend f/u with endocrinology  Will discuss results and management in detail at upcoming office visit.
Visit 10-7-20
day(s)/2

## 2020-10-27 ENCOUNTER — APPOINTMENT (OUTPATIENT)
Dept: RHEUMATOLOGY | Facility: CLINIC | Age: 55
End: 2020-10-27
Payer: MEDICARE

## 2020-10-27 VITALS
TEMPERATURE: 98.2 F | BODY MASS INDEX: 30.04 KG/M2 | DIASTOLIC BLOOD PRESSURE: 90 MMHG | RESPIRATION RATE: 16 BRPM | OXYGEN SATURATION: 100 % | SYSTOLIC BLOOD PRESSURE: 138 MMHG | WEIGHT: 149 LBS | HEART RATE: 95 BPM | HEIGHT: 59 IN

## 2020-10-27 DIAGNOSIS — M32.9 SYSTEMIC LUPUS ERYTHEMATOSUS, UNSPECIFIED: ICD-10-CM

## 2020-10-27 DIAGNOSIS — M25.50 PAIN IN UNSPECIFIED JOINT: ICD-10-CM

## 2020-10-27 PROCEDURE — 96372 THER/PROPH/DIAG INJ SC/IM: CPT

## 2020-10-27 PROCEDURE — 99213 OFFICE O/P EST LOW 20 MIN: CPT | Mod: 25

## 2020-10-27 PROCEDURE — 99072 ADDL SUPL MATRL&STAF TM PHE: CPT

## 2020-10-27 RX ORDER — METHOTREXATE 12.5 MG/.4ML
12.5 INJECTION, SOLUTION SUBCUTANEOUS
Qty: 0 | Refills: 0 | Status: COMPLETED | OUTPATIENT
Start: 2020-10-27

## 2020-10-27 RX ADMIN — METHOTREXATE 0 MG/0.4ML: 12.5 INJECTION, SOLUTION SUBCUTANEOUS at 00:00

## 2020-10-28 PROBLEM — M32.9 H/O SYSTEMIC LUPUS ERYTHEMATOSUS (SLE): Noted: 2019-01-31

## 2020-10-28 NOTE — REVIEW OF SYSTEMS
[Fever] : no fever [Chills] : no chills [Eye Pain] : no eye pain [Sore Throat] : no sore throat [Hoarseness] : no hoarseness [Chest Pain] : no chest pain [Palpitations] : no palpitations [Cough] : no cough [SOB on Exertion] : no shortness of breath during exertion [Heartburn] : no heartburn [Arthralgias] : arthralgias [Joint Pain] : joint pain [Joint Stiffness] : joint stiffness [Skin Lesions] : no skin lesions [Skin Wound] : no skin wound [Limb Weakness] : no limb weakness [Difficulty Walking] : no difficulty walking [Depression] : depression [Muscle Weakness] : no muscle weakness [Feelings Of Weakness] : no feelings of weakness [Easy Bleeding] : no tendency for easy bleeding [Easy Bruising] : no tendency for easy bruising

## 2020-10-28 NOTE — CONSULT LETTER
[Dear  ___] : Dear  [unfilled], [Courtesy Letter:] : I had the pleasure of seeing your patient, [unfilled], in my office today. [Please see my note below.] : Please see my note below. [Consult Closing:] : Thank you very much for allowing me to participate in the care of this patient.  If you have any questions, please do not hesitate to contact me. [Sincerely,] : Sincerely, [FreeTextEntry2] : Roe Sales MD\par [facility] [FreeTextEntry3] : Tamiko Duff M.D.\par  of Medicine \par [facility]/[place]\par \par

## 2020-10-28 NOTE — PHYSICAL EXAM
[General Appearance - Alert] : alert [General Appearance - In No Acute Distress] : in no acute distress [Sclera] : the sclera and conjunctiva were normal [Examination Of The Oral Cavity] : the lips and gums were normal [Oropharynx] : the oropharynx was normal [Neck Appearance] : the appearance of the neck was normal [No Spinal Tenderness] : no spinal tenderness [Motor Tone] : muscle strength and tone were normal [] : no rash [Skin Lesions] : no skin lesions [Motor Exam] : the motor exam was normal [Oriented To Time, Place, And Person] : oriented to person, place, and time [Impaired Insight] : insight and judgment were intact [FreeTextEntry1] : low mood

## 2020-10-28 NOTE — HISTORY OF PRESENT ILLNESS
[FreeTextEntry1] : Patient returns for follow up and explain continued arthralgias mainly in the hands and hips. She acknowledges weight gain and fine low mood during this pandemia. She reports intermittent stiffness of the hands along with stiffness of the knees leading to instability symptoms. She reports worsening pain over the hip away noted to be lying on that side. She denies accompanied swelling, motor or sensory disturbances. Patient with YOKO positivity 1:320 homogenous pattern with Ro positivity. upon further inquiry, patient recalls having been on methotrexate pills over 5 years ago and does not recall why she discontinued. She explains she continues on to many pills.  Patient continues on hydroxychloroquine and and has been cleared by optometry in regards to continuation. She otherwise denies any symptoms of oral ulcers, dyspnea, chest pain, rash, Raynaud's or systemic symptoms.

## 2020-10-28 NOTE — PROCEDURE
[Other Date:___] : Date: [unfilled] [Patient] : the patient [Risks] : risks [Benefits] : benefits [Consent Obtained] : written consent was obtained prior to the procedure and is detailed in the patient's record [Therapeutic] : therapeutic [#1 Site: ______] : #1 site identified in the [unfilled] [Alcohol] : alcohol [Tolerated Well] : the patient tolerated the procedure well [No Complications] : there were no complications [Patient Instructed to Call] : patient was instructed to call if redness at site, a decrease in range of motion or an increase in pain is noted after procedure. [FreeTextEntry5] : Otrexup 12.5mg injected

## 2020-10-28 NOTE — ASSESSMENT
[FreeTextEntry1] : Patient with SLE; resolved covid 19 infection with arthralgias:\par \par Patient's connective tissue disease had been relatively quiescent before covid-19 infection in the spring. Patient continues with diffuse arthralgias with low mood and would consider initiating disease modifying agent such as methotrexate (MTX)in combination with hydroxychloroquine at this point to assist with arthralgias. Sample of MTX administered and demonstrated for patient. She understands the increased risk of serious infections, bone marrow, hepatic and renal abnormalities related to therapy and the importance of surveillance labs every three months.\par Patient will benefit from physical therapy to help with joint mobility and muscle strengthening.  Quadriceps strengthening exercises rec.  Weight loss has been encouraged to reduce load over the medial joint line. Viscosupplementation has been encouraged to provide additional lubrication and joint support. \par She is in agreement with the above plan and will return in three months' time.\par

## 2020-11-11 ENCOUNTER — LABORATORY RESULT (OUTPATIENT)
Age: 55
End: 2020-11-11

## 2020-11-12 LAB
25(OH)D3 SERPL-MCNC: 44 NG/ML
ALBUMIN SERPL ELPH-MCNC: 4.9 G/DL
ALP BLD-CCNC: 87 U/L
ALT SERPL-CCNC: 21 U/L
ANION GAP SERPL CALC-SCNC: 14 MMOL/L
APPEARANCE: ABNORMAL
AST SERPL-CCNC: 26 U/L
BASOPHILS # BLD AUTO: 0.06 K/UL
BASOPHILS NFR BLD AUTO: 1.9 %
BILIRUB SERPL-MCNC: 0.2 MG/DL
BILIRUBIN URINE: NEGATIVE
BLOOD URINE: NEGATIVE
BUN SERPL-MCNC: 7 MG/DL
CALCIUM SERPL-MCNC: 9.7 MG/DL
CHLORIDE SERPL-SCNC: 104 MMOL/L
CO2 SERPL-SCNC: 24 MMOL/L
COLOR: YELLOW
CREAT SERPL-MCNC: 0.98 MG/DL
CRP SERPL-MCNC: <0.1 MG/DL
EOSINOPHIL # BLD AUTO: 0.09 K/UL
EOSINOPHIL NFR BLD AUTO: 2.9 %
ERYTHROCYTE [SEDIMENTATION RATE] IN BLOOD BY WESTERGREN METHOD: 33 MM/HR
GLUCOSE QUALITATIVE U: NEGATIVE
GLUCOSE SERPL-MCNC: 98 MG/DL
HAV IGM SER QL: NONREACTIVE
HBV CORE IGM SER QL: NONREACTIVE
HBV SURFACE AG SER QL: NONREACTIVE
HCT VFR BLD CALC: 34.9 %
HCV AB SER QL: NONREACTIVE
HCV S/CO RATIO: 0.24 S/CO
HGB BLD-MCNC: 10.9 G/DL
IMM GRANULOCYTES NFR BLD AUTO: 0 %
KETONES URINE: NEGATIVE
LEUKOCYTE ESTERASE URINE: ABNORMAL
LYMPHOCYTES # BLD AUTO: 1.17 K/UL
LYMPHOCYTES NFR BLD AUTO: 37.6 %
MAN DIFF?: NORMAL
MCHC RBC-ENTMCNC: 30 PG
MCHC RBC-ENTMCNC: 31.2 GM/DL
MCV RBC AUTO: 96.1 FL
MONOCYTES # BLD AUTO: 0.21 K/UL
MONOCYTES NFR BLD AUTO: 6.8 %
NEUTROPHILS # BLD AUTO: 1.58 K/UL
NEUTROPHILS NFR BLD AUTO: 50.8 %
NITRITE URINE: POSITIVE
PH URINE: 7
PLATELET # BLD AUTO: 460 K/UL
POTASSIUM SERPL-SCNC: 4.5 MMOL/L
PROT SERPL-MCNC: 7.9 G/DL
PROTEIN URINE: NORMAL
RBC # BLD: 3.63 M/UL
RBC # FLD: 13.8 %
SARS-COV-2 IGG SERPL IA-ACNC: 0.08 INDEX
SARS-COV-2 IGG SERPL QL IA: NEGATIVE
SODIUM SERPL-SCNC: 142 MMOL/L
SPECIFIC GRAVITY URINE: 1.01
TSH SERPL-ACNC: 5.15 UIU/ML
UROBILINOGEN URINE: NORMAL
WBC # FLD AUTO: 3.11 K/UL

## 2020-11-13 LAB
M TB IFN-G BLD-IMP: NEGATIVE
QUANTIFERON TB PLUS MITOGEN MINUS NIL: 0.86 IU/ML
QUANTIFERON TB PLUS NIL: 0.01 IU/ML
QUANTIFERON TB PLUS TB1 MINUS NIL: 0 IU/ML
QUANTIFERON TB PLUS TB2 MINUS NIL: 0 IU/ML

## 2020-11-19 NOTE — ED PROVIDER NOTE - CPE EDP ENMT NORM
You do not need any further pap smears  Continue mammograms yearly    May use Replens vaginal moisturizer or coconut oil as needed for vaginal dryness.  
normal...

## 2020-12-01 ENCOUNTER — APPOINTMENT (OUTPATIENT)
Dept: RHEUMATOLOGY | Facility: CLINIC | Age: 55
End: 2020-12-01

## 2020-12-08 ENCOUNTER — LABORATORY RESULT (OUTPATIENT)
Age: 55
End: 2020-12-08

## 2020-12-08 ENCOUNTER — APPOINTMENT (OUTPATIENT)
Dept: RHEUMATOLOGY | Facility: CLINIC | Age: 55
End: 2020-12-08
Payer: MEDICARE

## 2020-12-08 VITALS
SYSTOLIC BLOOD PRESSURE: 131 MMHG | HEIGHT: 59 IN | RESPIRATION RATE: 17 BRPM | OXYGEN SATURATION: 100 % | BODY MASS INDEX: 30.44 KG/M2 | DIASTOLIC BLOOD PRESSURE: 77 MMHG | WEIGHT: 151 LBS | HEART RATE: 72 BPM | TEMPERATURE: 97.9 F

## 2020-12-08 DIAGNOSIS — M16.11 UNILATERAL PRIMARY OSTEOARTHRITIS, RIGHT HIP: ICD-10-CM

## 2020-12-08 PROCEDURE — 99213 OFFICE O/P EST LOW 20 MIN: CPT

## 2020-12-08 PROCEDURE — 99072 ADDL SUPL MATRL&STAF TM PHE: CPT

## 2020-12-09 PROBLEM — M16.11 ARTHROPATHY OF RIGHT HIP: Status: ACTIVE | Noted: 2020-12-08

## 2020-12-09 LAB
ALBUMIN MFR SERPL ELPH: 58.8 %
ALBUMIN SERPL ELPH-MCNC: 4.8 G/DL
ALBUMIN SERPL-MCNC: 4.4 G/DL
ALBUMIN/GLOB SERPL: 1.4 RATIO
ALP BLD-CCNC: 86 U/L
ALPHA1 GLOB MFR SERPL ELPH: 3.4 %
ALPHA1 GLOB SERPL ELPH-MCNC: 0.3 G/DL
ALPHA2 GLOB MFR SERPL ELPH: 8.8 %
ALPHA2 GLOB SERPL ELPH-MCNC: 0.7 G/DL
ALT SERPL-CCNC: 13 U/L
ANION GAP SERPL CALC-SCNC: 10 MMOL/L
APPEARANCE: CLEAR
AST SERPL-CCNC: 19 U/L
B-GLOBULIN MFR SERPL ELPH: 11.6 %
B-GLOBULIN SERPL ELPH-MCNC: 0.9 G/DL
BASOPHILS # BLD AUTO: 0.04 K/UL
BASOPHILS NFR BLD AUTO: 1.2 %
BILIRUB SERPL-MCNC: <0.2 MG/DL
BILIRUBIN URINE: NEGATIVE
BLOOD URINE: NEGATIVE
BUN SERPL-MCNC: 12 MG/DL
C3 SERPL-MCNC: 120 MG/DL
C4 SERPL-MCNC: 25 MG/DL
CALCIUM SERPL-MCNC: 9.4 MG/DL
CHLORIDE SERPL-SCNC: 105 MMOL/L
CO2 SERPL-SCNC: 25 MMOL/L
COLOR: YELLOW
CREAT SERPL-MCNC: 0.94 MG/DL
CRP SERPL-MCNC: <0.1 MG/DL
DSDNA AB SER-ACNC: <12 IU/ML
EOSINOPHIL # BLD AUTO: 0.06 K/UL
EOSINOPHIL NFR BLD AUTO: 1.8 %
ERYTHROCYTE [SEDIMENTATION RATE] IN BLOOD BY WESTERGREN METHOD: 14 MM/HR
GAMMA GLOB FLD ELPH-MCNC: 1.3 G/DL
GAMMA GLOB MFR SERPL ELPH: 17.4 %
GLUCOSE QUALITATIVE U: NEGATIVE
GLUCOSE SERPL-MCNC: 70 MG/DL
HCT VFR BLD CALC: 33.8 %
HGB BLD-MCNC: 10.1 G/DL
IMM GRANULOCYTES NFR BLD AUTO: 0 %
INTERPRETATION SERPL IEP-IMP: NORMAL
KETONES URINE: NEGATIVE
LEUKOCYTE ESTERASE URINE: NEGATIVE
LYMPHOCYTES # BLD AUTO: 1.65 K/UL
LYMPHOCYTES NFR BLD AUTO: 48.2 %
MAN DIFF?: NORMAL
MCHC RBC-ENTMCNC: 29.9 GM/DL
MCHC RBC-ENTMCNC: 30.3 PG
MCV RBC AUTO: 101.5 FL
MONOCYTES # BLD AUTO: 0.33 K/UL
MONOCYTES NFR BLD AUTO: 9.6 %
NEUTROPHILS # BLD AUTO: 1.34 K/UL
NEUTROPHILS NFR BLD AUTO: 39.2 %
NITRITE URINE: NEGATIVE
PH URINE: 6
PLATELET # BLD AUTO: 393 K/UL
POTASSIUM SERPL-SCNC: 4.5 MMOL/L
PROT SERPL-MCNC: 7.5 G/DL
PROT SERPL-MCNC: 7.5 G/DL
PROT SERPL-MCNC: 7.7 G/DL
PROTEIN URINE: NORMAL
RBC # BLD: 3.33 M/UL
RBC # FLD: 14.6 %
SODIUM SERPL-SCNC: 140 MMOL/L
SPECIFIC GRAVITY URINE: 1.03
UROBILINOGEN URINE: NORMAL
WBC # FLD AUTO: 3.42 K/UL

## 2020-12-09 NOTE — REVIEW OF SYSTEMS
[Arthralgias] : arthralgias [Joint Pain] : joint pain [Joint Stiffness] : joint stiffness [Depression] : depression [Fever] : no fever [Chills] : no chills [Eye Pain] : no eye pain [Sore Throat] : no sore throat [Hoarseness] : no hoarseness [Chest Pain] : no chest pain [Palpitations] : no palpitations [Cough] : no cough [SOB on Exertion] : no shortness of breath during exertion [Heartburn] : no heartburn [Skin Lesions] : no skin lesions [Skin Wound] : no skin wound [Limb Weakness] : no limb weakness [Difficulty Walking] : no difficulty walking [Muscle Weakness] : no muscle weakness [Feelings Of Weakness] : no feelings of weakness [Easy Bleeding] : no tendency for easy bleeding [Easy Bruising] : no tendency for easy bruising

## 2020-12-09 NOTE — HISTORY OF PRESENT ILLNESS
[FreeTextEntry1] : Patient presents with recent MR imaging of the brain with several punctate supratentorial white matter hyperintensities as well as a suggestion of mild thickening of the calvarium. Etiologies including  Paget's disease discussed. Patient describes pain over the right hip otherwise explains mild tinnitus. She finds the hearing and imbalance as well as visual disturbances are all symptoms experienced after covid-19 infection in the spring. Patient explains the methotrexate dosing of 12.5 mg weekly has improved arthralgias by at least 50% notably in the hands and feet. She otherwise denies accompanied joint swelling. She further denies oral ulcers, dyspnea, chest pain, motor sensory disturbances or fevers.

## 2020-12-09 NOTE — CONSULT LETTER
[Dear  ___] : Dear  [unfilled], [Courtesy Letter:] : I had the pleasure of seeing your patient, [unfilled], in my office today. [Please see my note below.] : Please see my note below. [Consult Closing:] : Thank you very much for allowing me to participate in the care of this patient.  If you have any questions, please do not hesitate to contact me. [Sincerely,] : Sincerely, [FreeTextEntry2] : Roe Sales MD\par [facility] [FreeTextEntry3] : Tamiko Duff M.D.\par  of Medicine \par [facility] at [facility]/[place]\par \par

## 2020-12-09 NOTE — ASSESSMENT
[FreeTextEntry1] : Patient with SLE; resolved covid 19 infection with arthralgias:\par \par Patient's connective tissue disease had been relatively quiescent before covid-19 infection in the spring. Patient continues with diffuse arthralgias with low mood and would continue disease modifying agent such as methotrexate (MTX)in combination with hydroxychloroquine at this point to assist with arthralgias. She understands the increased risk of serious infections, bone marrow, hepatic and renal abnormalities related to therapy and the importance of surveillance labs every three months.\par Right  hip and pelvis films requested in addition to additional blood markers assessing her Paget's.\par I don't feel the hyperintensity lesions are related to an active inflammatory state as patient's symptoms and exam are not c/w flare.  \par Patient will benefit from physical therapy to help with joint mobility and muscle strengthening.  Quadriceps strengthening exercises rec.  Weight loss has been encouraged to reduce load over the medial joint line. Viscosupplementation has been encouraged to provide additional lubrication and joint support. \par She is in agreement with the above plan and will return in three months' time.\par

## 2020-12-10 LAB
ALBUPE: 11.6 %
ALPHA1UPE: 36.9 %
ALPHA2UPE: 18.7 %
BETAUPE: 13 %
CREAT 24H UR-MCNC: NORMAL G/24 H
CREATININE UR (MAYO): 197 MG/DL
GAMMAUPE: 19.8 %
IGA 24H UR QL IFE: NORMAL
KAPPA LC 24H UR QL: NORMAL
PROT PATTERN 24H UR ELPH-IMP: NORMAL
PROT UR-MCNC: 32 MG/DL
PROT UR-MCNC: 32 MG/DL
SPECIMEN VOL 24H UR: NORMAL ML

## 2020-12-11 LAB — COLLAGEN CTX SERPL-MCNC: 700 PG/ML

## 2020-12-15 ENCOUNTER — APPOINTMENT (OUTPATIENT)
Dept: INTERNAL MEDICINE | Facility: CLINIC | Age: 55
End: 2020-12-15
Payer: MEDICARE

## 2020-12-15 VITALS
OXYGEN SATURATION: 96 % | TEMPERATURE: 97.6 F | BODY MASS INDEX: 30.64 KG/M2 | HEART RATE: 83 BPM | SYSTOLIC BLOOD PRESSURE: 126 MMHG | WEIGHT: 152 LBS | RESPIRATION RATE: 16 BRPM | DIASTOLIC BLOOD PRESSURE: 84 MMHG | HEIGHT: 59 IN

## 2020-12-15 PROCEDURE — 99072 ADDL SUPL MATRL&STAF TM PHE: CPT

## 2020-12-15 PROCEDURE — 99214 OFFICE O/P EST MOD 30 MIN: CPT

## 2020-12-15 RX ORDER — PREDNISONE 10 MG/1
10 TABLET ORAL
Qty: 21 | Refills: 0 | Status: DISCONTINUED | COMMUNITY
Start: 2020-06-27 | End: 2020-12-15

## 2020-12-15 RX ORDER — NAPROXEN 375 MG/1
375 TABLET ORAL
Qty: 180 | Refills: 1 | Status: DISCONTINUED | COMMUNITY
Start: 2020-07-21 | End: 2020-12-15

## 2020-12-15 RX ORDER — PANTOPRAZOLE 40 MG/1
40 TABLET, DELAYED RELEASE ORAL
Qty: 30 | Refills: 3 | Status: DISCONTINUED | COMMUNITY
Start: 2020-05-07 | End: 2020-12-15

## 2020-12-15 RX ORDER — AMOXICILLIN AND CLAVULANATE POTASSIUM 875; 125 MG/1; MG/1
875-125 TABLET, COATED ORAL TWICE DAILY
Qty: 20 | Refills: 0 | Status: DISCONTINUED | COMMUNITY
Start: 2020-11-16 | End: 2020-12-15

## 2020-12-15 RX ORDER — FLUTICASONE PROPIONATE 50 UG/1
50 SPRAY, METERED NASAL
Qty: 1 | Refills: 4 | Status: DISCONTINUED | COMMUNITY
Start: 2020-02-12 | End: 2020-12-15

## 2020-12-15 RX ORDER — GABAPENTIN 100 MG/1
100 CAPSULE ORAL
Qty: 30 | Refills: 1 | Status: DISCONTINUED | COMMUNITY
Start: 2020-09-02 | End: 2020-12-15

## 2020-12-15 NOTE — ASSESSMENT
[FreeTextEntry1] : 55 year old female found to have stable Hypertension, Fibromyalgia, Reactive Airway Disease, Hypothyroidism, Hypercholesterolemia with Hypertriglyceridemia, Insomnia, Migraine, vertigo, GERD,with the current regimen, diet and life style modifications, as counseled. Prior results reviewed and discussed with the patient during today's examination. Plan as ordered.\par Patient was recently evaluated by RHEUM , findings and recommendations reviewed with the patient during today's examination, possible Paget's disease, as counseled.\par

## 2020-12-15 NOTE — HEALTH RISK ASSESSMENT
[No] : In the past 12 months have you used drugs other than those required for medical reasons? No [No falls in past year] : Patient reported no falls in the past year [1] : 2) Feeling down, depressed, or hopeless for several days (1) [] : No [de-identified] : RHEUM/NEURO [FreeTextEntry1] : Non-suicidal at this time. [LCB1Thpch] : 2

## 2020-12-15 NOTE — HISTORY OF PRESENT ILLNESS
[de-identified] : 55 year old  female patient with history of stable Hypertension, Fibromyalgia, Reactive Airway Disease, Hypothyroidism, Hypercholesterolemia with Hypertriglyceridemia, Insomnia, Migraine, vertigo, GERD, history as stated, presented for follow up examination. Patient is compliant with all medications. Denies shortness of breath, chest pain or abdominal pains at this time. ROS as stated.\par

## 2020-12-16 LAB — COLLAGEN NTX SER-SCNC: 25.6

## 2021-01-06 ENCOUNTER — NON-APPOINTMENT (OUTPATIENT)
Age: 56
End: 2021-01-06

## 2021-01-14 ENCOUNTER — RX RENEWAL (OUTPATIENT)
Age: 56
End: 2021-01-14

## 2021-01-18 ENCOUNTER — RX RENEWAL (OUTPATIENT)
Age: 56
End: 2021-01-18

## 2021-01-29 ENCOUNTER — RX RENEWAL (OUTPATIENT)
Age: 56
End: 2021-01-29

## 2021-02-15 ENCOUNTER — APPOINTMENT (OUTPATIENT)
Dept: INTERNAL MEDICINE | Facility: CLINIC | Age: 56
End: 2021-02-15

## 2021-03-01 ENCOUNTER — APPOINTMENT (OUTPATIENT)
Dept: INTERNAL MEDICINE | Facility: CLINIC | Age: 56
End: 2021-03-01

## 2021-03-09 ENCOUNTER — APPOINTMENT (OUTPATIENT)
Dept: RHEUMATOLOGY | Facility: CLINIC | Age: 56
End: 2021-03-09

## 2021-03-10 ENCOUNTER — APPOINTMENT (OUTPATIENT)
Dept: RHEUMATOLOGY | Facility: CLINIC | Age: 56
End: 2021-03-10

## 2021-03-18 ENCOUNTER — APPOINTMENT (OUTPATIENT)
Dept: INTERNAL MEDICINE | Facility: CLINIC | Age: 56
End: 2021-03-18
Payer: COMMERCIAL

## 2021-03-18 PROCEDURE — 99441: CPT

## 2021-03-18 NOTE — HISTORY OF PRESENT ILLNESS
[Home] : at home, [unfilled] , at the time of the visit. [Medical Office: ([facility])___] : at the medical office located in  [Verbal consent obtained from patient] : the patient, [unfilled] [FreeTextEntry4] : NOVA Cook [de-identified] : 55 year female  patient with history of stable Hypertension, Fibromyalgia, Reactive Airway Disease, Hypothyroidism, Hypercholesterolemia with Hypertriglyceridemia, Insomnia, Migraine, vertigo, GERD, history as stated, initiated telephonic visit for Disease Management and Medication Adherence.\par

## 2021-03-18 NOTE — ASSESSMENT
[FreeTextEntry1] : Time spent for this encounter : 10  minutes.\par More than 50 % of the time spent for - Disease Management and Medication Adherence.\par \par 55 year F patient found to have stable Hypertension, Fibromyalgia, Reactive Airway Disease, Hypothyroidism, Hypercholesterolemia with Hypertriglyceridemia, Insomnia, Migraine, vertigo, GERD,with the current regimen, diet and life style modifications, as counseled. Prior results reviewed and discussed with the patient during today's Telephonic encounter. Plan as ordered.\par \par Patient granted verbal permission to provide Telephonic/Tele Health service in reference to today's encounter, as a substitute form of a visit, for a standard office visit encounter in the phase of an ongoing Pandemic / Covid -19, with the awareness and acceptance of a possible limited nature of such an encounter, with a possibility of an inadvertent omission of possible findings and misleading treatment options, due to possible erroneous and/or incomplete diagnostic impressions.\par

## 2021-03-24 LAB
ALBUMIN SERPL ELPH-MCNC: 4.5 G/DL
ALP BLD-CCNC: 86 U/L
ALT SERPL-CCNC: 14 U/L
ANION GAP SERPL CALC-SCNC: 12 MMOL/L
AST SERPL-CCNC: 21 U/L
BASOPHILS # BLD AUTO: 0.06 K/UL
BASOPHILS NFR BLD AUTO: 1.6 %
BILIRUB SERPL-MCNC: <0.2 MG/DL
BUN SERPL-MCNC: 17 MG/DL
CALCIUM SERPL-MCNC: 9.2 MG/DL
CHLORIDE SERPL-SCNC: 104 MMOL/L
CHOLEST SERPL-MCNC: 160 MG/DL
CO2 SERPL-SCNC: 23 MMOL/L
CREAT SERPL-MCNC: 0.98 MG/DL
EOSINOPHIL # BLD AUTO: 0.09 K/UL
EOSINOPHIL NFR BLD AUTO: 2.4 %
ESTIMATED AVERAGE GLUCOSE: 111 MG/DL
GGT SERPL-CCNC: 27 U/L
GLUCOSE SERPL-MCNC: 198 MG/DL
HBA1C MFR BLD HPLC: 5.5 %
HCT VFR BLD CALC: 31.9 %
HDLC SERPL-MCNC: 56 MG/DL
HGB BLD-MCNC: 9.5 G/DL
IMM GRANULOCYTES NFR BLD AUTO: 0.3 %
LDLC SERPL CALC-MCNC: 88 MG/DL
LYMPHOCYTES # BLD AUTO: 1 K/UL
LYMPHOCYTES NFR BLD AUTO: 26.2 %
MAN DIFF?: NORMAL
MCHC RBC-ENTMCNC: 29.8 GM/DL
MCHC RBC-ENTMCNC: 30.2 PG
MCV RBC AUTO: 101.3 FL
MONOCYTES # BLD AUTO: 0.29 K/UL
MONOCYTES NFR BLD AUTO: 7.6 %
NEUTROPHILS # BLD AUTO: 2.36 K/UL
NEUTROPHILS NFR BLD AUTO: 61.9 %
NONHDLC SERPL-MCNC: 104 MG/DL
PLATELET # BLD AUTO: 405 K/UL
POTASSIUM SERPL-SCNC: 3.6 MMOL/L
PROT SERPL-MCNC: 7.4 G/DL
RBC # BLD: 3.15 M/UL
RBC # FLD: 14 %
SODIUM SERPL-SCNC: 138 MMOL/L
T3 SERPL-MCNC: 66 NG/DL
T4 FREE SERPL-MCNC: 1.2 NG/DL
TRIGL SERPL-MCNC: 80 MG/DL
TSH SERPL-ACNC: 2.33 UIU/ML
WBC # FLD AUTO: 3.81 K/UL

## 2021-03-25 ENCOUNTER — NON-APPOINTMENT (OUTPATIENT)
Age: 56
End: 2021-03-25

## 2021-04-07 ENCOUNTER — NON-APPOINTMENT (OUTPATIENT)
Age: 56
End: 2021-04-07

## 2021-04-07 ENCOUNTER — INPATIENT (INPATIENT)
Facility: HOSPITAL | Age: 56
LOS: 2 days | Discharge: HOME | DRG: 312 | End: 2021-04-10
Attending: HOSPITALIST | Admitting: HOSPITALIST
Payer: MEDICARE

## 2021-04-07 VITALS
SYSTOLIC BLOOD PRESSURE: 156 MMHG | HEART RATE: 68 BPM | WEIGHT: 143.08 LBS | OXYGEN SATURATION: 100 % | HEIGHT: 59 IN | DIASTOLIC BLOOD PRESSURE: 95 MMHG | TEMPERATURE: 99 F | RESPIRATION RATE: 16 BRPM

## 2021-04-07 DIAGNOSIS — Z98.890 OTHER SPECIFIED POSTPROCEDURAL STATES: Chronic | ICD-10-CM

## 2021-04-07 DIAGNOSIS — Z98.51 TUBAL LIGATION STATUS: Chronic | ICD-10-CM

## 2021-04-07 DIAGNOSIS — Z98.89 OTHER SPECIFIED POSTPROCEDURAL STATES: Chronic | ICD-10-CM

## 2021-04-07 DIAGNOSIS — R55 SYNCOPE AND COLLAPSE: ICD-10-CM

## 2021-04-07 LAB
ALBUMIN SERPL ELPH-MCNC: 4.5 G/DL — SIGNIFICANT CHANGE UP (ref 3.3–5)
ALP SERPL-CCNC: 94 U/L — SIGNIFICANT CHANGE UP (ref 40–120)
ALT FLD-CCNC: 14 U/L — SIGNIFICANT CHANGE UP (ref 10–45)
ANION GAP SERPL CALC-SCNC: 14 MMOL/L — SIGNIFICANT CHANGE UP (ref 5–17)
APPEARANCE UR: CLEAR — SIGNIFICANT CHANGE UP
APTT BLD: 29.9 SEC — SIGNIFICANT CHANGE UP (ref 27.5–35.5)
AST SERPL-CCNC: 35 U/L — SIGNIFICANT CHANGE UP (ref 10–40)
BASOPHILS # BLD AUTO: 0.04 K/UL — SIGNIFICANT CHANGE UP (ref 0–0.2)
BASOPHILS NFR BLD AUTO: 1 % — SIGNIFICANT CHANGE UP (ref 0–2)
BILIRUB SERPL-MCNC: 0.2 MG/DL — SIGNIFICANT CHANGE UP (ref 0.2–1.2)
BILIRUB UR-MCNC: NEGATIVE — SIGNIFICANT CHANGE UP
BUN SERPL-MCNC: 16 MG/DL — SIGNIFICANT CHANGE UP (ref 7–23)
CALCIUM SERPL-MCNC: 9.1 MG/DL — SIGNIFICANT CHANGE UP (ref 8.4–10.5)
CHLORIDE SERPL-SCNC: 105 MMOL/L — SIGNIFICANT CHANGE UP (ref 96–108)
CO2 SERPL-SCNC: 21 MMOL/L — LOW (ref 22–31)
COLOR SPEC: COLORLESS — SIGNIFICANT CHANGE UP
CREAT SERPL-MCNC: 0.79 MG/DL — SIGNIFICANT CHANGE UP (ref 0.5–1.3)
DIFF PNL FLD: NEGATIVE — SIGNIFICANT CHANGE UP
EOSINOPHIL # BLD AUTO: 0.12 K/UL — SIGNIFICANT CHANGE UP (ref 0–0.5)
EOSINOPHIL NFR BLD AUTO: 3.1 % — SIGNIFICANT CHANGE UP (ref 0–6)
GLUCOSE SERPL-MCNC: 83 MG/DL — SIGNIFICANT CHANGE UP (ref 70–99)
GLUCOSE UR QL: NEGATIVE — SIGNIFICANT CHANGE UP
HCT VFR BLD CALC: 30.9 % — LOW (ref 34.5–45)
HGB BLD-MCNC: 9.7 G/DL — LOW (ref 11.5–15.5)
IMM GRANULOCYTES NFR BLD AUTO: 0.3 % — SIGNIFICANT CHANGE UP (ref 0–1.5)
INR BLD: 1.09 RATIO — SIGNIFICANT CHANGE UP (ref 0.88–1.16)
KETONES UR-MCNC: NEGATIVE — SIGNIFICANT CHANGE UP
LEUKOCYTE ESTERASE UR-ACNC: NEGATIVE — SIGNIFICANT CHANGE UP
LIDOCAIN IGE QN: 30 U/L — SIGNIFICANT CHANGE UP (ref 7–60)
LYMPHOCYTES # BLD AUTO: 1.52 K/UL — SIGNIFICANT CHANGE UP (ref 1–3.3)
LYMPHOCYTES # BLD AUTO: 39.6 % — SIGNIFICANT CHANGE UP (ref 13–44)
MCHC RBC-ENTMCNC: 30.1 PG — SIGNIFICANT CHANGE UP (ref 27–34)
MCHC RBC-ENTMCNC: 31.4 GM/DL — LOW (ref 32–36)
MCV RBC AUTO: 96 FL — SIGNIFICANT CHANGE UP (ref 80–100)
MONOCYTES # BLD AUTO: 0.4 K/UL — SIGNIFICANT CHANGE UP (ref 0–0.9)
MONOCYTES NFR BLD AUTO: 10.4 % — SIGNIFICANT CHANGE UP (ref 2–14)
NEUTROPHILS # BLD AUTO: 1.75 K/UL — LOW (ref 1.8–7.4)
NEUTROPHILS NFR BLD AUTO: 45.6 % — SIGNIFICANT CHANGE UP (ref 43–77)
NITRITE UR-MCNC: NEGATIVE — SIGNIFICANT CHANGE UP
NRBC # BLD: 0 /100 WBCS — SIGNIFICANT CHANGE UP (ref 0–0)
PH UR: 7.5 — SIGNIFICANT CHANGE UP (ref 5–8)
PLATELET # BLD AUTO: 413 K/UL — HIGH (ref 150–400)
POTASSIUM SERPL-MCNC: 4.5 MMOL/L — SIGNIFICANT CHANGE UP (ref 3.5–5.3)
POTASSIUM SERPL-SCNC: 4.5 MMOL/L — SIGNIFICANT CHANGE UP (ref 3.5–5.3)
PROT SERPL-MCNC: 8 G/DL — SIGNIFICANT CHANGE UP (ref 6–8.3)
PROT UR-MCNC: NEGATIVE — SIGNIFICANT CHANGE UP
PROTHROM AB SERPL-ACNC: 13 SEC — SIGNIFICANT CHANGE UP (ref 10.6–13.6)
RBC # BLD: 3.22 M/UL — LOW (ref 3.8–5.2)
RBC # FLD: 13.2 % — SIGNIFICANT CHANGE UP (ref 10.3–14.5)
SARS-COV-2 RNA SPEC QL NAA+PROBE: SIGNIFICANT CHANGE UP
SODIUM SERPL-SCNC: 140 MMOL/L — SIGNIFICANT CHANGE UP (ref 135–145)
SP GR SPEC: 1.01 — LOW (ref 1.01–1.02)
TROPONIN T, HIGH SENSITIVITY RESULT: <6 NG/L — SIGNIFICANT CHANGE UP (ref 0–51)
UROBILINOGEN FLD QL: NEGATIVE — SIGNIFICANT CHANGE UP
WBC # BLD: 3.84 K/UL — SIGNIFICANT CHANGE UP (ref 3.8–10.5)
WBC # FLD AUTO: 3.84 K/UL — SIGNIFICANT CHANGE UP (ref 3.8–10.5)

## 2021-04-07 PROCEDURE — 99223 1ST HOSP IP/OBS HIGH 75: CPT | Mod: GC

## 2021-04-07 PROCEDURE — 70498 CT ANGIOGRAPHY NECK: CPT | Mod: 26,MA

## 2021-04-07 PROCEDURE — 70496 CT ANGIOGRAPHY HEAD: CPT | Mod: 26,MA

## 2021-04-07 PROCEDURE — 71045 X-RAY EXAM CHEST 1 VIEW: CPT | Mod: 26

## 2021-04-07 PROCEDURE — 99285 EMERGENCY DEPT VISIT HI MDM: CPT

## 2021-04-07 PROCEDURE — 93010 ELECTROCARDIOGRAM REPORT: CPT

## 2021-04-07 RX ORDER — LEVOTHYROXINE SODIUM 125 MCG
1 TABLET ORAL
Qty: 0 | Refills: 0 | DISCHARGE

## 2021-04-07 RX ORDER — SODIUM CHLORIDE 9 MG/ML
1000 INJECTION INTRAMUSCULAR; INTRAVENOUS; SUBCUTANEOUS ONCE
Refills: 0 | Status: COMPLETED | OUTPATIENT
Start: 2021-04-07 | End: 2021-04-07

## 2021-04-07 RX ORDER — ATORVASTATIN CALCIUM 80 MG/1
20 TABLET, FILM COATED ORAL AT BEDTIME
Refills: 0 | Status: DISCONTINUED | OUTPATIENT
Start: 2021-04-07 | End: 2021-04-10

## 2021-04-07 RX ORDER — LEVOTHYROXINE SODIUM 125 MCG
100 TABLET ORAL DAILY
Refills: 0 | Status: DISCONTINUED | OUTPATIENT
Start: 2021-04-07 | End: 2021-04-10

## 2021-04-07 RX ORDER — PANTOPRAZOLE SODIUM 20 MG/1
1 TABLET, DELAYED RELEASE ORAL
Qty: 0 | Refills: 0 | DISCHARGE

## 2021-04-07 RX ORDER — MELOXICAM 15 MG/1
1 TABLET ORAL
Qty: 0 | Refills: 0 | DISCHARGE

## 2021-04-07 RX ORDER — CLONAZEPAM 1 MG
1 TABLET ORAL
Qty: 0 | Refills: 0 | DISCHARGE

## 2021-04-07 RX ORDER — ACETAMINOPHEN 500 MG
650 TABLET ORAL ONCE
Refills: 0 | Status: COMPLETED | OUTPATIENT
Start: 2021-04-07 | End: 2021-04-07

## 2021-04-07 RX ORDER — METOCLOPRAMIDE HCL 10 MG
10 TABLET ORAL ONCE
Refills: 0 | Status: COMPLETED | OUTPATIENT
Start: 2021-04-07 | End: 2021-04-07

## 2021-04-07 RX ORDER — SUCRALFATE 1 G
1 TABLET ORAL
Qty: 0 | Refills: 0 | DISCHARGE

## 2021-04-07 RX ADMIN — SODIUM CHLORIDE 1000 MILLILITER(S): 9 INJECTION INTRAMUSCULAR; INTRAVENOUS; SUBCUTANEOUS at 17:08

## 2021-04-07 RX ADMIN — Medication 10 MILLIGRAM(S): at 17:08

## 2021-04-07 RX ADMIN — Medication 650 MILLIGRAM(S): at 21:36

## 2021-04-07 RX ADMIN — Medication 650 MILLIGRAM(S): at 22:15

## 2021-04-07 NOTE — H&P ADULT - NSHPLABSRESULTS_GEN_ALL_CORE
LABS: Personally reviewed labs, imaging, and ECG                        9.7    3.84  )-----------( 413      ( 2021 16:30 )             30.9    04-07    140  |  105  |  16  ----------------------------<  83  4.5   |  21<L>  |  0.79    Ca    9.1      2021 16:30    TPro  8.0  /  Alb  4.5  /  TBili  0.2  /  DBili  x   /  AST  35  /  ALT  14  /  AlkPhos  94         LIVER FUNCTIONS - ( 2021 16:30 )  Alb: 4.5 g/dL / Pro: 8.0 g/dL / ALK PHOS: 94 U/L / ALT: 14 U/L / AST: 35 U/L / GGT: x           Urinalysis Basic - ( 2021 18:43 )    Color: Colorless / Appearance: Clear / S.009 / pH: x  Gluc: x / Ketone: Negative  / Bili: Negative / Urobili: Negative   Blood: x / Protein: Negative / Nitrite: Negative   Leuk Esterase: Negative / RBC: x / WBC x   Sq Epi: x / Non Sq Epi: x / Bacteria: x      PT/INR - ( 2021 17:07 )   PT: 13.0 sec;   INR: 1.09 ratio         PTT - ( 2021 17:07 )  PTT:29.9 sec      POCT Blood Glucose.: 74 mg/dL (2021 15:41)      RADIOLOGY & ADDITIONAL TESTS:     < from: Xray Chest 1 View AP/PA (21 @ 16:59) >  INTERPRETATION:  Clear lungs    < from: CT Head No Cont (21 @ 19:38) >  IMPRESSION:  NONCONTRAST HEAD CT: No mass effect or acute hemorrhage.  CTA NECK: No evidence of hemodynamically significant stenosis using NASCET criteria. No evidence of arterial dissection.  CTA BRAIN: No evidence of stenosis or occlusion.    < from: Transthoracic Echocardiogram (19 @ 06:49) >    CONCLUSIONS:  1. Normal mitral valve. Trace mitral regurgitation.  2. Normal trileaflet aortic valve.  3. Aortic Root: 3.4 cm.  4. Normal left atrium.  LA volume index = 17 cc/m2.  5. Normal left ventricular internal dimensions and wall  thicknesses.  6. Normal Left Ventricular Systolic Function,  (EF = 55%)  7. Normal diastolic function.  8. Normal right atrium.  9. Normal right ventricular size and systolic function  (TAPSE 1.9 cm).  10. RV systolic pressure is normal at  23 mm Hg.  11. There is trace tricuspid regurgitation.  12. There is mild pulmonic regurgitation.  13. Normal pericardium with no pericardial effusion.  Ejection Fraction Visual Estimate: 55 % LABS: Personally reviewed labs, imaging, and ECG                        9.7    3.84  )-----------( 413      ( 2021 16:30 )             30.9    04-07    140  |  105  |  16  ----------------------------<  83  4.5   |  21<L>  |  0.79    Ca    9.1      2021 16:30    TPro  8.0  /  Alb  4.5  /  TBili  0.2  /  DBili  x   /  AST  35  /  ALT  14  /  AlkPhos  94         LIVER FUNCTIONS - ( 2021 16:30 )  Alb: 4.5 g/dL / Pro: 8.0 g/dL / ALK PHOS: 94 U/L / ALT: 14 U/L / AST: 35 U/L / GGT: x           Urinalysis Basic - ( 2021 18:43 )    Color: Colorless / Appearance: Clear / S.009 / pH: x  Gluc: x / Ketone: Negative  / Bili: Negative / Urobili: Negative   Blood: x / Protein: Negative / Nitrite: Negative   Leuk Esterase: Negative / RBC: x / WBC x   Sq Epi: x / Non Sq Epi: x / Bacteria: x      PT/INR - ( 2021 17:07 )   PT: 13.0 sec;   INR: 1.09 ratio         PTT - ( 2021 17:07 )  PTT:29.9 sec      POCT Blood Glucose.: 74 mg/dL (2021 15:41)      RADIOLOGY & ADDITIONAL TESTS:     < from: Xray Chest 1 View AP/PA (21 @ 16:59) >  INTERPRETATION:  Clear lungs    < from: CT Head No Cont (21 @ 19:38) >  IMPRESSION:  NONCONTRAST HEAD CT: No mass effect or acute hemorrhage.  CTA NECK: No evidence of hemodynamically significant stenosis using NASCET criteria. No evidence of arterial dissection.  CTA BRAIN: No evidence of stenosis or occlusion.    < from: Transthoracic Echocardiogram (19 @ 06:49) >    CONCLUSIONS:  1. Normal mitral valve. Trace mitral regurgitation.  2. Normal trileaflet aortic valve.  3. Aortic Root: 3.4 cm.  4. Normal left atrium.  LA volume index = 17 cc/m2.  5. Normal left ventricular internal dimensions and wall  thicknesses.  6. Normal Left Ventricular Systolic Function,  (EF = 55%)  7. Normal diastolic function.  8. Normal right atrium.  9. Normal right ventricular size and systolic function  (TAPSE 1.9 cm).  10. RV systolic pressure is normal at  23 mm Hg.  11. There is trace tricuspid regurgitation.  12. There is mild pulmonic regurgitation.  13. Normal pericardium with no pericardial effusion.  Ejection Fraction Visual Estimate: 55 %    < from: Nuclear Stress Test-Pharmacologic (10.02.18 @ 11:29) >    NUCLEAR FINDINGS:  Review of raw data shows: The study is of good technical  quality.  The left ventricle was normal in size. Normal myocardial  perfusion scan, with no evidence of infarction or  inducible ischemia.  Transient ischemic dilation (TID) ratio was normal at:  1.22 LABS: Personally reviewed labs, imaging, and ECG                        9.7    3.84  )-----------( 413      ( 2021 16:30 )             30.9    04-07    140  |  105  |  16  ----------------------------<  83  4.5   |  21<L>  |  0.79    Ca    9.1      2021 16:30    TPro  8.0  /  Alb  4.5  /  TBili  0.2  /  DBili  x   /  AST  35  /  ALT  14  /  AlkPhos  94         LIVER FUNCTIONS - ( 2021 16:30 )  Alb: 4.5 g/dL / Pro: 8.0 g/dL / ALK PHOS: 94 U/L / ALT: 14 U/L / AST: 35 U/L / GGT: x           Urinalysis Basic - ( 2021 18:43 )    Color: Colorless / Appearance: Clear / S.009 / pH: x  Gluc: x / Ketone: Negative  / Bili: Negative / Urobili: Negative   Blood: x / Protein: Negative / Nitrite: Negative   Leuk Esterase: Negative / RBC: x / WBC x   Sq Epi: x / Non Sq Epi: x / Bacteria: x      PT/INR - ( 2021 17:07 )   PT: 13.0 sec;   INR: 1.09 ratio         PTT - ( 2021 17:07 )  PTT:29.9 sec      POCT Blood Glucose.: 74 mg/dL (2021 15:41)      RADIOLOGY & ADDITIONAL TESTS:     < from: Xray Chest 1 View AP/PA (21 @ 16:59) >  INTERPRETATION:  Clear lungs    < from: CT Head No Cont (21 @ 19:38) >  IMPRESSION:  NONCONTRAST HEAD CT: No mass effect or acute hemorrhage.  CTA NECK: No evidence of hemodynamically significant stenosis using NASCET criteria. No evidence of arterial dissection.  CTA BRAIN: No evidence of stenosis or occlusion.    < from: Transthoracic Echocardiogram (19 @ 06:49) >  CONCLUSIONS:  1. Normal mitral valve. Trace mitral regurgitation.  2. Normal trileaflet aortic valve.  3. Aortic Root: 3.4 cm.  4. Normal left atrium.  LA volume index = 17 cc/m2.  5. Normal left ventricular internal dimensions and wall  thicknesses.  6. Normal Left Ventricular Systolic Function,  (EF = 55%)  7. Normal diastolic function.  8. Normal right atrium.  9. Normal right ventricular size and systolic function  (TAPSE 1.9 cm).  10. RV systolic pressure is normal at  23 mm Hg.  11. There is trace tricuspid regurgitation.  12. There is mild pulmonic regurgitation.  13. Normal pericardium with no pericardial effusion.  Ejection Fraction Visual Estimate: 55 %    < from: Nuclear Stress Test-Pharmacologic (10.02.18 @ 11:29) >  NUCLEAR FINDINGS:  Review of raw data shows: The study is of good technical  quality.  The left ventricle was normal in size. Normal myocardial  perfusion scan, with no evidence of infarction or  inducible ischemia.  Transient ischemic dilation (TID) ratio was normal at:  1.22

## 2021-04-07 NOTE — H&P ADULT - ATTENDING COMMENTS
56 yo F with complex medical history including SLE, IBS, bipolar disorder, depression, CVA in 2013 with unknown cause, c/b seizures, p/w syncope x2, endorses feeling hot/cold/sweaty/weakness prior to event however also states she does not remember syncopizing, only remembers her home aide standing over her. DDx broad and includes seizures, stroke, orthostatic hypotension, arrhythmia. Plan for neuro consult, video EEG, c/w topiramate, (may augment regimen per neuro recs) check MR brain for structural lesions and r/o CVA, less likely lupus with CNS involvement though possible w/ longstanding SLE dx. Also check orthostatic vitals, monitor on tele, repeat TTE, check Utox. C/w home meds (no recent changes per pt), resident to document ISTOP.   Other problems:  hypothyroid- w/ hot/cold symptoms. Check TSH, c/w synthroid  Anemia- unknown etiology, mother had anemia, check iron studies, B12, folate, diff  diarrhea- pt tells me this is resolved, no blood seen, has flares of diverticulitis. Monitor CBC, vitals, stool count, if diarrhea resumes can send stool studies at that point    Plan d/w resident MD

## 2021-04-07 NOTE — H&P ADULT - PROBLEM SELECTOR PLAN 3
known anemia, baseline 9.9  f/u outpatient known anemia, normocytic, baseline Hb 9.9  - send iron studies, B12, folate, retic, ferritin

## 2021-04-07 NOTE — ED PROVIDER NOTE - ATTENDING CONTRIBUTION TO CARE
Attending MD Hinson:   I personally have seen and examined this patient.  Physician assistant note reviewed and agree on plan of care and except where noted.  See below for details.     Seen in [facility] 6    55F with PMH/PSH including migraine on Emgality,     TO BE COMPLETED Attending MD Hinson:   I personally have seen and examined this patient.  Physician assistant note reviewed and agree on plan of care and except where noted.  See below for details.     Seen in [facility] 6  PMD Dr. Cale Sales ([address] [facility])    55F with PMH/PSH including migrain, lupus, fibromyalgia, anemia, asthma, depression, bipolar, s/p C sections, CVA 2013 with LUE residual, on Tizanidine, Venlafaxine, Nortriptyline, topiramate, Albuterol, Omeprazole/Bicarb powder 20/1680. Trazodone, Clonazepam, Emgality presents to the ED with syncope.  Reports that today at around 230pm was on her bed paying bills when she felt lightheaded, reports began hearing the TV sounding more distant and then didn't hear it, reports home attendant found her on the bed, +LOC, reports "wet between her legs" after episode and home attendant helped to change her.  Denies loss of bowel continence.  Denies abdominal pain, blood in stools, black stools.  Reports had nausea and episode of emesis, has had a few episodes of loos stools recently.  Reports yesterday was with daughter signing papers and began feeling like she could see the papers but could not hear people speaking to her.  Reports went to get up from chair and fell sideways.  Reports "came to" with her daughter slapping her face.  Reports has had episodes of difficulty breathing and chest pain, none at present.  Reports attributed to indigestion and took Tums, worse at night, denies worse with exertion, denies LE edema.  Denies dysuria, hematuria, change in urinary habits including frequency, urgency. Denies fevers, chills.  Reports had COVID a year ago, denies recent URI symptoms, denies sick contacts, recent travel.  A ten (10) point review of systems was negative other than as stated in the HPI or elsewhere in the chart.    Exam:   General: NAD  HENT: head NCAT, airway patent, poor dentition  Eyes: PERRL  Lungs: lungs CTAB with good inspiratory effort, no wheezing, no rhonchi, no rales  Cardiac: +S1S2, no m/r/g, no LE edema  GI: abdomen soft with +BS, NT, ND  : no CVAT  MSK: FROM at neck, no tenderness to midline palpation, no stepoffs along length of spine, no calf tenderness, swelling, erythema or warmth  Neuro: moving all extremities with 5/5 strength bilateral upper and lower extremities, sensory grossly intact, no gross neuro deficits  Psych: normal mood and affect     A/P: 55F with syncope, ?loss of continence,   TO BE COMPLETED Attending MD Hinson:   I personally have seen and examined this patient.  Physician assistant note reviewed and agree on plan of care and except where noted.  See below for details.     Seen in [facility] 6  PMD Dr. Cale Sales ([address] [facility])    55F with PMH/PSH including migrain, lupus, fibromyalgia, anemia, asthma, depression, bipolar, s/p C sections, CVA 2013 with LUE residual, on Tizanidine, Venlafaxine, Nortriptyline, topiramate, Albuterol, Omeprazole/Bicarb powder 20/1680. Trazodone, Clonazepam, Emgality presents to the ED with syncope.  Reports that today at around 230pm was on her bed paying bills when she felt lightheaded, reports began hearing the TV sounding more distant and then didn't hear it, reports home attendant found her on the bed, +LOC, reports "wet between her legs" after episode and home attendant helped to change her.  Denies loss of bowel continence.  Denies abdominal pain, blood in stools, black stools.  Reports had nausea and episode of emesis, has had a few episodes of loos stools recently.  Reports yesterday was with daughter signing papers and began feeling like she could see the papers but could not hear people speaking to her.  Reports went to get up from chair and fell sideways.  Reports "came to" with her daughter slapping her face.  Reports has had episodes of difficulty breathing and chest pain, none at present.  Reports attributed to indigestion and took Tums, worse at night, denies worse with exertion, denies LE edema.  Denies dysuria, hematuria, change in urinary habits including frequency, urgency. Denies fevers, chills.  Reports had COVID a year ago, denies recent URI symptoms, denies sick contacts, recent travel.  A ten (10) point review of systems was negative other than as stated in the HPI or elsewhere in the chart.    Exam:   General: NAD  HENT: head NCAT, airway patent, poor dentition  Eyes: PERRL  Lungs: lungs CTAB with good inspiratory effort, no wheezing, no rhonchi, no rales  Cardiac: +S1S2, no m/r/g, no LE edema  GI: abdomen soft with +BS, NT, ND  : no CVAT  MSK: FROM at neck, no tenderness to midline palpation, no stepoffs along length of spine, no calf tenderness, swelling, erythema or warmth  Neuro: moving all extremities with 5/5 strength bilateral upper and lower extremities, sensory grossly intact, no gross neuro deficits  Psych: normal mood and affect     A/P: 55F with syncope, ?loss of continence, DDx includes cardiac arrythmia, stroke, polypharmacy, seizure, given reported unchanged position less likely orthostatic or vasovagal, metabolic derangement, will obtain labs, CXR, EKG, CTH/CTAHN, cardiac monitor, will give IVFs, headache medication, will likely need admission

## 2021-04-07 NOTE — H&P ADULT - PROBLEM SELECTOR PLAN 2
- associated with meals, intermittently loose and yellow with some solid stools, improved with fluid intake IBS vs gastroenteritis   - associated with meals, intermittently loose and yellow with some solid stools, improved with fluid intake IBS vs gastroenteritis vs Crohn's  - associated with meals, intermittently loose and yellow with some solid stools, improved with fluid intake  - patient unable to remember Crohn's medication - confirm with pharmacy in AM ( [facility]) IBS vs gastroenteritis vs Crohn's  - associated with meals, intermittently loose and yellow with some solid stools, improved with fluid intake  - patient unable to remember Crohn's medication - confirm with pharmacy in AM ( [facility])  - no episodes of diarrhea since admission  - continue to monitor

## 2021-04-07 NOTE — H&P ADULT - PROBLEM SELECTOR PLAN 6
- patient having difficulty remembering medications not included on her physical copy of med rec - day team to confirm medications with her pharmacy
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
## 2021-04-07 NOTE — H&P ADULT - ASSESSMENT
55F with PMH/PSH including vertigo, migraine, lupus, fibromyalgia, anemia, asthma, depression, bipolar, s/p C sections, CVA 2013 with LUE residual, on Tizanidine, Venlafaxine, Nortriptyline, topiramate, Albuterol, Omeprazole/Bicarb powder 20/1680. Trazodone, Clonazepam, Emgality presents to the ED with recent episodes of diarrhea and vomiting syncope. 55F with PMH/PSH including vertigo migraine, SLE, IBS, Crohn's, fibromyalgia, anemia, asthma, depression, bipolar, CVA 2013 with LUE residual, on multiple neuroleptics presents to the ED with recent episodes of diarrhea and vomiting and now with 3 episodes of  LOC concerning for syncope vs polypharmacy vs seizure in addition to IBS vs crohn's flare. Compliant with all medications.

## 2021-04-07 NOTE — ED ADULT NURSE NOTE - OBJECTIVE STATEMENT
pt 54 yo female from home via [facility] pt states syncope this morning in bathroom found by her aide pt has hx of lupus fibromyalgia depression and cva pt states never had episodes similar and yesterday had near syncopal episode while with daughter felt voices sounded far away on arrival finger stick 73mcg/dl motor sensory intact to extremities pt ekg sinus with RBBB pt motor sensory intact to extremities placed on cardiac monitoring and labs sent as ordered

## 2021-04-07 NOTE — H&P ADULT - PROBLEM SELECTOR PLAN 4
DVT ppx: lovenox  Diet: regular  Disposition: pending workup  Code Status: - c/w home hydroxychloroquine and methotrexate while inpatient (not due for methotrexate until Tuesday)

## 2021-04-07 NOTE — ED PROVIDER NOTE - PROGRESS NOTE DETAILS
Attending MD Hinson: Call to Dr. Cale Sales Attending MD Hinson: Updated on results available, pending CT results Attending MD Hinson: CT reviewed, non actionable, no acute traumatic pathology

## 2021-04-07 NOTE — H&P ADULT - PROBLEM SELECTOR PLAN 5
DVT ppx: lovenox  Diet: regular  Disposition: pending workup  Code Status: - f/u TSH level in AM  - c/w home synthroid
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
## 2021-04-07 NOTE — ED ADULT NURSE REASSESSMENT NOTE - NS ED NURSE REASSESS COMMENT FT1
pt received at change of shift, pt updated on plan of care, pt in NAD, bed in lowest position, pt without complaints, will continue to monitor

## 2021-04-07 NOTE — H&P ADULT - NSHPPHYSICALEXAM_GEN_ALL_CORE
VITAL SIGNS: I have reviewed nursing notes and confirm.  CONSTITUTIONAL: Well-developed; well-nourished; in no acute distress. Laying comfortably in stretcher  SKIN: Skin exam is warm and dry, no acute rash.  HEAD: Normocephalic; atraumatic.  EYES: PERRL, EOM intact; conjunctiva and sclera clear.  ENT: No nasal discharge; airway clear. TMs clear.  NECK: Supple; non tender.  CARD: S1, S2 normal; no murmurs, gallops, or rubs. Regular rate and rhythm.  RESP: No wheezes, rales or rhonchi.  ABD: Normal bowel sounds; soft; non-distended; non-tender; no hepatosplenomegaly.  EXT: Normal ROM. No clubbing, cyanosis or edema.  LYMPH: No acute cervical adenopathy.  NEURO: Alert, oriented. Grossly unremarkable. No focal deficits. Normal gait  PSYCH: Cooperative, appropriate.

## 2021-04-07 NOTE — H&P ADULT - HISTORY OF PRESENT ILLNESS
55F with PMH/PSH including migrain, lupus, fibromyalgia, anemia, asthma, depression, bipolar, s/p C sections, CVA 2013 with LUE residual, on Tizanidine, Venlafaxine, Nortriptyline, topiramate, Albuterol, Omeprazole/Bicarb powder 20/1680. Trazodone, Clonazepam, Emgality presents to the ED with syncope. Reports yesterday was with daughter signing papers to apply for an apartment and began feeling like she could see the papers but could not hear people speaking to her.  Reports went to get up from chair and fell sideways.  Reports "came to" with her daughter slapping her face. Similar episode occurred a few hours later.  Reports that today at around 230pm was on her bed paying bills when she felt lightheaded, reports began hearing the TV sounding more distant and then didn't hear it, reports home attendant found her on the bed, +LOC, reports "wet between her legs" after episode and home attendant helped to change her.  Denies loss of bowel continence.  Reports had nausea and episode of NBNB emesis, has had a few episodes of loos stools recently which she took pepto bismol for. Loose stools associated with solid foods. Drank broth and pedialyte which helped her diarrhea. Denies abdominal pain, blood in stools, black stools.  Reports has had episodes of difficulty breathing and chest pain, none at present.  She attributed the chest pain to indigestion and took Tums, worse at night, denies worse with exertion, denies LE edema.  Denies dysuria, hematuria, change in urinary habits including frequency, urgency. Denies fevers, chills.  Reports had COVID a year ago, denies recent URI symptoms, denies sick contacts, recent travel.    In the ED VSS, afebrile. Labs significant for Hb 9.7, Plt 413, Tn <6, lipase 30, UA negative, covid negative. EKG NSR w known RBBB. CXR clear. Imaging results below. Patient was given 1 L NS bolus, IV reglan and tylenol.  55F with PMH/PSH including vertigo migraine, lupus, fibromyalgia, anemia, asthma, depression, bipolar, s/p C sections, CVA 2013 with LUE residual, on Tizanidine, Venlafaxine, Nortriptyline, topiramate, Albuterol, Omeprazole/Bicarb powder 20/1680. Trazodone, Clonazepam, Emgality presents to the ED with syncope. Reports yesterday was with daughter signing papers to apply for an apartment and began feeling like she could see the papers but could not hear people speaking to her.  Reports went to get up from chair and fell sideways.  Reports "came to" with her daughter slapping her face. Similar episode occurred a few hours later.  Reports that today at around 230pm was on her bed paying bills when she felt lightheaded, reports began hearing the TV sounding more distant and then didn't hear it, reports home attendant found her on the bed, +LOC, reports "wet between her legs" after episode and home attendant helped to change her.  Denies loss of bowel continence.  Reports had nausea and episode of NBNB emesis, has had a few episodes of loos stools recently which she took pepto bismol for. Loose stools associated with solid foods. Drank broth and pedialyte which helped her diarrhea. Denies abdominal pain, blood in stools, black stools.  Reports has had episodes of difficulty breathing and chest pain, none at present.  She attributed the chest pain to indigestion and took Tums, worse at night, denies worse with exertion, denies LE edema.  Denies dysuria, hematuria, change in urinary habits including frequency, urgency. Denies fevers, chills.  Reports had COVID a year ago, denies recent URI symptoms, denies sick contacts, recent travel.    In the ED VSS, afebrile. Labs significant for Hb 9.7, Plt 413, Tn <6, lipase 30, UA negative, covid negative. EKG NSR w known RBBB. CXR clear. Imaging results below. Patient was given 1 L NS bolus, IV reglan and tylenol.  55F with PMH/PSH including vertigo migraine, lupus, fibromyalgia, anemia, asthma, depression, bipolar, s/p C sections, CVA 2013 with LUE residual, on Tizanidine, Venlafaxine, Nortriptyline, topiramate, Albuterol, Omeprazole/Bicarb powder 20/1680. Trazodone, Clonazepam, Emgality presents to the ED with syncope. Reports yesterday was with daughter signing papers to apply for an apartment and began feeling like she could see the papers but could not hear people speaking to her.  Reports went to get up from chair and fell sideways.  Reports "came to" with her daughter slapping her face. Similar episode occurred a few hours later.  Reports that today at around 230pm was on her bed paying bills when she felt lightheaded, reports began hearing the TV sounding more distant and then didn't hear it, reports home attendant found her on the bed, +LOC, reports "wet between her legs" after episode and home attendant helped to change her.  Denies loss of bowel continence.  Reports had nausea and episode of NBNB emesis, has had a few episodes of loos stools recently which she took pepto bismol for. No change in diet. Describes as loose yellow watery stools associated with solid foods, intermittently solid stools. Drank broth and pedialyte which helped her diarrhea. Denies abdominal pain, blood in stools, black stools.  Reports has had episodes of difficulty breathing and chest pain, none at present.  She attributed the chest pain to indigestion and took Tums, worse at night, denies worse with exertion, denies LE edema.  Denies dysuria, hematuria, change in urinary habits including frequency, urgency. Denies fevers, chills.  Reports had COVID a year ago, denies recent URI symptoms, denies sick contacts, recent travel.   In the ED VSS, afebrile. Labs significant for Hb 9.7, Plt 413, Tn <6, lipase 30, UA negative, covid negative. EKG NSR w known RBBB. CXR clear. Imaging results below. Patient was given 1 L NS bolus, IV reglan and tylenol.  55F with PMH/PSH including vertigo migraine, lupus, IBS, fibromyalgia, anemia, asthma, depression, bipolar, s/p C sections, CVA 2013 with LUE residual, on Tizanidine, Venlafaxine, Nortriptyline, topiramate, Albuterol, Omeprazole/Bicarb powder 20/1680. Trazodone, Clonazepam, Emgality presents to the ED with syncope. Reports yesterday was with daughter signing papers to apply for an apartment and began feeling like she could see the papers but could not hear people speaking to her.  Reports went to get up from chair and fell sideways.  Reports "came to" with her daughter slapping her face. Similar episode occurred a few hours later.  Reports that today at around 230pm was on her bed paying bills when she felt lightheaded, reports began hearing the TV sounding more distant and then didn't hear it, reports home attendant found her on the bed, +LOC, reports "wet between her legs" after episode and home attendant helped to change her.  Denies loss of bowel continence.  Reports had nausea and episode of NBNB emesis, has had a few episodes of loos stools recently which she took pepto bismol for. No change in diet. Describes as loose yellow watery stools associated with solid foods, intermittently solid stools. Drank broth and pedialyte which helped her diarrhea. Denies abdominal pain, blood in stools, black stools.  Reports has had episodes of difficulty breathing and chest pain, none at present.  She attributed the chest pain to indigestion and took Tums, worse at night, denies worse with exertion, denies LE edema.  Denies dysuria, hematuria, change in urinary habits including frequency, urgency. Denies fevers, chills.  Reports had COVID a year ago, denies recent URI symptoms, denies sick contacts, recent travel.   In the ED VSS, afebrile. Labs significant for Hb 9.7, Plt 413, Tn <6, lipase 30, UA negative, covid negative. EKG NSR w known RBBB. CXR clear. Imaging results below. Patient was given 1 L NS bolus, IV reglan and tylenol.  55F with PMH/PSH including vertigo migraine, SLE, IBS, Crohn's, fibromyalgia, anemia, asthma, depression, bipolar, CVA 2013 with LUE residual, on Tizanidine, Venlafaxine, Nortriptyline, topiramate, Albuterol, Trazodone, Clonazepam, Emgality presents to the ED with LOC. Reports yesterday was with daughter signing papers to apply for an apartment and began feeling like she could see the papers but could not hear people speaking to her.  Reports went to get up from chair and fell sideways.  Reports "came to" with her daughter slapping her face. Similar episode occurred a few hours later.  Reports that today at around 230pm was on her bed paying bills when she felt lightheaded, reports began hearing the TV sounding more distant and then didn't hear it, reports home attendant found her on the bed, +LOC, reports "wet between her legs" after episode and home attendant helped to change her.  Denies loss of bowel continence.  Reports had nausea and episode of NBNB emesis, has had a few episodes of loos stools recently which she took pepto bismol for. No change in diet. Describes as loose yellow watery stools associated with solid foods, intermittently solid stools. Drank broth and pedialyte which helped her diarrhea. Denies abdominal pain, blood in stools, black stools.  Reports has had episodes of difficulty breathing and chest pain, none at present.  She attributed the chest pain to indigestion and took Tums, worse at night, denies worse with exertion, denies LE edema.  Denies dysuria, hematuria, change in urinary habits including frequency, urgency. Denies fevers, chills.  Reports had COVID a year ago, denies recent URI symptoms, denies sick contacts, recent travel.   In the ED VSS, afebrile. Labs significant for Hb 9.7, Plt 413, Tn <6, lipase 30, UA negative, covid negative. EKG NSR w known RBBB. CXR clear. Imaging results below. Patient was given 1 L NS bolus, IV reglan and tylenol.  55F with PMH/PSH including vertigo migraine, SLE, IBS, Crohn's, fibromyalgia, anemia, asthma, depression, bipolar, CVA 2013 with LUE residual, on Tizanidine, Venlafaxine, Nortriptyline, topiramate, Albuterol, Trazodone, Clonazepam, Emgality presents to the ED with LOC. Reports yesterday was with daughter signing papers to apply for an apartment and began feeling like she could see the papers but could not hear people speaking to her.  Reports went to get up from chair and fell sideways.  Reports "came to" with her daughter slapping her face. Similar episode occurred a few hours later.  Reports that today at around 230pm was on her bed paying bills when she felt lightheaded, reports began hearing the TV sounding more distant and then didn't hear it, reports home attendant found her on the bed, +LOC, reports "wet between her legs" after episode and home attendant helped to change her.  Denies loss of bowel continence.  Reports had nausea and episode of NBNB emesis, has had a few episodes of loose stools recently which she took pepto bismol for. Has experienced these loose stools in the past. No change in diet. Describes as loose yellow watery stools associated with solid foods, intermittently solid stools. Drank broth and pedialyte which helped her diarrhea. Denies abdominal pain, blood in stools, black stools.  Reports has had episodes of difficulty breathing and chest pain, none at present.  She attributed the chest pain to indigestion and took Tums, worse at night, denies worse with exertion, denies LE edema.  Denies dysuria, hematuria, change in urinary habits including frequency, urgency. Denies fevers, chills.  Reports had COVID a year ago, denies recent URI symptoms, denies sick contacts, recent travel.   In the ED VSS, afebrile. Labs significant for Hb 9.7, Plt 413, Tn <6, lipase 30, UA negative, covid negative. EKG NSR w known RBBB. CXR clear. Imaging results below. Patient was given 1 L NS bolus, IV reglan and tylenol.

## 2021-04-07 NOTE — H&P ADULT - NSHPSOCIALHISTORY_GEN_ALL_CORE
lives at home alone in apartment, has visiting aid M-F 6 hours a day, retired on disability former retail employee, denies smoking history, reports infrequent marijuana use, social alcohol use

## 2021-04-07 NOTE — H&P ADULT - NSHPREVIEWOFSYSTEMS_GEN_ALL_CORE
CONSTITUTIONAL: No weakness, fevers or chills, + dizzyness  EYES/ENT: No visual changes;  No vertigo or throat pain   NECK: No pain or stiffness  RESPIRATORY: No cough, wheezing, hemoptysis; No shortness of breath  CARDIOVASCULAR: no chest pain, no palpitations  GASTROINTESTINAL: + mild abdominal pain; no nausea, no vomiting;  no diarrhea or constipation. No hemetemesis, melena or hematochezia.  GENITOURINARY: No dysuria, frequency or hematuria  NEUROLOGICAL: No numbness or weakness  SKIN: No itching, burning, rashes, or lesions   All other review of systems is negative unless indicated above.

## 2021-04-07 NOTE — H&P ADULT - PROBLEM SELECTOR PLAN 1
syncope vs seizure vs medication induced (on multiple neuroleptics)  - hemodynamically stable at rest, however dizzyness upon gait assessment and change in position; will obtain orthostatic BP  - patient with one episode of incontinence during LOC - consider EEG to assess for seizure activity  - patient on multiple neuroleptics, reports taking as directed, will hold medications syncope vs seizure vs polypharmacy (on multiple neuroleptics)  - hemodynamically stable at rest, however dizzyness upon gait assessment and change in position; will obtain orthostatic BP  - patient with one episode of incontinence during LOC - consider EEG to assess for seizure activity  - patient on multiple neuroleptics, reports taking as directed, will hold medications syncope vs seizure vs polypharmacy (on multiple neuroleptics)  - hemodynamically stable at rest, however dizzyness upon gait assessment and change in position; will obtain orthostatic BP  - patient with one episode of incontinence during LOC and history of stroke; consult neuro  - patient on multiple neuroleptics, reports taking as directed, will continue home medications and monitor for sedation (c.w trazodone, clonazepam, tizanidine PRN)  - monitor on Tele syncope vs seizure vs polypharmacy (on multiple neuroleptics)  - hemodynamically stable at rest, however dizzyness upon gait assessment and change in position; will obtain orthostatic BP  - patient with one episode of incontinence during LOC and history of stroke; neuro consulted, appreciate recs  - patient on multiple neuroleptics, reports taking as directed, will continue home medications and monitor for sedation (c.w trazodone, clonazepam, tizanidine PRN)  - monitor on Tele

## 2021-04-08 ENCOUNTER — NON-APPOINTMENT (OUTPATIENT)
Age: 56
End: 2021-04-08

## 2021-04-08 DIAGNOSIS — M32.9 SYSTEMIC LUPUS ERYTHEMATOSUS, UNSPECIFIED: ICD-10-CM

## 2021-04-08 DIAGNOSIS — Z29.9 ENCOUNTER FOR PROPHYLACTIC MEASURES, UNSPECIFIED: ICD-10-CM

## 2021-04-08 DIAGNOSIS — Z79.899 OTHER LONG TERM (CURRENT) DRUG THERAPY: ICD-10-CM

## 2021-04-08 DIAGNOSIS — D64.9 ANEMIA, UNSPECIFIED: ICD-10-CM

## 2021-04-08 DIAGNOSIS — R19.7 DIARRHEA, UNSPECIFIED: ICD-10-CM

## 2021-04-08 DIAGNOSIS — E03.9 HYPOTHYROIDISM, UNSPECIFIED: ICD-10-CM

## 2021-04-08 DIAGNOSIS — R40.20 UNSPECIFIED COMA: ICD-10-CM

## 2021-04-08 LAB
ALBUMIN SERPL ELPH-MCNC: 4.3 G/DL — SIGNIFICANT CHANGE UP (ref 3.3–5)
ALP SERPL-CCNC: 88 U/L — SIGNIFICANT CHANGE UP (ref 40–120)
ALT FLD-CCNC: 12 U/L — SIGNIFICANT CHANGE UP (ref 10–45)
AMPHET UR-MCNC: NEGATIVE — SIGNIFICANT CHANGE UP
ANION GAP SERPL CALC-SCNC: 12 MMOL/L — SIGNIFICANT CHANGE UP (ref 5–17)
AST SERPL-CCNC: 19 U/L — SIGNIFICANT CHANGE UP (ref 10–40)
BARBITURATES UR SCN-MCNC: NEGATIVE — SIGNIFICANT CHANGE UP
BENZODIAZ UR-MCNC: NEGATIVE — SIGNIFICANT CHANGE UP
BILIRUB SERPL-MCNC: 0.1 MG/DL — LOW (ref 0.2–1.2)
BUN SERPL-MCNC: 15 MG/DL — SIGNIFICANT CHANGE UP (ref 7–23)
CALCIUM SERPL-MCNC: 9.2 MG/DL — SIGNIFICANT CHANGE UP (ref 8.4–10.5)
CHLORIDE SERPL-SCNC: 107 MMOL/L — SIGNIFICANT CHANGE UP (ref 96–108)
CO2 SERPL-SCNC: 21 MMOL/L — LOW (ref 22–31)
COCAINE METAB.OTHER UR-MCNC: NEGATIVE — SIGNIFICANT CHANGE UP
COVID-19 SPIKE DOMAIN AB INTERP: POSITIVE
COVID-19 SPIKE DOMAIN ANTIBODY RESULT: 149 U/ML — HIGH
CREAT SERPL-MCNC: 1.14 MG/DL — SIGNIFICANT CHANGE UP (ref 0.5–1.3)
FERRITIN SERPL-MCNC: 13 NG/ML — LOW (ref 15–150)
FOLATE SERPL-MCNC: 8.1 NG/ML — SIGNIFICANT CHANGE UP
GLUCOSE SERPL-MCNC: 88 MG/DL — SIGNIFICANT CHANGE UP (ref 70–99)
HCT VFR BLD CALC: 31.9 % — LOW (ref 34.5–45)
HGB BLD-MCNC: 9.9 G/DL — LOW (ref 11.5–15.5)
IRON SATN MFR SERPL: 103 UG/DL — SIGNIFICANT CHANGE UP (ref 30–160)
IRON SATN MFR SERPL: 30 % — SIGNIFICANT CHANGE UP (ref 14–50)
MAGNESIUM SERPL-MCNC: 2.1 MG/DL — SIGNIFICANT CHANGE UP (ref 1.6–2.6)
MCHC RBC-ENTMCNC: 29.6 PG — SIGNIFICANT CHANGE UP (ref 27–34)
MCHC RBC-ENTMCNC: 31 GM/DL — LOW (ref 32–36)
MCV RBC AUTO: 95.2 FL — SIGNIFICANT CHANGE UP (ref 80–100)
METHADONE UR-MCNC: NEGATIVE — SIGNIFICANT CHANGE UP
NRBC # BLD: 0 /100 WBCS — SIGNIFICANT CHANGE UP (ref 0–0)
OPIATES UR-MCNC: NEGATIVE — SIGNIFICANT CHANGE UP
OXYCODONE UR-MCNC: NEGATIVE — SIGNIFICANT CHANGE UP
PCP SPEC-MCNC: SIGNIFICANT CHANGE UP
PCP UR-MCNC: NEGATIVE — SIGNIFICANT CHANGE UP
PHOSPHATE SERPL-MCNC: 4.4 MG/DL — SIGNIFICANT CHANGE UP (ref 2.5–4.5)
PLATELET # BLD AUTO: 393 K/UL — SIGNIFICANT CHANGE UP (ref 150–400)
POTASSIUM SERPL-MCNC: 3.3 MMOL/L — LOW (ref 3.5–5.3)
POTASSIUM SERPL-SCNC: 3.3 MMOL/L — LOW (ref 3.5–5.3)
PROT SERPL-MCNC: 7.5 G/DL — SIGNIFICANT CHANGE UP (ref 6–8.3)
RBC # BLD: 3.35 M/UL — LOW (ref 3.8–5.2)
RBC # BLD: 3.35 M/UL — LOW (ref 3.8–5.2)
RBC # FLD: 13.3 % — SIGNIFICANT CHANGE UP (ref 10.3–14.5)
RETICS #: 25.5 K/UL — SIGNIFICANT CHANGE UP (ref 25–125)
RETICS/RBC NFR: 0.8 % — SIGNIFICANT CHANGE UP (ref 0.5–2.5)
SARS-COV-2 IGG+IGM SERPL QL IA: 149 U/ML — HIGH
SARS-COV-2 IGG+IGM SERPL QL IA: POSITIVE
SODIUM SERPL-SCNC: 140 MMOL/L — SIGNIFICANT CHANGE UP (ref 135–145)
THC UR QL: POSITIVE
TIBC SERPL-MCNC: 341 UG/DL — SIGNIFICANT CHANGE UP (ref 220–430)
TSH SERPL-MCNC: 1.09 UIU/ML — SIGNIFICANT CHANGE UP (ref 0.27–4.2)
UIBC SERPL-MCNC: 238 UG/DL — SIGNIFICANT CHANGE UP (ref 110–370)
VIT B12 SERPL-MCNC: 650 PG/ML — SIGNIFICANT CHANGE UP (ref 232–1245)
WBC # BLD: 3.38 K/UL — LOW (ref 3.8–10.5)
WBC # FLD AUTO: 3.38 K/UL — LOW (ref 3.8–10.5)

## 2021-04-08 PROCEDURE — 99222 1ST HOSP IP/OBS MODERATE 55: CPT | Mod: GC

## 2021-04-08 PROCEDURE — 99233 SBSQ HOSP IP/OBS HIGH 50: CPT

## 2021-04-08 PROCEDURE — 93306 TTE W/DOPPLER COMPLETE: CPT | Mod: 26

## 2021-04-08 RX ORDER — HYDROXYCHLOROQUINE SULFATE 200 MG
200 TABLET ORAL DAILY
Refills: 0 | Status: DISCONTINUED | OUTPATIENT
Start: 2021-04-08 | End: 2021-04-10

## 2021-04-08 RX ORDER — NORTRIPTYLINE HYDROCHLORIDE 10 MG/1
50 CAPSULE ORAL AT BEDTIME
Refills: 0 | Status: DISCONTINUED | OUTPATIENT
Start: 2021-04-08 | End: 2021-04-10

## 2021-04-08 RX ORDER — TRAZODONE HCL 50 MG
150 TABLET ORAL AT BEDTIME
Refills: 0 | Status: DISCONTINUED | OUTPATIENT
Start: 2021-04-08 | End: 2021-04-10

## 2021-04-08 RX ORDER — CLONAZEPAM 1 MG
0.5 TABLET ORAL
Refills: 0 | Status: DISCONTINUED | OUTPATIENT
Start: 2021-04-08 | End: 2021-04-10

## 2021-04-08 RX ORDER — VENLAFAXINE HCL 75 MG
150 CAPSULE, EXT RELEASE 24 HR ORAL
Refills: 0 | Status: DISCONTINUED | OUTPATIENT
Start: 2021-04-08 | End: 2021-04-10

## 2021-04-08 RX ORDER — TOPIRAMATE 25 MG
100 TABLET ORAL
Refills: 0 | Status: DISCONTINUED | OUTPATIENT
Start: 2021-04-08 | End: 2021-04-10

## 2021-04-08 RX ORDER — VENLAFAXINE HCL 75 MG
1 CAPSULE, EXT RELEASE 24 HR ORAL
Qty: 0 | Refills: 0 | DISCHARGE

## 2021-04-08 RX ORDER — POTASSIUM CHLORIDE 20 MEQ
40 PACKET (EA) ORAL ONCE
Refills: 0 | Status: COMPLETED | OUTPATIENT
Start: 2021-04-08 | End: 2021-04-08

## 2021-04-08 RX ORDER — TIZANIDINE 4 MG/1
4 TABLET ORAL EVERY 8 HOURS
Refills: 0 | Status: DISCONTINUED | OUTPATIENT
Start: 2021-04-08 | End: 2021-04-10

## 2021-04-08 RX ADMIN — Medication 200 MILLIGRAM(S): at 11:54

## 2021-04-08 RX ADMIN — Medication 100 MICROGRAM(S): at 05:55

## 2021-04-08 RX ADMIN — Medication 150 MILLIGRAM(S): at 17:51

## 2021-04-08 RX ADMIN — NORTRIPTYLINE HYDROCHLORIDE 50 MILLIGRAM(S): 10 CAPSULE ORAL at 21:03

## 2021-04-08 RX ADMIN — ATORVASTATIN CALCIUM 20 MILLIGRAM(S): 80 TABLET, FILM COATED ORAL at 21:03

## 2021-04-08 RX ADMIN — Medication 40 MILLIEQUIVALENT(S): at 13:23

## 2021-04-08 RX ADMIN — Medication 100 MILLIGRAM(S): at 17:42

## 2021-04-08 RX ADMIN — Medication 150 MILLIGRAM(S): at 05:55

## 2021-04-08 RX ADMIN — Medication 100 MILLIGRAM(S): at 05:55

## 2021-04-08 NOTE — CONSULT NOTE ADULT - ASSESSMENT
55F with PMH/PSH including vertigo migraine, SLE, IBS, Crohn's, fibromyalgia, anemia, asthma, depression, bipolar, CVA 2013 with LUE residual, on multiple neuroleptics presents to the ED with recent episodes of diarrhea and vomiting and now with 3 episodes of  LOC concerning for syncope vs polypharmacy vs seizure in addition to IBS vs crohn's flare. Compliant with all medications. Patient reports she had EEG 2013 which showed seizures.  Unclear about further details.  Notable hx includes description of recent episode with left arm shaking that spread to rest of body and then lost consciousness.  Does not think she hit her head.  No tongue biting.  Urinary incontinence.  Exam notable for left sided decreased LT, face v1-3 and LUE and LLE, and constricted visual fields 360 degrees.  Impression: possible seizure disorder but unclear given complicated history.    Recommendation:  - vEEG x 24 hours and then reassess based on results  - TSH  Further management recommendations will be based on initial work up results       55F with PMH/PSH including vertigo migraine, SLE, IBS, Crohn's, fibromyalgia, anemia, asthma, depression, bipolar, CVA 2013 with LUE residual, on multiple neuroleptics presents to the ED with recent episodes of diarrhea and vomiting and now with 3 episodes of  LOC concerning for syncope vs polypharmacy vs seizure in addition to IBS vs crohn's flare. Compliant with all medications. Patient reports she had EEG 2013 which showed seizures.  Unclear about further details.  Notable hx includes description of recent episode with left arm shaking that spread to rest of body and then lost consciousness.  Does not think she hit her head.  No tongue biting.  Urinary incontinence.  Exam notable for left sided decreased LT, face v1-3 and LUE and LLE, and constricted visual fields 360 degrees.  Impression: possible seizure disorder but unclear given complicated history.  Neurology consulted for question about events being seizure.    Recommendation:  - vEEG x 24 hours and then reassess based on results  - TSH  Further management recommendations will be based on initial work up results
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
## 2021-04-09 ENCOUNTER — TRANSCRIPTION ENCOUNTER (OUTPATIENT)
Age: 56
End: 2021-04-09

## 2021-04-09 LAB
ANION GAP SERPL CALC-SCNC: 13 MMOL/L — SIGNIFICANT CHANGE UP (ref 5–17)
BUN SERPL-MCNC: 14 MG/DL — SIGNIFICANT CHANGE UP (ref 7–23)
CALCIUM SERPL-MCNC: 9.8 MG/DL — SIGNIFICANT CHANGE UP (ref 8.4–10.5)
CHLORIDE SERPL-SCNC: 106 MMOL/L — SIGNIFICANT CHANGE UP (ref 96–108)
CO2 SERPL-SCNC: 20 MMOL/L — LOW (ref 22–31)
CREAT SERPL-MCNC: 0.88 MG/DL — SIGNIFICANT CHANGE UP (ref 0.5–1.3)
GLUCOSE SERPL-MCNC: 97 MG/DL — SIGNIFICANT CHANGE UP (ref 70–99)
HCT VFR BLD CALC: 34.3 % — LOW (ref 34.5–45)
HGB BLD-MCNC: 10.6 G/DL — LOW (ref 11.5–15.5)
MAGNESIUM SERPL-MCNC: 2.1 MG/DL — SIGNIFICANT CHANGE UP (ref 1.6–2.6)
MCHC RBC-ENTMCNC: 29.4 PG — SIGNIFICANT CHANGE UP (ref 27–34)
MCHC RBC-ENTMCNC: 30.9 GM/DL — LOW (ref 32–36)
MCV RBC AUTO: 95.3 FL — SIGNIFICANT CHANGE UP (ref 80–100)
NRBC # BLD: 0 /100 WBCS — SIGNIFICANT CHANGE UP (ref 0–0)
PHOSPHATE SERPL-MCNC: 4.2 MG/DL — SIGNIFICANT CHANGE UP (ref 2.5–4.5)
PLATELET # BLD AUTO: 441 K/UL — HIGH (ref 150–400)
POTASSIUM SERPL-MCNC: 3.6 MMOL/L — SIGNIFICANT CHANGE UP (ref 3.5–5.3)
POTASSIUM SERPL-SCNC: 3.6 MMOL/L — SIGNIFICANT CHANGE UP (ref 3.5–5.3)
RBC # BLD: 3.6 M/UL — LOW (ref 3.8–5.2)
RBC # FLD: 13.5 % — SIGNIFICANT CHANGE UP (ref 10.3–14.5)
SODIUM SERPL-SCNC: 139 MMOL/L — SIGNIFICANT CHANGE UP (ref 135–145)
WBC # BLD: 4.19 K/UL — SIGNIFICANT CHANGE UP (ref 3.8–10.5)
WBC # FLD AUTO: 4.19 K/UL — SIGNIFICANT CHANGE UP (ref 3.8–10.5)

## 2021-04-09 PROCEDURE — 95720 EEG PHY/QHP EA INCR W/VEEG: CPT

## 2021-04-09 PROCEDURE — 99233 SBSQ HOSP IP/OBS HIGH 50: CPT

## 2021-04-09 RX ORDER — ACETAMINOPHEN 500 MG
650 TABLET ORAL ONCE
Refills: 0 | Status: COMPLETED | OUTPATIENT
Start: 2021-04-09 | End: 2021-04-09

## 2021-04-09 RX ORDER — POTASSIUM CHLORIDE 20 MEQ
20 PACKET (EA) ORAL ONCE
Refills: 0 | Status: COMPLETED | OUTPATIENT
Start: 2021-04-09 | End: 2021-04-09

## 2021-04-09 RX ORDER — CALCIUM CARBONATE 500(1250)
1 TABLET ORAL THREE TIMES A DAY
Refills: 0 | Status: DISCONTINUED | OUTPATIENT
Start: 2021-04-09 | End: 2021-04-10

## 2021-04-09 RX ADMIN — NORTRIPTYLINE HYDROCHLORIDE 50 MILLIGRAM(S): 10 CAPSULE ORAL at 21:35

## 2021-04-09 RX ADMIN — Medication 150 MILLIGRAM(S): at 17:18

## 2021-04-09 RX ADMIN — Medication 150 MILLIGRAM(S): at 00:56

## 2021-04-09 RX ADMIN — Medication 100 MICROGRAM(S): at 05:17

## 2021-04-09 RX ADMIN — Medication 200 MILLIGRAM(S): at 12:32

## 2021-04-09 RX ADMIN — Medication 1 TABLET(S): at 14:39

## 2021-04-09 RX ADMIN — Medication 150 MILLIGRAM(S): at 05:17

## 2021-04-09 RX ADMIN — Medication 20 MILLIEQUIVALENT(S): at 08:54

## 2021-04-09 RX ADMIN — ATORVASTATIN CALCIUM 20 MILLIGRAM(S): 80 TABLET, FILM COATED ORAL at 21:35

## 2021-04-09 RX ADMIN — Medication 100 MILLIGRAM(S): at 05:17

## 2021-04-09 RX ADMIN — Medication 30 MILLILITER(S): at 16:21

## 2021-04-09 RX ADMIN — Medication 100 MILLIGRAM(S): at 17:18

## 2021-04-09 NOTE — DISCHARGE NOTE PROVIDER - NSDCCPCAREPLAN_GEN_ALL_CORE_FT
PRINCIPAL DISCHARGE DIAGNOSIS  Diagnosis: Syncope  Assessment and Plan of Treatment:        PRINCIPAL DISCHARGE DIAGNOSIS  Diagnosis: Syncope  Assessment and Plan of Treatment: hemodynamically stable.  Negatvie orthostatic vitals.  CT Head and Angio Head & Neck with no acute abnormalities   Echocardiogram with no gross abnormalities.  EEG shows-------------  follow up with your PCP and neurology.         SECONDARY DISCHARGE DIAGNOSES  Diagnosis: Diarrhea  Assessment and Plan of Treatment: Likely from IBS vs. Crohn's disease.   one episode of diarrhea here.  stable.   follow up with GI as routine.    Diagnosis: Anemia of chronic disease  Assessment and Plan of Treatment: cbc stable.   no active bleeding.   anemia blood workups are negative.    Diagnosis: Systemic lupus erythematosus, unspecified SLE type, unspecified organ involvement status  Assessment and Plan of Treatment: stable.  continue with home hydroxychloroquine and methotrexate.      Diagnosis: Hypothyroidism  Assessment and Plan of Treatment: sable.   continue with home synthroid.

## 2021-04-09 NOTE — DISCHARGE NOTE PROVIDER - CARE PROVIDER_API CALL
Roe Sales)  Internal Medicine  [address] [facility], [address]rd [place] [address]  Phone: [phone]  Fax: [phone]  Follow Up Time: 2 weeks    Kunal Virk)  Gastroenterology; Internal Medicine  [address] [place] [id number]  Phone: [phone]  Fax: [phone]  Follow Up Time: Routine    [facility]  Phone: (   )    -  Fax: (   )    -  Follow Up Time:

## 2021-04-09 NOTE — DISCHARGE NOTE PROVIDER - PROVIDER TOKENS
PROVIDER:[TOKEN:[[id number]],FOLLOWUP:[2 weeks]],PROVIDER:[TOKEN:[[id number]],FOLLOWUP:[Routine]],FREE:[LAST:[[facility]],FIRST:[[facility]],PHONE:[(   )    -],FAX:[(   )    -],ADDRESS:[your psychiatry]]

## 2021-04-09 NOTE — DISCHARGE NOTE PROVIDER - NSDCMRMEDTOKEN_GEN_ALL_CORE_FT
albuterol 2.5 mg/3 mL (0.083%) inhalation solution: 3 milliliter(s) inhaled every 6 hours, As Needed  atorvastatin 20 mg oral tablet: 1 tab(s) orally once a day (at bedtime)  clonazePAM 0.5 mg oral tablet: 1 tab(s) orally 2 times a day, As Needed  Emgality Prefilled Pen 120 mg/mL subcutaneous solution:   hydroxychloroquine 200 mg oral tablet: 1 tab(s) orally once a day  levothyroxine 100 mcg (0.1 mg) oral tablet: 1 tab(s) orally once a day  nortriptyline 50 mg oral capsule: 1 cap(s) orally once a day (at bedtime)  omeprazole-sodium bicarbonate 20 mg-1680 mg oral powder for reconstitution: 1 packet(s) orally once a week  Otrexup 15 mg/0.4 mL subcutaneous solution: 1 injection once weekly   tiZANidine 2 mg oral tablet: 2 tab(s) orally every 8 hours  topiramate 100 mg oral tablet: 1 tab(s) orally 2 times a day  traZODone 150 mg oral tablet: 1 tab(s) orally once a day (at bedtime)

## 2021-04-09 NOTE — DISCHARGE NOTE PROVIDER - NSDCFUSCHEDAPPT_GEN_ALL_CORE_FT
[facility] ; 05/14/2021 ; NPP PulmMed [address]  [facility] ; 05/17/2021 ; NPP Cardio [address] [facility] ; 06/24/2021 ; NPP Rheum [address] [facility] ; 04/19/2021 ; NPP Med [address]  [facility] ; 05/14/2021 ; NPP PulmMed [address] [facility] ; 05/17/2021 ; NPP Cardio [address] [facility] ; 06/24/2021 ; NPP Rheum [address] [facility]

## 2021-04-09 NOTE — DISCHARGE NOTE PROVIDER - HOSPITAL COURSE
54 yo female with PMH of vertigo migraine, SLE, IBS, Crohn's, fibromyalgia, anemia, asthma, depression, bipolar disorder, CVA 2013 with LUE residual, on multiple neuroleptics who presented to the ED with recent episodes of diarrhea and vomiting and now with 3 episodes of LOC concerning for syncope vs polypharmacy vs seizure in addition to IBS vs crohn's flare. Compliant with all medications. hemodynamically stable at rest, however endorses dizziness with positional changes. orthostatic vitals negative.  CT Head and Angio Head + Neck with no acute abnormalities. normal echo. pending 24hrs EEG---------------.  no episodes of diarrhea since admission.     54 yo female with PMH of vertigo migraine, SLE, IBS, Crohn's, fibromyalgia, anemia, asthma, depression, bipolar disorder, CVA 2013 with LUE residual, on multiple neuroleptics who presented to the ED with recent episodes of diarrhea and vomiting and now with 3 episodes of LOC concerning for syncope vs polypharmacy vs seizure in addition to IBS vs crohn's flare. Compliant with all medications. hemodynamically stable at rest, however endorses dizziness with positional changes. orthostatic vitals negative.  CT Head and Angio Head + Neck with no acute abnormalities. normal echo. pending 24hrs EEG---------------.  no episodes of diarrhea since admission.      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 Compliant with all medications. hemodynamically stable at rest, however endorses dizziness with positional changes. orthostatic vitals negative.  CT Head and Angio Head + Neck with no acute abnormalities. normal echo. pending 24hrs EEG- no seizure activity .  no episodes of diarrhea since admission.

## 2021-04-09 NOTE — DISCHARGE NOTE PROVIDER - CARE PROVIDERS DIRECT ADDRESSES
[email or web address]

## 2021-04-10 ENCOUNTER — TRANSCRIPTION ENCOUNTER (OUTPATIENT)
Age: 56
End: 2021-04-10

## 2021-04-10 VITALS
RESPIRATION RATE: 17 BRPM | SYSTOLIC BLOOD PRESSURE: 114 MMHG | DIASTOLIC BLOOD PRESSURE: 79 MMHG | OXYGEN SATURATION: 99 % | HEART RATE: 90 BPM | TEMPERATURE: 99 F

## 2021-04-10 LAB
ANION GAP SERPL CALC-SCNC: 13 MMOL/L — SIGNIFICANT CHANGE UP (ref 5–17)
BUN SERPL-MCNC: 14 MG/DL — SIGNIFICANT CHANGE UP (ref 7–23)
CALCIUM SERPL-MCNC: 9.6 MG/DL — SIGNIFICANT CHANGE UP (ref 8.4–10.5)
CHLORIDE SERPL-SCNC: 107 MMOL/L — SIGNIFICANT CHANGE UP (ref 96–108)
CO2 SERPL-SCNC: 20 MMOL/L — LOW (ref 22–31)
CREAT SERPL-MCNC: 0.85 MG/DL — SIGNIFICANT CHANGE UP (ref 0.5–1.3)
GLUCOSE SERPL-MCNC: 90 MG/DL — SIGNIFICANT CHANGE UP (ref 70–99)
HCT VFR BLD CALC: 33.4 % — LOW (ref 34.5–45)
HGB BLD-MCNC: 10.3 G/DL — LOW (ref 11.5–15.5)
MCHC RBC-ENTMCNC: 29.5 PG — SIGNIFICANT CHANGE UP (ref 27–34)
MCHC RBC-ENTMCNC: 30.8 GM/DL — LOW (ref 32–36)
MCV RBC AUTO: 95.7 FL — SIGNIFICANT CHANGE UP (ref 80–100)
NRBC # BLD: 0 /100 WBCS — SIGNIFICANT CHANGE UP (ref 0–0)
PLATELET # BLD AUTO: 447 K/UL — HIGH (ref 150–400)
POTASSIUM SERPL-MCNC: 4.1 MMOL/L — SIGNIFICANT CHANGE UP (ref 3.5–5.3)
POTASSIUM SERPL-SCNC: 4.1 MMOL/L — SIGNIFICANT CHANGE UP (ref 3.5–5.3)
RBC # BLD: 3.49 M/UL — LOW (ref 3.8–5.2)
RBC # FLD: 13.5 % — SIGNIFICANT CHANGE UP (ref 10.3–14.5)
SODIUM SERPL-SCNC: 140 MMOL/L — SIGNIFICANT CHANGE UP (ref 135–145)
WBC # BLD: 4.55 K/UL — SIGNIFICANT CHANGE UP (ref 3.8–10.5)
WBC # FLD AUTO: 4.55 K/UL — SIGNIFICANT CHANGE UP (ref 3.8–10.5)

## 2021-04-10 PROCEDURE — U0005: CPT

## 2021-04-10 PROCEDURE — 83550 IRON BINDING TEST: CPT

## 2021-04-10 PROCEDURE — 82746 ASSAY OF FOLIC ACID SERUM: CPT

## 2021-04-10 PROCEDURE — 85027 COMPLETE CBC AUTOMATED: CPT

## 2021-04-10 PROCEDURE — 80048 BASIC METABOLIC PNL TOTAL CA: CPT

## 2021-04-10 PROCEDURE — 80307 DRUG TEST PRSMV CHEM ANLYZR: CPT

## 2021-04-10 PROCEDURE — 95711 VEEG 2-12 HR UNMONITORED: CPT

## 2021-04-10 PROCEDURE — 70450 CT HEAD/BRAIN W/O DYE: CPT

## 2021-04-10 PROCEDURE — G0378: CPT

## 2021-04-10 PROCEDURE — 99285 EMERGENCY DEPT VISIT HI MDM: CPT | Mod: 25

## 2021-04-10 PROCEDURE — 96374 THER/PROPH/DIAG INJ IV PUSH: CPT

## 2021-04-10 PROCEDURE — 84443 ASSAY THYROID STIM HORMONE: CPT

## 2021-04-10 PROCEDURE — 85610 PROTHROMBIN TIME: CPT

## 2021-04-10 PROCEDURE — 93306 TTE W/DOPPLER COMPLETE: CPT

## 2021-04-10 PROCEDURE — 70496 CT ANGIOGRAPHY HEAD: CPT

## 2021-04-10 PROCEDURE — 85025 COMPLETE CBC W/AUTO DIFF WBC: CPT

## 2021-04-10 PROCEDURE — 80053 COMPREHEN METABOLIC PANEL: CPT

## 2021-04-10 PROCEDURE — 83540 ASSAY OF IRON: CPT

## 2021-04-10 PROCEDURE — 95718 EEG PHYS/QHP 2-12 HR W/VEEG: CPT

## 2021-04-10 PROCEDURE — 86769 SARS-COV-2 COVID-19 ANTIBODY: CPT

## 2021-04-10 PROCEDURE — 82607 VITAMIN B-12: CPT

## 2021-04-10 PROCEDURE — 84484 ASSAY OF TROPONIN QUANT: CPT

## 2021-04-10 PROCEDURE — 84100 ASSAY OF PHOSPHORUS: CPT

## 2021-04-10 PROCEDURE — 95714 VEEG EA 12-26 HR UNMNTR: CPT

## 2021-04-10 PROCEDURE — 95700 EEG CONT REC W/VID EEG TECH: CPT

## 2021-04-10 PROCEDURE — 83690 ASSAY OF LIPASE: CPT

## 2021-04-10 PROCEDURE — 83735 ASSAY OF MAGNESIUM: CPT

## 2021-04-10 PROCEDURE — 70498 CT ANGIOGRAPHY NECK: CPT

## 2021-04-10 PROCEDURE — 85730 THROMBOPLASTIN TIME PARTIAL: CPT

## 2021-04-10 PROCEDURE — 82962 GLUCOSE BLOOD TEST: CPT

## 2021-04-10 PROCEDURE — 85045 AUTOMATED RETICULOCYTE COUNT: CPT

## 2021-04-10 PROCEDURE — 82728 ASSAY OF FERRITIN: CPT

## 2021-04-10 PROCEDURE — 71045 X-RAY EXAM CHEST 1 VIEW: CPT

## 2021-04-10 PROCEDURE — 81003 URINALYSIS AUTO W/O SCOPE: CPT

## 2021-04-10 PROCEDURE — U0003: CPT

## 2021-04-10 PROCEDURE — 99239 HOSP IP/OBS DSCHRG MGMT >30: CPT

## 2021-04-10 RX ADMIN — Medication 200 MILLIGRAM(S): at 11:11

## 2021-04-10 RX ADMIN — Medication 100 MILLIGRAM(S): at 05:12

## 2021-04-10 RX ADMIN — Medication 150 MILLIGRAM(S): at 05:12

## 2021-04-10 RX ADMIN — Medication 100 MICROGRAM(S): at 05:13

## 2021-04-10 RX ADMIN — Medication 150 MILLIGRAM(S): at 00:28

## 2021-04-10 NOTE — PROGRESS NOTE ADULT - PROBLEM SELECTOR PLAN 2
IBS vs gastroenteritis vs Crohn's. associated with meals, intermittently loose and yellow with some solid stools, improved with fluid intake  - patient unable to remember Crohn's medication. per PMD notes, not on any medications at this time  - no episodes of diarrhea since admission. possible transient viral GI illness  - continue to monitor  - if recurs, can send GI PCR and stool studies
IBS vs gastroenteritis vs Crohn's.  - associated with meals, intermittently loose and yellow with some solid stools, improved with fluid intake  - patient unable to remember Crohn's medication. per PMD notes, not on any medications at this time.  - no episodes of diarrhea since admission  - continue to monitor  - if recurs, can send GI PCR and stool studies
IBS vs gastroenteritis vs Crohn's. associated with meals, intermittently loose and yellow with some solid stools, improved with fluid intake  - patient unable to remember Crohn's medication. per PMD notes, not on any medications at this time  - no episodes of diarrhea since admission. possible transient viral GI illness  - continue to monitor  - if recurs, can send GI PCR and stool studies

## 2021-04-10 NOTE — PROGRESS NOTE ADULT - PROBLEM SELECTOR PLAN 6
DVT ppx: lovenox  Diet: regular  Disposition: pending vEEG    Plan discussed with patient, carol Smith, and medicine NP Alyssa.
DVT ppx: lovenox  Diet: regular  Disposition: home with outpatient followup today    >35 minutes spent on discharge planning
DVT ppx: lovenox  Diet: regular  Disposition: pending workup    Plan discussed with patient and medicine SEBLE Dias.

## 2021-04-10 NOTE — CHART NOTE - NSCHARTNOTEFT_GEN_A_CORE
Pt. is stable and clear for discharge.   Pt. will leave in uber as unable to get family to pick her up.   Dr. Draper is aware and in agreement
This report was requested by: Bindu Erwin | Reference #: [id number]    Others' Prescriptions  Patient Name: Caroline Trammell Date: 1965  Address: [address] [place] [id number]: Female  Rx Written	Rx Dispensed	Drug	Quantity	Days Supply	Prescriber Name	Payment Method	Dispenser  01/08/2021	01/19/2021	clonazepam 0.5 mg tablet	60	30	Sadaf Linn MD	[facility]  12/16/2020	12/21/2020	clonazepam 0.5 mg tablet	60	30	Anupam Hernandez MD	[facility]  10/14/2020	11/25/2020	pregabalin 100 mg capsule	60	30	[facility]  10/30/2020	10/30/2020	clonazepam 0.5 mg tablet	90	30	Sadaf Linn MD	[facility]  10/14/2020	10/22/2020	pregabalin 100 mg capsule	60	30	[place], [facility]  09/02/2020	09/08/2020	pregabalin 50 mg capsule	60	30	[facility]  08/26/2020	08/31/2020	clonazepam 0.5 mg tablet	90	30	Sadaf Linn MD	[facility]  06/08/2020	07/29/2020	pregabalin 50 mg capsule	60	30	[place], [facility]  07/07/2020	07/08/2020	clonazepam 0.5 mg tablet	45	15	Sadaf Linn MD	[facility]  06/08/2020	07/03/2020	pregabalin 50 mg capsule	60	30	[place], [facility]  04/30/2020	06/03/2020	pregabalin 50 mg capsule	60	30	[facility]  06/02/2020	06/03/2020	clonazepam 0.5 mg tablet	60	30	Sadaf Linn MD	[facility]  04/30/2020	05/05/2020	pregabalin 50 mg capsule	60	30	[facility]  04/22/2020	04/29/2020	clonazepam 0.5 mg tablet	30	15	Sadaf Linn MD	[facility]  04/14/2020	04/14/2020	clonazepam 0.5 mg tablet	30	15	Sadaf Linn MD	[facility]  04/06/2020	04/11/2020	PREGABALIN 50 MG CAPSULE	30	30	Negrita Seymour	[facility]

## 2021-04-10 NOTE — EEG REPORT - NS EEG TEXT BOX
[facility]   COMPREHENSIVE EPILEPSY CENTER   REPORT OF LONG-TERM VIDEO EEG     [facility]: [address], [place] [address], Ph#: [phone]  [facility]: [id number], [place] [address], Ph#: [phone]  [facility]: [address] [place] [address], Ph#: [phone]    Patient Name: CONSUELO GUTIERREZ  Age and : 55y ([birth year])  MRN #: [id number]  Location: [address]  Referring Physician: Libertad Zavaleta    Start Time/Date: 14:53 on 21  End Time/Date: 08:00 on 04-10-21  Duration: 16hr 30m    _____________________________________________________________  STUDY INFORMATION    EEG Recording Technique:  The patient underwent continuous Video-EEG monitoring, using Telemetry System hardware on the XLTek Digital System. EEG and video data were stored on a computer hard drive with important events saved in digital archive files. The material was reviewed by a physician (electroencephalographer / epileptologist) on a daily basis. Joaquin and seizure detection algorithms were utilized and reviewed. An EEG Technician attended to the patient, and was available throughout daytime work hours.  The epilepsy center neurologist was available in person or on call 24-hours per day.    EEG Placement and Labeling of Electrodes:  The EEG was performed utilizing 20 channel referential EEG connections (coronal over temporal over parasagittal montage) using all standard 10-20 electrode placements with EKG, with additional electrodes placed in the inferior temporal region using the modified 10-10 montage electrode placements for elective admissions, or if deemed necessary. Recording was at a sampling rate of 256 samples per second per channel. Time synchronized digital video recording was done simultaneously with EEG recording. A low light infrared camera was used for low light recording.     _____________________________________________________________  HISTORY    Patient is a 55y old  Female who presents with a chief complaint of LOC (2021 12:24)      PERTINENT MEDICATION:  topiramate 100 milliGRAM(s) Oral two times a day    _____________________________________________________________  STUDY INTERPRETATION    Findings: The background was continuous, spontaneously variable and reactive. During wakefulness, the posterior dominant rhythm consisted of symmetric, well-modulated 10 Hz activity, with amplitude to 30 uV, that attenuated to eye opening.      Background Slowing:  No generalized background slowing was present.    Focal Slowing:   None were present.    Sleep Background:  Drowsiness was characterized by fragmentation, attenuation, and slowing of the background activity.    Sleep was characterized by the presence of vertex waves, symmetric sleep spindles and K-complexes.    Other Non-Epileptiform Findings:  None were present.    Interictal Epileptiform Activity:   None were present.    Events:  Clinical events: None recorded.  Seizures: None recorded.    Activation Procedures:   Hyperventilation was not performed.    Photic stimulation was not performed.     Artifacts:  Intermittent myogenic and movement artifacts were noted.    ECG:  The heart rate on single channel ECG was predominantly between 70-80 BPM.    _____________________________________________________________  EEG SUMMARY/CLASSIFICATION    Normal EEG in the awake, drowsy and asleep states.    _____________________________________________________________  EEG IMPRESSION/CLINICAL CORRELATE    Normal EEG study.  No epileptiform pattern or seizure seen.    _____________________________________________________________    Kody Campuzano MD  Attending Physician, [facility]

## 2021-04-10 NOTE — PROGRESS NOTE ADULT - PROBLEM SELECTOR PLAN 3
known anemia, normocytic, baseline Hb 9.9  - b12/folate, iron studies unremarkable
known anemia, normocytic, baseline Hb 9.9  - b12/folate wnl   - f/u iron studie
known anemia, normocytic, baseline Hb 9.9  - b12/folate, iron studies unremarkable

## 2021-04-10 NOTE — PROGRESS NOTE ADULT - PROBLEM SELECTOR PLAN 5
TSH level 1.09 on admission  - c/w home levothyroxine 100mcg daily

## 2021-04-10 NOTE — PROGRESS NOTE ADULT - ASSESSMENT
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 Compliant with all medications. CT Head and CTA Head and Neck unremarkable. Pending vEEG.
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 Compliant with all medications. CT Head and CTA Head and Neck unremarkable.
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 Compliant with all medications.

## 2021-04-10 NOTE — EEG REPORT - NS EEG TEXT BOX
[facility]   COMPREHENSIVE EPILEPSY CENTER   REPORT OF LONG-TERM VIDEO EEG     [facility]: [address], [place] [address], Ph#: [phone]  [facility]: [id number], [place] [address], Ph#: [phone]  [facility]: [address] [place] [address], Ph#: [phone]    Patient Name: CONSUELO GUTIERREZ  Age and : 55y (61)  MRN #: [id number]  Location: [address]  Referring Physician: Libertad Zavaleta    Start Time/Date: 08:00 on 04-10-21  End Time/Date: 17:48 on 04-10-21  Duration: 9hr 47m    _____________________________________________________________  STUDY INFORMATION    EEG Recording Technique:  The patient underwent continuous Video-EEG monitoring, using Telemetry System hardware on the XLTek Digital System. EEG and video data were stored on a computer hard drive with important events saved in digital archive files. The material was reviewed by a physician (electroencephalographer / epileptologist) on a daily basis. Joaquin and seizure detection algorithms were utilized and reviewed. An EEG Technician attended to the patient, and was available throughout daytime work hours.  The epilepsy center neurologist was available in person or on call 24-hours per day.    EEG Placement and Labeling of Electrodes:  The EEG was performed utilizing 20 channel referential EEG connections (coronal over temporal over parasagittal montage) using all standard 10-20 electrode placements with EKG, with additional electrodes placed in the inferior temporal region using the modified 10-10 montage electrode placements for elective admissions, or if deemed necessary. Recording was at a sampling rate of 256 samples per second per channel. Time synchronized digital video recording was done simultaneously with EEG recording. A low light infrared camera was used for low light recording.     _____________________________________________________________  HISTORY    Patient is a 55y old  Female who presents with a chief complaint of LOC (2021 12:24)      PERTINENT MEDICATION:  topiramate 100 milliGRAM(s) Oral two times a day    _____________________________________________________________  STUDY INTERPRETATION    Findings: The background was continuous, spontaneously variable and reactive. During wakefulness, the posterior dominant rhythm consisted of symmetric, well-modulated 10 Hz activity, with amplitude to 30 uV, that attenuated to eye opening.      Background Slowing:  No generalized background slowing was present.    Focal Slowing:   None were present.    Sleep Background:  Drowsiness was characterized by fragmentation, attenuation, and slowing of the background activity.    Sleep was characterized by the presence of vertex waves, symmetric sleep spindles and K-complexes.    Other Non-Epileptiform Findings:  None were present.    Interictal Epileptiform Activity:   None were present.    Events:  Clinical events: None recorded.  Seizures: None recorded.    Activation Procedures:   Hyperventilation was not performed.    Photic stimulation was not performed.     Artifacts:  Intermittent myogenic and movement artifacts were noted.    ECG:  The heart rate on single channel ECG was predominantly between 70-80 BPM.    _____________________________________________________________  EEG SUMMARY/CLASSIFICATION    Normal EEG in the awake, drowsy and asleep states.    _____________________________________________________________  EEG IMPRESSION/CLINICAL CORRELATE    Normal EEG study.  No epileptiform pattern or seizure seen.    _____________________________________________________________    Kody Campuzano MD  Attending Physician, [facility]

## 2021-04-10 NOTE — PROGRESS NOTE ADULT - PROBLEM SELECTOR PLAN 4
- c/w home hydroxychloroquine and methotrexate while inpatient (not due for methotrexate until Tuesday)

## 2021-04-10 NOTE — PROGRESS NOTE ADULT - SUBJECTIVE AND OBJECTIVE BOX
[facility] Division of Hospital Medicine  Libertad Zavaleta MD  Pager (M-F, 8A-5P): [phone]  Other Times:  [phone]      Patient is a 55y old  Female who presents with a chief complaint of LOC (2021 07:45)      SUBJECTIVE / OVERNIGHT EVENTS: no acute events overnight. no further episodes of LOC nor confusion inpatient. awaiting video EEG as per neuro recs. denies any dizziness, lightheadedness, chest pain, palpitations, SOB, abd pain, n/v/d/c nor dysuria.  ADDITIONAL REVIEW OF SYSTEMS:    MEDICATIONS  (STANDING):  atorvastatin 20 milliGRAM(s) Oral at bedtime  hydroxychloroquine 200 milliGRAM(s) Oral daily  levothyroxine 100 MICROGram(s) Oral daily  nortriptyline 50 milliGRAM(s) Oral at bedtime  topiramate 100 milliGRAM(s) Oral two times a day  venlafaxine XR. 150 milliGRAM(s) Oral two times a day    MEDICATIONS  (PRN):  clonazePAM  Tablet 0.5 milliGRAM(s) Oral two times a day PRN anxiety  tiZANidine 4 milliGRAM(s) Oral every 8 hours PRN Muscle Spasm  traZODone 150 milliGRAM(s) Oral at bedtime PRN insomnia      CAPILLARY BLOOD GLUCOSE      POCT Blood Glucose.: 74 mg/dL (2021 15:41)    I&O's Summary    2021 07:01  -  2021 07:00  --------------------------------------------------------  IN: 0 mL / OUT: 100 mL / NET: -100 mL        PHYSICAL EXAM:  Vital Signs Last 24 Hrs  T(C): 36.5 (2021 04:44), Max: 37.2 (2021 23:50)  T(F): 97.7 (2021 04:44), Max: 99 (2021 23:50)  HR: 80 (2021 04:44) (68 - 84)  BP: 122/78 (2021 04:44) (122/78 - 156/95)  BP(mean): --  RR: 16 (2021 04:44) (16 - 19)  SpO2: 95% (2021 04:44) (95% - 100%)    CONSTITUTIONAL: NAD, well-developed, well-groomed  EYES: PERRLA; conjunctiva and sclera clear  ENMT: Moist oral mucosa, no pharyngeal injection or exudates; normal dentition  NECK: Supple, no palpable masses; no thyromegaly  RESPIRATORY: Normal respiratory effort; lungs are clear to auscultation bilaterally  CARDIOVASCULAR: Regular rate and rhythm, normal S1 and S2, no murmur/rub/gallop; No lower extremity edema; Peripheral pulses are 2+ bilaterally  ABDOMEN: Soft, Nondistended,  Nontender to palpation, normoactive bowel sounds  MUSCULOSKELETAL:  No clubbing or cyanosis of digits; no joint swelling or tenderness to palpation  PSYCH: A+O to person, place, and time; affect appropriate  NEUROLOGY: CN 2-12 are intact and symmetric; no gross sensory deficits   SKIN: No rashes; no palpable lesions    LABS:                        9.9    3.38  )-----------( 393      ( 2021 06:08 )             31.9     04-08    140  |  107  |  15  ----------------------------<  88  3.3<L>   |  21<L>  |  1.14    Ca    9.2      2021 06:18  Phos  4.4     04-08  Mg     2.1     04-08    TPro  7.5  /  Alb  4.3  /  TBili  0.1<L>  /  DBili  x   /  AST  19  /  ALT  12  /  AlkPhos  88  04-08    PT/INR - ( 2021 17:07 )   PT: 13.0 sec;   INR: 1.09 ratio         PTT - ( 2021 17:07 )  PTT:29.9 sec      Urinalysis Basic - ( 2021 18:43 )    Color: Colorless / Appearance: Clear / S.009 / pH: x  Gluc: x / Ketone: Negative  / Bili: Negative / Urobili: Negative   Blood: x / Protein: Negative / Nitrite: Negative   Leuk Esterase: Negative / RBC: x / WBC x   Sq Epi: x / Non Sq Epi: x / Bacteria: x          RADIOLOGY & ADDITIONAL TESTS:  Results Reviewed: no leukocytosis, H/H stable, mild hypokalemia, Cr within normal limits, TSH wnl  Imaging Personally Reviewed:  Electrocardiogram Personally Reviewed:    COORDINATION OF CARE:  Care Discussed with Consultants/Other Providers [Y]: medicine SEBLE Dias  Prior or Outpatient Records Reviewed [Y]: Neurology consult note  
[facility] Division of Hospital Medicine  Libertad Zavaleta MD  Pager ([place], 8A-5P): [phone]  Other Times:  [phone]      Patient is a 55y old  Female who presents with a chief complaint of LOC (2021 14:08)      SUBJECTIVE / OVERNIGHT EVENTS: no acute events overnight. BP soft this morning but patient asymptomatic. denies any dizziness, lightheadedness, fever, chills, chest pain, sob, cough, n/v/d/c, nor dysuria. no further episodes of loss of consciousness/confusion. awaiting vEEG.  ADDITIONAL REVIEW OF SYSTEMS:    MEDICATIONS  (STANDING):  atorvastatin 20 milliGRAM(s) Oral at bedtime  hydroxychloroquine 200 milliGRAM(s) Oral daily  levothyroxine 100 MICROGram(s) Oral daily  nortriptyline 50 milliGRAM(s) Oral at bedtime  topiramate 100 milliGRAM(s) Oral two times a day  venlafaxine XR. 150 milliGRAM(s) Oral two times a day    MEDICATIONS  (PRN):  clonazePAM  Tablet 0.5 milliGRAM(s) Oral two times a day PRN anxiety  tiZANidine 4 milliGRAM(s) Oral every 8 hours PRN Muscle Spasm  traZODone 150 milliGRAM(s) Oral at bedtime PRN insomnia      CAPILLARY BLOOD GLUCOSE        I&O's Summary    2021 07:01  -  2021 07:00  --------------------------------------------------------  IN: 890 mL / OUT: 1300 mL / NET: -410 mL    2021 07:01  -  2021 12:00  --------------------------------------------------------  IN: 240 mL / OUT: 0 mL / NET: 240 mL        PHYSICAL EXAM:  Vital Signs Last 24 Hrs  T(C): 36.6 (2021 08:53), Max: 37.1 (2021 20:32)  T(F): 97.8 (2021 08:53), Max: 98.8 (2021 20:32)  HR: 79 (2021 08:53) (76 - 93)  BP: 100/67 (2021 08:53) (94/62 - 134/84)  BP(mean): --  RR: 16 (2021 08:53) (16 - 18)  SpO2: 96% (2021 08:53) (96% - 98%)    CONSTITUTIONAL: NAD, well-developed, well-groomed  EYES: PERRLA; conjunctiva and sclera clear  ENMT: Moist oral mucosa, no pharyngeal injection or exudates; normal dentition  NECK: Supple, no palpable masses; no thyromegaly  RESPIRATORY: Normal respiratory effort; lungs are clear to auscultation bilaterally  CARDIOVASCULAR: Regular rate and rhythm, normal S1 and S2, no murmur/rub/gallop; No lower extremity edema; Peripheral pulses are 2+ bilaterally  ABDOMEN: Soft, Nondistended,  Nontender to palpation, normoactive bowel sounds  MUSCULOSKELETAL:  No clubbing or cyanosis of digits; no joint swelling or tenderness to palpation  PSYCH: A+O to person, place, and time; affect appropriate  NEUROLOGY: CN 2-12 are intact and symmetric; no gross sensory deficits   SKIN: No rashes; no palpable lesions    LABS:                        10.6   4.19  )-----------( 441      ( 2021 06:43 )             34.3     04-09    139  |  106  |  14  ----------------------------<  97  3.6   |  20<L>  |  0.88    Ca    9.8      2021 06:43  Phos  4.2     04-09  Mg     2.1     04-09    TPro  7.5  /  Alb  4.3  /  TBili  0.1<L>  /  DBili  x   /  AST  19  /  ALT  12  /  AlkPhos  88  04-08    PT/INR - ( 2021 17:07 )   PT: 13.0 sec;   INR: 1.09 ratio         PTT - ( 2021 17:07 )  PTT:29.9 sec      Urinalysis Basic - ( 2021 18:43 )    Color: Colorless / Appearance: Clear / S.009 / pH: x  Gluc: x / Ketone: Negative  / Bili: Negative / Urobili: Negative   Blood: x / Protein: Negative / Nitrite: Negative   Leuk Esterase: Negative / RBC: x / WBC x   Sq Epi: x / Non Sq Epi: x / Bacteria: x    RADIOLOGY & ADDITIONAL TESTS:  Results Reviewed: no leukocytosis, H/H stable, Cr within normal limits, electrolytes at goal  Imaging Personally Reviewed:  21 TTE:  Conclusions:  1. Normal mitral valve. No mitral regurgitation seen.  2. Normal trileaflet aortic valve. No aortic valve regurgitation seen.  3. Normal left ventricular systolic function. No segmental wall motion abnormalities.  4. Mild diastolic dysfunction (Stage I).  5. Normal right ventricular size and function.  Electrocardiogram Personally Reviewed:    COORDINATION OF CARE:  Care Discussed with Consultants/Other Providers [Y]: medicine NP Alyssa  Prior or Outpatient Records Reviewed [Y]: Neurology consult note  
[facility] Division of Hospital Medicine  Meli Draper MD  Pager (M-F, 8A-5P): [phone]  Other Times:  [phone]    Patient is a 55y old  Female who presents with a chief complaint of LOC (08 Apr 2021 14:08)      SUBJECTIVE / OVERNIGHT EVENTS: No acute events. EEG with no epileptiform events. Stable for discharge to home    ADDITIONAL REVIEW OF SYSTEMS:  Negative    MEDICATIONS  (STANDING):  atorvastatin 20 milliGRAM(s) Oral at bedtime  hydroxychloroquine 200 milliGRAM(s) Oral daily  levothyroxine 100 MICROGram(s) Oral daily  nortriptyline 50 milliGRAM(s) Oral at bedtime  topiramate 100 milliGRAM(s) Oral two times a day  venlafaxine XR. 150 milliGRAM(s) Oral two times a day    MEDICATIONS  (PRN):  clonazePAM  Tablet 0.5 milliGRAM(s) Oral two times a day PRN anxiety  tiZANidine 4 milliGRAM(s) Oral every 8 hours PRN Muscle Spasm  traZODone 150 milliGRAM(s) Oral at bedtime PRN insomnia      CAPILLARY BLOOD GLUCOSE    PHYSICAL EXAM:  Vital Signs Last 24 Hrs  T(C): 36.9 (04-10-21 @ 04:28), Max: 37.5 (04-09-21 @ 20:47)  T(F): 98.4 (04-10-21 @ 04:28), Max: 99.5 (04-09-21 @ 20:47)  HR: 62 (04-10-21 @ 04:28) (62 - 95)  BP: 120/68 (04-10-21 @ 04:28) (103/70 - 120/68)  RR: 18 (04-10-21 @ 04:28) (16 - 18)  SpO2: 96% (04-10-21 @ 04:28) (96% - 97%)  Wt(kg): --    CONSTITUTIONAL: NAD, well-developed, well-groomed  EYES: PERRLA; conjunctiva and sclera clear  ENMT: Moist oral mucosa, no pharyngeal injection or exudates; normal dentition  NECK: Supple, no palpable masses; no thyromegaly  RESPIRATORY: Normal respiratory effort; lungs are clear to auscultation bilaterally  CARDIOVASCULAR: Regular rate and rhythm, normal S1 and S2, no murmur/rub/gallop; No lower extremity edema; Peripheral pulses are 2+ bilaterally  ABDOMEN: Soft, Nondistended,  Nontender to palpation, normoactive bowel sounds  MUSCULOSKELETAL:  No clubbing or cyanosis of digits; no joint swelling or tenderness to palpation  PSYCH: A+O to person, place, and time; affect appropriate  NEUROLOGY: CN 2-12 are intact and symmetric; no gross sensory deficits   SKIN: No rashes; no palpable lesions    LABS:                           10.3   4.55  )-----------( 447      ( 10 Apr 2021 06:05 )             33.4       04-10    140  |  107  |  14  ----------------------------<  90  4.1   |  20<L>  |  0.85    Ca    9.6      10 Apr 2021 06:05  Phos  4.2     04-09  Mg     2.1     04-09                              CAPILLARY BLOOD GLUCOSE                          RADIOLOGY & ADDITIONAL TESTS:  4/8/21 TTE:  Conclusions:  1. Normal mitral valve. No mitral regurgitation seen.  2. Normal trileaflet aortic valve. No aortic valve regurgitation seen.  3. Normal left ventricular systolic function. No segmental wall motion abnormalities.  4. Mild diastolic dysfunction (Stage I).  5. Normal right ventricular size and function.    EEG IMPRESSION/CLINICAL CORRELATE    Normal EEG study.  No epileptiform pattern or seizure seen.

## 2021-04-10 NOTE — DISCHARGE NOTE NURSING/CASE MANAGEMENT/SOCIAL WORK - PATIENT PORTAL LINK FT
You can access the FollowMyHealth Patient Portal offered by [facility] by registering at the following website: [email or web address]. By joining [employer]’s FollowMyHealth portal, you will also be able to view your health information using other applications (apps) compatible with our system.

## 2021-04-13 ENCOUNTER — NON-APPOINTMENT (OUTPATIENT)
Age: 56
End: 2021-04-13

## 2021-04-13 PROBLEM — K50.90 CROHN'S DISEASE, UNSPECIFIED, WITHOUT COMPLICATIONS: Chronic | Status: ACTIVE | Noted: 2021-04-08

## 2021-04-16 ENCOUNTER — NON-APPOINTMENT (OUTPATIENT)
Age: 56
End: 2021-04-16

## 2021-04-19 ENCOUNTER — APPOINTMENT (OUTPATIENT)
Dept: INTERNAL MEDICINE | Facility: CLINIC | Age: 56
End: 2021-04-19

## 2021-05-10 NOTE — ED PROVIDER NOTE - CHIEF COMPLAINT
Need for Home Care Services was communicated by ERON De Paz, who offered choice and patient chose [facility]. Spoke with patient regarding Home Care for SN/PT & OT at [facility], patient's Assisted Living Facility. Patient will benefit from home care services due to new indwelling urinary catheter, size 16 fr for teaching & monitoring, hip pain, recent fall, CHF, A-fib.     Readmission risk is 98%. Homebound criteria discussed, patient verbalizes understanding, and meets criteria because of hip pain, indwelling catheter, and need for help from others for general oversight. Patient has own 4WW.     Confirmed patient's home address, phone and email are correct. Patient may have access for Zoom calls through facility internet. Explained that patient and caregivers need to wear masks during clinician visits, which they agreed to.    Explained that [facility] will contact patient to schedule services within 24-48 hours of discharge. Provided agency phone number [phone] for patient to call with any non-emergent communication. Anticipated discharge date is 5/10/21.     Marissa Santamaria RN, Home Care Liaison, [phone]    
The patient is a 53y Female complaining of migraine.
25-Jan-2019 09:32

## 2021-05-13 NOTE — PATIENT PROFILE ADULT. - EXTENSIONS OF SELF_ADULT
Pt comes to the ER from c/o imbalance while walking since 1400. Pt has a history of stroke, mostly speech was affected at that time.      Neuro alert paged @ 0880    LKWT: 1400    BS=83   None

## 2021-05-14 ENCOUNTER — APPOINTMENT (OUTPATIENT)
Dept: PULMONOLOGY | Facility: CLINIC | Age: 56
End: 2021-05-14
Payer: MEDICARE

## 2021-05-14 VITALS
DIASTOLIC BLOOD PRESSURE: 95 MMHG | SYSTOLIC BLOOD PRESSURE: 155 MMHG | HEART RATE: 84 BPM | RESPIRATION RATE: 16 BRPM | TEMPERATURE: 97.8 F | OXYGEN SATURATION: 98 % | WEIGHT: 151 LBS | HEIGHT: 59 IN | BODY MASS INDEX: 30.44 KG/M2

## 2021-05-14 PROCEDURE — 99203 OFFICE O/P NEW LOW 30 MIN: CPT

## 2021-05-14 PROCEDURE — 99072 ADDL SUPL MATRL&STAF TM PHE: CPT

## 2021-05-16 ENCOUNTER — TRANSCRIPTION ENCOUNTER (OUTPATIENT)
Age: 56
End: 2021-05-16

## 2021-05-16 NOTE — DISCUSSION/SUMMARY
[FreeTextEntry1] : Obstructive airways disease, asthma\par \par History of COVID infection\par \par Fibromyalgia on methotrexate hydroxychloroquine\par \par CXR 4/7/21 was normal\par \par CT 5/11/20 was normal\par \par CT sinuses demonstrated nasal deviation\par \par PLAN\par \par continue albuterol\par \par PFT next visit\par \par Further recommendations based on results. \par \par Nhan Kapoor MD

## 2021-05-16 NOTE — PHYSICAL EXAM
[Normal Oropharynx] : normal oropharynx [II] : Mallampati Class: II [Normal Appearance] : normal appearance [No Neck Mass] : no neck mass [Normal Rate/Rhythm] : normal rate/rhythm [Normal S1, S2] : normal s1, s2 [No Murmurs] : no murmurs [No Resp Distress] : no resp distress [Benign] : benign [Not Tender] : not tender [No Masses] : no masses [Soft] : soft [Normal Bowel Sounds] : normal bowel sounds [No Clubbing] : no clubbing [No Cyanosis] : no cyanosis [Oriented x3] : oriented x3 [Normal Affect] : normal affect [Clear to Auscultation Bilaterally] : clear to auscultation bilaterally

## 2021-05-16 NOTE — HISTORY OF PRESENT ILLNESS
[TextBox_4] : 55 year old patient presents for evaluation of asthma.  She also has HTN, hypothyroidism, HLD, GERD, fibromyalgia.  She continues to have episodes of dyspnea. \par She uses nebulized albuterol \par \par She reports developing breathing problems since 2002 after giving birth.\par She has been treated in ER but has never required admission. \par \par She is using methotrexate injection weekly, hydroxychloroquine for arthralgias, fibromyalgia. \par \par She has rhinitis and deviated septum\par \par She also has had COVID 19 infection.\par \par \par PSH:\par \par C section x 2 tubal ligation, reversal of ligation shoulder surgery, knee surgery\par \par \par PMH:\par \par  HTN, hypothyroidism, HLD, GERD, fibromyalgia\par \par SH:\par \par never smoker\par \par occasional cannabis, for sleep\par \par ETOH:  none\par \par \par \par ALLERGY:\par \par allergic to estrogen\par \par Reaction is hives\par \par environmental/seasonal allergy:  none\par \par \par \par Review of Systems:\par \par No rash, skin problems\par \par no sinusitis, sinus infections, right nasal obstruction\par \par \par \par no lung cancer\par \par no CAD\par no MI\par no chest pain\par no murmur\par no CHF\par no HTN\par no edema\par \par no peptic ulcer or gastritis\par no GERD\par no abdominal pain\par no liver disease\par \par no Diabetes\par no thyroid disease\par no hyperlipidemia\par \par \par no bleeding\par \par no DVT or PE\par \par no kidney disease\par \par no stroke\par no seizure\par \par \par \par \par \par \par \par \par \par \par \par   [Hypersomnolence] : denies hypersomnolence [Snoring] : no snoring [Unintentional Sleep while Inactive] : no unintentional sleep while inactive

## 2021-05-17 ENCOUNTER — APPOINTMENT (OUTPATIENT)
Dept: CARDIOLOGY | Facility: CLINIC | Age: 56
End: 2021-05-17
Payer: MEDICARE

## 2021-05-17 VITALS
TEMPERATURE: 97 F | RESPIRATION RATE: 16 BRPM | SYSTOLIC BLOOD PRESSURE: 127 MMHG | DIASTOLIC BLOOD PRESSURE: 80 MMHG | HEIGHT: 59 IN | OXYGEN SATURATION: 98 % | HEART RATE: 77 BPM | BODY MASS INDEX: 30.44 KG/M2 | WEIGHT: 151 LBS

## 2021-05-17 DIAGNOSIS — U07.1 COVID-19: ICD-10-CM

## 2021-05-17 PROCEDURE — 99205 OFFICE O/P NEW HI 60 MIN: CPT

## 2021-05-17 PROCEDURE — 99072 ADDL SUPL MATRL&STAF TM PHE: CPT

## 2021-05-17 PROCEDURE — 93000 ELECTROCARDIOGRAM COMPLETE: CPT

## 2021-06-04 NOTE — ED PROVIDER NOTE - CHILD ABUSE FACILITY

## 2021-06-07 NOTE — ED ADULT NURSE NOTE - NS ED NOTE ABUSE SUSPICION NEGLECT YN
Andrew spoke with Mick Frances from [facility] indicating that [facility] will not accept client due to his need for oxygen at night  Mick Frances indicated she would follow up with [facility] tomorrow  No

## 2021-06-16 ENCOUNTER — APPOINTMENT (OUTPATIENT)
Dept: GASTROENTEROLOGY | Facility: CLINIC | Age: 56
End: 2021-06-16
Payer: MEDICARE

## 2021-06-16 VITALS
HEART RATE: 71 BPM | OXYGEN SATURATION: 97 % | TEMPERATURE: 98.2 F | BODY MASS INDEX: 30.9 KG/M2 | WEIGHT: 153 LBS | SYSTOLIC BLOOD PRESSURE: 133 MMHG | DIASTOLIC BLOOD PRESSURE: 86 MMHG

## 2021-06-16 DIAGNOSIS — R19.8 OTHER SPECIFIED SYMPTOMS AND SIGNS INVOLVING THE DIGESTIVE SYSTEM AND ABDOMEN: ICD-10-CM

## 2021-06-16 PROCEDURE — 99214 OFFICE O/P EST MOD 30 MIN: CPT

## 2021-06-16 PROCEDURE — 99072 ADDL SUPL MATRL&STAF TM PHE: CPT

## 2021-06-16 NOTE — HISTORY OF PRESENT ILLNESS
[ER Visit] : was seen in the Emergency Department [Yellow Skin Or Eyes (Jaundice)] : denies jaundice [Rectal Pain] : denies rectal pain [Heartburn] : heartburn [Nausea] : nausea [Vomiting] : vomiting [Diarrhea] : diarrhea [Constipation] : constipation [Abdominal Pain] : abdominal pain [Abdominal Swelling] : abdominal swelling [GERD] : gastroesophageal reflux disease [Wt Gain ___ Lbs] : no recent weight gain [Wt Loss ___ Lbs] : no recent weight loss [Hiatus Hernia] : no hiatus hernia [Peptic Ulcer Disease] : no peptic ulcer disease [Pancreatitis] : no pancreatitis [Cholelithiasis] : no cholelithiasis [Kidney Stone] : no kidney stone [Inflammatory Bowel Disease] : no inflammatory bowel disease [Irritable Bowel Syndrome] : no irritable bowel syndrome [Diverticulitis] : no diverticulitis [Alcohol Abuse] : no alcohol abuse [Malignancy] : no malignancy [Abdominal Surgery] : no abdominal surgery [Appendectomy] : no appendectomy [Cholecystectomy] : no cholecystectomy [de-identified] : (-) smoking, (-) ETOH, (-) IVDA\par  [de-identified] : The patient states that she is feeling uncomfortable x 2 months. The patient denies any jaundice or pruritus.  The patient complains of chronic lower back pain. The patient complains of abdominal pain.  The patient describes the abdominal pain as a crampy, constant diffuse abdominal discomfort that is nonradiating in nature.  The abdominal pain is unrelated to passing gas or having bowel movements.  The abdominal pain is worse with meals and stress.  The abdominal pain is described as being moderate in nature.  The abdominal pain occurs at night and in the morning.  The abdominal pain can occur at any time.   The abdominal pain has awakened the patient from sleep.  The abdominal pain is not relieved with medication.  The abdominal pain is associated with abdominal gas and bloating.  The patient complains of nausea and occasional vomiting.  The patient complains of gastroesophageal reflux disease but denies any dysphagia.  The gastroesophageal reflux disease is worse after meals and late at night and in the early morning. The gastroesophageal reflux disease is not improved with proton pump inhibitors, H2 blockers and antacids.   The patient denies any atypical chest pain, shortness of breath or palpitations.  The patient denies any diaphoresis. The patient admits to occasional episodes of diaphoresis.  The patient complains of alternating diarrhea/constipation.  The patient has 1 bowel movement every 1 to 3 days.  The patient complains of a change in bowel habits.  The patient complains of a change in caliber of stool.   The diarrhea is described as soft in nature.  The patient denies having mucus discharge with the bowel movements.  The patient denies any bright red blood per rectum, melena or hematemesis.  The patient denies any rectal pain or rectal pruritus.  The patient denies any weight loss or anorexia.  The patient admits to gaining weight recently.  She denies any fevers or chills.  The blood work performed on March 23, 2021 revealed an elevated platelet count of 405,000, normal liver enzymes with a normal total bilirubin of < 0.2 mg/dL, normal alkaline phosphatase/AST/ALT/GGTP of 86/21/14/27 U/L, respectively, anemia with a hemoglobin/hematocrit level of 9.5/31.9, respectively, an elevated blood glucose of 198 mg/dl and elevated T3 of 66 ng/ml.  The patient was admitted to [facility] [place] on February 16, 2020 with 1 week history of abdominal pain. The blood work performed on February 20, 2020 revealed mild anemia with Hgb/Hct level of 10.7/33.0, respectively and an elevated chloride of 109 mmol/L. The abdominal ultrasound performed on February 19, 2020 revealed no gallstones, gallbladder wall thickening or pericholecystic fluid. The CAT scan of the chest, abdomen and pelvis with IV contrast performed on February 17, 2020 revealed no evidence of an aortic dissection. The patient had [place] [facility] [place], 2020. The upper endoscopy revealed a small hiatal hernia and mild diffuse gastritis with retained food and debris in the stomach suggestive of gastroparesis. The gastroparesis may be the cause of the abdominal pain. The patient was discharged on February 20, 2020 in stable condition. The patient had a colonoscopy to the terminal ileum performed [place] [facility] [place] on February 13, 2020. The colonoscopy to the terminal ileum revealed moderate left sided diverticulosis. Random biopsies were taken of the terminal ileum, cecum and rectum to assess for ileitis and microscopic colitis. There were no polyps, masses, AVMs or colitis noted. The pathology revealed unremarkable small intestinal mucosa with preserved villous pattern and lymphocytic aggregates (terminal ileum), patchy mild colitis without evidence of architectural changes or evidence of chronicity (cecum) and benign colonic mucosa with focal hyperplastic change (rectum). The patient had an upper endoscopy and colonoscopy to the cecum performed [place] [facility] [place] on July 18, 2019. The upper endoscopy was performed up to the level of the second portion of the duodenum. The upper endoscopy revealed a small hiatal hernia and mild diffuse bile gastritis. Biopsies were taken of the distal esophagus, antrum, body of stomach and duodenum to assess for esophagitis, gastritis and duodenitis. The pathology revealed distal esophagus with unremarkable squamous mucosa, gastric antral and body mucosa with chronic inactive gastritis with severe lymphoid follicles that was negative for Helicobacter pylori and unremarkable duodenal mucosa. The colonoscopy to the cecum revealed moderate left sided diverticulosis, a poor prep colonoscopy and small internal hemorrhoids. The study was limited secondary to retained liquid and solid stool scattered throughout the colon but no gross lesions were noted. Unable to comment on small colonic polyps or masses secondary to the poor prep. There were no polyps, masses, diverticulosis, AVMs or colitis noted. The patient tolerated the procedures well. The patient was previously found to have occult blood in the stool. The CAT scan of the abdomen and pelvis with IV contrast performed on December 16, 2019 revealed heterogeneous attenuation in the external iliac and common femoral veins is likely related to mixing artifact. Also noted was constipation, sigmoid diverticulosis without diverticulitis, trace bilateral hydronephrosis likely related to distended urinary bladder. The doppler ultrasound of the lower extremities performed on December 27, 2019 revealed no evidence of deep venous thrombosis in either lower extremity. The patient had an abdominal and pelvic ultrasound performed on October 18, 2019 to assess the symptoms. The abdominal ultrasound performed on October 18, 2019 revealed no gallstones or pericholecystic fluid. The contracted state of the gallbladder limits assessment for gallbladder wall thickening. There is no evidence for intrahepatic or extrahepatic biliary duct dilatation. The pelvic ultrasound performed on October 18, 2019 revealed no evidence for an ovarian lesion. The blood work performed on November 25, 2019 revealed an elevated CEA of 4.8 ng/ml, an elevated ESR of 22 mm/hr, anemia with Hgb of 10.5 and low WBC count of 3.29. The patient was also previously [place] [facility] [place] 10, 2020 for abdominal pain. The patient was treated with medications and discharged. The blood work performed on January 11, 2020 revealed an elevated chloride level of 110 mmol/L, anemia with a hemoglobin/hematocrit level 10.5/34.3, respectively and a normalization of the WBC count of 4.32. The patient was observed with resolution of the symptoms and was discharged to followup in the office. The patient admits to having a prior upper endoscopy and colonoscopy performed by another gastroenterologist. The upper endoscopic findings were unknown to the patient. The colonoscopic findings revealed a poor prep colonoscopy. The patient admits to a family history of GI problems. The patient’s brother and father had a history of bleeding peptic ulcer disease.

## 2021-06-16 NOTE — ASSESSMENT
[FreeTextEntry1] : Abdominal Pain: The patient complains of abdominal pain. The patient is to avoid nonsteroidal anti-inflammatory drugs and aspirin.  I recommend a low FOD-MAP diet.  I recommend a trial of Dicyclomine 10 mg tablet PO 3 times a day PRN for the abdominal pain.\par Dyspepsia: The patient complains of dyspeptic symptoms.  The patient was advised to abide by an anti-gas diet.  The patient was given a pamphlet for anti-gas.  The patient and I reviewed the anti-gas diet at length. The patient is to start on a trial of Phazyme one tablet 3 times a day p.r.n. abdominal pain and gas.\par GERD: The patient was advised to avoid late-night meals and dietary indiscretions.  The patient was advised to avoid fried and fatty foods.  The patient was advised to abide by an anti-GERD diet. The patient was given a pamphlet for anti-GERD.  The patient and I reviewed the anti-GERD diet at length. I recommend a trial of Pantoprazole 40 mg once a day x 3 months for the symptoms.\par Nausea/Vomiting: The patient complains of nausea/vomiting. If the symptoms of nausea/vomiting persists, the patient may require a trial of Zofran 4 mg twice a day.\par Alternating Diarrhea/Constipation: The patient complains of alternating diarrhea/constipation.  I recommend a low residue diet. The patient is to avoid fiber supplementation. The patient is to consider starting a trial of a probiotic such as Align once a day.   If the symptoms persist, the patient may require sending stool studies for C+S, O+P x3, and C. difficile to assess for an infectious etiology of the diarrhea.    The patient agreed and will follow-up to reassess the symptoms.\par Gastroparesis: The patient had an upper endoscopy performed at the [facility] [place] GI endoscopy suite on February 20, 2020. The upper endoscopy revealed a small hiatal hernia and mild diffuse gastritis with retained food and debris in the stomach suggestive of gastroparesis. The gastroparesis may be contributing to the abdominal pain. The patient is currently on medications that can contribute to the gastroparesis. I recommend a gastroparesis diet with smaller but more frequent meals. The patient can have a liquid and puree diet and avoid foods high in fats and carbohydrates. The patient was also advised to avoid high fiber diet. If the symptoms persist, the patient may require a trial of a promotility agent for the gastroparesis such as a trial of Reglan (metoclopramide) 5 mg 4 times a day. The patient and I had a long discussion regarding the risks of potential side effects of Reglan (metoclopramide). The patient was told of the risk of headaches, dizziness, diarrhea and permanent tremors. The patient was told to stop the medication immediately and call the office if any of these symptoms occur. The patient agrees and will call the office regarding her symptoms.\par History of Diverticulitis: The patient had a prior history of acute diverticulitis. The patient may require an emergency room evaluation for diverticulitis if the symptoms recur. The patient may require treatment with antibiotics if the symptoms recur. I recommend a low residue diet. The CAT scan of the abdomen and pelvis with IV contrast performed on December 16, 2019 revealed heterogeneous attenuation in the external iliac and common femoral veins is likely related to mixing artifact. Also noted was constipation, sigmoid diverticulosis without diverticulitis, trace bilateral hydronephrosis likely related to distended urinary bladder. The patient was advised to go to the hospital if fever, chills, worsening abdominal pain or GI bleeding recurs. The patient agrees.\par Abnormal Imaging Study: The CAT scan of the abdomen and pelvis with IV contrast performed on December 16, 2019 revealed heterogeneous attenuation in the external iliac and common femoral veins is likely related to mixing artifact. Also noted was constipation, sigmoid diverticulosis without diverticulitis, trace bilateral hydronephrosis likely related to distended urinary bladder. The doppler ultrasound of the lower extremities performed on December 27, 2019 revealed no evidence of deep venous thrombosis in either lower extremity. \par Hiatal Hernia: The patient was advised to avoid late-night meals and dietary indiscretions. The patient was advised to avoid fried and fatty foods. The patient was advised to abide by an anti-GERD diet. The patient was given a pamphlet for anti-GERD. The patient and I reviewed the anti-GERD diet at length. The patient had no improvement on a trial of Carafate. I recommend restarting on a trial of Pantoprazole 40 mg once a day x 3 months for the symptoms.\par Gastritis: The patient has a history of gastritis. The patient is to avoid nonsteroidal anti-inflammatory drugs and aspirin. The patient previously had no improvement on a trial of Carafate. I recommend restarting on a trial of Pantoprazole 40 mg once a day x 3 months for the symptoms.\par Irritable Bowel Syndrome: The patient has symptoms suggestive of irritable bowel syndrome. The patient was advised to follow a low FOD-MAP diet for the irritable bowel syndrome. I recommend restarting a trial of Dicyclomine 10 mg three times a day for the abdominal pain and constipation. \par Diverticulosis: I recommend a low residue diet and avoid seeds. The patient is to consider a trial of Metamucil once a day for fiber supplementation. The patient is to also consider a trial of a probiotic such as Align once a day. \par Internal Hemorrhoids: The patient is to consider a trial of Anusol H. C. suppositories one per rectum nightly and Anusol HC2.5% cream apply to affected area twice a day p.r.n. hemorrhoidal bleeding or pain. \par History of Occult Blood in the Stool: The patient was previously found to have occult blood in the stool. The patient denies any bright red blood per rectum, melena or hematemesis. The patient was previously found to be guaiac-positive by her PMD. I recommend an MR enterography to assess the occult blood in the stool and small bowel pathology after the COVID 19 pandemic resolves.\par Blood Work: I recommend blood work to assess the patient's symptoms. I recommend a CBC, SMA 24, amylase, lipase, ESR, TFTs, ,iron, TIBC, ferritin level.  I also recommend obtaining the recent blood work performed by the patient's PMD.\par Follow-up: The patient is to follow-up in the office in 1 month to reassess the symptoms. The patient was told to call the office if any further problems.\par \par \par

## 2021-06-17 ENCOUNTER — NON-APPOINTMENT (OUTPATIENT)
Age: 56
End: 2021-06-17

## 2021-06-18 ENCOUNTER — NON-APPOINTMENT (OUTPATIENT)
Age: 56
End: 2021-06-18

## 2021-06-21 ENCOUNTER — NON-APPOINTMENT (OUTPATIENT)
Age: 56
End: 2021-06-21

## 2021-06-21 LAB
ALBUMIN SERPL ELPH-MCNC: 4.4 G/DL
ALP BLD-CCNC: 101 U/L
ALT SERPL-CCNC: 16 U/L
AMYLASE/CREAT SERPL: 55 U/L
ANION GAP SERPL CALC-SCNC: 13 MMOL/L
AST SERPL-CCNC: 27 U/L
BASOPHILS # BLD AUTO: 0.05 K/UL
BASOPHILS NFR BLD AUTO: 1.3 %
BILIRUB SERPL-MCNC: 0.2 MG/DL
BUN SERPL-MCNC: 14 MG/DL
CALCIUM SERPL-MCNC: 9.4 MG/DL
CHLORIDE SERPL-SCNC: 105 MMOL/L
CO2 SERPL-SCNC: 20 MMOL/L
CREAT SERPL-MCNC: 0.97 MG/DL
EOSINOPHIL # BLD AUTO: 0.1 K/UL
EOSINOPHIL NFR BLD AUTO: 2.6 %
ERYTHROCYTE [SEDIMENTATION RATE] IN BLOOD BY WESTERGREN METHOD: 37 MM/HR
FERRITIN SERPL-MCNC: 20 NG/ML
FOLATE SERPL-MCNC: 6.5 NG/ML
GGT SERPL-CCNC: 42 U/L
GLIADIN IGA SER QL: 6.8 UNITS
GLIADIN IGG SER QL: <5 UNITS
GLIADIN PEPTIDE IGA SER-ACNC: NEGATIVE
GLIADIN PEPTIDE IGG SER-ACNC: NEGATIVE
GLUCOSE SERPL-MCNC: 92 MG/DL
HCT VFR BLD CALC: 32.6 %
HGB BLD-MCNC: 9.6 G/DL
IGA SER QL IEP: 237 MG/DL
IMM GRANULOCYTES NFR BLD AUTO: 0 %
IRON SATN MFR SERPL: 10 %
IRON SERPL-MCNC: 44 UG/DL
LPL SERPL-CCNC: 22 U/L
LYMPHOCYTES # BLD AUTO: 1.16 K/UL
LYMPHOCYTES NFR BLD AUTO: 30.3 %
MAN DIFF?: NORMAL
MCHC RBC-ENTMCNC: 29.4 GM/DL
MCHC RBC-ENTMCNC: 29.6 PG
MCV RBC AUTO: 100.6 FL
MONOCYTES # BLD AUTO: 0.36 K/UL
MONOCYTES NFR BLD AUTO: 9.4 %
NEUTROPHILS # BLD AUTO: 2.16 K/UL
NEUTROPHILS NFR BLD AUTO: 56.4 %
PLATELET # BLD AUTO: 444 K/UL
POTASSIUM SERPL-SCNC: 4.2 MMOL/L
PROT SERPL-MCNC: 7.7 G/DL
RBC # BLD: 3.24 M/UL
RBC # FLD: 17.2 %
SODIUM SERPL-SCNC: 138 MMOL/L
TIBC SERPL-MCNC: 452 UG/DL
TSH SERPL-ACNC: 0.94 UIU/ML
TTG IGA SER IA-ACNC: <1.2 U/ML
TTG IGA SER-ACNC: NEGATIVE
TTG IGG SER IA-ACNC: 3.4 U/ML
TTG IGG SER IA-ACNC: NEGATIVE
UIBC SERPL-MCNC: 407 UG/DL
VIT B12 SERPL-MCNC: 616 PG/ML
WBC # FLD AUTO: 3.83 K/UL

## 2021-06-24 ENCOUNTER — APPOINTMENT (OUTPATIENT)
Dept: RHEUMATOLOGY | Facility: CLINIC | Age: 56
End: 2021-06-24

## 2021-07-08 ENCOUNTER — APPOINTMENT (OUTPATIENT)
Dept: INTERNAL MEDICINE | Facility: CLINIC | Age: 56
End: 2021-07-08

## 2021-07-13 ENCOUNTER — APPOINTMENT (OUTPATIENT)
Dept: INTERNAL MEDICINE | Facility: CLINIC | Age: 56
End: 2021-07-13
Payer: MEDICARE

## 2021-07-13 VITALS
RESPIRATION RATE: 16 BRPM | BODY MASS INDEX: 29.43 KG/M2 | HEART RATE: 90 BPM | TEMPERATURE: 98.1 F | DIASTOLIC BLOOD PRESSURE: 86 MMHG | SYSTOLIC BLOOD PRESSURE: 126 MMHG | OXYGEN SATURATION: 97 % | HEIGHT: 59 IN | WEIGHT: 146 LBS

## 2021-07-13 PROCEDURE — 99214 OFFICE O/P EST MOD 30 MIN: CPT

## 2021-07-13 PROCEDURE — 99072 ADDL SUPL MATRL&STAF TM PHE: CPT

## 2021-07-13 NOTE — HEALTH RISK ASSESSMENT
[] : No [de-identified] : As documented in a message section. [de-identified] : GI [FreeTextEntry1] : Non-suicidal at this time. [[id number]] : 2

## 2021-07-13 NOTE — HISTORY OF PRESENT ILLNESS
[Admitted on: ___] : The patient was admitted on [unfilled] [FreeTextEntry2] : As documented in a message section.\par \par 56 year old  female patient with history of stable , history as stated, presented for follow up examination after hospital discharge. Patient is compliant with all medications. Denies shortness of breath, chest pain or abdominal pains at this time. ROS as stated.\par \par

## 2021-07-13 NOTE — ASSESSMENT
[FreeTextEntry1] : 56 year old female found to have stable Hypertension, Fibromyalgia, Reactive Airway Disease, Hypothyroidism, Hypercholesterolemia with Hypertriglyceridemia, Insomnia, Migraine, vertigo, GERD, Anemia, Major Depression in partial remission, with the current prescription regimen as recommended, diet and life style modifications, as counseled. Prior results reviewed, interpreted and discussed with the patient during today's examination, as appropriate. Follow up, treatment plan and tests, as ordered.\par \par Patient was recently hospitalized, findings and recommendations reviewed and discussed with the patient during today's examination.\par \par Total time spent : 30 minutes\par Including:\par Preparation prior to visit - Reviewing prior record, results of tests and Consultation Reports as applicable\par Conducting an appropriate H & P during today's encounter\par Appropriate orders for tests, medications and procedures, as applicable\par Counseling patient \par Note completion\par

## 2021-07-14 ENCOUNTER — APPOINTMENT (OUTPATIENT)
Dept: NEUROSURGERY | Facility: CLINIC | Age: 56
End: 2021-07-14
Payer: MEDICARE

## 2021-07-14 VITALS
BODY MASS INDEX: 28.83 KG/M2 | SYSTOLIC BLOOD PRESSURE: 121 MMHG | TEMPERATURE: 97.7 F | OXYGEN SATURATION: 99 % | DIASTOLIC BLOOD PRESSURE: 70 MMHG | HEIGHT: 59 IN | WEIGHT: 143 LBS | HEART RATE: 64 BPM

## 2021-07-14 PROCEDURE — 99204 OFFICE O/P NEW MOD 45 MIN: CPT

## 2021-07-14 PROCEDURE — 99072 ADDL SUPL MATRL&STAF TM PHE: CPT

## 2021-07-14 NOTE — ASSESSMENT
[FreeTextEntry1] : 55 year old female with multiple medical problems including CVA in 2013 with LUE residual on multiple neuroleptics who presented to the ED Aprill 2021 with episodes of diarrhea and vomiting, episodes of LOC concerning for syncope v/s polypharmacy v/s seizure in addition to IBS v/s crohn's flare. Workup in hospital included orthostatic vitals which were negative, CT Head and Angio Head + Neck with no acute abnormalities, normal echo, 24hrs EEG no seizure activity. \par \par Per patient she has history of migraine and neck pain for years. Her neurologist ordered an MRI brain and cervical spine in 11/2020 due to persistent headache and dizziness. MRIs reviewed, MRI brain looks normal, MR cervical spine with herniated disc at C4-5, C5-6 causing symptomatic stenosis. Patient is symptomatic with neck pain, bilateral shoulder pain, bilateral hand numbness L>R, LUE weakness, and worsening balance problem. Surgical option C4-6 ACDF discussed with patient and she would like to proceed.\par \par - Medical clearance \par - Surgical planning for C4-6 ACDF\par \par

## 2021-07-14 NOTE — HISTORY OF PRESENT ILLNESS
[FreeTextEntry1] : Neck pain  [de-identified] : 55 year old female with PMH of vertigo migraine, SLE, IBS, Crohn's, fibromyalgia, anemia, asthma, depression, bipolar disorder, CVA 2013 with LUE residual, on multiple neuroleptics who presented to the ED with recent episodes of diarrhea and vomiting, 3 episodes of LOC concerning for syncope vs polypharmacy vs seizure in addition to IBS vs crohn's flare. Orthostatic vitals were negative. CT Head and Angio Head + Neck with no acute abnormalities, normal echo, 24hrs EEG no seizure activity.\par \par Patient has history of migraine and neck pain for years. Her neurologist ordered an MRI brain and cervical spine in 11/2020 due to headache, diziness. MRI brain looks normal, MR cervical spine with herniated disc at C4-5, C5-6 causing severe stenosisl. Patient is symptomatic, has neck pain, bilateral shoulder pain, bilateral hand numbness L>R, LUE weakness, problem with balance.\par \par

## 2021-07-14 NOTE — PHYSICAL EXAM
[General Appearance - Alert] : alert [General Appearance - In No Acute Distress] : in no acute distress [Person] : oriented to person [Place] : oriented to place [Time] : oriented to time [Cranial Nerves Optic (II)] : visual acuity intact bilaterally,  pupils equal round and reactive to light [Cranial Nerves Oculomotor (III)] : extraocular motion intact [Cranial Nerves Trigeminal (V)] : facial sensation intact symmetrically [Cranial Nerves Facial (VII)] : face symmetrical [Cranial Nerves Vestibulocochlear (VIII)] : hearing was intact bilaterally [Cranial Nerves Accessory (XI - Cranial And Spinal)] : head turning and shoulder shrug symmetric [Cranial Nerves Hypoglossal (XII)] : there was no tongue deviation with protrusion [Motor Tone] : muscle tone was normal in all four extremities [Sensation Tactile Decrease] : light touch was intact [FreeTextEntry5] : 5/5 strength except 4/5 LUE

## 2021-08-06 ENCOUNTER — RX RENEWAL (OUTPATIENT)
Age: 56
End: 2021-08-06

## 2021-08-06 ENCOUNTER — APPOINTMENT (OUTPATIENT)
Dept: NEUROSURGERY | Facility: HOSPITAL | Age: 56
End: 2021-08-06

## 2021-08-10 ENCOUNTER — APPOINTMENT (OUTPATIENT)
Dept: RHEUMATOLOGY | Facility: CLINIC | Age: 56
End: 2021-08-10
Payer: MEDICARE

## 2021-08-10 VITALS
DIASTOLIC BLOOD PRESSURE: 86 MMHG | TEMPERATURE: 98.1 F | HEART RATE: 91 BPM | RESPIRATION RATE: 16 BRPM | HEIGHT: 59 IN | SYSTOLIC BLOOD PRESSURE: 128 MMHG | OXYGEN SATURATION: 91 % | BODY MASS INDEX: 29.23 KG/M2 | WEIGHT: 145 LBS

## 2021-08-10 PROCEDURE — 99214 OFFICE O/P EST MOD 30 MIN: CPT

## 2021-08-10 RX ORDER — FOLIC ACID 1 MG/1
1 TABLET ORAL
Qty: 90 | Refills: 3 | Status: ACTIVE | COMMUNITY
Start: 2021-08-10 | End: 1900-01-01

## 2021-08-11 LAB
25(OH)D3 SERPL-MCNC: 40.7 NG/ML
ALBUMIN SERPL ELPH-MCNC: 4.9 G/DL
ALP BLD-CCNC: 89 U/L
ALT SERPL-CCNC: 16 U/L
ANION GAP SERPL CALC-SCNC: 13 MMOL/L
APPEARANCE: CLEAR
AST SERPL-CCNC: 21 U/L
BASOPHILS # BLD AUTO: 0.05 K/UL
BASOPHILS NFR BLD AUTO: 1.3 %
BILIRUB SERPL-MCNC: 0.2 MG/DL
BILIRUBIN URINE: NEGATIVE
BLOOD URINE: NEGATIVE
BUN SERPL-MCNC: 12 MG/DL
CALCIUM SERPL-MCNC: 9.5 MG/DL
CHLORIDE SERPL-SCNC: 104 MMOL/L
CO2 SERPL-SCNC: 22 MMOL/L
COLOR: COLORLESS
COVID-19 SPIKE DOMAIN ANTIBODY INTERPRETATION: POSITIVE
CREAT SERPL-MCNC: 1 MG/DL
CRP SERPL-MCNC: <3 MG/L
EOSINOPHIL # BLD AUTO: 0.07 K/UL
EOSINOPHIL NFR BLD AUTO: 1.9 %
ERYTHROCYTE [SEDIMENTATION RATE] IN BLOOD BY WESTERGREN METHOD: 39 MM/HR
GLUCOSE QUALITATIVE U: NEGATIVE
GLUCOSE SERPL-MCNC: 84 MG/DL
HCT VFR BLD CALC: 34.3 %
HGB BLD-MCNC: 10.5 G/DL
IMM GRANULOCYTES NFR BLD AUTO: 0 %
KETONES URINE: NEGATIVE
LEUKOCYTE ESTERASE URINE: NEGATIVE
LYMPHOCYTES # BLD AUTO: 1.67 K/UL
LYMPHOCYTES NFR BLD AUTO: 44.7 %
MAN DIFF?: NORMAL
MCHC RBC-ENTMCNC: 30.6 GM/DL
MCHC RBC-ENTMCNC: 31.1 PG
MCV RBC AUTO: 101.5 FL
MONOCYTES # BLD AUTO: 0.32 K/UL
MONOCYTES NFR BLD AUTO: 8.6 %
NEUTROPHILS # BLD AUTO: 1.63 K/UL
NEUTROPHILS NFR BLD AUTO: 43.5 %
NITRITE URINE: NEGATIVE
PH URINE: 7
PLATELET # BLD AUTO: 363 K/UL
POTASSIUM SERPL-SCNC: 4.2 MMOL/L
PROT SERPL-MCNC: 7.6 G/DL
PROTEIN URINE: NEGATIVE
RBC # BLD: 3.38 M/UL
RBC # FLD: 15.7 %
SARS-COV-2 AB SERPL IA-ACNC: >250 U/ML
SODIUM SERPL-SCNC: 139 MMOL/L
SPECIFIC GRAVITY URINE: 1
TSH SERPL-ACNC: 2.88 UIU/ML
UROBILINOGEN URINE: NORMAL
WBC # FLD AUTO: 3.74 K/UL

## 2021-08-12 NOTE — CONSULT LETTER
[Dear  ___] : Dear  [unfilled], [Courtesy Letter:] : I had the pleasure of seeing your patient, [unfilled], in my office today. [Please see my note below.] : Please see my note below. [Consult Closing:] : Thank you very much for allowing me to participate in the care of this patient.  If you have any questions, please do not hesitate to contact me. [Sincerely,] : Sincerely, [FreeTextEntry2] : Roe Sales MD\par [facility] [FreeTextEntry3] : Tamiko Duff M.D.\par  of Medicine \par [facility] [employer] [facility]/[place]\par \par

## 2021-08-12 NOTE — HISTORY OF PRESENT ILLNESS
[FreeTextEntry1] : Patient presents and explains w/u for Paget's resulted in nL findings with Endocrinology.  Alk phos and Collagen Type I peptide were within range on last set of blood testing.   Imaging of the brain with several punctate supratentorial white matter hyperintensities as well as a suggestion of mild thickening of the calvarium lead to initial w/u.  She denies bone pain, visual or hearing disturbance.   Patient explains the methotrexate dosing of 12.5 mg weekly has improved arthralgias by at least 80% with concomitant HCQ, notably in the hands and feet. She explains intermittent laxity symptoms upon prolonged walking , L>R otherwise denies accompanied joint swelling. She is under Neurosurg evaluation for cervical disc herniations lending to b/l UE numbness.\par \par She further denies oral ulcers, dyspnea, chest pain, motor/ sensory disturbances or fevers.

## 2021-08-12 NOTE — ASSESSMENT
[FreeTextEntry1] : Patient with SLE; resolved covid 19 infection; knee arthropathy:\par \par Patient to continue disease modifying agent such as methotrexate (MTX)in combination with hydroxychloroquine at this point to assist with arthralgias. She understands the increased risk of serious infections, bone marrow, hepatic and renal abnormalities related to therapy and the importance of surveillance labs every three months.\par \par Patient will benefit from physical therapy to help with joint mobility and muscle strengthening.  Quadriceps strengthening exercises emphasized.  Weight loss has been encouraged to reduce load over the medial joint line. Viscosupplementation has been encouraged to provide additional lubrication and joint support. \par \par She is in agreement with the above plan and will return in three months' time.\par

## 2021-08-16 NOTE — ED PROVIDER NOTE - CPE EDP CARDIAC NORM
Provider: SIS COOPER  Caller: TAN AMANDA  Relationship to Patient: SELF   Pharmacy: [facility] [address] - [address] [facility]  AT [facility] - [phone]  - [phone] FX  [phone]    Phone Number: [phone]    Reason for Call: PATIENT STATED HE FELL 2 WEEKS AGO, AND AGAIN Thursday 8/12.  PATIENT STATED LEFT SIDE OF CHEST HURTS WHEN HE COUGHS OR BREATHES DEEPLY, BRUISING AROUND EYE, AND CHIN.    PATIENT PREFERS TO SEE DOCTOR ALLISON.  PATIENT AVAILABLE [facility] IN THE AFTERNOON.  PATIENT DECLINED THE 8 AM APPT. WITH ALLISON 8/17.    When was the patient last seen: 7/22  When did it start: 2 WEEKS AGO, AND LAST Thursday EVENING  Where is it located:   Characteristics of symptom/severity:   Timing- Is it constant or intermittent:   What makes it worse:   What makes it better:   What therapies/medications have you tried:   normal...

## 2021-08-19 ENCOUNTER — NON-APPOINTMENT (OUTPATIENT)
Age: 56
End: 2021-08-19

## 2021-08-23 ENCOUNTER — APPOINTMENT (OUTPATIENT)
Dept: GASTROENTEROLOGY | Facility: CLINIC | Age: 56
End: 2021-08-23
Payer: MEDICARE

## 2021-08-23 PROCEDURE — 99214 OFFICE O/P EST MOD 30 MIN: CPT

## 2021-08-23 NOTE — HISTORY OF PRESENT ILLNESS
[None] : had no significant interval events [Diarrhea] : denies diarrhea [Yellow Skin Or Eyes (Jaundice)] : denies jaundice [Rectal Pain] : denies rectal pain [Wt Loss ___ Lbs] : recent [unfilled] ~Upound(s) weight loss [Heartburn] : heartburn [Nausea] : nausea [Vomiting] : vomiting [Constipation] : constipation [Abdominal Pain] : abdominal pain [Abdominal Swelling] : abdominal swelling [GERD] : gastroesophageal reflux disease [Hiatus Hernia] : hiatus hernia [Wt Gain ___ Lbs] : no recent weight gain [Peptic Ulcer Disease] : no peptic ulcer disease [Cholelithiasis] : no cholelithiasis [Kidney Stone] : no kidney stone [Inflammatory Bowel Disease] : no inflammatory bowel disease [Irritable Bowel Syndrome] : no irritable bowel syndrome [Diverticulitis] : no diverticulitis [Alcohol Abuse] : no alcohol abuse [Malignancy] : no malignancy [Abdominal Surgery] : no abdominal surgery [Appendectomy] : no appendectomy [Cholecystectomy] : no cholecystectomy [de-identified] : The patient states that she is feeling uncomfortable x 1 ½ weeks. The patient denies any jaundice or pruritus.  The patient complains of chronic lower back pain. The patient complains of abdominal pain.  The patient describes the abdominal pain as a crampy, constant midepigastric discomfort that occasionally radiates to the back.  The abdominal pain is unrelated to meals or passing gas or having bowel movements.  The abdominal pain is worse with stress.  The abdominal pain is described as moderate to severe in nature.  The abdominal pain occurs at night and in the morning.  The abdominal pain can occur at any time.   The abdominal pain has awakened the patient from sleep.  The abdominal pain is slightly relieved with certain medication such as extra strength tylenol.  The abdominal pain is associated with abdominal gas and bloating.  The patient complains of nausea and 1 episode of vomiting.  The patient complains of gastroesophageal reflux disease but denies any dysphagia.  The gastroesophageal reflux disease is worse after meals and late at night and in the early morning. The gastroesophageal reflux disease is slightly improved with proton pump inhibitors, H2 blockers and antacids.   The patient complains of atypical chest pain and palpitations but denies any shortness of breath.  The chest pain is described as a heaviness, pressure, intermittent substernal discomfort that radiates to the back.  The patient admits to occasional episodes of diaphoresis.  The chest pain is described as 8 out of 10 in intensity.  The chest pain can occur at any time.  The chest pain is worse at night and early morning.  The chest pain is unrelated to meals and passing gas.  The chest pain has never awakened the patient from sleep.  The patient complains of constipation but denies any diarrhea.  The patient has 1 bowel movement every 3 to 4 days.  The patient complains of a change in bowel habits.  The patient complains of a change in caliber of stool.   The patient denies having mucus discharge with the bowel movements.  The patient complains of dark stools but denies any bright red blood per rectum, melena or hematemesis.  The patient denies any rectal pain or rectal pruritus.  The patient complains of weight loss and anorexia.  The patient admits to losing 10 pounds over the past 2 months.  She denies any fevers or chills.  The blood work performed on June 16, 2021 revealed mild anemia with hemoglobin/hematocrit level of 9.6/32.6, respectively, elevated platelet count of 444,000, an elevated TIBC/UIBC of 452/407 ug/dl, respectively, a low iron % saturation of 10%, a normal Ferritin level of 20 ng/ml, normal liver enzymes with a normal total bilirubin of 0.2 mg/dl, normal alkaline phosphatase/AST/ALT/GGTP of 101/27/16/14 U/L, respectively, an elevated GGTP of 42 U/L, a low CO2 of 20 mmol/L an elevated ESR of 37 mm/hr.  The blood work performed on March 23, 2021 revealed an elevated platelet count of 405,000, normal liver enzymes with a normal total bilirubin of < 0.2 mg/dL, normal alkaline phosphatase/AST/ALT/GGTP of 86/21/14/27 U/L, respectively, anemia with a hemoglobin/hematocrit level of 9.5/31.9, respectively, an elevated blood glucose of 198 mg/dl and elevated T3 of 66 ng/ml. The patient was admitted to [facility] on February 16, 2020 with 1 week history of abdominal pain. The blood work performed on February 20, 2020 revealed mild anemia with Hgb/Hct level of 10.7/33.0, respectively and an elevated chloride of 109 mmol/L. The abdominal ultrasound performed on February 19, 2020 revealed no gallstones, gallbladder wall thickening or pericholecystic fluid. The CAT scan of the chest, abdomen and pelvis with IV contrast performed on February 17, 2020 revealed no evidence of an aortic dissection. The patient had an upper endoscopy performed at the [facility] GI endoscopy suite on February 20, 2020. The upper endoscopy revealed a small hiatal hernia and mild diffuse gastritis with retained food and debris in the stomach suggestive of gastroparesis. The gastroparesis may be the cause of the abdominal pain. The patient was discharged on February 20, 2020 in stable condition. The patient had a colonoscopy to the terminal ileum performed at the [facility] GI endoscopy suite on February 13, 2020. The colonoscopy to the terminal ileum revealed moderate left sided diverticulosis. Random biopsies were taken of the terminal ileum, cecum and rectum to assess for ileitis and microscopic colitis. There were no polyps, masses, AVMs or colitis noted. The pathology revealed unremarkable small intestinal mucosa with preserved villous pattern and lymphocytic aggregates (terminal ileum), patchy mild colitis without evidence of architectural changes or evidence of chronicity (cecum) and benign colonic mucosa with focal hyperplastic change (rectum). The patient had an upper endoscopy and colonoscopy to the cecum performed at the [facility] GI endoscopy suite on July 18, 2019. The upper endoscopy was performed up to the level of the second portion of the duodenum. The upper endoscopy revealed a small hiatal hernia and mild diffuse bile gastritis. Biopsies were taken of the distal esophagus, antrum, body of stomach and duodenum to assess for esophagitis, gastritis and duodenitis. The pathology revealed distal esophagus with unremarkable squamous mucosa, gastric antral and body mucosa with chronic inactive gastritis with severe lymphoid follicles that was negative for Helicobacter pylori and unremarkable duodenal mucosa. The colonoscopy to the cecum revealed moderate left sided diverticulosis, a poor prep colonoscopy and small internal hemorrhoids. The study was limited secondary to retained liquid and solid stool scattered throughout the colon but no gross lesions were noted. Unable to comment on small colonic polyps or masses secondary to the poor prep. There were no polyps, masses, diverticulosis, AVMs or colitis noted. The patient tolerated the procedures well. The patient was previously found to have occult blood in the stool. The CAT scan of the abdomen and pelvis with IV contrast performed on December 16, 2019 revealed heterogeneous attenuation in the external iliac and common femoral veins is likely related to mixing artifact. Also noted was constipation, sigmoid diverticulosis without diverticulitis, trace bilateral hydronephrosis likely related to distended urinary bladder. The doppler ultrasound of the lower extremities performed on December 27, 2019 revealed no evidence of deep venous thrombosis in either lower extremity. The patient had an abdominal and pelvic ultrasound performed on October 18, 2019 to assess the symptoms. The abdominal ultrasound performed on October 18, 2019 revealed no gallstones or pericholecystic fluid. The contracted state of the gallbladder limits assessment for gallbladder wall thickening. There is no evidence for intrahepatic or extrahepatic biliary duct dilatation. The pelvic ultrasound performed on October 18, 2019 revealed no evidence for an ovarian lesion. The blood work performed on November 25, 2019 revealed an elevated CEA of 4.8 ng/ml, an elevated ESR of 22 mm/hr, anemia with Hgb of 10.5 and low WBC count of 3.29. The patient was also previously evaluated at the [facility] emergency room on January 10, 2020 for abdominal pain. The patient was treated with medications and discharged. The blood work performed on January 11, 2020 revealed an elevated chloride level of 110 mmol/L, anemia with a hemoglobin/hematocrit level 10.5/34.3, respectively and a normalization of the WBC count of 4.32. The patient was observed with resolution of the symptoms and was discharged to followup in the office. The patient admits to having a prior upper endoscopy and colonoscopy performed by another gastroenterologist. The upper endoscopic findings were unknown to the patient. The colonoscopic findings revealed a poor prep colonoscopy. The patient admits to a family history of GI problems. The patient’s brother and father had a history of bleeding peptic ulcer disease.  [de-identified] : (-) smoking, (-) ETOH, (-) IVDA\par

## 2021-08-23 NOTE — ASSESSMENT
[FreeTextEntry1] : Abdominal Pain: The patient complains of abdominal pain. The patient is to avoid nonsteroidal anti-inflammatory drugs and aspirin.  I recommend a low FOD-MAP diet.  I recommend a trial of Dicyclomine 10 mg tablet PO 3 times a day PRN for the abdominal pain.\par Dyspepsia: The patient complains of dyspeptic symptoms.  The patient was advised to abide by an anti-gas diet.  The patient was given a pamphlet for anti-gas.  The patient and I reviewed the anti-gas diet at length. The patient is to start on a trial of Phazyme one tablet 3 times a day p.r.n. abdominal pain and gas.\par GERD: The patient was advised to avoid late-night meals and dietary indiscretions.  The patient was advised to avoid fried and fatty foods.  The patient was advised to abide by an anti-GERD diet. The patient was given a pamphlet foranti-GERD.  The patient and I reviewed the anti-GERD diet at length. I recommend a trial of Pantoprazole 40 mg once a day x 3 months for the symptoms.\par Nausea/Vomiting: The patient complains of nausea/vomiting. If the symptoms of nausea/vomiting persists, the patient may require a trial of Zofran 4 mg twice a day.\par Atypical Chest Pain: The patient complains of atypical chest pain of unclear etiology.  The patient was advised to follow up with the PMD and cardiologist regarding evaluation for the atypical chest pain. The patient was told of possible etiologies such as cardiac, pulmonary, GI, musculoskeletal, stress and other causes for the atypical chest pain.  The patient agrees and will follow-up with the PMD and cardiologist. \par Constipation: The patient complains of constipation. I recommend a high-fiber diet. I recommend a trial of a probiotic such as Align once a day. I recommend a trial of Metamucil once a day for fiber supplementation.  The patient agreed and will followup to reassess the symptoms.  \par Gastroparesis: The patient had an upper endoscopy performed at [place] [facility] [place] on February 20, 2020. The upper endoscopy revealed a small hiatal hernia and mild diffuse gastritis with retained food and debris in the stomach suggestive of gastroparesis. The gastroparesis may be contributing to the abdominal pain. The patient is currently on medications that can contribute to the gastroparesis. I recommend a gastroparesis diet with smaller but more frequent meals. The patient can have a liquid and puree diet and avoid foods high in fats and carbohydrates. The patient was also advised to avoid high fiber diet. If the symptoms persist, the patient may require a trial of a promotility agent for the gastroparesis such as a trial of Reglan (metoclopramide) 5 mg 4 times a day. The patient and I had a long discussion regarding the risks of potential side effects of Reglan (metoclopramide). The patient was told of the risk of headaches, dizziness, diarrhea and permanent tremors. The patient was told to stop the medication immediately and call the office if any of these symptoms occur. The patient agrees and will call the office regarding her symptoms.\par History of Diverticulitis: The patient had a prior history of acute diverticulitis. The patient may require an emergency room evaluation for diverticulitis if the symptoms recur. The patient may require treatment with antibiotics if the symptoms recur. I recommend a low residue diet. The CAT scan of the abdomen and pelvis with IV contrast performed on December 16, 2019 revealed heterogeneous attenuation in the external iliac and common femoral veins is likely related to mixing artifact. Also noted was constipation, sigmoid diverticulosis without diverticulitis, trace bilateral hydronephrosis likely related to distended urinary bladder. The patient was advised to go to the hospital if fever, chills, worsening abdominal pain or GI bleeding recurs. The patient agrees.\par Abnormal Imaging Study: The CAT scan of the abdomen and pelvis with IV contrast performed on December 16, 2019 revealed heterogeneous attenuation in the external iliac and common femoral veins is likely related to mixing artifact. Also noted was constipation, sigmoid diverticulosis without diverticulitis, trace bilateral hydronephrosis likely related to distended urinary bladder. The doppler ultrasound of the lower extremities performed on December 27, 2019 revealed no evidence of deep venous thrombosis in either lower extremity. \par Hiatal Hernia: The patient was advised to avoid late-night meals and dietary indiscretions. The patient was advised to avoid fried and fatty foods. The patient was advised to abide by an anti-GERD diet. The patient was given a pamphlet for anti-GERD. The patient and I reviewed the anti-GERD diet at length. The patient had no improvement on a trial of Carafate. I recommend restarting on a trial of Pantoprazole 40 mg once a day x 3 months for the symptoms.\par Gastritis: The patient has a history of gastritis. The patient is to avoid nonsteroidal anti-inflammatory drugs and aspirin. The patient previously had no improvement on a trial of Carafate. I recommend continue on a trial of Pantoprazole 40 mg once a day x 3 months for the symptoms.\par Irritable Bowel Syndrome: The patient has symptoms suggestive of irritable bowel syndrome. The patient was advised to follow a low FOD-MAP diet for the irritable bowel syndrome. I recommend continue on a trial of Dicyclomine 10 mg three times a day for the abdominal pain and constipation. \par Diverticulosis: I recommend a low residue diet and avoid seeds. The patient is to consider a trial of Metamucil once a day for fiber supplementation. The patient is to also consider a trial of a probiotic such as Align once a day. \par Internal Hemorrhoids: The patient is to consider a trial of Anusol H. C. suppositories one per rectum nightly and Anusol HC2.5% cream apply to affected area twice a day p.r.n. hemorrhoidal bleeding or pain. \par History of Occult Blood in the Stool: The patient was previously found to have occult blood in the stool. The patient denies any bright red blood per rectum, melena or hematemesis. The patient was previously found to be guaiac-positive by her PMD. I recommend an MR enterography to assess the occult blood in the stool and small bowel pathology after the COVID 19 pandemic resolves.\par Anemia: The blood work performed on June 16, 2021 revealed mild anemia with hemoglobin/hematocrit level of 9.6/32.6, respectively, elevated platelet count of 444,000, an elevated TIBC/UIBC of 452/407 ug/dl, respectively, a low iron % saturation of 10%, a normal Ferritin level of 20 ng/ml.  I recommend obtaining the recent blood work performed by her hematologist.  \par Blood Work: I  recommend obtaining the recent blood work performed by the patient's PMD.\par Follow-up: The patient is to follow-up in the office in 1 to 2 months to reassess the symptoms. The patient was told to call the office if any further problems.\par \par \par

## 2021-08-25 ENCOUNTER — RX RENEWAL (OUTPATIENT)
Age: 56
End: 2021-08-25

## 2021-08-28 ENCOUNTER — TRANSCRIPTION ENCOUNTER (OUTPATIENT)
Age: 56
End: 2021-08-28

## 2021-09-17 LAB
M TB IFN-G BLD-IMP: NEGATIVE
QUANTIFERON TB PLUS MITOGEN MINUS NIL: 1.54 IU/ML
QUANTIFERON TB PLUS NIL: 0.02 IU/ML
QUANTIFERON TB PLUS TB1 MINUS NIL: 0 IU/ML
QUANTIFERON TB PLUS TB2 MINUS NIL: 0 IU/ML

## 2021-09-21 ENCOUNTER — APPOINTMENT (OUTPATIENT)
Dept: INTERNAL MEDICINE | Facility: CLINIC | Age: 56
End: 2021-09-21
Payer: MEDICARE

## 2021-09-21 VITALS
RESPIRATION RATE: 16 BRPM | OXYGEN SATURATION: 97 % | WEIGHT: 140 LBS | SYSTOLIC BLOOD PRESSURE: 141 MMHG | TEMPERATURE: 98.2 F | DIASTOLIC BLOOD PRESSURE: 87 MMHG | HEART RATE: 82 BPM | BODY MASS INDEX: 28.22 KG/M2 | HEIGHT: 59 IN

## 2021-09-21 PROCEDURE — G0439: CPT

## 2021-09-21 PROCEDURE — G0008: CPT

## 2021-09-21 PROCEDURE — 90686 IIV4 VACC NO PRSV 0.5 ML IM: CPT

## 2021-09-21 NOTE — ASSESSMENT
[FreeTextEntry1] : 56 year old female found to have stable Hypertension, Asthma, GERD, Major Depression in partial remission, Hypercholesterolemia with Hypertriglyceridemia, Hypothyroidism, Reactive Airway Disease, SLE,with the current prescription regimen as recommended, diet and life style modifications, as counseled. Prior results reviewed, interpreted and discussed with the patient during today's examination, as appropriate. Follow up, treatment plan and tests, as ordered.\par \par

## 2021-09-21 NOTE — HISTORY OF PRESENT ILLNESS
[de-identified] : 56 year old female patient with history of stable Hypertension, Asthma, GERD, Major Depression in partial remission, Hypercholesterolemia with Hypertriglyceridemia, Hypothyroidism, Reactive Airway Disease, SLE, history as stated, presented for an annual preventative examination.\par Patient denies any associated symptoms of shortness of breath, chest pain, abdominal pain at this time.\par

## 2021-09-21 NOTE — HEALTH RISK ASSESSMENT
[Good] : ~his/her~  mood as  good [No] : In the past 12 months have you used drugs other than those required for medical reasons? No [0] : 2) Feeling down, depressed, or hopeless: Not at all (0) [Patient reported mammogram was normal] : Patient reported mammogram was normal [Patient reported colonoscopy was normal] : Patient reported colonoscopy was normal [None] : None [Feels Safe at Home] : Feels safe at home [Smoke Detector] : smoke detector [Carbon Monoxide Detector] : carbon monoxide detector [Seat Belt] :  uses seat belt [Sunscreen] : uses sunscreen [With Patient/Caregiver] : , with patient/caregiver [FreeTextEntry1] : Check up\par  [] : No [de-identified] : GI [[id number]] : 0 [Change in mental status noted] : No change in mental status noted [Reports changes in hearing] : Reports no changes in hearing [Reports changes in vision] : Reports no changes in vision [Reports changes in dental health] : Reports no changes in dental health [MammogramDate] : 06/20 [MammogramComments] : As ordered for today. [PapSmearComments] : As ordered for today. [ColonoscopyDate] : 02/20 [HIVDate] : 12/20 [HIVComments] : Negative [HepatitisCDate] : 08/18 [HepatitisCComments] : Negative [AdvancecareDate] : 09/21

## 2021-10-12 ENCOUNTER — APPOINTMENT (OUTPATIENT)
Dept: INTERNAL MEDICINE | Facility: CLINIC | Age: 56
End: 2021-10-12

## 2021-11-04 NOTE — BRIEF OPERATIVE NOTE - VENOUS THROMBOEMBOLISM PROPHYLAXIS THERAPY
Patient Seen in: [facility] AND [facility] Emergency Department      History   Patient presents with:  Nose Bleed    Stated Complaint: epistaxis, + blood thinners     Subjective:   HPI    Patient presents to the emergency department with the bleeding around the 4/7/2016    robotic repair of paraesophageal hernia with biologic mesh/Nissen fundoplication   • HIP REPLACEMENT SURGERY Right 2012   • TOTAL KNEE REPLACEMENT                  Social History    Tobacco Use      Smoking status: Never Smoker      Smokeless t of motion and neck supple. Skin:     General: Skin is warm and dry. Capillary Refill: Capillary refill takes less than 2 seconds. Findings: No rash. Neurological:      General: No focal deficit present. Mental Status: He is alert. primary care provider within the next three months to obtain basic health screening including reassessment of your blood pressure.       Medications Prescribed:  Current Discharge Medication List scds

## 2021-11-13 NOTE — ED ADULT TRIAGE NOTE - NS ED NURSE DIRECT TO ROOM YN
Yes pt biba, c/o havy vaginal bleding that started 30 mins pta; pt is 14 weeks pregnant; denies pain, c/o nausea; pt sts her Dr put her on lovenox and baby aspirin for this pregnancy

## 2021-11-17 ENCOUNTER — NON-APPOINTMENT (OUTPATIENT)
Age: 56
End: 2021-11-17

## 2021-11-18 ENCOUNTER — NON-APPOINTMENT (OUTPATIENT)
Age: 56
End: 2021-11-18

## 2021-11-19 ENCOUNTER — APPOINTMENT (OUTPATIENT)
Dept: GASTROENTEROLOGY | Facility: CLINIC | Age: 56
End: 2021-11-19
Payer: MEDICARE

## 2021-11-19 VITALS
WEIGHT: 146 LBS | DIASTOLIC BLOOD PRESSURE: 76 MMHG | SYSTOLIC BLOOD PRESSURE: 116 MMHG | TEMPERATURE: 97.2 F | OXYGEN SATURATION: 99 % | HEART RATE: 70 BPM | BODY MASS INDEX: 29.43 KG/M2 | HEIGHT: 59 IN

## 2021-11-19 PROCEDURE — 99214 OFFICE O/P EST MOD 30 MIN: CPT

## 2021-11-19 RX ORDER — SIMETHICONE 180 MG
180 CAPSULE ORAL 4 TIMES DAILY
Qty: 120 | Refills: 3 | Status: ACTIVE | COMMUNITY
Start: 2021-11-19 | End: 1900-01-01

## 2021-11-19 NOTE — HISTORY OF PRESENT ILLNESS
[None] : had no significant interval events [Heartburn] : denies heartburn [Nausea] : denies nausea [Vomiting] : denies vomiting [Diarrhea] : denies diarrhea [Constipation] : denies constipation [Yellow Skin Or Eyes (Jaundice)] : denies jaundice [Abdominal Pain] : denies abdominal pain [Rectal Pain] : denies rectal pain [Abdominal Swelling] : abdominal swelling [Wt Gain ___ Lbs] : no recent weight gain [Wt Loss ___ Lbs] : no recent weight loss [GERD] : no gastroesophageal reflux disease [Hiatus Hernia] : no hiatus hernia [Peptic Ulcer Disease] : no peptic ulcer disease [Pancreatitis] : no pancreatitis [Cholelithiasis] : no cholelithiasis [Kidney Stone] : no kidney stone [Inflammatory Bowel Disease] : no inflammatory bowel disease [Irritable Bowel Syndrome] : no irritable bowel syndrome [Diverticulitis] : no diverticulitis [Alcohol Abuse] : no alcohol abuse [Malignancy] : no malignancy [Abdominal Surgery] : no abdominal surgery [Appendectomy] : no appendectomy [Cholecystectomy] : no cholecystectomy [de-identified] : The patient states that she is feeling uncomfortable.  The patient complains of arthralgias and back pain.  The patient is to undergo back surgery at [facility] with Dr. Armstrong December 27, 2021.  The patient was recently hospitalized at [facility] for back pain.  The patient developed abdominal pain after eating air fried sweet potato.  The patient denies any jaundice or pruritus.  The patient complains of chronic lower back pain. The patient denies any abdominal pain.  The patient complains of abdominal gas and bloating.  The patient denies any nausea or vomiting.  The patient denies any gastroesophageal reflux disease or dysphagia.  The patient complains of palpitations but denies any atypical chest pain or shortness of breath or palpitations.  The patient admits to occasional episodes of diaphoresis.  The patient complains of constipation but denies any diarrhea.  The patient has 1 bowel movement a day.  The patient complains of a change in bowel habits.  The patient complains of a change in caliber of stool.   The patient denies having mucus discharge with the bowel movements.  The patient denies any bright red blood per rectum, melena or hematemesis.  The patient denies any rectal pain or rectal pruritus.  The patient denies any weight loss or anorexia.  The patient previously complained of weight loss and anorexia. The patient admitted to losing 10 pounds over 2 months. She denies any fevers or chills. The   blood work performed on August 10, 2021 revealed a low WBC count of 3.74 K/ul, anemia with a hemoglobin/hematocrit level of 10.5/34.3, respectively, and elevated ESR of 39 mm/h a normal vitamin D level of 40.7 ng/mL, a positive COVID-19 spike domain antibody of > 250.00 U/ml, and a negative QuantiFERON plus TB and a normal C-reactive protein of <3 mg/L. The blood work performed on June 16, 2021 revealed mild anemia with hemoglobin/hematocrit level of 9.6/32.6, respectively, elevated platelet count of 444,000, an elevated TIBC/UIBC of 452/407 ug/dl, respectively, a low iron % saturation of 10%, a normal Ferritin level of 20 ng/ml, normal liver enzymes with a normal total bilirubin of 0.2 mg/dl, normal alkaline phosphatase/AST/ALT/GGTP of 101/27/16/14 U/L, respectively, an elevated GGTP of 42 U/L, a low CO2 of 20 mmol/L an elevated ESR of 37 mm/hr. The blood work performed on March 23, 2021 revealed an elevated platelet count of 405,000, normal liver enzymes with a normal total bilirubin of < 0.2 mg/dL, normal alkaline phosphatase/AST/ALT/GGTP of 86/21/14/27 U/L, respectively, anemia with a hemoglobin/hematocrit level of 9.5/31.9, respectively, an elevated blood glucose of 198 mg/dl and elevated T3 of 66 ng/ml. The patient was admitted to [facility] on February 16, 2020 with 1 week history of abdominal pain. The blood work performed on February 20, 2020 revealed mild anemia with Hgb/Hct level of 10.7/33.0, respectively and an elevated chloride of 109 mmol/L. The abdominal ultrasound performed on February 19, 2020 revealed no gallstones, gallbladder wall thickening or pericholecystic fluid. The CAT scan of the chest, abdomen and pelvis with IV contrast performed on February 17, 2020 revealed no evidence of an aortic dissection. The patient had an upper endoscopy performed at the [facility] GI endoscopy suite on February 20, 2020. The upper endoscopy revealed a small hiatal hernia and mild diffuse gastritis with retained food and debris in the stomach suggestive of gastroparesis. The gastroparesis may be the cause of the abdominal pain. The patient was discharged on February 20, 2020 in stable condition. The patient had a colonoscopy to the terminal ileum performed at the [facility] GI endoscopy suite on February 13, 2020. The colonoscopy to the terminal ileum revealed moderate left sided diverticulosis. Random biopsies were taken of the terminal ileum, cecum and rectum to assess for ileitis and microscopic colitis. There were no polyps, masses, AVMs or colitis noted. The pathology revealed unremarkable small intestinal mucosa with preserved villous pattern and lymphocytic aggregates (terminal ileum), patchy mild colitis without evidence of architectural changes or evidence of chronicity (cecum) and benign colonic mucosa with focal hyperplastic change (rectum). The patient had an upper endoscopy and colonoscopy to the cecum performed at the [facility] GI endoscopy suite on July 18, 2019. The upper endoscopy was performed up to the level of the second portion of the duodenum. The upper endoscopy revealed a small hiatal hernia and mild diffuse bile gastritis. Biopsies were taken of the distal esophagus, antrum, body of stomach and duodenum to assess for esophagitis, gastritis and duodenitis. The pathology revealed distal esophagus with unremarkable squamous mucosa, gastric antral and body mucosa with chronic inactive gastritis with severe lymphoid follicles that was negative for Helicobacter pylori and unremarkable duodenal mucosa. The colonoscopy to the cecum revealed moderate left sided diverticulosis, a poor prep colonoscopy and small internal hemorrhoids. The study was limited secondary to retained liquid and solid stool scattered throughout the colon but no gross lesions were noted. Unable to comment on small colonic polyps or masses secondary to the poor prep. There were no polyps, masses, diverticulosis, AVMs or colitis noted. The patient tolerated the procedures well. The patient was previously found to have occult blood in the stool. The CAT scan of the abdomen and pelvis with IV contrast performed on December 16, 2019 revealed heterogeneous attenuation in the external iliac and common femoral veins is likely related to mixing artifact. Also noted was constipation, sigmoid diverticulosis without diverticulitis, trace bilateral hydronephrosis likely related to distended urinary bladder. The doppler ultrasound of the lower extremities performed on December 27, 2019 revealed no evidence of deep venous thrombosis in either lower extremity. The patient had an abdominal and pelvic ultrasound performed on October 18, 2019 to assess the symptoms. The abdominal ultrasound performed on October 18, 2019 revealed no gallstones or pericholecystic fluid. The contracted state of the gallbladder limits assessment for gallbladder wall thickening. There is no evidence for intrahepatic or extrahepatic biliary duct dilatation. The pelvic ultrasound performed on October 18, 2019 revealed no evidence for an ovarian lesion. The blood work performed on November 25, 2019 revealed an elevated CEA of 4.8 ng/ml, an elevated ESR of 22 mm/hr, anemia with Hgb of 10.5 and low WBC count of 3.29. The patient was also previously evaluated at the [facility] emergency room on January 10, 2020 for abdominal pain. The patient was treated with medications and discharged. The blood work performed on January 11, 2020 revealed an elevated chloride level of 110 mmol/L, anemia with a hemoglobin/hematocrit level 10.5/34.3, respectively and a normalization of the WBC count of 4.32. The patient was observed with resolution of the symptoms and was discharged to followup in the office. The patient admits to having a prior upper endoscopy and colonoscopy performed by another gastroenterologist. The upper endoscopic findings were unknown to the patient. The colonoscopic findings revealed a poor prep colonoscopy. The patient admits to a family history of GI problems. The patient’s brother and father had a history of bleeding peptic ulcer disease.  [de-identified] : (-) smoking, (-) ETOH, (-) IVDA\par

## 2021-11-19 NOTE — ASSESSMENT
[FreeTextEntry1] : Dyspepsia: The patient complains of dyspeptic symptoms.  The patient was advised to continue to abide by an anti-gas (low FOD-MAP) diet.  The patient was previously given a pamphlet for anti-gas.  The patient and I reviewed the anti-gas diet at length again. The patient is to continue on a trial of Phazyme one tablet 3 times a day p.r.n. abdominal pain and gas.\par Constipation: The patient complains of constipation. I recommend a high-fiber diet. I recommend a trial of a probiotic such as Align once a day. I recommend a trial of Metamucil once a day for fiber supplementation. I recommend a trial of Miralax 1 packet once a day for the constipation. The patient agreed and will followup to reassess the symptoms.  \par Gastroparesis: The patient had an upper endoscopy performed at the [facility] GI endoscopy suite on February 20, 2020. The upper endoscopy revealed a small hiatal hernia and mild diffuse gastritis with retained food and debris in the stomach suggestive of gastroparesis. The gastroparesis may be contributing to the abdominal pain. The patient is currently on medications that can contribute to the gastroparesis. I recommend a gastroparesis diet with smaller but more frequent meals. The patient can have a liquid and puree diet and avoid foods high in fats and carbohydrates. The patient was also advised to avoid high fiber diet. If the symptoms persist, the patient may require a trial of a promotility agent for the gastroparesis such as a trial of Reglan (metoclopramide) 5 mg 4 times a day. The patient and I had a long discussion regarding the risks of potential side effects of Reglan (metoclopramide). The patient was told of the risk of headaches, dizziness, diarrhea and permanent tremors. The patient was told to stop the medication immediately and call the office if any of these symptoms occur. The patient agrees and will call the office regarding her symptoms.\par History of Diverticulitis: The patient had a prior history of acute diverticulitis. The patient may require an emergency room evaluation for diverticulitis if the symptoms recur. The patient may require treatment with antibiotics if the symptoms recur. I recommend a low residue diet. The CAT scan of the abdomen and pelvis with IV contrast performed on December 16, 2019 revealed heterogeneous attenuation in the external iliac and common femoral veins is likely related to mixing artifact. Also noted was constipation, sigmoid diverticulosis without diverticulitis, trace bilateral hydronephrosis likely related to distended urinary bladder. The patient was advised to go to the hospital if fever, chills, worsening abdominal pain or GI bleeding recurs. The patient agrees.\par Abnormal Imaging Study: The CAT scan of the abdomen and pelvis with IV contrast performed on December 16, 2019 revealed heterogeneous attenuation in the external iliac and common femoral veins is likely related to mixing artifact. Also noted was constipation, sigmoid diverticulosis without diverticulitis, trace bilateral hydronephrosis likely related to distended urinary bladder. The doppler ultrasound of the lower extremities performed on December 27, 2019 revealed no evidence of deep venous thrombosis in either lower extremity. \par Hiatal Hernia: The patient was advised to avoid late-night meals and dietary indiscretions. The patient was advised to avoid fried and fatty foods. The patient was advised to abide by an anti-GERD diet. The patient was given a pamphlet for anti-GERD. The patient and I reviewed the anti-GERD diet at length. The patient had no improvement on a trial of Carafate. I recommend restarting on a trial of Pantoprazole 40 mg once a day x 3 months for the symptoms.\par Gastritis: The patient has a history of gastritis. The patient is to avoid nonsteroidal anti-inflammatory drugs and aspirin. The patient previously had no improvement on a trial of Carafate. I recommend continue on a trial of Pantoprazole 40 mg once a day x 3 months for the symptoms.\par Irritable Bowel Syndrome: The patient has symptoms suggestive of irritable bowel syndrome. The patient was advised to follow a low FOD-MAP diet for the irritable bowel syndrome. I recommend continue on a trial of Dicyclomine 10 mg three times a day for the abdominal pain and constipation. \par Diverticulosis: I recommend a low residue diet and avoid seeds. The patient is to consider a trial of Metamucil once a day for fiber supplementation. The patient is to also consider a trial of a probiotic such as Align once a day. \par Internal Hemorrhoids: The patient is to consider a trial of Anusol H. C. suppositories one per rectum nightly and Anusol HC2.5% cream apply to affected area twice a day p.r.n. hemorrhoidal bleeding or pain. \par History of Occult Blood in the Stool: The patient was previously found to have occult blood in the stool. The patient denies any bright red blood per rectum, melena or hematemesis. The patient was previously found to be guaiac-positive by her PMD. I recommend an MR enterography to assess the occult blood in the stool and small bowel pathology after the COVID 19 pandemic resolves.\par Anemia: The blood work performed on June 16, 2021 revealed mild anemia with hemoglobin/hematocrit level of 9.6/32.6, respectively, elevated platelet count of 444,000, an elevated TIBC/UIBC of 452/407 ug/dl, respectively, a low iron % saturation of 10%, a normal Ferritin level of 20 ng/ml. I recommend obtaining the recent blood work performed by her hematologist. \par Blood Work: I recommend obtaining the recent blood work performed by the patient's PMD.\par Follow-up: The patient is to follow-up in the office in 6 months to reassess the symptoms. The patient was told to call the office if any further problems.\par \par \par

## 2021-11-20 ENCOUNTER — APPOINTMENT (OUTPATIENT)
Dept: INTERNAL MEDICINE | Facility: CLINIC | Age: 56
End: 2021-11-20
Payer: MEDICARE

## 2021-11-20 ENCOUNTER — NON-APPOINTMENT (OUTPATIENT)
Age: 56
End: 2021-11-20

## 2021-11-20 VITALS
BODY MASS INDEX: 30.04 KG/M2 | WEIGHT: 149 LBS | HEIGHT: 59 IN | HEART RATE: 73 BPM | RESPIRATION RATE: 16 BRPM | SYSTOLIC BLOOD PRESSURE: 124 MMHG | OXYGEN SATURATION: 95 % | DIASTOLIC BLOOD PRESSURE: 88 MMHG

## 2021-11-20 PROCEDURE — 93000 ELECTROCARDIOGRAM COMPLETE: CPT

## 2021-11-20 PROCEDURE — 99214 OFFICE O/P EST MOD 30 MIN: CPT | Mod: 25

## 2021-11-20 RX ORDER — UBROGEPANT 100 MG/1
100 TABLET ORAL
Refills: 0 | Status: ACTIVE | COMMUNITY
Start: 2021-04-26

## 2021-11-20 RX ORDER — NORTRIPTYLINE HYDROCHLORIDE 50 MG/1
50 CAPSULE ORAL
Qty: 30 | Refills: 5 | Status: ACTIVE | COMMUNITY
Start: 2018-07-18

## 2021-11-20 NOTE — RESULTS/DATA
[] : results reviewed [de-identified] : NSR @ 65bpm, RBBB, no STTW changes, no change from previous EKG

## 2021-11-20 NOTE — PLAN
[FreeTextEntry1] : tasked MA to obtain preop form from [facility] spine surgeon

## 2021-11-20 NOTE — RESULTS/DATA
[] : results reviewed [de-identified] : NSR @ 65bpm, RBBB, no STTW changes, no change from previous EKG

## 2021-11-20 NOTE — HISTORY OF PRESENT ILLNESS
[No Pertinent Cardiac History] : no history of aortic stenosis, atrial fibrillation, coronary artery disease, recent myocardial infarction, or implantable device/pacemaker [Asthma] : asthma [No Adverse Anesthesia Reaction] : no adverse anesthesia reaction in self or family member [(Patient denies any chest pain, claudication, dyspnea on exertion, orthopnea, palpitations or syncope)] : Patient denies any chest pain, claudication, dyspnea on exertion, orthopnea, palpitations or syncope [FreeTextEntry1] : cervical fusion  [FreeTextEntry2] : 12/2/2021 [FreeTextEntry4] : 56yoF PMH SLE,fibromyalgia, depression, migraine, HTN, HLD, hypothyroidism, asthma presents for preop assessment\par \par cardiology workup in 4/2021 - normal TTE\par as per patient, normal NST at outside facility in 2018\par \par no recent asthma exacerbation

## 2021-11-20 NOTE — ASSESSMENT
[Patient Optimized for Surgery] : Patient optimized for surgery [Continue medications as is] : Continue current medications [As per surgery] : as per surgery [FreeTextEntry4] : revised cardiac risk index = 1, which translates to 0.9% risk of postoperative cardiovascular complications

## 2021-11-22 ENCOUNTER — APPOINTMENT (OUTPATIENT)
Dept: CARDIOLOGY | Facility: CLINIC | Age: 56
End: 2021-11-22

## 2021-11-24 ENCOUNTER — APPOINTMENT (OUTPATIENT)
Dept: RHEUMATOLOGY | Facility: CLINIC | Age: 56
End: 2021-11-24
Payer: MEDICARE

## 2021-11-24 DIAGNOSIS — Z79.899 OTHER LONG TERM (CURRENT) DRUG THERAPY: ICD-10-CM

## 2021-11-24 DIAGNOSIS — M48.02 SPINAL STENOSIS, CERVICAL REGION: ICD-10-CM

## 2021-11-24 PROCEDURE — 99213 OFFICE O/P EST LOW 20 MIN: CPT | Mod: 95

## 2021-11-24 RX ORDER — TOPIRAMATE 100 MG/1
100 TABLET, FILM COATED ORAL TWICE DAILY
Refills: 0 | Status: DISCONTINUED | COMMUNITY
Start: 2016-09-27 | End: 2021-11-24

## 2021-11-29 PROBLEM — Z79.899 ON METHOTREXATE THERAPY: Status: ACTIVE | Noted: 2021-08-10

## 2021-11-29 PROBLEM — M48.02 CERVICAL SPINAL STENOSIS: Status: ACTIVE | Noted: 2021-07-14

## 2021-11-29 NOTE — REASON FOR VISIT
[Follow-Up: _____] : a [unfilled] follow-up visit [Home] : at home, [unfilled] , at the time of the visit. [Medical Office: ([facility])___] : at the medical office located in  [Verbal consent obtained from patient] : the patient, [unfilled]

## 2021-11-29 NOTE — CONSULT LETTER
[Dear  ___] : Dear  [unfilled], [Courtesy Letter:] : I had the pleasure of seeing your patient, [unfilled], in my office today. [Please see my note below.] : Please see my note below. [Consult Closing:] : Thank you very much for allowing me to participate in the care of this patient.  If you have any questions, please do not hesitate to contact me. [Sincerely,] : Sincerely, [FreeTextEntry2] : Roe Sales MD\par [facility] [FreeTextEntry3] : Tamiko Duff M.D.\par  of Medicine \par [facility] [employer] [facility]/[place]\par \par

## 2021-11-29 NOTE — ASSESSMENT
[FreeTextEntry1] : Patient with SLE; resolved covid 19 infection; cervical disc disease:\par \par Patient to continue disease modifying agent such as methotrexate (MTX)in combination with hydroxychloroquine at this point to assist with arthralgias. She understands the increased risk of serious infections, bone marrow, hepatic and renal abnormalities related to therapy and the importance of surveillance labs every three months. \par Preprocedure, patient will Otrexup injections one week prior to surgery as requested by the surgery team.\par \par Patient will benefit from physical therapy to help with joint mobility and muscle strengthening. Quadriceps strengthening exercises emphasized. Viscosupplementation has been encouraged to provide additional lubrication and joint support. \par \par She is in agreement with the above plan and will return in three months' time.\par

## 2021-11-29 NOTE — HISTORY OF PRESENT ILLNESS
[FreeTextEntry1] : Today's encounter took place on the video.  Patient requesting visit to discuss health and medications in lieu of physical visit secondary to COVID-19  pandemic. \par Patient presents for follow up and explains doing well save the pain experienced in the cervical spine due to cervical disc herniations leading to bilateral upper extremity numbness. She reports the pain is intense and had be seen in the emergency room where she received episodes of further pain control. She reports surgery has been postponed until December and in the interim was recommended to have epidural injections ; she is currently  seeking pain management assistance.  She c/w MTX and HCQ without complication.  She otherwise denies visual disturbances, oral ulcers, shortness of breath, chest pain, motor disturbances, Raynauds , rash or fever.\par

## 2021-11-29 NOTE — PHYSICAL EXAM
[General Appearance - Alert] : alert [General Appearance - Well-Appearing] : healthy appearing [Neck Appearance] : the appearance of the neck was normal [] : no respiratory distress [Oriented To Time, Place, And Person] : oriented to person, place, and time [Impaired Insight] : insight and judgment were intact [FreeTextEntry1] : via telehealth platform: low mood

## 2021-11-30 LAB
ALBUMIN SERPL ELPH-MCNC: 4.6 G/DL
ALP BLD-CCNC: 90 U/L
ALT SERPL-CCNC: 41 U/L
ANION GAP SERPL CALC-SCNC: 12 MMOL/L
APPEARANCE: CLEAR
APTT BLD: 32.3 SEC
AST SERPL-CCNC: 40 U/L
BASOPHILS # BLD AUTO: 0.07 K/UL
BASOPHILS NFR BLD AUTO: 2.1 %
BILIRUB SERPL-MCNC: 0.2 MG/DL
BILIRUBIN URINE: NEGATIVE
BLOOD URINE: NEGATIVE
BUN SERPL-MCNC: 14 MG/DL
CALCIUM SERPL-MCNC: 9.4 MG/DL
CHLORIDE SERPL-SCNC: 106 MMOL/L
CO2 SERPL-SCNC: 23 MMOL/L
COLOR: NORMAL
CREAT SERPL-MCNC: 0.74 MG/DL
EOSINOPHIL # BLD AUTO: 0.1 K/UL
EOSINOPHIL NFR BLD AUTO: 3 %
FERRITIN SERPL-MCNC: 256 NG/ML
FOLATE SERPL-MCNC: 16.1 NG/ML
GLUCOSE QUALITATIVE U: NEGATIVE
GLUCOSE SERPL-MCNC: 81 MG/DL
HCG SERPL-MCNC: 4 MIU/ML
HCT VFR BLD CALC: 34.2 %
HGB BLD-MCNC: 10.5 G/DL
IMM GRANULOCYTES NFR BLD AUTO: 0.3 %
INR PPP: 1.03 RATIO
IRON SATN MFR SERPL: 25 %
IRON SERPL-MCNC: 71 UG/DL
KETONES URINE: NEGATIVE
LEUKOCYTE ESTERASE URINE: NEGATIVE
LYMPHOCYTES # BLD AUTO: 1.26 K/UL
LYMPHOCYTES NFR BLD AUTO: 37.6 %
MAN DIFF?: NORMAL
MCHC RBC-ENTMCNC: 30.7 GM/DL
MCHC RBC-ENTMCNC: 31.9 PG
MCV RBC AUTO: 104 FL
MONOCYTES # BLD AUTO: 0.31 K/UL
MONOCYTES NFR BLD AUTO: 9.3 %
NEUTROPHILS # BLD AUTO: 1.6 K/UL
NEUTROPHILS NFR BLD AUTO: 47.7 %
NITRITE URINE: NEGATIVE
PH URINE: 7.5
PLATELET # BLD AUTO: 414 K/UL
POTASSIUM SERPL-SCNC: 4.6 MMOL/L
PROT SERPL-MCNC: 7.3 G/DL
PROTEIN URINE: NORMAL
PT BLD: 12.2 SEC
RBC # BLD: 3.29 M/UL
RBC # FLD: 12.7 %
SODIUM SERPL-SCNC: 140 MMOL/L
SPECIFIC GRAVITY URINE: 1.02
TIBC SERPL-MCNC: 289 UG/DL
UIBC SERPL-MCNC: 218 UG/DL
UROBILINOGEN URINE: NORMAL
VIT B12 SERPL-MCNC: 547 PG/ML
WBC # FLD AUTO: 3.35 K/UL

## 2021-12-01 ENCOUNTER — NON-APPOINTMENT (OUTPATIENT)
Age: 56
End: 2021-12-01

## 2021-12-14 ENCOUNTER — NON-APPOINTMENT (OUTPATIENT)
Age: 56
End: 2021-12-14

## 2021-12-14 NOTE — ED PROVIDER NOTE - NIH STROKE SCALE: 3. VISUAL
pt c/o asthma exacerbation. wheezes heard bilat and diminished at the bases bilat.
(0) No visual loss

## 2021-12-15 NOTE — ED PROVIDER NOTE - CROS ED GU ALL NEG
RT End of Shift Summary      Airway: # 7.5 @ 22  Lip     Secretions: slight thick and creamy yellow     Daily Interruption of Sedation: Performed: No_-- If no, why: Respiratory Support: FiO2 >70%    Weaning: Spontaneous Volumes  Not performed; If no, why: FiO2 >50%, Fever/Temp >38.5/101.3 and Hemodynamically Unstable (low dose Vasopressors or Inotropes does not exclude SBT)     Spontaneous Breathing Trial Initiated:No, not appropriate at this time    Barriers to extubation: High Ventilator Support    Weaning/Ventilator Changes: NONE       negative...

## 2021-12-16 ENCOUNTER — APPOINTMENT (OUTPATIENT)
Dept: RHEUMATOLOGY | Facility: CLINIC | Age: 56
End: 2021-12-16

## 2021-12-20 ENCOUNTER — APPOINTMENT (OUTPATIENT)
Dept: CARDIOLOGY | Facility: CLINIC | Age: 56
End: 2021-12-20

## 2021-12-21 ENCOUNTER — APPOINTMENT (OUTPATIENT)
Dept: INTERNAL MEDICINE | Facility: CLINIC | Age: 56
End: 2021-12-21
Payer: MEDICARE

## 2021-12-21 VITALS
SYSTOLIC BLOOD PRESSURE: 122 MMHG | OXYGEN SATURATION: 97 % | TEMPERATURE: 97.6 F | DIASTOLIC BLOOD PRESSURE: 76 MMHG | RESPIRATION RATE: 16 BRPM | WEIGHT: 151 LBS | HEIGHT: 59 IN | BODY MASS INDEX: 30.44 KG/M2 | HEART RATE: 88 BPM

## 2021-12-21 PROCEDURE — 99214 OFFICE O/P EST MOD 30 MIN: CPT

## 2021-12-21 NOTE — HEALTH RISK ASSESSMENT
[Never] : Never [No] : In the past 12 months have you used drugs other than those required for medical reasons? No [0] : 2) Feeling down, depressed, or hopeless: Not at all (0) [de-identified] : RHEUM/NEURO SX - Pending Cervical Spine Sx [[id number]] : 0

## 2021-12-21 NOTE — ASSESSMENT
[FreeTextEntry1] : 56 year old female found to have stable Hypertension, Asthma, GERD, Major Depression in partial remission, Hypercholesterolemia with Hypertriglyceridemia, Hypothyroidism, Reactive Airway Disease, SLE,with the current prescription regimen as recommended, diet and life style modifications, as counseled. Prior results reviewed, interpreted and discussed with the patient during today's examination, as appropriate. Follow up, treatment plan and tests, as ordered.\par \par Total time spent : 30 minutes\par Including:\par Preparation prior to visit - Reviewing prior record, results of tests and Consultation Reports as applicable\par Conducting an appropriate H & P during today's encounter\par Appropriate orders for tests, medications and procedures, as applicable\par Counseling patient \par Note completion\par

## 2021-12-21 NOTE — HISTORY OF PRESENT ILLNESS
[de-identified] : 56 year old female patient with history of stable Hypertension, Asthma, GERD, Major Depression in partial remission, Hypercholesterolemia with Hypertriglyceridemia, Hypothyroidism, Reactive Airway Disease, SLE, history as stated, presented for an annual preventative examination.\par Patient denies any associated symptoms of shortness of breath, chest pain, abdominal pain at this time.\par

## 2021-12-29 ENCOUNTER — NON-APPOINTMENT (OUTPATIENT)
Age: 56
End: 2021-12-29

## 2022-01-11 ENCOUNTER — APPOINTMENT (OUTPATIENT)
Dept: INTERNAL MEDICINE | Facility: CLINIC | Age: 57
End: 2022-01-11

## 2022-02-13 ENCOUNTER — TRANSCRIPTION ENCOUNTER (OUTPATIENT)
Age: 57
End: 2022-02-13

## 2022-02-14 ENCOUNTER — APPOINTMENT (OUTPATIENT)
Dept: INTERNAL MEDICINE | Facility: CLINIC | Age: 57
End: 2022-02-14
Payer: MEDICARE

## 2022-02-14 VITALS
RESPIRATION RATE: 16 BRPM | BODY MASS INDEX: 29.64 KG/M2 | HEIGHT: 59 IN | OXYGEN SATURATION: 96 % | SYSTOLIC BLOOD PRESSURE: 131 MMHG | DIASTOLIC BLOOD PRESSURE: 85 MMHG | TEMPERATURE: 98.4 F | WEIGHT: 147 LBS | HEART RATE: 85 BPM

## 2022-02-14 PROCEDURE — 99214 OFFICE O/P EST MOD 30 MIN: CPT

## 2022-02-14 NOTE — HEALTH RISK ASSESSMENT
[Never] : Never [No] : In the past 12 months have you used drugs other than those required for medical reasons? No [0] : 2) Feeling down, depressed, or hopeless: Not at all (0) [de-identified] : None [[id number]] : 0

## 2022-02-14 NOTE — HISTORY OF PRESENT ILLNESS
[de-identified] : 56 year old  female patient with history of stable  Hypertension, Asthma, GERD, Major Depression in partial remission, Hypercholesterolemia with Hypertriglyceridemia, Hypothyroidism, Reactive Airway Disease, SLE, history as stated, presented for follow up examination with C/O cough, chest congestion, for 3-5 days, intermittent wheezing, no fever at this time. Patient is compliant with all medications. Denies shortness of breath, chest pain or abdominal pains at this time. ROS as stated.\par

## 2022-02-14 NOTE — ASSESSMENT
[FreeTextEntry1] : 56 year old female found to have stable Hypertension, GERD, Major Depression in partial remission, Hypercholesterolemia with Hypertriglyceridemia, Hypothyroidism, Reactive Airway Disease, SLE,with the current prescription regimen as recommended, diet and life style modifications, as counseled. Prior results reviewed, interpreted and discussed with the patient during today's examination, as appropriate. Follow up, treatment plan and tests, as ordered.\par \par Current symptoms are consistent with symptomatic Reactive Airway Disease , prescription management, further followup, as ordered. If symptoms get worse, consider adding Prednisone, as counseled.\par \par Total time spent : 30 minutes\par Including:\par Preparation prior to visit - Reviewing prior record, results of tests and Consultation Reports as applicable\par Conducting an appropriate H & P during today's encounter\par Appropriate orders for tests, medications and procedures, as applicable\par Counseling patient \par Note completion\par

## 2022-02-15 LAB — SARS-COV-2 N GENE NPH QL NAA+PROBE: NOT DETECTED

## 2022-02-17 DIAGNOSIS — H10.9 UNSPECIFIED CONJUNCTIVITIS: ICD-10-CM

## 2022-02-28 ENCOUNTER — APPOINTMENT (OUTPATIENT)
Dept: INTERNAL MEDICINE | Facility: CLINIC | Age: 57
End: 2022-02-28

## 2022-03-14 ENCOUNTER — RX RENEWAL (OUTPATIENT)
Age: 57
End: 2022-03-14

## 2022-03-25 ENCOUNTER — APPOINTMENT (OUTPATIENT)
Dept: GASTROENTEROLOGY | Facility: CLINIC | Age: 57
End: 2022-03-25
Payer: MEDICARE

## 2022-03-25 VITALS
BODY MASS INDEX: 31.04 KG/M2 | TEMPERATURE: 97.9 F | WEIGHT: 154 LBS | DIASTOLIC BLOOD PRESSURE: 108 MMHG | SYSTOLIC BLOOD PRESSURE: 150 MMHG | OXYGEN SATURATION: 96 % | HEIGHT: 59 IN | HEART RATE: 108 BPM

## 2022-03-25 DIAGNOSIS — K21.9 GASTRO-ESOPHAGEAL REFLUX DISEASE W/OUT ESOPHAGITIS: ICD-10-CM

## 2022-03-25 PROCEDURE — 99214 OFFICE O/P EST MOD 30 MIN: CPT

## 2022-03-25 NOTE — HISTORY OF PRESENT ILLNESS
[None] : had no significant interval events [Nausea] : denies nausea [Vomiting] : denies vomiting [Diarrhea] : denies diarrhea [Constipation] : denies constipation [Yellow Skin Or Eyes (Jaundice)] : denies jaundice [Abdominal Pain] : denies abdominal pain [Rectal Pain] : denies rectal pain [Wt Gain ___ Lbs] : recent [unfilled] ~Upound(s) weight gain [Heartburn] : heartburn [Abdominal Swelling] : abdominal swelling [GERD] : gastroesophageal reflux disease [Wt Loss ___ Lbs] : no recent weight loss [Hiatus Hernia] : no hiatus hernia [Peptic Ulcer Disease] : no peptic ulcer disease [Pancreatitis] : no pancreatitis [Cholelithiasis] : no cholelithiasis [Kidney Stone] : no kidney stone [Inflammatory Bowel Disease] : no inflammatory bowel disease [Irritable Bowel Syndrome] : no irritable bowel syndrome [Diverticulitis] : no diverticulitis [Alcohol Abuse] : no alcohol abuse [Malignancy] : no malignancy [Abdominal Surgery] : no abdominal surgery [Appendectomy] : no appendectomy [Cholecystectomy] : no cholecystectomy [de-identified] : The patient states that she is feeling fine. The patient denies any jaundice or pruritus.  The patient complains of chronic lower back pain. The patient denies any abdominal pain.  The patient complains of abdominal gas and bloating.  The patient denies any nausea or vomiting.  The patient complains of occasional gastroesophageal reflux disease but denies any dysphagia.  The gastroesophageal reflux disease is worse after meals and late at night and in the early morning. The gastroesophageal reflux disease is improved with proton pump inhibitors, H2 blockers and antacids.   The patient complains of occasional atypical chest pain, shortness of breath and palpitations.  The patient is being followed by his cardiologist, Dr. Tone Herrera. The chest pain is described as a pressure, intermittent substernal discomfort that occasionally radiates to the back.  The patient admits to occasional episodes of diaphoresis.  The chest pain is described as 8 out of 10 in intensity.  The chest pain can occur at any time.  The chest pain is worse at night and early morning.  The chest pain is worse with stress.  The chest pain is unrelated to meals and passing gas.  The chest pain has never awakened the patient from sleep.  The patient denies any constipation or diarrhea.  The patient has 1 to 2 bowel movements a day. The patient denies a change in bowel habits.  The patient denies a change in caliber of stool.  The patient denies having mucus discharge with the bowel movements.  The patient denies any bright red blood per rectum, melena or hematemesis.  The patient denies any rectal pain or rectal pruritus.  The patient denies any weight loss or anorexia.  The patient admits to gaining weight recently.  She denies any fevers or chills.  The patient is s/p back surgery at [facility] with Dr. Armstrong December 27, 2021. The patient was recently hospitalized at [facility] for back pain. The patient tolerated the surgery well. The blood work performed on August 10, 2021 revealed a low WBC count of 3.74 K/ul, anemia with a hemoglobin/hematocrit level of 10.5/34.3, respectively, and elevated ESR of 39 mm/h a normal vitamin D level of 40.7 ng/mL, a positive COVID-19 spike domain antibody of > 250.00 U/ml, and a negative QuantiFERON plus TB and a normal C-reactive protein of <3 mg/L. The blood work performed on June 16, 2021 revealed mild anemia with hemoglobin/hematocrit level of 9.6/32.6, respectively, elevated platelet count of 444,000, an elevated TIBC/UIBC of 452/407 ug/dl, respectively, a low iron % saturation of 10%, a normal Ferritin level of 20 ng/ml, normal liver enzymes with a normal total bilirubin of 0.2 mg/dl, normal alkaline phosphatase/AST/ALT/GGTP of 101/27/16/14 U/L, respectively, an elevated GGTP of 42 U/L, a low CO2 of 20 mmol/L an elevated ESR of 37 mm/hr. The blood work performed on March 23, 2021 revealed an elevated platelet count of 405,000, normal liver enzymes with a normal total bilirubin of < 0.2 mg/dL, normal alkaline phosphatase/AST/ALT/GGTP of 86/21/14/27 U/L, respectively, anemia with a hemoglobin/hematocrit level of 9.5/31.9, respectively, an elevated blood glucose of 198 mg/dl and elevated T3 of 66 ng/ml. The patient was [place] [facility] [place] 16, 2020 with 1 week history of abdominal pain. The blood work performed on February 20, 2020 revealed mild anemia with Hgb/Hct level of 10.7/33.0, respectively and an elevated chloride of 109 mmol/L. The abdominal ultrasound performed on February 19, 2020 revealed no gallstones, gallbladder wall thickening or pericholecystic fluid. The CAT scan of the chest, abdomen and pelvis with IV contrast performed on February 17, 2020 revealed no evidence of an aortic dissection. The patient had an upper endoscopy performed at the [facility] [place] suite on February 20, 2020. The upper endoscopy revealed a small hiatal hernia and mild diffuse gastritis with retained food and debris in the stomach suggestive of gastroparesis. The gastroparesis may be the cause of the abdominal pain. The patient was discharged on February 20, 2020 in stable condition. The patient had a colonoscopy to the terminal ileum performed at the [facility] [place] suite on February 13, 2020. The colonoscopy to the terminal ileum revealed moderate left sided diverticulosis. Random biopsies were taken of the terminal ileum, cecum and rectum to assess for ileitis and microscopic colitis. There were no polyps, masses, AVMs or colitis noted. The pathology revealed unremarkable small intestinal mucosa with preserved villous pattern and lymphocytic aggregates (terminal ileum), patchy mild colitis without evidence of architectural changes or evidence of chronicity (cecum) and benign colonic mucosa with focal hyperplastic change (rectum). The patient had an upper endoscopy and colonoscopy to the cecum performed at the [facility] [place] suite on July 18, 2019. The upper endoscopy was performed up to the level of the second portion of the duodenum. The upper endoscopy revealed a small hiatal hernia and mild diffuse bile gastritis. Biopsies were taken of the distal esophagus, antrum, body of stomach and duodenum to assess for esophagitis, gastritis and duodenitis. The pathology revealed distal esophagus with unremarkable squamous mucosa, gastric antral and body mucosa with chronic inactive gastritis with severe lymphoid follicles that was negative for Helicobacter pylori and unremarkable duodenal mucosa. The colonoscopy to the cecum revealed moderate left sided diverticulosis, a poor prep colonoscopy and small internal hemorrhoids. The study was limited secondary to retained liquid and solid stool scattered throughout the colon but no gross lesions were noted. Unable to comment on small colonic polyps or masses secondary to the poor prep. There were no polyps, masses, diverticulosis, AVMs or colitis noted. The patient tolerated the procedures well. The patient was previously found to have occult blood in the stool. The CAT scan of the abdomen and pelvis with IV contrast performed on December 16, 2019 revealed heterogeneous attenuation in the external iliac and common femoral veins is likely related to mixing artifact. Also noted was constipation, sigmoid diverticulosis without diverticulitis, trace bilateral hydronephrosis likely related to distended urinary bladder. The doppler ultrasound of the lower extremities performed on December 27, 2019 revealed no evidence of deep venous thrombosis in either lower extremity. The patient had an abdominal and pelvic ultrasound performed on October 18, 2019 to assess the symptoms. The abdominal ultrasound performed on October 18, 2019 revealed no gallstones or pericholecystic fluid. The contracted state of the gallbladder limits assessment for gallbladder wall thickening. There is no evidence for intrahepatic or extrahepatic biliary duct dilatation. The pelvic ultrasound performed on October 18, 2019 revealed no evidence for an ovarian lesion. The blood work performed on November 25, 2019 revealed an elevated CEA of 4.8 ng/ml, an elevated ESR of 22 mm/hr, anemia with Hgb of 10.5 and low WBC count of 3.29. The patient was also previously evaluated at the [facility] [place] on January 10, 2020 for abdominal pain. The patient was treated with medications and discharged. The blood work performed on January 11, 2020 revealed an elevated chloride level of 110 mmol/L, anemia with a hemoglobin/hematocrit level 10.5/34.3, respectively and a normalization of the WBC count of 4.32. The patient was observed with resolution of the symptoms and was discharged to followup in the office. The patient admits to having a prior upper endoscopy and colonoscopy performed by another gastroenterologist. The upper endoscopic findings were unknown to the patient. The colonoscopic findings revealed a poor prep colonoscopy. The patient admits to a family history of GI problems. The patient’s brother and father had a history of bleeding peptic ulcer disease.  [de-identified] : (-) smoking, (-) ETOH, (-) IVDA\par

## 2022-03-25 NOTE — ASSESSMENT
[FreeTextEntry1] : Dyspepsia: The patient complains of dyspeptic symptoms.  The patient was advised to continue to abide by an anti-gas (low FOD-MAP) diet.  The patient was previously given a pamphlet for anti-gas (low FOD-MAP).  The patient and I reviewed the anti-gas (low FOD-MAP)diet at length again. The patient is to continue on a trial of Simethicone one tablet 4 times a day p.r.n. abdominal pain and gas.\par GERD: The patient was advised to avoid late-night meals and dietary indiscretions.  The patient was advised to avoid fried and fatty foods.  The patient was advised to abide by an anti-GERD diet. The patient was given a pamphlet for anti-GERD.  The patient and I reviewed the anti-GERD diet at length. I recommend a trial of Pantoprazole 40 mg once a day x 3 months for the symptoms.\par Atypical Chest Pain: The patient complains of atypical chest pain of unclear etiology.  The patient was advised to follow up with the PMD and cardiologist regarding evaluation for the atypical chest pain. The patient was told of possible etiologies such as cardiac, pulmonary, GI, musculoskeletal, stress and other causes for the atypical chest pain.  The patient agrees and will follow-up with the PMD and cardiologist, Dr. oTne Miles. \par Gastroparesis: The patient had an upper endoscopy performed at the [facility] GI endoscopy suite on February 20, 2020. The upper endoscopy revealed a small hiatal hernia and mild diffuse gastritis with retained food and debris in the stomach suggestive of gastroparesis. The gastroparesis may be contributing to the abdominal pain. The patient is currently on medications that can contribute to the gastroparesis. I recommend a gastroparesis diet with smaller but more frequent meals. The patient can have a liquid and puree diet and avoid foods high in fats and carbohydrates. The patient was also advised to avoid high fiber diet. If the symptoms persist, the patient may require a trial of a promotility agent for the gastroparesis such as a trial of Reglan (metoclopramide) 5 mg 4 times a day. The patient and I had a long discussion regarding the risks of potential side effects of Reglan (metoclopramide). The patient was told of the risk of headaches, dizziness, diarrhea and permanent tremors. The patient was told to stop the medication immediately and call the office if any of these symptoms occur. The patient agrees and will call the office regarding her symptoms.\par History of Diverticulitis: The patient had a prior history of acute diverticulitis. The patient may require an emergency room evaluation for diverticulitis if the symptoms recur. The patient may require treatment with antibiotics if the symptoms recur. I recommend a low residue diet. The CAT scan of the abdomen and pelvis with IV contrast performed on December 16, 2019 revealed heterogeneous attenuation in the external iliac and common femoral veins is likely related to mixing artifact. Also noted was constipation, sigmoid diverticulosis without diverticulitis, trace bilateral hydronephrosis likely related to distended urinary bladder. The patient was advised to go to the hospital if fever, chills, worsening abdominal pain or GI bleeding recurs. The patient agrees.\par Abnormal Imaging Study: The CAT scan of the abdomen and pelvis with IV contrast performed on December 16, 2019 revealed heterogeneous attenuation in the external iliac and common femoral veins is likely related to mixing artifact. Also noted was constipation, sigmoid diverticulosis without diverticulitis, trace bilateral hydronephrosis likely related to distended urinary bladder. The doppler ultrasound of the lower extremities performed on December 27, 2019 revealed no evidence of deep venous thrombosis in either lower extremity. \par Hiatal Hernia: The patient was advised to avoid late-night meals and dietary indiscretions. The patient was advised to avoid fried and fatty foods. The patient was advised to abide by an anti-GERD diet. The patient was given a pamphlet for anti-GERD. The patient and I reviewed the anti-GERD diet at length. The patient had no improvement on a trial of Carafate. I recommend restarting on a trial of Pantoprazole 40 mg once a day x 3 months for the symptoms.\par Gastritis: The patient has a history of gastritis. The patient is to avoid nonsteroidal anti-inflammatory drugs and aspirin. The patient previously had no improvement on a trial of Carafate. I recommend continue on a trial of Pantoprazole 40 mg once a day x 3 months for the symptoms.\par Irritable Bowel Syndrome: The patient has symptoms suggestive of irritable bowel syndrome. The patient was advised to follow a low FOD-MAP diet for the irritable bowel syndrome. I recommend continue on a trial of Dicyclomine 10 mg three times a day for the abdominal pain and constipation. \par Diverticulosis: I recommend a low residue diet and avoid seeds. The patient is to consider a trial of Metamucil once a day for fiber supplementation. The patient is to also consider a trial of a probiotic such as Align once a day. \par Internal Hemorrhoids: The patient is to consider a trial of Anusol H. C. suppositories one per rectum nightly and Anusol HC2.5% cream apply to affected area twice a day p.r.n. hemorrhoidal bleeding or pain. \par History of Occult Blood in the Stool: The patient was previously found to have occult blood in the stool. The patient denies any bright red blood per rectum, melena or hematemesis. The patient was previously found to be guaiac-positive by her PMD. I recommend an MR enterography to assess the occult blood in the stool and small bowel pathology after the COVID 19 pandemic resolves.\par Anemia: The blood work performed on June 16, 2021 revealed mild anemia with hemoglobin/hematocrit level of 9.6/32.6, respectively, elevated platelet count of 444,000, an elevated TIBC/UIBC of 452/407 ug/dl, respectively, a low iron % saturation of 10%, a normal Ferritin level of 20 ng/ml. I recommend obtaining the recent blood work performed by her hematologist. \par Blood Work: I recommend obtaining the recent blood work performed by the patient's PMD.\par Follow-up: The patient is to follow-up in the office in 6 months to reassess the symptoms. The patient was told to call the office if any further problems.\par \par

## 2022-05-10 NOTE — ED PROVIDER NOTE - NSCAREINITIATED _GEN_ER
PAST MEDICAL HISTORY:  DVT (deep venous thrombosis) R, dx 2/2020    Hyperlipidemia     Hypertension     Hypothyroid      Km Mena(Attending)

## 2022-05-18 ENCOUNTER — APPOINTMENT (OUTPATIENT)
Dept: CARDIOLOGY | Facility: CLINIC | Age: 57
End: 2022-05-18

## 2022-05-20 ENCOUNTER — APPOINTMENT (OUTPATIENT)
Dept: GASTROENTEROLOGY | Facility: CLINIC | Age: 57
End: 2022-05-20

## 2022-06-13 ENCOUNTER — NON-APPOINTMENT (OUTPATIENT)
Age: 57
End: 2022-06-13

## 2022-06-13 ENCOUNTER — APPOINTMENT (OUTPATIENT)
Dept: CARDIOLOGY | Facility: CLINIC | Age: 57
End: 2022-06-13

## 2022-06-13 VITALS
BODY MASS INDEX: 32.66 KG/M2 | OXYGEN SATURATION: 98 % | SYSTOLIC BLOOD PRESSURE: 125 MMHG | RESPIRATION RATE: 16 BRPM | HEIGHT: 59 IN | WEIGHT: 162 LBS | TEMPERATURE: 98.1 F | HEART RATE: 72 BPM | DIASTOLIC BLOOD PRESSURE: 74 MMHG

## 2022-06-13 PROCEDURE — 99214 OFFICE O/P EST MOD 30 MIN: CPT

## 2022-06-13 PROCEDURE — 93000 ELECTROCARDIOGRAM COMPLETE: CPT

## 2022-06-13 NOTE — ED ADULT NURSE NOTE - FINAL NURSING ELECTRONIC SIGNATURE
Dr. Sonny San notified of patient stating she cannot hold her bladder. Patient incontinent of large amount of urine in brief. Offered bedpan at this time. Will continue to monitor. 07-Apr-2021 23:33

## 2022-06-14 ENCOUNTER — TRANSCRIPTION ENCOUNTER (OUTPATIENT)
Age: 57
End: 2022-06-14

## 2022-06-14 ENCOUNTER — EMERGENCY (EMERGENCY)
Facility: HOSPITAL | Age: 57
LOS: 1 days | Discharge: HOME | End: 2022-06-14
Attending: EMERGENCY MEDICINE
Payer: COMMERCIAL

## 2022-06-14 VITALS
HEIGHT: 59 IN | HEART RATE: 83 BPM | SYSTOLIC BLOOD PRESSURE: 109 MMHG | RESPIRATION RATE: 18 BRPM | DIASTOLIC BLOOD PRESSURE: 71 MMHG | WEIGHT: 162.04 LBS | TEMPERATURE: 98 F | OXYGEN SATURATION: 97 %

## 2022-06-14 DIAGNOSIS — Z98.89 OTHER SPECIFIED POSTPROCEDURAL STATES: Chronic | ICD-10-CM

## 2022-06-14 DIAGNOSIS — Z98.890 OTHER SPECIFIED POSTPROCEDURAL STATES: Chronic | ICD-10-CM

## 2022-06-14 DIAGNOSIS — Z98.51 TUBAL LIGATION STATUS: Chronic | ICD-10-CM

## 2022-06-14 PROCEDURE — 99285 EMERGENCY DEPT VISIT HI MDM: CPT

## 2022-06-15 ENCOUNTER — NON-APPOINTMENT (OUTPATIENT)
Age: 57
End: 2022-06-15

## 2022-06-15 ENCOUNTER — TRANSCRIPTION ENCOUNTER (OUTPATIENT)
Age: 57
End: 2022-06-15

## 2022-06-15 VITALS
HEART RATE: 65 BPM | SYSTOLIC BLOOD PRESSURE: 125 MMHG | DIASTOLIC BLOOD PRESSURE: 75 MMHG | RESPIRATION RATE: 18 BRPM | OXYGEN SATURATION: 98 %

## 2022-06-15 LAB
ALBUMIN SERPL ELPH-MCNC: 4.7 G/DL — SIGNIFICANT CHANGE UP (ref 3.3–5)
ALP SERPL-CCNC: 106 U/L — SIGNIFICANT CHANGE UP (ref 40–120)
ALT FLD-CCNC: 37 U/L — SIGNIFICANT CHANGE UP (ref 10–45)
ANION GAP SERPL CALC-SCNC: 10 MMOL/L — SIGNIFICANT CHANGE UP (ref 5–17)
APPEARANCE UR: CLEAR — SIGNIFICANT CHANGE UP
AST SERPL-CCNC: 40 U/L — SIGNIFICANT CHANGE UP (ref 10–40)
BACTERIA # UR AUTO: NEGATIVE — SIGNIFICANT CHANGE UP
BASOPHILS # BLD AUTO: 0.06 K/UL — SIGNIFICANT CHANGE UP (ref 0–0.2)
BASOPHILS NFR BLD AUTO: 1.1 % — SIGNIFICANT CHANGE UP (ref 0–2)
BILIRUB SERPL-MCNC: 0.1 MG/DL — LOW (ref 0.2–1.2)
BILIRUB UR-MCNC: NEGATIVE — SIGNIFICANT CHANGE UP
BUN SERPL-MCNC: 14 MG/DL — SIGNIFICANT CHANGE UP (ref 7–23)
CALCIUM SERPL-MCNC: 8.8 MG/DL — SIGNIFICANT CHANGE UP (ref 8.4–10.5)
CHLORIDE SERPL-SCNC: 106 MMOL/L — SIGNIFICANT CHANGE UP (ref 96–108)
CO2 SERPL-SCNC: 23 MMOL/L — SIGNIFICANT CHANGE UP (ref 22–31)
COLOR SPEC: SIGNIFICANT CHANGE UP
CREAT SERPL-MCNC: 1 MG/DL — SIGNIFICANT CHANGE UP (ref 0.5–1.3)
DIFF PNL FLD: NEGATIVE — SIGNIFICANT CHANGE UP
EGFR: 65 ML/MIN/1.73M2 — SIGNIFICANT CHANGE UP
EOSINOPHIL # BLD AUTO: 0.16 K/UL — SIGNIFICANT CHANGE UP (ref 0–0.5)
EOSINOPHIL NFR BLD AUTO: 2.9 % — SIGNIFICANT CHANGE UP (ref 0–6)
EPI CELLS # UR: 1 /HPF — SIGNIFICANT CHANGE UP
GLUCOSE SERPL-MCNC: 82 MG/DL — SIGNIFICANT CHANGE UP (ref 70–99)
GLUCOSE UR QL: NEGATIVE — SIGNIFICANT CHANGE UP
HCT VFR BLD CALC: 36.3 % — SIGNIFICANT CHANGE UP (ref 34.5–45)
HGB BLD-MCNC: 11.3 G/DL — LOW (ref 11.5–15.5)
HYALINE CASTS # UR AUTO: 3 /LPF — HIGH (ref 0–2)
IMM GRANULOCYTES NFR BLD AUTO: 0.4 % — SIGNIFICANT CHANGE UP (ref 0–1.5)
KETONES UR-MCNC: NEGATIVE — SIGNIFICANT CHANGE UP
LEUKOCYTE ESTERASE UR-ACNC: NEGATIVE — SIGNIFICANT CHANGE UP
LYMPHOCYTES # BLD AUTO: 2.08 K/UL — SIGNIFICANT CHANGE UP (ref 1–3.3)
LYMPHOCYTES # BLD AUTO: 38.3 % — SIGNIFICANT CHANGE UP (ref 13–44)
MCHC RBC-ENTMCNC: 31.1 GM/DL — LOW (ref 32–36)
MCHC RBC-ENTMCNC: 31.8 PG — SIGNIFICANT CHANGE UP (ref 27–34)
MCV RBC AUTO: 102.3 FL — HIGH (ref 80–100)
MONOCYTES # BLD AUTO: 0.5 K/UL — SIGNIFICANT CHANGE UP (ref 0–0.9)
MONOCYTES NFR BLD AUTO: 9.2 % — SIGNIFICANT CHANGE UP (ref 2–14)
NEUTROPHILS # BLD AUTO: 2.61 K/UL — SIGNIFICANT CHANGE UP (ref 1.8–7.4)
NEUTROPHILS NFR BLD AUTO: 48.1 % — SIGNIFICANT CHANGE UP (ref 43–77)
NITRITE UR-MCNC: NEGATIVE — SIGNIFICANT CHANGE UP
NRBC # BLD: 0 /100 WBCS — SIGNIFICANT CHANGE UP (ref 0–0)
PH UR: 6.5 — SIGNIFICANT CHANGE UP (ref 5–8)
PLATELET # BLD AUTO: 407 K/UL — HIGH (ref 150–400)
POTASSIUM SERPL-MCNC: 3.9 MMOL/L — SIGNIFICANT CHANGE UP (ref 3.5–5.3)
POTASSIUM SERPL-SCNC: 3.9 MMOL/L — SIGNIFICANT CHANGE UP (ref 3.5–5.3)
PROT SERPL-MCNC: 7.4 G/DL — SIGNIFICANT CHANGE UP (ref 6–8.3)
PROT UR-MCNC: NEGATIVE — SIGNIFICANT CHANGE UP
RBC # BLD: 3.55 M/UL — LOW (ref 3.8–5.2)
RBC # FLD: 13.1 % — SIGNIFICANT CHANGE UP (ref 10.3–14.5)
RBC CASTS # UR COMP ASSIST: 1 /HPF — SIGNIFICANT CHANGE UP (ref 0–4)
SODIUM SERPL-SCNC: 139 MMOL/L — SIGNIFICANT CHANGE UP (ref 135–145)
SP GR SPEC: 1.01 — LOW (ref 1.01–1.02)
UROBILINOGEN FLD QL: NEGATIVE — SIGNIFICANT CHANGE UP
WBC # BLD: 5.43 K/UL — SIGNIFICANT CHANGE UP (ref 3.8–10.5)
WBC # FLD AUTO: 5.43 K/UL — SIGNIFICANT CHANGE UP (ref 3.8–10.5)
WBC UR QL: 1 /HPF — SIGNIFICANT CHANGE UP (ref 0–5)

## 2022-06-15 PROCEDURE — 74177 CT ABD & PELVIS W/CONTRAST: CPT | Mod: 26,MA

## 2022-06-15 PROCEDURE — 80053 COMPREHEN METABOLIC PANEL: CPT

## 2022-06-15 PROCEDURE — 72148 MRI LUMBAR SPINE W/O DYE: CPT | Mod: 26,MA

## 2022-06-15 PROCEDURE — 81001 URINALYSIS AUTO W/SCOPE: CPT

## 2022-06-15 PROCEDURE — 99284 EMERGENCY DEPT VISIT MOD MDM: CPT | Mod: 25

## 2022-06-15 PROCEDURE — 81025 URINE PREGNANCY TEST: CPT

## 2022-06-15 PROCEDURE — 72148 MRI LUMBAR SPINE W/O DYE: CPT | Mod: MA

## 2022-06-15 PROCEDURE — 72132 CT LUMBAR SPINE W/DYE: CPT | Mod: 26,MA

## 2022-06-15 PROCEDURE — 85025 COMPLETE CBC W/AUTO DIFF WBC: CPT

## 2022-06-15 PROCEDURE — 87086 URINE CULTURE/COLONY COUNT: CPT

## 2022-06-15 PROCEDURE — 74177 CT ABD & PELVIS W/CONTRAST: CPT | Mod: MA

## 2022-06-15 RX ORDER — ACETAMINOPHEN 500 MG
650 TABLET ORAL ONCE
Refills: 0 | Status: DISCONTINUED | OUTPATIENT
Start: 2022-06-15 | End: 2022-06-18

## 2022-06-15 NOTE — ED ADULT NURSE REASSESSMENT NOTE - NS ED NURSE REASSESS COMMENT FT1
Report received from SHRUTHI Gonsalez. Pt A/O x4. pt provided leg bag and educated on use. Pt understood and demonstrated proper use of leg bag. Pt well appearing. Currently getting dressed to go home.

## 2022-06-15 NOTE — CONSULT NOTE ADULT - ASSESSMENT
Patient is a 56y/o female with h/o Lupus with 1 day h/o back pain urinary retention, denies any bladder or bowel issue . Patient has had sx in the past by Dr Cabral and was told that she may need back surgery  Neurologically stable  Pain management  Look for other courses for her urinary retention  No neurosurgical at this time  Pt can follow up with Dr Cbaral as out patient

## 2022-06-15 NOTE — ED ADULT NURSE NOTE - OBJECTIVE STATEMENT
56yo F with PMH lupus, presents to ED complaining of urinary retention. Pt states, "I have not been able to pee for the last 2 days." Pt endorses last being able to empty her bladder on sunday.     Bladder scanned, >476 mLs in patients bladder. MD Soni made aware. Abdomen soft, distended (according to patient) and tender to palpation. 56yo F with PMH lupus, presents to ED complaining of urinary retention. Pt states, "I have not been able to pee for the last 2 days." Pt endorses last being able to empty her bladder on sunday." Endorses discomfort and pressure to pelvic area. Also notes having the feeling she has to use urinate but is unable to. Denies chest pain, SOB, N/V/D, trauma, bloody stools, hematuria, dysuria,     Bladder scanned, >476 mLs in patients bladder. MD Soni made aware. Abdomen soft, distended (according to patient) and tender to palpation. 56yo F with PMH lupus, HTN, presents to ED complaining of urinary retention. Pt states, "I have not been able to pee for the last 2 days." Pt endorses last being able to empty her bladder on sunday." Endorses discomfort and pressure to pelvic area. Also notes having the feeling she has to use urinate but is unable to. Denies chest pain, SOB, N/V/D, trauma, bloody stools, hematuria, dysuria, fevers/chills. sick contacts. A&Ox3. Strong peripheral pulses. Abdomen soft, distended (according to patient), tender to palpation. Pelvic region distended and tender to palpation. Pt bladder scanned, >476 mLs in patients bladder. MD Soni made aware. Skin warm dry intact and normal for ethnicity. Stretcher locked and in lowest position, side rails up. Call bell given and patient instructed to notify RN if assistance is needed. 56yo F with PMH lupus, HTN, presents to ED complaining of urinary retention. Pt states, "I have not been able to pee for the last 2 days." Pt endorses last being able to empty her bladder on sunday." Endorses discomfort and pressure to pelvic area. Also notes having the feeling she has to use urinate but is unable to. Denies chest pain, SOB, N/V/D, trauma, bloody stools, hematuria, dysuria, fevers/chills. sick contacts. Pt also notes that she has herniated discs and is scheduled for a procedure in a month. A&Ox3. Strong peripheral pulses. Abdomen soft, distended (according to patient), tender to palpation. Pelvic region distended and tender to palpation. Pt bladder scanned, >476 mLs in patients bladder. MD Soni made aware. Skin warm dry intact and normal for ethnicity. Stretcher locked and in lowest position, side rails up. Call bell given and patient instructed to notify RN if assistance is needed.

## 2022-06-15 NOTE — ED PROVIDER NOTE - PHYSICAL EXAMINATION
GENERAL: well appearing in no acute distress, non-toxic appearing  HEAD: normocephalic, atraumatic  HENT: airway intact, neck supple  EYES: normal conjunctiva  CARDIAC: regular rate and rhythm, normal S1S2, no appreciable murmurs, 2+ pulses in UE/LE b/l  PULM: normal breath sounds, clear to ascultation bilaterally, no rales, rhonchi, wheezing  GI: abdomen nondistended, soft, nontender, no guarding, rebound tenderness  : TTP over pelvis  NEURO: no focal motor or sensory deficits; 5/5 strength in b/l LE; no gait ataxia, sensation intact b/l  MSK: no peripheral edema  SKIN: well-perfused, extremities warm, no visible rashes GENERAL: well appearing in no acute distress, non-toxic appearing  HEAD: normocephalic, atraumatic  HENT: airway intact, neck supple  EYES: normal conjunctiva  CARDIAC: regular rate and rhythm, normal S1S2, no appreciable murmurs, 2+ pulses in UE/LE b/l  PULM: normal breath sounds, clear to ascultation bilaterally, no rales, rhonchi, wheezing  GI: abdomen nondistended, soft, nontender, no guarding, rebound tenderness  : TTP over pelvis  NEURO: no focal motor or sensory deficits; 5/5 strength in b/l LE; no gait ataxia, sensation intact b/l  MSK: mild lumbar midline ttp  SKIN: well-perfused, extremities warm, no visible rashes

## 2022-06-15 NOTE — ED PROVIDER NOTE - PROGRESS NOTE DETAILS
Zachariah, PGY2: CT lumbar notable for multilevel spondylosis resulting in at least mild central canal narrowing at L3-L4 and L4-L5. Neuro surg aware and recommending MRI and will evaluate. Attending MD Mackey : Patient signed out to me from Dr Blackmon p/w urinary retention up to 600cc with back pain c/f cord compression, atraumatic. Called rads - will expedite MRI. NSGY will eval patient after MRI. Pete DO: patient ambulatory with steady gait.  patient cleared by neurosurgery for outpatient follow-up.  patient discharged with a leg bag and urology follow-up secondary to urinary retention.

## 2022-06-15 NOTE — ED ADULT NURSE REASSESSMENT NOTE - NS ED NURSE REASSESS COMMENT FT1
Indwelling urinary "forrest" catheter inserted using sterile technique. Procedure, risks, and benefits of catheter explained to patient, patient verbalized understanding. Second RN present to confirm sterility. Pt tolerated well. Urinary catheter drained clear yellow urine, no clots visualized. Bedside drainage to gravity. Stat lock in place. Approximately 550cc of urine in drainage bag after insertion. MD Soni made aware. Urine obtain and sent to lab as per MD order. Stretcher locked and in lowest position, side rails up. Call bell given and patient instructed to notify RN if assistance is needed.

## 2022-06-15 NOTE — ED PROVIDER NOTE - NSFOLLOWUPINSTRUCTIONS_ED_ALL_ED_FT
There were no signs of an emergency medical condition on completion of today's workup.  You will need further medical care and evaluation. A presumptive diagnosis has been made, however further evaluation may be required by your primary care doctor or specialist for a definitive diagnosis.      Follow up with your medical doctor in 2-3 days or call our clinic at [phone] and state you were seen in the Emergency Department and would like to be seen in clinic. You may also call [phone] to speak with a representative to assist follow up care with medicine, surgery, or specialists.    If you are having pain, take Tylenol/acetaminophen 1 g every six hours and supplement (if allowed by your physician, and if you're not having gastric/gastrointestinal/stomach/intestinal problems) with ibuprofen 600 mg, with food or milk/maalox, every six hours which can be taken three hours apart from the Tylenol to have a layered effect.     Be sure to take no more than 4000mg or 4g of Tylenol/acetaminophen in a 24 hour period. Be sure to check your other medications to see if they include Tylenol/acetaminophen and include them in your calculations to ensure you do not take more than 4000mg or 4g of Tylenol/acetaminophen a day.    Drink at least 2 Liters or 64 Ounces of water each day (UNLESS you are supposed to restrict fluids or have a history of congestive heart failure (CHF)).    Return for any persistent, worsening symptoms, or ANY concerns at all.    Please follow-up with your neurosurgeon within 1-2 days and please follow-up with urology in 1-2 days for potential urinary catheter removal.

## 2022-06-15 NOTE — ED PROVIDER NOTE - CLINICAL SUMMARY MEDICAL DECISION MAKING FREE TEXT BOX
56yo F w/ history of lupus,  HTN, HLD, migraine, fibromyalgia coming in w/ urinary retention x2 days; low concern for cauda equina given lack of motor deficits. Will evaluate for obstructive mass and send for spine recons 56yo F w/ history of lupus,  HTN, HLD, migraine, fibromyalgia coming in w/ urinary retention x2 days; low concern for cauda equina given lack of motor deficits. Will evaluate for obstructive mass and send for spine recons, if concerning findings, will consider MR and neuro surg consult

## 2022-06-15 NOTE — ED PROVIDER NOTE - ATTENDING CONTRIBUTION TO CARE
RGUJRAL 56yo f hx listed presents with urinary retention x 2 d. Pt complains lower back pain, no numbness, tingling or weakness. No dysuria or frequency. States she has some herniated disc and is scheduled for a procedure. On exam, Patient is awake,alert,oriented x 3. Patient is well appearing and in no acute distress. Patient's chest is clear to ausculation, +s1s2. Abdomen is soft nd/nt +BS. Extremity with no swelling or calf tenderness. +L4-5 mild ttp.   Neuro CN3-12 intact. Strength 5/5 in upper and lower extremities. Nml Sensation.  Bladder scan. Check labs, CT to ro obstruction.
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
## 2022-06-15 NOTE — ED ADULT NURSE NOTE - ABDOMEN
Addended by: ARNOL AMARAL on: 5/14/2020 05:31 PM     Modules accepted: Orders    
soft/distended/tender
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
## 2022-06-15 NOTE — CHART NOTE - NSCHARTNOTEFT_GEN_A_CORE
CAPRINI SCORE [CLOT] Score on Admission for     AGE RELATED RISK FACTORS                                                       MOBILITY RELATED FACTORS  [ x] Age 41-60 years                                            (1 Point)                  [ ] Bed rest                                                        (1 Point)  [ ] Age: 61-74 years                                           (2 Points)                 [ ] Plaster cast                                                   (2 Points)  [ ] Age= 75 years                                              (3 Points)                 [ ] Bed bound for more than 72 hours                 (2 Points)    DISEASE RELATED RISK FACTORS                                               GENDER SPECIFIC FACTORS  [ ] Edema in the lower extremities                       (1 Point)                  [ ] Pregnancy                                                     (1 Point)  [ ] Varicose veins                                               (1 Point)                  [ ] Post-partum < 6 weeks                                   (1 Point)             [ x] BMI > 25 Kg/m2                                            (1 Point)                  [ ] Hormonal therapy  or oral contraception          (1 Point)                 [ ] Sepsis (in the previous month)                        (1 Point)                  [ ] History of pregnancy complications                 (1 point)  [ ] Pneumonia or serious lung disease                                               [ ] Unexplained or recurrent                     (1 Point)           (in the previous month)                               (1 Point)  [ ] Abnormal pulmonary function test                     (1 Point)                 SURGERY RELATED RISK FACTORS (include planned surgeries)  [ ] Acute myocardial infarction                              (1 Point)                 [ ]  Section                                             (1 Point)  [ ] Congestive heart failure (in the previous month)  (1 Point)         [ ] Minor surgery                                                  (1 Point)   [ ] Inflammatory bowel disease                             (1 Point)                 [ ] Arthroscopic surgery                                        (2 Points)  [ ] Central venous access                                      (2 Points)                [ ] General surgery lasting more than 45 minutes   (2 Points)       [ ] Stroke (in the previous month)                          (5 Points)               [ ] Elective arthroplasty                                         (5 Points)            [ ] current or past malignancy                              (2 Points)                                                                                                       HEMATOLOGY RELATED FACTORS                                                 TRAUMA RELATED RISK FACTORS  [ ] Prior episodes of VTE                                     (3 Points)                [ ] Fracture of the hip, pelvis, or leg                       (5 Points)  [ ] Positive family history for VTE                         (3 Points)                 [ ] Acute spinal cord injury (in the previous month)  (5 Points)  [ ] Prothrombin 99801 A                                     (3 Points)                 [ ] Paralysis  (less than 1 month)                             (5 Points)  [ ] Factor V Leiden                                             (3 Points)                  [ ] Multiple Trauma within 1 month                        (5 Points)  [ ] Lupus anticoagulants                                     (3 Points)                                                           [ ] Anticardiolipin antibodies                               (3 Points)                                                       [ ] High homocysteine in the blood                      (3 Points)                                             [ ] Other congenital or acquired thrombophilia      (3 Points)                                                [ ] Heparin induced thrombocytopenia                  (3 Points)                                          Total Score [    2      ]    Risk:  Very low 0   Low 1 to 2   Moderate 3 to 4   High =5       VTE Prophylasix Recommednations:  [x ] mechanical pneumatic compression devices                                      [ ] contraindicated: _____________________  [ ] chemo prophylasix                                                                                   [ ] contraindicated _____________________    **** HIGH LIKELIHOOD DVT PRESENT ON ADMISSION  [ ] (please order LE dopplers within 24 hours of admission)

## 2022-06-15 NOTE — CONSULT NOTE ADULT - ASSESSMENT
58 y/o female with past medical h/o Lupus who presented with c/o back pain, urinary retention , no weakness  Neurologically stable  Lumbar spine CT and MR reviewed:   No neurosurgical intervention  Follow up with Dr Cabral 58 y/o female with past medical h/o Lupus who presented with c/o back pain, urinary retention , no weakness  Neurologically stable  Lumbar spine CT and MR reviewed:   No neurosurgical intervention at this time  Follow up with Dr Cabral as out patient 58 y/o female with past medical h/o Lupus who presented with c/o back pain, urinary retention , no weakness  Neurologically stable  Lumbar spine CT and MR reviewed:   No neurosurgical intervention at this  Needs w/u for other causes for urinary retention  Follow up with Dr Cabral patient own neurosurgeon 56 y/o female with past medical h/o Lupus who presented with c/o back pain, urinary retention , no weakness  Neurologically stable  Lumbar spine CT and MR reviewed:   No neurosurgical intervention at this time  Needs w/u for other causes for urinary retention  Follow up with Dr Cabral patient own neurosurgeon

## 2022-06-15 NOTE — ED PROVIDER NOTE - NS ED ROS FT
General: denies fever, chills  HENT: denies nasal congestion, rhinorrhea  Eyes: denies visual changes, blurred vision  CV: denies chest pain, palpitations  Resp: denies difficulty breathing, cough  Abdominal: denies nausea, vomiting, diarrhea, abdominal pain  : urinary retention   MSK: denies muscle aches, leg swelling  Neuro: denies headaches, numbness, tingling  Skin: denies rashes, bruises

## 2022-06-15 NOTE — ED PROVIDER NOTE - OBJECTIVE STATEMENT
56yo F w/ history of lupus,  HTN, HLD, migraine, fibromyalgia coming in w/ urinary retention. Patient 58yo F w/ history of lupus,  HTN, HLD, migraine, fibromyalgia coming in w/ urinary retention x2 days. Denies any trauma, difficulty walking, fevers, chills or constipation. Patient has a history of herniated discs and is scheduled for a procedure in a month. Has otherwise been in his normal state of health.

## 2022-06-15 NOTE — ED PROVIDER NOTE - PATIENT PORTAL LINK FT
You can access the FollowMyHealth Patient Portal offered by [facility] by registering at the following website: [email or web address]. By joining [facility]’s FollowMyHealth portal, you will also be able to view your health information using other applications (apps) compatible with our system.

## 2022-06-16 LAB
CULTURE RESULTS: SIGNIFICANT CHANGE UP
SPECIMEN SOURCE: SIGNIFICANT CHANGE UP

## 2022-06-20 ENCOUNTER — EMERGENCY (EMERGENCY)
Facility: HOSPITAL | Age: 57
LOS: 1 days | Discharge: HOME | End: 2022-06-20
Attending: STUDENT IN AN ORGANIZED HEALTH CARE EDUCATION/TRAINING PROGRAM
Payer: MEDICARE

## 2022-06-20 ENCOUNTER — NON-APPOINTMENT (OUTPATIENT)
Age: 57
End: 2022-06-20

## 2022-06-20 VITALS
OXYGEN SATURATION: 99 % | RESPIRATION RATE: 18 BRPM | WEIGHT: 160.94 LBS | HEART RATE: 84 BPM | SYSTOLIC BLOOD PRESSURE: 130 MMHG | DIASTOLIC BLOOD PRESSURE: 83 MMHG | HEIGHT: 59 IN | TEMPERATURE: 99 F

## 2022-06-20 VITALS
TEMPERATURE: 99 F | OXYGEN SATURATION: 96 % | HEART RATE: 86 BPM | SYSTOLIC BLOOD PRESSURE: 150 MMHG | RESPIRATION RATE: 18 BRPM | DIASTOLIC BLOOD PRESSURE: 93 MMHG

## 2022-06-20 DIAGNOSIS — Z98.890 OTHER SPECIFIED POSTPROCEDURAL STATES: Chronic | ICD-10-CM

## 2022-06-20 DIAGNOSIS — Z98.51 TUBAL LIGATION STATUS: Chronic | ICD-10-CM

## 2022-06-20 DIAGNOSIS — Z98.89 OTHER SPECIFIED POSTPROCEDURAL STATES: Chronic | ICD-10-CM

## 2022-06-20 LAB
ALBUMIN SERPL ELPH-MCNC: 3.6 G/DL — SIGNIFICANT CHANGE UP (ref 3.5–5)
ALP SERPL-CCNC: 80 U/L — SIGNIFICANT CHANGE UP (ref 40–120)
ALT FLD-CCNC: 31 U/L DA — SIGNIFICANT CHANGE UP (ref 10–60)
ANION GAP SERPL CALC-SCNC: 4 MMOL/L — LOW (ref 5–17)
APPEARANCE UR: CLEAR — SIGNIFICANT CHANGE UP
AST SERPL-CCNC: 26 U/L — SIGNIFICANT CHANGE UP (ref 10–40)
BACTERIA # UR AUTO: ABNORMAL /HPF
BASOPHILS # BLD AUTO: 0.06 K/UL — SIGNIFICANT CHANGE UP (ref 0–0.2)
BASOPHILS NFR BLD AUTO: 1.5 % — SIGNIFICANT CHANGE UP (ref 0–2)
BILIRUB SERPL-MCNC: 0.2 MG/DL — SIGNIFICANT CHANGE UP (ref 0.2–1.2)
BILIRUB UR-MCNC: NEGATIVE — SIGNIFICANT CHANGE UP
BUN SERPL-MCNC: 19 MG/DL — HIGH (ref 7–18)
CALCIUM SERPL-MCNC: 8.4 MG/DL — SIGNIFICANT CHANGE UP (ref 8.4–10.5)
CHLORIDE SERPL-SCNC: 113 MMOL/L — HIGH (ref 96–108)
CO2 SERPL-SCNC: 25 MMOL/L — SIGNIFICANT CHANGE UP (ref 22–31)
COLOR SPEC: YELLOW — SIGNIFICANT CHANGE UP
CREAT SERPL-MCNC: 0.97 MG/DL — SIGNIFICANT CHANGE UP (ref 0.5–1.3)
DIFF PNL FLD: ABNORMAL
EGFR: 67 ML/MIN/1.73M2 — SIGNIFICANT CHANGE UP
EOSINOPHIL # BLD AUTO: 0.14 K/UL — SIGNIFICANT CHANGE UP (ref 0–0.5)
EOSINOPHIL NFR BLD AUTO: 3.6 % — SIGNIFICANT CHANGE UP (ref 0–6)
EPI CELLS # UR: SIGNIFICANT CHANGE UP /HPF
GLUCOSE SERPL-MCNC: 83 MG/DL — SIGNIFICANT CHANGE UP (ref 70–99)
GLUCOSE UR QL: NEGATIVE — SIGNIFICANT CHANGE UP
HCT VFR BLD CALC: 33.8 % — LOW (ref 34.5–45)
HGB BLD-MCNC: 10.9 G/DL — LOW (ref 11.5–15.5)
IMM GRANULOCYTES NFR BLD AUTO: 0 % — SIGNIFICANT CHANGE UP (ref 0–1.5)
KETONES UR-MCNC: NEGATIVE — SIGNIFICANT CHANGE UP
LEUKOCYTE ESTERASE UR-ACNC: ABNORMAL
LYMPHOCYTES # BLD AUTO: 1.46 K/UL — SIGNIFICANT CHANGE UP (ref 1–3.3)
LYMPHOCYTES # BLD AUTO: 37.4 % — SIGNIFICANT CHANGE UP (ref 13–44)
MCHC RBC-ENTMCNC: 31.9 PG — SIGNIFICANT CHANGE UP (ref 27–34)
MCHC RBC-ENTMCNC: 32.2 GM/DL — SIGNIFICANT CHANGE UP (ref 32–36)
MCV RBC AUTO: 98.8 FL — SIGNIFICANT CHANGE UP (ref 80–100)
MONOCYTES # BLD AUTO: 0.41 K/UL — SIGNIFICANT CHANGE UP (ref 0–0.9)
MONOCYTES NFR BLD AUTO: 10.5 % — SIGNIFICANT CHANGE UP (ref 2–14)
NEUTROPHILS # BLD AUTO: 1.83 K/UL — SIGNIFICANT CHANGE UP (ref 1.8–7.4)
NEUTROPHILS NFR BLD AUTO: 47 % — SIGNIFICANT CHANGE UP (ref 43–77)
NITRITE UR-MCNC: NEGATIVE — SIGNIFICANT CHANGE UP
NRBC # BLD: 0 /100 WBCS — SIGNIFICANT CHANGE UP (ref 0–0)
PH UR: 6.5 — SIGNIFICANT CHANGE UP (ref 5–8)
PLATELET # BLD AUTO: 370 K/UL — SIGNIFICANT CHANGE UP (ref 150–400)
POTASSIUM SERPL-MCNC: 3.9 MMOL/L — SIGNIFICANT CHANGE UP (ref 3.5–5.3)
POTASSIUM SERPL-SCNC: 3.9 MMOL/L — SIGNIFICANT CHANGE UP (ref 3.5–5.3)
PROT SERPL-MCNC: 7.1 G/DL — SIGNIFICANT CHANGE UP (ref 6–8.3)
PROT UR-MCNC: 15
RBC # BLD: 3.42 M/UL — LOW (ref 3.8–5.2)
RBC # FLD: 12.7 % — SIGNIFICANT CHANGE UP (ref 10.3–14.5)
RBC CASTS # UR COMP ASSIST: SIGNIFICANT CHANGE UP /HPF (ref 0–2)
SODIUM SERPL-SCNC: 142 MMOL/L — SIGNIFICANT CHANGE UP (ref 135–145)
SP GR SPEC: 1.01 — SIGNIFICANT CHANGE UP (ref 1.01–1.02)
UROBILINOGEN FLD QL: 1
WBC # BLD: 3.9 K/UL — SIGNIFICANT CHANGE UP (ref 3.8–10.5)
WBC # FLD AUTO: 3.9 K/UL — SIGNIFICANT CHANGE UP (ref 3.8–10.5)
WBC UR QL: ABNORMAL /HPF (ref 0–5)

## 2022-06-20 PROCEDURE — 80053 COMPREHEN METABOLIC PANEL: CPT

## 2022-06-20 PROCEDURE — 36415 COLL VENOUS BLD VENIPUNCTURE: CPT

## 2022-06-20 PROCEDURE — 99284 EMERGENCY DEPT VISIT MOD MDM: CPT

## 2022-06-20 PROCEDURE — 87186 SC STD MICRODIL/AGAR DIL: CPT

## 2022-06-20 PROCEDURE — 81001 URINALYSIS AUTO W/SCOPE: CPT

## 2022-06-20 PROCEDURE — 87086 URINE CULTURE/COLONY COUNT: CPT

## 2022-06-20 PROCEDURE — 99283 EMERGENCY DEPT VISIT LOW MDM: CPT

## 2022-06-20 PROCEDURE — 85025 COMPLETE CBC W/AUTO DIFF WBC: CPT

## 2022-06-20 NOTE — ED PROVIDER NOTE - OBJECTIVE STATEMENT
57 y.o female with a pmhx of lupus, hypertension, hyperlipidemia, and fibromyalgia presents with blood-tinged urine in her forrest catheter since yesterday. Patient states she went to [facility] on 6/15 for urinary retention, at which time a forrest was placed. Patient notes that there initially wasn't a concern for cord compression after MRI lumbar spine 57 y.o female with a pmhx of lupus, hypertension, hyperlipidemia, and fibromyalgia presents with blood-tinged urine in her forrest catheter since yesterday. Patient states she went to [facility] on 6/15 for urinary retention, at which time a forrest was placed. Patient notes that there initially wasn't a concern for cord compression after MRI lumbar spine showed spondylosis, but was cleared by neurosurgery and discharged to followup with neurology. Patient states that she noticed blood-tinged urine in her forrest yesterday and also reported leakage, but stated it was because her forrest became unscrewed. Patient denies any other symptoms including fever, abdominal pain, or back pain. 57 y.o female with a pmhx of lupus, hypertension, hyperlipidemia, and fibromyalgia presents with blood-tinged urine in her forrest catheter since yesterday. Patient states she went to [facility] on 6/15 for urinary retention, at which time a forrest was placed. Patient notes that there initially wasn't a concern for cord compression after MRI lumbar spine showed spondylosis, but was cleared by neurosurgery and discharged to followup with urology. Patient states that she noticed blood-tinged urine in her forrest yesterday and also reported leakage, but stated it was because her forrest became unscrewed. Patient denies any other symptoms including fever, abdominal pain, or back pain.

## 2022-06-20 NOTE — ED PROVIDER NOTE - PATIENT PORTAL LINK FT
You can access the FollowMyHealth Patient Portal offered by [facility] by registering at the following website: [email or web address]. By joining [facility]’s FollowMyHealth portal, you will also be able to view your health information using other applications (apps) compatible with our system.

## 2022-06-20 NOTE — ED PROVIDER NOTE - CLINICAL SUMMARY MEDICAL DECISION MAKING FREE TEXT BOX
57 y.o female presenting with blood-tinged urine in her forrest catheter since yesterday. Patient well-appearing. Dark-tinged urine in forrest, otherwise physical exam is unremarkable. Will check labs, urine, and recommend patient sees urology at her followup appointment tomorrow, but patient is requesting forrest be removed right now. Will do trial of forrest removal. 57 y.o female presenting with blood-tinged urine in her forrest catheter since yesterday. Patient well-appearing. Dark-tinged urine in forrest, otherwise physical exam is unremarkable. Will check labs, urine, and recommend patient sees urology at her followup appointment tomorrow, but patient is requesting forrest be removed right now. Will do void trial and reassess.

## 2022-06-20 NOTE — ED PROVIDER NOTE - NSFOLLOWUPINSTRUCTIONS_ED_ALL_ED_FT
Follow up with your urology tomorrow.   May take Tylenol as directed on the bottle for pain control.   Return to the ER if you develop any new or worsening symptoms such as fever, incontinence, urinary retention, chest pain, shortness of breath, numbness, weakness, abdominal pain, nausea, vomiting, or visual changes.

## 2022-06-20 NOTE — ED ADULT NURSE REASSESSMENT NOTE - GENERAL PATIENT STATE
patient care endorsed to Rn Kylee/comfortable appearance/cooperative/improvement verbalized/resting/sleeping/smiling/interactive

## 2022-06-20 NOTE — PROGRESS NOTE ADULT - PROBLEM SELECTOR PLAN 1
Cornelius Calderón                     [facility] Hospitalist Inpatient Progress Note    Chief Complaint   Patient presents with   â¢ Wound Check   â¢ Infection     No acute events overnight. Afebrile. On room air, hemodynamically stable. S/p debridement 6/18. Hb slight downtrend 6.9, no overt bleeding    He denies any pain, dyspnea, nausea, vomiting. Tolerating PO w/o issue.  +constipation    cx E.faecalis, E.coli, S. anginosus from sacrum fluid intraop         Allergies  ALLERGIES:  Patient has no known allergies.     Medications  Current Facility-Administered Medications   Medication Dose Route Frequency Provider Last Rate Last Admin   â¢ sodium chloride 0.9% infusion   Intravenous Continuous PRN Dolorita H Onguru, CNP       â¢ potassium CHLORIDE (KLOR-CON M) vikash ER tablet 20 mEq  20 mEq Oral Daily with breakfast Dolorita H Onguru, CNP   20 mEq at 06/20/22 0607   â¢ insulin lispro (ADMELOG, HumaLOG) injection 3 Units  3 Units Subcutaneous TID WC Arty Hence, DO   3 Units at 06/20/22 1332   â¢ insulin glargine (LANTUS) injection 10 Units  10 Units Subcutaneous Daily Arty Hence, DO   10 Units at 06/20/22 1014   â¢ insulin lispro (ADMELOG,HumaLOG) - Correction Dose   Subcutaneous TID WC Arty Hence, DO   4 Units at 06/20/22 1333   â¢ insulin lispro (ADMELOG,HumaLOG) - Correction Dose   Subcutaneous Nightly Arty Hence, DO       â¢ enoxaparin (LOVENOX) injection 40 mg  40 mg Subcutaneous Daily Arty Hence, DO   40 mg at 06/20/22 1014   â¢ polyethylene glycol (MIRALAX) packet 17 g  17 g Oral Daily PRN Arty Hence, DO       â¢ bisacodyl (DULCOLAX) suppository 10 mg  10 mg Rectal Daily PRN Arty Hence, DO       â¢ docusate sodium-sennosides (SENOKOT S) 50-8.6 MG 1 tablet  1 tablet Oral BID Arty Hence, DO       â¢ polyethylene glycol (MIRALAX) packet 17 g  17 g Oral Daily Arty Hence, DO   17 g at 06/20/22 1533   â¢ sodium hypochlorite (DAKINS) 0.125 % (1/4 strength) irrigation solution   Topical 3 times per day Kimberley
syncope vs seizure vs polypharmacy (on multiple neuroleptics). states these episodes are similar to what she experienced in 2013 when she was first told she had seizure (on EEG)  - hemodynamically stable at rest, however endorses dizziness with positional changes now improved.   - orthostatic vitals negative  - CT Head and Angio Head + Neck with no acute abnormalities   - utox positive for THC - smokes 1 joint a week for many years, not new  - 24hr vEEG with no epileptiform activity seen  - patient on multiple neuroleptics, reports taking as directed, will continue home medications and monitor for sedation (c/w trazodone, clonazepam, tizanidine PRN)  - TTE as above with no gross abnormalities. d/c telemetry  - outpatient followup with neurology
Haydee Mueller MD       â¢ cholecalciferol (VITAMIN D) tablet 25 mcg  25 mcg Oral Daily Jason Helen, DO   25 mcg at 06/20/22 3516   â¢ mirtazapine (REMERON) tablet 15 mg  15 mg Oral Nightly Jason Windsor, DO   15 mg at 06/19/22 2104   â¢ lidocaine 1 % injection 100 mg  10 mL Injection Once Jonah Sanchez MD       â¢ sodium chloride 0.9 % flush bag 25 mL  25 mL Intravenous PRN Jonah Sanchez MD       â¢ sodium chloride (PF) 0.9 % injection 2 mL  2 mL Intracatheter 2 times per day Jonah Sanchez MD   2 mL at 06/20/22 0834   â¢ sodium chloride 0.9 % flush bag 25 mL  25 mL Intravenous PRN Jonah Sanchez MD       â¢ Phosphorus Standard Replacement Protocol   Does not apply See Dong Paz MD       â¢ Magnesium Standard Replacement Protocol   Does not apply See Dong Paz MD       â¢ HYDROcodone-acetaminophen (NORCO) 5-325 MG per tablet 1 tablet  1 tablet Oral Q4H PRN Jonah Sanchez MD   1 tablet at 06/19/22 1039   â¢ docusate sodium-sennosides (SENOKOT S) 50-8.6 MG 2 tablet  2 tablet Oral Daily PRN Jonah Sanchez MD       â¢ clopidogrel (PLAVIX) tablet 75 mg  75 mg Oral Daily Jonah Sanchez MD   75 mg at 06/20/22 6742   â¢ aspirin (ECOTRIN) enteric coated tablet 81 mg  81 mg Oral Daily Jonah Sanchez MD   81 mg at 06/20/22 0833   â¢ finasteride (PROSCAR) tablet 5 mg  5 mg Oral Nightly Jonah Sanchez MD   5 mg at 06/19/22 2104   â¢ tamsulosin (FLOMAX) capsule 0.4 mg  0.4 mg Oral QHS Jonah Sanchez MD   0.4 mg at 06/19/22 2104   â¢ dextrose 50 % injection 25 g  25 g Intravenous PRN Jonah Sanchez MD       â¢ dextrose 50 % injection 12.5 g  12.5 g Intravenous PRN Jonah Sanchez MD       â¢ glucagon (GLUCAGEN) injection 1 mg  1 mg Intramuscular PRN Jonah Sanchez MD       â¢ dextrose (GLUTOSE) 40 % gel 15 g  15 g Oral PRN Jonah Sanchez MD       â¢ dextrose (GLUTOSE) 40 % gel 30 g  30 g Oral PRN Jonah Sanchez MD       â¢ ferrous sulfate (65 mg Fe per 325 mg) tablet 325 mg  325 mg Oral Every Other
Nichole Murillo MD   325 mg at 06/19/22 0842   â¢ lactulose (CHRONULAC) 10 GM/15ML solution 20 g  20 g Oral Daily PRN Lizzie Cornejo MD       â¢ thiamine (VITAMIN B-1) 200 mg in sodium chloride 0.9 % 100 mL IVPB  200 mg Intravenous Daily Lizzie Cornejo  mL/hr at 06/20/22 1007 200 mg at 06/20/22 1007   â¢ acetaminophen (TYLENOL) tablet 650 mg  650 mg Oral Q6H Lizzie Cornejo MD   650 mg at 06/20/22 1006   â¢ gabapentin (NEURONTIN) capsule 100 mg  100 mg Oral TID Lizzie Cornejo MD   100 mg at 06/20/22 1537   â¢ piperacillin-tazobactam (ZOSYN) 3.375 g in sodium chloride 0.9 % 100 mL IVPB  3.375 g Intravenous 3 times per day Darlenealix Quiñones  mL/hr at 06/20/22 1531 3.375 g at 06/20/22 1531   â¢ HYDROmorphone (DILAUDID) injection 0.4 mg  0.4 mg Intravenous Q3H PRN Krishan Bello, DO   0.4 mg at 06/20/22 1332   â¢ cyanocobalamin (Vitamin B-12) tablet 1,000 mcg  1,000 mcg Oral Daily Krishan Sosatan, DO   1,000 mcg at 06/20/22 3989   â¢ metoPROLOL succinate (TOPROL-XL) ER tablet 25 mg  25 mg Oral Daily Krishan Sosatan, DO   25 mg at 06/20/22 0148   â¢ hydrALAZINE (APRESOLINE) tablet 25 mg  25 mg Oral Q8H PRN Krishan Bello, DO           Review of Systems  Constitutional:  No Weight Change, No Fever, No Chills, No Night Sweats, No Fatigue, No Malaise  ENT/Mouth:  No Hearing Changes, No Ear Pain, No Nasal Congestion, No Sinus Pain, No Hoarseness, No sore throat, No Rhinorrhea, No Swallowing Difficulty  Eyes:  No Eye Pain, No Swelling, No Redness, No Vision Changes  Cardiovascular:  No Chest Pain, No SOB, No PND,  No Orthopnea, No Palpitations  Respiratory:  No Cough, No Sputum, No Wheezing, No Dyspnea  Gastrointestinal:  No Nausea, No Vomiting, No Diarrhea,  No Heartburn,  Genitourinary   No Dysuria, No Urinary Frequency, No Hematuria, No Urinary Incontinence, No Urgency,   Musculoskeletal:  No Arthralgias, No Myalgias, No Joint Swelling, No Joint Stiffness, No Back Pain, No Neck Pain,  Skin:  No Skin Lesions, No Pruritis,
No Hair Changes, , No Nipple Discharge  Neuro:  No Weakness, No Numbness, No Paresthesias, No Loss of Consciousness,  Psych:  No Anxiety/Panic, No Depression, No Insomnia,   Heme/Lymph:  No Bruising, No Bleeding,  No Lymphadenopathy  Endocrine:  No Polyuria, No Polydipsia,      Last Recorded Vitals  Vitals with min/max: Body mass index is 32.08 kg/mÂ². Vital Last Value 24 Hour Range   Temperature 97.6 Â°F (36.4 Â°C) (06/20/22 1200) Temp  Min: 96.1 Â°F (35.6 Â°C)  Max: 97.8 Â°F (36.6 Â°C)   Pulse 77 (06/20/22 1000) Pulse  Min: 63  Max: 77   Respiratory 15 (06/20/22 1332) Resp  Min: 10  Max: 18   Non-Invasive  Blood Pressure 118/61 (06/20/22 1000) BP  Min: 105/48  Max: 142/74   Pulse Oximetry 100 % (06/20/22 1000) SpO2  Min: 100 %  Max: 100 %   Arterial   Blood Pressure   No data recorded      Physical Exam  General:  In no apparent distress. Head:  No signs of head trauma. Eyes:  Pupils are equal.  Extraocular motions intact. Ears:  Hearing grossly intact. Mouth:  Oropharynx is normal.   Neck:  No adenopathy, no JVD. Chest:  Chest with clear breath sounds bilaterally. No wheezes, rales, or rhonchi. Cardiac:  Regular rate and rhythm. S1 and S2, without murmurs, gallops, or rubs. Abdomen:  Soft, without detectable tenderness. No sign of distention. No   rebound or guarding, and no masses palpated. Bowel Sounds normal.  Musculoskeletal:  Good range of motion of all major joints. Extremities without clubbing, cyanosis or edema. Vascular:  Left stump with staples in place, tissue CDI and appears well approximated/healing well. No edema. Neuro:  Alert and oriented x name/year. No focal sensory or strength deficits. Speech somewhat dysarthric at times. Follows commands. Psychiatric:  Mood normal.  Skin:  No rash or lesions.      Imaging  CXR personally reviewed:  CT ABDOMEN PELVIS W CONTRAST   Final Result by Rita Wallace MD (01/03 6778)   Addendum 1 of 1 by Rita Wallace MD (06/18
4272)   ADDENDUM:   06/18/22 05:15 Call Doctor Regarding Necrotizing Fasciitis, called    Abigail gangrene - Called Dr. Maricruz Luz on 06/18 05:15 (-05:00)            Final   1. There is tracking subcutaneous emphysema along the dorsal aspect of    the inferior sacrum and coccyx. There is also abnormal tracking    subcutaneous emphysema extending into the left gluteus rebekah muscle in    the subcutaneous fat of the left gluteal region. There are infiltrating    fluid and gas collections noted in the left gluteal muscle which tracked    inferolaterally. Findings are most consistent with necrotizing fasciitis    and associated necrotizing myositis. Urgent surgical consultation, if    not are any performed is recommended. 2.  No obvious cortical destruction involving the sacrum and coccyx. The    gas collections approximate the regional osseous structures however. Communications:       Call Doctor Necrotizing Fasciitis; Fournierâs gangrene      Electronically signed by Vee Chirinos MD on 06 18 22 at 04:50      XR CHEST PA OR AP 1 VIEW   Final Result by Domenico Louie MD (06/17 2203)   Impression:    Cardiomediastinal silhouette normal. No lobar consolidation pleural   effusion or pneumothorax. Vascularity is normal. Degenerative changes in   the shoulders and spinal axis. Upper abdomen is normal.      Remainder of the exam is otherwise stable.          Electronically Signed by: Vibha Knutson MD    Signed on: 6/17/2022 10:03 PM          MRI SACRUM COCCYX W CONTRAST    (Results Pending)       Cardiac studies:   ECG personally reviewed:   Encounter Date: 06/17/22   Electrocardiogram 12-Lead   Result Value    Ventricular Rate EKG/Min (BPM) 83    Atrial Rate (BPM) 83    IL-Interval (MSEC) 186    QRS-Interval (MSEC) 140    QT-Interval (MSEC) 388    QTc 456    P Axis (Degrees) 10    R Axis (Degrees) -91    T Axis (Degrees) 37    REPORT TEXT      Sinus rhythm  with  premature ventricular
complexes  Right bundle branch block  Inferior infarct  , age undetermined  Suggest Clinical Correlation  Abnormal ECG  When compared with ECG of  28-APR-2022 21:44,  premature ventricular complexes  present  Inferior infarct  is now  present  Confirmed by Zenon Nevarez MD [facility] ([id number]) on 6/18/2022 12:15:48 AM         Labs     Recent Results (from the past 24 hour(s))   CBC with Automated Differential (performable only)    Collection Time: 06/19/22  4:49 PM   Result Value Ref Range    WBC 20.9 (H) 4.2 - 11.0 K/mcL    RBC 2.40 (L) 4.50 - 5.90 mil/mcL    HGB 7.1 (L) 13.0 - 17.0 g/dL    HCT 21.7 (L) 39.0 - 51.0 %    MCV 90.4 78.0 - 100.0 fl    MCH 29.6 26.0 - 34.0 pg    MCHC 32.7 32.0 - 36.5 g/dL    RDW-CV 18.1 (H) 11.0 - 15.0 %    RDW-SD 59.2 (H) 39.0 - 50.0 fL     140 - 450 K/mcL    NRBC 0 <=0 /100 WBC   Manual Differential    Collection Time: 06/19/22  4:49 PM   Result Value Ref Range    Neutrophil, Percent 91 %    Lymphocytes, Percent 2 %    Mono, Percent 7 %    Eosinophils, Percent 0 %    Basophils, Percent 0 %    Absolute Neutrophil 19.0 (H) 1.8 - 7.7 K/mcL    Absolute Lymphocytes 0.4 (L) 1.0 - 4.0 K/mcL    Absolute Monocytes 1.5 (H) 0.3 - 0.9 K/mcL    Absolute Eosinophils 0.0 0.0 - 0.5 K/mcL    Absolute Basophils 0.0 0.0 - 0.3 K/mcL    Hypochromia Moderate     Platelet Morphology Normal Normal    Toxic Granulation Present     Toxic Vacuolation Present    GLUCOSE, BEDSIDE - POINT OF CARE    Collection Time: 06/19/22  5:12 PM   Result Value Ref Range    GLUCOSE, BEDSIDE - POINT OF CARE 318 (H) 70 - 99 mg/dL   GLUCOSE, BEDSIDE - POINT OF CARE    Collection Time: 06/19/22  8:19 PM   Result Value Ref Range    GLUCOSE, BEDSIDE - POINT OF CARE 279 (H) 70 - 99 mg/dL   Glycohemoglobin    Collection Time: 06/20/22  3:48 AM   Result Value Ref Range    Hemoglobin A1C 6.2 (H) 4.5 - 5.6 %   Comprehensive Metabolic Panel    Collection Time: 06/20/22  3:48 AM   Result Value Ref Range    Fasting Status      Sodium 136 135 - 145
mmol/L    Potassium 3.9 3.4 - 5.1 mmol/L    Chloride 105 97 - 110 mmol/L    Carbon Dioxide 23 21 - 32 mmol/L    Anion Gap 12 7 - 19 mmol/L    Glucose 221 (H) 70 - 99 mg/dL    BUN 80 (H) 6 - 20 mg/dL    Creatinine 1.41 (H) 0.67 - 1.17 mg/dL    Glomerular Filtration Rate 50 (L) >=60    BUN/ Creatinine Ratio 57 (H) 7 - 25    Calcium 8.5 8.4 - 10.2 mg/dL    Bilirubin, Total 0.2 0.2 - 1.0 mg/dL    GOT/AST 33 <=37 Units/L    GPT/ALT 20 <64 Units/L    Alkaline Phosphatase 97 45 - 117 Units/L    Albumin 1.5 (L) 3.6 - 5.1 g/dL    Protein, Total 6.7 6.4 - 8.2 g/dL    Globulin 5.2 (H) 2.0 - 4.0 g/dL    A/G Ratio 0.3 (L) 1.0 - 2.4   CBC No Differential    Collection Time: 06/20/22  3:48 AM   Result Value Ref Range    WBC 15.6 (H) 4.2 - 11.0 K/mcL    RBC 2.40 (L) 4.50 - 5.90 mil/mcL    HGB 6.9 (LL) 13.0 - 17.0 g/dL    HCT 21.6 (L) 39.0 - 51.0 %    MCV 90.0 78.0 - 100.0 fl    MCH 28.8 26.0 - 34.0 pg    MCHC 31.9 (L) 32.0 - 36.5 g/dL     140 - 450 K/mcL    RDW-CV 17.9 (H) 11.0 - 15.0 %    RDW-SD 58.4 (H) 39.0 - 50.0 fL    NRBC 0 <=0 /100 WBC   Prepare Red Blood Cells: 1 Units    Collection Time: 06/20/22  5:13 AM   Result Value Ref Range    UNIT BLOOD TYPE B Pos     ISBT BLOOD TYPE 7300     BLOOD EXPIRATION DATE [id number]     UNIT NUMBER [id number]     DISPENSE STATUS Issued     PRODUCT ID Red Blood Cells     PRODUCT CODE [id number]     PRODUCT DESCRIPTION RBC AS-1 LR     CROSSMATCH RESULT Compatible     ISSUE DATE/TIME [id number]    GLUCOSE, BEDSIDE - POINT OF CARE    Collection Time: 06/20/22  7:52 AM   Result Value Ref Range    GLUCOSE, BEDSIDE - POINT OF CARE 182 (H) 70 - 99 mg/dL   GLUCOSE, BEDSIDE - POINT OF CARE    Collection Time: 06/20/22 11:44 AM   Result Value Ref Range    GLUCOSE, BEDSIDE - POINT OF CARE 217 (H) 70 - 99 mg/dL   CBC with Automated Differential (performable only)    Collection Time: 06/20/22 12:17 PM   Result Value Ref Range    WBC 18.8 (H) 4.2 - 11.0 K/mcL    RBC 3.20 (L) 4.50 - 5.90
mil/mcL    HGB 9.3 (L) 13.0 - 17.0 g/dL    HCT 28.4 (L) 39.0 - 51.0 %    MCV 88.8 78.0 - 100.0 fl    MCH 29.1 26.0 - 34.0 pg    MCHC 32.7 32.0 - 36.5 g/dL    RDW-CV 17.3 (H) 11.0 - 15.0 %    RDW-SD 55.2 (H) 39.0 - 50.0 fL     140 - 450 K/mcL    NRBC 0 <=0 /100 WBC   Manual Differential    Collection Time: 06/20/22 12:17 PM   Result Value Ref Range    Neutrophil, Percent 83 %    Lymphocytes, Percent 8 %    Mono, Percent 3 %    Eosinophils, Percent 1 %    Basophils, Percent 2 %    Bands, Percent 1 0 - 10 %    Myelocytes, Percent 1 (H) <=0 %    Reactive Lymphocytes, Percent 1 0 - 5 %    Absolute Neutrophil 15.8 (H) 1.8 - 7.7 K/mcL    Absolute Lymphocytes 1.7 1.0 - 4.0 K/mcL    Absolute Monocytes 0.6 0.3 - 0.9 K/mcL    Absolute Eosinophils 0.2 0.0 - 0.5 K/mcL    Absolute Basophils 0.4 (H) 0.0 - 0.3 K/mcL    Hypochromia Few     Platelet Morphology Normal Normal    Toxic Granulation Present     Toxic Vacuolation Present        Assessment/Plan  Marietta Jensen is a 78year old male w/PMH diabetes mellitus type 2, chronic heart failure preserved ejection fraction, urethral stricture with urinary retention status post Burroughs, CKD 3A, peripheral vascular disease status post recent BKA in May 2022 who presents for wound evaluation. Sacral decubitus ulcer, unstageable, present on admission  Severe sepsis secondary to necrotizing soft tissue infection  Lactic acid 4 on arrival, leukocytosis 18, CRP elevated 28  S/p bedside debridement and debridement in OR  She Vanco and Zosyn as well as clinda > changed zosyn, clinda   -cont clinda for possible toxin production inhibition  -ID following  -f/u wound and OR cx, blood cx  -growing S.anginosus, E.faecalis and E.  Coli, susc pending  -wound care MD following  -levo to maintain MAP >65      Nonoliguric WIL on CKD 2/3A  NAGMA  pseudohyperkalemia  History of bilateral hydronephrosis secondary to prostatic stricture   Chronic Burroughs  WIL may be prerenal versus potentially
obstructive versus less likely ATN in the setting of his sepsis  Cr peaked t o2.4 on prior hospitalization, downtrended to 1.1 on discharge  Bladder scan to ensure no obstruction  Continue Burroughs placed by urology prior to admission we will hold off on exchanging at this time  Status post IV fluid resuscitation  S/p amp bicarb  Trend CMP, UOP  -repeat renal US or CT if Cr uptrending or becomes oliguric/anuric    Severe PAD status post left toe gangrene with subsequent BKA  Left SFA atherectomy and left popliteal atherectomy as well as balloon angioplasty and ZHENG to both on May 6, 2022, remain on Plavix and aspirin as well as statin  Went for subsequent BKA on 5/23/22  Continue statin, Plavix, aspirin  PT OT    Diabetes mellitus type 2 without long-term insulin use  Last A1c 6.1 > repeat 6.2  On oral medications with metformin and sulfonylurea  Sliding scale, goal blood sugar 140-180  Adding lantus, sched HL and increase SS to HD  -likely has some stress-induced insulin resistance nd hyperglycemia    Chronic heart failure with preserved ejection fraction  NYHA class II, ACC AHA class C  Recent echo in April 2022 showed EF 60%, no wall motion abnormality, grade 1 diastolic dysfunction and no significant valvular disease  -His GDMT included metoprolol, losartan, Lasix  Holding ARB And diuretic    Acute on chronic normocytic anemia  -ferritin >1k, iron <5, component of ACD and NIXON as well as acute blood loss anemia  -b12 intermediate, folic wnl  -2U PRBC on hold, transfuse for hemodynamics or Hb <7  -trend CBC  -receiving 1UPRBC on 6/20 >> post transfusion overcorrection        based on the patient's presentation on admission, I expect the patient to require at least 2 midnights of medically necessary [address]  I have evaluated the patient who has the capacity to make healthcare decisions.   This patient has/has not have an Advance Care directive document in Epic  Code Status
Information     Code Status    Full Resuscitation             DVT Prophylaxis   lovenox    Primary Care Physician  Veronica Kay MD    .MORE than 35 MINS WERE SPENT ON THIS PATIENTS CARE TODAY, more than 50% of which was spent coordinating patient care. This includes endering the following: Reviewed all vitals, medications, new orders, I/O, labs, micro, radiology, nurses notes, pertinent consultant notes which are reflected in assessment and plan. This does not include time spent on other items of care such as smoking cessation counseling, prolonged care time, and or advanced care planning if applicable.        Discussed with Patient, Nurse, Ammy Schneider, General Surgery and ID  Dr. Alma Rosa Poole  6/20/2022
syncope vs seizure vs polypharmacy (on multiple neuroleptics). states these episodes are similar to what she experienced in 2013 when she was first told she had seizure.  - hemodynamically stable at rest, however endorses dizziness with positional changes  - orthostatic vitals negative  - CT Head and Angio Head + Neck with no acute abnormalities   - f/u 24hr vEEG - pending  - patient on multiple neuroleptics, reports taking as directed, will continue home medications and monitor for sedation (c/w trazodone, clonazepam, tizanidine PRN)  - f/u TTE   - monitor on telemetry
syncope vs seizure vs polypharmacy (on multiple neuroleptics). states these episodes are similar to what she experienced in 2013 when she was first told she had seizure (on EEG)  - hemodynamically stable at rest, however endorses dizziness with positional changes now improved.   - orthostatic vitals negative  - CT Head and Angio Head + Neck with no acute abnormalities   - utox positive for THC - smokes 1 joint a week for many years, not new  - f/u 24hr vEEG - pending  - patient on multiple neuroleptics, reports taking as directed, will continue home medications and monitor for sedation (c/w trazodone, clonazepam, tizanidine PRN)  - TTE as above with no gross abnormalities. d/c telemetry

## 2022-06-20 NOTE — ED PROVIDER NOTE - PROGRESS NOTE DETAILS
pt feeling well, voiding spontaneously. will f/u with urology tmr. as no urinary symptoms will hold off on abx (ua sig for 6-10 wbc). strict return precautions given.

## 2022-06-21 ENCOUNTER — APPOINTMENT (OUTPATIENT)
Dept: UROLOGY | Facility: CLINIC | Age: 57
End: 2022-06-21

## 2022-06-24 ENCOUNTER — OUTPATIENT (OUTPATIENT)
Dept: OUTPATIENT SERVICES | Facility: HOSPITAL | Age: 57
LOS: 1 days | End: 2022-06-24
Payer: MEDICARE

## 2022-06-24 ENCOUNTER — APPOINTMENT (OUTPATIENT)
Dept: ULTRASOUND IMAGING | Facility: CLINIC | Age: 57
End: 2022-06-24

## 2022-06-24 ENCOUNTER — APPOINTMENT (OUTPATIENT)
Dept: CT IMAGING | Facility: CLINIC | Age: 57
End: 2022-06-24

## 2022-06-24 ENCOUNTER — OUTPATIENT (OUTPATIENT)
Dept: OUTPATIENT SERVICES | Facility: HOSPITAL | Age: 57
LOS: 1 days | End: 2022-06-24
Payer: COMMERCIAL

## 2022-06-24 ENCOUNTER — RESULT REVIEW (OUTPATIENT)
Age: 57
End: 2022-06-24

## 2022-06-24 ENCOUNTER — APPOINTMENT (OUTPATIENT)
Dept: ULTRASOUND IMAGING | Facility: CLINIC | Age: 57
End: 2022-06-24
Payer: MEDICARE

## 2022-06-24 DIAGNOSIS — Z00.8 ENCOUNTER FOR OTHER GENERAL EXAMINATION: ICD-10-CM

## 2022-06-24 DIAGNOSIS — Z98.89 OTHER SPECIFIED POSTPROCEDURAL STATES: Chronic | ICD-10-CM

## 2022-06-24 DIAGNOSIS — R07.89 OTHER CHEST PAIN: ICD-10-CM

## 2022-06-24 DIAGNOSIS — Z98.890 OTHER SPECIFIED POSTPROCEDURAL STATES: Chronic | ICD-10-CM

## 2022-06-24 DIAGNOSIS — Z98.51 TUBAL LIGATION STATUS: Chronic | ICD-10-CM

## 2022-06-24 PROCEDURE — 93880 EXTRACRANIAL BILAT STUDY: CPT | Mod: 26

## 2022-06-24 PROCEDURE — 93880 EXTRACRANIAL BILAT STUDY: CPT

## 2022-06-24 PROCEDURE — 75571 CT HRT W/O DYE W/CA TEST: CPT | Mod: 26

## 2022-06-24 PROCEDURE — 75571 CT HRT W/O DYE W/CA TEST: CPT

## 2022-06-25 NOTE — ED ADULT TRIAGE NOTE - SPO2 (%)
Occupational Therapy  Visit Type: initial evaluation  Precautions:  Medical precautions:  fall risk;. The patient is a 81 year old female admitted on 6/24/2022.  Pt admitted with diagnosis of Surgery, elective (Z41.9) s/p (L) hip (anterior approach).    Prior to admission pt lives alone in house ([employer]).  1STE with grab bar.  Shower has grab bars.  Pt uses cane mod (I) and (I).  Drive (I). Owns RW.  Lower Extremity:    Left:  weight bearing: as tolerated.  hip - direct anterior precautions    Hearing: no hearing deficits  Safety Measures: chair alarm    SUBJECTIVE  Patient agreed to participate in therapy this date.  \"I don't like people doing things for me that I can do myself.\"  \"I don't need to practice that. I don't wear socks.\"  Patient / Family Goal: return to previous functional status    OBJECTIVE   Level of consciousness: alert    Oriented to person, place, time and situation     Arousal alertness: appropriate responses to stimuli    Affect/Behavior: alert, appropriate, cooperative and pleasant  Patient activity tolerance: 1 to 2 activity to rest  Functional Communication/Cognition    Overall status:  Within functional limits    Form of communication:  Verbal   Attention span:  Appears intact    Commands: follows all commands and directions consistently.    Transition between tasks: transitions tasks without difficulty.    Safety judgement: good awareness of safety precautions.  Hand Dominance: right  Range of Motion (measured in degrees unless otherwise indicated)  WNL: LUE, RUE  Strength (out of 5 unless otherwise indicated)  5/5: LUE, RUE  Balance (trials in sec unless otherwise indicated)  Sitting:   - Static:  independent    - Dynamic:  independent  Standing - Firm Surface - Eyes Open:    - Static: stand by assist   - Dynamic:  contact guard/touching/steadying assist double upper extremity support      Transfers:    Assistive devices: gait belt and 2-wheeled walker    Sit to stand: stand  by assist    Stand to sit: stand by assist    Activities of Daily Living (ADLs):  Eating:     Assist: independent  Grooming/Oral Hygiene:     Grooming assist: supervision    Oral hygiene assist: supervision    Position: standing at sink  Toilet transfer:     Assist: stand by assist    Device: gait belt and 2-wheeled walker    Equipment: commode over toilet      Interventions    Training provided: ADL training, activity tolerance, balance retraining, safety training and use of adaptive equipment  Skilled input: verbal instruction/cues  Verbal Consent: Writer verbally educated and received verbal consent for hand placement, positioning of patient, and techniques to be performed today from patient for clothing adjustments for techniques as described above and how they are pertinent to the patient's plan of care.        ASSESSMENT  Impairments: activity tolerance, balance and pain  Functional Limitations: bed mobility, functional mobility, IADLs, functional transfers, toileting and dressing    Pt evaluated for OT s/p L KAREN anterior approach. Pt lives alone in [facility] and was independent with ADL. Pt oriented x4. Pt agreed to demonstrate toilet transfer in bathroom using armrests with SBA, grooming standing sinkside with supervision. Pt ambulated back to chair using RW with CGA/SBA. Pt declined practicing LB dressing due to states she has sock-aid at home and already knows how to use it. Pt educated on using reacher to assist with LB dressing as well, but declined to practice. Pt issued list of lending closets with handout of adaptive equipment for toilet riser. Pt does not have countertop/grab bar to assist her standing in her bathroom at home. Pt stated she would ask her daughter to bring toilet riser for her bathroom. Pt unclear how she would get her groceries until doctor clears her for driving. Pt is currently limited due to decreased balance, decreased LB AROM and c/o pain in L hip.       Discharge  Recommendations:  Recommendations for Discharge: OT IL: Patient is appropriate for Occupational Therapy 1-3 times per week, Patient requires 24 HOUR assistance to perform mobility and/or ADLs safely  PT/OT Mobility Equipment for Discharge: Pt owns needed device  PT/OT ADL Equipment for Discharge: toilet riser with armrests, pt owns sock-aid and reacher    Progress: progressing toward goals    • Skilled therapy is required to address these limitations in attempt to maximize the patient's independence.     • Personal Occupations Profile Affected: lower body dressing, toileting/toilet hygiene, functional mobility/transfers     • Clinical decision making: Low - Patient has few limitations (1-3), comorbidities and/or complexities, as noted in problem focused assessment noted above, that impact their occupational profile.  Resulting in few treatment options and no task modification consistent with low clinical decision making complexity.    Patient at End of Session:   Location: in chair  Safety measures: alarm system in place/re-engaged, call light within reach and equipment intact  Handoff to: nurse    PLAN  Interventions: ADL retraining, balance, activity tolerance training, use of adaptive equipment, functional transfer training and safety training  Agreement to plan and goals: patient agrees with goals and treatment plan      GOALS  Long Term Goals: (to be met by time of discharge from hospital)  Grooming: Patient will complete grooming tasks in standing and at sink independent.  Lower body dressing: Patient will complete lower body dressing modified independent.  Bathing: Patient will complete bathingmodified independent Toilet transfer: Patient will complete toilet transfer with modified independent.         Documented in the chart in the following areas: Prior Level of Function. Pain. Assessment. Plan.      Therapy procedure time and total treatment time can be found documented on the Time Entry flowsheet   100

## 2022-06-26 ENCOUNTER — TRANSCRIPTION ENCOUNTER (OUTPATIENT)
Age: 57
End: 2022-06-26

## 2022-06-27 ENCOUNTER — TRANSCRIPTION ENCOUNTER (OUTPATIENT)
Age: 57
End: 2022-06-27

## 2022-06-28 ENCOUNTER — APPOINTMENT (OUTPATIENT)
Dept: UROLOGY | Facility: CLINIC | Age: 57
End: 2022-06-28

## 2022-06-29 ENCOUNTER — NON-APPOINTMENT (OUTPATIENT)
Age: 57
End: 2022-06-29

## 2022-07-01 ENCOUNTER — NON-APPOINTMENT (OUTPATIENT)
Age: 57
End: 2022-07-01

## 2022-07-05 ENCOUNTER — APPOINTMENT (OUTPATIENT)
Dept: INTERNAL MEDICINE | Facility: CLINIC | Age: 57
End: 2022-07-05

## 2022-07-05 VITALS
SYSTOLIC BLOOD PRESSURE: 72 MMHG | RESPIRATION RATE: 16 BRPM | HEART RATE: 67 BPM | OXYGEN SATURATION: 97 % | TEMPERATURE: 98 F | DIASTOLIC BLOOD PRESSURE: 49 MMHG | HEIGHT: 59 IN

## 2022-07-05 VITALS
RESPIRATION RATE: 16 BRPM | OXYGEN SATURATION: 97 % | SYSTOLIC BLOOD PRESSURE: 100 MMHG | TEMPERATURE: 98 F | HEART RATE: 67 BPM | BODY MASS INDEX: 32.66 KG/M2 | DIASTOLIC BLOOD PRESSURE: 70 MMHG | WEIGHT: 162 LBS | HEIGHT: 59 IN

## 2022-07-05 LAB — GLUCOSE BLDC GLUCOMTR-MCNC: 242

## 2022-07-05 PROCEDURE — 99496 TRANSJ CARE MGMT HIGH F2F 7D: CPT

## 2022-07-05 PROCEDURE — 82962 GLUCOSE BLOOD TEST: CPT

## 2022-07-05 NOTE — HEALTH RISK ASSESSMENT
[Intercurrent hospitalizations] : was admitted to the hospital  [de-identified] : As documented in a message section. [de-identified] : None [[id number]] : 0

## 2022-07-05 NOTE — DISCUSSION/SUMMARY
[Discharge summary reviewed by healthcare provider] : Discharge summary reviewed by healthcare provider. [Call back completed] : Call back completed. [Home] : home [Patient] : patient [Discharge Medication List] : The discharge medication list was reviewed [Health care provider reviewed patient's current health status/signs/symptoms during this call] : Health care provider reviewed patient's current health status/signs/symptoms during this call [Health care provider addressed questions and concerns regarding patient's health care plan] : Health care provider addressed questions and concerns regarding patient's health care plan [Home care needed] : Home care not needed [FreeTextEntry1] : [facility] [FreeTextEntry2] : 06/29/2022 [FreeTextEntry3] : 06/30/2022 [FreeTextEntry4] : Low Back Pain [FreeTextEntry7] : Pt states she went to ED for low back pain- MRI showed inflammation of the spine. Gave her medication for inflammation- pt does not remember the name or dosage of medication. States she was discharged late last night and pharmacy will be delivering the medications to her house today. No changes to existing medications. Pt does not have anyone living with her- states she does not need any assistance at home. States she has had no appetite- RN informed pt that she should try to eat and drink even if she is no hungry. Pt states she is able to walk around house without pain, but sitting is painful. RN instructed pt to sit in reclining position or place pillow under lower back to remove pressure from effected area. Pt verbalized understanding. On 07/06/2022 pt has apt with [facility] neurosurgeon. Pt states she would be happy to come see Dr. Sales for HFU.  will be tasked to schedule. Pt has no other questions or concerns for RN at this time.  [FreeTextEntry8] : as per pt

## 2022-07-05 NOTE — HISTORY OF PRESENT ILLNESS
[FreeTextEntry2] : As documented in a message section.\par  [de-identified] : 57 year old  female patient with history of stable  Hypertension, Asthma, GERD, Major Depression in partial remission, Hypercholesterolemia with Hypertriglyceridemia, Hypothyroidism, Reactive Airway Disease, SLE, history as stated, presented for follow up examination after hospital discharge. Patient is compliant with all medications. Denies shortness of breath, chest pain or abdominal pains at this time. ROS as stated.\par

## 2022-07-05 NOTE — ASSESSMENT
[FreeTextEntry1] : 57 year old female found to have stable Hypertension, GERD, Major Depression in partial remission, Hypercholesterolemia with Hypertriglyceridemia, Hypothyroidism, Reactive Airway Disease, SLE,with the current prescription regimen as recommended, diet and life style modifications, as counseled. Prior results reviewed, interpreted and discussed with the patient during today's examination, as appropriate. Follow up, treatment plan and tests, as ordered.\par \par Patient was recently hospitalized, findings and recommendations reviewed and discussed with the patient during today's examination.\par \par FSBS - 242 noted and discussed with the patient.

## 2022-07-05 NOTE — PLAN
Raina Carrera [Patient/representative verbalized importance of medical follow up with PCP/other specialist after hospital discharge] : Patient/representative verbalized importance of medical follow up with PCP/other specialist after hospital discharge [To follow up with specialist] : To follow up with specialist [Specialist appointment scheduled] : Specialist appointment scheduled [FreeTextEntry2] : [facility] Neurosurgeon 07/06/2022\par  tasked to schedule HFU with Dr. Sales

## 2022-07-05 NOTE — PLAN
[Patient/representative verbalized importance of medical follow up with PCP/other specialist after hospital discharge] : Patient/representative verbalized importance of medical follow up with PCP/other specialist after hospital discharge [To follow up with specialist] : To follow up with specialist [Specialist appointment scheduled] : Specialist appointment scheduled [FreeTextEntry2] : [facility] Neurosurgeon 07/06/2022\par  tasked to schedule HFU with Dr. Sales

## 2022-07-05 NOTE — DISCUSSION/SUMMARY
[Discharge summary reviewed by healthcare provider] : Discharge summary reviewed by healthcare provider. [Call back completed] : Call back completed. [Home] : home [Patient] : patient [Discharge Medication List] : The discharge medication list was reviewed [Health care provider reviewed patient's current health status/signs/symptoms during this call] : Health care provider reviewed patient's current health status/signs/symptoms during this call [Health care provider addressed questions and concerns regarding patient's health care plan] : Health care provider addressed questions and concerns regarding patient's health care plan [Home care needed] : Home care not needed [FreeTextEntry1] : [facility] [FreeTextEntry2] : 06/29/2022 [FreeTextEntry3] : 06/30/2022 [FreeTextEntry4] : Low Back Pain [FreeTextEntry7] : Pt states she went to ED for low back pain- MRI showed inflammation of the spine. Gave her medication for inflammation- pt does not remember the name or dosage of medication. States she was discharged late last night and pharmacy will be delivering the medications to her house today. No changes to existing medications. Pt does not have anyone living with her- states she does not need any assistance at home. States she has had no appetite- RN informed pt that she should try to eat and drink even if she is no hungry. Pt states she is able to walk around house without pain, but sitting is painful. RN instructed pt to sit in reclining position or place pillow under lower back to remove pressure from effected area. Pt verbalized understanding. On 07/06/2022 pt has apt with [facility] neurosurgeon. Pt states she would be happy to come see Dr. Sales for HFU.  will be tasked to schedule. Pt has no other questions or concerns for RN at this time.  [FreeTextEntry8] : as per pt

## 2022-07-05 NOTE — CURRENT MEDS
[Takes medication as prescribed] : takes [None] : Patient does not have any barriers to medication adherence [The provider in the hospital prescribed new medication(s)] : The provider in the hospital prescribed new medication(s) [Patient has all prescribed medications] : Patient has all prescribed medications [FreeTextEntry2] : Inflammation Reducing Medication- pt does not know name or dose [FreeTextEntry3] : Pharmacy delivering today 07/01/2022

## 2022-07-05 NOTE — HEALTH RISK ASSESSMENT
[Intercurrent hospitalizations] : was admitted to the hospital  [de-identified] : As documented in a message section. [de-identified] : None [[id number]] : 0

## 2022-07-12 ENCOUNTER — LABORATORY RESULT (OUTPATIENT)
Age: 57
End: 2022-07-12

## 2022-07-12 ENCOUNTER — APPOINTMENT (OUTPATIENT)
Dept: INTERNAL MEDICINE | Facility: CLINIC | Age: 57
End: 2022-07-12

## 2022-07-12 VITALS
TEMPERATURE: 98 F | OXYGEN SATURATION: 94 % | DIASTOLIC BLOOD PRESSURE: 90 MMHG | BODY MASS INDEX: 32.66 KG/M2 | HEIGHT: 59 IN | SYSTOLIC BLOOD PRESSURE: 138 MMHG | HEART RATE: 80 BPM | WEIGHT: 162 LBS | RESPIRATION RATE: 16 BRPM

## 2022-07-12 DIAGNOSIS — G43.909 MIGRAINE, UNSPECIFIED, NOT INTRACTABLE, W/OUT STATUS MIGRAINOSUS: ICD-10-CM

## 2022-07-12 DIAGNOSIS — Z87.898 PERSONAL HISTORY OF OTHER SPECIFIED CONDITIONS: ICD-10-CM

## 2022-07-12 PROCEDURE — 99214 OFFICE O/P EST MOD 30 MIN: CPT

## 2022-07-12 RX ORDER — DICYCLOMINE HYDROCHLORIDE 10 MG/1
10 CAPSULE ORAL 3 TIMES DAILY
Qty: 90 | Refills: 3 | Status: DISCONTINUED | COMMUNITY
Start: 2021-08-23 | End: 2022-07-12

## 2022-07-12 RX ORDER — PREGABALIN 50 MG/1
50 CAPSULE ORAL TWICE DAILY
Refills: 0 | Status: DISCONTINUED | COMMUNITY
End: 2022-07-12

## 2022-07-12 RX ORDER — DICYCLOMINE HYDROCHLORIDE 10 MG/1
10 CAPSULE ORAL 3 TIMES DAILY
Qty: 90 | Refills: 3 | Status: DISCONTINUED | COMMUNITY
Start: 2022-03-25 | End: 2022-07-12

## 2022-07-12 RX ORDER — TIZANIDINE 2 MG/1
2 TABLET ORAL
Refills: 0 | Status: DISCONTINUED | COMMUNITY
End: 2022-07-12

## 2022-07-12 RX ORDER — PANTOPRAZOLE 40 MG/1
40 TABLET, DELAYED RELEASE ORAL DAILY
Qty: 30 | Refills: 3 | Status: DISCONTINUED | COMMUNITY
Start: 2021-11-19 | End: 2022-07-12

## 2022-07-12 RX ORDER — PROMETHAZINE HYDROCHLORIDE 6.25 MG/5ML
6.25 SOLUTION ORAL
Qty: 210 | Refills: 0 | Status: DISCONTINUED | COMMUNITY
Start: 2022-02-14 | End: 2022-07-12

## 2022-07-12 RX ORDER — DOXYCYCLINE HYCLATE 100 MG/1
100 CAPSULE ORAL
Qty: 14 | Refills: 0 | Status: DISCONTINUED | COMMUNITY
Start: 2019-12-02 | End: 2022-07-12

## 2022-07-12 RX ORDER — PROMETHAZINE HYDROCHLORIDE AND DEXTROMETHORPHAN HYDROBROMIDE ORAL SOLUTION 15; 6.25 MG/5ML; MG/5ML
6.25-15 SOLUTION ORAL
Qty: 210 | Refills: 2 | Status: DISCONTINUED | COMMUNITY
Start: 2019-12-02 | End: 2022-07-12

## 2022-07-12 RX ORDER — MELOXICAM 15 MG/1
15 TABLET ORAL
Qty: 30 | Refills: 5 | Status: DISCONTINUED | COMMUNITY
Start: 2018-08-17 | End: 2022-07-12

## 2022-07-12 RX ORDER — METHOTREXATE 15 MG/.4ML
15 INJECTION, SOLUTION SUBCUTANEOUS
Qty: 1 | Refills: 3 | Status: DISCONTINUED | COMMUNITY
Start: 2020-12-08 | End: 2022-07-12

## 2022-07-12 RX ORDER — TOBRAMYCIN AND DEXAMETHASONE 3; 1 MG/ML; MG/ML
0.3-0.1 SUSPENSION/ DROPS OPHTHALMIC
Qty: 1 | Refills: 1 | Status: DISCONTINUED | COMMUNITY
Start: 2022-02-17 | End: 2022-07-12

## 2022-07-12 RX ORDER — DICLOFENAC SODIUM 75 MG/1
75 TABLET, DELAYED RELEASE ORAL
Qty: 30 | Refills: 0 | Status: DISCONTINUED | COMMUNITY
Start: 2022-06-16 | End: 2022-07-12

## 2022-07-12 RX ORDER — TRAMADOL HYDROCHLORIDE 50 MG/1
50 TABLET, COATED ORAL
Qty: 14 | Refills: 0 | Status: DISCONTINUED | COMMUNITY
Start: 2022-06-10 | End: 2022-07-12

## 2022-07-12 RX ORDER — DICLOFENAC SODIUM 50 MG/1
50 TABLET, DELAYED RELEASE ORAL
Qty: 60 | Refills: 1 | Status: DISCONTINUED | COMMUNITY
Start: 2019-08-26 | End: 2022-07-12

## 2022-07-14 LAB
ABO + RH PNL BLD: NORMAL
ALBUMIN SERPL ELPH-MCNC: 4.9 G/DL
ALP BLD-CCNC: 85 U/L
ALT SERPL-CCNC: 40 U/L
ANION GAP SERPL CALC-SCNC: 10 MMOL/L
APPEARANCE: CLEAR
APTT BLD: 29.1 SEC
AST SERPL-CCNC: 39 U/L
BASOPHILS # BLD AUTO: 0.05 K/UL
BASOPHILS NFR BLD AUTO: 1.3 %
BILIRUB SERPL-MCNC: 0.3 MG/DL
BILIRUBIN URINE: NEGATIVE
BLOOD URINE: NEGATIVE
BUN SERPL-MCNC: 18 MG/DL
CALCIUM SERPL-MCNC: 9 MG/DL
CHLORIDE SERPL-SCNC: 103 MMOL/L
CO2 SERPL-SCNC: 27 MMOL/L
COLOR: YELLOW
CREAT SERPL-MCNC: 0.73 MG/DL
EGFR: 95 ML/MIN/1.73M2
EOSINOPHIL # BLD AUTO: 0.13 K/UL
EOSINOPHIL NFR BLD AUTO: 3.3 %
GGT SERPL-CCNC: 54 U/L
GLUCOSE QUALITATIVE U: NEGATIVE
GLUCOSE SERPL-MCNC: 74 MG/DL
HCT VFR BLD CALC: 35.3 %
HGB BLD-MCNC: 11 G/DL
IMM GRANULOCYTES NFR BLD AUTO: 0 %
INR PPP: 0.97 RATIO
KETONES URINE: NEGATIVE
LEUKOCYTE ESTERASE URINE: NEGATIVE
LYMPHOCYTES # BLD AUTO: 1.56 K/UL
LYMPHOCYTES NFR BLD AUTO: 40 %
MAN DIFF?: NORMAL
MCHC RBC-ENTMCNC: 31.2 GM/DL
MCHC RBC-ENTMCNC: 32 PG
MCV RBC AUTO: 102.6 FL
MONOCYTES # BLD AUTO: 0.38 K/UL
MONOCYTES NFR BLD AUTO: 9.7 %
NEUTROPHILS # BLD AUTO: 1.78 K/UL
NEUTROPHILS NFR BLD AUTO: 45.7 %
NITRITE URINE: NEGATIVE
PH URINE: 6.5
PLATELET # BLD AUTO: 384 K/UL
POTASSIUM SERPL-SCNC: 4.5 MMOL/L
PROT SERPL-MCNC: 7.2 G/DL
PROTEIN URINE: ABNORMAL
PT BLD: 11.2 SEC
RBC # BLD: 3.44 M/UL
RBC # FLD: 12.4 %
SODIUM SERPL-SCNC: 141 MMOL/L
SPECIFIC GRAVITY URINE: 1.03
T3 SERPL-MCNC: 89 NG/DL
T4 FREE SERPL-MCNC: 1.8 NG/DL
TSH SERPL-ACNC: 0.84 UIU/ML
UROBILINOGEN URINE: NORMAL
WBC # FLD AUTO: 3.9 K/UL

## 2022-07-14 NOTE — ADDENDUM
[FreeTextEntry1] : PST results from 7/12/22 noted, discussed with the patient.\par \par Patient is medically optimized to the best at this time for the stated intervention.\par Patient is medically cleared.\par \par \par \par

## 2022-07-14 NOTE — HISTORY OF PRESENT ILLNESS
[No Pertinent Cardiac History] : no history of aortic stenosis, atrial fibrillation, coronary artery disease, recent myocardial infarction, or implantable device/pacemaker [Asthma] : asthma [No Adverse Anesthesia Reaction] : no adverse anesthesia reaction in self or family member [Chronic Anticoagulation] : chronic anticoagulation [(Patient denies any chest pain, claudication, dyspnea on exertion, orthopnea, palpitations or syncope)] : Patient denies any chest pain, claudication, dyspnea on exertion, orthopnea, palpitations or syncope [COPD] : no COPD [Sleep Apnea] : no sleep apnea [Smoker] : not a smoker [Chronic Kidney Disease] : no chronic kidney disease [Diabetes] : no diabetes [FreeTextEntry1] : Lumbar Spine Sx, as per Surgeon [FreeTextEntry2] : 08/01/2022 [FreeTextEntry3] : Dr. Danny Cabral [FreeTextEntry4] : 57 year old female with history of stable Hypertension, GERD, Major Depression in partial remission, Hypercholesterolemia with Hypertriglyceridemia, Hypothyroidism, Asthma, SLE, Migraine, history as stated, presented for clearance evaluation prior to possible Lumbar Spine Sx, for Lumbar Radiculopathy, as per Surgeon\par

## 2022-07-14 NOTE — HEALTH RISK ASSESSMENT
[Intercurrent hospitalizations] : was admitted to the hospital  [Never] : Never [No] : In the past 12 months have you used drugs other than those required for medical reasons? No [0] : 2) Feeling down, depressed, or hopeless: Not at all (0) [de-identified] : As documented in a message section. [de-identified] : None [[id number]] : 0

## 2022-07-14 NOTE — ASSESSMENT
[Procedure Intermediate Risk] : the procedure risk is intermediate [Patient Intermediate Risk] : the patient is an intermediate risk [Optimized for Surgery Pending Laboratory Results] : the patient is optimized for surgery pending laboratory results [As per surgery] : as per surgery [FreeTextEntry3] : To stop Aspirin 1 week prior to surgical intervention, and may be restarted as per Surgeon, as counseled. [FreeTextEntry4] : 57 year old female found to have stable Hypertension, GERD, Major Depression in partial remission, Hypercholesterolemia with Hypertriglyceridemia, Hypothyroidism, Asthma, SLE, Migraine,with the current prescription regimen as recommended, diet and life style modifications, as counseled. Prior results reviewed, interpreted and discussed with the patient during today's examination, as appropriate. Follow up, treatment plan and tests, as ordered.\par \par Possible Lumbar Spine Sx, for Lumbar Radiculopathy, as per Surgeon.\par \par EKG from June 13, 2022 showed NSR at the rate of 65 BPM, known RBBB, no new ST-T changes noted.\par Updated Cardiology Clearance from June 13, 2022 noted and discussed with the Cardiologist during today's examination.\par \par Total time spent : 30 minutes\par Including:\par Preparation prior to visit - Reviewing prior record, results of tests and Consultation Reports as applicable\par Conducting an appropriate H & P during today's encounter\par Appropriate orders for tests, medications and procedures, as applicable\par Counseling patient \par Note completion\par

## 2022-07-20 ENCOUNTER — NON-APPOINTMENT (OUTPATIENT)
Age: 57
End: 2022-07-20

## 2022-07-26 ENCOUNTER — APPOINTMENT (OUTPATIENT)
Dept: RHEUMATOLOGY | Facility: CLINIC | Age: 57
End: 2022-07-26

## 2022-08-02 ENCOUNTER — NON-APPOINTMENT (OUTPATIENT)
Age: 57
End: 2022-08-02

## 2022-08-12 ENCOUNTER — NON-APPOINTMENT (OUTPATIENT)
Age: 57
End: 2022-08-12

## 2022-08-30 ENCOUNTER — APPOINTMENT (OUTPATIENT)
Dept: INTERNAL MEDICINE | Facility: CLINIC | Age: 57
End: 2022-08-30

## 2022-08-30 VITALS
TEMPERATURE: 98 F | WEIGHT: 169 LBS | RESPIRATION RATE: 16 BRPM | HEART RATE: 67 BPM | OXYGEN SATURATION: 99 % | SYSTOLIC BLOOD PRESSURE: 110 MMHG | BODY MASS INDEX: 34.07 KG/M2 | HEIGHT: 59 IN | DIASTOLIC BLOOD PRESSURE: 69 MMHG

## 2022-08-30 PROCEDURE — 99214 OFFICE O/P EST MOD 30 MIN: CPT

## 2022-08-30 NOTE — HISTORY OF PRESENT ILLNESS
[de-identified] : 57 year old  female patient with history of stable Hypertension, Asthma, GERD, Major Depression in partial remission, Hypercholesterolemia with Hypertriglyceridemia, Hypothyroidism, Reactive Airway Disease, SLE, history as stated, presented for follow up examination. Patient is compliant with all medications. ROS as stated.\par

## 2022-08-30 NOTE — ASSESSMENT
[FreeTextEntry1] : 57 year old female found to have stable Hypertension, Asthma, GERD, Major Depression in partial remission, Hypercholesterolemia with Hypertriglyceridemia, Hypothyroidism, Reactive Airway Disease, SLE,with the current prescription regimen as recommended, diet and life style modifications, as counseled. Prior results reviewed, interpreted and discussed with the patient during today's examination, as appropriate. Follow up, treatment plan and tests, as ordered.\par \par Patient was recently hospitalized, findings and recommendations reviewed and discussed with the patient during today's examination.\par \par Total time spent : 30 minutes\par Including:\par Preparation prior to visit - Reviewing prior record, results of tests and Consultation Reports as applicable\par Conducting an appropriate H & P during today's encounter\par Appropriate orders for tests, medications and procedures, as applicable\par Counseling patient \par Note completion\par

## 2022-08-30 NOTE — PHYSICAL EXAM
[de-identified] : Post op wounds are healing well, left lower abdominal wall area wound with some induration, diffuse tenderness and increased warmth noted during today's examination.

## 2022-09-02 DIAGNOSIS — Z87.898 PERSONAL HISTORY OF OTHER SPECIFIED CONDITIONS: ICD-10-CM

## 2022-09-02 DIAGNOSIS — R42 DIZZINESS AND GIDDINESS: ICD-10-CM

## 2022-09-03 ENCOUNTER — NON-APPOINTMENT (OUTPATIENT)
Age: 57
End: 2022-09-03

## 2022-09-12 ENCOUNTER — APPOINTMENT (OUTPATIENT)
Dept: CARDIOLOGY | Facility: CLINIC | Age: 57
End: 2022-09-12

## 2022-09-12 ENCOUNTER — NON-APPOINTMENT (OUTPATIENT)
Age: 57
End: 2022-09-12

## 2022-09-12 VITALS
DIASTOLIC BLOOD PRESSURE: 82 MMHG | OXYGEN SATURATION: 99 % | TEMPERATURE: 97.5 F | HEIGHT: 59 IN | HEART RATE: 74 BPM | BODY MASS INDEX: 33.87 KG/M2 | RESPIRATION RATE: 18 BRPM | SYSTOLIC BLOOD PRESSURE: 122 MMHG | WEIGHT: 168 LBS

## 2022-09-12 DIAGNOSIS — I63.9 CEREBRAL INFARCTION, UNSPECIFIED: ICD-10-CM

## 2022-09-12 PROCEDURE — 93000 ELECTROCARDIOGRAM COMPLETE: CPT

## 2022-09-12 PROCEDURE — 99214 OFFICE O/P EST MOD 30 MIN: CPT | Mod: 25

## 2022-09-16 ENCOUNTER — NON-APPOINTMENT (OUTPATIENT)
Age: 57
End: 2022-09-16

## 2022-09-19 ENCOUNTER — NON-APPOINTMENT (OUTPATIENT)
Age: 57
End: 2022-09-19

## 2022-09-21 NOTE — ED ADULT NURSE NOTE - PAIN: PRESENCE, MLM
10 y o  male   Chief complaint:   Chief Complaint   Patient presents with    Left Wrist - Follow-up       HPI:  Here for left wrist follow-up following a left nondisplaced scaphoid wrist fracture sustained on 08/17/2022 playing football  Past Medical History:   Diagnosis Date    Heart murmur      History reviewed  No pertinent surgical history  History reviewed  No pertinent family history  Social History     Socioeconomic History    Marital status: Single     Spouse name: Not on file    Number of children: Not on file    Years of education: Not on file    Highest education level: Not on file   Occupational History    Not on file   Tobacco Use    Smoking status: Never Smoker    Smokeless tobacco: Never Used   Vaping Use    Vaping Use: Never used   Substance and Sexual Activity    Alcohol use: Never    Drug use: Never    Sexual activity: Not on file   Other Topics Concern    Not on file   Social History Narrative    Not on file     Social Determinants of Health     Financial Resource Strain: Not on file   Food Insecurity: Not on file   Transportation Needs: Not on file   Physical Activity: Not on file   Stress: Not on file   Intimate Partner Violence: Not on file   Housing Stability: Not on file     No current outpatient medications on file  No current facility-administered medications for this visit  Patient has no known allergies  Patient's medications, allergies, past medical, surgical, social and family histories were reviewed and updated as appropriate  Unless otherwise noted above, past medical history, family history, and social history are noncontributory  Patient's caretaker was present and provided pertinent history  I personally reviewed all images and discussed them with the caretaker  All plans outlined below were discussed with the patient's caretaker present for this visit      Review of Systems:  Constitutional: no chills  Respiratory: no chest pain  Cardio: no syncope  GI: no abdominal pain  : no urinary continence  Neuro: no headaches  Psych: no anxiety  Skin: no rash  MS: except as noted in HPI and chief complaint  Allergic/immunology: no contact dermatitis    Physical Exam:  Blood pressure 114/70, pulse 66, height 5' 10" (1 778 m), weight 68 kg (150 lb)  Constitutional: Patient is cooperative  Does not have a sickly appearance  Does not appear ill  No distress  Head: Atraumatic  Eyes: Conjunctivae are normal    Cardiovascular: 2+ radial pulses bilaterally with brisk cap refill of all fingers  Pulmonary/Chest: Effort normal  No stridor  Abdomen: soft NT/ND  Skin: Skin is warm and dry  No rash noted  No erythema  No skin breakdown  Psychiatric: mood/affect appropriate, behavior is normal     left wrist -  Patient presents with no obvious anatomical deformity  Skin is warm and dry to touch with no signs of erythema, ecchymosis, infection  No TTP  MMT: deferred  No soft tissue swelling or effusion noted  Full FDS, FDP, extensor mechanisms are intact  Demonstrates elbow, shoulder or motion  Forearm compartments are soft and supple  2+ Distal radial pulse with brisk capillary refill to the fingers  Radial, median, ulnar motor and sensory distribution intact  Sensation to light touch intact distally      Studies reviewed:  Xr wrist 3+ vw left: Routine healing of left nondisplaced scaphoid wrist fracture    Impression:  Nondisplaced left scaphoid wrist fracture    Plan:  Patient's caretaker was present and provided pertinent history  I personally reviewed all images and discussed them with the caretaker  All plans outlined below were discussed with the patient's caretaker present for this visit  Treatment options were discussed in detail   After considering all various options, the plan will include:    Cast removal today  CT scan of left upper extremity  School/sport note provided to patient/mother at the time of visit  May return to football  Will return when CT scan is complete      This document was created using speech voice recognition software  Grammatical errors, random word insertions, pronoun errors, and incomplete sentences are an occasional consequence of this system due to software limitations, ambient noise, and hardware issues  Any formal questions or concerns about content, text, or information contained within the body of this dictation should be directly addressed to the provider for clarification  complains of pain/discomfort

## 2022-09-26 ENCOUNTER — APPOINTMENT (OUTPATIENT)
Dept: GASTROENTEROLOGY | Facility: CLINIC | Age: 57
End: 2022-09-26

## 2022-09-26 VITALS
HEIGHT: 59 IN | HEART RATE: 70 BPM | OXYGEN SATURATION: 100 % | SYSTOLIC BLOOD PRESSURE: 143 MMHG | BODY MASS INDEX: 33.87 KG/M2 | WEIGHT: 168 LBS | DIASTOLIC BLOOD PRESSURE: 94 MMHG

## 2022-09-26 DIAGNOSIS — D64.9 ANEMIA, UNSPECIFIED: ICD-10-CM

## 2022-09-26 PROCEDURE — 99214 OFFICE O/P EST MOD 30 MIN: CPT

## 2022-09-26 NOTE — ASSESSMENT
[FreeTextEntry1] : Abdominal Pain: The patient complains of abdominal pain. The patient is to avoid nonsteroidal anti-inflammatory drugs and aspirin.  I recommend a low FOD-MAP diet.  I recommend a trial of Dicyclomine 10 mg tablet PO 3 times a day PRN for the abdominal pain.\par Dyspepsia: The patient complains of dyspeptic symptoms.  The patient was advised to continue to abide by an anti-gas (low FOD-MAP) diet.  The patient was previously given a pamphlet for anti-gas (low FOD-MAP).  The patient and I reviewed the anti-gas (low FOD-MAP)diet at length again. The patient is to continue on a trial of Simethicone one tablet 4 times a day p.r.n. abdominal pain and gas.\par Gastroparesis: The patient had an upper endoscopy performed at the [facility] [place] GI endoscopy suite on February 20, 2020. The upper endoscopy revealed a small hiatal hernia and mild diffuse gastritis with retained food and debris in the stomach suggestive of gastroparesis. The gastroparesis may be contributing to the abdominal pain. The patient is currently on medications that can contribute to the gastroparesis. I recommend a gastroparesis diet with smaller but more frequent meals. The patient can have a liquid and puree diet and avoid foods high in fats and carbohydrates. The patient was also advised to avoid high fiber diet. If the symptoms persist, the patient may require a trial of a promotility agent for the gastroparesis such as a trial of Reglan (metoclopramide) 5 mg 4 times a day. The patient and I had a long discussion regarding the risks of potential side effects of Reglan (metoclopramide). The patient was told of the risk of headaches, dizziness, diarrhea and permanent tremors. The patient was told to stop the medication immediately and call the office if any of these symptoms occur. The patient agrees and will call the office regarding her symptoms.\par History of Diverticulitis: The patient had a prior history of acute diverticulitis. The patient may require an emergency room evaluation for diverticulitis if the symptoms recur. The patient may require treatment with antibiotics if the symptoms recur. I recommend a low residue diet. The CAT scan of the abdomen and pelvis with IV contrast performed on December 16, 2019 revealed heterogeneous attenuation in the external iliac and common femoral veins is likely related to mixing artifact. Also noted was constipation, sigmoid diverticulosis without diverticulitis, trace bilateral hydronephrosis likely related to distended urinary bladder. The patient was advised to go to the hospital if fever, chills, worsening abdominal pain or GI bleeding recurs. The patient agrees.\par Abnormal Imaging Study: The CAT scan of the abdomen and pelvis with IV contrast performed on December 16, 2019 revealed heterogeneous attenuation in the external iliac and common femoral veins is likely related to mixing artifact. Also noted was constipation, sigmoid diverticulosis without diverticulitis, trace bilateral hydronephrosis likely related to distended urinary bladder. The doppler ultrasound of the lower extremities performed on December 27, 2019 revealed no evidence of deep venous thrombosis in either lower extremity. \par Hiatal Hernia: The patient was advised to avoid late-night meals and dietary indiscretions. The patient was advised to avoid fried and fatty foods. The patient was advised to abide by an anti-GERD diet. The patient was given a pamphlet for anti-GERD. The patient and I reviewed the anti-GERD diet at length. The patient had no improvement on a trial of Carafate. I recommend restarting on a trial of Pantoprazole 40 mg once a day x 3 months for the symptoms.\par Gastritis: The patient has a history of gastritis. The patient is to avoid nonsteroidal anti-inflammatory drugs and aspirin. The patient previously had no improvement on a trial of Carafate. I recommend continue on a trial of Pantoprazole 40 mg once a day x 3 months for the symptoms.\par Irritable Bowel Syndrome: The patient has symptoms suggestive of irritable bowel syndrome. The patient was advised to follow a low FOD-MAP diet for the irritable bowel syndrome. I recommend continue on a trial of Dicyclomine 10 mg three times a day for the abdominal pain and constipation. \par Diverticulosis: I recommend a low residue diet and avoid seeds. The patient is to consider a trial of Metamucil once a day for fiber supplementation. The patient is to also consider a trial of a probiotic such as Align once a day. \par Internal Hemorrhoids: The patient is to consider a trial of Anusol H. C. suppositories one per rectum nightly and Anusol HC2.5% cream apply to affected area twice a day p.r.n. hemorrhoidal bleeding or pain. \par History of Occult Blood in the Stool: The patient was previously found to have occult blood in the stool. The patient denies any bright red blood per rectum, melena or hematemesis. The patient was previously found to be guaiac-positive by her PMD. \par Anemia: The blood work performed on June 16, 2021 revealed mild anemia with hemoglobin/hematocrit level of 9.6/32.6, respectively, elevated platelet count of 444,000, an elevated TIBC/UIBC of 452/407 ug/dl, respectively, a low iron % saturation of 10%, a normal Ferritin level of 20 ng/ml. I recommend obtaining the recent blood work performed by her hematologist. \par Blood Work: I recommend blood work to assess the patient's symptoms. I recommend a CBC, SMA 24, amylase, lipase, ESR, TFTs, YOKO, rheumatoid factor, celiac sprue panel, IgA, profile for hepatitis A, B, C. ,iron, TIBC, ferritin level and lipid profile.  I also recommend obtaining the recent blood work performed by the patient's PMD.\par \par Follow-up: The patient is to follow-up in the office in 6 months to reassess the symptoms. The patient was told to call the office if any further problems.\par

## 2022-09-26 NOTE — HISTORY OF PRESENT ILLNESS
[FreeTextEntry1] : The colonoscopy to the terminal ileum performed at the [facility] at [place] GI endoscopy suite on February 13, 2020.revealed moderate left sided diverticulosis.  There were no polyps, masses, AVMs or colitis noted. The pathology revealed unremarkable small intestinal mucosa with preserved villous pattern and lymphocytic aggregates (terminal ileum), patchy mild colitis without evidence of architectural changes or evidence of chronicity (cecum) and benign colonic mucosa with focal hyperplastic change (rectum). [de-identified] : The CAT scan of the chest, abdomen and pelvis with IV contrast performed on February 17, 2020 revealed no evidence of an aortic dissection.\par The CAT scan of the abdomen and pelvis with IV contrast performed on December 16, 2019 revealed heterogeneous attenuation in the external iliac and common femoral veins is likely related to mixing artifact. Also noted was constipation, sigmoid diverticulosis without diverticulitis, trace bilateral hydronephrosis likely related to distended urinary bladder. The doppler ultrasound of the lower extremities performed on December 27, 2019 revealed no evidence of deep venous thrombosis in either lower extremity.  [de-identified] : The abdominal ultrasound performed on February 19, 2020 revealed no gallstones, gallbladder wall thickening or pericholecystic fluid. \par The abdominal ultrasound performed on October 18, 2019 revealed no gallstones or pericholecystic fluid. The contracted state of the gallbladder limits assessment for gallbladder wall thickening. There is no evidence for intrahepatic or extrahepatic biliary duct dilatation. The pelvic ultrasound performed on October 18, 2019 revealed no evidence for an ovarian lesion.

## 2022-09-27 ENCOUNTER — NON-APPOINTMENT (OUTPATIENT)
Age: 57
End: 2022-09-27

## 2022-09-27 LAB
ALBUMIN SERPL ELPH-MCNC: 4.9 G/DL
ALP BLD-CCNC: 133 U/L
ALT SERPL-CCNC: 17 U/L
AMYLASE/CREAT SERPL: 64 U/L
ANION GAP SERPL CALC-SCNC: 15 MMOL/L
AST SERPL-CCNC: 23 U/L
BASOPHILS # BLD AUTO: 0.05 K/UL
BASOPHILS NFR BLD AUTO: 1.5 %
BILIRUB SERPL-MCNC: <0.2 MG/DL
BUN SERPL-MCNC: 13 MG/DL
CALCIUM SERPL-MCNC: 9.4 MG/DL
CHLORIDE SERPL-SCNC: 102 MMOL/L
CHOLEST SERPL-MCNC: 159 MG/DL
CO2 SERPL-SCNC: 24 MMOL/L
CREAT SERPL-MCNC: 0.7 MG/DL
EGFR: 100 ML/MIN/1.73M2
EOSINOPHIL # BLD AUTO: 0.06 K/UL
EOSINOPHIL NFR BLD AUTO: 1.8 %
ERYTHROCYTE [SEDIMENTATION RATE] IN BLOOD BY WESTERGREN METHOD: 69 MM/HR
FERRITIN SERPL-MCNC: 39 NG/ML
FOLATE SERPL-MCNC: 15.8 NG/ML
GGT SERPL-CCNC: 51 U/L
GLIADIN IGA SER QL: 5.3 UNITS
GLIADIN IGG SER QL: <5 UNITS
GLIADIN PEPTIDE IGA SER-ACNC: NEGATIVE
GLIADIN PEPTIDE IGG SER-ACNC: NEGATIVE
GLUCOSE SERPL-MCNC: 144 MG/DL
HCT VFR BLD CALC: 35.6 %
HDLC SERPL-MCNC: 69 MG/DL
HGB BLD-MCNC: 10.6 G/DL
IGA SER QL IEP: 279 MG/DL
IMM GRANULOCYTES NFR BLD AUTO: 0 %
IRON SATN MFR SERPL: 9 %
IRON SERPL-MCNC: 33 UG/DL
LDLC SERPL CALC-MCNC: 69 MG/DL
LPL SERPL-CCNC: 30 U/L
LYMPHOCYTES # BLD AUTO: 1.15 K/UL
LYMPHOCYTES NFR BLD AUTO: 35.3 %
MAN DIFF?: NORMAL
MCHC RBC-ENTMCNC: 28.8 PG
MCHC RBC-ENTMCNC: 29.8 GM/DL
MCV RBC AUTO: 96.7 FL
MONOCYTES # BLD AUTO: 0.26 K/UL
MONOCYTES NFR BLD AUTO: 8 %
NEUTROPHILS # BLD AUTO: 1.74 K/UL
NEUTROPHILS NFR BLD AUTO: 53.4 %
NONHDLC SERPL-MCNC: 90 MG/DL
PLATELET # BLD AUTO: 482 K/UL
POTASSIUM SERPL-SCNC: 4.6 MMOL/L
PROT SERPL-MCNC: 7.6 G/DL
RBC # BLD: 3.68 M/UL
RBC # FLD: 14.1 %
RHEUMATOID FACT SER QL: 10 IU/ML
SODIUM SERPL-SCNC: 141 MMOL/L
TIBC SERPL-MCNC: 386 UG/DL
TRIGL SERPL-MCNC: 104 MG/DL
TTG IGA SER IA-ACNC: <1.2 U/ML
TTG IGA SER-ACNC: NEGATIVE
TTG IGG SER IA-ACNC: 1.6 U/ML
TTG IGG SER IA-ACNC: NEGATIVE
UIBC SERPL-MCNC: 352 UG/DL
VIT B12 SERPL-MCNC: 420 PG/ML
WBC # FLD AUTO: 3.26 K/UL

## 2022-09-29 ENCOUNTER — APPOINTMENT (OUTPATIENT)
Dept: ORTHOPEDIC SURGERY | Facility: CLINIC | Age: 57
End: 2022-09-29

## 2022-10-03 ENCOUNTER — APPOINTMENT (OUTPATIENT)
Dept: INTERNAL MEDICINE | Facility: CLINIC | Age: 57
End: 2022-10-03

## 2022-10-03 VITALS
OXYGEN SATURATION: 93 % | HEART RATE: 82 BPM | DIASTOLIC BLOOD PRESSURE: 85 MMHG | SYSTOLIC BLOOD PRESSURE: 145 MMHG | BODY MASS INDEX: 34.68 KG/M2 | WEIGHT: 172 LBS | HEIGHT: 59 IN | RESPIRATION RATE: 18 BRPM | TEMPERATURE: 97 F

## 2022-10-03 PROCEDURE — 99213 OFFICE O/P EST LOW 20 MIN: CPT | Mod: 25

## 2022-10-03 PROCEDURE — 90686 IIV4 VACC NO PRSV 0.5 ML IM: CPT

## 2022-10-03 PROCEDURE — G0008: CPT

## 2022-10-03 NOTE — ASSESSMENT
[FreeTextEntry1] : 57 year old female found to have stable Hypertension, Asthma, GERD, Major Depression in partial remission, Hypercholesterolemia with Hypertriglyceridemia, Hypothyroidism, Reactive Airway Disease, SLE,with the current prescription regimen as recommended, diet and life style modifications, as counseled. Prior results reviewed, interpreted and discussed with the patient during today's examination, as appropriate. Follow up, treatment plan and tests, as ordered.\par \par Total time spent : 30 minutes\par Including:\par Preparation prior to visit - Reviewing prior record, results of tests and Consultation Reports as applicable\par Conducting an appropriate H & P during today's encounter\par Appropriate orders for tests, medications and procedures, as applicable\par Counseling patient \par Note completion\par

## 2022-10-03 NOTE — HISTORY OF PRESENT ILLNESS
[de-identified] : 57 year old  female patient with history of stable Hypertension, Asthma, GERD, Major Depression in partial remission, Hypercholesterolemia with Hypertriglyceridemia, Hypothyroidism, Reactive Airway Disease, SLE, history as stated, presented for follow up examination. Patient is compliant with all medications. ROS as stated.\par

## 2022-10-03 NOTE — HEALTH RISK ASSESSMENT
[Never] : Never [No] : In the past 12 months have you used drugs other than those required for medical reasons? No [0] : 2) Feeling down, depressed, or hopeless: Not at all (0) [de-identified] : CARD/GI [[id number]] : 0

## 2022-11-17 ENCOUNTER — RX RENEWAL (OUTPATIENT)
Age: 57
End: 2022-11-17

## 2022-11-21 ENCOUNTER — APPOINTMENT (OUTPATIENT)
Dept: ORTHOPEDIC SURGERY | Facility: CLINIC | Age: 57
End: 2022-11-21

## 2022-11-21 VITALS
DIASTOLIC BLOOD PRESSURE: 89 MMHG | HEIGHT: 59 IN | HEART RATE: 89 BPM | SYSTOLIC BLOOD PRESSURE: 145 MMHG | BODY MASS INDEX: 34.68 KG/M2 | TEMPERATURE: 97.8 F | WEIGHT: 172 LBS | OXYGEN SATURATION: 86 %

## 2022-11-21 PROCEDURE — 73564 X-RAY EXAM KNEE 4 OR MORE: CPT | Mod: LT

## 2022-11-21 PROCEDURE — 99214 OFFICE O/P EST MOD 30 MIN: CPT

## 2022-11-21 PROCEDURE — 99204 OFFICE O/P NEW MOD 45 MIN: CPT

## 2022-11-21 RX ORDER — HYLAN G-F 20 16MG/2ML
48 SYRINGE (ML) INTRAARTICULAR
Qty: 1 | Refills: 1 | Status: ACTIVE | COMMUNITY
Start: 2022-11-21

## 2022-11-23 NOTE — HISTORY OF PRESENT ILLNESS
[de-identified] : 57 year old female presents today with bilateral knee pain x 6 months. She sustained a fall last week, but says it did not make her pain worse. The pain is constant worse with stair usage. She was under the care of Dr. Fischer and received ZARATE injection in the left knee in 2020 which gave her significant relief until recently. Has received no treatment on the right knee. Status post Left knee Arthroscopy on 3-26-19.\par \par The patient's past medical history, past surgical history, medications and allergies were reviewed by me today with the patient and documented accordingly. In addition, the patient's family and social history, which were noncontributory to this visit, were reviewed also.

## 2022-11-23 NOTE — DISCUSSION/SUMMARY
[de-identified] : 58 y/o female with bilateral knee osteoarthritis. \par \par Patient presents for evaluation of bilateral knee pain.  Symptoms are chronic, and consistent with early degenerative changes.  Patient is status post arthroscopy left knee with some post meniscectomy arthrosis seen on x-ray imaging.  I discussed the treatment of degenerative arthritis with the patient at length today, as well as the chronic degenerative process and likely future progression of the disease. Pain may be related to the bony degenerative changes seen on XR imaging, or the associated degeneration to the soft tissues within the knee joint, including cartilage, meniscus, or ligamentous structures.  I described the spectrum of treatment options available including nonoperative modalities to total joint arthroplasty. Noninvasive and nonoperative treatment modalities include weight reduction, activity modification with low impact exercise, PRN use of acetaminophen or anti-inflammatory medication, natural anti-inflammatory supplements, glucosamine/chondroitin, and physical therapy (for strengthening and conditioning). More invasive treatments can include injection therapies; including cortisone, hyaluronic acid (visco-supplementation), or platelet-rich-plasma (PRP) injections. Definitive treatment could also include future total joint arthroplasty if symptoms persist and cause prolonged disability or interference with activities of daily living. \par \par The risks and benefits of each treatment option was discussed and all questions were answered. At this time recommendations are for conservative and symptomatic care as detailed above with impact loading activity restriction, low impact exercise and avoidance of strenuous activity. \par \par Other recommendations include OTC NSAIDs or acetaminophen (if tolerated/able), with application of ice to the knee 2-3x daily for 20 minutes or after periods of activity. \par \par Recommend trial of Visco supplementation injection therapy. We'll obtain preauthorization prior to injection. Patient is not a candidate for intra-articular steroid injection or arthroplasty at this time. \par \par Followup: Once approved for HA injection therapy.

## 2022-11-23 NOTE — ADDENDUM
[FreeTextEntry1] : This note was written by Arlen Lynch on 11/21/2022 acting solely as a scribe for Dr. Kole Bartholomew.\par \par All medical record entries made by the Scribe were at my, Dr. Kole Bartholomew, direction and personally dictated by me on 11/21/2022. I have personally reviewed the chart and agree that the record accurately reflects my personal performance of the history, physical exam, assessment and plan.

## 2022-11-23 NOTE — PHYSICAL EXAM
[de-identified] : Oriented to time, place, person\par Mood: Normal\par Affect: Normal\par Appearance: Healthy, well appearing, no acute distress.\par Gait: Normal\par Assistive Devices: None\par \par Left Knee Exam:\par \par Skin: Clean, dry, intact\par Inspection: No obvious malalignment, no masses, + swelling pn lateral incisions, no effusion\par Pulses: 2+ DP/PT pulses \par ROM: 0-135 degrees of flexion. No pain with deep knee flexion/extension.\par Tenderness: + MJLT. + LJLT. No pain over the patella facets. No pain to the quadriceps tendon. No pain to the patella tendon. No posterior knee tenderness.\par Stability: Stable to varus, valgus. Negative Lachman testing. Negative anterior drawer, negative posterior drawer.\par Strength: 5/5 Q/H/TA/GS/EHL, without atrophy\par Neuro: Intact to light touch throughout, DTRs normal\par Additional Tests: Negative Genesis's test, Negative patellar grind test \par \par Right Knee Exam:\par \par Skin: Clean, dry, intact\par Inspection: No obvious malalignment, no masses, no swelling, no effusion\par Pulses: 2+ DP/PT pulses \par ROM: 0-135 degrees of flexion. No pain with deep knee flexion/extension.\par Tenderness: No MJLT. No LJLT. No pain over the patella facets. No pain to the quadriceps tendon. No pain to the patella tendon. No posterior knee tenderness.\par Stability: Stable to varus, valgus. Negative Lachman testing. Negative anterior drawer, negative posterior drawer.\par Strength: 5/5 Q/H/TA/GS/EHL, without atrophy\par Neuro: Intact to light touch throughout, DTRs normal\par Additional Tests: Negative Genesis's test, Negative patellar grind test  [de-identified] : The following radiographs were ordered and read by me during this patients visit. I reviewed each radiograph in detail with the patient and discussed the findings as highlighted below. \par \par 4 views of the right knee were obtained today, 11/21/2022, that show no acute fracture or dislocation. There is mild medial, no lateral and mild patellofemoral degenerative changes seen. There is no significant malalignment. No significant other obvious osseous abnormality, otherwise unremarkable. \par \par 4 views of the left knee were obtained today, 11/21/2022, that show no acute fracture or dislocation. There is mild medial, mild lateral and mild patellofemoral degenerative changes seen. There is no significant malalignment. No significant other obvious osseous abnormality, otherwise unremarkable.

## 2022-11-28 ENCOUNTER — APPOINTMENT (OUTPATIENT)
Dept: INTERNAL MEDICINE | Facility: CLINIC | Age: 57
End: 2022-11-28

## 2022-11-28 RX ORDER — INHALER, ASSIST DEVICES
SPACER (EA) MISCELLANEOUS
Qty: 1 | Refills: 0 | Status: ACTIVE | COMMUNITY
Start: 2022-11-28 | End: 1900-01-01

## 2022-11-28 RX ORDER — HYALURONATE SODIUM 20 MG/2 ML
20 SYRINGE (ML) INTRAARTICULAR
Qty: 3 | Refills: 1 | Status: ACTIVE | COMMUNITY
Start: 2022-11-28

## 2022-12-03 RX ORDER — ASPIRIN 81 MG
81 TABLET, DELAYED RELEASE (ENTERIC COATED) ORAL DAILY
Qty: 30 | Refills: 5 | Status: ACTIVE | COMMUNITY

## 2022-12-06 ENCOUNTER — APPOINTMENT (OUTPATIENT)
Dept: ORTHOPEDIC SURGERY | Facility: CLINIC | Age: 57
End: 2022-12-06

## 2022-12-06 PROCEDURE — 20610 DRAIN/INJ JOINT/BURSA W/O US: CPT | Mod: LT

## 2022-12-06 NOTE — PROCEDURE
[de-identified] : Injection: right knee joint.\par Indication: Osteoarthritis. \par \par A discussion was had with the patient regarding this procedure and all questions were answered. All risks, benefits and alternatives were discussed. These included but were not limited to bleeding, infection, and allergic reaction. Alcohol was used to clean the skin, and betadine was used to sterilize and prep the area in the supero-lateral aspect of the right knee. Ethyl chloride spray was then used as a topical anesthetic. A 21-gauge needle was used to inject Euflexxa into the knee. A sterile bandage was then applied. The patient tolerated the procedure well and there were no complications. \par \par Injection: left knee joint.\par Indication: Osteoarthritis. \par \par A discussion was had with the patient regarding this procedure and all questions were answered. All risks, benefits and alternatives were discussed. These included but were not limited to bleeding, infection, and allergic reaction. Alcohol was used to clean the skin, and betadine was used to sterilize and prep the area in the supero-lateral aspect of the left knee. Ethyl chloride spray was then used as a topical anesthetic. A 21-gauge needle was used to inject Euflexxa into the knee. A sterile bandage was then applied. The patient tolerated the procedure well and there were no complications.

## 2022-12-06 NOTE — END OF VISIT
[FreeTextEntry3] : 58 y/o female with bilateral knee osteoarthritis. \par \par The first of three Euflexxa injections was given today under sterile conditions into the bilateral knee joint without complication. The patient was instructed on modification of activities over the next 48-72 hours. I advised the patient to ice the knee as needed for control of local irritation from the injection. I advised that some patients have immediate benefit from the initial injection therapy, however it usually takes the medication a number of weeks (~8wks) to provide significant relief of osteoarthritic symptoms. \par \par We will see the patient back for the second injection in a weeks time.

## 2022-12-06 NOTE — ADDENDUM
[FreeTextEntry1] : This note was written by Arlen Lynch on 12/06/2022 acting solely as a scribe for Dr. Kole Bartholomew.\par \par All medical record entries made by the Scribe were at my, Dr. Kole Bartholomew, direction and personally dictated by me on 12/06/2022. I have personally reviewed the chart and agree that the record accurately reflects my personal performance of the history, physical exam, assessment and plan.

## 2022-12-14 ENCOUNTER — APPOINTMENT (OUTPATIENT)
Dept: ORTHOPEDIC SURGERY | Facility: CLINIC | Age: 57
End: 2022-12-14
Payer: MEDICARE

## 2022-12-14 VITALS
WEIGHT: 172 LBS | BODY MASS INDEX: 34.68 KG/M2 | OXYGEN SATURATION: 96 % | HEART RATE: 86 BPM | TEMPERATURE: 97.5 F | HEIGHT: 59 IN | DIASTOLIC BLOOD PRESSURE: 85 MMHG | SYSTOLIC BLOOD PRESSURE: 142 MMHG

## 2022-12-14 PROCEDURE — 20610 DRAIN/INJ JOINT/BURSA W/O US: CPT | Mod: 50

## 2023-01-03 ENCOUNTER — APPOINTMENT (OUTPATIENT)
Dept: INTERNAL MEDICINE | Facility: CLINIC | Age: 58
End: 2023-01-03
Payer: MEDICARE

## 2023-01-03 ENCOUNTER — APPOINTMENT (OUTPATIENT)
Dept: ORTHOPEDIC SURGERY | Facility: CLINIC | Age: 58
End: 2023-01-03

## 2023-01-03 VITALS
TEMPERATURE: 98 F | DIASTOLIC BLOOD PRESSURE: 90 MMHG | HEART RATE: 89 BPM | RESPIRATION RATE: 18 BRPM | OXYGEN SATURATION: 97 % | HEIGHT: 59 IN | WEIGHT: 173 LBS | SYSTOLIC BLOOD PRESSURE: 162 MMHG | BODY MASS INDEX: 34.88 KG/M2

## 2023-01-03 DIAGNOSIS — S60.10XA CONTUSION OF UNSPECIFIED FINGER WITH DAMAGE TO NAIL, INITIAL ENCOUNTER: ICD-10-CM

## 2023-01-03 PROCEDURE — 99214 OFFICE O/P EST MOD 30 MIN: CPT

## 2023-01-03 NOTE — ADDENDUM
[FreeTextEntry1] : This note was written by Arlen Lynch on 12/14/2022 acting solely as a scribe for Dr. Kole Bartholomew.\par \par All medical record entries made by the Scribe were at my, Dr. Kole Bartholomew, direction and personally dictated by me on 12/14/2022. I have personally reviewed the chart and agree that the record accurately reflects my personal performance of the history, physical exam, assessment and plan.

## 2023-01-03 NOTE — ASSESSMENT
[FreeTextEntry1] : 57 year old female found to have stable Hypertension, Asthma, GERD, Major Depression in partial remission, Hypercholesterolemia with Hypertriglyceridemia, Hypothyroidism, Reactive Airway Disease, SLE,with the current prescription regimen as recommended, diet and life style modifications, as counseled. Prior results reviewed, interpreted and discussed with the patient during today's examination, as appropriate. Follow up, treatment plan and tests, as ordered.\par \par Discussed with the daughter, at the patient's request, during today's examination.\par \par Total time spent : 30 minutes\par Including:\par Preparation prior to visit - Reviewing prior record, results of tests and Consultation Reports as applicable\par Conducting an appropriate H & P during today's encounter\par Appropriate orders for tests, medications and procedures, as applicable\par Counseling patient \par Note completion\par

## 2023-01-03 NOTE — HISTORY OF PRESENT ILLNESS
[de-identified] : 57 year old  female patient with history of stable Hypertension, Asthma, GERD, Major Depression in partial remission, Hypercholesterolemia with Hypertriglyceridemia, Hypothyroidism, Reactive Airway Disease, SLE, history as stated, presented for follow up examination. Patient is compliant with all medications. ROS as stated.\par

## 2023-01-03 NOTE — PROCEDURE
[de-identified] : Injection: right knee joint.\par Indication: Osteoarthritis. \par \par A discussion was had with the patient regarding this procedure and all questions were answered. All risks, benefits and alternatives were discussed. These included but were not limited to bleeding, infection, and allergic reaction. Alcohol was used to clean the skin, and betadine was used to sterilize and prep the area in the supero-lateral aspect of the right knee. Ethyl chloride spray was then used as a topical anesthetic. A 21-gauge needle was used to inject Euflexxa into the knee. A sterile bandage was then applied. The patient tolerated the procedure well and there were no complications. \par \par Injection: left knee joint.\par Indication: Osteoarthritis. \par \par A discussion was had with the patient regarding this procedure and all questions were answered. All risks, benefits and alternatives were discussed. These included but were not limited to bleeding, infection, and allergic reaction. Alcohol was used to clean the skin, and betadine was used to sterilize and prep the area in the supero-lateral aspect of the left knee. Ethyl chloride spray was then used as a topical anesthetic. A 21-gauge needle was used to inject Euflexxa into the knee. A sterile bandage was then applied. The patient tolerated the procedure well and there were no complications.

## 2023-01-03 NOTE — END OF VISIT
[FreeTextEntry3] : 56 y/o female with bilateral knee osteoarthritis. \par \par The second of three Euflexxa injections was given today under sterile conditions into the bilateral knee joint without complication. I again discussed the role of activity modification/icing following the injection to treat any local irritation from the injection.\par \par We'll have the patient followup for the final injection next week.

## 2023-01-09 RX ORDER — TIZANIDINE 4 MG/1
4 TABLET ORAL
Qty: 30 | Refills: 0 | Status: ACTIVE | COMMUNITY
Start: 2022-06-10 | End: 1900-01-01

## 2023-01-26 ENCOUNTER — APPOINTMENT (OUTPATIENT)
Dept: ORTHOPEDIC SURGERY | Facility: CLINIC | Age: 58
End: 2023-01-26
Payer: MEDICARE

## 2023-01-26 PROCEDURE — 20610 DRAIN/INJ JOINT/BURSA W/O US: CPT | Mod: RT

## 2023-01-27 NOTE — PROCEDURE
[de-identified] : Injection: right knee joint.\par Indication: Osteoarthritis. \par \par A discussion was had with the patient regarding this procedure and all questions were answered. All risks, benefits and alternatives were discussed. These included but were not limited to bleeding, infection, and allergic reaction. Alcohol was used to clean the skin, and betadine was used to sterilize and prep the area in the supero-lateral aspect of the right knee. Ethyl chloride spray was then used as a topical anesthetic. A 21-gauge needle was used to inject Euflexxa into the knee. A sterile bandage was then applied. The patient tolerated the procedure well and there were no complications. \par \par Injection: left knee joint.\par Indication: Osteoarthritis. \par \par A discussion was had with the patient regarding this procedure and all questions were answered. All risks, benefits and alternatives were discussed. These included but were not limited to bleeding, infection, and allergic reaction. Alcohol was used to clean the skin, and betadine was used to sterilize and prep the area in the supero-lateral aspect of the left knee. Ethyl chloride spray was then used as a topical anesthetic. A 21-gauge needle was used to inject Euflexxa into the knee. A sterile bandage was then applied. The patient tolerated the procedure well and there were no complications.

## 2023-01-27 NOTE — ADDENDUM
[FreeTextEntry1] : This note was written by Arlen Lynch on 01/26/2023 acting solely as a scribe for Dr. Kole Bartholomew.\par \par All medical record entries made by the Scribe were at my, Dr. Kole Bartholomew, direction and personally dictated by me on 01/26/2023. I have personally reviewed the chart and agree that the record accurately reflects my personal performance of the history, physical exam, assessment and plan.

## 2023-01-27 NOTE — END OF VISIT
[FreeTextEntry3] : 58 y/o female with bilateral knee osteoarthritis. \par \par The final Euflexxa injection was given today under sterile conditions into the bilateral knee joint without complication. I discussed the effects of this medication and how long it may provide benefit. Patient has obtained moderate immediate improvement. If no significant long-term benefit, the patient may elect for additional treatment strategies as previously discussed. However, if the patient obtains good relief of symptoms, the injection therapy series can be repeated over the next 6-12 months.\par \par We'll have the patient followup on an as-needed basis at this time.

## 2023-01-30 NOTE — ED ADULT NURSE NOTE - CINV DISCH MEDS REVIEWED YN
Mildly to Moderately Impaired: difficulty hearing in some environments or speaker may need to increase volume or speak distinctly Yes

## 2023-03-10 ENCOUNTER — NON-APPOINTMENT (OUTPATIENT)
Age: 58
End: 2023-03-10

## 2023-03-13 ENCOUNTER — NON-APPOINTMENT (OUTPATIENT)
Age: 58
End: 2023-03-13

## 2023-03-13 ENCOUNTER — APPOINTMENT (OUTPATIENT)
Dept: CARDIOLOGY | Facility: CLINIC | Age: 58
End: 2023-03-13
Payer: MEDICARE

## 2023-03-13 VITALS
DIASTOLIC BLOOD PRESSURE: 94 MMHG | TEMPERATURE: 97.6 F | OXYGEN SATURATION: 97 % | HEART RATE: 72 BPM | WEIGHT: 179 LBS | BODY MASS INDEX: 36.15 KG/M2 | SYSTOLIC BLOOD PRESSURE: 151 MMHG

## 2023-03-13 PROCEDURE — 99214 OFFICE O/P EST MOD 30 MIN: CPT | Mod: 25

## 2023-03-13 PROCEDURE — 93000 ELECTROCARDIOGRAM COMPLETE: CPT

## 2023-03-14 LAB
ALBUMIN SERPL ELPH-MCNC: 4.4 G/DL
ALP BLD-CCNC: 124 U/L
ALT SERPL-CCNC: 21 U/L
ANION GAP SERPL CALC-SCNC: 11 MMOL/L
AST SERPL-CCNC: 27 U/L
BASOPHILS # BLD AUTO: 0.07 K/UL
BASOPHILS NFR BLD AUTO: 1.6 %
BILIRUB SERPL-MCNC: 0.2 MG/DL
BUN SERPL-MCNC: 19 MG/DL
CALCIUM SERPL-MCNC: 9.7 MG/DL
CHLORIDE SERPL-SCNC: 102 MMOL/L
CHOLEST SERPL-MCNC: 171 MG/DL
CO2 SERPL-SCNC: 29 MMOL/L
CREAT SERPL-MCNC: 0.77 MG/DL
EGFR: 89 ML/MIN/1.73M2
EOSINOPHIL # BLD AUTO: 0.11 K/UL
EOSINOPHIL NFR BLD AUTO: 2.4 %
ESTIMATED AVERAGE GLUCOSE: 131 MG/DL
GLUCOSE SERPL-MCNC: 106 MG/DL
HBA1C MFR BLD HPLC: 6.2 %
HCT VFR BLD CALC: 36.4 %
HDLC SERPL-MCNC: 60 MG/DL
HGB BLD-MCNC: 11.3 G/DL
IMM GRANULOCYTES NFR BLD AUTO: 0.2 %
LDLC SERPL CALC-MCNC: 95 MG/DL
LYMPHOCYTES # BLD AUTO: 1.34 K/UL
LYMPHOCYTES NFR BLD AUTO: 29.8 %
MAN DIFF?: NORMAL
MCHC RBC-ENTMCNC: 30.5 PG
MCHC RBC-ENTMCNC: 31 GM/DL
MCV RBC AUTO: 98.1 FL
MONOCYTES # BLD AUTO: 0.48 K/UL
MONOCYTES NFR BLD AUTO: 10.7 %
NEUTROPHILS # BLD AUTO: 2.49 K/UL
NEUTROPHILS NFR BLD AUTO: 55.3 %
NONHDLC SERPL-MCNC: 112 MG/DL
NT-PROBNP SERPL-MCNC: 43 PG/ML
PLATELET # BLD AUTO: 405 K/UL
POTASSIUM SERPL-SCNC: 5.2 MMOL/L
PROT SERPL-MCNC: 7.6 G/DL
RBC # BLD: 3.71 M/UL
RBC # FLD: 12.5 %
SODIUM SERPL-SCNC: 143 MMOL/L
TRIGL SERPL-MCNC: 83 MG/DL
TSH SERPL-ACNC: 3.22 UIU/ML
WBC # FLD AUTO: 4.5 K/UL

## 2023-03-16 ENCOUNTER — APPOINTMENT (OUTPATIENT)
Dept: ULTRASOUND IMAGING | Facility: HOSPITAL | Age: 58
End: 2023-03-16
Payer: MEDICARE

## 2023-03-16 ENCOUNTER — OUTPATIENT (OUTPATIENT)
Dept: OUTPATIENT SERVICES | Facility: HOSPITAL | Age: 58
LOS: 1 days | End: 2023-03-16
Payer: MEDICARE

## 2023-03-16 DIAGNOSIS — R60.0 LOCALIZED EDEMA: ICD-10-CM

## 2023-03-16 DIAGNOSIS — Z98.51 TUBAL LIGATION STATUS: Chronic | ICD-10-CM

## 2023-03-16 DIAGNOSIS — Z98.89 OTHER SPECIFIED POSTPROCEDURAL STATES: Chronic | ICD-10-CM

## 2023-03-16 DIAGNOSIS — Z98.890 OTHER SPECIFIED POSTPROCEDURAL STATES: Chronic | ICD-10-CM

## 2023-03-16 PROCEDURE — 93970 EXTREMITY STUDY: CPT

## 2023-03-16 PROCEDURE — 93970 EXTREMITY STUDY: CPT | Mod: 26

## 2023-03-24 ENCOUNTER — OUTPATIENT (OUTPATIENT)
Dept: OUTPATIENT SERVICES | Facility: HOSPITAL | Age: 58
LOS: 1 days | End: 2023-03-24
Payer: MEDICARE

## 2023-03-24 DIAGNOSIS — I10 ESSENTIAL (PRIMARY) HYPERTENSION: ICD-10-CM

## 2023-03-24 DIAGNOSIS — Z98.51 TUBAL LIGATION STATUS: Chronic | ICD-10-CM

## 2023-03-24 DIAGNOSIS — Z98.890 OTHER SPECIFIED POSTPROCEDURAL STATES: Chronic | ICD-10-CM

## 2023-03-24 DIAGNOSIS — Z98.89 OTHER SPECIFIED POSTPROCEDURAL STATES: Chronic | ICD-10-CM

## 2023-03-24 PROCEDURE — 93306 TTE W/DOPPLER COMPLETE: CPT

## 2023-03-24 PROCEDURE — 93306 TTE W/DOPPLER COMPLETE: CPT | Mod: 26

## 2023-03-27 ENCOUNTER — APPOINTMENT (OUTPATIENT)
Dept: GASTROENTEROLOGY | Facility: CLINIC | Age: 58
End: 2023-03-27
Payer: MEDICARE

## 2023-03-27 VITALS
HEIGHT: 59 IN | HEART RATE: 82 BPM | DIASTOLIC BLOOD PRESSURE: 82 MMHG | OXYGEN SATURATION: 96 % | BODY MASS INDEX: 35.88 KG/M2 | SYSTOLIC BLOOD PRESSURE: 130 MMHG | WEIGHT: 178 LBS

## 2023-03-27 PROCEDURE — 99214 OFFICE O/P EST MOD 30 MIN: CPT

## 2023-03-27 RX ORDER — TRETINOIN 0.5 MG/G
0.05 CREAM TOPICAL
Qty: 45 | Refills: 0 | Status: ACTIVE | COMMUNITY
Start: 2023-02-15

## 2023-03-27 RX ORDER — CICLOPIROX 80 MG/ML
8 SOLUTION TOPICAL
Qty: 20 | Refills: 0 | Status: ACTIVE | COMMUNITY
Start: 2023-02-15

## 2023-03-27 RX ORDER — SIMETHICONE 125 MG/1
125 TABLET, CHEWABLE ORAL
Qty: 120 | Refills: 3 | Status: ACTIVE | COMMUNITY
Start: 2023-03-27 | End: 1900-01-01

## 2023-03-27 RX ORDER — CHOLESTYRAMINE 4 G/9G
4 POWDER, FOR SUSPENSION ORAL DAILY
Qty: 30 | Refills: 3 | Status: ACTIVE | COMMUNITY
Start: 2023-03-27 | End: 1900-01-01

## 2023-03-27 NOTE — ASSESSMENT
[FreeTextEntry1] : Dyspepsia: The patient complains of dyspeptic symptoms.  The patient was advised to continue to abide by an anti-gas (low FOD-MAP) diet.  The patient was previously given a pamphlet for anti-gas (low FOD-MAP).  The patient and I reviewed the anti-gas (low FOD-MAP)diet at length again. The patient is to continue on a trial of Simethicone one tablet 4 times a day p.r.n. abdominal pain and gas.\par Nausea: The patient complains of nausea. If the symptoms of nausea persists, the patient may require a trial of Zofran 4 mg twice a day. \par Diarrhea: The patient complains of diarrhea.  I recommend a low residue diet. The patient is to avoid fiber supplementation. The patient is to consider starting a trial of a probiotic such as Align once a day.   If the symptoms persist, the patient may requires ending stool studies for C+S, O+P x3, and C. difficile to assess for an infectious etiology of the diarrhea.  The symptoms are worse after meals.    I recommend a trial of cholestyramine one packet once a day for possible bile induced diarrhea. If the symptoms persist, the patient may require a colonoscopy to assess for colitis versus other causes.  The patient was told of the risks and benefits of the procedure.  The patient was told of the risks of perforation, emergency surgery, bleeding, infections and missed lesions.  The patient agreed and will follow-up to reassess the symptoms.  \par Gastroparesis: The patient had an upper endoscopy performed at the [facility] on February 20, 2020. The upper endoscopy revealed a small hiatal hernia and mild diffuse gastritis with retained food and debris in the stomach suggestive of gastroparesis. The gastroparesis may be contributing to the abdominal pain. The patient is currently on medications that can contribute to the gastroparesis. I recommend a gastroparesis diet with smaller but more frequent meals. The patient can have a liquid and puree diet and avoid foods high in fats and carbohydrates. The patient was also advised to avoid high fiber diet. If the symptoms persist, the patient may require a trial of a promotility agent for the gastroparesis such as a trial of Reglan (metoclopramide) 5 mg 4 times a day. The patient and I had a long discussion regarding the risks of potential side effects of Reglan (metoclopramide). The patient was told of the risk of headaches, dizziness, diarrhea and permanent tremors. The patient was told to stop the medication immediately and call the office if any of these symptoms occur. The patient agrees and will call the office regarding her symptoms.\par History of Diverticulitis: The patient had a prior history of acute diverticulitis. The patient may require an emergency room evaluation for diverticulitis if the symptoms recur. The patient may require treatment with antibiotics if the symptoms recur. I recommend a low residue diet. The CAT scan of the abdomen and pelvis with IV contrast performed on December 16, 2019 revealed heterogeneous attenuation in the external iliac and common femoral veins is likely related to mixing artifact. Also noted was constipation, sigmoid diverticulosis without diverticulitis, trace bilateral hydronephrosis likely related to distended urinary bladder. The patient was advised to go to the hospital if fever, chills, worsening abdominal pain or GI bleeding recurs. The patient agrees.\par Abnormal Imaging Study: The CAT scan of the abdomen and pelvis with IV contrast performed on December 16, 2019 revealed heterogeneous attenuation in the external iliac and common femoral veins is likely related to mixing artifact. Also noted was constipation, sigmoid diverticulosis without diverticulitis, trace bilateral hydronephrosis likely related to distended urinary bladder. The doppler ultrasound of the lower extremities performed on December 27, 2019 revealed no evidence of deep venous thrombosis in either lower extremity. \par Hiatal Hernia: The patient was advised to avoid late-night meals and dietary indiscretions. The patient was advised to avoid fried and fatty foods. The patient was advised to abide by an anti-GERD diet. The patient was given a pamphlet for anti-GERD. The patient and I reviewed the anti-GERD diet at length. The patient had no improvement on a trial of Carafate. I recommend continue on a trial of Pantoprazole 40 mg once a day x 3 months for the symptoms.\par Gastritis: The patient has a history of gastritis. The patient is to avoid nonsteroidal anti-inflammatory drugs and aspirin. The patient previously had no improvement on a trial of Carafate. I recommend continue on a trial of Pantoprazole 40 mg once a day x 3 months for the symptoms.\par Irritable Bowel Syndrome: The patient has symptoms suggestive of irritable bowel syndrome. The patient was advised to follow a low FOD-MAP diet for the irritable bowel syndrome. I recommend continue on a trial of Dicyclomine 10 mg three times a day for the abdominal pain and constipation. \par Diverticulosis: I recommend a low residue diet and avoid seeds. The patient is to consider a trial of Metamucil once a day for fiber supplementation. The patient is to also consider a trial of a probiotic such as Align once a day. \par Internal Hemorrhoids: The patient is to consider a trial of Anusol H. C. suppositories one per rectum nightly and Anusol HC2.5% cream apply to affected area twice a day p.r.n. hemorrhoidal bleeding or pain. \par History of Occult Blood in the Stool: The patient was previously found to have occult blood in the stool. The patient denies any bright red blood per rectum, melena or hematemesis. The patient was previously found to be guaiac-positive by her PMD. \par Anemia: The blood work performed on June 16, 2021 revealed mild anemia with hemoglobin/hematocrit level of 9.6/32.6, respectively, elevated platelet count of 444,000, an elevated TIBC/UIBC of 452/407 ug/dl, respectively, a low iron % saturation of 10%, a normal Ferritin level of 20 ng/ml. I recommend obtaining the recent blood work performed by her hematologist. \par Blood Work: I  recommend obtaining the recent blood work performed by the patient's PMD.\par Follow-up: The patient is to follow-up in the office in 6 months to reassess the symptoms. The patient was told to call the office if any further problems.\par \par \par \par

## 2023-03-27 NOTE — HISTORY OF PRESENT ILLNESS
[FreeTextEntry1] : The colonoscopy to the terminal ileum performed at the [facility] on February 13, 2020.revealed moderate left sided diverticulosis.  There were no polyps, masses, AVMs or colitis noted. The pathology revealed unremarkable small intestinal mucosa with preserved villous pattern and lymphocytic aggregates (terminal ileum), patchy mild colitis without evidence of architectural changes or evidence of chronicity (cecum) and benign colonic mucosa with focal hyperplastic change (rectum). [de-identified] : The CAT scan of the chest, abdomen and pelvis with IV contrast performed on February 17, 2020 revealed no evidence of an aortic dissection.\par The CAT scan of the abdomen and pelvis with IV contrast performed on December 16, 2019 revealed heterogeneous attenuation in the external iliac and common femoral veins is likely related to mixing artifact. Also noted was constipation, sigmoid diverticulosis without diverticulitis, trace bilateral hydronephrosis likely related to distended urinary bladder. The doppler ultrasound of the lower extremities performed on December 27, 2019 revealed no evidence of deep venous thrombosis in either lower extremity.  [de-identified] : The abdominal ultrasound performed on February 19, 2020 revealed no gallstones, gallbladder wall thickening or pericholecystic fluid. \par The abdominal ultrasound performed on October 18, 2019 revealed no gallstones or pericholecystic fluid. The contracted state of the gallbladder limits assessment for gallbladder wall thickening. There is no evidence for intrahepatic or extrahepatic biliary duct dilatation. The pelvic ultrasound performed on October 18, 2019 revealed no evidence for an ovarian lesion.

## 2023-03-28 ENCOUNTER — APPOINTMENT (OUTPATIENT)
Dept: INTERNAL MEDICINE | Facility: CLINIC | Age: 58
End: 2023-03-28
Payer: MEDICARE

## 2023-03-28 VITALS
RESPIRATION RATE: 18 BRPM | BODY MASS INDEX: 36.08 KG/M2 | HEIGHT: 59 IN | OXYGEN SATURATION: 99 % | WEIGHT: 179 LBS | SYSTOLIC BLOOD PRESSURE: 148 MMHG | HEART RATE: 67 BPM | TEMPERATURE: 98 F | DIASTOLIC BLOOD PRESSURE: 90 MMHG

## 2023-03-28 PROCEDURE — 99214 OFFICE O/P EST MOD 30 MIN: CPT

## 2023-03-28 NOTE — HISTORY OF PRESENT ILLNESS
[de-identified] : 57 year old  female patient with history of stable Hypertension, Asthma, GERD, Major Depression in partial remission, Hypercholesterolemia with Hypertriglyceridemia, Hypothyroidism, Reactive Airway Disease, SLE, history as stated, presented for follow up examination. Patient is compliant with all medications. ROS as stated.\par

## 2023-03-28 NOTE — HEALTH RISK ASSESSMENT
[No] : In the past 12 months have you used drugs other than those required for medical reasons? No [0] : 2) Feeling down, depressed, or hopeless: Not at all (0) [Never] : Never [de-identified] : Patient was recently evaluated by CARD/GI, findings and recommendations reviewed with the patient during today's examination. [[id number]] : 0

## 2023-03-28 NOTE — ASSESSMENT
[FreeTextEntry1] : 57 year old female found to have stable Hypertension, Asthma, GERD, Major Depression in partial remission, Hypercholesterolemia with Hypertriglyceridemia, Hypothyroidism, Reactive Airway Disease, SLE, Elevated Hemoglobin A1c, with the current prescription regimen as recommended, diet and life style modifications, as counseled. Prior results reviewed, interpreted and discussed with the patient during today's examination, as appropriate. Follow up, treatment plan and tests, as ordered.\par \par Total time spent : 30 minutes\par Including:\par Preparation prior to visit - Reviewing prior record, results of tests and Consultation Reports as applicable\par Conducting an appropriate H & P during today's encounter\par Appropriate orders for tests, medications and procedures, as applicable\par Counseling patient \par Note completion\par

## 2023-05-02 ENCOUNTER — APPOINTMENT (OUTPATIENT)
Dept: RHEUMATOLOGY | Facility: CLINIC | Age: 58
End: 2023-05-02
Payer: MEDICARE

## 2023-05-02 VITALS
HEIGHT: 59 IN | OXYGEN SATURATION: 95 % | HEART RATE: 69 BPM | TEMPERATURE: 97.8 F | SYSTOLIC BLOOD PRESSURE: 151 MMHG | RESPIRATION RATE: 16 BRPM | WEIGHT: 172 LBS | BODY MASS INDEX: 34.68 KG/M2 | DIASTOLIC BLOOD PRESSURE: 71 MMHG

## 2023-05-02 PROCEDURE — 99214 OFFICE O/P EST MOD 30 MIN: CPT

## 2023-05-04 NOTE — CONSULT LETTER
[Dear  ___] : Dear  [unfilled], [Courtesy Letter:] : I had the pleasure of seeing your patient, [unfilled], in my office today. [Please see my note below.] : Please see my note below. [Consult Closing:] : Thank you very much for allowing me to participate in the care of this patient.  If you have any questions, please do not hesitate to contact me. [Sincerely,] : Sincerely, [FreeTextEntry2] : Roe Sales MD\[facility] [FreeTextEntry3] : Tamiko Duff M.D.\par  of Medicine \par [employer] [facility]/[place]\par \par

## 2023-05-04 NOTE — PHYSICAL EXAM
[General Appearance - Alert] : alert [General Appearance - Well-Appearing] : healthy appearing [Neck Appearance] : the appearance of the neck was normal [Oriented To Time, Place, And Person] : oriented to person, place, and time [Impaired Insight] : insight and judgment were intact [General Appearance - In No Acute Distress] : in no acute distress [Sclera] : the sclera and conjunctiva were normal [Auscultation Breath Sounds / Voice Sounds] : lungs were clear to auscultation bilaterally [Edema] : there was no peripheral edema [Abnormal Walk] : normal gait [Nail Clubbing] : no clubbing  or cyanosis of the fingernails [Musculoskeletal - Swelling] : no joint swelling seen [Motor Tone] : muscle strength and tone were normal [] : no rash [Skin Lesions] : no skin lesions

## 2023-05-04 NOTE — HISTORY OF PRESENT ILLNESS
[FreeTextEntry1] : Patient presents after long hiatus and explains arthralgias are at bay ; she reports improvement in cervical and lumbar pain after receiving surgical correction in 2022.  She continues on antidepressants and anti anxiolytics.  She describes restorative sleep.  Patient has been off of disease modifying agents including methotrexate and hydroxychloroquine over the last 2 years.  She describes minimal morning stiffness or associated swelling.  She further denies rash, Raynaud's or photosensitivity.  \par Patient with history of positive YOKO 1: 320 and homogenous/speckled pattern with associated positive SSA antibody positivity; APL Ab negative; she has not had thrombotic events in the past and initially manifested with arthralgias and low mood in early 2000.

## 2023-05-04 NOTE — ASSESSMENT
[FreeTextEntry1] : Patient with SLE; FM:\par \par Patient currently off of traditional dmard tx and doing well.  Discussed if resurfacing of arthralgias, fatigue or photosensitivity patient would benefit from low-dose hydroxychloroquine.  Patient understands the retinal toxicity associated with therapy and would like to continue lifestyle modifications.  Patient understands the increased risk of cardiovascular events associated with systemic lupus and the importance of dietary and exercise modification.\par \par Patient will benefit from physical therapy to help with joint mobility and muscle strengthening. Quadriceps and core strengthening exercises emphasized. Viscosupplementation has been encouraged to provide additional lubrication and joint support. \par \par She is in agreement with the above plan and will return in three months' time.\par

## 2023-05-15 ENCOUNTER — NON-APPOINTMENT (OUTPATIENT)
Age: 58
End: 2023-05-15

## 2023-05-16 ENCOUNTER — LABORATORY RESULT (OUTPATIENT)
Age: 58
End: 2023-05-16

## 2023-05-18 ENCOUNTER — APPOINTMENT (OUTPATIENT)
Dept: RHEUMATOLOGY | Facility: CLINIC | Age: 58
End: 2023-05-18
Payer: MEDICARE

## 2023-05-18 DIAGNOSIS — R22.32 LOCALIZED SWELLING, MASS AND LUMP, LEFT UPPER LIMB: ICD-10-CM

## 2023-05-18 LAB
ALBUMIN SERPL ELPH-MCNC: 4.9 G/DL
ALP BLD-CCNC: 112 U/L
ALT SERPL-CCNC: 16 U/L
ANA PAT FLD IF-IMP: ABNORMAL
ANACR T: ABNORMAL
ANION GAP SERPL CALC-SCNC: 9 MMOL/L
APPEARANCE: CLEAR
AST SERPL-CCNC: 22 U/L
BACTERIA: ABNORMAL /HPF
BILIRUB SERPL-MCNC: 0.2 MG/DL
BILIRUBIN URINE: NEGATIVE
BLOOD URINE: NEGATIVE
BUN SERPL-MCNC: 21 MG/DL
C3 SERPL-MCNC: 101 MG/DL
C4 SERPL-MCNC: 20 MG/DL
CALCIUM SERPL-MCNC: 9.6 MG/DL
CAST: 0 /LPF
CHLORIDE SERPL-SCNC: 102 MMOL/L
CO2 SERPL-SCNC: 28 MMOL/L
COLOR: YELLOW
CREAT SERPL-MCNC: 0.77 MG/DL
CREAT SPEC-SCNC: 135 MG/DL
CREAT/PROT UR: 0.1 RATIO
CRP SERPL-MCNC: <3 MG/L
EGFR: 89 ML/MIN/1.73M2
EPITHELIAL CELLS: 2 /HPF
ERYTHROCYTE [SEDIMENTATION RATE] IN BLOOD BY WESTERGREN METHOD: 49 MM/HR
ESTIMATED AVERAGE GLUCOSE: 128 MG/DL
GLUCOSE QUALITATIVE U: NEGATIVE MG/DL
GLUCOSE SERPL-MCNC: 95 MG/DL
HBA1C MFR BLD HPLC: 6.1 %
KETONES URINE: NEGATIVE MG/DL
LEUKOCYTE ESTERASE URINE: NEGATIVE
MICROSCOPIC-UA: NORMAL
NITRITE URINE: NEGATIVE
PH URINE: 8
POTASSIUM SERPL-SCNC: 4.9 MMOL/L
PROT SERPL-MCNC: 7.9 G/DL
PROT UR-MCNC: 18 MG/DL
PROTEIN URINE: 30 MG/DL
RED BLOOD CELLS URINE: 1 /HPF
SODIUM SERPL-SCNC: 139 MMOL/L
SPECIFIC GRAVITY URINE: 1.02
TSH SERPL-ACNC: 2.34 UIU/ML
UROBILINOGEN URINE: 0.2 MG/DL
WHITE BLOOD CELLS URINE: 1 /HPF

## 2023-05-18 PROCEDURE — 99442: CPT

## 2023-06-28 ENCOUNTER — NON-APPOINTMENT (OUTPATIENT)
Age: 58
End: 2023-06-28

## 2023-06-29 ENCOUNTER — INPATIENT (INPATIENT)
Facility: HOSPITAL | Age: 58
LOS: 0 days | Discharge: HOME | DRG: 391 | End: 2023-06-30
Attending: STUDENT IN AN ORGANIZED HEALTH CARE EDUCATION/TRAINING PROGRAM | Admitting: STUDENT IN AN ORGANIZED HEALTH CARE EDUCATION/TRAINING PROGRAM
Payer: MEDICARE

## 2023-06-29 VITALS
TEMPERATURE: 98 F | WEIGHT: 145.51 LBS | OXYGEN SATURATION: 96 % | DIASTOLIC BLOOD PRESSURE: 78 MMHG | SYSTOLIC BLOOD PRESSURE: 113 MMHG | RESPIRATION RATE: 18 BRPM | HEIGHT: 67.52 IN | HEART RATE: 88 BPM

## 2023-06-29 DIAGNOSIS — E03.9 HYPOTHYROIDISM, UNSPECIFIED: ICD-10-CM

## 2023-06-29 DIAGNOSIS — I10 ESSENTIAL (PRIMARY) HYPERTENSION: ICD-10-CM

## 2023-06-29 DIAGNOSIS — K58.1 IRRITABLE BOWEL SYNDROME WITH CONSTIPATION: ICD-10-CM

## 2023-06-29 DIAGNOSIS — Z98.89 OTHER SPECIFIED POSTPROCEDURAL STATES: Chronic | ICD-10-CM

## 2023-06-29 DIAGNOSIS — K58.9 IRRITABLE BOWEL SYNDROME WITHOUT DIARRHEA: ICD-10-CM

## 2023-06-29 DIAGNOSIS — R11.2 NAUSEA WITH VOMITING, UNSPECIFIED: ICD-10-CM

## 2023-06-29 DIAGNOSIS — D64.9 ANEMIA, UNSPECIFIED: ICD-10-CM

## 2023-06-29 DIAGNOSIS — Z29.9 ENCOUNTER FOR PROPHYLACTIC MEASURES, UNSPECIFIED: ICD-10-CM

## 2023-06-29 DIAGNOSIS — K50.90 CROHN'S DISEASE, UNSPECIFIED, WITHOUT COMPLICATIONS: ICD-10-CM

## 2023-06-29 DIAGNOSIS — E16.2 HYPOGLYCEMIA, UNSPECIFIED: ICD-10-CM

## 2023-06-29 DIAGNOSIS — F41.9 ANXIETY DISORDER, UNSPECIFIED: ICD-10-CM

## 2023-06-29 DIAGNOSIS — Z98.890 OTHER SPECIFIED POSTPROCEDURAL STATES: Chronic | ICD-10-CM

## 2023-06-29 DIAGNOSIS — K62.5 HEMORRHAGE OF ANUS AND RECTUM: ICD-10-CM

## 2023-06-29 DIAGNOSIS — Z98.51 TUBAL LIGATION STATUS: Chronic | ICD-10-CM

## 2023-06-29 LAB
ALBUMIN SERPL ELPH-MCNC: 4.3 G/DL — SIGNIFICANT CHANGE UP (ref 3.5–5)
ALP SERPL-CCNC: 114 U/L — SIGNIFICANT CHANGE UP (ref 40–120)
ALT FLD-CCNC: 53 U/L DA — SIGNIFICANT CHANGE UP (ref 10–60)
ANION GAP SERPL CALC-SCNC: 6 MMOL/L — SIGNIFICANT CHANGE UP (ref 5–17)
APPEARANCE UR: CLEAR — SIGNIFICANT CHANGE UP
APTT BLD: 32.4 SEC — SIGNIFICANT CHANGE UP (ref 27.5–35.5)
AST SERPL-CCNC: 36 U/L — SIGNIFICANT CHANGE UP (ref 10–40)
BACTERIA # UR AUTO: ABNORMAL /HPF
BASOPHILS # BLD AUTO: 0.07 K/UL — SIGNIFICANT CHANGE UP (ref 0–0.2)
BASOPHILS NFR BLD AUTO: 1.2 % — SIGNIFICANT CHANGE UP (ref 0–2)
BILIRUB SERPL-MCNC: 0.3 MG/DL — SIGNIFICANT CHANGE UP (ref 0.2–1.2)
BILIRUB UR-MCNC: NEGATIVE — SIGNIFICANT CHANGE UP
BUN SERPL-MCNC: 27 MG/DL — HIGH (ref 7–18)
CALCIUM SERPL-MCNC: 9.2 MG/DL — SIGNIFICANT CHANGE UP (ref 8.4–10.5)
CHLORIDE SERPL-SCNC: 104 MMOL/L — SIGNIFICANT CHANGE UP (ref 96–108)
CO2 SERPL-SCNC: 26 MMOL/L — SIGNIFICANT CHANGE UP (ref 22–31)
COLOR SPEC: YELLOW — SIGNIFICANT CHANGE UP
CREAT SERPL-MCNC: 0.86 MG/DL — SIGNIFICANT CHANGE UP (ref 0.5–1.3)
DIFF PNL FLD: NEGATIVE — SIGNIFICANT CHANGE UP
EGFR: 77 ML/MIN/1.73M2 — SIGNIFICANT CHANGE UP
EOSINOPHIL # BLD AUTO: 0.1 K/UL — SIGNIFICANT CHANGE UP (ref 0–0.5)
EOSINOPHIL NFR BLD AUTO: 1.8 % — SIGNIFICANT CHANGE UP (ref 0–6)
EPI CELLS # UR: SIGNIFICANT CHANGE UP /HPF
GLUCOSE BLDC GLUCOMTR-MCNC: 142 MG/DL — HIGH (ref 70–99)
GLUCOSE SERPL-MCNC: 47 MG/DL — CRITICAL LOW (ref 70–99)
GLUCOSE UR QL: 250 MG/DL
HCT VFR BLD CALC: 34.7 % — SIGNIFICANT CHANGE UP (ref 34.5–45)
HGB BLD-MCNC: 11.1 G/DL — LOW (ref 11.5–15.5)
HYALINE CASTS # UR AUTO: ABNORMAL /LPF
IMM GRANULOCYTES NFR BLD AUTO: 0.2 % — SIGNIFICANT CHANGE UP (ref 0–0.9)
INR BLD: 1.01 RATIO — SIGNIFICANT CHANGE UP (ref 0.88–1.16)
KETONES UR-MCNC: NEGATIVE — SIGNIFICANT CHANGE UP
LACTATE SERPL-SCNC: 1.6 MMOL/L — SIGNIFICANT CHANGE UP (ref 0.7–2)
LEUKOCYTE ESTERASE UR-ACNC: NEGATIVE — SIGNIFICANT CHANGE UP
LIDOCAIN IGE QN: 130 U/L — SIGNIFICANT CHANGE UP (ref 73–393)
LYMPHOCYTES # BLD AUTO: 1.85 K/UL — SIGNIFICANT CHANGE UP (ref 1–3.3)
LYMPHOCYTES # BLD AUTO: 32.8 % — SIGNIFICANT CHANGE UP (ref 13–44)
MCHC RBC-ENTMCNC: 30.1 PG — SIGNIFICANT CHANGE UP (ref 27–34)
MCHC RBC-ENTMCNC: 32 GM/DL — SIGNIFICANT CHANGE UP (ref 32–36)
MCV RBC AUTO: 94 FL — SIGNIFICANT CHANGE UP (ref 80–100)
MONOCYTES # BLD AUTO: 0.67 K/UL — SIGNIFICANT CHANGE UP (ref 0–0.9)
MONOCYTES NFR BLD AUTO: 11.9 % — SIGNIFICANT CHANGE UP (ref 2–14)
NEUTROPHILS # BLD AUTO: 2.94 K/UL — SIGNIFICANT CHANGE UP (ref 1.8–7.4)
NEUTROPHILS NFR BLD AUTO: 52.1 % — SIGNIFICANT CHANGE UP (ref 43–77)
NITRITE UR-MCNC: NEGATIVE — SIGNIFICANT CHANGE UP
NRBC # BLD: 0 /100 WBCS — SIGNIFICANT CHANGE UP (ref 0–0)
PH UR: 5 — SIGNIFICANT CHANGE UP (ref 5–8)
PLATELET # BLD AUTO: 340 K/UL — SIGNIFICANT CHANGE UP (ref 150–400)
POTASSIUM SERPL-MCNC: 3.5 MMOL/L — SIGNIFICANT CHANGE UP (ref 3.5–5.3)
POTASSIUM SERPL-SCNC: 3.5 MMOL/L — SIGNIFICANT CHANGE UP (ref 3.5–5.3)
PROT SERPL-MCNC: 8.7 G/DL — HIGH (ref 6–8.3)
PROT UR-MCNC: 15 MG/DL
PROTHROM AB SERPL-ACNC: 12 SEC — SIGNIFICANT CHANGE UP (ref 10.5–13.4)
RBC # BLD: 3.69 M/UL — LOW (ref 3.8–5.2)
RBC # FLD: 14 % — SIGNIFICANT CHANGE UP (ref 10.3–14.5)
RBC CASTS # UR COMP ASSIST: SIGNIFICANT CHANGE UP /HPF (ref 0–2)
SODIUM SERPL-SCNC: 136 MMOL/L — SIGNIFICANT CHANGE UP (ref 135–145)
SP GR SPEC: 1.01 — SIGNIFICANT CHANGE UP (ref 1.01–1.02)
UROBILINOGEN FLD QL: NEGATIVE — SIGNIFICANT CHANGE UP
WBC # BLD: 5.64 K/UL — SIGNIFICANT CHANGE UP (ref 3.8–10.5)
WBC # FLD AUTO: 5.64 K/UL — SIGNIFICANT CHANGE UP (ref 3.8–10.5)
WBC UR QL: SIGNIFICANT CHANGE UP /HPF (ref 0–5)

## 2023-06-29 PROCEDURE — 99285 EMERGENCY DEPT VISIT HI MDM: CPT

## 2023-06-29 PROCEDURE — 74177 CT ABD & PELVIS W/CONTRAST: CPT | Mod: 26,MA

## 2023-06-29 PROCEDURE — 99223 1ST HOSP IP/OBS HIGH 75: CPT | Mod: GC

## 2023-06-29 RX ORDER — GALCANEZUMAB 100 MG/ML
0 INJECTION, SOLUTION SUBCUTANEOUS
Qty: 0 | Refills: 0 | DISCHARGE

## 2023-06-29 RX ORDER — TRAZODONE HCL 50 MG
3 TABLET ORAL
Refills: 0 | DISCHARGE

## 2023-06-29 RX ORDER — FAMOTIDINE 10 MG/ML
20 INJECTION INTRAVENOUS ONCE
Refills: 0 | Status: COMPLETED | OUTPATIENT
Start: 2023-06-29 | End: 2023-06-29

## 2023-06-29 RX ORDER — LANOLIN ALCOHOL/MO/W.PET/CERES
3 CREAM (GRAM) TOPICAL AT BEDTIME
Refills: 0 | Status: DISCONTINUED | OUTPATIENT
Start: 2023-06-29 | End: 2023-06-30

## 2023-06-29 RX ORDER — UBROGEPANT 100 MG/1
1 TABLET ORAL
Refills: 0 | DISCHARGE

## 2023-06-29 RX ORDER — ALBUTEROL 90 UG/1
3 AEROSOL, METERED ORAL
Qty: 0 | Refills: 0 | DISCHARGE

## 2023-06-29 RX ORDER — HYDROXYCHLOROQUINE SULFATE 200 MG
1 TABLET ORAL
Refills: 0 | DISCHARGE

## 2023-06-29 RX ORDER — MORPHINE SULFATE 50 MG/1
4 CAPSULE, EXTENDED RELEASE ORAL ONCE
Refills: 0 | Status: DISCONTINUED | OUTPATIENT
Start: 2023-06-29 | End: 2023-06-29

## 2023-06-29 RX ORDER — VENLAFAXINE HCL 75 MG
1 CAPSULE, EXT RELEASE 24 HR ORAL
Refills: 0 | DISCHARGE

## 2023-06-29 RX ORDER — PANTOPRAZOLE SODIUM 20 MG/1
1 TABLET, DELAYED RELEASE ORAL
Refills: 0 | DISCHARGE

## 2023-06-29 RX ORDER — ONDANSETRON 8 MG/1
4 TABLET, FILM COATED ORAL EVERY 8 HOURS
Refills: 0 | Status: DISCONTINUED | OUTPATIENT
Start: 2023-06-29 | End: 2023-06-30

## 2023-06-29 RX ORDER — HYDROXYCHLOROQUINE SULFATE 200 MG
1 TABLET ORAL
Qty: 0 | Refills: 0 | DISCHARGE

## 2023-06-29 RX ORDER — PANTOPRAZOLE SODIUM 20 MG/1
40 TABLET, DELAYED RELEASE ORAL EVERY 12 HOURS
Refills: 0 | Status: DISCONTINUED | OUTPATIENT
Start: 2023-06-29 | End: 2023-06-30

## 2023-06-29 RX ORDER — ACETAMINOPHEN 500 MG
650 TABLET ORAL EVERY 6 HOURS
Refills: 0 | Status: DISCONTINUED | OUTPATIENT
Start: 2023-06-29 | End: 2023-06-30

## 2023-06-29 RX ORDER — HYDROXYCHLOROQUINE SULFATE 200 MG
200 TABLET ORAL DAILY
Refills: 0 | Status: DISCONTINUED | OUTPATIENT
Start: 2023-06-29 | End: 2023-06-30

## 2023-06-29 RX ORDER — TIZANIDINE 4 MG/1
2 TABLET ORAL
Qty: 0 | Refills: 0 | DISCHARGE

## 2023-06-29 RX ORDER — VENLAFAXINE HCL 75 MG
150 CAPSULE, EXT RELEASE 24 HR ORAL DAILY
Refills: 0 | Status: DISCONTINUED | OUTPATIENT
Start: 2023-06-29 | End: 2023-06-30

## 2023-06-29 RX ORDER — ATORVASTATIN CALCIUM 80 MG/1
20 TABLET, FILM COATED ORAL AT BEDTIME
Refills: 0 | Status: DISCONTINUED | OUTPATIENT
Start: 2023-06-29 | End: 2023-06-30

## 2023-06-29 RX ORDER — METHOTREXATE 2.5 MG/1
1 TABLET ORAL
Qty: 0 | Refills: 0 | DISCHARGE

## 2023-06-29 RX ORDER — ATORVASTATIN CALCIUM 80 MG/1
1 TABLET, FILM COATED ORAL
Qty: 0 | Refills: 0 | DISCHARGE

## 2023-06-29 RX ORDER — SODIUM CHLORIDE 9 MG/ML
1000 INJECTION, SOLUTION INTRAVENOUS ONCE
Refills: 0 | Status: COMPLETED | OUTPATIENT
Start: 2023-06-29 | End: 2023-06-29

## 2023-06-29 RX ORDER — LEVOTHYROXINE SODIUM 125 MCG
100 TABLET ORAL DAILY
Refills: 0 | Status: DISCONTINUED | OUTPATIENT
Start: 2023-06-29 | End: 2023-06-30

## 2023-06-29 RX ORDER — ACETAMINOPHEN 500 MG
1000 TABLET ORAL ONCE
Refills: 0 | Status: COMPLETED | OUTPATIENT
Start: 2023-06-29 | End: 2023-06-29

## 2023-06-29 RX ORDER — METOPROLOL TARTRATE 50 MG
1 TABLET ORAL
Refills: 0 | DISCHARGE

## 2023-06-29 RX ORDER — ALBUTEROL 90 UG/1
2 AEROSOL, METERED ORAL EVERY 6 HOURS
Refills: 0 | Status: DISCONTINUED | OUTPATIENT
Start: 2023-06-29 | End: 2023-06-30

## 2023-06-29 RX ORDER — METOCLOPRAMIDE HCL 10 MG
10 TABLET ORAL EVERY 8 HOURS
Refills: 0 | Status: DISCONTINUED | OUTPATIENT
Start: 2023-06-29 | End: 2023-06-30

## 2023-06-29 RX ORDER — CLONAZEPAM 1 MG
1 TABLET ORAL
Refills: 0 | DISCHARGE

## 2023-06-29 RX ORDER — ALBUTEROL 90 UG/1
3 AEROSOL, METERED ORAL
Refills: 0 | DISCHARGE

## 2023-06-29 RX ORDER — OMEPRAZOLE AND SODIUM BICARBONATE 40; 1100 MG/1; MG/1
1 CAPSULE, GELATIN COATED ORAL
Qty: 0 | Refills: 0 | DISCHARGE

## 2023-06-29 RX ORDER — TRAZODONE HCL 50 MG
1 TABLET ORAL
Qty: 0 | Refills: 0 | DISCHARGE

## 2023-06-29 RX ORDER — CLONAZEPAM 1 MG
0.5 TABLET ORAL EVERY 12 HOURS
Refills: 0 | Status: DISCONTINUED | OUTPATIENT
Start: 2023-06-29 | End: 2023-06-30

## 2023-06-29 RX ORDER — KETOROLAC TROMETHAMINE 30 MG/ML
15 SYRINGE (ML) INJECTION ONCE
Refills: 0 | Status: DISCONTINUED | OUTPATIENT
Start: 2023-06-29 | End: 2023-06-29

## 2023-06-29 RX ORDER — METOPROLOL TARTRATE 50 MG
25 TABLET ORAL
Refills: 0 | Status: DISCONTINUED | OUTPATIENT
Start: 2023-06-29 | End: 2023-06-30

## 2023-06-29 RX ORDER — TRAZODONE HCL 50 MG
100 TABLET ORAL AT BEDTIME
Refills: 0 | Status: DISCONTINUED | OUTPATIENT
Start: 2023-06-29 | End: 2023-06-30

## 2023-06-29 RX ORDER — ONDANSETRON 8 MG/1
4 TABLET, FILM COATED ORAL ONCE
Refills: 0 | Status: COMPLETED | OUTPATIENT
Start: 2023-06-29 | End: 2023-06-29

## 2023-06-29 RX ORDER — CLONAZEPAM 1 MG
1 TABLET ORAL
Qty: 0 | Refills: 0 | DISCHARGE

## 2023-06-29 RX ORDER — LEVOTHYROXINE SODIUM 125 MCG
1 TABLET ORAL
Qty: 0 | Refills: 0 | DISCHARGE

## 2023-06-29 RX ADMIN — Medication 10 MILLIGRAM(S): at 19:08

## 2023-06-29 RX ADMIN — PANTOPRAZOLE SODIUM 40 MILLIGRAM(S): 20 TABLET, DELAYED RELEASE ORAL at 16:15

## 2023-06-29 RX ADMIN — Medication 200 MILLIGRAM(S): at 19:09

## 2023-06-29 RX ADMIN — FAMOTIDINE 20 MILLIGRAM(S): 10 INJECTION INTRAVENOUS at 08:04

## 2023-06-29 RX ADMIN — ATORVASTATIN CALCIUM 20 MILLIGRAM(S): 80 TABLET, FILM COATED ORAL at 20:23

## 2023-06-29 RX ADMIN — Medication 15 MILLIGRAM(S): at 04:10

## 2023-06-29 RX ADMIN — MORPHINE SULFATE 4 MILLIGRAM(S): 50 CAPSULE, EXTENDED RELEASE ORAL at 08:35

## 2023-06-29 RX ADMIN — Medication 0.5 MILLIGRAM(S): at 19:08

## 2023-06-29 RX ADMIN — Medication 400 MILLIGRAM(S): at 04:20

## 2023-06-29 RX ADMIN — Medication 1000 MILLIGRAM(S): at 04:35

## 2023-06-29 RX ADMIN — Medication 650 MILLIGRAM(S): at 17:35

## 2023-06-29 RX ADMIN — SODIUM CHLORIDE 1000 MILLILITER(S): 9 INJECTION, SOLUTION INTRAVENOUS at 04:40

## 2023-06-29 RX ADMIN — Medication 5 MILLIGRAM(S): at 17:36

## 2023-06-29 RX ADMIN — Medication 150 MILLIGRAM(S): at 20:23

## 2023-06-29 RX ADMIN — ONDANSETRON 4 MILLIGRAM(S): 8 TABLET, FILM COATED ORAL at 03:40

## 2023-06-29 RX ADMIN — Medication 650 MILLIGRAM(S): at 18:01

## 2023-06-29 RX ADMIN — SODIUM CHLORIDE 1000 MILLILITER(S): 9 INJECTION, SOLUTION INTRAVENOUS at 03:40

## 2023-06-29 RX ADMIN — Medication 1000 MILLIGRAM(S): at 04:50

## 2023-06-29 RX ADMIN — MORPHINE SULFATE 4 MILLIGRAM(S): 50 CAPSULE, EXTENDED RELEASE ORAL at 08:05

## 2023-06-29 RX ADMIN — Medication 15 MILLIGRAM(S): at 03:40

## 2023-06-29 NOTE — H&P ADULT - HISTORY OF PRESENT ILLNESS
58F from home, PMHx  hypertension, hyperlipidemia, fibromyalgia, constipation, p/w worsening abdominal pain x1d. States she has not had a bowel movement in 4 to 5 days, reports diffuse abdominal pain since yesterday.  Patient reports taking MiraLAX and using Dulcolax with little improvement.  Patient reports diffuse abdominal pain and back pain since yesterday, reports nausea and vomiting.  Patient states not passing gas.  Patient states she strained had small hard bowel movements with notice of bright red blood and toilet earlier today.  Denies any fevers, chest pain, shortness of breath, dysuria, vaginal bleeding, vaginal discharge, numbness, weakness, or rash. 58F from home, PMHx  hypertension, hyperlipidemia, Lupus, fibromyalgia, constipation, hypothyroidism, and Irritable bowel syndrome, p/w worsening abdominal pain x1d. Notes that she has been constipated  for 5-days and yesterday took a laxative with little to no improvement, normally takes MiraLAX and Dulcolax with little improvement. Then had an urgency to go to the bathroom but instead passed what she describes a bloody BM, a/w diffuse abdominal pain since yesterday. Patient reports diffuse abdominal pain and back pain since yesterday, reports nausea and vomiting. Patient states she strained had small hard bowel movements with notice of bright red blood and toilet earlier today.  Denies any fevers, chest pain, shortness of breath, dysuria, vaginal bleeding, vaginal discharge, numbness, weakness, or rash.

## 2023-06-29 NOTE — PATIENT PROFILE ADULT - FALL HARM RISK - HARM RISK INTERVENTIONS

## 2023-06-29 NOTE — ED PROVIDER NOTE - PHYSICAL EXAMINATION
CONSTITUTIONAL: non-toxic, well appearing  SKIN: no rash, no petechiae.  EYES: pink conjunctiva, anicteric  NECK: Supple; no meningismus, no JVD  CARD: RRR, no murmurs, equal radial pulses bilaterally 2+  RESP: CTAB, no respiratory distress  ABD: Soft, diffusely tender, mildly distended, no peritoneal signs, no CVA tenderness  EXT: Normal ROM x4. No edema. No calf tenderness  NEURO: Alert, oriented. Neuro exam nonfocal, equal strength bilaterally  PSYCH: Cooperative, appropriate.

## 2023-06-29 NOTE — PHARMACOTHERAPY INTERVENTION NOTE - COMMENTS
Freetext allergy to estrogen (no alerts) was re-entered as allergy to estrogens; patient experienced hives with oral estrogen. Confirmed with patient.
Patient interviewed at bedside; allergy status and preferred pharmacy confirmed. Outpatient medication review updated based on list provided by patient.    Ubrelvy as needed for migraine is required about once a month, and albuterol PRN is required less frequently than a few times a week in a usual week.
Height entered  Weight check

## 2023-06-29 NOTE — ED PROVIDER NOTE - NS ED MD DISPO ISOLATION TYPES
"Mynor Valero is a 4 y.o. male here for a non-provider visit for:   FLU    Reason for immunization: Annual Flu Vaccine  Immunization records indicate need for vaccine: Yes, confirmed with Epic  Minimum interval has been met for this vaccine: Yes  ABN completed: Not Indicated    Order and dose verified by: STEVEN HUITRON Dated  8/15/19 was given to patient: Yes  All IAC Questionnaire questions were answered \"No.\"    Patient tolerated injection and no adverse effects were observed or reported: Yes    Pt scheduled for next dose in series: Not Indicated  "
None

## 2023-06-29 NOTE — ED PROVIDER NOTE - PROGRESS NOTE DETAILS
Lucks-DO: labs/imaging non-actionable. Patient with persistent nausea/vomiting and abd pain. Patient requesting additional symptomatic treatment and PO trial, may require admission. patient endorsed awaiting reassessment.  Recommended admission if still symptomatic.  Patient states she feels nauseated and is throwing up.  Reports ice chips because her stomach pain.  Discussed potential admission.  Patient would want to wait and see

## 2023-06-29 NOTE — H&P ADULT - PROBLEM SELECTOR PLAN 2
h/o IBS with internal hemorrhoids constipation + diarrhea type   follows Dr. Lamar   c/w Dicyclomine 10 mg TID PRN abdominal pain  start Simethicone  start dulcolax for constipation   would benefit from o/p egd which can be done with GI in the clinic

## 2023-06-29 NOTE — PATIENT PROFILE ADULT - FUNCTIONAL ASSESSMENT - BASIC MOBILITY 5.
Ms. Poole is a 44 year old female that presents today for rectal bleeding. This started a few days ago. The first episode was large in amount and the second time was a small amount of bright red blood. She notes that she is seeing blood in the toilet with bowel movements. She notes that she is not having any issues with constipation. She denies rectal pain with passage of bowel movements. She denies presence of rectal protrusions. She denies use of NSAIDs.  
4 = No assist / stand by assistance

## 2023-06-29 NOTE — ED ADULT NURSE NOTE - OBJECTIVE STATEMENT
Patient reported of abdominal pain, constipation, blood in stools 4 days with vomiting since yesterday.

## 2023-06-29 NOTE — ED ADULT NURSE REASSESSMENT NOTE - NS ED NURSE REASSESS COMMENT FT1
PT ADMITTED TO [facility] WITH DX OF INTRACTABLE NAUSEA AND VOMITING. AWAITING MEDICAL BED. WILL CONTINUE TO MONITOR

## 2023-06-29 NOTE — ED ADULT NURSE NOTE - NSFALLUNIVINTERV_ED_ALL_ED
Bed/Stretcher in lowest position, wheels locked, appropriate side rails in place/Call bell, personal items and telephone in reach/Instruct patient to call for assistance before getting out of bed/chair/stretcher/Non-slip footwear applied when patient is off stretcher/[place] to call system/Physically safe environment - no spills, clutter or unnecessary equipment/Purposeful proactive rounding/Room/bathroom lighting operational, light cord in reach

## 2023-06-29 NOTE — ED PROVIDER NOTE - OBJECTIVE STATEMENT
58 year old female presents with abdominal pain times-year-old female with past medical history hypertension, hyperlipidemia, fibromyalgia, constipation presents with abdominal pain since yesterday.  Patient states she has not had a bowel movement in 4 to 5 days, reports diffuse abdominal pain since yesterday.  Patient reports taking MiraLAX and using Dulcolax with little improvement.  Patient reports diffuse abdominal pain and back pain since yesterday, reports nausea and vomiting.  Patient states not passing gas.  Patient states she strained had small hard bowel movements with notice of bright red blood and toilet earlier today.  Denies any fevers, chest pain, shortness of breath, dysuria, vaginal bleeding, vaginal discharge, numbness, weakness, or rash.  Denies any additional complaints.

## 2023-06-29 NOTE — ED PROVIDER NOTE - CLINICAL SUMMARY MEDICAL DECISION MAKING FREE TEXT BOX
Lucks-DO: 58 year old female presents with abdominal pain times-year-old female with past medical history hypertension, hyperlipidemia, fibromyalgia, constipation presents with abdominal pain since yesterday.  Patient states she has not had a bowel movement in 4 to 5 days, reports diffuse abdominal pain since yesterday.  Patient reports taking MiraLAX and using Dulcolax with little improvement.  Patient reports diffuse abdominal pain and back pain since yesterday, reports nausea and vomiting.  Patient states not passing gas.  Patient states she strained had small hard bowel movements with notice of bright red blood and toilet earlier today.  Denies any fevers, chest pain, shortness of breath, dysuria, vaginal bleeding, vaginal discharge, numbness, weakness, or rash. Abdomen diffusely tender. Will obtain labs r/o pancreatitis imaging r/o obstruction r/o colitis, provide supportive treatment/PO trial with dispo pending workup.

## 2023-06-29 NOTE — H&P ADULT - PROBLEM SELECTOR PLAN 1
c/o 2-3 episodes of BRBPR a/w abdominal pain after taking a laxative for chronic constipation   follows GI, [facility]  prior colonoscopy + internal hemorrhoids  CT showing diverticulitis without diverticulitis, no obstruction   NPO for now   c/w IVF  c/w PPI   if continues to worsen consider GI in the AM

## 2023-06-29 NOTE — H&P ADULT - NSHPPHYSICALEXAM_GEN_ALL_CORE
Vital Signs Last 24 Hrs  T(C): 36.9 (29 Jun 2023 11:15), Max: 36.9 (29 Jun 2023 11:15)  T(F): 98.5 (29 Jun 2023 11:15), Max: 98.5 (29 Jun 2023 11:15)  HR: 61 (29 Jun 2023 11:15) (61 - 88)  BP: 129/62 (29 Jun 2023 11:15) (113/78 - 144/82)  BP(mean): --  RR: 18 (29 Jun 2023 11:15) (18 - 18)  SpO2: 95% (29 Jun 2023 11:15) (95% - 96%)    Parameters below as of 29 Jun 2023 05:44  Patient On (Oxygen Delivery Method): room air Vital Signs Last 24 Hrs  T(C): 36.9 (29 Jun 2023 11:15), Max: 36.9 (29 Jun 2023 11:15)  T(F): 98.5 (29 Jun 2023 11:15), Max: 98.5 (29 Jun 2023 11:15)  HR: 61 (29 Jun 2023 11:15) (61 - 88)  BP: 129/62 (29 Jun 2023 11:15) (113/78 - 144/82)  BP(mean): --  RR: 18 (29 Jun 2023 11:15) (18 - 18)  SpO2: 95% (29 Jun 2023 11:15) (95% - 96%)    Parameters below as of 29 Jun 2023 05:44  Patient On (Oxygen Delivery Method): room air    GENERAL: NAD, lying in bed comfortably  HEAD:  Atraumatic, Normocephalic  EYES: EOMI, PERRLA, conjunctiva and sclera clear  ENT: Moist mucous membranes  NECK: Supple, No JVD  CHEST/LUNG: Clear to auscultation bilaterally; No rales, rhonchi, wheezing, or rubs. Unlabored respirations  HEART: Regular rate and rhythm; No murmurs, rubs, or gallops   ABDOMEN: Bowel sounds present; Soft, Nondistended. No hepatomegally (+) LLQ tenderness on light and deep palpation   EXTREMITIES:  2+ Peripheral Pulses, brisk capillary refill. No clubbing, cyanosis, or edema  NERVOUS SYSTEM:  Alert & Oriented X3, speech clear. No deficits   MSK: FROM all 4 extremities, full and equal strength  SKIN: No rashes or lesions

## 2023-06-29 NOTE — H&P ADULT - PROBLEM SELECTOR PLAN 5
p/w Hgb 11.1, reports BRBPR x2-3 episodes   baseline Hgb 11-12  likely in the setting of  hemorrhoidal bleed c/b Iron deficiency h/o prior blood and iron infusions  CT a/p shows diverticulosis  _____signs of bleeding on exam  Maintain active T&S q72h, transfuse for Hgb <7  Monitor CBC daily  Follows Dr. GALLARDO p/w Hgb 11.1, reports BRBPR x2-3 episodes   baseline Hgb 11-12  likely in the setting of  hemorrhoidal bleed c/b Iron deficiency h/o prior blood and iron infusions  CT a/p shows diverticulosis  Maintain active T&S q72h, transfuse for Hgb <7  Monitor CBC daily  Follows Dr. Snell o/p

## 2023-06-29 NOTE — H&P ADULT - NSHPREVIEWOFSYSTEMS_GEN_ALL_CORE
CONSTITUTIONAL: No weakness and fatigue; No fevers and chills  EYES/ENT: No visual changes;  No vertigo or throat pain   NECK: No pain or stiffness  RESPIRATORY: No cough, wheezing, hemoptysis; No shortness of breath  CARDIOVASCULAR: No chest pain or palpitations  GASTROINTESTINAL: (+) abdominal or epigastric pain. (+) nausea/ vomiting, No hematemesis; No diarrhea (+)constipation. No melena or hematochezia.  GENITOURINARY: No dysuria, frequency or hematuria  NEUROLOGICAL: No numbness or weakness  SKIN: No itching, rashes

## 2023-06-29 NOTE — H&P ADULT - ATTENDING COMMENTS
57yo F PMHx of SLE, Migraines, HTN, IBS, Gastroparesis presented with abdominal pain and intractable vomiting. Patient reports she has been constipated for the past week. She took 4 dulcolax yesterday, but has been having nausea and vomiting. Patient went to the bathroom today and had blood fill up the toilet. She denies straining, or known history of hemorrhoids. Outpatient GI note reviewed by Dr. Lamar, patient has history of internal hemorrhoids, IBS, Gastroparesis. In the ED, patient unable to tolerate even small sips of liquid. Received morphine with some improvement in pain, but it returned. Reports having one more bowel movement with blood in the toilet. Hgb appears stable at baseline. CT A/P without acute finding.    Reviewed BMP, CBC, PT/PTT  Order CBC  Reviewed CT A/P, no significant stool burden    #Intractable Nausea/Vomiting, unclear etiology, patient with known hiatal hernia, gastroparesis. Possible gastritis vs. gastroparesis exacerbation  #Hematochezia, Hemoglobin currently stable, suspect hemorrhoidal bleed, but if worsening could be diverticular  #SLE  #IBS  #HTN  #Anxiety Disorder    -Start on reglan standing for possible gastroparesis  -Continue on bentyl for IBS  -Bowel regimen for constipation  -trend Hgb, active T&S, if Hgb downtrending will consult GI, Dr. Lamar on vacation  -avoid opioids  -PPI BID  -check TSH  -continue home anxiety medications  -continue on plaquenil

## 2023-06-29 NOTE — H&P ADULT - ASSESSMENT
58F from home, PMHx  hypertension, hyperlipidemia, Lupus, fibromyalgia, constipation, hypothyroidism, and Irritable bowel syndrome, p/w worsening abdominal pain x1d. CT imaging non- diagnostic. Admitted for rectal bleed in the setting of  IBS w/ constipation, bowel regiment started and hemoglobin monitored, will advance diet as tolerated.

## 2023-06-29 NOTE — ED ADULT NURSE REASSESSMENT NOTE - NS ED NURSE REASSESS COMMENT FT1
RECEIVED PT ON ROUNDS A&O X 3. C/O MID ABDOMINAL PAIN 8/10. +NAUSEA. DENIES VOMITING OR CHILLS. SEEN AND EVALUATED BY DR. DEVI. MORPHINE 4 MG AND PEPCID 20 MG IV GIVEN AS ORDERED WILL CONTINUE TO MONITOR

## 2023-06-29 NOTE — H&P ADULT - PROBLEM SELECTOR PLAN 6
h/o HTN on Lopressor 25mg BID  will hold for right now in the setting of rectal bleed   can resume at reduced dose as indicated  monitor BP

## 2023-06-30 ENCOUNTER — TRANSCRIPTION ENCOUNTER (OUTPATIENT)
Age: 58
End: 2023-06-30

## 2023-06-30 VITALS — DIASTOLIC BLOOD PRESSURE: 73 MMHG | HEART RATE: 75 BPM | SYSTOLIC BLOOD PRESSURE: 134 MMHG

## 2023-06-30 LAB
ALBUMIN SERPL ELPH-MCNC: 3.8 G/DL — SIGNIFICANT CHANGE UP (ref 3.5–5)
ALP SERPL-CCNC: 101 U/L — SIGNIFICANT CHANGE UP (ref 40–120)
ALT FLD-CCNC: 40 U/L DA — SIGNIFICANT CHANGE UP (ref 10–60)
ANION GAP SERPL CALC-SCNC: 8 MMOL/L — SIGNIFICANT CHANGE UP (ref 5–17)
AST SERPL-CCNC: 27 U/L — SIGNIFICANT CHANGE UP (ref 10–40)
BASOPHILS # BLD AUTO: 0.06 K/UL — SIGNIFICANT CHANGE UP (ref 0–0.2)
BASOPHILS NFR BLD AUTO: 1.6 % — SIGNIFICANT CHANGE UP (ref 0–2)
BILIRUB SERPL-MCNC: 0.4 MG/DL — SIGNIFICANT CHANGE UP (ref 0.2–1.2)
BLD GP AB SCN SERPL QL: SIGNIFICANT CHANGE UP
BUN SERPL-MCNC: 13 MG/DL — SIGNIFICANT CHANGE UP (ref 7–18)
CALCIUM SERPL-MCNC: 8.8 MG/DL — SIGNIFICANT CHANGE UP (ref 8.4–10.5)
CHLORIDE SERPL-SCNC: 106 MMOL/L — SIGNIFICANT CHANGE UP (ref 96–108)
CO2 SERPL-SCNC: 25 MMOL/L — SIGNIFICANT CHANGE UP (ref 22–31)
CREAT SERPL-MCNC: 0.71 MG/DL — SIGNIFICANT CHANGE UP (ref 0.5–1.3)
CULTURE RESULTS: SIGNIFICANT CHANGE UP
EGFR: 97 ML/MIN/1.73M2 — SIGNIFICANT CHANGE UP
EOSINOPHIL # BLD AUTO: 0.11 K/UL — SIGNIFICANT CHANGE UP (ref 0–0.5)
EOSINOPHIL NFR BLD AUTO: 2.9 % — SIGNIFICANT CHANGE UP (ref 0–6)
GLUCOSE BLDC GLUCOMTR-MCNC: 90 MG/DL — SIGNIFICANT CHANGE UP (ref 70–99)
GLUCOSE BLDC GLUCOMTR-MCNC: 92 MG/DL — SIGNIFICANT CHANGE UP (ref 70–99)
GLUCOSE SERPL-MCNC: 92 MG/DL — SIGNIFICANT CHANGE UP (ref 70–99)
HCT VFR BLD CALC: 35.5 % — SIGNIFICANT CHANGE UP (ref 34.5–45)
HGB BLD-MCNC: 11.2 G/DL — LOW (ref 11.5–15.5)
IMM GRANULOCYTES NFR BLD AUTO: 0 % — SIGNIFICANT CHANGE UP (ref 0–0.9)
LYMPHOCYTES # BLD AUTO: 1.49 K/UL — SIGNIFICANT CHANGE UP (ref 1–3.3)
LYMPHOCYTES # BLD AUTO: 39.6 % — SIGNIFICANT CHANGE UP (ref 13–44)
MAGNESIUM SERPL-MCNC: 2.4 MG/DL — SIGNIFICANT CHANGE UP (ref 1.6–2.6)
MCHC RBC-ENTMCNC: 29.7 PG — SIGNIFICANT CHANGE UP (ref 27–34)
MCHC RBC-ENTMCNC: 31.5 GM/DL — LOW (ref 32–36)
MCV RBC AUTO: 94.2 FL — SIGNIFICANT CHANGE UP (ref 80–100)
MONOCYTES # BLD AUTO: 0.34 K/UL — SIGNIFICANT CHANGE UP (ref 0–0.9)
MONOCYTES NFR BLD AUTO: 9 % — SIGNIFICANT CHANGE UP (ref 2–14)
NEUTROPHILS # BLD AUTO: 1.76 K/UL — LOW (ref 1.8–7.4)
NEUTROPHILS NFR BLD AUTO: 46.9 % — SIGNIFICANT CHANGE UP (ref 43–77)
NRBC # BLD: 0 /100 WBCS — SIGNIFICANT CHANGE UP (ref 0–0)
PHOSPHATE SERPL-MCNC: 4.2 MG/DL — SIGNIFICANT CHANGE UP (ref 2.5–4.5)
PLATELET # BLD AUTO: 345 K/UL — SIGNIFICANT CHANGE UP (ref 150–400)
POTASSIUM SERPL-MCNC: 3.8 MMOL/L — SIGNIFICANT CHANGE UP (ref 3.5–5.3)
POTASSIUM SERPL-SCNC: 3.8 MMOL/L — SIGNIFICANT CHANGE UP (ref 3.5–5.3)
PROT SERPL-MCNC: 7.5 G/DL — SIGNIFICANT CHANGE UP (ref 6–8.3)
RBC # BLD: 3.77 M/UL — LOW (ref 3.8–5.2)
RBC # FLD: 14.3 % — SIGNIFICANT CHANGE UP (ref 10.3–14.5)
SODIUM SERPL-SCNC: 139 MMOL/L — SIGNIFICANT CHANGE UP (ref 135–145)
SPECIMEN SOURCE: SIGNIFICANT CHANGE UP
TSH SERPL-MCNC: 9.75 UU/ML — HIGH (ref 0.34–4.82)
WBC # BLD: 3.76 K/UL — LOW (ref 3.8–10.5)
WBC # FLD AUTO: 3.76 K/UL — LOW (ref 3.8–10.5)

## 2023-06-30 PROCEDURE — 81001 URINALYSIS AUTO W/SCOPE: CPT

## 2023-06-30 PROCEDURE — 84443 ASSAY THYROID STIM HORMONE: CPT

## 2023-06-30 PROCEDURE — 85610 PROTHROMBIN TIME: CPT

## 2023-06-30 PROCEDURE — 84100 ASSAY OF PHOSPHORUS: CPT

## 2023-06-30 PROCEDURE — 99239 HOSP IP/OBS DSCHRG MGMT >30: CPT

## 2023-06-30 PROCEDURE — 85025 COMPLETE CBC W/AUTO DIFF WBC: CPT

## 2023-06-30 PROCEDURE — 80053 COMPREHEN METABOLIC PANEL: CPT

## 2023-06-30 PROCEDURE — 85730 THROMBOPLASTIN TIME PARTIAL: CPT

## 2023-06-30 PROCEDURE — 87086 URINE CULTURE/COLONY COUNT: CPT

## 2023-06-30 PROCEDURE — 83605 ASSAY OF LACTIC ACID: CPT

## 2023-06-30 PROCEDURE — 96375 TX/PRO/DX INJ NEW DRUG ADDON: CPT

## 2023-06-30 PROCEDURE — 36415 COLL VENOUS BLD VENIPUNCTURE: CPT

## 2023-06-30 PROCEDURE — 96374 THER/PROPH/DIAG INJ IV PUSH: CPT

## 2023-06-30 PROCEDURE — 86850 RBC ANTIBODY SCREEN: CPT

## 2023-06-30 PROCEDURE — 83735 ASSAY OF MAGNESIUM: CPT

## 2023-06-30 PROCEDURE — 86900 BLOOD TYPING SEROLOGIC ABO: CPT

## 2023-06-30 PROCEDURE — 99285 EMERGENCY DEPT VISIT HI MDM: CPT | Mod: 25

## 2023-06-30 PROCEDURE — 74177 CT ABD & PELVIS W/CONTRAST: CPT | Mod: MA

## 2023-06-30 PROCEDURE — 83690 ASSAY OF LIPASE: CPT

## 2023-06-30 PROCEDURE — 86901 BLOOD TYPING SEROLOGIC RH(D): CPT

## 2023-06-30 PROCEDURE — 82962 GLUCOSE BLOOD TEST: CPT

## 2023-06-30 RX ORDER — METOCLOPRAMIDE HCL 10 MG
1 TABLET ORAL
Qty: 9 | Refills: 0
Start: 2023-06-30 | End: 2023-07-02

## 2023-06-30 RX ORDER — SIMETHICONE 80 MG/1
80 TABLET, CHEWABLE ORAL THREE TIMES A DAY
Refills: 0 | Status: DISCONTINUED | OUTPATIENT
Start: 2023-06-30 | End: 2023-06-30

## 2023-06-30 RX ORDER — TRAZODONE HCL 50 MG
100 TABLET ORAL AT BEDTIME
Refills: 0 | Status: DISCONTINUED | OUTPATIENT
Start: 2023-06-30 | End: 2023-06-30

## 2023-06-30 RX ORDER — MELOXICAM 15 MG/1
1 TABLET ORAL
Refills: 0 | DISCHARGE

## 2023-06-30 RX ADMIN — Medication 25 MILLIGRAM(S): at 00:30

## 2023-06-30 RX ADMIN — Medication 10 MILLIGRAM(S): at 05:54

## 2023-06-30 RX ADMIN — Medication 0.5 MILLIGRAM(S): at 18:32

## 2023-06-30 RX ADMIN — PANTOPRAZOLE SODIUM 40 MILLIGRAM(S): 20 TABLET, DELAYED RELEASE ORAL at 05:53

## 2023-06-30 RX ADMIN — Medication 100 MICROGRAM(S): at 05:53

## 2023-06-30 RX ADMIN — Medication 10 MILLIGRAM(S): at 13:21

## 2023-06-30 RX ADMIN — Medication 25 MILLIGRAM(S): at 18:32

## 2023-06-30 RX ADMIN — Medication 10 MILLIGRAM(S): at 11:05

## 2023-06-30 RX ADMIN — Medication 10 MILLIGRAM(S): at 05:53

## 2023-06-30 RX ADMIN — Medication 100 MILLIGRAM(S): at 00:30

## 2023-06-30 RX ADMIN — Medication 200 MILLIGRAM(S): at 11:05

## 2023-06-30 RX ADMIN — Medication 25 MILLIGRAM(S): at 05:53

## 2023-06-30 RX ADMIN — Medication 150 MILLIGRAM(S): at 11:05

## 2023-06-30 RX ADMIN — Medication 0.5 MILLIGRAM(S): at 05:54

## 2023-06-30 RX ADMIN — Medication 10 MILLIGRAM(S): at 15:48

## 2023-06-30 NOTE — DISCHARGE NOTE PROVIDER - NSDCCPCAREPLAN_GEN_ALL_CORE_FT
PRINCIPAL DISCHARGE DIAGNOSIS  Diagnosis: Diverticulosis of intestine with bleeding  Assessment and Plan of Treatment: You presented with intractable abdominal pain with associated constipation and episode of rectal bleeding. You were following outpatient Gastroenterology (Dr. Lamar). You had a recent colonoscopy showing internal hemorrhoids. Hemoglobin on admission was at baseline (11-12). CT Abdomen showed divertilosis without diverticulitis. You were initially placed on NPO but later your diet was advanced as tolerated. You were also started on IV pantoprazole. Outpatient medication Dicyclomine was resumed for your IBS. Dulcolax was given for constipation.  You were stable, improved and subsequently discharged. Follow-up with PCP (Dr. Sales) and GI (Dr. Lamar).      SECONDARY DISCHARGE DIAGNOSES  Diagnosis: Anemia  Assessment and Plan of Treatment: You presented with intractable abdominal pain with associated constipation and episode of rectal bleeding. You were following outpatient Gastroenterology (Dr. Lamar). You had a recent colonoscopy showing internal hemorrhoids. Hemoglobin on admission was at baseline (11-12). You were also following outpatient Hematology (Dr. Snell). You were getting Iron Infusion every 6 months as outpatient.   You were stable, improved and subsequently discharged. Follow-up with PCP (Dr. Sales), GI (Dr. Lamar) and Hematology (Dr. Snell).    Diagnosis: IBS (irritable bowel syndrome)  Assessment and Plan of Treatment: You presented with intractable abdominal pain with associated constipation and episode of rectal bleeding. You were following outpatient Gastroenterology (Dr. Lamar). You had a recent colonoscopy showing internal hemorrhoids. You were taking Dicyclomine as outpatient for your IBS. Continue taking your home medication.   You were stable, improved and subsequently discharged. Follow-up with PCP (Dr. Sales) and GI (Dr. Lamar).    Diagnosis: HTN (hypertension)  Assessment and Plan of Treatment: You have Hypertension on home medication Metoprolol. Blood pressure was monitored and home medication resumed. Follow-up with your PCP (Dr. Sales).    Diagnosis: Hypothyroidism  Assessment and Plan of Treatment: You presented with intractable abdominal pain and constipation. You have history of Hypothyroidism with home medication Levothyroxine 100 mcg daily. Thyroid Stimulating Hormone (TSH) was elevated 9.75. You were supposed to take your medications daily 30 minutes before breakfast. Advised correct medication regimen. Consider outpatient thyroid function test (FT4). Follow-up with your PCP (Dr. Sales) and Endocrinology.    Diagnosis: SLE (systemic lupus erythematosus)  Assessment and Plan of Treatment: You have SLE on home medication Hydroxychloroquine and Meloxicam. Meloxicam was held in the setting of rectal bleeding. Continue taking Hydroxychloroquine. Follow-up with your PCP (Dr. Sales) and Rheumatology.    Diagnosis: Anxiety  Assessment and Plan of Treatment: You have Anxiety on home medication Trazodone and Venlafaxine. Continue taking your home medication. Follow-up with your PCP (Dr. Sales).     PRINCIPAL DISCHARGE DIAGNOSIS  Diagnosis: Diverticulosis of intestine with bleeding  Assessment and Plan of Treatment: You presented with intractable abdominal pain with associated constipation and episode of rectal bleeding. You were following outpatient Gastroenterology (Dr. Lamar). You had a recent colonoscopy showing internal hemorrhoids. Hemoglobin on admission was at baseline (11-12). CT Abdomen showed divertilosis without diverticulitis. You were initially placed on NPO but later your diet was advanced as tolerated. You were also started on IV pantoprazole. Outpatient medication Dicyclomine was resumed for your IBS. Dulcolax was given for constipation.  You were stable, improved and subsequently discharged. Follow-up with PCP (Dr. Sales) and GI (Dr. Lamar).      SECONDARY DISCHARGE DIAGNOSES  Diagnosis: Anemia  Assessment and Plan of Treatment: You presented with intractable abdominal pain with associated constipation and episode of rectal bleeding. You were following outpatient Gastroenterology (Dr. Lamar). You had a recent colonoscopy showing internal hemorrhoids. Hemoglobin on admission was at baseline (11-12). You were also following outpatient Hematology (Dr. Snell). You were getting Iron Infusion every 6 months as outpatient.   You were stable, improved and subsequently discharged. Follow-up with PCP (Dr. Sales), GI (Dr. Lamar) and Hematology (Dr. Snell).    Diagnosis: IBS (irritable bowel syndrome)  Assessment and Plan of Treatment: You presented with intractable abdominal pain with associated constipation and episode of rectal bleeding. You were following outpatient Gastroenterology (Dr. Lamar). You had a recent colonoscopy showing internal hemorrhoids. You were taking Dicyclomine as outpatient for your IBS. Continue taking your home medication.   You were stable, improved and subsequently discharged. Follow-up with PCP (Dr. Sales) and GI (Dr. Lamar).    Diagnosis: HTN (hypertension)  Assessment and Plan of Treatment: You have Hypertension on home medication Metoprolol. Blood pressure was monitored and home medication resumed. Follow-up with your PCP (Dr. Sales).    Diagnosis: Hypothyroidism  Assessment and Plan of Treatment: You presented with intractable abdominal pain and constipation. You have history of Hypothyroidism with home medication Levothyroxine 100 mcg daily. Thyroid Stimulating Hormone (TSH) was elevated 9.75. You were supposed to take your medications daily 30 minutes before breakfast. Advised correct medication regimen. Consider outpatient thyroid function test (FT4). Follow-up with your PCP (Dr. Sales) and Endocrinology.    Diagnosis: SLE (systemic lupus erythematosus)  Assessment and Plan of Treatment: You have SLE on home medication Hydroxychloroquine and Meloxicam. Meloxicam was held in the setting of rectal bleeding. Continue taking Hydroxychloroquine. Follow-up with your PCP (Dr. Sales) and Rheumatology.    Diagnosis: Anxiety  Assessment and Plan of Treatment: You have Anxiety on home medication Trazodone, Venlafaxine. and Clonazepam. Continue taking your home medication. Follow-up with your PCP (Dr. Sales).     PRINCIPAL DISCHARGE DIAGNOSIS  Diagnosis: Gastroparesis  Assessment and Plan of Treatment: You presented to the hospital with itnractable nausea and vomiting. You have a history of gastroparesis per your outpatient GI doctor. You were started on reglan q8h and your nausea improved. You tolerated a PO diet and are ready for discharge. Please follow-up with your PCP and gastroenterologist for further work-up.      SECONDARY DISCHARGE DIAGNOSES  Diagnosis: Anemia  Assessment and Plan of Treatment: You presented with intractable abdominal pain with associated constipation and episode of rectal bleeding. You were following outpatient Gastroenterology (Dr. Lamar). You had a recent colonoscopy showing internal hemorrhoids. Hemoglobin on admission was at baseline (11-12). You were also following outpatient Hematology (Dr. Snell). You were getting Iron Infusion every 6 months as outpatient.   You were stable, improved and subsequently discharged. Follow-up with PCP (Dr. Sales), GI (Dr. Lamar) and Hematology (Dr. Snell).    Diagnosis: IBS (irritable bowel syndrome)  Assessment and Plan of Treatment: You presented with intractable abdominal pain with associated constipation and episode of rectal bleeding. You were following outpatient Gastroenterology (Dr. Lamar). You had a recent colonoscopy showing internal hemorrhoids. You were taking Dicyclomine as outpatient for your IBS. Continue taking your home medication.   You were stable, improved and subsequently discharged. Follow-up with PCP (Dr. Sales) and GI (Dr. Lamar).    Diagnosis: HTN (hypertension)  Assessment and Plan of Treatment: You have Hypertension on home medication Metoprolol. Blood pressure was monitored and home medication resumed. Follow-up with your PCP (Dr. Sales).    Diagnosis: Hypothyroidism  Assessment and Plan of Treatment: You presented with intractable abdominal pain and constipation. You have history of Hypothyroidism with home medication Levothyroxine 100 mcg daily. Thyroid Stimulating Hormone (TSH) was elevated 9.75. You were supposed to take your medications daily on an empty stomach. Please wait atleast 45 minutes to an hour before taking anything else by mouth. Advised correct medication regimen. Repeat thyroid testing in 4-6 weeks. Follow-up with your PCP (Dr. Sales) and Endocrinology.    Diagnosis: Internal hemorrhoid  Assessment and Plan of Treatment: You presented with intractable abdominal pain with associated constipation and episode of rectal bleeding. You were following outpatient Gastroenterology (Dr. Lamar). You had a recent colonoscopy showing internal hemorrhoids. Hemoglobin on admission was at baseline (11-12). CT Abdomen showed divertilosis without diverticulitis. You were initially placed on NPO but later your diet was advanced as tolerated. You were also started on IV pantoprazole. Outpatient medication Dicyclomine was resumed for your IBS. Dulcolax was given for constipation.  You were stable, improved and subsequently discharged. Follow-up with PCP (Dr. Sales) and GI (Dr. Lamar).    Diagnosis: SLE (systemic lupus erythematosus)  Assessment and Plan of Treatment: You have SLE on home medication Hydroxychloroquine and Meloxicam. Meloxicam was held in the setting of rectal bleeding. Continue taking Hydroxychloroquine. Follow-up with your PCP (Dr. Sales) and Rheumatology.    Diagnosis: Anxiety  Assessment and Plan of Treatment: You have Anxiety on home medication Trazodone, Venlafaxine. and Clonazepam. Continue taking your home medication. Follow-up with your PCP (Dr. Sales).

## 2023-06-30 NOTE — DISCHARGE NOTE PROVIDER - CARE PROVIDER_API CALL
Roe Sales  Internal Medicine  [address], [address]  [place] [address]  Phone: [phone]  Fax: [phone]  Follow Up Time:     Shan Lamar  Gastroenterology  [address] [place] [address]  Phone: [phone]  Fax: [phone]  Follow Up Time:     Spenser Snell  Medical Oncology  [address] [place] [address]  Phone: [phone]  Fax: [phone]  Follow Up Time:

## 2023-06-30 NOTE — PROGRESS NOTE ADULT - PROBLEM SELECTOR PLAN 4
takes Synthroid 100mcg   c/w home meds   f/u TSH elevated >9; in the setting of taking incorrectly, counseled and will follow up outpatient

## 2023-06-30 NOTE — PROGRESS NOTE ADULT - PROBLEM SELECTOR PLAN 1
c/o 2-3 episodes of BRBPR a/w abdominal pain after taking a laxative for chronic constipation   follows GI, Willard Almanza  prior colonoscopy + internal hemorrhoids  CT showing diverticulitis without diverticulitis, no obstruction   Advancing diet  Can be DC with outpatient GI follow up

## 2023-06-30 NOTE — DISCHARGE NOTE PROVIDER - NSDCFUSCHEDAPPT_GEN_ALL_CORE_FT
Kole Bartholomew  [facility] Physician [facility]  ORTHOSURG [address] [facility]  Scheduled Appointment: 07/13/2023    Roe Sales  [facility] Physician [facility]  INTMED [address]  Scheduled Appointment: 07/22/2023    Tamiko Duff  [facility] Physician [facility] [address] [facility] [address]  Scheduled Appointment: 08/10/2023    Shan Lamar  [facility] Physician [facility] [address] [facility]  Scheduled Appointment: 09/25/2023

## 2023-06-30 NOTE — DISCHARGE NOTE NURSING/CASE MANAGEMENT/SOCIAL WORK - PATIENT PORTAL LINK FT
You can access the FollowMyHealth Patient Portal offered by [facility] by registering at the following website: [email or web address]. By joining [employer]’s FollowMyHealth portal, you will also be able to view your health information using other applications (apps) compatible with our system.

## 2023-06-30 NOTE — DISCHARGE NOTE PROVIDER - PROVIDER TOKENS
PROVIDER:[TOKEN:[[id number]]],PROVIDER:[TOKEN:[[id number]]],PROVIDER:[TOKEN:[[id number]]]

## 2023-06-30 NOTE — PROGRESS NOTE ADULT - PROBLEM SELECTOR PLAN 5
p/w Hgb 11.1, reports BRBPR x2-3 episodes   baseline Hgb 11-12  likely in the setting of  hemorrhoidal bleed c/b Iron deficiency h/o prior blood and iron infusions  CT a/p shows diverticulosis  Maintain active T&S q72h, transfuse for Hgb <7  Monitor CBC daily  Follows Dr. Snell o/p

## 2023-06-30 NOTE — DISCHARGE NOTE NURSING/CASE MANAGEMENT/SOCIAL WORK - NSDCVIVACCINE_GEN_ALL_CORE_FT
influenza, injectable, quadrivalent, preservative free; 10-Sep-2016 13:02; Lisa Merida (RN); [employer]; [id number]; IntraMuscular; Deltoid Left.; 0.5 milliLiter(s); VIS (VIS Published: 07-Aug-2015, VIS Presented: 10-Sep-2016);   influenza, injectable, quadrivalent, preservative free; 02-Oct-2018 16:27; Waylon Frazier (SHRUTHI); [employer]; 499DE (Exp. Date: 30-Jun-2019); IntraMuscular; Deltoid Left.; 0.5 milliLiter(s); VIS (VIS Published: 07-Aug-2015, VIS Presented: 02-Oct-2018);

## 2023-06-30 NOTE — PROGRESS NOTE ADULT - SUBJECTIVE AND OBJECTIVE BOX
PGY-1 Progress Note discussed with attending    PAGER #: [phone]] TILL 5:00 PM  PLEASE CONTACT ON CALL TEAM:  - On Call Team (Please refer to Efrain) FROM 5:00 PM - 8:30PM  - Nightfloat Team FROM 8:30 -7:30 AM    CHIEF COMPLAINT & BRIEF HOSPITAL COURSE:    INTERVAL HPI/OVERNIGHT EVENTS: no acute overnight events, pt. seen and examined at bedside, offers no complaints. no more bloody bowel movements; no pain  MEDICATIONS  (STANDING):  atorvastatin 20 milliGRAM(s) Oral at bedtime  clonazePAM  Tablet 0.5 milliGRAM(s) Oral every 12 hours  dicyclomine 10 milliGRAM(s) Oral three times a day before meals  hydroxychloroquine 200 milliGRAM(s) Oral daily  levothyroxine 100 MICROGram(s) Oral daily  metoclopramide Injectable 10 milliGRAM(s) IV Push every 8 hours  metoprolol tartrate 25 milliGRAM(s) Oral two times a day  pantoprazole  Injectable 40 milliGRAM(s) IV Push every 12 hours  traZODone 100 milliGRAM(s) Oral at bedtime  venlafaxine XR. 150 milliGRAM(s) Oral daily    MEDICATIONS  (PRN):  acetaminophen     Tablet .. 650 milliGRAM(s) Oral every 6 hours PRN Temp greater or equal to 38C (100.4F), Mild Pain (1 - 3)  albuterol    90 MICROgram(s) HFA Inhaler 2 Puff(s) Inhalation every 6 hours PRN Shortness of Breath and/or Wheezing  aluminum hydroxide/magnesium hydroxide/simethicone Suspension 30 milliLiter(s) Oral every 4 hours PRN Dyspepsia  bisacodyl 5 milliGRAM(s) Oral every 12 hours PRN Constipation  melatonin 3 milliGRAM(s) Oral at bedtime PRN Insomnia  ondansetron Injectable 4 milliGRAM(s) IV Push every 8 hours PRN Nausea and/or Vomiting  simethicone 80 milliGRAM(s) Chew three times a day PRN Gas      REVIEW OF SYSTEMS:  CONSTITUTIONAL: No fever, weight loss, or fatigue  RESPIRATORY: No shortness of breath  CARDIOVASCULAR: No chest pain  GASTROINTESTINAL: No abdominal pain.  GENITOURINARY: No dysuria  NEUROLOGICAL: No headaches  SKIN: No itching, burning, rashes    Vital Signs Last 24 Hrs  T(C): 37.2 (30 Jun 2023 13:13), Max: 37.2 (30 Jun 2023 13:13)  T(F): 98.9 (30 Jun 2023 13:13), Max: 98.9 (30 Jun 2023 13:13)  HR: 70 (30 Jun 2023 13:13) (65 - 72)  BP: 117/69 (30 Jun 2023 13:13) (117/69 - 163/82)  BP(mean): --  RR: 16 (30 Jun 2023 13:13) (16 - 18)  SpO2: 96% (30 Jun 2023 13:13) (96% - 97%)    Parameters below as of 30 Jun 2023 13:13  Patient On (Oxygen Delivery Method): room air        PHYSICAL EXAMINATION:  GENERAL: NAD, well built  HEAD:  Atraumatic, Normocephalic  EYES:  conjunctiva and sclera clear  CHEST/LUNG: Clear to auscultation. No rales, rhonchi, wheezing, or rubs  HEART: Regular rate and rhythm; No murmurs, rubs, or gallops  ABDOMEN: Soft, Nontender, Nondistended; Bowel sounds present  NERVOUS SYSTEM:  Alert & Oriented X3,    EXTREMITIES:  2+ Peripheral Pulses, No clubbing, cyanosis, or edema  SKIN: warm dry                          11.2   3.76  )-----------( 345      ( 30 Jun 2023 06:50 )             35.5     06-30    139  |  106  |  13  ----------------------------<  92  3.8   |  25  |  0.71    Ca    8.8      30 Jun 2023 06:50  Phos  4.2     06-30  Mg     2.4     06-30    TPro  7.5  /  Alb  3.8  /  TBili  0.4  /  DBili  x   /  AST  27  /  ALT  40  /  AlkPhos  101  06-30    LIVER FUNCTIONS - ( 30 Jun 2023 06:50 )  Alb: 3.8 g/dL / Pro: 7.5 g/dL / ALK PHOS: 101 U/L / ALT: 40 U/L DA / AST: 27 U/L / GGT: x               PT/INR - ( 29 Jun 2023 03:51 )   PT: 12.0 sec;   INR: 1.01 ratio         PTT - ( 29 Jun 2023 03:51 )  PTT:32.4 sec    CAPILLARY BLOOD GLUCOSE      RADIOLOGY & ADDITIONAL TESTS:

## 2023-07-10 ENCOUNTER — NON-APPOINTMENT (OUTPATIENT)
Age: 58
End: 2023-07-10

## 2023-07-13 ENCOUNTER — APPOINTMENT (OUTPATIENT)
Dept: ORTHOPEDIC SURGERY | Facility: CLINIC | Age: 58
End: 2023-07-13
Payer: MEDICARE

## 2023-07-13 VITALS — HEIGHT: 59 IN | BODY MASS INDEX: 34.68 KG/M2 | WEIGHT: 172 LBS

## 2023-07-13 DIAGNOSIS — M17.11 UNILATERAL PRIMARY OSTEOARTHRITIS, RIGHT KNEE: ICD-10-CM

## 2023-07-13 PROCEDURE — 99213 OFFICE O/P EST LOW 20 MIN: CPT

## 2023-07-14 PROBLEM — M17.11 PRIMARY OSTEOARTHRITIS OF RIGHT KNEE: Status: ACTIVE | Noted: 2022-11-23

## 2023-07-14 NOTE — HISTORY OF PRESENT ILLNESS
[de-identified] : 58 year old female presents today for follow up of bilateral knee pain. She received HA injections in both knees in January 2023 for early treatment of DJD, providing her with relief for 4 months only in the left knee. Right knee has no pain.  The pain is constant worse with stair usage and walking. She is taking Tylenol/ meloxicam with relief.   Status post Left knee Arthroscopy on 3-26-19. PMHx of SLE/fibromyalgia.\par \par .

## 2023-07-14 NOTE — ADDENDUM
[FreeTextEntry1] : This note was written by Arlen Lynch on 07/13/2023 acting solely as a scribe for Dr. Kole Bartholomew.\par \par All medical record entries made by the Scribe were at my, Dr. Kole Bartholomew, direction and personally dictated by me on 07/13/2023. I have personally reviewed the chart and agree that the record accurately reflects my personal performance of the history, physical exam, assessment and plan.

## 2023-07-14 NOTE — PHYSICAL EXAM
[de-identified] : Oriented to time, place, person\par Mood: Normal\par Affect: Normal\par Appearance: Healthy, well appearing, no acute distress.\par Gait: Normal\par Assistive Devices: None\par \par Left Knee Exam:\par \par Skin: Clean, dry, intact\par Inspection: No obvious malalignment, no masses, + swelling pn lateral incisions, no effusion\par Pulses: 2+ DP/PT pulses \par ROM: 0-135 degrees of flexion. No pain with deep knee flexion/extension.\par Tenderness: + MJLT. + LJLT. No pain over the patella facets. No pain to the quadriceps tendon. No pain to the patella tendon. No posterior knee tenderness.\par Stability: Stable to varus, valgus. Negative Lachman testing. Negative anterior drawer, negative posterior drawer.\par Strength: 5/5 Q/H/TA/GS/EHL, without atrophy\par Neuro: Intact to light touch throughout, DTRs normal\par Additional Tests: Negative Genesis's test, Negative patellar grind test \par \par Right Knee Exam:\par \par Skin: Clean, dry, intact\par Inspection: No obvious malalignment, no masses, no swelling, no effusion\par Pulses: 2+ DP/PT pulses \par ROM: 0-135 degrees of flexion. No pain with deep knee flexion/extension.\par Tenderness: No MJLT. No LJLT. No pain over the patella facets. No pain to the quadriceps tendon. No pain to the patella tendon. No posterior knee tenderness.\par Stability: Stable to varus, valgus. Negative Lachman testing. Negative anterior drawer, negative posterior drawer.\par Strength: 5/5 Q/H/TA/GS/EHL, without atrophy\par Neuro: Intact to light touch throughout, DTRs normal\par Additional Tests: Negative Genesis's test, Negative patellar grind test  [de-identified] : 4 views of the right knee were obtained 11/21/2022, that show no acute fracture or dislocation. There is mild medial, no lateral and mild patellofemoral degenerative changes seen. There is no significant malalignment. No significant other obvious osseous abnormality, otherwise unremarkable. \par \par 4 views of the left knee were obtained 11/21/2022, that show no acute fracture or dislocation. There is mild medial, mild lateral and mild patellofemoral degenerative changes seen. There is no significant malalignment. No significant other obvious osseous abnormality, otherwise unremarkable.

## 2023-07-14 NOTE — DISCUSSION/SUMMARY
[de-identified] : 56 y/o female with bilateral knee DJD\par \par Patient presents for further evaluation of bilateral degenerative knee pain.  Symptoms are chronic, and consistent with early degenerative changes -symptoms may also be due to prior post meniscectomy syndrome as well as systemic dysfunction due to rheumatologic disorders including SLE/fibromyalgia.  She has recently trialed viscosupplementation with minimal relief. \par \par Given the degree of arthrosis present on imaging, I discussed potential limitations of prolonged conservative treatment as outlined.  However, radiographic changes may not be severe enough to consider arthroplasty.  This is concerning for long-term prognosis or for degenerative knee pain with her medical comorbidities.  Recommend consultation for possible consideration of TKA at this time given failure of conservative management.  Patient voiced understanding that she may not be a good surgical candidate, but would be willing to discuss further.\par \par Referral provided for Orthopaedic Arthroplasty consultation. \par \par Follow up as needed.

## 2023-07-21 ENCOUNTER — APPOINTMENT (OUTPATIENT)
Dept: ORTHOPEDIC SURGERY | Facility: CLINIC | Age: 58
End: 2023-07-21
Payer: MEDICARE

## 2023-07-21 VITALS
DIASTOLIC BLOOD PRESSURE: 83 MMHG | HEART RATE: 65 BPM | SYSTOLIC BLOOD PRESSURE: 142 MMHG | HEIGHT: 59 IN | TEMPERATURE: 97.4 F | BODY MASS INDEX: 34.68 KG/M2 | OXYGEN SATURATION: 97 % | WEIGHT: 172 LBS

## 2023-07-21 DIAGNOSIS — Z87.39 PERSONAL HISTORY OF OTHER DISEASES OF THE MUSCULOSKELETAL SYSTEM AND CONNECTIVE TISSUE: ICD-10-CM

## 2023-07-21 PROCEDURE — 99214 OFFICE O/P EST MOD 30 MIN: CPT

## 2023-07-21 PROCEDURE — 73564 X-RAY EXAM KNEE 4 OR MORE: CPT | Mod: LT

## 2023-07-21 NOTE — PHYSICAL EXAM
[LE] : Sensory: Intact in bilateral lower extremities [DP] : dorsalis pedis 2+ and symmetric bilaterally [PT] : posterior tibial 2+ and symmetric bilaterally [Normal] : Alert and in no acute distress [Poor Appearance] : well-appearing [Acute Distress] : not in acute distress [Obese] : not obese [de-identified] : The patient has no respiratory distress. Mood and affect are normal. The patient is alert and oriented to person, place and time.\par The patient has no pain with motion of the hips.  There is no tenderness of either hip.\par Examination of the knees demonstrates lateral sided tenderness of the left knee.  Quadriceps and hamstring function are intact.  Range of motion 0 to 115 degrees left, 0 to 120 degrees right.  There is no instability of collateral or cruciate ligaments.  There is patellar tenderness.  Calves are soft and nontender.  The skin is intact.  There is no lymphedema. [de-identified] : AP, lateral, tunnel and sunrise x-rays of the left knee demonstrate no fracture or dislocation.  There are degenerative changes most notably in the lateral compartment seen on the Matias view.
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
## 2023-07-21 NOTE — DISCUSSION/SUMMARY
[de-identified] : The patient has left knee osteoarthritis.  She has not had adequate relief from injection and NSAID therapy.  I have discussed the pathology, natural history and treatment options with her.  She may consider left knee arthroplasty.  I have gone over the procedure, risk, benefits and alternatives.  She will think about her options and get back to me as needed.

## 2023-07-22 ENCOUNTER — APPOINTMENT (OUTPATIENT)
Dept: INTERNAL MEDICINE | Facility: CLINIC | Age: 58
End: 2023-07-22

## 2023-07-26 ENCOUNTER — APPOINTMENT (OUTPATIENT)
Dept: GASTROENTEROLOGY | Facility: CLINIC | Age: 58
End: 2023-07-26
Payer: MEDICARE

## 2023-07-26 VITALS
TEMPERATURE: 97.2 F | BODY MASS INDEX: 34.74 KG/M2 | DIASTOLIC BLOOD PRESSURE: 81 MMHG | OXYGEN SATURATION: 98 % | WEIGHT: 172 LBS | HEART RATE: 70 BPM | SYSTOLIC BLOOD PRESSURE: 149 MMHG

## 2023-07-26 DIAGNOSIS — K58.0 IRRITABLE BOWEL SYNDROME WITH DIARRHEA: ICD-10-CM

## 2023-07-26 PROCEDURE — 99214 OFFICE O/P EST MOD 30 MIN: CPT

## 2023-07-26 NOTE — ASSESSMENT
[FreeTextEntry1] : Abdominal Pain: The patient complains of abdominal pain. The patient is to avoid nonsteroidal anti-inflammatory drugs and aspirin.  I recommend a low FOD-MAP diet.  I recommend a trial of Dicyclomine 10 mg tablet PO 3 times a day PRN for the abdominal pain.  A I recommend a trial of Pantoprazole 40 mg once a day for 3 months for the symptoms.  \par Dyspepsia: The patient complains of dyspeptic symptoms.  The patient was advised to continue to abide by an anti-gas (low FOD-MAP) diet.  The patient was previously given a pamphlet for anti-gas (low FOD-MAP).  The patient and I reviewed the anti-gas (low FOD-MAP)diet at length again. The patient is to continue on a trial of Simethicone one tablet 4 times a day p.r.n. abdominal pain and gas.\par Nausea/Vomiting: The patient complains of nausea/vomiting. If the symptoms of nausea/vomiting persists, the patient may require a trial of Zofran 4 mg twice a day.\par Constipation: The patient complains of constipation. I recommend a high-fiber diet. I recommend a trial of a probiotic such as Align once a day. I recommend a trial of Metamucil once a day for fiber supplementation. I recommend a trial of Linzess 145 mcg once a day for the constipation. If the symptoms persist, the patient may require a colonoscopy to assess the symptoms.  The patient was told of the risks and benefits of the procedure.  The patient was told of the risks of perforation, emergency surgery, bleeding, infections and missed lesions.  The patient agreed and will followup to reassess the symptoms.  \par Gastroparesis: The patient had an upper endoscopy performed at the [facility] [place] GI endoscopy suite on February 20, 2020. The upper endoscopy revealed a small hiatal hernia and mild diffuse gastritis with retained food and debris in the stomach suggestive of gastroparesis. The gastroparesis may be contributing to the abdominal pain. The patient is currently on medications that can contribute to the gastroparesis. I recommend a gastroparesis diet with smaller but more frequent meals. The patient can have a liquid and puree diet and avoid foods high in fats and carbohydrates. The patient was also advised to avoid high fiber diet. If the symptoms persist, the patient may require a trial of a promotility agent for the gastroparesis such as a trial of Reglan (metoclopramide) 5 mg 4 times a day. The patient and I had a long discussion regarding the risks of potential side effects of Reglan (metoclopramide). The patient was told of the risk of headaches, dizziness, diarrhea and permanent tremors. The patient was told to stop the medication immediately and call the office if any of these symptoms occur. The patient agrees and will call the office regarding her symptoms.\par History of Diverticulitis: The patient had a prior history of acute diverticulitis. The patient may require an emergency room evaluation for diverticulitis if the symptoms recur. The patient may require treatment with antibiotics if the symptoms recur. I recommend a low residue diet. The CAT scan of the abdomen and pelvis with IV contrast performed on December 16, 2019 revealed heterogeneous attenuation in the external iliac and common femoral veins is likely related to mixing artifact. Also noted was constipation, sigmoid diverticulosis without diverticulitis, trace bilateral hydronephrosis likely related to distended urinary bladder. The patient was advised to go to the hospital if fever, chills, worsening abdominal pain or GI bleeding recurs. The patient agrees.\par Abnormal Imaging Study: The CAT scan of the abdomen and pelvis with IV contrast performed on December 16, 2019 revealed heterogeneous attenuation in the external iliac and common femoral veins is likely related to mixing artifact. Also noted was constipation, sigmoid diverticulosis without diverticulitis, trace bilateral hydronephrosis likely related to distended urinary bladder. The doppler ultrasound of the lower extremities performed on December 27, 2019 revealed no evidence of deep venous thrombosis in either lower extremity. \par Hiatal Hernia: The patient was advised to avoid late-night meals and dietary indiscretions. The patient was advised to avoid fried and fatty foods. The patient was advised to abide by an anti-GERD diet. The patient was given a pamphlet for anti-GERD. The patient and I reviewed the anti-GERD diet at length. The patient had no improvement on a trial of Carafate. I recommend continue on a trial of Pantoprazole 40 mg once a day x 3 months for the symptoms.\par Gastritis: The patient has a history of gastritis. The patient is to avoid nonsteroidal anti-inflammatory drugs and aspirin. The patient previously had no improvement on a trial of Carafate. I recommend continue on a trial of Pantoprazole 40 mg once a day x 3 months for the symptoms.\par Irritable Bowel Syndrome: The patient has symptoms suggestive of irritable bowel syndrome. The patient was advised to follow a low FOD-MAP diet for the irritable bowel syndrome. I recommend continue on a trial of Dicyclomine 10 mg three times a day for the abdominal pain and constipation. \par Diverticulosis: I recommend a low residue diet and avoid seeds. The patient is to consider a trial of Metamucil once a day for fiber supplementation. The patient is to also consider a trial of a probiotic such as Align once a day. \par Internal Hemorrhoids: The patient is to consider a trial of Anusol H. C. suppositories one per rectum nightly and Anusol HC2.5% cream apply to affected area twice a day p.r.n. hemorrhoidal bleeding or pain. \par History of Occult Blood in the Stool: The patient was previously found to have occult blood in the stool. The patient denies any bright red blood per rectum, melena or hematemesis. The patient was previously found to be guaiac-positive by her PMD. \par Anemia: The blood work performed on June 16, 2021 revealed mild anemia with hemoglobin/hematocrit level of 9.6/32.6, respectively, elevated platelet count of 444,000, an elevated TIBC/UIBC of 452/407 ug/dl, respectively, a low iron % saturation of 10%, a normal Ferritin level of 20 ng/ml. I recommend obtaining the recent blood work performed by her hematologist. The patient is to followup with her hematologist, Dr. Snell.\par Blood Work: I recommend obtaining the recent blood work performed by the patient's PMD.\par Follow-up: The patient is to follow-up in the office in 6 months to reassess the symptoms. The patient was told to call the office if any further problems.\par \par

## 2023-07-26 NOTE — HISTORY OF PRESENT ILLNESS
[FreeTextEntry1] : The colonoscopy to the terminal ileum performed at the [facility] [place] GI endoscopy suite on February 13, 2020.revealed moderate left sided diverticulosis.  There were no polyps, masses, AVMs or colitis noted. The pathology revealed unremarkable small intestinal mucosa with preserved villous pattern and lymphocytic aggregates (terminal ileum), patchy mild colitis without evidence of architectural changes or evidence of chronicity (cecum) and benign colonic mucosa with focal hyperplastic change (rectum). [de-identified] : The CAT scan of the abdomen and pelvis with IV contrast performed on June 29, 2023 revealed no acute CT findings.  Also noted were spinal rods and pedicle screws transfix at L5-S1 with disc spacers present at these levels, postsurgical changes in the abdominal wall, atherosclerotic changes in the vessels, diverticulosis coli and coronary artery calcifications.  \par \par The CAT scan of the chest, abdomen and pelvis with IV contrast performed on February 17, 2020 revealed no evidence of an aortic dissection.\par The CAT scan of the abdomen and pelvis with IV contrast performed on December 16, 2019 revealed heterogeneous attenuation in the external iliac and common femoral veins is likely related to mixing artifact. Also noted was constipation, sigmoid diverticulosis without diverticulitis, trace bilateral hydronephrosis likely related to distended urinary bladder. The doppler ultrasound of the lower extremities performed on December 27, 2019 revealed no evidence of deep venous thrombosis in either lower extremity.  [de-identified] : The abdominal ultrasound performed on February 19, 2020 revealed no gallstones, gallbladder wall thickening or pericholecystic fluid. \par The abdominal ultrasound performed on October 18, 2019 revealed no gallstones or pericholecystic fluid. The contracted state of the gallbladder limits assessment for gallbladder wall thickening. There is no evidence for intrahepatic or extrahepatic biliary duct dilatation. The pelvic ultrasound performed on October 18, 2019 revealed no evidence for an ovarian lesion.

## 2023-08-03 ENCOUNTER — APPOINTMENT (OUTPATIENT)
Dept: INTERNAL MEDICINE | Facility: CLINIC | Age: 58
End: 2023-08-03

## 2023-08-10 ENCOUNTER — APPOINTMENT (OUTPATIENT)
Dept: RHEUMATOLOGY | Facility: CLINIC | Age: 58
End: 2023-08-10
Payer: MEDICARE

## 2023-08-10 VITALS
TEMPERATURE: 97.3 F | BODY MASS INDEX: 34.27 KG/M2 | SYSTOLIC BLOOD PRESSURE: 154 MMHG | HEIGHT: 59 IN | WEIGHT: 170 LBS | HEART RATE: 59 BPM | RESPIRATION RATE: 16 BRPM | DIASTOLIC BLOOD PRESSURE: 90 MMHG | OXYGEN SATURATION: 96 %

## 2023-08-10 DIAGNOSIS — M17.12 UNILATERAL PRIMARY OSTEOARTHRITIS, LEFT KNEE: ICD-10-CM

## 2023-08-10 DIAGNOSIS — M70.50 OTHER BURSITIS OF KNEE, UNSPECIFIED KNEE: ICD-10-CM

## 2023-08-10 PROCEDURE — 99214 OFFICE O/P EST MOD 30 MIN: CPT | Mod: 25

## 2023-08-10 PROCEDURE — 20610 DRAIN/INJ JOINT/BURSA W/O US: CPT | Mod: LT

## 2023-08-10 RX ADMIN — Medication 0 %: at 00:00

## 2023-08-10 RX ADMIN — Medication 0 MG/ML: at 00:00

## 2023-08-11 LAB
ALBUMIN SERPL ELPH-MCNC: 5 G/DL
ALP BLD-CCNC: 98 U/L
ALT SERPL-CCNC: 30 U/L
ANION GAP SERPL CALC-SCNC: 10 MMOL/L
APPEARANCE: CLEAR
AST SERPL-CCNC: 34 U/L
BACTERIA: ABNORMAL /HPF
BILIRUB SERPL-MCNC: 0.3 MG/DL
BILIRUBIN URINE: NEGATIVE
BLOOD URINE: NEGATIVE
BUN SERPL-MCNC: 23 MG/DL
C3 SERPL-MCNC: 125 MG/DL
C4 SERPL-MCNC: 26 MG/DL
CALCIUM SERPL-MCNC: 9.5 MG/DL
CAST: 1 /LPF
CHLORIDE SERPL-SCNC: 103 MMOL/L
CHOLEST SERPL-MCNC: 133 MG/DL
CO2 SERPL-SCNC: 28 MMOL/L
COLOR: YELLOW
CREAT SERPL-MCNC: 0.79 MG/DL
CREAT SPEC-SCNC: 227 MG/DL
CREAT/PROT UR: 0.1 RATIO
CRP SERPL-MCNC: <3 MG/L
EGFR: 86 ML/MIN/1.73M2
EPITHELIAL CELLS: 3 /HPF
ERYTHROCYTE [SEDIMENTATION RATE] IN BLOOD BY WESTERGREN METHOD: 32 MM/HR
ESTIMATED AVERAGE GLUCOSE: 123 MG/DL
GLUCOSE QUALITATIVE U: NEGATIVE MG/DL
GLUCOSE SERPL-MCNC: 77 MG/DL
HBA1C MFR BLD HPLC: 5.9 %
HDLC SERPL-MCNC: 61 MG/DL
KETONES URINE: ABNORMAL MG/DL
LDLC SERPL CALC-MCNC: 56 MG/DL
LEUKOCYTE ESTERASE URINE: ABNORMAL
MICROSCOPIC-UA: NORMAL
NITRITE URINE: NEGATIVE
NONHDLC SERPL-MCNC: 72 MG/DL
PH URINE: 5.5
POTASSIUM SERPL-SCNC: 4.2 MMOL/L
PROT SERPL-MCNC: 8.1 G/DL
PROT UR-MCNC: 28 MG/DL
PROTEIN URINE: 30 MG/DL
RED BLOOD CELLS URINE: 1 /HPF
SODIUM SERPL-SCNC: 140 MMOL/L
SPECIFIC GRAVITY URINE: >1.03
TRIGL SERPL-MCNC: 84 MG/DL
TSH SERPL-ACNC: 0.8 UIU/ML
UROBILINOGEN URINE: 0.2 MG/DL
WHITE BLOOD CELLS URINE: 2 /HPF

## 2023-08-12 PROBLEM — M17.12 PRIMARY OSTEOARTHRITIS OF LEFT KNEE: Status: ACTIVE | Noted: 2019-01-15

## 2023-08-12 NOTE — ASSESSMENT
[FreeTextEntry1] : Patient with SLE; FM; left pes anserine bursitis:  Patient to continue with hydroxychloroquine to help assuage underlying inflammation.  Patient understands the retinal toxicity associated with therapy and the importance of visual field screening testing with ophthalmology on a yearly basis.  Patient understands the increased risk of cardiovascular events associated with systemic lupus and the importance of dietary and exercise modification.Patient to continue with strict photoprotection. Patient will benefit from physical therapy to help with joint mobility and muscle strengthening. Quadriceps and core strengthening exercises emphasized.  Cortisone injection given to the left pes anserine bursa for pain relief.  Viscosupplementation has been encouraged to provide additional lubrication and joint support.  She will continue on Calcium and VitD at the recommended doses of 1200mg and 800IU daily, respectively, whether through foods or supplements.  Food plan discussed for improved weight loss efforts.  Bone density recommended.  She is in agreement with the above plan and will return in three months' time.

## 2023-08-12 NOTE — HISTORY OF PRESENT ILLNESS
[FreeTextEntry1] : Patient presents and explains performing ADLs and focusing on tasks without difficulty.  She reports arthralgias at bay save left knee pain which she finds exacerbated after prolonged walking or climbing stairs.  The patient's knee films not reflective of severe arthritic changes patient with arthrosis and significant symptomatology and has received opinion of a possible total knee replacement.  Despite this patient continues to dance up to three hours at a time.  She does note difficulty with weight loss.   She reports improvement in cervical and lumbar pain after receiving surgical correction in 2022.  She continues on antidepressants and anti anxiolytics.  She describes restorative sleep.  Patient continues with hydroxychloroquine twice daily without complication.  She describes minimal morning stiffness or associated swelling.  She further denies rash, Raynaud's or photosensitivity.   Patient with history of positive YOKO 1: 320 and homogenous/speckled pattern with associated positive SSA antibody positivity; APL Ab negative; she has not had thrombotic events in the past and initially manifested with arthralgias and low mood in early 2000.

## 2023-08-12 NOTE — PHYSICAL EXAM
[General Appearance - Alert] : alert [General Appearance - In No Acute Distress] : in no acute distress [General Appearance - Well-Appearing] : healthy appearing [Sclera] : the sclera and conjunctiva were normal [Neck Appearance] : the appearance of the neck was normal [Auscultation Breath Sounds / Voice Sounds] : lungs were clear to auscultation bilaterally [Edema] : there was no peripheral edema [Abnormal Walk] : normal gait [Nail Clubbing] : no clubbing  or cyanosis of the fingernails [Musculoskeletal - Swelling] : no joint swelling seen [Motor Tone] : muscle strength and tone were normal [] : no rash [Skin Lesions] : no skin lesions [No Spinal Tenderness] : no spinal tenderness [FreeTextEntry1] : No active hand synovitis; tenderness appreciated over the left pes anserine bursa; appropriate external rotation of the hips bilaterally; free range of motion in all planes [Motor Exam] : the motor exam was normal [Oriented To Time, Place, And Person] : oriented to person, place, and time [Impaired Insight] : insight and judgment were intact

## 2023-08-12 NOTE — CONSULT LETTER
[Dear  ___] : Dear  [unfilled], [Courtesy Letter:] : I had the pleasure of seeing your patient, [unfilled], in my office today. [Please see my note below.] : Please see my note below. [Consult Closing:] : Thank you very much for allowing me to participate in the care of this patient.  If you have any questions, please do not hesitate to contact me. [Sincerely,] : Sincerely, [FreeTextEntry2] : Roe Sales MD\[place]  [facility] [FreeTextEntry3] : Tamiko Duff M.D.\par   of Medicine \par  [employer] [employer] [facility] [employer] [facility]/[place]\par  \par

## 2023-08-13 ENCOUNTER — NON-APPOINTMENT (OUTPATIENT)
Age: 58
End: 2023-08-13

## 2023-08-21 ENCOUNTER — RX RENEWAL (OUTPATIENT)
Age: 58
End: 2023-08-21

## 2023-08-22 RX ORDER — LIDOCAINE HYDROCHLORIDE 10 MG/ML
1 INJECTION, SOLUTION INFILTRATION; PERINEURAL
Qty: 0 | Refills: 0 | Status: COMPLETED | OUTPATIENT
Start: 2023-08-10

## 2023-08-22 RX ORDER — METHYLPRED ACET/NACL,ISO-OS/PF 40 MG/ML
40 VIAL (ML) INJECTION
Qty: 1 | Refills: 0 | Status: COMPLETED | OUTPATIENT
Start: 2023-08-10

## 2023-08-27 ENCOUNTER — RX RENEWAL (OUTPATIENT)
Age: 58
End: 2023-08-27

## 2023-08-28 ENCOUNTER — RX RENEWAL (OUTPATIENT)
Age: 58
End: 2023-08-28

## 2023-08-31 ENCOUNTER — OUTPATIENT (OUTPATIENT)
Dept: OUTPATIENT SERVICES | Facility: HOSPITAL | Age: 58
LOS: 1 days | End: 2023-08-31
Payer: MEDICARE

## 2023-08-31 ENCOUNTER — APPOINTMENT (OUTPATIENT)
Dept: RADIOLOGY | Facility: IMAGING CENTER | Age: 58
End: 2023-08-31
Payer: MEDICARE

## 2023-08-31 DIAGNOSIS — Z98.890 OTHER SPECIFIED POSTPROCEDURAL STATES: Chronic | ICD-10-CM

## 2023-08-31 DIAGNOSIS — Z98.89 OTHER SPECIFIED POSTPROCEDURAL STATES: Chronic | ICD-10-CM

## 2023-08-31 DIAGNOSIS — M25.562 PAIN IN LEFT KNEE: ICD-10-CM

## 2023-08-31 DIAGNOSIS — Z98.51 TUBAL LIGATION STATUS: Chronic | ICD-10-CM

## 2023-08-31 PROCEDURE — 77080 DXA BONE DENSITY AXIAL: CPT

## 2023-08-31 PROCEDURE — 77080 DXA BONE DENSITY AXIAL: CPT | Mod: 26

## 2023-09-07 RX ORDER — ZOLEDRONIC ACID 5 MG/100ML
5 INJECTION INTRAVENOUS
Qty: 1 | Refills: 4 | Status: ACTIVE | OUTPATIENT
Start: 2023-09-01

## 2023-09-14 ENCOUNTER — APPOINTMENT (OUTPATIENT)
Dept: INTERNAL MEDICINE | Facility: CLINIC | Age: 58
End: 2023-09-14
Payer: MEDICARE

## 2023-09-14 VITALS
OXYGEN SATURATION: 96 % | DIASTOLIC BLOOD PRESSURE: 92 MMHG | WEIGHT: 175 LBS | SYSTOLIC BLOOD PRESSURE: 158 MMHG | TEMPERATURE: 98 F | HEIGHT: 59 IN | HEART RATE: 69 BPM | BODY MASS INDEX: 35.28 KG/M2 | RESPIRATION RATE: 16 BRPM

## 2023-09-14 DIAGNOSIS — Z00.00 ENCOUNTER FOR GENERAL ADULT MEDICAL EXAMINATION W/OUT ABNORMAL FINDINGS: ICD-10-CM

## 2023-09-14 PROCEDURE — G0439: CPT

## 2023-09-14 PROCEDURE — G0008: CPT

## 2023-09-14 PROCEDURE — 90686 IIV4 VACC NO PRSV 0.5 ML IM: CPT

## 2023-09-21 ENCOUNTER — APPOINTMENT (OUTPATIENT)
Dept: INTERNAL MEDICINE | Facility: CLINIC | Age: 58
End: 2023-09-21
Payer: MEDICARE

## 2023-09-21 VITALS
RESPIRATION RATE: 16 BRPM | WEIGHT: 174 LBS | DIASTOLIC BLOOD PRESSURE: 88 MMHG | HEART RATE: 75 BPM | HEIGHT: 59 IN | BODY MASS INDEX: 35.08 KG/M2 | OXYGEN SATURATION: 97 % | TEMPERATURE: 98 F | SYSTOLIC BLOOD PRESSURE: 151 MMHG

## 2023-09-21 PROCEDURE — 99214 OFFICE O/P EST MOD 30 MIN: CPT

## 2023-09-25 RX ORDER — ALBUTEROL SULFATE 90 UG/1
108 (90 BASE) INHALANT RESPIRATORY (INHALATION)
Qty: 1 | Refills: 5 | Status: ACTIVE | COMMUNITY
Start: 2023-09-25 | End: 1900-01-01

## 2023-10-01 ENCOUNTER — RX RENEWAL (OUTPATIENT)
Age: 58
End: 2023-10-01

## 2023-10-05 ENCOUNTER — RX RENEWAL (OUTPATIENT)
Age: 58
End: 2023-10-05

## 2023-10-09 NOTE — ED ADULT NURSE NOTE - PRIMARY CARE PROVIDER
Unknown Minoxidil Counseling: Minoxidil is a topical medication which can increase blood flow where it is applied. It is uncertain how this medication increases hair growth. Side effects are uncommon and include stinging and allergic reactions.

## 2023-10-10 ENCOUNTER — APPOINTMENT (OUTPATIENT)
Dept: GASTROENTEROLOGY | Facility: CLINIC | Age: 58
End: 2023-10-10

## 2023-10-20 ENCOUNTER — EMERGENCY (EMERGENCY)
Facility: HOSPITAL | Age: 58
LOS: 1 days | Discharge: HOME | End: 2023-10-20
Attending: EMERGENCY MEDICINE
Payer: MEDICARE

## 2023-10-20 VITALS
HEART RATE: 66 BPM | TEMPERATURE: 98 F | OXYGEN SATURATION: 97 % | WEIGHT: 169.98 LBS | HEIGHT: 59 IN | DIASTOLIC BLOOD PRESSURE: 76 MMHG | RESPIRATION RATE: 16 BRPM | SYSTOLIC BLOOD PRESSURE: 114 MMHG

## 2023-10-20 VITALS
SYSTOLIC BLOOD PRESSURE: 105 MMHG | DIASTOLIC BLOOD PRESSURE: 60 MMHG | RESPIRATION RATE: 18 BRPM | TEMPERATURE: 98 F | OXYGEN SATURATION: 97 % | HEART RATE: 64 BPM

## 2023-10-20 DIAGNOSIS — Z98.890 OTHER SPECIFIED POSTPROCEDURAL STATES: Chronic | ICD-10-CM

## 2023-10-20 DIAGNOSIS — Z98.89 OTHER SPECIFIED POSTPROCEDURAL STATES: Chronic | ICD-10-CM

## 2023-10-20 DIAGNOSIS — Z98.51 TUBAL LIGATION STATUS: Chronic | ICD-10-CM

## 2023-10-20 LAB
ALBUMIN SERPL ELPH-MCNC: 3.9 G/DL — SIGNIFICANT CHANGE UP (ref 3.5–5)
ALBUMIN SERPL ELPH-MCNC: 3.9 G/DL — SIGNIFICANT CHANGE UP (ref 3.5–5)
ALP SERPL-CCNC: 85 U/L — SIGNIFICANT CHANGE UP (ref 40–120)
ALP SERPL-CCNC: 85 U/L — SIGNIFICANT CHANGE UP (ref 40–120)
ALT FLD-CCNC: 31 U/L DA — SIGNIFICANT CHANGE UP (ref 10–60)
ALT FLD-CCNC: 31 U/L DA — SIGNIFICANT CHANGE UP (ref 10–60)
ANION GAP SERPL CALC-SCNC: 5 MMOL/L — SIGNIFICANT CHANGE UP (ref 5–17)
ANION GAP SERPL CALC-SCNC: 5 MMOL/L — SIGNIFICANT CHANGE UP (ref 5–17)
AST SERPL-CCNC: 33 U/L — SIGNIFICANT CHANGE UP (ref 10–40)
AST SERPL-CCNC: 33 U/L — SIGNIFICANT CHANGE UP (ref 10–40)
BASOPHILS # BLD AUTO: 0.06 K/UL — SIGNIFICANT CHANGE UP (ref 0–0.2)
BASOPHILS # BLD AUTO: 0.06 K/UL — SIGNIFICANT CHANGE UP (ref 0–0.2)
BASOPHILS NFR BLD AUTO: 1.5 % — SIGNIFICANT CHANGE UP (ref 0–2)
BASOPHILS NFR BLD AUTO: 1.5 % — SIGNIFICANT CHANGE UP (ref 0–2)
BILIRUB SERPL-MCNC: 0.3 MG/DL — SIGNIFICANT CHANGE UP (ref 0.2–1.2)
BILIRUB SERPL-MCNC: 0.3 MG/DL — SIGNIFICANT CHANGE UP (ref 0.2–1.2)
BUN SERPL-MCNC: 17 MG/DL — SIGNIFICANT CHANGE UP (ref 7–18)
BUN SERPL-MCNC: 17 MG/DL — SIGNIFICANT CHANGE UP (ref 7–18)
CALCIUM SERPL-MCNC: 9 MG/DL — SIGNIFICANT CHANGE UP (ref 8.4–10.5)
CALCIUM SERPL-MCNC: 9 MG/DL — SIGNIFICANT CHANGE UP (ref 8.4–10.5)
CHLORIDE SERPL-SCNC: 105 MMOL/L — SIGNIFICANT CHANGE UP (ref 96–108)
CHLORIDE SERPL-SCNC: 105 MMOL/L — SIGNIFICANT CHANGE UP (ref 96–108)
CO2 SERPL-SCNC: 30 MMOL/L — SIGNIFICANT CHANGE UP (ref 22–31)
CO2 SERPL-SCNC: 30 MMOL/L — SIGNIFICANT CHANGE UP (ref 22–31)
CREAT SERPL-MCNC: 0.9 MG/DL — SIGNIFICANT CHANGE UP (ref 0.5–1.3)
CREAT SERPL-MCNC: 0.9 MG/DL — SIGNIFICANT CHANGE UP (ref 0.5–1.3)
EGFR: 73 ML/MIN/1.73M2 — SIGNIFICANT CHANGE UP
EGFR: 73 ML/MIN/1.73M2 — SIGNIFICANT CHANGE UP
EOSINOPHIL # BLD AUTO: 0.11 K/UL — SIGNIFICANT CHANGE UP (ref 0–0.5)
EOSINOPHIL # BLD AUTO: 0.11 K/UL — SIGNIFICANT CHANGE UP (ref 0–0.5)
EOSINOPHIL NFR BLD AUTO: 2.7 % — SIGNIFICANT CHANGE UP (ref 0–6)
EOSINOPHIL NFR BLD AUTO: 2.7 % — SIGNIFICANT CHANGE UP (ref 0–6)
GLUCOSE SERPL-MCNC: 68 MG/DL — LOW (ref 70–99)
GLUCOSE SERPL-MCNC: 68 MG/DL — LOW (ref 70–99)
HCT VFR BLD CALC: 34 % — LOW (ref 34.5–45)
HCT VFR BLD CALC: 34 % — LOW (ref 34.5–45)
HGB BLD-MCNC: 10.9 G/DL — LOW (ref 11.5–15.5)
HGB BLD-MCNC: 10.9 G/DL — LOW (ref 11.5–15.5)
IMM GRANULOCYTES NFR BLD AUTO: 0.2 % — SIGNIFICANT CHANGE UP (ref 0–0.9)
IMM GRANULOCYTES NFR BLD AUTO: 0.2 % — SIGNIFICANT CHANGE UP (ref 0–0.9)
LYMPHOCYTES # BLD AUTO: 1.41 K/UL — SIGNIFICANT CHANGE UP (ref 1–3.3)
LYMPHOCYTES # BLD AUTO: 1.41 K/UL — SIGNIFICANT CHANGE UP (ref 1–3.3)
LYMPHOCYTES # BLD AUTO: 34.8 % — SIGNIFICANT CHANGE UP (ref 13–44)
LYMPHOCYTES # BLD AUTO: 34.8 % — SIGNIFICANT CHANGE UP (ref 13–44)
MCHC RBC-ENTMCNC: 31.1 PG — SIGNIFICANT CHANGE UP (ref 27–34)
MCHC RBC-ENTMCNC: 31.1 PG — SIGNIFICANT CHANGE UP (ref 27–34)
MCHC RBC-ENTMCNC: 32.1 GM/DL — SIGNIFICANT CHANGE UP (ref 32–36)
MCHC RBC-ENTMCNC: 32.1 GM/DL — SIGNIFICANT CHANGE UP (ref 32–36)
MCV RBC AUTO: 96.9 FL — SIGNIFICANT CHANGE UP (ref 80–100)
MCV RBC AUTO: 96.9 FL — SIGNIFICANT CHANGE UP (ref 80–100)
MONOCYTES # BLD AUTO: 0.32 K/UL — SIGNIFICANT CHANGE UP (ref 0–0.9)
MONOCYTES # BLD AUTO: 0.32 K/UL — SIGNIFICANT CHANGE UP (ref 0–0.9)
MONOCYTES NFR BLD AUTO: 7.9 % — SIGNIFICANT CHANGE UP (ref 2–14)
MONOCYTES NFR BLD AUTO: 7.9 % — SIGNIFICANT CHANGE UP (ref 2–14)
NEUTROPHILS # BLD AUTO: 2.14 K/UL — SIGNIFICANT CHANGE UP (ref 1.8–7.4)
NEUTROPHILS # BLD AUTO: 2.14 K/UL — SIGNIFICANT CHANGE UP (ref 1.8–7.4)
NEUTROPHILS NFR BLD AUTO: 52.9 % — SIGNIFICANT CHANGE UP (ref 43–77)
NEUTROPHILS NFR BLD AUTO: 52.9 % — SIGNIFICANT CHANGE UP (ref 43–77)
NRBC # BLD: 0 /100 WBCS — SIGNIFICANT CHANGE UP (ref 0–0)
NRBC # BLD: 0 /100 WBCS — SIGNIFICANT CHANGE UP (ref 0–0)
PLATELET # BLD AUTO: 352 K/UL — SIGNIFICANT CHANGE UP (ref 150–400)
PLATELET # BLD AUTO: 352 K/UL — SIGNIFICANT CHANGE UP (ref 150–400)
POTASSIUM SERPL-MCNC: 4.3 MMOL/L — SIGNIFICANT CHANGE UP (ref 3.5–5.3)
POTASSIUM SERPL-MCNC: 4.3 MMOL/L — SIGNIFICANT CHANGE UP (ref 3.5–5.3)
POTASSIUM SERPL-SCNC: 4.3 MMOL/L — SIGNIFICANT CHANGE UP (ref 3.5–5.3)
POTASSIUM SERPL-SCNC: 4.3 MMOL/L — SIGNIFICANT CHANGE UP (ref 3.5–5.3)
PROT SERPL-MCNC: 7.6 G/DL — SIGNIFICANT CHANGE UP (ref 6–8.3)
PROT SERPL-MCNC: 7.6 G/DL — SIGNIFICANT CHANGE UP (ref 6–8.3)
RBC # BLD: 3.51 M/UL — LOW (ref 3.8–5.2)
RBC # BLD: 3.51 M/UL — LOW (ref 3.8–5.2)
RBC # FLD: 13.4 % — SIGNIFICANT CHANGE UP (ref 10.3–14.5)
RBC # FLD: 13.4 % — SIGNIFICANT CHANGE UP (ref 10.3–14.5)
SODIUM SERPL-SCNC: 140 MMOL/L — SIGNIFICANT CHANGE UP (ref 135–145)
SODIUM SERPL-SCNC: 140 MMOL/L — SIGNIFICANT CHANGE UP (ref 135–145)
WBC # BLD: 4.05 K/UL — SIGNIFICANT CHANGE UP (ref 3.8–10.5)
WBC # BLD: 4.05 K/UL — SIGNIFICANT CHANGE UP (ref 3.8–10.5)
WBC # FLD AUTO: 4.05 K/UL — SIGNIFICANT CHANGE UP (ref 3.8–10.5)
WBC # FLD AUTO: 4.05 K/UL — SIGNIFICANT CHANGE UP (ref 3.8–10.5)

## 2023-10-20 PROCEDURE — 72131 CT LUMBAR SPINE W/O DYE: CPT | Mod: 26,MA

## 2023-10-20 PROCEDURE — 99284 EMERGENCY DEPT VISIT MOD MDM: CPT | Mod: 25

## 2023-10-20 PROCEDURE — 80053 COMPREHEN METABOLIC PANEL: CPT

## 2023-10-20 PROCEDURE — 72131 CT LUMBAR SPINE W/O DYE: CPT | Mod: MA

## 2023-10-20 PROCEDURE — 96375 TX/PRO/DX INJ NEW DRUG ADDON: CPT

## 2023-10-20 PROCEDURE — 99284 EMERGENCY DEPT VISIT MOD MDM: CPT

## 2023-10-20 PROCEDURE — 85025 COMPLETE CBC W/AUTO DIFF WBC: CPT

## 2023-10-20 PROCEDURE — 72192 CT PELVIS W/O DYE: CPT | Mod: MA

## 2023-10-20 PROCEDURE — 36415 COLL VENOUS BLD VENIPUNCTURE: CPT

## 2023-10-20 PROCEDURE — 72192 CT PELVIS W/O DYE: CPT | Mod: 26,MA

## 2023-10-20 PROCEDURE — 96374 THER/PROPH/DIAG INJ IV PUSH: CPT

## 2023-10-20 RX ORDER — CYCLOBENZAPRINE HYDROCHLORIDE 10 MG/1
10 TABLET, FILM COATED ORAL ONCE
Refills: 0 | Status: COMPLETED | OUTPATIENT
Start: 2023-10-20 | End: 2023-10-20

## 2023-10-20 RX ORDER — CYCLOBENZAPRINE HYDROCHLORIDE 10 MG/1
1 TABLET, FILM COATED ORAL
Qty: 21 | Refills: 0
Start: 2023-10-20 | End: 2023-10-26

## 2023-10-20 RX ORDER — IBUPROFEN 200 MG
1 TABLET ORAL
Qty: 21 | Refills: 0
Start: 2023-10-20 | End: 2023-10-26

## 2023-10-20 RX ORDER — KETOROLAC TROMETHAMINE 30 MG/ML
30 SYRINGE (ML) INJECTION ONCE
Refills: 0 | Status: DISCONTINUED | OUTPATIENT
Start: 2023-10-20 | End: 2023-10-20

## 2023-10-20 RX ORDER — MORPHINE SULFATE 50 MG/1
4 CAPSULE, EXTENDED RELEASE ORAL ONCE
Refills: 0 | Status: DISCONTINUED | OUTPATIENT
Start: 2023-10-20 | End: 2023-10-20

## 2023-10-20 RX ORDER — OXYCODONE AND ACETAMINOPHEN 5; 325 MG/1; MG/1
1 TABLET ORAL
Qty: 12 | Refills: 0
Start: 2023-10-20 | End: 2023-10-22

## 2023-10-20 RX ADMIN — MORPHINE SULFATE 4 MILLIGRAM(S): 50 CAPSULE, EXTENDED RELEASE ORAL at 03:09

## 2023-10-20 RX ADMIN — Medication 30 MILLIGRAM(S): at 03:09

## 2023-10-20 RX ADMIN — MORPHINE SULFATE 4 MILLIGRAM(S): 50 CAPSULE, EXTENDED RELEASE ORAL at 03:47

## 2023-10-20 RX ADMIN — CYCLOBENZAPRINE HYDROCHLORIDE 10 MILLIGRAM(S): 10 TABLET, FILM COATED ORAL at 03:08

## 2023-10-20 RX ADMIN — Medication 30 MILLIGRAM(S): at 03:47

## 2023-10-20 NOTE — ED PROVIDER NOTE - NSFOLLOWUPINSTRUCTIONS_ED_ALL_ED_FT
Acute Back Pain, Adult  Acute back pain is sudden and usually short-lived. It is often caused by an injury to the muscles and tissues in the back. The injury may result from:  A muscle, tendon, or ligament getting overstretched or torn. Ligaments are tissues that connect bones to each other. Lifting something improperly can cause a back strain.  Wear and tear (degeneration) of the spinal disks. Spinal disks are circular tissue that provide cushioning between the bones of the spine (vertebrae).  Twisting motions, such as while playing sports or doing yard work.  A hit to the back.  Arthritis.  You may have a physical exam, lab tests, and imaging tests to find the cause of your pain. Acute back pain usually goes away with rest and home care.    Follow these instructions at home:  Managing pain, stiffness, and swelling    Take over-the-counter and prescription medicines only as told by your health care provider. Treatment may include medicines for pain and inflammation that are taken by mouth or applied to the skin, or muscle relaxants.  Your health care provider may recommend applying ice during the first 24–48 hours after your pain starts. To do this:  Put ice in a plastic bag.  Place a towel between your skin and the bag.  Leave the ice on for 20 minutes, 2–3 times a day.  Remove the ice if your skin turns bright red. This is very important. If you cannot feel pain, heat, or cold, you have a greater risk of damage to the area.  If directed, apply heat to the affected area as often as told by your health care provider. Use the heat source that your health care provider recommends, such as a moist heat pack or a heating pad.  Place a towel between your skin and the heat source.  Leave the heat on for 20–30 minutes.  Remove the heat if your skin turns bright red. This is especially important if you are unable to feel pain, heat, or cold. You have a greater risk of getting burned.  Activity    Comparisons of good and bad posture while driving, standing, sitting at a desk, and lifting heavy objects.  Do not stay in bed. Staying in bed for more than 1–2 days can delay your recovery.  Sit up and stand up straight. Avoid leaning forward when you sit or hunching over when you stand.  If you work at a desk, sit close to it so you do not need to lean over. Keep your chin tucked in. Keep your neck drawn back, and keep your elbows bent at a 90-degree angle (right angle).  Sit high and close to the steering wheel when you drive. Add lower back (lumbar) support to your car seat, if needed.  Take short walks on even surfaces as soon as you are able. Try to increase the length of time you walk each day.  Do not sit, drive, or  one place for more than 30 minutes at a time. Sitting or standing for long periods of time can put stress on your back.  Do not drive or use heavy machinery while taking prescription pain medicine.  Use proper lifting techniques. When you bend and lift, use positions that put less stress on your back:  Bend your knees.  Keep the load close to your body.  Avoid twisting.  Exercise regularly as told by your health care provider. Exercising helps your back heal faster and helps prevent back injuries by keeping muscles strong and flexible.  Work with a physical therapist to make a safe exercise program, as recommended by your health care provider. Do any exercises as told by your physical therapist.  Lifestyle    Maintain a healthy weight. Extra weight puts stress on your back and makes it difficult to have good posture.  Avoid activities or situations that make you feel anxious or stressed. Stress and anxiety increase muscle tension and can make back pain worse. Learn ways to manage anxiety and stress, such as through exercise.  General instructions    Sleep on a firm mattress in a comfortable position. Try lying on your side with your knees slightly bent. If you lie on your back, put a pillow under your knees.  Keep your head and neck in a straight line with your spine (neutral position) when using electronic equipment like smartphones or pads. To do this:  Raise your smartphone or pad to look at it instead of bending your head or neck to look down.  Put the smartphone or pad at the level of your face while looking at the screen.  Follow your treatment plan as told by your health care provider. This may include:  Cognitive or behavioral therapy.  Acupuncture or massage therapy.  Meditation or yoga.  Contact a health care provider if:  You have pain that is not relieved with rest or medicine.  You have increasing pain going down into your legs or buttocks.  Your pain does not improve after 2 weeks.  You have pain at night.  You lose weight without trying.  You have a fever or chills.  You develop nausea or vomiting.  You develop abdominal pain.  Get help right away if:  You develop new bowel or bladder control problems.  You have unusual weakness or numbness in your arms or legs.  You feel faint.  These symptoms may represent a serious problem that is an emergency. Do not wait to see if the symptoms will go away. Get medical help right away. Call your local emergency services (911 in the [place] Do not drive yourself to the hospital.    Summary  Acute back pain is sudden and usually short-lived.  Use proper lifting techniques. When you bend and lift, use positions that put less stress on your back.  Take over-the-counter and prescription medicines only as told by your health care provider, and apply heat or ice as told.  This information is not intended to replace advice given to you by your health care provider. Make sure you discuss any questions you have with your health care provider.    Document Revised: 03/11/2022 Document Reviewed: 03/11/2022

## 2023-10-20 NOTE — ED ADULT NURSE NOTE - NSFALLUNIVINTERV_ED_ALL_ED
Bed/Stretcher in lowest position, wheels locked, appropriate side rails in place/Call bell, personal items and telephone in reach/Instruct patient to call for assistance before getting out of bed/chair/stretcher/Non-slip footwear applied when patient is off stretcher/[place] to call system/Physically safe environment - no spills, clutter or unnecessary equipment/Purposeful proactive rounding/Room/bathroom lighting operational, light cord in reach

## 2023-10-20 NOTE — ED ADULT NURSE REASSESSMENT NOTE - NS ED NURSE REASSESS COMMENT FT1
730 am . pt resting now , no distress .pt is discharged , waiting for Medicaid  TAXI . report given to  day shift RN

## 2023-10-20 NOTE — ED ADULT NURSE NOTE - OBJECTIVE STATEMENT
the patient  is a 58 y female complaining  of right hip pain . pt had osteoporosis  of hip , ulisses. wrist , and ulisses ankles . she got infusion for osteoporosis yesterday  afternoon

## 2023-10-20 NOTE — ED PROVIDER NOTE - PHYSICAL EXAMINATION
b/l LE strength 5/5 all muscle groups and gross sensation normal and equal in all dermatomes  ttp over right lumbar paraspinal muscles and right gluteal area  ambulatory with assistance

## 2023-10-20 NOTE — ED ADULT NURSE NOTE - CCCP TRG CHIEF CMPLNT
Subjective   History of Present Illness  Healthy appearing 5-month female child presents with mother concerning for the child accidentally rolling off the bed that was roughly 3 feet in height.  Mother states that        Review of Systems    No past medical history on file.    No Known Allergies    No past surgical history on file.    Family History   Problem Relation Age of Onset   • Diabetes Maternal Grandfather         Copied from mother's family history at birth   • Hypertension Maternal Grandfather         Copied from mother's family history at birth   • Diabetes Maternal Grandmother         Copied from mother's family history at birth   • Hypertension Mother         Copied from mother's history at birth   • Mental illness Mother         Copied from mother's history at birth       Social History     Socioeconomic History   • Marital status: Single           Objective   Physical Exam  Vitals and nursing note reviewed.   Constitutional:       General: She is active. She has a strong cry. She is not in acute distress.     Appearance: She is well-developed. She is not diaphoretic.   HENT:      Head: Anterior fontanelle is flat.      Right Ear: Tympanic membrane normal.      Left Ear: Tympanic membrane normal.      Mouth/Throat:      Mouth: Mucous membranes are moist.      Pharynx: Oropharynx is clear.   Eyes:      Conjunctiva/sclera: Conjunctivae normal.      Pupils: Pupils are equal, round, and reactive to light.   Cardiovascular:      Rate and Rhythm: Normal rate and regular rhythm.      Heart sounds: No murmur heard.  Pulmonary:      Effort: Pulmonary effort is normal.      Breath sounds: Normal breath sounds.   Abdominal:      General: Bowel sounds are normal.      Tenderness: There is no abdominal tenderness. There is no guarding.   Musculoskeletal:         General: Normal range of motion.      Cervical back: Normal range of motion.   Skin:     General: Skin is warm and dry.      Coloration: Skin is not  hip pain/injury jaundiced or mottled.      Findings: No petechiae or rash.   Neurological:      Mental Status: She is alert.      Motor: No abnormal muscle tone.      Primitive Reflexes: Suck normal.      Deep Tendon Reflexes: Reflexes are normal and symmetric.         Procedures              ED Course  ED Course as of 05/07/23 0246   Sun May 07, 2023   0244 X-rays disclose were negative.  Patient has soft tissue contusion on the frontal scalp superficial bruising on the left side of the cheek.    The child can have Tylenol every 8 hours and apply ice to the scalp that the child will tolerate.    ReTurn if the child becomes severely irritable, recurrent vomiting or unable to awaken the child for any reason. [LK]      ED Course User Index  [LK] Danika Cortez DO                                           MDM    Final diagnoses:   Contusion of scalp, initial encounter       ED Disposition  ED Disposition     ED Disposition   Discharge    Condition   Stable    Comment   --             Nirmal Chaparro  [address] [place] [id number] [phone]               Medication List      No changes were made to your prescriptions during this visit.              ED Disposition  ED Disposition     ED Disposition   Discharge    Condition   Stable    Comment   --             Nirmal Chaparro  [address] [place], [place] [id number]  [phone]               Medication List      No changes were made to your prescriptions during this visit.         Danika Cortez DO  05/18/23 0478

## 2023-10-20 NOTE — ED ADULT TRIAGE NOTE - GLASGOW COMA SCALE: EYE OPENING, MLM
[FreeTextEntry1] : This note was written by Bev Yin on 09/14/2022 acting as scribe for Alia Gonzalez, OTR/L, PA 
(E4) spontaneous

## 2023-10-20 NOTE — ED PROVIDER NOTE - PROGRESS NOTE DETAILS
pain improved. labs unremarkable. ct with ?loosened screw. offered admission but pt prefers to go home at this time as pain is improving. states she will call her spinal surgeon today at [facility] to go over the CT results for any possible intervention. return precautions discussed.

## 2023-10-20 NOTE — ED PROVIDER NOTE - CLINICAL SUMMARY MEDICAL DECISION MAKING FREE TEXT BOX
58 year old female with back pain. PE as above.  labs, ct lumbar spine/pelvis, pain control, reassess

## 2023-11-10 DIAGNOSIS — M25.522 PAIN IN RIGHT ELBOW: ICD-10-CM

## 2023-11-10 DIAGNOSIS — M25.521 PAIN IN RIGHT ELBOW: ICD-10-CM

## 2023-11-13 ENCOUNTER — RX RENEWAL (OUTPATIENT)
Age: 58
End: 2023-11-13

## 2023-11-14 ENCOUNTER — APPOINTMENT (OUTPATIENT)
Dept: NEUROLOGY | Facility: CLINIC | Age: 58
End: 2023-11-14

## 2023-11-16 ENCOUNTER — APPOINTMENT (OUTPATIENT)
Dept: RHEUMATOLOGY | Facility: CLINIC | Age: 58
End: 2023-11-16
Payer: MEDICARE

## 2023-11-16 VITALS
HEIGHT: 59 IN | DIASTOLIC BLOOD PRESSURE: 82 MMHG | RESPIRATION RATE: 16 BRPM | WEIGHT: 167 LBS | OXYGEN SATURATION: 96 % | BODY MASS INDEX: 33.67 KG/M2 | SYSTOLIC BLOOD PRESSURE: 130 MMHG | TEMPERATURE: 98.8 F | HEART RATE: 84 BPM

## 2023-11-16 DIAGNOSIS — M25.562 PAIN IN LEFT KNEE: ICD-10-CM

## 2023-11-16 PROCEDURE — 99214 OFFICE O/P EST MOD 30 MIN: CPT | Mod: 25

## 2023-11-16 PROCEDURE — 20611 DRAIN/INJ JOINT/BURSA W/US: CPT | Mod: LT

## 2023-11-16 RX ORDER — LIDOCAINE HYDROCHLORIDE 10 MG/ML
1 INJECTION, SOLUTION INFILTRATION; PERINEURAL
Qty: 0 | Refills: 0 | Status: COMPLETED | OUTPATIENT
Start: 2023-11-16

## 2023-11-16 RX ORDER — METHYLPRED ACET/NACL,ISO-OS/PF 80 MG/ML
80 VIAL (ML) INJECTION
Qty: 1 | Refills: 0 | Status: COMPLETED | OUTPATIENT
Start: 2023-11-16

## 2023-11-16 RX ADMIN — Medication 0 %: at 00:00

## 2023-11-16 RX ADMIN — Medication 0 MG/ML: at 00:00

## 2023-11-27 ENCOUNTER — APPOINTMENT (OUTPATIENT)
Dept: CARDIOLOGY | Facility: CLINIC | Age: 58
End: 2023-11-27

## 2023-11-30 ENCOUNTER — RX RENEWAL (OUTPATIENT)
Age: 58
End: 2023-11-30

## 2023-12-07 ENCOUNTER — RX RENEWAL (OUTPATIENT)
Age: 58
End: 2023-12-07

## 2023-12-07 RX ORDER — DICLOFENAC SODIUM 50 MG/1
50 TABLET, DELAYED RELEASE ORAL
Qty: 60 | Refills: 0 | Status: ACTIVE | COMMUNITY
Start: 2023-11-10 | End: 1900-01-01

## 2023-12-12 ENCOUNTER — APPOINTMENT (OUTPATIENT)
Dept: INTERNAL MEDICINE | Facility: CLINIC | Age: 58
End: 2023-12-12
Payer: MEDICARE

## 2023-12-12 VITALS
DIASTOLIC BLOOD PRESSURE: 80 MMHG | WEIGHT: 167 LBS | TEMPERATURE: 98.7 F | BODY MASS INDEX: 33.67 KG/M2 | SYSTOLIC BLOOD PRESSURE: 146 MMHG | HEIGHT: 59 IN | RESPIRATION RATE: 16 BRPM | OXYGEN SATURATION: 99 % | HEART RATE: 64 BPM

## 2023-12-12 PROCEDURE — 99214 OFFICE O/P EST MOD 30 MIN: CPT

## 2023-12-19 ENCOUNTER — LABORATORY RESULT (OUTPATIENT)
Age: 58
End: 2023-12-19

## 2023-12-19 DIAGNOSIS — R30.0 DYSURIA: ICD-10-CM

## 2023-12-21 ENCOUNTER — NON-APPOINTMENT (OUTPATIENT)
Age: 58
End: 2023-12-21

## 2023-12-26 ENCOUNTER — RX RENEWAL (OUTPATIENT)
Age: 58
End: 2023-12-26

## 2023-12-27 ENCOUNTER — RX RENEWAL (OUTPATIENT)
Age: 58
End: 2023-12-27

## 2023-12-29 RX ORDER — ATORVASTATIN CALCIUM 20 MG/1
20 TABLET, FILM COATED ORAL
Qty: 90 | Refills: 3 | Status: ACTIVE | COMMUNITY
Start: 2018-01-17 | End: 1900-01-01

## 2023-12-30 ENCOUNTER — RX RENEWAL (OUTPATIENT)
Age: 58
End: 2023-12-30

## 2024-01-22 ENCOUNTER — APPOINTMENT (OUTPATIENT)
Dept: CARDIOLOGY | Facility: CLINIC | Age: 59
End: 2024-01-22

## 2024-01-22 ENCOUNTER — RX RENEWAL (OUTPATIENT)
Age: 59
End: 2024-01-22

## 2024-01-22 RX ORDER — LEVOTHYROXINE SODIUM 0.1 MG/1
100 TABLET ORAL
Qty: 30 | Refills: 5 | Status: ACTIVE | COMMUNITY
Start: 2022-03-14 | End: 1900-01-01

## 2024-01-22 NOTE — ED ADULT NURSE NOTE - MUSCULOSKELETAL WDL
Reason for visit:  1. Coronary artery disease   --s/p PTCA/Stent of OM1 in 1999  --last stress test was 6/2009 and was normal  2. H/O mild CVA in 1997  3. Tobacco use--quit summer of 2022  4. Peripheral arterial disease s/p lower ext PCI's  5. Hyperlipidemia  Last office visit:  1/30/2023  PCP:  Jayden Estrada MD      Patient denies any complaints or concerns today.     Family history reviewed and updated.  Medications verified, reviewed, and updated.  Denies known latex allergy or symptoms of Latex sensitivity.  Tobacco & other substance use reviewed.    Full range of motion of upper and lower extremities, no joint tenderness/swelling.

## 2024-01-24 ENCOUNTER — APPOINTMENT (OUTPATIENT)
Dept: GASTROENTEROLOGY | Facility: CLINIC | Age: 59
End: 2024-01-24
Payer: MEDICARE

## 2024-01-24 VITALS
HEIGHT: 59 IN | DIASTOLIC BLOOD PRESSURE: 82 MMHG | TEMPERATURE: 97.5 F | SYSTOLIC BLOOD PRESSURE: 145 MMHG | WEIGHT: 165 LBS | HEART RATE: 60 BPM | OXYGEN SATURATION: 99 % | BODY MASS INDEX: 33.26 KG/M2

## 2024-01-24 DIAGNOSIS — K44.9 DIAPHRAGMATIC HERNIA W/OUT OBSTRUCTION OR GANGRENE: ICD-10-CM

## 2024-01-24 DIAGNOSIS — R10.13 EPIGASTRIC PAIN: ICD-10-CM

## 2024-01-24 DIAGNOSIS — K58.1 IRRITABLE BOWEL SYNDROME WITH CONSTIPATION: ICD-10-CM

## 2024-01-24 DIAGNOSIS — K59.00 CONSTIPATION, UNSPECIFIED: ICD-10-CM

## 2024-01-24 PROCEDURE — 99214 OFFICE O/P EST MOD 30 MIN: CPT

## 2024-01-24 RX ORDER — FAMOTIDINE 20 MG/1
20 TABLET ORAL
Qty: 120 | Refills: 3 | Status: ACTIVE | COMMUNITY
Start: 2024-01-24 | End: 1900-01-01

## 2024-01-24 NOTE — HISTORY OF PRESENT ILLNESS
[FreeTextEntry1] : The colonoscopy to the terminal ileum performed at the [facility] GI endoscopy suite on February 13, 2020.revealed moderate left sided diverticulosis.  There were no polyps, masses, AVMs or colitis noted. The pathology revealed unremarkable small intestinal mucosa with preserved villous pattern and lymphocytic aggregates (terminal ileum), patchy mild colitis without evidence of architectural changes or evidence of chronicity (cecum) and benign colonic mucosa with focal hyperplastic change (rectum). [de-identified] : The CAT scan of the abdomen and pelvis with IV contrast performed on June 29, 2023 revealed no acute CT findings.  Also noted were spinal rods and pedicle screws transfix at L5-S1 with disc spacers present at these levels, postsurgical changes in the abdominal wall, atherosclerotic changes in the vessels, diverticulosis coli and coronary artery calcifications.    The CAT scan of the chest, abdomen and pelvis with IV contrast performed on February 17, 2020 revealed no evidence of an aortic dissection. The CAT scan of the abdomen and pelvis with IV contrast performed on December 16, 2019 revealed heterogeneous attenuation in the external iliac and common femoral veins is likely related to mixing artifact. Also noted was constipation, sigmoid diverticulosis without diverticulitis, trace bilateral hydronephrosis likely related to distended urinary bladder. The doppler ultrasound of the lower extremities performed on December 27, 2019 revealed no evidence of deep venous thrombosis in either lower extremity.  [de-identified] : The abdominal ultrasound performed on February 19, 2020 revealed no gallstones, gallbladder wall thickening or pericholecystic fluid. \par  The abdominal ultrasound performed on October 18, 2019 revealed no gallstones or pericholecystic fluid. The contracted state of the gallbladder limits assessment for gallbladder wall thickening. There is no evidence for intrahepatic or extrahepatic biliary duct dilatation. The pelvic ultrasound performed on October 18, 2019 revealed no evidence for an ovarian lesion.

## 2024-01-25 ENCOUNTER — APPOINTMENT (OUTPATIENT)
Dept: RHEUMATOLOGY | Facility: CLINIC | Age: 59
End: 2024-01-25

## 2024-01-29 ENCOUNTER — RX RENEWAL (OUTPATIENT)
Age: 59
End: 2024-01-29

## 2024-02-05 RX ORDER — BENZONATATE 100 MG/1
100 CAPSULE ORAL 3 TIMES DAILY
Qty: 30 | Refills: 0 | Status: ACTIVE | COMMUNITY
Start: 2023-09-14 | End: 1900-01-01

## 2024-02-14 NOTE — ED ADULT NURSE NOTE - EXTENSIONS OF SELF_ADULT
Photo Preface (Leave Blank If You Do Not Want): Photographs were obtained today Detail Level: Zone None

## 2024-02-15 ENCOUNTER — APPOINTMENT (OUTPATIENT)
Dept: RHEUMATOLOGY | Facility: CLINIC | Age: 59
End: 2024-02-15

## 2024-02-21 ENCOUNTER — RX RENEWAL (OUTPATIENT)
Age: 59
End: 2024-02-21

## 2024-02-21 RX ORDER — MECLIZINE HYDROCHLORIDE 12.5 MG/1
12.5 TABLET ORAL
Qty: 60 | Refills: 5 | Status: ACTIVE | COMMUNITY
Start: 2019-02-25 | End: 1900-01-01

## 2024-02-26 ENCOUNTER — RX RENEWAL (OUTPATIENT)
Age: 59
End: 2024-02-26

## 2024-02-28 DIAGNOSIS — R39.81 FUNCTIONAL URINARY INCONTINENCE: ICD-10-CM

## 2024-03-07 ENCOUNTER — RX RENEWAL (OUTPATIENT)
Age: 59
End: 2024-03-07

## 2024-03-11 ENCOUNTER — APPOINTMENT (OUTPATIENT)
Dept: INTERNAL MEDICINE | Facility: CLINIC | Age: 59
End: 2024-03-11

## 2024-03-19 ENCOUNTER — APPOINTMENT (OUTPATIENT)
Dept: RHEUMATOLOGY | Facility: CLINIC | Age: 59
End: 2024-03-19

## 2024-04-08 ENCOUNTER — EMERGENCY (EMERGENCY)
Facility: HOSPITAL | Age: 59
LOS: 1 days | Discharge: HOME | End: 2024-04-08
Attending: EMERGENCY MEDICINE
Payer: MEDICARE

## 2024-04-08 ENCOUNTER — RX RENEWAL (OUTPATIENT)
Age: 59
End: 2024-04-08

## 2024-04-08 VITALS
HEART RATE: 66 BPM | DIASTOLIC BLOOD PRESSURE: 83 MMHG | OXYGEN SATURATION: 96 % | WEIGHT: 162.92 LBS | SYSTOLIC BLOOD PRESSURE: 130 MMHG | TEMPERATURE: 98 F | RESPIRATION RATE: 16 BRPM

## 2024-04-08 VITALS
HEART RATE: 60 BPM | DIASTOLIC BLOOD PRESSURE: 75 MMHG | TEMPERATURE: 98 F | OXYGEN SATURATION: 96 % | RESPIRATION RATE: 17 BRPM | SYSTOLIC BLOOD PRESSURE: 135 MMHG

## 2024-04-08 DIAGNOSIS — Z98.89 OTHER SPECIFIED POSTPROCEDURAL STATES: Chronic | ICD-10-CM

## 2024-04-08 DIAGNOSIS — Z98.51 TUBAL LIGATION STATUS: Chronic | ICD-10-CM

## 2024-04-08 DIAGNOSIS — Z98.890 OTHER SPECIFIED POSTPROCEDURAL STATES: Chronic | ICD-10-CM

## 2024-04-08 LAB
ALBUMIN SERPL ELPH-MCNC: 4 G/DL — SIGNIFICANT CHANGE UP (ref 3.5–5)
ALP SERPL-CCNC: 55 U/L — SIGNIFICANT CHANGE UP (ref 40–120)
ALT FLD-CCNC: 28 U/L DA — SIGNIFICANT CHANGE UP (ref 10–60)
ANION GAP SERPL CALC-SCNC: 4 MMOL/L — LOW (ref 5–17)
APTT BLD: 30.9 SEC — SIGNIFICANT CHANGE UP (ref 24.5–35.6)
AST SERPL-CCNC: 22 U/L — SIGNIFICANT CHANGE UP (ref 10–40)
BASOPHILS # BLD AUTO: 0.07 K/UL — SIGNIFICANT CHANGE UP (ref 0–0.2)
BASOPHILS NFR BLD AUTO: 2.1 % — HIGH (ref 0–2)
BILIRUB SERPL-MCNC: 0.2 MG/DL — SIGNIFICANT CHANGE UP (ref 0.2–1.2)
BUN SERPL-MCNC: 20 MG/DL — HIGH (ref 7–18)
CALCIUM SERPL-MCNC: 8.8 MG/DL — SIGNIFICANT CHANGE UP (ref 8.4–10.5)
CHLORIDE SERPL-SCNC: 104 MMOL/L — SIGNIFICANT CHANGE UP (ref 96–108)
CO2 SERPL-SCNC: 29 MMOL/L — SIGNIFICANT CHANGE UP (ref 22–31)
CREAT SERPL-MCNC: 0.9 MG/DL — SIGNIFICANT CHANGE UP (ref 0.5–1.3)
EGFR: 73 ML/MIN/1.73M2 — SIGNIFICANT CHANGE UP
EOSINOPHIL # BLD AUTO: 0.09 K/UL — SIGNIFICANT CHANGE UP (ref 0–0.5)
EOSINOPHIL NFR BLD AUTO: 2.7 % — SIGNIFICANT CHANGE UP (ref 0–6)
GLUCOSE SERPL-MCNC: 86 MG/DL — SIGNIFICANT CHANGE UP (ref 70–99)
HCT VFR BLD CALC: 36.9 % — SIGNIFICANT CHANGE UP (ref 34.5–45)
HGB BLD-MCNC: 11.7 G/DL — SIGNIFICANT CHANGE UP (ref 11.5–15.5)
IMM GRANULOCYTES NFR BLD AUTO: 0 % — SIGNIFICANT CHANGE UP (ref 0–0.9)
INR BLD: 0.99 RATIO — SIGNIFICANT CHANGE UP (ref 0.85–1.18)
LACTATE SERPL-SCNC: 1.3 MMOL/L — SIGNIFICANT CHANGE UP (ref 0.7–2)
LIDOCAIN IGE QN: 22 U/L — SIGNIFICANT CHANGE UP (ref 13–75)
LYMPHOCYTES # BLD AUTO: 1.39 K/UL — SIGNIFICANT CHANGE UP (ref 1–3.3)
LYMPHOCYTES # BLD AUTO: 42.1 % — SIGNIFICANT CHANGE UP (ref 13–44)
MCHC RBC-ENTMCNC: 31.4 PG — SIGNIFICANT CHANGE UP (ref 27–34)
MCHC RBC-ENTMCNC: 31.7 GM/DL — LOW (ref 32–36)
MCV RBC AUTO: 98.9 FL — SIGNIFICANT CHANGE UP (ref 80–100)
MONOCYTES # BLD AUTO: 0.33 K/UL — SIGNIFICANT CHANGE UP (ref 0–0.9)
MONOCYTES NFR BLD AUTO: 10 % — SIGNIFICANT CHANGE UP (ref 2–14)
NEUTROPHILS # BLD AUTO: 1.42 K/UL — LOW (ref 1.8–7.4)
NEUTROPHILS NFR BLD AUTO: 43.1 % — SIGNIFICANT CHANGE UP (ref 43–77)
NRBC # BLD: 0 /100 WBCS — SIGNIFICANT CHANGE UP (ref 0–0)
PLATELET # BLD AUTO: 299 K/UL — SIGNIFICANT CHANGE UP (ref 150–400)
POTASSIUM SERPL-MCNC: 4.5 MMOL/L — SIGNIFICANT CHANGE UP (ref 3.5–5.3)
POTASSIUM SERPL-SCNC: 4.5 MMOL/L — SIGNIFICANT CHANGE UP (ref 3.5–5.3)
PROT SERPL-MCNC: 7.7 G/DL — SIGNIFICANT CHANGE UP (ref 6–8.3)
PROTHROM AB SERPL-ACNC: 11.3 SEC — SIGNIFICANT CHANGE UP (ref 9.5–13)
RBC # BLD: 3.73 M/UL — LOW (ref 3.8–5.2)
RBC # FLD: 12.3 % — SIGNIFICANT CHANGE UP (ref 10.3–14.5)
SODIUM SERPL-SCNC: 137 MMOL/L — SIGNIFICANT CHANGE UP (ref 135–145)
TROPONIN I, HIGH SENSITIVITY RESULT: 3.7 NG/L — SIGNIFICANT CHANGE UP
WBC # BLD: 3.3 K/UL — LOW (ref 3.8–10.5)
WBC # FLD AUTO: 3.3 K/UL — LOW (ref 3.8–10.5)

## 2024-04-08 PROCEDURE — 99285 EMERGENCY DEPT VISIT HI MDM: CPT | Mod: 25

## 2024-04-08 PROCEDURE — 85730 THROMBOPLASTIN TIME PARTIAL: CPT

## 2024-04-08 PROCEDURE — 71046 X-RAY EXAM CHEST 2 VIEWS: CPT

## 2024-04-08 PROCEDURE — 80053 COMPREHEN METABOLIC PANEL: CPT

## 2024-04-08 PROCEDURE — 86900 BLOOD TYPING SEROLOGIC ABO: CPT

## 2024-04-08 PROCEDURE — 93005 ELECTROCARDIOGRAM TRACING: CPT

## 2024-04-08 PROCEDURE — 85610 PROTHROMBIN TIME: CPT

## 2024-04-08 PROCEDURE — 84484 ASSAY OF TROPONIN QUANT: CPT

## 2024-04-08 PROCEDURE — 85025 COMPLETE CBC W/AUTO DIFF WBC: CPT

## 2024-04-08 PROCEDURE — 86850 RBC ANTIBODY SCREEN: CPT

## 2024-04-08 PROCEDURE — 86901 BLOOD TYPING SEROLOGIC RH(D): CPT

## 2024-04-08 PROCEDURE — 96374 THER/PROPH/DIAG INJ IV PUSH: CPT

## 2024-04-08 PROCEDURE — 83605 ASSAY OF LACTIC ACID: CPT

## 2024-04-08 PROCEDURE — 71046 X-RAY EXAM CHEST 2 VIEWS: CPT | Mod: 26

## 2024-04-08 PROCEDURE — 83690 ASSAY OF LIPASE: CPT

## 2024-04-08 PROCEDURE — 99285 EMERGENCY DEPT VISIT HI MDM: CPT

## 2024-04-08 PROCEDURE — 36415 COLL VENOUS BLD VENIPUNCTURE: CPT

## 2024-04-08 RX ORDER — KETOROLAC TROMETHAMINE 30 MG/ML
15 SYRINGE (ML) INJECTION ONCE
Refills: 0 | Status: DISCONTINUED | OUTPATIENT
Start: 2024-04-08 | End: 2024-04-08

## 2024-04-08 RX ORDER — NITROGLYCERIN 6.5 MG
0.4 CAPSULE, EXTENDED RELEASE ORAL
Refills: 0 | Status: DISCONTINUED | OUTPATIENT
Start: 2024-04-08 | End: 2024-04-11

## 2024-04-08 RX ADMIN — Medication 15 MILLIGRAM(S): at 13:05

## 2024-04-08 RX ADMIN — Medication 15 MILLIGRAM(S): at 12:35

## 2024-04-08 RX ADMIN — Medication 0.4 MILLIGRAM(S): at 11:45

## 2024-04-08 NOTE — ED PROVIDER NOTE - PROGRESS NOTE DETAILS
EKG on my interpretation normal sinus rhythm, rate 61, QTc 465, right bundle branch block, no ischemic changes, no ectopy Labs nonactionable including negative troponin  Chest x-ray with clear lungs  Pain controlled  DC supportive care and outpatient follow-up  Discussed indication for patient return to ED.  Patient understood.

## 2024-04-08 NOTE — ED PROVIDER NOTE - CLINICAL SUMMARY MEDICAL DECISION MAKING FREE TEXT BOX
4-year-old female with intermittent chest pain since yesterday.  Plan for EKG, labs, chest x-ray, reassess 58-year-old female with intermittent chest pain since yesterday.  Plan for EKG, labs, chest x-ray, reassess

## 2024-04-08 NOTE — ED PROVIDER NOTE - OBJECTIVE STATEMENT
4-year-old female past medical history of lupus, fibromyalgia, anemia, remote history of MI in 2013, complains of of chest pain that started yesterday while patient was sleeping.  Associated with some diaphoresis.  Took aspirin.  Pain returned today so comes to the ER.  Denies other acute complaints.

## 2024-04-08 NOTE — ED ADULT NURSE NOTE - NSFALLUNIVINTERV_ED_ALL_ED
Bed/Stretcher in lowest position, wheels locked, appropriate side rails in place/Call bell, personal items and telephone in reach/Instruct patient to call for assistance before getting out of bed/chair/stretcher/Non-slip footwear applied when patient is off stretcher/[place] to call system/Physically safe environment - no spills, clutter or unnecessary equipment/Purposeful proactive rounding/Room/bathroom lighting operational, light cord in reach

## 2024-04-08 NOTE — ED ADULT NURSE NOTE - OBJECTIVE STATEMENT
over-the-counter or symptom relief.     Joslyn Garcia, VIJAY   Pt c/o chest pain x 4 days, reports nausea and sweating yesterday.

## 2024-04-08 NOTE — ED PROVIDER NOTE - PATIENT PORTAL LINK FT
You can access the FollowMyHealth Patient Portal offered by [facility] by registering at the following website: [email or web address]. By joining [facility] FollowMyHealth portal, you will also be able to view your health information using other applications (apps) compatible with our system.

## 2024-04-08 NOTE — ED ADULT TRIAGE NOTE - CHIEF COMPLAINT QUOTE
Chest pain , on and off nausea and sweating since yesterday too Baby ASA 4 tabs this morning with some relief

## 2024-04-08 NOTE — ED PROVIDER NOTE - PHYSICAL EXAMINATION
GENERAL: well appearing, no acute distress   HEAD: atraumatic   EYES: EOMI   ENT: moist oral mucosa   CARDIAC: regular rate, No murmurs, extremities warm and well-perfused  RESPIRATORY: no increased work of breathing, Lungs clear to auscultation bilaterally  MUSCULOSKELETAL: no deformity   NEUROLOGICAL: alert, spontaneous movement of extremities   SKIN: no visible rash  PSYCHIATRIC: cooperative

## 2024-04-11 ENCOUNTER — NON-APPOINTMENT (OUTPATIENT)
Age: 59
End: 2024-04-11

## 2024-04-12 ENCOUNTER — NON-APPOINTMENT (OUTPATIENT)
Age: 59
End: 2024-04-12

## 2024-04-16 ENCOUNTER — APPOINTMENT (OUTPATIENT)
Dept: RHEUMATOLOGY | Facility: CLINIC | Age: 59
End: 2024-04-16
Payer: MEDICARE

## 2024-04-16 VITALS
RESPIRATION RATE: 16 BRPM | OXYGEN SATURATION: 97 % | BODY MASS INDEX: 33.26 KG/M2 | HEART RATE: 80 BPM | HEIGHT: 59 IN | TEMPERATURE: 97.9 F | WEIGHT: 165 LBS | DIASTOLIC BLOOD PRESSURE: 84 MMHG | SYSTOLIC BLOOD PRESSURE: 160 MMHG

## 2024-04-16 DIAGNOSIS — M75.22 BICIPITAL TENDINITIS, LEFT SHOULDER: ICD-10-CM

## 2024-04-16 DIAGNOSIS — M81.0 AGE-RELATED OSTEOPOROSIS W/OUT CURRENT PATHOLOGICAL FRACTURE: ICD-10-CM

## 2024-04-16 DIAGNOSIS — M77.12 LATERAL EPICONDYLITIS, LEFT ELBOW: ICD-10-CM

## 2024-04-16 PROCEDURE — 99214 OFFICE O/P EST MOD 30 MIN: CPT | Mod: 25

## 2024-04-16 PROCEDURE — 20550 NJX 1 TENDON SHEATH/LIGAMENT: CPT

## 2024-04-16 PROCEDURE — 76882 US LMTD JT/FCL EVL NVASC XTR: CPT | Mod: 59

## 2024-04-16 RX ORDER — LIDOCAINE HYDROCHLORIDE 10 MG/ML
1 INJECTION, SOLUTION INFILTRATION; PERINEURAL
Qty: 0 | Refills: 0 | Status: COMPLETED | OUTPATIENT
Start: 2024-04-16

## 2024-04-16 RX ORDER — METHYLPRED ACET/NACL,ISO-OS/PF 40 MG/ML
40 VIAL (ML) INJECTION
Qty: 0 | Refills: 0 | Status: COMPLETED | OUTPATIENT
Start: 2024-04-16

## 2024-04-16 RX ADMIN — METHYLPREDNISOLONE ACETATE 0 MG/ML: 40 INJECTION, SUSPENSION INTRA-ARTICULAR; INTRALESIONAL; INTRAMUSCULAR; SOFT TISSUE at 00:00

## 2024-04-16 RX ADMIN — LIDOCAINE HYDROCHLORIDE 0 %: 10 INJECTION, SOLUTION EPIDURAL; INFILTRATION; INTRACAUDAL; PERINEURAL at 00:00

## 2024-04-16 NOTE — PHYSICAL EXAM
[General Appearance - Alert] : alert [General Appearance - In No Acute Distress] : in no acute distress [General Appearance - Well-Appearing] : healthy appearing [Sclera] : the sclera and conjunctiva were normal [Neck Appearance] : the appearance of the neck was normal [Auscultation Breath Sounds / Voice Sounds] : lungs were clear to auscultation bilaterally [Edema] : there was no peripheral edema [No Spinal Tenderness] : no spinal tenderness [] : no rash [Skin Lesions] : no skin lesions [Motor Exam] : the motor exam was normal [Oriented To Time, Place, And Person] : oriented to person, place, and time [Impaired Insight] : insight and judgment were intact [Abnormal Walk] : normal gait [Nail Clubbing] : no clubbing  or cyanosis of the fingernails [Musculoskeletal - Swelling] : no joint swelling seen [Motor Tone] : muscle strength and tone were normal [Heart Rate And Rhythm] : heart rate was normal and rhythm regular [FreeTextEntry1] : No active hand synovitis; tenderness appreciated over the left bicipital groove and along the left lateral epicondyles shoulder abduction to 140-60 without discomfort with appropriate internal rotation of the shoulders bilaterally right shoulder abduction maintained; minimal tenderness along the medial joint lines

## 2024-04-16 NOTE — PROCEDURE
[Today's Date:] : Date: [unfilled] [Patient] : the patient [Risks] : risks [Benefits] : benefits [Consent Obtained] : written consent was obtained prior to the procedure and is detailed in the patient's record [Diagnostic] : diagnostic  [Therapeutic] : therapeutic [#1 Site: ______] : #1 site identified in the [unfilled] [#2 Site: ___] : # 2 site identified in the [unfilled] [Ethyl Chloride] : ethyl chloride [Betadine] : with betadine solution [Alcohol] : alcohol [25 gauge 1 inch] : A 25 gauge 1 inch needle was used [___ml 1% Lidocaine] : [unfilled] ml of 1% lidocaine [Depomedrol ___ mg] : Depomedrol [unfilled] mg [Tolerated Well] : the patient tolerated the procedure well [No Complications] : there were no complications [FreeTextEntry1] : Patient name :  Caroline Newman :  1965 DOS: 2024  Study Type: Limited study Indication: Pain in LT shoulder  Study Site: LT  shoulder Equipment:  Equipment: [employer] e 12MHz linear transducer   Findings:   Orthogonal views of the anterior shoulder were obtained in grey scale and cpd.   Hyperechoic dense deposits noted within the biceps tendon and along the bicipital groove ; continuity of hyperechoic linear structure of the humerus delineating the bicipital groove noted .  Bicep tendon within bicipital groove with trace anechoic rim surrounding sheath without cpd uptake .  [place] the biceps tendon reflects thickening over the proximal region otherwise with fine, tightly alternating parallel lines of the fibers noted in the mid and distal tendon.   Impressions:   Calcific tendinitis of the long head of the biceps tendon Calcification in the bicipital groove

## 2024-04-16 NOTE — PROCEDURE
[Today's Date:] : Date: [unfilled] [Patient] : the patient [Risks] : risks [Benefits] : benefits [Consent Obtained] : written consent was obtained prior to the procedure and is detailed in the patient's record [Diagnostic] : diagnostic  [Therapeutic] : therapeutic [#1 Site: ______] : #1 site identified in the [unfilled] [#2 Site: ___] : # 2 site identified in the [unfilled] [Ethyl Chloride] : ethyl chloride [Betadine] : with betadine solution [Alcohol] : alcohol [25 gauge 1 inch] : A 25 gauge 1 inch needle was used [___ml 1% Lidocaine] : [unfilled] ml of 1% lidocaine [Depomedrol ___ mg] : Depomedrol [unfilled] mg [Tolerated Well] : the patient tolerated the procedure well [No Complications] : there were no complications [FreeTextEntry1] : Patient name :  Caroline Newman :  1965 DOS: 2024  Study Type: Limited study Indication: Pain in LT shoulder  Study Site: LT  shoulder Equipment:  Equipment: [employer] e 12MHz linear transducer   Findings:   Orthogonal views of the anterior shoulder were obtained in grey scale and cpd.   Hyperechoic dense deposits noted within the biceps tendon and along the bicipital groove ; continuity of hyperechoic linear structure of the humerus delineating the bicipital groove noted .  Bicep tendon within bicipital groove with trace anechoic rim surrounding sheath without cpd uptake .  [place] the biceps tendon reflects thickening over the proximal region otherwise with fine, tightly alternating parallel lines of the fibers noted in the mid and distal tendon.   Impressions:   Calcific tendinitis of the long head of the biceps tendon Calcification in the bicipital groove

## 2024-04-16 NOTE — ASSESSMENT
[FreeTextEntry1] : Patient with SLE; FM; LT tendinopathy, calcific tendinitis; LT lateral epicondyle:  Patient to continue with hydroxychloroquine to help assuage underlying inflammation. Patient understands the retinal toxicity associated with therapy and the importance of visual field screening testing with ophthalmology on a yearly basis; recent testing performed February 2024.. Patient understands the increased risk of cardiovascular events associated with systemic lupus and the importance of dietary and exercise modification. Patient to continue with strict photoprotection. Patient will benefit from physical therapy to help with joint mobility and muscle strengthening.  Rotator cuff strengthening exercises encouraged; point-of-care ultrasound reflects bicipital tendinopathy along with calcific depositions: physiotherapy, contrast therapy with heat and ice recommended for now.  Topical diclofenac therapy recommended .  Cortisone injection given to the lateral epicondyles for pain relief with elbow sleeve strongly encouraged. She will continue on Calcium and VitD at the recommended doses of 1200mg and 800IU daily, respectively, whether through foods or supplements.  Recommend holding off on additional IV bisphosphonate therapy until discussing with oral surgeon the next steps in dental plan.  Would recommend that denosumab later in the year if agreed upon by oral maxillary team.  Food plan discussed for improved weight loss efforts.   She is in agreement with the above plan and will return in three months' time.

## 2024-04-16 NOTE — HISTORY OF PRESENT ILLNESS
[FreeTextEntry1] : Patient continues with hydroxychloroquine twice daily without complication.  She describes minimal morning stiffness or associated swelling.  She further denies rash, Raynaud's or photosensitivity.  Patient with history of positive YOKO 1: 320 and homogenous/speckled pattern with associated positive SSA antibody positivity; APL Ab negative; she has not had thrombotic events in the past and initially manifested with arthralgias and low mood in early 2000. Patient explains performing ADLs and focusing on tasks without difficulty.  She reports arthralgias at bay save left shoulder pain.  She reports progressive pain over the left shoulder most notable when lifting arm or reaching for the back however finds over the last two weeks pain has migrated to the elbow area without recollection of heavy lifting, inciting factors or trauma. She reports improvement in cervical and lumbar pain after receiving surgical correction in 2022.  She continues on antidepressants and anti anxiolytics.  She describes restorative sleep.  Patient with bone density reflective of OP in the LT fem neck and distal radius; she received IV Reclast with mild hypersensitivity reaction.  She explains recent dental work and notes history of bone graft in the past.  She explains dental history of this nature x 6 years.  Dentist aware of osteoporosis treatment given for the first time last year. She further denies recent falls or fractures.

## 2024-04-16 NOTE — CONSULT LETTER
[Dear  ___] : Dear  [unfilled], [Courtesy Letter:] : I had the pleasure of seeing your patient, [unfilled], in my office today. [Please see my note below.] : Please see my note below. [Consult Closing:] : Thank you very much for allowing me to participate in the care of this patient.  If you have any questions, please do not hesitate to contact me. [Sincerely,] : Sincerely, [FreeTextEntry2] : Roe Sales MD\ysabel  [facility] [FreeTextEntry3] : Tamiko Duff M.D.\par   of Medicine \par  [facility] [employer] [facility]/[place]\par  \par

## 2024-04-16 NOTE — CONSULT LETTER
[Dear  ___] : Dear  [unfilled], [Courtesy Letter:] : I had the pleasure of seeing your patient, [unfilled], in my office today. [Please see my note below.] : Please see my note below. [Consult Closing:] : Thank you very much for allowing me to participate in the care of this patient.  If you have any questions, please do not hesitate to contact me. [Sincerely,] : Sincerely, [FreeTextEntry2] : Roe Sales MD\ysabel  [facility] [FreeTextEntry3] : Tamiko Duff M.D.\par   of Medicine \par  [facility] [employer] [facility] at [facility]/[place]\par  \par

## 2024-04-17 LAB
ALBUMIN SERPL ELPH-MCNC: 4.9 G/DL
ALP BLD-CCNC: 67 U/L
ALT SERPL-CCNC: 24 U/L
ANION GAP SERPL CALC-SCNC: 12 MMOL/L
AST SERPL-CCNC: 31 U/L
BILIRUB SERPL-MCNC: 0.3 MG/DL
BUN SERPL-MCNC: 20 MG/DL
C3 SERPL-MCNC: 127 MG/DL
C4 SERPL-MCNC: 26 MG/DL
CALCIUM SERPL-MCNC: 9.6 MG/DL
CHLORIDE SERPL-SCNC: 99 MMOL/L
CHOLEST SERPL-MCNC: 159 MG/DL
CO2 SERPL-SCNC: 27 MMOL/L
CREAT SERPL-MCNC: 0.87 MG/DL
CRP SERPL-MCNC: <3 MG/L
EGFR: 76 ML/MIN/1.73M2
ERYTHROCYTE [SEDIMENTATION RATE] IN BLOOD BY WESTERGREN METHOD: 31 MM/HR
ESTIMATED AVERAGE GLUCOSE: 120 MG/DL
GLUCOSE SERPL-MCNC: 90 MG/DL
HBA1C MFR BLD HPLC: 5.8 %
HCT VFR BLD CALC: 38.2 %
HDLC SERPL-MCNC: 70 MG/DL
HGB BLD-MCNC: 11.8 G/DL
LDLC SERPL CALC-MCNC: 77 MG/DL
MCHC RBC-ENTMCNC: 30.9 GM/DL
MCHC RBC-ENTMCNC: 31.5 PG
MCV RBC AUTO: 101.9 FL
NONHDLC SERPL-MCNC: 89 MG/DL
PLATELET # BLD AUTO: 348 K/UL
POTASSIUM SERPL-SCNC: 5.3 MMOL/L
PROT SERPL-MCNC: 7.7 G/DL
RBC # BLD: 3.75 M/UL
RBC # FLD: 13.2 %
SODIUM SERPL-SCNC: 138 MMOL/L
TRIGL SERPL-MCNC: 58 MG/DL
TSH SERPL-ACNC: 1.85 UIU/ML
URATE SERPL-MCNC: 2.9 MG/DL
WBC # FLD AUTO: 3.72 K/UL

## 2024-04-29 ENCOUNTER — RX RENEWAL (OUTPATIENT)
Age: 59
End: 2024-04-29

## 2024-04-29 RX ORDER — LINACLOTIDE 145 UG/1
145 CAPSULE, GELATIN COATED ORAL
Qty: 30 | Refills: 0 | Status: ACTIVE | COMMUNITY
Start: 2023-07-26 | End: 1900-01-01

## 2024-04-30 RX ORDER — METHOCARBAMOL 750 MG/1
750 TABLET, FILM COATED ORAL
Qty: 180 | Refills: 3 | Status: ACTIVE | COMMUNITY
Start: 2024-04-30 | End: 1900-01-01

## 2024-05-05 PROBLEM — R60.0 BILATERAL LEG EDEMA: Status: ACTIVE | Noted: 2023-03-13

## 2024-05-06 ENCOUNTER — APPOINTMENT (OUTPATIENT)
Dept: CARDIOLOGY | Facility: CLINIC | Age: 59
End: 2024-05-06
Payer: MEDICARE

## 2024-05-06 ENCOUNTER — APPOINTMENT (OUTPATIENT)
Dept: INTERNAL MEDICINE | Facility: CLINIC | Age: 59
End: 2024-05-06
Payer: MEDICARE

## 2024-05-06 ENCOUNTER — NON-APPOINTMENT (OUTPATIENT)
Age: 59
End: 2024-05-06

## 2024-05-06 VITALS
SYSTOLIC BLOOD PRESSURE: 153 MMHG | TEMPERATURE: 97.6 F | OXYGEN SATURATION: 96 % | HEART RATE: 76 BPM | WEIGHT: 164 LBS | DIASTOLIC BLOOD PRESSURE: 89 MMHG | BODY MASS INDEX: 33.12 KG/M2

## 2024-05-06 VITALS — SYSTOLIC BLOOD PRESSURE: 150 MMHG | DIASTOLIC BLOOD PRESSURE: 83 MMHG

## 2024-05-06 VITALS
WEIGHT: 164 LBS | HEART RATE: 76 BPM | SYSTOLIC BLOOD PRESSURE: 140 MMHG | HEIGHT: 59 IN | OXYGEN SATURATION: 96 % | TEMPERATURE: 97.6 F | DIASTOLIC BLOOD PRESSURE: 80 MMHG | RESPIRATION RATE: 16 BRPM | BODY MASS INDEX: 33.06 KG/M2

## 2024-05-06 DIAGNOSIS — R60.0 LOCALIZED EDEMA: ICD-10-CM

## 2024-05-06 DIAGNOSIS — F32.4 MAJOR DEPRESSIVE DISORDER, SINGLE EPISODE, IN PARTIAL REMISSION: ICD-10-CM

## 2024-05-06 DIAGNOSIS — E78.2 MIXED HYPERLIPIDEMIA: ICD-10-CM

## 2024-05-06 PROCEDURE — 93000 ELECTROCARDIOGRAM COMPLETE: CPT

## 2024-05-06 PROCEDURE — 99214 OFFICE O/P EST MOD 30 MIN: CPT

## 2024-05-06 PROCEDURE — G2211 COMPLEX E/M VISIT ADD ON: CPT

## 2024-05-06 RX ORDER — METOPROLOL TARTRATE 25 MG/1
25 TABLET, FILM COATED ORAL
Qty: 180 | Refills: 3 | Status: ACTIVE | COMMUNITY
Start: 2021-04-13 | End: 1900-01-01

## 2024-05-06 NOTE — REVIEW OF SYSTEMS
[Muscle Pain] : muscle pain [TextEntry] : CARDIOVASCULAR: Negative RESPIRATORY: Negative GASTROINTESTINAL: Negative NEUROLOGICAL: Negativec.

## 2024-05-06 NOTE — HISTORY OF PRESENT ILLNESS
[de-identified] : 58 year old  female patient with history of stable Hypertension, Asthma, GERD, Major Depression in partial remission, Hypercholesterolemia with Hypertriglyceridemia, Hypothyroidism, SLE, Elevated Hemoglobin A1c, history as stated, presented for follow up examination. Patient is compliant with all medications. ROS as stated.

## 2024-05-06 NOTE — ASSESSMENT
[FreeTextEntry1] : 58 year old female found to have stable Hypertension, Asthma, GERD, Major Depression in partial remission, Hypercholesterolemia with Hypertriglyceridemia, Hypothyroidism, SLE, Elevated Hemoglobin A1c, with the current prescription regimen as recommended, diet and lifestyle modifications, as counseled. Prior results reviewed, interpreted and discussed with the patient during today's examination, as appropriate. Follow up, treatment plan and tests, as ordered.  Total time spent:: 25 minutes Including: Preparation prior to visit - Reviewing prior record, results of tests and Consultation Reports as applicable Conducting an appropriate H & P during today's encounter Appropriate orders for tests, medications and procedures, as applicable Counseling patient Note completion

## 2024-05-20 NOTE — H&P ADULT - CLICK TO LAUNCH ORM
Patient reports irregular period after starting Bupropion, wondering if that could be a side effect.  Patient started medication 9 days ago, took two tablets and then started her period.  Last period was only two weeks before.  She also reports she stopped her OCP a few months ago as it was giving her a headache, took one pill about 3 weeks ago to see what would happen and a headache developed again.  Patient currently taking spironolactone. Patient reports day one of current menses was heavy, no heavy bleeding now.   Patient also following up on vaccination record message from last week.    Needs one more dose of Hep A and HPV vaccine.  Also needs 2 step TB skin test and Hep B titer.     OK to monitor period for now? OK for orders for vaccinations?         .

## 2024-05-25 ENCOUNTER — APPOINTMENT (OUTPATIENT)
Dept: INTERNAL MEDICINE | Facility: CLINIC | Age: 59
End: 2024-05-25
Payer: MEDICARE

## 2024-05-25 VITALS
WEIGHT: 167 LBS | HEIGHT: 59 IN | RESPIRATION RATE: 16 BRPM | BODY MASS INDEX: 33.67 KG/M2 | HEART RATE: 98 BPM | SYSTOLIC BLOOD PRESSURE: 118 MMHG | DIASTOLIC BLOOD PRESSURE: 76 MMHG | OXYGEN SATURATION: 94 % | TEMPERATURE: 98 F

## 2024-05-25 DIAGNOSIS — E03.9 HYPOTHYROIDISM, UNSPECIFIED: ICD-10-CM

## 2024-05-25 DIAGNOSIS — M54.16 RADICULOPATHY, LUMBAR REGION: ICD-10-CM

## 2024-05-25 DIAGNOSIS — I10 ESSENTIAL (PRIMARY) HYPERTENSION: ICD-10-CM

## 2024-05-25 PROCEDURE — 99496 TRANSJ CARE MGMT HIGH F2F 7D: CPT

## 2024-05-25 NOTE — HEALTH RISK ASSESSMENT
[Intercurrent hospitalizations] : was admitted to the hospital  [No] : In the past 12 months have you used drugs other than those required for medical reasons? No [0] : 2) Feeling down, depressed, or hopeless: Not at all (0) [de-identified] : 05/24 [de-identified] : None [[id number]] : 0 [Never] : Never

## 2024-05-25 NOTE — ASSESSMENT
[FreeTextEntry1] : 58 year old female found to have stable Hypertension, Asthma, GERD, Major Depression in partial remission, Hypercholesterolemia with Hypertriglyceridemia, Hypothyroidism, SLE, Elevated Hemoglobin A1c, with the current prescription regimen as recommended, diet and lifestyle modifications, as counseled. Prior results reviewed, interpreted and discussed with the patient during today's examination, as appropriate. Follow up, treatment plan and tests, as ordered.   Patient was recently hospitalized, findings of Lumbar Radiculopathy and recommendations reviewed and discussed with the patient during today's examination.

## 2024-05-25 NOTE — HISTORY OF PRESENT ILLNESS
[Post-hospitalization from ___ Hospital] : Post-hospitalization from [unfilled] Hospital [Admitted on: ___] : The patient was admitted on [unfilled] [Discharged on ___] : discharged on [unfilled] [Discharge Summary] : discharge summary [FreeTextEntry2] : 58 year old  female patient with history of stable Hypertension, Asthma, GERD, Major Depression in partial remission, Hypercholesterolemia with Hypertriglyceridemia, Hypothyroidism, SLE, Elevated Hemoglobin A1c, history as stated, presented for follow up examination after hospital discharge, following evaluation for back pain, found to be consistent with Lumbar Radiculopathy. Patient is compliant with all medications. ROS as stated.

## 2024-05-27 ENCOUNTER — RX RENEWAL (OUTPATIENT)
Age: 59
End: 2024-05-27

## 2024-05-30 ENCOUNTER — APPOINTMENT (OUTPATIENT)
Dept: RHEUMATOLOGY | Facility: CLINIC | Age: 59
End: 2024-05-30
Payer: MEDICARE

## 2024-05-30 VITALS
SYSTOLIC BLOOD PRESSURE: 169 MMHG | RESPIRATION RATE: 16 BRPM | OXYGEN SATURATION: 97 % | BODY MASS INDEX: 33.67 KG/M2 | DIASTOLIC BLOOD PRESSURE: 83 MMHG | WEIGHT: 167 LBS | HEIGHT: 59 IN | TEMPERATURE: 98.3 F | HEART RATE: 65 BPM

## 2024-05-30 DIAGNOSIS — M79.7 FIBROMYALGIA: ICD-10-CM

## 2024-05-30 DIAGNOSIS — M32.9 SYSTEMIC LUPUS ERYTHEMATOSUS, UNSPECIFIED: ICD-10-CM

## 2024-05-30 DIAGNOSIS — M17.0 BILATERAL PRIMARY OSTEOARTHRITIS OF KNEE: ICD-10-CM

## 2024-05-30 DIAGNOSIS — M17.12 UNILATERAL PRIMARY OSTEOARTHRITIS, LEFT KNEE: ICD-10-CM

## 2024-05-30 PROCEDURE — 99214 OFFICE O/P EST MOD 30 MIN: CPT | Mod: 25

## 2024-05-30 PROCEDURE — 20610 DRAIN/INJ JOINT/BURSA W/O US: CPT | Mod: LT

## 2024-05-30 RX ORDER — IBUPROFEN 600 MG/1
600 TABLET, FILM COATED ORAL
Qty: 60 | Refills: 0 | Status: ACTIVE | COMMUNITY
Start: 2024-05-30 | End: 1900-01-01

## 2024-05-30 RX ORDER — HYDROXYCHLOROQUINE SULFATE 200 MG/1
200 TABLET, FILM COATED ORAL
Qty: 60 | Refills: 3 | Status: ACTIVE | COMMUNITY
Start: 2023-05-18 | End: 1900-01-01

## 2024-05-30 RX ADMIN — LIDOCAINE HYDROCHLORIDE 0 %: 10 INJECTION, SOLUTION INFILTRATION; PERINEURAL at 00:00

## 2024-05-30 RX ADMIN — Medication 0 MG/ML: at 00:00

## 2024-05-31 PROBLEM — M79.7 FIBROMYALGIA: Status: ACTIVE | Noted: 2017-12-28

## 2024-05-31 PROBLEM — M17.0 PRIMARY LOCALIZED OSTEOARTHRITIS OF BOTH KNEES: Status: ACTIVE | Noted: 2023-07-21

## 2024-05-31 PROBLEM — M32.9 SLE (SYSTEMIC LUPUS ERYTHEMATOSUS): Status: ACTIVE | Noted: 2020-10-28

## 2024-05-31 RX ORDER — METHYLPRED ACET/NACL,ISO-OS/PF 40 MG/ML
40 VIAL (ML) INJECTION
Qty: 0 | Refills: 0 | Status: COMPLETED | OUTPATIENT
Start: 2024-05-30

## 2024-05-31 RX ORDER — LIDOCAINE HYDROCHLORIDE 10 MG/ML
1 INJECTION, SOLUTION INFILTRATION; PERINEURAL
Qty: 0 | Refills: 0 | Status: COMPLETED | OUTPATIENT
Start: 2024-05-30

## 2024-05-31 NOTE — PHYSICAL EXAM
[General Appearance - Alert] : alert [General Appearance - In No Acute Distress] : in no acute distress [General Appearance - Well-Appearing] : healthy appearing [Sclera] : the sclera and conjunctiva were normal [Neck Appearance] : the appearance of the neck was normal [Auscultation Breath Sounds / Voice Sounds] : lungs were clear to auscultation bilaterally [Heart Rate And Rhythm] : heart rate was normal and rhythm regular [Edema] : there was no peripheral edema [No Spinal Tenderness] : no spinal tenderness [] : no rash [Skin Lesions] : no skin lesions [Motor Exam] : the motor exam was normal [Oriented To Time, Place, And Person] : oriented to person, place, and time [Impaired Insight] : insight and judgment were intact [Abnormal Walk] : normal gait [Nail Clubbing] : no clubbing  or cyanosis of the fingernails [Motor Tone] : muscle strength and tone were normal [Musculoskeletal - Swelling] : no joint swelling seen [FreeTextEntry1] : No active hand synovitis; appropriate shoulder abduction; moderate tenderness along the LT medial joint line without ballotable fluid; minimal tenderness appreciated over the right knee

## 2024-05-31 NOTE — CONSULT LETTER
[Dear  ___] : Dear  [unfilled], [Courtesy Letter:] : I had the pleasure of seeing your patient, [unfilled], in my office today. [Please see my note below.] : Please see my note below. [Consult Closing:] : Thank you very much for allowing me to participate in the care of this patient.  If you have any questions, please do not hesitate to contact me. [Sincerely,] : Sincerely, [FreeTextEntry2] : Roe Sales MD\ysabel  [facility] [FreeTextEntry3] : Tamiko Duff M.D.\par   of Medicine \par  [facility] [employer] [facility] at [facility]/[place]\par  \par

## 2024-05-31 NOTE — ASSESSMENT
[FreeTextEntry1] : Patient with SLE; FM; LT knee arthropathy:  Patient to continue with hydroxychloroquine to help assuage underlying inflammation. Patient understands the retinal toxicity associated with therapy and the importance of visual field screening testing with ophthalmology on a yearly basis; recent testing performed February 2024. Patient understands the increased risk of cardiovascular events associated with systemic lupus and the importance of dietary and exercise modification. Patient to continue with strict photoprotection. Patient will benefit from physical therapy to help with joint mobility and muscle strengthening. Cortisone injection given to the LT knee for pain relief .    Quadriceps strengthening exercises demonstrated in the office.  Weight loss has been encouraged to reduce load over the medial joint line.  Viscosupplementation has been encouraged to provide additional lubrication and joint support.  In the interim, Diclofenac gel recommended. She will continue on Calcium and VitD at the recommended doses of 1200mg and 800IU daily, respectively, whether through foods or supplements.  Recommend holding off on additional IV bisphosphonate therapy until discussing with oral surgeon the next steps in dental plan.  Would recommend that denosumab later in the year if agreed upon by oral maxillary team.  Food plan discussed for improved weight loss efforts.   She is in agreement with the above plan and will return in three months' time.

## 2024-05-31 NOTE — HISTORY OF PRESENT ILLNESS
[FreeTextEntry1] : Patient returns for follow-up and explains resurfacing of left knee pain over the last two weeks.  She explains recent discharge from the hospital for lumbar radiculopathy and received pain therapy during evaluation.  Patient finds the laxity of the knee can be intermittent and exacerbated upon prolonged walking or climbing stairs.  She denies accompanied swelling.  She reports increased stressors.  She continues on antidepressants and anti anxiolytics.   Patient continues with hydroxychloroquine twice daily without complication.   She further denies rash, Raynaud's or photosensitivity.  Patient with history of positive YOKO 1: 320 and homogenous/speckled pattern with associated positive SSA antibody positivity; APL Ab negative; she has not had thrombotic events in the past and initially manifested with arthralgias and low mood in early 2000. Patient with bone density reflective of OP in the LT fem neck and distal radius; she received IV Reclast with mild hypersensitivity reaction.  She explains recent dental work and notes history of bone graft in the past.  She explains dental history of this nature x 6 years.  Dentist aware of osteoporosis treatment given for the first time last year.  She further denies recent falls or fractures.

## 2024-05-31 NOTE — PROCEDURE
[Today's Date:] : Date: [unfilled] [Patient] : the patient [Risks] : risks [Benefits] : benefits [Consent Obtained] : written consent was obtained prior to the procedure and is detailed in the patient's record [Diagnostic] : diagnostic  [Therapeutic] : therapeutic [#1 Site: ______] : #1 site identified in the [unfilled] [Betadine] : betadine solution [Alcohol] : alcohol [___ml 1% Lidocaine] : [unfilled] ml of 1% lidocaine [Depomedrol ___ mg] : Depomedrol [unfilled] mg [de-identified] : 22g x 2 in inserted

## 2024-06-05 PROBLEM — J45.909 REACTIVE AIRWAY DISEASE: Noted: 2019-12-02

## 2024-06-05 RX ORDER — DICYCLOMINE HYDROCHLORIDE 10 MG/1
10 CAPSULE ORAL 3 TIMES DAILY
Qty: 90 | Refills: 3 | Status: COMPLETED | COMMUNITY
Start: 2023-03-27 | End: 2024-06-05

## 2024-06-05 RX ORDER — DICYCLOMINE HYDROCHLORIDE 10 MG/1
10 CAPSULE ORAL 3 TIMES DAILY
Qty: 90 | Refills: 2 | Status: COMPLETED | COMMUNITY
Start: 2022-09-26 | End: 2024-06-05

## 2024-06-05 RX ORDER — PANTOPRAZOLE 40 MG/1
40 TABLET, DELAYED RELEASE ORAL DAILY
Qty: 30 | Refills: 3 | Status: COMPLETED | COMMUNITY
Start: 2022-03-25 | End: 2024-06-05

## 2024-06-05 RX ORDER — DICYCLOMINE HYDROCHLORIDE 10 MG/1
10 CAPSULE ORAL 3 TIMES DAILY
Qty: 90 | Refills: 3 | Status: COMPLETED | COMMUNITY
Start: 2021-11-19 | End: 2024-06-05

## 2024-06-05 RX ORDER — MECLIZINE HYDROCHLORIDE 25 MG/1
25 TABLET ORAL 3 TIMES DAILY
Qty: 90 | Refills: 5 | Status: COMPLETED | COMMUNITY
Start: 2022-02-23 | End: 2024-06-05

## 2024-06-05 RX ORDER — SIMETHICONE 180 MG
180 CAPSULE ORAL 4 TIMES DAILY
Qty: 120 | Refills: 3 | Status: COMPLETED | COMMUNITY
Start: 2024-01-24 | End: 2024-06-05

## 2024-06-05 RX ORDER — LINACLOTIDE 72 UG/1
72 CAPSULE, GELATIN COATED ORAL
Qty: 30 | Refills: 2 | Status: COMPLETED | COMMUNITY
Start: 2024-01-24 | End: 2024-06-05

## 2024-06-05 RX ORDER — PANTOPRAZOLE 40 MG/1
40 TABLET, DELAYED RELEASE ORAL
Qty: 30 | Refills: 3 | Status: COMPLETED | COMMUNITY
Start: 2023-03-27 | End: 2024-06-05

## 2024-06-05 RX ORDER — SIMETHICONE 180 MG
180 CAPSULE ORAL 4 TIMES DAILY
Qty: 120 | Refills: 3 | Status: COMPLETED | COMMUNITY
Start: 2021-08-23 | End: 2024-06-05

## 2024-06-05 RX ORDER — ALBUTEROL SULFATE 90 UG/1
108 (90 BASE) INHALANT RESPIRATORY (INHALATION)
Qty: 1 | Refills: 5 | Status: COMPLETED | COMMUNITY
Start: 2020-09-15 | End: 2024-06-05

## 2024-06-05 RX ORDER — PANTOPRAZOLE 40 MG/1
40 TABLET, DELAYED RELEASE ORAL
Qty: 30 | Refills: 3 | Status: COMPLETED | COMMUNITY
Start: 2022-09-26 | End: 2024-06-05

## 2024-06-05 RX ORDER — HYDROXYCHLOROQUINE SULFATE 200 MG/1
200 TABLET, FILM COATED ORAL DAILY
Qty: 30 | Refills: 2 | Status: COMPLETED | COMMUNITY
Start: 2021-08-10 | End: 2024-06-05

## 2024-06-05 RX ORDER — PREDNISONE 10 MG/1
10 TABLET ORAL
Qty: 21 | Refills: 0 | Status: COMPLETED | COMMUNITY
Start: 2023-09-21 | End: 2024-06-05

## 2024-06-06 ENCOUNTER — APPOINTMENT (OUTPATIENT)
Dept: HOME HEALTH SERVICES | Facility: HOME HEALTH | Age: 59
End: 2024-06-06

## 2024-06-06 DIAGNOSIS — J45.909 UNSPECIFIED ASTHMA, UNCOMPLICATED: ICD-10-CM

## 2024-06-19 ENCOUNTER — RX RENEWAL (OUTPATIENT)
Age: 59
End: 2024-06-19

## 2024-06-19 RX ORDER — PANTOPRAZOLE 40 MG/1
40 TABLET, DELAYED RELEASE ORAL DAILY
Qty: 90 | Refills: 0 | Status: ACTIVE | COMMUNITY
Start: 2023-07-26 | End: 1900-01-01

## 2024-06-25 ENCOUNTER — RX RENEWAL (OUTPATIENT)
Age: 59
End: 2024-06-25

## 2024-06-25 RX ORDER — DICYCLOMINE HYDROCHLORIDE 10 MG/1
10 CAPSULE ORAL 3 TIMES DAILY
Qty: 90 | Refills: 0 | Status: ACTIVE | COMMUNITY
Start: 2023-07-26 | End: 1900-01-01

## 2024-06-26 ENCOUNTER — APPOINTMENT (OUTPATIENT)
Dept: ENDOCRINOLOGY | Facility: CLINIC | Age: 59
End: 2024-06-26
Payer: MEDICARE

## 2024-06-26 VITALS
SYSTOLIC BLOOD PRESSURE: 123 MMHG | HEIGHT: 60 IN | OXYGEN SATURATION: 98 % | BODY MASS INDEX: 32.59 KG/M2 | RESPIRATION RATE: 16 BRPM | WEIGHT: 166 LBS | DIASTOLIC BLOOD PRESSURE: 81 MMHG | TEMPERATURE: 98.5 F | HEART RATE: 68 BPM

## 2024-06-26 DIAGNOSIS — R73.09 OTHER ABNORMAL GLUCOSE: ICD-10-CM

## 2024-06-26 DIAGNOSIS — R23.2 FLUSHING: ICD-10-CM

## 2024-06-26 DIAGNOSIS — N95.9 UNSPECIFIED MENOPAUSAL AND PERIMENOPAUSAL DISORDER: ICD-10-CM

## 2024-06-26 PROCEDURE — 99205 OFFICE O/P NEW HI 60 MIN: CPT

## 2024-06-26 RX ORDER — PREGABALIN 50 MG/1
50 CAPSULE ORAL TWICE DAILY
Refills: 0 | Status: DISCONTINUED | COMMUNITY
Start: 2022-06-16 | End: 2024-06-26

## 2024-06-26 RX ORDER — GABAPENTIN 100 MG/1
100 CAPSULE ORAL
Qty: 30 | Refills: 5 | Status: DISCONTINUED | COMMUNITY
Start: 2023-10-26 | End: 2024-06-26

## 2024-07-02 ENCOUNTER — APPOINTMENT (OUTPATIENT)
Dept: RHEUMATOLOGY | Facility: CLINIC | Age: 59
End: 2024-07-02

## 2024-07-24 ENCOUNTER — APPOINTMENT (OUTPATIENT)
Dept: ENDOCRINOLOGY | Facility: CLINIC | Age: 59
End: 2024-07-24
Payer: MEDICARE

## 2024-07-24 VITALS
OXYGEN SATURATION: 96 % | SYSTOLIC BLOOD PRESSURE: 123 MMHG | RESPIRATION RATE: 16 BRPM | DIASTOLIC BLOOD PRESSURE: 81 MMHG | BODY MASS INDEX: 32.59 KG/M2 | HEART RATE: 80 BPM | WEIGHT: 166 LBS | HEIGHT: 60 IN | TEMPERATURE: 98.2 F

## 2024-07-24 DIAGNOSIS — N95.9 UNSPECIFIED MENOPAUSAL AND PERIMENOPAUSAL DISORDER: ICD-10-CM

## 2024-07-24 DIAGNOSIS — R73.09 OTHER ABNORMAL GLUCOSE: ICD-10-CM

## 2024-07-24 DIAGNOSIS — M81.0 AGE-RELATED OSTEOPOROSIS W/OUT CURRENT PATHOLOGICAL FRACTURE: ICD-10-CM

## 2024-07-24 PROCEDURE — 99214 OFFICE O/P EST MOD 30 MIN: CPT

## 2024-07-24 NOTE — REVIEW OF SYSTEMS
[Fatigue] : fatigue [Depression] : depression [Anxiety] : anxiety [Stress] : stress [Heat Intolerance] : heat intolerance [Hot Flashes] : hot flashes [Negative] : Heme/Lymph

## 2024-07-24 NOTE — ASSESSMENT
[FreeTextEntry1] : hot flashes: post menopausal allergic to estrogen formuation howver due to >10years since menopause, no longer option  On Venlafaxine without any symptomatic relief.- on medication for depression  Will try Veozah again for temperature regulation and treatment of hot flashes- non-hormonal option Will check with pharmacy this time about Rx and if need for PA   Prediabetes: A1c: 5.8%  managing with diet lost weight over the past year

## 2024-07-24 NOTE — HISTORY OF PRESENT ILLNESS
[FreeTextEntry1] : HPI: 59 year old female presenting for excessive sweating. Early 40s went through menopause, excessively sweating since, on and off until 50s and then started again now.      ENDOCRINOPATHY Hx: Menopause at age 40  Menses at age 7 Has 4 children     Interval Hx: Pt never started Veozah therapy, stated her pharmacy said there was no script in place. Script was noted to be verified and transmitted on June 26th.  She reports significant life stressors. Daughter is currently in rehab recovering from emergent back surgery and is unable to move or feel from the neck down. She continues to feel hot and reports sleeping with both the air conditioner and direct fan nightly.

## 2024-08-02 ENCOUNTER — NON-APPOINTMENT (OUTPATIENT)
Age: 59
End: 2024-08-02

## 2024-08-06 ENCOUNTER — APPOINTMENT (OUTPATIENT)
Dept: INTERNAL MEDICINE | Facility: CLINIC | Age: 59
End: 2024-08-06

## 2024-08-06 PROBLEM — K43.9 SPIGELIAN HERNIA: Status: ACTIVE | Noted: 2024-08-06

## 2024-08-06 PROCEDURE — 99213 OFFICE O/P EST LOW 20 MIN: CPT

## 2024-08-06 NOTE — REVIEW OF SYSTEMS
[Muscle Pain] : muscle pain [TextEntry] : CARDIOVASCULAR: Negative RESPIRATORY: Negative GASTROINTESTINAL: Negative NEUROLOGICAL: Negative

## 2024-08-06 NOTE — HEALTH RISK ASSESSMENT
[No] : In the past 12 months have you used drugs other than those required for medical reasons? No [0] : 2) Feeling down, depressed, or hopeless: Not at all (0) [Never] : Never [Intercurrent ED visits] : went to ED [de-identified] : 08/24 [de-identified] : RHEUM/RHEUM [[id number]] : 0

## 2024-08-06 NOTE — HISTORY OF PRESENT ILLNESS
1st attempt left detailed message   [de-identified] : 59 year old  female patient with history of stable Hypertension, Asthma, GERD, Major Depression in partial remission, Hypercholesterolemia with Hypertriglyceridemia, Hypothyroidism, SLE, Elevated Hemoglobin A1c, history as stated, presented for follow up examination. Patient is compliant with all medications. ROS as stated.

## 2024-08-06 NOTE — ASSESSMENT
[FreeTextEntry1] : 59 year old female found to have stable Hypertension, Asthma, GERD, Major Depression in partial remission, Hypercholesterolemia with Hypertriglyceridemia, Hypothyroidism, SLE, Elevated Hemoglobin A1c, with the current prescription regimen as recommended, diet and lifestyle modifications, as counseled. Prior results reviewed, interpreted and discussed with the patient during today's examination, as appropriate. Follow up, treatment plan and tests, as ordered.  Patient was recently evaluated in ER, findings of Spigelian Hernia and recommendations reviewed with the patient, discussed with GI-Dr. Lamar during today's examination, Surgical Consultation for possible surgical intervention, as counseled.  Patient is aware of going to ER for an evaluation, if sudden onset of increasing abdominal pain, associated with vomiting and distention of the abdomen, as directed,  Total time spent:: 25 minutes Including: Preparation prior to visit - Reviewing prior record, results of tests and Consultation Reports as applicable Conducting an appropriate H & P during today's encounter Appropriate orders for tests, medications and procedures, as applicable Counseling patient Note completion

## 2024-08-06 NOTE — HEALTH RISK ASSESSMENT
[No] : In the past 12 months have you used drugs other than those required for medical reasons? No [0] : 2) Feeling down, depressed, or hopeless: Not at all (0) [Never] : Never [Intercurrent ED visits] : went to ED [de-identified] : 08/24 [de-identified] : RHEUM/RHEUM [[id number]] : 0

## 2024-08-06 NOTE — HISTORY OF PRESENT ILLNESS
[de-identified] : 59 year old  female patient with history of stable Hypertension, Asthma, GERD, Major Depression in partial remission, Hypercholesterolemia with Hypertriglyceridemia, Hypothyroidism, SLE, Elevated Hemoglobin A1c, history as stated, presented for follow up examination. Patient is compliant with all medications. ROS as stated.

## 2024-08-07 ENCOUNTER — EMERGENCY (EMERGENCY)
Facility: HOSPITAL | Age: 59
LOS: 1 days | Discharge: HOME | End: 2024-08-07
Attending: STUDENT IN AN ORGANIZED HEALTH CARE EDUCATION/TRAINING PROGRAM
Payer: MEDICARE

## 2024-08-07 VITALS
WEIGHT: 166.89 LBS | SYSTOLIC BLOOD PRESSURE: 134 MMHG | RESPIRATION RATE: 20 BRPM | TEMPERATURE: 98 F | HEART RATE: 56 BPM | DIASTOLIC BLOOD PRESSURE: 81 MMHG | HEIGHT: 60 IN | OXYGEN SATURATION: 99 %

## 2024-08-07 VITALS
HEART RATE: 56 BPM | SYSTOLIC BLOOD PRESSURE: 146 MMHG | OXYGEN SATURATION: 97 % | DIASTOLIC BLOOD PRESSURE: 75 MMHG | RESPIRATION RATE: 20 BRPM | TEMPERATURE: 98 F

## 2024-08-07 DIAGNOSIS — Z98.890 OTHER SPECIFIED POSTPROCEDURAL STATES: Chronic | ICD-10-CM

## 2024-08-07 DIAGNOSIS — Z98.51 TUBAL LIGATION STATUS: Chronic | ICD-10-CM

## 2024-08-07 DIAGNOSIS — Z98.89 OTHER SPECIFIED POSTPROCEDURAL STATES: Chronic | ICD-10-CM

## 2024-08-07 LAB
ALBUMIN SERPL ELPH-MCNC: 4.4 G/DL — SIGNIFICANT CHANGE UP (ref 3.3–5)
ALP SERPL-CCNC: 60 U/L — SIGNIFICANT CHANGE UP (ref 40–120)
ALT FLD-CCNC: 24 U/L — SIGNIFICANT CHANGE UP (ref 10–45)
ANION GAP SERPL CALC-SCNC: 11 MMOL/L — SIGNIFICANT CHANGE UP (ref 5–17)
ANION GAP SERPL CALC-SCNC: 12 MMOL/L — SIGNIFICANT CHANGE UP (ref 5–17)
APPEARANCE UR: CLEAR — SIGNIFICANT CHANGE UP
APTT BLD: 27.7 SEC — SIGNIFICANT CHANGE UP (ref 24.5–35.6)
AST SERPL-CCNC: 45 U/L — HIGH (ref 10–40)
BACTERIA # UR AUTO: NEGATIVE /HPF — SIGNIFICANT CHANGE UP
BASE EXCESS BLDV CALC-SCNC: 3.4 MMOL/L — HIGH (ref -2–3)
BASOPHILS # BLD AUTO: 0.05 K/UL — SIGNIFICANT CHANGE UP (ref 0–0.2)
BASOPHILS NFR BLD AUTO: 1.3 % — SIGNIFICANT CHANGE UP (ref 0–2)
BILIRUB SERPL-MCNC: 0.2 MG/DL — SIGNIFICANT CHANGE UP (ref 0.2–1.2)
BILIRUB UR-MCNC: NEGATIVE — SIGNIFICANT CHANGE UP
BUN SERPL-MCNC: 14 MG/DL — SIGNIFICANT CHANGE UP (ref 7–23)
BUN SERPL-MCNC: 14 MG/DL — SIGNIFICANT CHANGE UP (ref 7–23)
CA-I SERPL-SCNC: 1.14 MMOL/L — LOW (ref 1.15–1.33)
CALCIUM SERPL-MCNC: 9.2 MG/DL — SIGNIFICANT CHANGE UP (ref 8.4–10.5)
CALCIUM SERPL-MCNC: 9.4 MG/DL — SIGNIFICANT CHANGE UP (ref 8.4–10.5)
CAST: 0 /LPF — SIGNIFICANT CHANGE UP (ref 0–4)
CHLORIDE BLDV-SCNC: 106 MMOL/L — SIGNIFICANT CHANGE UP (ref 96–108)
CHLORIDE SERPL-SCNC: 101 MMOL/L — SIGNIFICANT CHANGE UP (ref 96–108)
CHLORIDE SERPL-SCNC: 102 MMOL/L — SIGNIFICANT CHANGE UP (ref 96–108)
CO2 BLDV-SCNC: 28 MMOL/L — HIGH (ref 22–26)
CO2 SERPL-SCNC: 24 MMOL/L — SIGNIFICANT CHANGE UP (ref 22–31)
CO2 SERPL-SCNC: 27 MMOL/L — SIGNIFICANT CHANGE UP (ref 22–31)
COLOR SPEC: YELLOW — SIGNIFICANT CHANGE UP
CREAT SERPL-MCNC: 0.81 MG/DL — SIGNIFICANT CHANGE UP (ref 0.5–1.3)
CREAT SERPL-MCNC: 0.82 MG/DL — SIGNIFICANT CHANGE UP (ref 0.5–1.3)
DIFF PNL FLD: NEGATIVE — SIGNIFICANT CHANGE UP
EGFR: 81 ML/MIN/1.73M2 — SIGNIFICANT CHANGE UP
EGFR: 81 ML/MIN/1.73M2 — SIGNIFICANT CHANGE UP
EGFR: 83 ML/MIN/1.73M2 — SIGNIFICANT CHANGE UP
EGFR: 83 ML/MIN/1.73M2 — SIGNIFICANT CHANGE UP
EOSINOPHIL # BLD AUTO: 0.07 K/UL — SIGNIFICANT CHANGE UP (ref 0–0.5)
EOSINOPHIL NFR BLD AUTO: 1.9 % — SIGNIFICANT CHANGE UP (ref 0–6)
GAS PNL BLDV: 130 MMOL/L — LOW (ref 136–145)
GAS PNL BLDV: SIGNIFICANT CHANGE UP
GAS PNL BLDV: SIGNIFICANT CHANGE UP
GLUCOSE BLDV-MCNC: 82 MG/DL — SIGNIFICANT CHANGE UP (ref 70–99)
GLUCOSE SERPL-MCNC: 108 MG/DL — HIGH (ref 70–99)
GLUCOSE SERPL-MCNC: 82 MG/DL — SIGNIFICANT CHANGE UP (ref 70–99)
GLUCOSE UR QL: NEGATIVE MG/DL — SIGNIFICANT CHANGE UP
HCO3 BLDV-SCNC: 27 MMOL/L — SIGNIFICANT CHANGE UP (ref 22–29)
HCT VFR BLD CALC: 36.2 % — SIGNIFICANT CHANGE UP (ref 34.5–45)
HCT VFR BLDA CALC: 36 % — SIGNIFICANT CHANGE UP (ref 34.5–46.5)
HGB BLD CALC-MCNC: 12 G/DL — SIGNIFICANT CHANGE UP (ref 11.7–16.1)
HGB BLD-MCNC: 11.6 G/DL — SIGNIFICANT CHANGE UP (ref 11.5–15.5)
IMM GRANULOCYTES NFR BLD AUTO: 0.3 % — SIGNIFICANT CHANGE UP (ref 0–0.9)
INR BLD: 1.03 RATIO — SIGNIFICANT CHANGE UP (ref 0.85–1.18)
KETONES UR-MCNC: NEGATIVE MG/DL — SIGNIFICANT CHANGE UP
LACTATE BLDV-MCNC: 1.2 MMOL/L — SIGNIFICANT CHANGE UP (ref 0.5–2)
LEUKOCYTE ESTERASE UR-ACNC: NEGATIVE — SIGNIFICANT CHANGE UP
LIDOCAIN IGE QN: 27 U/L — SIGNIFICANT CHANGE UP (ref 7–60)
LYMPHOCYTES # BLD AUTO: 1.08 K/UL — SIGNIFICANT CHANGE UP (ref 1–3.3)
LYMPHOCYTES # BLD AUTO: 28.6 % — SIGNIFICANT CHANGE UP (ref 13–44)
MCHC RBC-ENTMCNC: 31.4 PG — SIGNIFICANT CHANGE UP (ref 27–34)
MCHC RBC-ENTMCNC: 32 GM/DL — SIGNIFICANT CHANGE UP (ref 32–36)
MCV RBC AUTO: 98.1 FL — SIGNIFICANT CHANGE UP (ref 80–100)
MONOCYTES # BLD AUTO: 0.34 K/UL — SIGNIFICANT CHANGE UP (ref 0–0.9)
MONOCYTES NFR BLD AUTO: 9 % — SIGNIFICANT CHANGE UP (ref 2–14)
NEUTROPHILS # BLD AUTO: 2.23 K/UL — SIGNIFICANT CHANGE UP (ref 1.8–7.4)
NEUTROPHILS NFR BLD AUTO: 58.9 % — SIGNIFICANT CHANGE UP (ref 43–77)
NITRITE UR-MCNC: NEGATIVE — SIGNIFICANT CHANGE UP
NRBC # BLD: 0 /100 WBCS — SIGNIFICANT CHANGE UP (ref 0–0)
NRBC BLD-RTO: 0 /100 WBCS — SIGNIFICANT CHANGE UP (ref 0–0)
PCO2 BLDV: 35 MMHG — LOW (ref 39–42)
PH BLDV: 7.49 — HIGH (ref 7.32–7.43)
PH UR: 7.5 — SIGNIFICANT CHANGE UP (ref 5–8)
PLATELET # BLD AUTO: 327 K/UL — SIGNIFICANT CHANGE UP (ref 150–400)
PO2 BLDV: 74 MMHG — HIGH (ref 25–45)
POTASSIUM BLDV-SCNC: 4.9 MMOL/L — SIGNIFICANT CHANGE UP (ref 3.5–5.1)
POTASSIUM SERPL-MCNC: 4.3 MMOL/L — SIGNIFICANT CHANGE UP (ref 3.5–5.3)
POTASSIUM SERPL-MCNC: 6 MMOL/L — HIGH (ref 3.5–5.3)
POTASSIUM SERPL-SCNC: 4.3 MMOL/L — SIGNIFICANT CHANGE UP (ref 3.5–5.3)
POTASSIUM SERPL-SCNC: 6 MMOL/L — HIGH (ref 3.5–5.3)
PROT SERPL-MCNC: 8 G/DL — SIGNIFICANT CHANGE UP (ref 6–8.3)
PROT UR-MCNC: SIGNIFICANT CHANGE UP MG/DL
PROTHROM AB SERPL-ACNC: 11.3 SEC — SIGNIFICANT CHANGE UP (ref 9.5–13)
RBC # BLD: 3.69 M/UL — LOW (ref 3.8–5.2)
RBC # FLD: 12.2 % — SIGNIFICANT CHANGE UP (ref 10.3–14.5)
RBC CASTS # UR COMP ASSIST: 0 /HPF — SIGNIFICANT CHANGE UP (ref 0–4)
SAO2 % BLDV: 96.5 % — HIGH (ref 67–88)
SODIUM SERPL-SCNC: 137 MMOL/L — SIGNIFICANT CHANGE UP (ref 135–145)
SODIUM SERPL-SCNC: 140 MMOL/L — SIGNIFICANT CHANGE UP (ref 135–145)
SP GR SPEC: 1.02 — SIGNIFICANT CHANGE UP (ref 1–1.03)
SQUAMOUS # UR AUTO: 1 /HPF — SIGNIFICANT CHANGE UP (ref 0–5)
UROBILINOGEN FLD QL: 1 MG/DL — SIGNIFICANT CHANGE UP (ref 0.2–1)
WBC # BLD: 3.78 K/UL — LOW (ref 3.8–10.5)
WBC # FLD AUTO: 3.78 K/UL — LOW (ref 3.8–10.5)
WBC UR QL: 0 /HPF — SIGNIFICANT CHANGE UP (ref 0–5)

## 2024-08-07 PROCEDURE — 87086 URINE CULTURE/COLONY COUNT: CPT

## 2024-08-07 PROCEDURE — 74177 CT ABD & PELVIS W/CONTRAST: CPT | Mod: MC

## 2024-08-07 PROCEDURE — 99285 EMERGENCY DEPT VISIT HI MDM: CPT

## 2024-08-07 PROCEDURE — 82947 ASSAY GLUCOSE BLOOD QUANT: CPT

## 2024-08-07 PROCEDURE — 85018 HEMOGLOBIN: CPT

## 2024-08-07 PROCEDURE — 85730 THROMBOPLASTIN TIME PARTIAL: CPT

## 2024-08-07 PROCEDURE — 84132 ASSAY OF SERUM POTASSIUM: CPT

## 2024-08-07 PROCEDURE — 80053 COMPREHEN METABOLIC PANEL: CPT

## 2024-08-07 PROCEDURE — 99284 EMERGENCY DEPT VISIT MOD MDM: CPT | Mod: 25

## 2024-08-07 PROCEDURE — 83605 ASSAY OF LACTIC ACID: CPT

## 2024-08-07 PROCEDURE — 85025 COMPLETE CBC W/AUTO DIFF WBC: CPT

## 2024-08-07 PROCEDURE — 80048 BASIC METABOLIC PNL TOTAL CA: CPT

## 2024-08-07 PROCEDURE — 82435 ASSAY OF BLOOD CHLORIDE: CPT

## 2024-08-07 PROCEDURE — 83690 ASSAY OF LIPASE: CPT

## 2024-08-07 PROCEDURE — 96375 TX/PRO/DX INJ NEW DRUG ADDON: CPT

## 2024-08-07 PROCEDURE — 85014 HEMATOCRIT: CPT

## 2024-08-07 PROCEDURE — 81001 URINALYSIS AUTO W/SCOPE: CPT

## 2024-08-07 PROCEDURE — 74177 CT ABD & PELVIS W/CONTRAST: CPT | Mod: 26,MC

## 2024-08-07 PROCEDURE — 96374 THER/PROPH/DIAG INJ IV PUSH: CPT | Mod: XU

## 2024-08-07 PROCEDURE — 82330 ASSAY OF CALCIUM: CPT

## 2024-08-07 PROCEDURE — 85610 PROTHROMBIN TIME: CPT

## 2024-08-07 PROCEDURE — 82803 BLOOD GASES ANY COMBINATION: CPT

## 2024-08-07 PROCEDURE — 84295 ASSAY OF SERUM SODIUM: CPT

## 2024-08-07 RX ORDER — ONDANSETRON HCL/PF 4 MG/2 ML
4 VIAL (ML) INJECTION ONCE
Refills: 0 | Status: COMPLETED | OUTPATIENT
Start: 2024-08-07 | End: 2024-08-07

## 2024-08-07 RX ORDER — ACETAMINOPHEN 500 MG/5ML
1000 LIQUID (ML) ORAL ONCE
Refills: 0 | Status: COMPLETED | OUTPATIENT
Start: 2024-08-07 | End: 2024-08-07

## 2024-08-07 RX ADMIN — Medication 1000 MILLILITER(S): at 15:36

## 2024-08-07 RX ADMIN — Medication 4 MILLIGRAM(S): at 15:36

## 2024-08-07 RX ADMIN — Medication 1000 MILLIGRAM(S): at 17:40

## 2024-08-07 RX ADMIN — Medication 4 MILLIGRAM(S): at 20:46

## 2024-08-07 RX ADMIN — Medication 400 MILLIGRAM(S): at 15:36

## 2024-08-08 ENCOUNTER — APPOINTMENT (OUTPATIENT)
Dept: SURGERY | Facility: CLINIC | Age: 59
End: 2024-08-08

## 2024-08-08 PROBLEM — R10.9 ABDOMINAL DISCOMFORT: Status: ACTIVE | Noted: 2024-08-08

## 2024-08-08 PROCEDURE — 99203 OFFICE O/P NEW LOW 30 MIN: CPT
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
## 2024-08-08 NOTE — CONSULT LETTER
[Dear  ___] : Dear  [unfilled], [Consult Letter:] : I had the pleasure of evaluating your patient, [unfilled]. [Consult Closing:] : Thank you very much for allowing me to participate in the care of this patient.  If you have any questions, please do not hesitate to contact me. [Sincerely,] : Sincerely, [FreeTextEntry3] : Luis Escalera MD

## 2024-08-08 NOTE — PHYSICAL EXAM
[No HSM] : no hepatosplenomegaly [No Rash or Lesion] : No rash or lesion [Alert] : alert [Oriented to Person] : oriented to person [Oriented to Place] : oriented to place [Oriented to Time] : oriented to time [Calm] : calm [de-identified] : A/Ox3; NAD. appears comfortable [de-identified] : EOMI; sclera anicteric. [de-identified] : no cervical lymphadenopathy  [de-identified] : airway patent, no use of accessory muscles [de-identified] : abd soft, lower abdominal tenderness w palpation no masses felt  no palpable defects to abdominal wall  [de-identified] : +ROM, normal gait
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
## 2024-08-08 NOTE — PHYSICAL EXAM
[No HSM] : no hepatosplenomegaly [No Rash or Lesion] : No rash or lesion [Alert] : alert [Oriented to Person] : oriented to person [Oriented to Place] : oriented to place [Oriented to Time] : oriented to time [Calm] : calm [de-identified] : A/Ox3; NAD. appears comfortable [de-identified] : EOMI; sclera anicteric. [de-identified] : no cervical lymphadenopathy  [de-identified] : airway patent, no use of accessory muscles [de-identified] : abd soft, lower abdominal tenderness w palpation no masses felt  no palpable defects to abdominal wall  [de-identified] : +ROM, normal gait

## 2024-08-08 NOTE — ASSESSMENT
[FreeTextEntry1] : IMP: 58 yo F with diffuse abdominal pain, w tenderness to the lower abdomen x 3 weeks.  No palpable defects to abdominal wall on physical exam.   Most Recent Imaging; CT A/P 08/07--No acute intra-abdominal finding.

## 2024-08-08 NOTE — ASSESSMENT
[FreeTextEntry1] : IMP: 60 yo F with diffuse abdominal pain, w tenderness to the lower abdomen x 3 weeks.  No palpable defects to abdominal wall on physical exam.   Most Recent Imaging; CT A/P 08/07--No acute intra-abdominal finding.

## 2024-08-08 NOTE — PHYSICAL EXAM
[No HSM] : no hepatosplenomegaly [No Rash or Lesion] : No rash or lesion [Alert] : alert [Oriented to Person] : oriented to person [Oriented to Place] : oriented to place [Oriented to Time] : oriented to time [Calm] : calm [de-identified] : A/Ox3; NAD. appears comfortable [de-identified] : EOMI; sclera anicteric. [de-identified] : no cervical lymphadenopathy  [de-identified] : airway patent, no use of accessory muscles [de-identified] : abd soft, lower abdominal tenderness w palpation no masses felt  no palpable defects to abdominal wall  [de-identified] : +ROM, normal gait

## 2024-08-08 NOTE — DATA REVIEWED
[FreeTextEntry1] : ACC: [id number]     EXAM:  CT ABDOMEN AND PELVIS IC   ORDERED BY: GABRIELLA CALVILLO PROCEDURE DATE:  08/07/2024 INTERPRETATION:  CLINICAL INFORMATION: Diffuse abdominal pain. History of Spigelian hernia.  COMPARISON: CT abdomen and pelvis from 6/29/2023.  CONTRAST/COMPLICATIONS: IV Contrast: Omnipaque 350  90 cc administered   10 cc discarded Oral Contrast: NONE Complications: None reported at time of study completion  PROCEDURE: CT of the Abdomen and Pelvis was performed. Sagittal and coronal reformats were performed.  FINDINGS: LOWER CHEST: Within normal limits.  LIVER: A subcentimeter hypodense focus is too small to characterize and unchanged from 6/29/2023. BILE DUCTS: Normal caliber. GALLBLADDER: Within normal limits. SPLEEN: Within normal limits. PANCREAS: Within normal limits. ADRENALS: Within normal limits. KIDNEYS/URETERS: No hydronephrosis. Right subcentimeter hypodense focus is too small to characterize.  BLADDER: Within normal limits. REPRODUCTIVE ORGANS: Uterus and adnexa within normal limits.  BOWEL: No bowel obstruction. Appendix is normal. Colonic diverticulosis, without diverticulitis. PERITONEUM/RETROPERITONEUM: Within normal limits. VESSELS: Atherosclerotic changes. LYMPH NODES: No lymphadenopathy. ABDOMINAL WALL: Postsurgical changes. BONES: Degenerative changes. Posterior spinal fusion and intervertebral disc spacers from L2 to S1.  IMPRESSION: No acute intra-abdominal finding.  --- End of Report ---

## 2024-08-08 NOTE — PLAN
[FreeTextEntry1] : symptoms out of proportion in the event of abdominal wall hernia finding  on exam- no palpable defects to abdominal wall  surgical intervention not indicated at this time   follow up as needed Patient's questions and concerns addressed to their satisfaction, and patient verbalized an understanding of the information discussed.

## 2024-08-08 NOTE — DATA REVIEWED
[FreeTextEntry1] : ACC: [id number]     EXAM:  CT ABDOMEN AND PELVIS IC   ORDERED BY: GABRIELLA CALVILLO PROCEDURE DATE:  08/07/2024 INTERPRETATION:  CLINICAL INFORMATION: Diffuse abdominal pain. History of Spigelian hernia.  COMPARISON: CT abdomen and pelvis from 6/29/2023.  CONTRAST/COMPLICATIONS: IV Contrast: Omnipaque 350  90 cc administered   10 cc discarded Oral Contrast: NONE Complications: None reported at time of study completion  PROCEDURE: CT of the Abdomen and Pelvis was performed. Sagittal and coronal reformats were performed.  FINDINGS: LOWER CHEST: Within normal limits.  LIVER: A subcentimeter hypodense focus is too small to characterize and unchanged from 6/29/2023. BILE DUCTS: Normal caliber. GALLBLADDER: Within normal limits. SPLEEN: Within normal limits. PANCREAS: Within normal limits. ADRENALS: Within normal limits. KIDNEYS/URETERS: No hydronephrosis. Right subcentimeter hypodense focus is too small to characterize.  BLADDER: Within normal limits. REPRODUCTIVE ORGANS: Uterus and adnexa within normal limits.  BOWEL: No bowel obstruction. Appendix is normal. Colonic diverticulosis, without diverticulitis. PERITONEUM/RETROPERITONEUM: Within normal limits. VESSELS: Atherosclerotic changes. LYMPH NODES: No lymphadenopathy. ABDOMINAL WALL: Postsurgical changes. BONES: Degenerative changes. Posterior spinal fusion and intervertebral disc spacers from L2 to S1.  IMPRESSION: No acute intra-abdominal finding.  --- End of Report ---

## 2024-08-08 NOTE — DATA REVIEWED
[FreeTextEntry1] : ACC: [id number]     EXAM:  CT ABDOMEN AND PELVIS IC   ORDERED BY: GABRIELLA CALVILLO PROCEDURE DATE:  08/07/2024 INTERPRETATION:  CLINICAL INFORMATION: Diffuse abdominal pain. History of Spigelian hernia.  COMPARISON: CT abdomen and pelvis from 6/29/2023.  CONTRAST/COMPLICATIONS: IV Contrast: Omnipaque 350  90 cc administered   10 cc discarded Oral Contrast: NONE Complications: None reported at time of study completion  PROCEDURE: CT of the Abdomen and Pelvis was performed. Sagittal and coronal reformats were performed.  FINDINGS: LOWER CHEST: Within normal limits.  LIVER: A subcentimeter hypodense focus is too small to characterize and unchanged from 6/29/2023. BILE DUCTS: Normal caliber. GALLBLADDER: Within normal limits. SPLEEN: Within normal limits. PANCREAS: Within normal limits. ADRENALS: Within normal limits. KIDNEYS/URETERS: No hydronephrosis. Right subcentimeter hypodense focus is too small to characterize.  BLADDER: Within normal limits. REPRODUCTIVE ORGANS: Uterus and adnexa within normal limits.  BOWEL: No bowel obstruction. Appendix is normal. Colonic diverticulosis, without diverticulitis. PERITONEUM/RETROPERITONEUM: Within normal limits. VESSELS: Atherosclerotic changes. LYMPH NODES: No lymphadenopathy. ABDOMINAL WALL: Postsurgical changes. BONES: Degenerative changes. Posterior spinal fusion and intervertebral disc spacers from L2 to S1.  IMPRESSION: No acute intra-abdominal finding.  --- End of Report ---

## 2024-08-09 LAB
CULTURE RESULTS: SIGNIFICANT CHANGE UP
SPECIMEN SOURCE: SIGNIFICANT CHANGE UP

## 2024-08-19 ENCOUNTER — RX RENEWAL (OUTPATIENT)
Age: 59
End: 2024-08-19

## 2024-08-28 ENCOUNTER — RX RENEWAL (OUTPATIENT)
Age: 59
End: 2024-08-28

## 2024-09-03 ENCOUNTER — APPOINTMENT (OUTPATIENT)
Dept: RHEUMATOLOGY | Facility: CLINIC | Age: 59
End: 2024-09-03
Payer: MEDICARE

## 2024-09-03 VITALS
HEART RATE: 68 BPM | HEIGHT: 60 IN | RESPIRATION RATE: 16 BRPM | TEMPERATURE: 97.7 F | BODY MASS INDEX: 32.39 KG/M2 | DIASTOLIC BLOOD PRESSURE: 78 MMHG | SYSTOLIC BLOOD PRESSURE: 136 MMHG | WEIGHT: 165 LBS | OXYGEN SATURATION: 98 %

## 2024-09-03 DIAGNOSIS — M75.52 BURSITIS OF LEFT SHOULDER: ICD-10-CM

## 2024-09-03 DIAGNOSIS — M17.0 BILATERAL PRIMARY OSTEOARTHRITIS OF KNEE: ICD-10-CM

## 2024-09-03 DIAGNOSIS — M32.9 SYSTEMIC LUPUS ERYTHEMATOSUS, UNSPECIFIED: ICD-10-CM

## 2024-09-03 DIAGNOSIS — M54.16 RADICULOPATHY, LUMBAR REGION: ICD-10-CM

## 2024-09-03 PROCEDURE — 99214 OFFICE O/P EST MOD 30 MIN: CPT | Mod: 25

## 2024-09-03 PROCEDURE — 20611 DRAIN/INJ JOINT/BURSA W/US: CPT | Mod: LT

## 2024-09-03 RX ORDER — METHYLPRED ACET/NACL,ISO-OS/PF 40 MG/ML
40 VIAL (ML) INJECTION
Qty: 0 | Refills: 0 | Status: COMPLETED | OUTPATIENT
Start: 2024-09-03

## 2024-09-03 RX ORDER — LIDOCAINE HYDROCHLORIDE 10 MG/ML
1 INJECTION, SOLUTION INFILTRATION; PERINEURAL
Qty: 0 | Refills: 0 | Status: COMPLETED | OUTPATIENT
Start: 2024-09-03

## 2024-09-03 RX ADMIN — METHYLPREDNISOLONE ACETATE 0 MG/ML: 40 INJECTION, SUSPENSION INTRA-ARTICULAR; INTRALESIONAL; INTRAMUSCULAR; SOFT TISSUE at 00:00

## 2024-09-03 RX ADMIN — LIDOCAINE HYDROCHLORIDE 0 %: 10 INJECTION, SOLUTION EPIDURAL; INFILTRATION; INTRACAUDAL; PERINEURAL at 00:00

## 2024-09-03 NOTE — PROCEDURE
[Today's Date:] : Date: [unfilled] [Patient] : the patient [Risks] : risks [Benefits] : benefits [Consent Obtained] : written consent was obtained prior to the procedure and is detailed in the patient's record [Therapeutic] : therapeutic [#1 Site: ______] : #1 site identified in the [unfilled] [Betadine] : betadine solution [Alcohol] : alcohol [25 gauge 1.5 inch] : A 25 gauge 1.5 inch needle was used [___ml 1% Lidocaine] : [unfilled] ml of 1% lidocaine [Depomedrol ___ mg] : Depomedrol [unfilled] mg [No Complications] : there were no complications [Patient Instructed to Call] : patient was instructed to call if redness at site, a decrease in range of motion or an increase in pain is noted after procedure. [FreeTextEntry1] : Patient Name: Caroline Newman : 1965 Study Type: Guidance of needle placement  Indication: Pain in LT shoulder/ prior palpation guided IAC Study Site: LT shoulder Equipment:  Logiq e 12MHz linear transducer    Findings: The use of a Logiq e 12 MHz linear transducer with live ultrasound guidance of the shoulder was necessary given the patient's local body habitus including longstanding SLE and failure from prior palpation guided IAC, all factors obscuring the accurate identification of normal body bony anatomy used to identify the injection site and the depth of soft tissue space; The location of the needle tip and intra-articular delivery of the medication while avoiding neurovascular structures confirmed. Orthogonal views of the subacromial bursa was taken with US reflecting hypoechoic SAB bursal structure distal to the supraspinatus insertion site onto the GT .  After prepping the anterolateral shoulder with betadine, a 25gx1.5 in gauge needle was advanced to the LT SAB under direct US visualization using in-plane technique.  40mg of methylprednisolone and 1% lidocaine was injected.  Impressions: Successful injection of the LT SAB using US guidance.

## 2024-09-03 NOTE — HISTORY OF PRESENT ILLNESS
[FreeTextEntry1] : Patient returns for follow-up and reports improvement in left knee pain after IAC given in May .  Patient finds the laxity of the knee can be intermittent and exacerbated upon prolonged walking or climbing stairs.  She is also notes increased LT shoulder pain especially when lifting arm overhead or reaching for the mid back.  She denies recent traumas or triggers.  She denies accompanied swelling.  She reports increased stressors.  She continues on antidepressants and anti anxiolytics.   Patient continues with hydroxychloroquine twice daily without complication.   She further denies rash, Raynaud's or photosensitivity.  Patient with history of positive YOKO 1: 320 and homogenous/speckled pattern with associated positive SSA antibody positivity; APL Ab negative; she has not had thrombotic events in the past and initially manifested with arthralgias and low mood in early 2000. Patient with bone density reflective of OP in the LT fem neck and distal radius; she received IV Reclast with mild hypersensitivity reaction.  She explains recent dental work and notes history of bone graft in the past.  She explains dental history of this nature x 6 years.  Dentist aware of osteoporosis treatment given for the first time last year.  She further denies recent falls or fractures.

## 2024-09-03 NOTE — CONSULT LETTER
[Dear  ___] : Dear  [unfilled], [Courtesy Letter:] : I had the pleasure of seeing your patient, [unfilled], in my office today. [Please see my note below.] : Please see my note below. [Consult Closing:] : Thank you very much for allowing me to participate in the care of this patient.  If you have any questions, please do not hesitate to contact me. [Sincerely,] : Sincerely, [FreeTextEntry2] : Roe Sales MD\ysabel  [facility] [FreeTextEntry3] : Tamiko Duff M.D.\par   of Medicine \par  [facility] [employer] [facility]/[place]\par  \par

## 2024-09-03 NOTE — PHYSICAL EXAM
[General Appearance - Alert] : alert [General Appearance - In No Acute Distress] : in no acute distress [General Appearance - Well-Appearing] : healthy appearing [Sclera] : the sclera and conjunctiva were normal [Neck Appearance] : the appearance of the neck was normal [Auscultation Breath Sounds / Voice Sounds] : lungs were clear to auscultation bilaterally [Heart Rate And Rhythm] : heart rate was normal and rhythm regular [Edema] : there was no peripheral edema [No Spinal Tenderness] : no spinal tenderness [] : no rash [Skin Lesions] : no skin lesions [Motor Exam] : the motor exam was normal [Oriented To Time, Place, And Person] : oriented to person, place, and time [Impaired Insight] : insight and judgment were intact [Abnormal Walk] : normal gait [Nail Clubbing] : no clubbing  or cyanosis of the fingernails [Musculoskeletal - Swelling] : no joint swelling seen [Motor Tone] : muscle strength and tone were normal [FreeTextEntry1] : No active hand synovitis; LT shoulder abduction to 120 degrees with pain upon internal rotation and resisted internal rotation; RT shoulder within range; knees with minimal tenderness; tibiotalar motions intact.

## 2024-09-16 ENCOUNTER — APPOINTMENT (OUTPATIENT)
Dept: GASTROENTEROLOGY | Facility: CLINIC | Age: 59
End: 2024-09-16

## 2024-09-17 ENCOUNTER — APPOINTMENT (OUTPATIENT)
Dept: RHEUMATOLOGY | Facility: CLINIC | Age: 59
End: 2024-09-17
Payer: MEDICARE

## 2024-09-17 DIAGNOSIS — M16.11 UNILATERAL PRIMARY OSTEOARTHRITIS, RIGHT HIP: ICD-10-CM

## 2024-09-17 PROCEDURE — 99442: CPT

## 2024-09-18 RX ORDER — DENOSUMAB 60 MG/ML
60 INJECTION SUBCUTANEOUS
Qty: 1 | Refills: 0 | Status: ACTIVE | COMMUNITY
Start: 2024-09-17

## 2024-10-03 ENCOUNTER — RX RENEWAL (OUTPATIENT)
Age: 59
End: 2024-10-03

## 2024-10-11 ENCOUNTER — RX RENEWAL (OUTPATIENT)
Age: 59
End: 2024-10-11

## 2024-10-22 ENCOUNTER — RX RENEWAL (OUTPATIENT)
Age: 59
End: 2024-10-22

## 2024-10-24 ENCOUNTER — APPOINTMENT (OUTPATIENT)
Dept: AFTER HOURS CARE | Facility: EMERGENCY ROOM | Age: 59
End: 2024-10-24

## 2024-10-30 ENCOUNTER — APPOINTMENT (OUTPATIENT)
Dept: ENDOCRINOLOGY | Facility: CLINIC | Age: 59
End: 2024-10-30

## 2024-10-31 ENCOUNTER — EMERGENCY (EMERGENCY)
Facility: HOSPITAL | Age: 59
LOS: 1 days | Discharge: LEFT BEFORE DISCHARGE | End: 2024-10-31
Payer: MEDICARE

## 2024-10-31 VITALS
OXYGEN SATURATION: 100 % | HEART RATE: 68 BPM | DIASTOLIC BLOOD PRESSURE: 87 MMHG | RESPIRATION RATE: 18 BRPM | WEIGHT: 162.92 LBS | SYSTOLIC BLOOD PRESSURE: 163 MMHG | TEMPERATURE: 98 F

## 2024-10-31 DIAGNOSIS — Z98.890 OTHER SPECIFIED POSTPROCEDURAL STATES: Chronic | ICD-10-CM

## 2024-10-31 DIAGNOSIS — Z98.89 OTHER SPECIFIED POSTPROCEDURAL STATES: Chronic | ICD-10-CM

## 2024-10-31 DIAGNOSIS — Z98.51 TUBAL LIGATION STATUS: Chronic | ICD-10-CM

## 2024-10-31 PROCEDURE — L9991: CPT

## 2024-10-31 NOTE — ED ADULT TRIAGE NOTE - CHIEF COMPLAINT QUOTE
Pt  reports that   She has  right shoulder pain  radiating to Neck and right lower back  x 2 days S/P  cervical disc Sx and  spinal sx

## 2024-11-06 ENCOUNTER — APPOINTMENT (OUTPATIENT)
Dept: CARDIOLOGY | Facility: CLINIC | Age: 59
End: 2024-11-06

## 2024-11-06 ENCOUNTER — RX RENEWAL (OUTPATIENT)
Age: 59
End: 2024-11-06

## 2024-11-07 DIAGNOSIS — J34.2 DEVIATED NASAL SEPTUM: ICD-10-CM

## 2024-11-11 ENCOUNTER — APPOINTMENT (OUTPATIENT)
Dept: CARDIOLOGY | Facility: CLINIC | Age: 59
End: 2024-11-11

## 2024-11-11 NOTE — ED ADULT NURSE NOTE - GASTROINTESTINAL ASSESSMENT
Patient ID: Fang Carvalho is a 31 y.o. female who presents for subsequent evaluation of right otorrhea and right hearing loss.     PROVIDER IMPRESSIONS:  DIAGNOSES/PROBLEMS:  -Right tympanic membrane perforation    ASSESSMENT:   Fang Carvalho is a pleasant 31 y.o. female who presents for subsequent evaluation of right otorrhea and right hearing loss. At our last visit, patient was found to have right AOE/AOM with spontaneous right TM rupture and secondary right conductive hearing loss.The patient endorses that symptoms are improving  since previous visit with completion of Ciprodex otic drops course and p.o. Augmentin course for right AOE/AOM. Based on the clinical information provided, symptoms and clinical exam findings are consistent with right tympanic membrane perforation with resolution of right AOE/AOM. Exam today revealed ongoing central right TM perforation (40%) with no sign of drainage or erythema. I explained to patient that tympanic membrane perforation may likely heal spontaneously over the next couple of months. Reassurance provided to patient that bilateral EAC appears with no sign of acute infection or inflammation and that bilateral TM appears with no sign of infection, effusion, or retraction. Patient offered reassurance and counseling on the current clinical findings and management recommendations.     PLAN:  I recommended patient maintains adherence to dry ear precautions in the right ear.   Follow-up: Patient may schedule for follow-up in 3 months with audiogram prior to visit for surveillance of right TM perforation, sooner as needed. Patient is agreeable to plan. All questions answered to patient's satisfaction.     Subjective   HPI: Fang Carvalho is a 31 y.o. female who presents for a 4 week follow-up evaluation for right otorrhea and right hearing loss. Since last visit on 10/17/24, the patient states that symptoms have improved. Patient has been consistent with course completion of  recommended Ciprodex otic drops for right AOE, p.o. augmentin BID x 10 days for right AOM with right TM rupture, and dry ear precautions for right AOE/AOM with significant symptom benefit. Denies any new symptoms of ear pain, ear itching, tinnitus, ear drainage, ear fullness/pressure, autophony, dizziness and/or vertigo.       RECALL 10/17/24:   HPI: Fang Carvalho is a 31 y.o. female who presents for the evaluation of right ear drainage and right hearing loss. The patient states that symptom onset began 6 weeks ago. Describes ear drainage as yellow/white oozing from the right ear. Patient seen by PCP for symptoms on 9/3/24 and received p.o. Augmentin course for AOM with moderate symptom benefit. Patient received additional course of p.o. cefdinir for symptoms on 9/16/24 which provided symptom benefit for ear pain but continued to notice muffled hearing and thick yellow drainage from the right ear. Patient seen by PCP on 10/8/24 for ongoing ear symptoms and received p.o. doxycyline 100 mg BID x 10 days which patient did not take due to concerns with GI upset. States that she also use tap water to irrigate right ear canal at home. When asked about the presence of left hearing loss, ear pain, ear fullness/pressure, tinnitus, ear itching, left ear drainage, autophony, dizziness or vertigo, she admits to left ear itching. Reports that she was prescribed DermOtic drops by her dermatologist a few months ago but has not used recently since presenting symptom onset. When asked about pertinent otologic history, the patient denies a history of recurrent ear infections, denies history of ear surgery, reports history of PE tube insertion x 1 in early childhood. Patient reports history of prolonged/traumatic loud noise exposure. The patient does not endorse a family history of hearing loss.   ASSESSMENT:   Fang Carvalho is a pleasant 31 y.o. female who presents with symptoms of right otorrhea and right hearing loss. Based on  the clinical information provided, symptoms and clinical exam findings are consistent with right AOM with spontaneous TM rupture, right AOE, and bilateral cerumen impaction. I obtained a culture of right EAC otorrhea today. Using appropriate instrumentation, impacted cerumen and copious purulent debris was successfully removed from the EAC on the right, and impacted cerumen was successfully removed from the EAC on the left. Otologic exam today revealed a central/superior right TM perforation (30%) with pulsating purulent drainage coming from the middle ear space. Reassurance provided to patient that exam today showed no evidence of acute infection or inflammation in the left EAC and that left TM appears with no evidence of infection, effusion, retraction or perforation.  Audiogram reviewed in detail with the patient, which revealed mild to moderate conductive hearing loss in and type B tympanogram in the right ear consistent with perforation and infection and unrestricted hearing in the left ear.   PLAN:  I recommended Ciprodex otic drops with 4 drops into the right ear twice daily for 10 days for right AOE/AOM. Patient was counseled on the indications, possible side effects, and proper administration of medication. Prescription was e-submitted to the patient's preferred pharmacy.   I recommended additional course of p.o. Augmentin (875-125 mg) tablet BID x 10 days for right AOM. Patient was counseled on the indications, possible side effects, and proper administration of medication. Prescription was e-submitted to the patient's preferred pharmacy.   I counseled patient on the following dry ear precautions: Avoid Swimming or hot tubs until ear infection is resolved. If you must swim, please use silicone ear plugs or rubber swim caps and make sure no water gets in the ears. Use cotton ball covered with Vaseline before showering. Apply Vaseline on cotton ball and place it just at the opening of ear canal to create a  water seal. Do not push the cotton ball all the way in the canal. After shower, remove the cotton ball and wipe off excess Vaseline using dry clean cloth. Then, use hair dryer on warm or cool air and hold it out 12 inches away from the affected ear and air dry ears for 30 seconds.  Follow-up: Patient may schedule for follow-up in 2-3 weeks to evaluate treatment outcomes. They may follow-up sooner, if needed. Patient is agreeable to this plan, all questions were answered to patient's satisfaction.     PATIENT HISTORY:  Past Medical History:   Diagnosis Date    ADHD (attention deficit hyperactivity disorder) 1/1/2019    Ankylosing spondylitis     Arthritis 1/1/2002    Bipolar disorder, unspecified (Multi) 01/05/2023    Bipolar 1 disorder    Eczema 3/1/2023    GERD without esophagitis     Hyperthyroidism complicating pregnancy ([facility])     Long term (current) use of immunosuppressive biologic 12/30/2015    Adalimumab (Humira) long-term use    Personal history of other diseases of the musculoskeletal system and connective tissue 01/05/2023    History of ankylosing spondylitis    Polycystic ovarian syndrome 01/05/2023    PCOS (polycystic ovarian syndrome)    Raynaud's syndrome     Raynaud's syndrome without gangrene     Raynaud's phenomenon without gangrene      Past Surgical History:   Procedure Laterality Date    FRACTURE SURGERY  4/1/2014    OTHER SURGICAL HISTORY  01/05/2023    Femur fracture repair      Allergies   Allergen Reactions    Biaxin [Clarithromycin] Psychosis        Current Outpatient Medications:     cariprazine (Vraylar) 3 mg capsule, Take 1 capsule (3 mg) by mouth once daily., Disp: , Rfl:     levonorgestrel (Mirena) 21 mcg/24 hours (8 yrs) 52 mg IUD, 52 mg by intrauterine route 1 time. IN 2/12/24 OUT 2/32, Disp: , Rfl:     Simponi 100 mg/mL syringe, Inject 1 mL (100 mg) under the skin every 30 (thirty) days., Disp: , Rfl:    Tobacco Use: Low Risk  (11/5/2024)    Patient History     Smoking Tobacco  Use: Never     Smokeless Tobacco Use: Never     Passive Exposure: Not on file      Alcohol Use: Not At Risk (11/12/2023)    AUDIT-C     Frequency of Alcohol Consumption: Never     Average Number of Drinks: Patient does not drink     Frequency of Binge Drinking: Never      Social History     Substance and Sexual Activity   Drug Use Never        Review of Systems   All other systems negative.     Objective   ENT FOCUSED PHYSICAL EXAM:   Right Ear--EAC is clear. TM with 40% central perforation with no sign of infection, drainage, or retraction.   Left Ear-- EAC is clear. TM is intact with no sign of infection, effusion, or retraction.  No perforation seen.      Nilda Vega, APRN-CNP    - - -

## 2024-11-19 ENCOUNTER — APPOINTMENT (OUTPATIENT)
Dept: INTERNAL MEDICINE | Facility: CLINIC | Age: 59
End: 2024-11-19

## 2024-12-03 ENCOUNTER — RX RENEWAL (OUTPATIENT)
Age: 59
End: 2024-12-03

## 2024-12-14 ENCOUNTER — APPOINTMENT (OUTPATIENT)
Dept: INTERNAL MEDICINE | Facility: CLINIC | Age: 59
End: 2024-12-14
Payer: MEDICARE

## 2024-12-14 VITALS
RESPIRATION RATE: 16 BRPM | BODY MASS INDEX: 32.39 KG/M2 | WEIGHT: 165 LBS | TEMPERATURE: 97.8 F | DIASTOLIC BLOOD PRESSURE: 87 MMHG | OXYGEN SATURATION: 98 % | HEIGHT: 60 IN | SYSTOLIC BLOOD PRESSURE: 145 MMHG | HEART RATE: 87 BPM

## 2024-12-14 DIAGNOSIS — R73.09 OTHER ABNORMAL GLUCOSE: ICD-10-CM

## 2024-12-14 DIAGNOSIS — I10 ESSENTIAL (PRIMARY) HYPERTENSION: ICD-10-CM

## 2024-12-14 DIAGNOSIS — J45.909 UNSPECIFIED ASTHMA, UNCOMPLICATED: ICD-10-CM

## 2024-12-14 DIAGNOSIS — E03.9 HYPOTHYROIDISM, UNSPECIFIED: ICD-10-CM

## 2024-12-14 PROBLEM — I72.9 ANEURYSM OF ARTERY: Status: ACTIVE | Noted: 2024-12-14

## 2024-12-14 PROCEDURE — 99496 TRANSJ CARE MGMT HIGH F2F 7D: CPT

## 2024-12-14 PROCEDURE — 99495 TRANSJ CARE MGMT MOD F2F 14D: CPT

## 2024-12-14 RX ORDER — PREDNISONE 50 MG/1
50 TABLET ORAL
Qty: 4 | Refills: 0 | Status: ACTIVE | COMMUNITY
Start: 2024-12-09

## 2024-12-14 RX ORDER — PRAZOSIN HYDROCHLORIDE 2 MG/1
2 CAPSULE ORAL
Qty: 60 | Refills: 0 | Status: ACTIVE | COMMUNITY
Start: 2024-11-25

## 2024-12-14 RX ORDER — AZELASTINE HYDROCHLORIDE 137 UG/1
137 SPRAY, METERED NASAL
Qty: 90 | Refills: 0 | Status: ACTIVE | COMMUNITY
Start: 2024-12-09

## 2024-12-14 RX ORDER — GABAPENTIN 300 MG/1
300 CAPSULE ORAL
Qty: 60 | Refills: 0 | Status: ACTIVE | COMMUNITY
Start: 2024-12-09

## 2024-12-17 ENCOUNTER — APPOINTMENT (OUTPATIENT)
Dept: RHEUMATOLOGY | Facility: CLINIC | Age: 59
End: 2024-12-17
Payer: MEDICARE

## 2024-12-17 VITALS
OXYGEN SATURATION: 98 % | TEMPERATURE: 94.8 F | SYSTOLIC BLOOD PRESSURE: 155 MMHG | HEIGHT: 60 IN | HEART RATE: 72 BPM | WEIGHT: 164 LBS | RESPIRATION RATE: 16 BRPM | DIASTOLIC BLOOD PRESSURE: 84 MMHG | BODY MASS INDEX: 32.2 KG/M2

## 2024-12-17 DIAGNOSIS — M32.9 SYSTEMIC LUPUS ERYTHEMATOSUS, UNSPECIFIED: ICD-10-CM

## 2024-12-17 DIAGNOSIS — M81.0 AGE-RELATED OSTEOPOROSIS W/OUT CURRENT PATHOLOGICAL FRACTURE: ICD-10-CM

## 2024-12-17 DIAGNOSIS — M17.12 UNILATERAL PRIMARY OSTEOARTHRITIS, LEFT KNEE: ICD-10-CM

## 2024-12-17 DIAGNOSIS — I72.9 ANEURYSM OF UNSPECIFIED SITE: ICD-10-CM

## 2024-12-17 PROCEDURE — G2211 COMPLEX E/M VISIT ADD ON: CPT

## 2024-12-17 PROCEDURE — 99215 OFFICE O/P EST HI 40 MIN: CPT

## 2024-12-18 ENCOUNTER — APPOINTMENT (OUTPATIENT)
Dept: CARDIOLOGY | Facility: CLINIC | Age: 59
End: 2024-12-18

## 2024-12-18 PROBLEM — M32.9 PERSONAL HISTORY OF SYSTEMIC LUPUS ERYTHEMATOSUS (SLE): Status: ACTIVE | Noted: 2024-12-18

## 2024-12-31 ENCOUNTER — APPOINTMENT (OUTPATIENT)
Dept: INTERNAL MEDICINE | Facility: CLINIC | Age: 59
End: 2024-12-31

## 2025-01-21 ENCOUNTER — RX RENEWAL (OUTPATIENT)
Age: 60
End: 2025-01-21

## 2025-01-22 ENCOUNTER — NON-APPOINTMENT (OUTPATIENT)
Age: 60
End: 2025-01-22

## 2025-01-24 ENCOUNTER — APPOINTMENT (OUTPATIENT)
Dept: GASTROENTEROLOGY | Facility: CLINIC | Age: 60
End: 2025-01-24
Payer: MEDICARE

## 2025-01-24 VITALS
WEIGHT: 164 LBS | HEIGHT: 60 IN | DIASTOLIC BLOOD PRESSURE: 77 MMHG | BODY MASS INDEX: 32.2 KG/M2 | HEART RATE: 71 BPM | SYSTOLIC BLOOD PRESSURE: 110 MMHG | TEMPERATURE: 97.2 F | OXYGEN SATURATION: 98 %

## 2025-01-24 DIAGNOSIS — R19.7 DIARRHEA, UNSPECIFIED: ICD-10-CM

## 2025-01-24 DIAGNOSIS — K58.0 IRRITABLE BOWEL SYNDROME WITH DIARRHEA: ICD-10-CM

## 2025-01-24 DIAGNOSIS — R10.13 EPIGASTRIC PAIN: ICD-10-CM

## 2025-01-24 DIAGNOSIS — R10.31 RIGHT LOWER QUADRANT PAIN: ICD-10-CM

## 2025-01-24 PROCEDURE — 99214 OFFICE O/P EST MOD 30 MIN: CPT

## 2025-01-24 RX ORDER — POLYETHYLENE GLYCOL 3350 AND ELECTROLYTES WITH LEMON FLAVOR 236; 22.74; 6.74; 5.86; 2.97 G/4L; G/4L; G/4L; G/4L; G/4L
236 POWDER, FOR SOLUTION ORAL
Qty: 1 | Refills: 0 | Status: ACTIVE | COMMUNITY
Start: 2025-01-24 | End: 1900-01-01

## 2025-01-30 ENCOUNTER — NON-APPOINTMENT (OUTPATIENT)
Age: 60
End: 2025-01-30

## 2025-01-30 LAB
GI PCR PANEL: NOT DETECTED
HEMOCCULT STL QL IA: NEGATIVE

## 2025-01-31 LAB
C DIFF TOXIN B QL PCR REFLEX: NORMAL
GDH ANTIGEN: NOT DETECTED
TOXIN A AND B: NOT DETECTED

## 2025-02-01 LAB
CALPROTECTIN FECAL: 43 UG/G
PANCREATIC ELASTASE, FECAL: 136 CD:794062645

## 2025-02-05 ENCOUNTER — APPOINTMENT (OUTPATIENT)
Dept: CARDIOLOGY | Facility: CLINIC | Age: 60
End: 2025-02-05

## 2025-02-10 ENCOUNTER — NON-APPOINTMENT (OUTPATIENT)
Age: 60
End: 2025-02-10

## 2025-02-13 ENCOUNTER — TRANSCRIPTION ENCOUNTER (OUTPATIENT)
Age: 60
End: 2025-02-13

## 2025-02-13 ENCOUNTER — OUTPATIENT (OUTPATIENT)
Dept: OUTPATIENT SERVICES | Facility: HOSPITAL | Age: 60
LOS: 1 days | End: 2025-02-13
Payer: MEDICARE

## 2025-02-13 ENCOUNTER — RESULT REVIEW (OUTPATIENT)
Age: 60
End: 2025-02-13

## 2025-02-13 ENCOUNTER — APPOINTMENT (OUTPATIENT)
Dept: GASTROENTEROLOGY | Facility: HOSPITAL | Age: 60
End: 2025-02-13

## 2025-02-13 VITALS
DIASTOLIC BLOOD PRESSURE: 72 MMHG | OXYGEN SATURATION: 99 % | HEART RATE: 70 BPM | RESPIRATION RATE: 17 BRPM | SYSTOLIC BLOOD PRESSURE: 130 MMHG

## 2025-02-13 VITALS
WEIGHT: 166.89 LBS | OXYGEN SATURATION: 100 % | RESPIRATION RATE: 18 BRPM | DIASTOLIC BLOOD PRESSURE: 83 MMHG | HEART RATE: 78 BPM | SYSTOLIC BLOOD PRESSURE: 133 MMHG | TEMPERATURE: 98 F | HEIGHT: 60 IN

## 2025-02-13 DIAGNOSIS — Z98.890 OTHER SPECIFIED POSTPROCEDURAL STATES: Chronic | ICD-10-CM

## 2025-02-13 DIAGNOSIS — Z98.51 TUBAL LIGATION STATUS: Chronic | ICD-10-CM

## 2025-02-13 DIAGNOSIS — Z98.89 OTHER SPECIFIED POSTPROCEDURAL STATES: Chronic | ICD-10-CM

## 2025-02-13 DIAGNOSIS — R19.7 DIARRHEA, UNSPECIFIED: ICD-10-CM

## 2025-02-13 PROCEDURE — 45380 COLONOSCOPY AND BIOPSY: CPT

## 2025-02-13 PROCEDURE — 88305 TISSUE EXAM BY PATHOLOGIST: CPT

## 2025-02-13 PROCEDURE — 88305 TISSUE EXAM BY PATHOLOGIST: CPT | Mod: 26

## 2025-02-13 RX ORDER — BACTERIOSTATIC SODIUM CHLORIDE 0.9 %
500 VIAL (ML) INJECTION
Refills: 0 | Status: COMPLETED | OUTPATIENT
Start: 2025-02-13 | End: 2025-02-13

## 2025-02-13 RX ADMIN — Medication 30 MILLILITER(S): at 13:17

## 2025-02-13 NOTE — ASU DISCHARGE PLAN (ADULT/PEDIATRIC) - FINANCIAL ASSISTANCE
[facility] provides services at a reduced cost to those who are determined to be eligible through [facility]’s financial assistance program. Information regarding [facility]’s financial assistance program can be found by going to [email or web address] or by calling [phone].

## 2025-02-13 NOTE — ASU DISCHARGE PLAN (ADULT/PEDIATRIC) - ***IN THE EVENT THAT YOU DEVELOP A COMPLICATION AND YOU ARE UNABLE TO REACH YOUR OWN PHYSICIAN, YOU MAY CONTACT:
Department of Anesthesiology  Preprocedure Note       Name:  Gray Melendez   Age:  74 y.o.  :  1951                                          MRN:  [id number]         Date:  10/10/2023      Surgeon: Óscar Condon):  Gris Diallo MD    Procedure: Procedure(s):  COLONOSCOPY DIAGNOSTIC    Medications prior to admission:   Prior to Admission medications    Medication Sig Start Date End Date Taking?  Authorizing Provider   estradiol (ESTRACE) 0.1 MG/GM vaginal cream PLACE 1 GRAM VAGINALLY EVERY 72 HOURS 23   Francois Medel DO   estradiol (ESTRACE) 0.1 MG/GM vaginal cream PLACE 1 GRAM VAGINALLY EVERY 72 HOURS 23   Francois Medel DO   clobetasol (TEMOVATE) 0.05 % cream APPLY TOPICALLY TO THE AFFECTED AREA TWICE DAILY 23   Francois Medel DO   ascorbic acid (VITAMIN C) 500 MG tablet Take by mouth daily 22   Lalito Sin MD   cyanocobalamin 1000 MCG tablet Take by mouth daily 22   Lalito Sin MD   lisinopril (PRINIVIL;ZESTRIL) 20 MG tablet Take 1 tablet by mouth daily    Lalito Sin MD   metoprolol succinate (TOPROL XL) 50 MG extended release tablet Take 1 tablet by mouth daily    Lalito Sin MD   Magnesium Oxide (MAG- PO) Take 800 mg by mouth daily Takes in afternoon    Lalito Sin MD   ferrous sulfate (FE TABS) 325 (65 Fe) MG EC tablet Take 1 tablet by mouth 2 times daily 19   Harjinder Castaneda DO   levothyroxine (SYNTHROID) 75 MCG tablet Take 1 tablet by mouth Daily    Lalito Sin MD   sennosides-docusate sodium (SENOKOT-S) 8.6-50 MG tablet Take 1 tablet by mouth daily 19   Harjinder Castaneda DO   amLODIPine (NORVASC) 5 MG tablet Take 1 tablet by mouth daily    Lalito Sin MD   simvastatin (ZOCOR) 40 MG tablet 0.5 tablets 18   Lalito Sin MD   meloxicam (MOBIC) 15 MG tablet TK 1 T PO QD 18   Lalito Sin MD   PROAIR  (90 BASE) MCG/ACT inhaler  17   Lalito Sin MD
Statement Selected

## 2025-02-13 NOTE — CHART NOTE - NSCHARTNOTEFT_GEN_A_CORE
History of Present Illness       The patient is a 59-year-old  female with past medical history significant for systemic lupus erythematosus, fibromyalgia, depression, hypertension, hypercholesterolemia, hypertriglyceridemia, hypothyroidism and cerebral aneurysm who was referred to my office by Dr. Roe Sales for abdominal pain. The patient also admits to having diarrhea, change in bowel habits and change in caliber of stool. The patient admits to a family history of GI problems. The patient's brother and father had a history of bleeding peptic ulcer disease.     The patient states that she is feeling uncomfortable x 2 months. The patient was recently hospitalized at the [facility] on December 8, 2024 for headaches. The patient was hospitalized for 5 days for the symptoms. The blood work performed was unknown to the patient. The CAT scan of the head revealed a cerebral aneurysm. The patient was observed for 5 days with resolution of the symptoms and was discharged to followup in the office. The patient denies any jaundice or pruritus. The patient complains of chronic lower back pain. The patient complains of abdominal pain. The patient describes the abdominal pain as a pressure, intermittent right lower quadrant discomfort that is nonradiating in nature. The abdominal pain is unrelated to stress or passing gas or having bowel movements. The abdominal pain is worse with meals. The abdominal pain is described as mild to severe in nature. The abdominal pain occurs at night and in the morning. The abdominal pain can occur at any time. The abdominal pain has awakened the patient from sleep. The abdominal pain is not relieved with medication. The abdominal pain is associated with abdominal gas and bloating. The patient denies any nausea or vomiting. The patient denies any gastroesophageal reflux disease or dysphagia. The patient denies any atypical chest pain, shortness of breath or palpitations. The patient admits to occasional episodes of diaphoresis. The patient complains of diarrhea but denies any constipation. The patient has > 5 bowel movements a day. The diarrhea is described as soft to watery in nature. The patient complains of a change in bowel habits. The patient complains of a change in caliber of stool. The patient denies having mucus discharge with the bowel movements. The patient denies any bright red blood per rectum, melena or hematemesis. The patient complains of rectal pain and rectal pruritus. The patient complains of poor appetite but denies any weight loss or anorexia. She complains of occasional chills but denies any fevers. The patient had an upper end[place] performed [place] [facility] on February 20, 2020. The upper endoscopy revealed a small hiatal hernia and mild diffuse gastritis with retained food and debris in the stomach suggestive of gastroparesis. The gastroparesis may be the cause of the abdominal pain. The patient had a colonoscopy to the terminal ileum performed at [place] [facility] on February 13, 2020. The colonoscopy to the terminal ileum revealed moderate left sided diverticulosis. Random biopsies were taken of the terminal ileum, cecum and rectum to assess for ileitis and microscopic colitis. There were no polyps, masses, AVMs or colitis noted. The pathology revealed unremarkable small intestinal mucosa with preserved villous pattern and lymphocytic aggregates (terminal ileum), patchy mild colitis without evidence of architectural changes or evidence of chronicity (cecum) and benign colonic mucosa with focal hyperplastic change (rectum). The patient was previously found to have occult blood in the stool. The CAT scan of the abdomen and pelvis with IV contrast a new left lower back fat-containing spigelian hernia possibly the source of the patient's pain performed on August 1, 2024 revealed otherwise no acute abdominal pelvic abnormalities. Also noted was unchanged segment 4 liver cyst, diverticulosis without diverticulitis, fallopian tube clips lumbar fixation hardware with bilateral iliac screws. The CAT scan of the abdomen and pelvis with IV contrast performed on August 7, 2024 revealed no acute intra-abdominal findings. Also noted with degenerative changes of the bone, posterior spinal fusion and intravertebral disc spaces from L2-S1, postsurgical changes of the abdominal wall, atherosclerotic changes of the vessels, colonic diverticulosis without diverticulitis, right subcentimeter hypodense focus in the right kidney too small to characterize, a subcentimeter hypodense focus too small to characterize and unchanged from June 29, 2023.  The stool GI PCR panel performed on January 28, 2025 was not detected. The fecal occult blood immunology test performed on January 28, 2025 was negative for occult blood in stool. The stool for C. difficile toxin/GDH performed on January 24, 2025 was negative. The fecal calprotectin level performed on January 24, 2025 was normal at 43 ug/g. The stool quantitative lactoferrin level performed on January 24, 2025 was normal at < 1.00. The fecal pancreatic elastase level performed on January 24, 2025 was low at 136. The blood work performed on April 16, 2024 revealed no evidence of anemia with a hemoglobin/hematocrit level of 11.8/38.2, respectively, a low WBC count of 3.72 K/UL, a normal platelet count of 348,000, a normal TSH of 1.85u IU/mL, a normal total cholesterol/triglyceride level of 159/58 mg/dL, respectively, normal liver enzymes with a total bilirubin of 0.3 mg/dL, a normal alkaline phosphatase/AST/ALT of 67/31/24 U/L, respectively, a normal uric acid level of 2.9 mg/dL, a normal C-reactive protein of <3 mg/L, and elevated ESR of 31 mm/h, and elevated hemoglobin A1c of 5.8% with an estimated average glucose of 120 mg/dL, a normal C3 complement of 127 mg/dL, a normal C4 complement of 26 mg/dL. The blood work performed on September 26, 2022 revealed a negative transglutaminase IgG antibody EIA of 1.6 U/ml, a negative transglutaminase IgA antibody EIA of <1.2 U/ml, a negative Gliadin Deamidated IgG antibody of < 5.0 units, a negative Gliadin Deamidated IgA antibody of 5.3 units, a normal serum IgA level of 279 mg/dl. The patient is s/p back surgery at [facility] with Dr. Armstrong December 27, 2021. The patient was hospitalized at [facility] for back pain. The patient tolerated the surgery well. The patient admits to having a prior upper endoscopy and colonoscopy performed by another gastroenterologist. The upper endoscopic findings were unknown to the patient. The colonoscopic findings revealed a poor prep colonoscopy. The patient admits to a family history of GI problems. The patient's brother and father had a history of bleeding peptic ulcer disease.  ?  (-) smoking, (-) ETOH, (-) IVDA  ?  Prior Hospitalization:  ?  The patient was evaluated at [facility] emergency room on August 7, 2024 for abdominal pain. The patient has a history of lupus, hypertension, fibromyalgia, bipolar disorder, Crohn's disease, irritable bowel syndrome, who complains of 2-week history of abdominal pain. The patient had been also evaluated at [facility] 1 week prior to evaluation. According to the patient, a CAT scan revealed a small fat-containing spigelian hernia. The patient had blood work and imaging studies performed in the emergency room to assess the symptoms. The blood work performed on August 7, 2024 revealed a low WBC count of 3.78 K/UL, no evidence of anemia with a hemoglobin/hematocrit level of 11.6/36.2, respectively, a normal platelet count of 327,000 on an elevated potassium level of 6.0 [place] per liter, and elevated AST of 45U/L, a normal total bilirubin of 0.2 mg/dL, a normal alkaline phosphatase/ALT of 60/24 U/L, respectively, a normal lipase level 27 U/L. The urinalysis performed on August 7, 2024 was negative. The urine culture performed on August 7, 2024 revealed <10,000 CFU/mL of normal urogenital ben. The CAT scan of the abdomen and pelvis with IV contrast performed on August 7, 2024 revealed no acute intra-abdominal findings. Also noted with degenerative changes of the bones, posterior spinal fusion and intervertebral disc spaces from L2-S1, postsurgical changes of the abdomen, atherosclerotic changes of the vessels, colonic diverticulosis without diverticulitis, right subcentimeter hypodense focus in the kidney that is too small to characterize, subcentimeter hypodense focus too small to characterize and unchanged from June 29, 2023. The patient was treated with IV fluids, acetaminophen ondansetron with improvement of the symptoms. The patient symptoms improved and was discharged from the emergency room in stable condition with a diagnosis of abdominal pain and advised to follow-up in the office for further workup and treatment.  ?  The patient was evaluated at [facility] emergency room for right shoulder pain radiating to the neck and right lower back pain x 2 days. The patient left the emergency room without being seen by a physician or midlevel provider.  The patient was evaluated at [facility] emergency room on April 8, 2024 for chest pain. The patient has a history of lupus, fibromyalgia, anemia, remote history for MI in 2013 who presents to the emergency room with chest pain x 1 day duration. The patient admitted to having diaphoresis. The patient had blood work and imaging studies performed in the emergency room to assess the symptoms. The blood work performed on April 8, 2024 revealed a low WBC count of 3.30 K/UL, no evidence of anemia with a hemoglobin/hematocrit level of 11.7/36.9, respectively, a normal platelet count of 299,000, a normal PT/INR/PTT of 11.3/0.99/30.9, respectively, and elevated BUN of 20 mg/dL, a low anion gap of 4 mmol/L, normal liver enzymes with a total bilirubin of 0.2 mg/dL, a normal alkaline phosphatase/AST/ALT of 55/22/28 U/L, respectively, a normal troponin level of 3.7 ng/L, a normal lactate level of 1.3 mmol/L and a normal lipase level of 22 U/L. The chest x-ray performed on April 8, 2024 revealed no acute findings. The EKG performed on April 8, 2024 revealed normal sinus rhythm with possible left atrial enlargement, right bundle branch block at a rate of 61 bpm. The patient was treated with sublingual nitroglycerin. The patient's symptoms improved and was discharged from the emergency room with a diagnosis of chest pain and advised to follow-up in the office for further workup and treatment.  The patient was evaluated in the [facility] emergency room for the abdominal pain. The patient had a CAT scan and blood work performed in the emergency room to assess the symptoms. There is a question of a hernia with large bowel that may be the cause of the abdominal pain. The patient was advised to go to the emergency room if the symptoms persist.  The patient was hospitalized at [facility] on June 29, 2023 for abdominal pain, constipation, nausea/vomiting and rectal bleeding. The patient had blood work and imaging studies performed in the emergency room to assess the symptoms. The blood work performed on June 29, 2023 revealed mild anemia with a hemoglobin of 11.1 g/dL, a normal hematocrit level of 34.7%, a normal PT/INR/PTT of 12.0/1.01/32.4, respectively, and elevated BUN of 27 mg/dL, a normal creatinine level of 0.86 mg/dL, a low blood glucose of 47 mg/dL, and elevated total protein level of 8.7 g/dL, normal liver enzymes with a total bilirubin of 0.3 mg/dL, a normal alkaline phosphatase/AST/ALT of 114/36/53 U/L, respectively, a normal lactate level of 1.6 mmol/L, a normal lipase level of 130 U/L. The urinalysis performed on June 29, 2023 revealed mild proteinuria of 15 mg/dL, trace bacteria, 0-2 hyaline casts, 250 mg/dL of glucose in the urine. The CAT scan of the abdomen and pelvis with IV contrast performed on June 29, 2023 revealed no acute CT findings. Also noted were spinal rods and pedicle screws transfix at L5-S1 with disc spacers present at these levels, postsurgical changes in the abdominal wall, atherosclerotic changes in the vessels, diverticulosis coli and coronary artery calcifications. The patient's symptoms improved. The diet was advanced. The patient denied any further episodes of GI bleeding or constipation. The last blood work performed on June 30, 2023 revealed a low WBC count of 3.76 K/UL, mild anemia with a hemoglobin of 11.2 g/dL, a normal hematocrit level of 35.2%, normal liver enzymes with a total bilirubin of 0.4 mg/dL, a normal alkaline phosphatase/AST/ALT of 101/27/40 U/L, respectively, and elevated TSH of 9.75u IU/mL. The patient was discharged in stable condition and advised to follow-up in the office for further work-up and treatment.  The [place] [facility] [place] with 1 week history of abdominal pain. The blood work performed on February 20, 2020 revealed mild anemia with Hgb/Hct level of 10.7/33.0, respectively and an elevated chloride of 109 mmol/L. The abdominal ultrasound performed on February 19, 2020 revealed no gallstones, gallbladder wall thickening or pericholecystic fluid. The CAT scan of the chest, abdomen and pelvis with IV contrast performed on February 17, 2020 revealed no evidence of an aortic dissection.  The patient was previously evaluated at [place] [facility] [place] on January 10, 2020 for abdominal pain. The patient was treated with medications and discharged. The blood work performed on January 11, 2020 revealed an elevated chloride level of 110 mmol/L, anemia with a hemoglobin/hematocrit level 10.5/34.3, respectively and a normalization of the WBC count of 4.32. The patient was observed with resolution of the symptoms and was discharged to followup in the office.  ?  Physical Exam:  General Appearance: Overweight, no acute distress  ENT: nose clear, ears unremarkable  Eyes: No enteric sclera, conjunctiva clear.  Neck: Supple, without masses  Respiratory: Breath sounds equal and bilateral, no wheezing no rales or rhonchi  Cardiovascular: S1-S2 audible, no murmur, no rubs or gallops  GI: (+) BS, soft, tender in the right upper and lower quadrant, no rebound, no guarding, no masses  Liver: liver edge palpated  Musculo-skeletal: Good motor strength, poorrange of motion, normal appearing extremities  Skin: Normal appearing skin, no jaundice, no rashes or nodules  Neurological: without focal motor or sensory deficits Patient is moving all extremities spontaneously and to command with normal muscle strength alert and oriented X3  Psychiatric: Good affect, not depressed, not anxious      Gastroenterology Summary    Upper Endoscopy: The upper endoscopy performed at the [facility] on February 20, 2020 revealed a small hiatal hernia and mild diffuse gastritis with retained food and debris in the stomach suggestive of gastroparesis.    Colonoscopy: The colonoscopy to the terminal ileum performed at the [facility] on February 13, 2020.revealed moderate left sided diverticulosis. There were no polyps, masses, AVMs or colitis noted. The pathology revealed unremarkable small intestinal mucosa with preserved villous pattern and lymphocytic aggregates (terminal ileum), patchy mild colitis without evidence of architectural changes or evidence of chronicity (cecum) and benign colonic mucosa with focal hyperplastic change (rectum).      Radiology Summary    CT: The CAT scan of the abdomen and pelvis with IV contrast performed on August 7, 2024 revealed no acute intra-abdominal findings. Also noted with degenerative changes of the bone, posterior spinal fusion and intravertebral disc spaces from L2-S1, postsurgical changes of the abdominal wall, atherosclerotic changes of the vessels, colonic diverticulosis without diverticulitis, right subcentimeter hypodense focus in the right kidney too small to characterize, a subcentimeter hypodense focus too small to characterize and unchanged from June 29, 2023.  ?  The CAT scan of the abdomen and pelvis with IV contrast a new left lower back fat-containing spigelian hernia possibly the source of the patient's pain performed on August 1, 2024 revealed otherwise no acute abdominal pelvic abnormalities. Also noted was unchanged segment 4 liver cyst, diverticulosis without diverticulitis, fallopian tube clips lumbar fixation hardware with bilateral iliac screws.  ?  ?  The CAT scan of the abdomen and pelvis with IV contrast performed on June 29, 2023 revealed no acute CT findings. Also noted were spinal rods and pedicle screws transfix at L5-S1 with disc spacers present at these levels, postsurgical changes in the abdominal wall, atherosclerotic changes in the vessels, diverticulosis coli and coronary artery calcifications.  ?  The CAT scan of the chest, abdomen and pelvis with IV contrast performed on February 17, 2020 revealed no evidence of an aortic dissection.  The CAT scan of the abdomen and pelvis with IV contrast performed on December 16, 2019 revealed heterogeneous attenuation in the external iliac and common femoral veins is likely related to mixing artifact. Also noted was constipation, sigmoid diverticulosis without diverticulitis, trace bilateral hydronephrosis likely related to distended urinary bladder. The doppler ultrasound of the lower extremities performed on December 27, 2019 revealed no evidence of deep venous thrombosis in either lower extremity.    Abdominal Sono: The abdominal ultrasound performed on February 19, 2020 revealed no gallstones, gallbladder wall thickening or pericholecystic fluid.  ?  The abdominal ultrasound performed on October 18, 2019 revealed no gallstones or pericholecystic fluid. The contracted state of the gallbladder limits assessment for gallbladder wall thickening. There is no evidence for intrahepatic or extrahepatic biliary duct dilatation. The pelvic ultrasound performed on October 18, 2019 revealed no evidence for an ovarian lesion.  ?  Active Problems  Abdominal discomfort (789.00) (R10.9)  Age-related osteoporosis without current pathological fracture (733.01) (M81.0)  Alternating constipation and diarrhea (787.99) (R19.8)  Anemia (285.9) (D64.9)  Aneurysm of artery (442.9) (I72.9)  Arthralgia (719.40) (M25.50)  Arthropathy of left knee (716.96) (M17.12)  Arthropathy of right hip (716.85) (M16.11)  Asthma, unspecified asthma severity, unspecified whether complicated, unspecified  whether persistent (493.90) (J45.909)  Biceps tendinitis of left upper extremity (726.12) (M75.22)  Bilateral leg edema (782.3) (R60.0)  Cervical spinal stenosis (723.0) (M48.02)  Constipation, unspecified constipation type (564.00) (K59.00)  COVID-19 virus infection (079.89) (U07.1)  CVA (cerebral vascular accident) (434.91) (I63.9)  Depression, major, in partial remission (296.25) (F32.4)  Deviated septum (470) (J34.2)  Dyspepsia (536.8) (R10.13)  Dysuria (788.1) (R30.0)  Elevated hemoglobin A1c (790.29) (R73.09)  False positive cristian (795.79) (R76.8)  Fibromyalgia (729.1) (M79.7)  Functional urinary incontinence (788.91) (R39.81)  GERD (gastroesophageal reflux disease) (530.81) (K21.9)  Hiatal hernia (553.3) (K44.9)  Hot flashes (782.62) (R23.2)  HTN (hypertension), benign (401.1) (I10)  Hypercholesterolemia with hypertriglyceridemia (272.2) (E78.2)  Hypopigmentation (709.00) (L81.9)  Hypothyroidism (244.9) (E03.9)  Insomnia (780.52) (G47.00)  Internal hemorrhoids (455.0) (K64.8)  Irritable bowel syndrome with constipation (564.1) (K58.1)  Irritable bowel syndrome with diarrhea (564.1) (K58.0)  Lateral epicondylitis of left elbow (726.32) (M77.12)  Left knee pain, unspecified chronicity (719.46) (M25.562)  Localized swelling of left forearm (782.2) (R22.32)  Lumbar radiculopathy (724.4) (M54.16)  Migraine (346.90) (G43.909)  Multiple thyroid nodules (241.1) (E04.2)  Neuropathy (355.9) (G62.9)  On methotrexate therapy (V58.69) (Z79.631)  Pain of both elbows (719.42) (M25.521,M25.522)  Personal history of systemic lupus erythematosus (SLE) (710.0) (M32.9)  Pes anserine bursitis (726.61) (M70.50)  Postmenopausal symptoms (627.9) (N95.9)  Primary localized osteoarthritis of both knees (715.16) (M17.0)  Primary osteoarthritis of left knee (715.16) (M17.12)  Primary osteoarthritis of right knee (715.16) (M17.11)  SLE (systemic lupus erythematosus) (710.0) (M32.9)  Spigelian hernia (553.29) (K43.9)  Subacromial bursitis of left shoulder joint (726.19) (M75.52)  Subungual hemorrhage of fingernail (923.3) (S60.10XA)  Vertigo (780.4) (R42)           ?  Past Medical History  Unreviewed Unverified: History of osteoporosis (V13.59) (Z87.39)  History of Arthropathy of left knee (716.96) (M17.12)  Bilateral conjunctivitis (372.30) (H10.9)  History of anemia (V12.3) (Z86.2)  History of blood transfusion (V15.89) (Z92.89)  History of breast lump (V13.89) (Z87.898)  History of cardiac disorder (V12.50) (Z86.79)  History of vertigo (V12.49) (Z87.898)  History of vertigo (V12.49) (Z87.898)  History of Left knee pain, unspecified chronicity (719.46) (M25.562)  History of Left knee pain, unspecified chronicity (719.46) (M25.562)           ?  Current Meds  AeroChamber Mini Chamber Device; USE AS DIRECTED  Albuterol Sulfate  (90 Base) MCG/ACT Inhalation Aerosol Solution; inhale 2 puffs  by mouth every 6 hours  Atorvastatin Calcium 20 MG Oral Tablet; TAKE 1 TABLET AT BEDTIME  Azelastine HCl - 137 MCG/SPRAY Nasal Solution  Benzonatate 100 MG Oral Capsule; TAKE 1 CAPSULE 3 TIMES DAILY  Cholestyramine 4 GM Oral Packet; TAKE 4 GM Daily  Ciclopirox 8 % External Solution  clonazePAM 0.5 MG Oral Tablet; TAKE 1 TABLET TWICE DAILY AS NEEDED  Diclofenac Sodium 50 MG Oral Tablet Delayed Release; TAKE 1 TABLET BY MOUTH  TWICE A DAY WITH FOOD AS NEEDED  Dicyclomine HCl - 10 MG Oral Capsule; take 1 capsule by mouth three times a day  Ecotrin Low Strength 81 MG Oral Tablet Delayed Release; TAKE 1 TABLET DAILY  Effexor  MG Oral Capsule Extended Release 24 Hour; TAKE 1 CAPSULE ONCE  DAILY WITH FOOD  Euflexxa 20 MG/2ML Intra-articular Solution Prefilled Syringe; INJECT 2 MG Weekly x3  weeks  Famotidine Maximum Strength 20 MG Oral Tablet; TAKE 2 TABLET Twice daily  Folic Acid 1 MG Oral Tablet; TAKE 3 TABLET DAILY AS DIRECTED  Gabapentin 300 MG Oral Capsule  Hydroxychloroquine Sulfate 200 MG Oral Tablet; TAKE 1 TABLET BY MOUTH EVERY DAY  WITH FOOD  Hydroxychloroquine Sulfate 200 MG Oral Tablet; TAKE 1 TABLET BY MOUTH TWICE A  DAY WITH FOOD  Ibuprofen 600 MG Oral Tablet; TAKE 1 TABLET BY MOUTH TWICE DAILY WITH MEALS AS  NEEDED  Levothyroxine Sodium 100 MCG Oral Tablet; TAKE 1 TABLET BY MOUTH EVERY DAY  Linzess 145 MCG Oral Capsule; TAKE 1 CAPSULE BY MOUTH EVERY DAY  Meclizine HCl - 12.5 MG Oral Tablet; take 1 tablet by mouth twice a day if needed for  dizziness  Meloxicam 15 MG Oral Tablet; TAKE 1 TABLET EVERY DAY AS NEEDED  Methocarbamol 750 MG Oral Tablet; take 1 tablet by mouth twice a day if needed for pain  Metoprolol Tartrate 25 MG Oral Tablet; TAKE 1 TABLET BY MOUTH TWICE A DAY  Nortriptyline HCl - 50 MG Oral Capsule; TAKE 1 CAPSULE AT BEDTIME  Pantoprazole Sodium 40 MG Oral Tablet Delayed Release; TAKE 1 TABLET BY MOUTH  EVERY DAY  Prazosin HCl - 2 MG Oral Capsule  predniSONE 50 MG Oral Tablet  Prolia 60 MG/ML Subcutaneous Solution Prefilled Syringe; 60mg/1ml to be injected every  six months  Simethicone 125 MG Oral Tablet Chewable; CHEW AND SWALLOW 1 TABLET 4 TIMES  DAILY, AFTER MEALS AND AT BEDTIME  Simethicone 180 MG Oral Capsule; TAKE 1 CAPSULE 4 TIMES DAILY AS NEEDED  Synvisc One 48 MG/6ML Intra-articular Solution Prefilled Syringe; Inject 2 MG  weekly x  weeks  tiZANidine HCl - 4 MG Oral Tablet; Take 1 tablet at bedtime, as directed  traZODone HCl - 100 MG Oral Tablet; TAKE 2 TABLET Daily  Tretinoin 0.05 % External Cream  Tylenol 325 MG Oral Capsule; Administer 2 tablets 30 minutes before infusion  Ubrelvy 100 MG Oral Tablet  Veozah 45 MG Oral Tablet; Take 1 tablet daily at the same time  Zoledronic Acid 5 MG/100ML Intravenous Solution; INFUSE 5MG INTRAVENOUSLY OVER  15 MINUTES AS DIRECTED           Allergies  Estrogens           ?  Vitals  Vital Signs  ?  Recorded: [id number] 06:39AM  Systolic  110  Diastolic  77  Height  5 ft  Weight  164 lb  BMI Calculated  32.03 kg/m2  BSA Calculated  1.72 m2  Temperature  97.2 F  Heart Rate  71  O2 Saturation  98 %, Room Air  FiO2 Flow Rate  0 L/min, Room Air           ?  Assessment  Diarrhea, unspecified type (787.91) (R19.7)  Irritable bowel syndrome with diarrhea (564.1) (K58.0)  Abdominal pain, acute, right lower quadrant (789.03,338.19) (R10.31)  Dyspepsia (536.8) (R10.13)    Abdominal Pain: The patient complains of abdominal pain. The patient is to avoid nonsteroidal anti-inflammatory drugs and aspirin. I recommend a low FOD-MAP diet. I recommend a trial of Dicyclomine 10 mg tablet PO 3 times a day PRN for the abdominal pain.  Dyspepsia: The patient complains of dyspeptic symptoms. The patient was advised to continue to abide by an anti-gas (low FOD-MAP) diet. The patient was previously given a pamphlet for anti-gas (low FOD-MAP). The patient and I reviewed the anti-gas (low FOD-MAP) diet at length again. The patient is to continue on a trial of Simethicone one tablet 4 times a day p.r.n. abdominal pain and gas.  Diarrhea: The patient complains of diarrhea. I recommend a low residue diet. The patient is to avoid fiber supplementation. The patient is to consider starting a trial of a probiotic such as Align once a day. I recommend sending stool studies for GI PCR, fecal calprotectin, stool for lactoferrin, fecal elastase level and C. difficile toxin/GDH to assess for an infectious etiology of the diarrhea. The symptoms are worse after meals. I recommend a trial of cholestyramine one packet once a day for possible bile induced diarrhea. I recommend a colonoscopy to assess for colitis versus other causes. The patient was told of the risks and benefits of the procedure. The patient was told of the risks of perforation, emergency surgery, bleeding, infections and missed lesions. The patient agreed and will follow-up to reassess the symptoms.  Hiatal Hernia: The patient has a history of a hiatal hernia noted on prior upper endoscopy. The patient was advised to avoid late-night meals and dietary indiscretions. The patient was advised to avoid fried and fatty foods. The patient was advised to abide by an anti-GERD diet. The patient was given a pamphlet for anti-GERD. The patient and I reviewed the anti-GERD diet at length. I recommend a trial of Pantoprazole 40 mg once a day for 3 months for the symptoms.  Gastritis: The patient has a history of gastritis noted on prior upper endoscopy. The patient is to avoid nonsteroidal anti-inflammatory drugs and aspirin. I recommend a trial of pantoprazole 40 mg once a day for 3 months for the symptoms.  Diverticulosis: I recommend a high-fiber diet and avoid seeds. The patient is to consider a trial of Metamucil once a day for fiber supplementation. The patient is to also consider a trial of a probiotic such as Align once a day. The patient and I discussed the potential risk of diverticulitis and diverticular bleeding secondary to diverticular disease. The patient is aware of the importance of follow-up if these complications arise.  Gastroparesis: The patient has symptoms suggestive of gastroparesis. The patient may require a trial of a promotility agent for the symptoms pending the upper endoscopy results. I recommend a trial of Reglan (metoclopramide) 5 mg 4 times a day for 3 months. The patient and I had a long discussion regarding the risks of potential side effects of Reglan (metoclopramide). The patient was told of the risk of headaches, dizziness, diarrhea and permanent tremors. The patient was told to stop the medication immediately and call the office if any of these symptoms occur. The patient agrees.  Irritable Bowel Syndrome with diarrhea: The patient has symptoms suggestive of irritable bowel syndrome with diarrhea. The patient was advised to follow a low FOD-MAP diet for the irritable bowel syndrome. I recommend a trial of Dicyclomine 10 mg TID PRN for the abdominal pain and diarrhea. If the symptoms persist, the patient may require a trial of hyoscyamine 0.125 mg sublingual 3 times a day p.r.n. abdominal pain and diarrhea. If the symptoms persist, the patient may require a trial of Xifaxin 550 mg TID x 14 days for possible post infectious irritable bowel syndrome.  Colonoscopy: I recommend a colonoscopy to assess the symptoms and for colonic polyps. The patient was told of the risks and benefits of the procedure. The patient was told of the risks of perforation, emergency surgery, bleeding, infections and missed lesions. The patient is to be on a clear liquid diet the entire day prior to the procedure. The patient is to complete the entire prescribed bowel prep the day prior to the procedure as directed. The patient is told not to drive, drink alcohol, use recreational drugs, exercise, or work the day of the procedure. The patient was told of the need for an escort to accompany the patient home after the procedure. The patient is aware that the procedure may be cancelled if they fail to follow the directions. The patient agreed and will schedule for the procedure. The patient can take the antihypertensive medication with a sip of water one hour prior to the procedure. The patient is to be n.p.o. after midnight and bowel prep was given. The patient is to return for the procedure.  Follow-up: The patient is to follow-up in the office in 1 month to reassess the symptoms. The patient was told to call the office if any further problems.            ?  Plan  Abdominal pain, acute, right lower quadrant  Renew: Dicyclomine HCl - 10 MG Oral Capsule; take 1 capsule by mouth three times a  day  Diarrhea, unspecified type  Start: PEG-3350/Electrolytes 236 GM Oral Solution Reconstituted; MIX AS DIRECTED  AND DRINK OVER 4 HOURS, START AT 4PM  C diff Toxin/GDH w/ Reflex to PCR; Status:Active; Requested for:24Jan2025;  Renew: Cholestyramine 4 GM Oral Packet; TAKE 4 GM Daily  Calprotectin, Fecal; Status:Active; Requested for:24Jan2025;  Colonoscopy Diagnostic; Status:Active; Requested for:24Jan2025;  Fecal Occult Blood Immunology; Status:Active; Requested for:24Jan2025;  GI PCR Panel, Stool; Status:Active; Requested for:24Jan2025;  Lactoferrin, Quantitative, Stool; Status:Active; Requested for:24Jan2025;  Pancreatic Elastase, Fecal; Status:Active; Requested for:24Jan2025;

## 2025-02-18 ENCOUNTER — NON-APPOINTMENT (OUTPATIENT)
Age: 60
End: 2025-02-18

## 2025-02-18 ENCOUNTER — APPOINTMENT (OUTPATIENT)
Dept: RHEUMATOLOGY | Facility: CLINIC | Age: 60
End: 2025-02-18
Payer: MEDICARE

## 2025-02-18 VITALS
TEMPERATURE: 97.2 F | WEIGHT: 164 LBS | SYSTOLIC BLOOD PRESSURE: 133 MMHG | HEIGHT: 60 IN | DIASTOLIC BLOOD PRESSURE: 89 MMHG | HEART RATE: 65 BPM | RESPIRATION RATE: 16 BRPM | BODY MASS INDEX: 32.2 KG/M2 | OXYGEN SATURATION: 96 %

## 2025-02-18 DIAGNOSIS — M25.562 PAIN IN LEFT KNEE: ICD-10-CM

## 2025-02-18 DIAGNOSIS — M79.7 FIBROMYALGIA: ICD-10-CM

## 2025-02-18 DIAGNOSIS — M54.16 RADICULOPATHY, LUMBAR REGION: ICD-10-CM

## 2025-02-18 DIAGNOSIS — M32.9 SYSTEMIC LUPUS ERYTHEMATOSUS, UNSPECIFIED: ICD-10-CM

## 2025-02-18 DIAGNOSIS — M17.12 UNILATERAL PRIMARY OSTEOARTHRITIS, LEFT KNEE: ICD-10-CM

## 2025-02-18 DIAGNOSIS — M77.12 LATERAL EPICONDYLITIS, LEFT ELBOW: ICD-10-CM

## 2025-02-18 LAB — SURGICAL PATHOLOGY STUDY: SIGNIFICANT CHANGE UP

## 2025-02-18 PROCEDURE — 20611 DRAIN/INJ JOINT/BURSA W/US: CPT | Mod: LT

## 2025-02-18 PROCEDURE — 99214 OFFICE O/P EST MOD 30 MIN: CPT | Mod: 25

## 2025-02-18 PROCEDURE — 76882 US LMTD JT/FCL EVL NVASC XTR: CPT | Mod: 59

## 2025-02-18 RX ORDER — LIDOCAINE HYDROCHLORIDE 10 MG/ML
1 INJECTION, SOLUTION INFILTRATION; PERINEURAL
Qty: 0 | Refills: 0 | Status: COMPLETED | OUTPATIENT
Start: 2025-02-18

## 2025-02-25 RX ORDER — METHYLPRED ACET/NACL,ISO-OS/PF 40 MG/ML
40 VIAL (ML) INJECTION
Qty: 0 | Refills: 0 | Status: COMPLETED | OUTPATIENT
Start: 2025-02-18

## 2025-03-04 DIAGNOSIS — M17.12 UNILATERAL PRIMARY OSTEOARTHRITIS, LEFT KNEE: ICD-10-CM

## 2025-03-04 RX ORDER — DICLOFENAC SODIUM 50 MG/1
50 TABLET, DELAYED RELEASE ORAL
Qty: 60 | Refills: 0 | Status: ACTIVE | COMMUNITY
Start: 2025-03-04 | End: 1900-01-01

## 2025-03-06 DIAGNOSIS — M25.562 PAIN IN LEFT KNEE: ICD-10-CM

## 2025-03-06 RX ORDER — METHYLPREDNISOLONE 4 MG/1
4 TABLET ORAL
Qty: 1 | Refills: 0 | Status: ACTIVE | COMMUNITY
Start: 2025-03-06 | End: 1900-01-01

## 2025-03-07 ENCOUNTER — APPOINTMENT (OUTPATIENT)
Dept: GASTROENTEROLOGY | Facility: CLINIC | Age: 60
End: 2025-03-07
Payer: MEDICARE

## 2025-03-07 VITALS
HEIGHT: 60 IN | SYSTOLIC BLOOD PRESSURE: 129 MMHG | DIASTOLIC BLOOD PRESSURE: 83 MMHG | HEART RATE: 80 BPM | BODY MASS INDEX: 32.59 KG/M2 | OXYGEN SATURATION: 97 % | TEMPERATURE: 97.7 F | WEIGHT: 166 LBS

## 2025-03-07 DIAGNOSIS — K58.0 IRRITABLE BOWEL SYNDROME WITH DIARRHEA: ICD-10-CM

## 2025-03-07 DIAGNOSIS — K21.9 GASTRO-ESOPHAGEAL REFLUX DISEASE W/OUT ESOPHAGITIS: ICD-10-CM

## 2025-03-07 DIAGNOSIS — R10.13 EPIGASTRIC PAIN: ICD-10-CM

## 2025-03-07 DIAGNOSIS — M32.9 SYSTEMIC LUPUS ERYTHEMATOSUS, UNSPECIFIED: ICD-10-CM

## 2025-03-07 DIAGNOSIS — D64.9 ANEMIA, UNSPECIFIED: ICD-10-CM

## 2025-03-07 DIAGNOSIS — K57.90 DIVERTICULOSIS OF INTESTINE, PART UNSPECIFIED, W/OUT PERFORATION OR ABSCESS W/OUT BLEEDING: ICD-10-CM

## 2025-03-07 DIAGNOSIS — K64.8 OTHER HEMORRHOIDS: ICD-10-CM

## 2025-03-07 DIAGNOSIS — K44.9 DIAPHRAGMATIC HERNIA W/OUT OBSTRUCTION OR GANGRENE: ICD-10-CM

## 2025-03-07 PROCEDURE — 99214 OFFICE O/P EST MOD 30 MIN: CPT

## 2025-03-07 RX ORDER — HYDROCORTISONE 10 MG/G
1 CREAM TOPICAL
Qty: 2 | Refills: 3 | Status: ACTIVE | COMMUNITY
Start: 2025-03-07 | End: 1900-01-01

## 2025-03-10 ENCOUNTER — RX RENEWAL (OUTPATIENT)
Age: 60
End: 2025-03-10

## 2025-03-17 ENCOUNTER — APPOINTMENT (OUTPATIENT)
Dept: CARDIOLOGY | Facility: CLINIC | Age: 60
End: 2025-03-17
Payer: MEDICARE

## 2025-03-17 ENCOUNTER — NON-APPOINTMENT (OUTPATIENT)
Age: 60
End: 2025-03-17

## 2025-03-17 VITALS
OXYGEN SATURATION: 98 % | BODY MASS INDEX: 32.42 KG/M2 | HEART RATE: 84 BPM | WEIGHT: 166 LBS | TEMPERATURE: 97.7 F | SYSTOLIC BLOOD PRESSURE: 125 MMHG | DIASTOLIC BLOOD PRESSURE: 81 MMHG

## 2025-03-17 PROCEDURE — 93000 ELECTROCARDIOGRAM COMPLETE: CPT

## 2025-03-17 PROCEDURE — G2211 COMPLEX E/M VISIT ADD ON: CPT

## 2025-03-17 PROCEDURE — 99214 OFFICE O/P EST MOD 30 MIN: CPT

## 2025-03-17 NOTE — ASU PATIENT PROFILE, ADULT - FALL HARM RISK - RISK INTERVENTIONS

## 2025-03-18 ENCOUNTER — APPOINTMENT (OUTPATIENT)
Dept: GASTROENTEROLOGY | Facility: HOSPITAL | Age: 60
End: 2025-03-18

## 2025-03-18 ENCOUNTER — RESULT REVIEW (OUTPATIENT)
Age: 60
End: 2025-03-18

## 2025-03-18 ENCOUNTER — TRANSCRIPTION ENCOUNTER (OUTPATIENT)
Age: 60
End: 2025-03-18

## 2025-03-18 ENCOUNTER — OUTPATIENT (OUTPATIENT)
Dept: OUTPATIENT SERVICES | Facility: HOSPITAL | Age: 60
LOS: 1 days | End: 2025-03-18
Payer: MEDICARE

## 2025-03-18 VITALS
WEIGHT: 166.89 LBS | OXYGEN SATURATION: 96 % | HEART RATE: 59 BPM | HEIGHT: 60 IN | RESPIRATION RATE: 20 BRPM | DIASTOLIC BLOOD PRESSURE: 72 MMHG | TEMPERATURE: 98 F | SYSTOLIC BLOOD PRESSURE: 149 MMHG

## 2025-03-18 VITALS
OXYGEN SATURATION: 98 % | DIASTOLIC BLOOD PRESSURE: 85 MMHG | HEART RATE: 73 BPM | SYSTOLIC BLOOD PRESSURE: 120 MMHG | RESPIRATION RATE: 16 BRPM

## 2025-03-18 DIAGNOSIS — Z98.890 OTHER SPECIFIED POSTPROCEDURAL STATES: Chronic | ICD-10-CM

## 2025-03-18 DIAGNOSIS — Z98.89 OTHER SPECIFIED POSTPROCEDURAL STATES: Chronic | ICD-10-CM

## 2025-03-18 DIAGNOSIS — Z98.51 TUBAL LIGATION STATUS: Chronic | ICD-10-CM

## 2025-03-18 DIAGNOSIS — D64.9 ANEMIA, UNSPECIFIED: ICD-10-CM

## 2025-03-18 PROCEDURE — 88312 SPECIAL STAINS GROUP 1: CPT | Mod: 26

## 2025-03-18 PROCEDURE — 88305 TISSUE EXAM BY PATHOLOGIST: CPT | Mod: 26

## 2025-03-18 PROCEDURE — 88305 TISSUE EXAM BY PATHOLOGIST: CPT

## 2025-03-18 PROCEDURE — 43239 EGD BIOPSY SINGLE/MULTIPLE: CPT

## 2025-03-18 PROCEDURE — 88312 SPECIAL STAINS GROUP 1: CPT

## 2025-03-18 NOTE — CHART NOTE - NSCHARTNOTEFT_GEN_A_CORE
History of Present Illness       The patient is a 59-year-old  female with past medical history significant for systemic lupus erythematosus, fibromyalgia, depression, hypertension, hypercholesterolemia, hypertriglyceridemia, hypothyroidism and cerebral aneurysm who was referred to my office by Dr. Roe Sales for anemia.  The patient also admits to having dyspepsia, gastroesophageal reflux disease, diarrhea, change in bowel habits and change in caliber of stool. The patient admits to a family history of GI problems. The patient's brother and father had a history of bleeding peptic ulcer disease.  The patient states that she is feeling tired. The patient was told of anemia and thrombocytopenia. The patient is currently on iron supplementation. The patient denies any jaundice or pruritus. The patient denies any chronic lower back pain. The patient denies any abdominal pain. The patient complains of abdominal gas and bloating. The patient denies any nausea or vomiting. The patient complains of gastroesophageal reflux disease but denies any dysphagia. The gastroesophageal reflux disease is worse after meals and late at night and in the early morning. The gastroesophageal reflux disease is improved with proton pump inhibitors, H2 blockers and antacids. The patient denies any atypical chest pain, shortness of breath or palpitations. The patient denies any diaphoresis. The patient denies any constipation or diarrhea. The patient has 3 bowel movements a day. The patient denies a change in bowel habits. The patient denies a change in caliber of stool. The patient denies having mucus discharge with the bowel movements. The patient denies any bright red blood per rectum, melena or hematemesis. The patient denies any rectal pain or rectal pruritus. The patient denies any weight loss or anorexia. She denies any fevers or chills. The patient had a colonoscopy to the cecum performed at the [facility] [place] GI endoscopy suite on February 13, 2025. The colonoscopy to the cecum performed on February 13, 2025 revealed severe left sided diverticulosis, a rigid, thickened, hypertrophied sigmoid colon secondary to diverticular disease, a very long and tortuous colon and small internal hemorrhoids. Random biopsies were taken of the cecum to assess for microscopic colitis. There were no polyps, masses, AVMs or colitis noted. The colonic pathology performed on February 13, 2025 revealed fragments of colonic mucosa with focal hyperplastic change (cecal biopsy). The patient tolerated the procedure well. The patient had an upper endoscopy performed [place] [facility] [place] on February 20, 2020. The upper endoscopy revealed a small hiatal hernia and mild diffuse gastritis with retained food and debris in the stomach suggestive of gastroparesis. The gastroparesis may be the cause of the abdominal pain. The patient was previously found to have occult blood in the stool. The CAT scan of the abdomen and pelvis with IV contrast a new left lower back fat-containing spigelian hernia possibly the source of the patient's pain performed on August 1, 2024 revealed otherwise no acute abdominal pelvic abnormalities. Also noted was unchanged segment 4 liver cyst, diverticulosis without diverticulitis, fallopian tube clips lumbar fixation hardware with bilateral iliac screws. The CAT scan of the abdomen and pelvis with IV contrast performed on August 7, 2024 revealed no acute intra-abdominal findings. Also noted with degenerative changes of the bone, posterior spinal fusion and intravertebral disc spaces from L2-S1, postsurgical changes of the abdominal wall, atherosclerotic changes of the vessels, colonic diverticulosis without diverticulitis, right subcentimeter hypodense focus in the right kidney too small to characterize, a subcentimeter hypodense focus too small to characterize and unchanged from June 29, 2023. The stool GI PCR panel performed on January 28, 2025 was not detected. The fecal occult blood immunology test performed on January 28, 2025 was negative for occult blood in stool. The stool for C. difficile toxin/GDH performed on January 24, 2025 was negative. The fecal calprotectin level performed on January 24, 2025 was normal at 43 ug/g. The stool quantitative lactoferrin level performed on January 24, 2025 was normal at < 1.00. The fecal pancreatic elastase level performed on January 24, 2025 was low at 136. The blood work performed on March 5, 2025 revealed a low WBC count of 3.2 K/UL, mild anemia with a hemoglobin/hematocrit level of 11.1/35.3, respectively, a normal platelet count of 299,000, and elevated blood glucose of 141 mg/dL, a mean normal vitamin B12 level 1099 pg/mL, elevated liver enzymes with an AST/ALT of 40/38 U/L, respectively, a normal total bilirubin of <0.2 mg/dL, a normal alkaline phosphatase of 68 U/L, and elevated blood glucose of 141 mg/dL, a normal serum iron level of 64 mcg/dL, a normal TIBC/UIBC of 381/317 mcg/dL, respectively, a normal iron percent saturation of 17%, a normal ferritin level of 30.7 ng/mL, and elevated LDH of 276 U/L, a normal magnesium level 2.3 mg/dL, a normal phosphorus level of 4.3 mg/dL, a normal uric acid level of 3.1 mg/dL. The patient is scheduled for a colonoscopy to assess for colitis. The patient was taking the cholestyramine 1 packet twice a day on for the diarrhea. The blood work performed on April 16, 2024 revealed no evidence of anemia with a hemoglobin/hematocrit level of 11.8/38.2, respectively, a low WBC count of 3.72 K/UL, a normal platelet count of 348,000, a normal TSH of 1.85u IU/mL, a normal total cholesterol/triglyceride level of 159/58 mg/dL, respectively, normal liver enzymes with a total bilirubin of 0.3 mg/dL, a normal alkaline phosphatase/AST/ALT of 67/31/24 U/L, respectively, a normal uric acid level of 2.9 mg/dL, a normal C-reactive protein of <3 mg/L, and elevated ESR of 31 mm/h, and elevated hemoglobin A1c of 5.8% with an estimated average glucose of 120 mg/dL, a normal C3 complement of 127 mg/dL, a normal C4 complement of 26 mg/dL. The blood work performed on September 26, 2022 revealed a negative transglutaminase IgG antibody EIA of 1.6 U/ml, a negative transglutaminase IgA antibody EIA of <1.2 U/ml, a negative Gliadin Deamidated IgG antibody of < 5.0 units, a negative Gliadin Deamidated IgA antibody of 5.3 units, a normal serum IgA level of 279 mg/dl. The patient is s/p back surgery at [facility] with Dr. Armstrong December 27, 2021. The patient was hospitalized at [facility] for back pain. The patient tolerated the surgery well. The patient admits to having a prior upper endoscopy and colonoscopy performed by another gastroenterologist. The upper endoscopic findings were unknown to the patient. The colonoscopic findings revealed a poor prep colonoscopy. The patient admits to a family history of GI problems. The patient's brother and father had a history of bleeding peptic ulcer disease.  ?  (-) smoking, (-) ETOH, (-) IVDA  ?  Prior Hospitalization:  The patient was hospitalized at the [facility] on December 8, 2024 for headaches. The patient was hospitalized for 5 days for the symptoms. The blood work performed was unknown to the patient. The CAT scan of the head revealed a cerebral aneurysm. The patient was observed for 5 days with resolution of the symptoms and was discharged to followup in the office.  The patient was evaluated at [facility] emergency room on August 7, 2024 for abdominal pain. The patient has a history of lupus, hypertension, fibromyalgia, bipolar disorder, Crohn's disease, irritable bowel syndrome, who complains of 2-week history of abdominal pain. The patient had been also evaluated at [facility] 1 week prior to evaluation. According to the patient, a CAT scan revealed a small fat-containing spigelian hernia. The patient had blood work and imaging studies performed in the emergency room to assess the symptoms. The blood work performed on August 7, 2024 revealed a low WBC count of 3.78 K/UL, no evidence of anemia with a hemoglobin/hematocrit level of 11.6/36.2, respectively, a normal platelet count of 327,000 on an elevated potassium level of 6.0 [place] per liter, and elevated AST of 45U/L, a normal total bilirubin of 0.2 mg/dL, a normal alkaline phosphatase/ALT of 60/24 U/L, respectively, a normal lipase level 27 U/L. The urinalysis performed on August 7, 2024 was negative. The urine culture performed on August 7, 2024 revealed <10,000 CFU/mL of normal urogenital ben. The CAT scan of the abdomen and pelvis with IV contrast performed on August 7, 2024 revealed no acute intra-abdominal findings. Also noted with degenerative changes of the bones, posterior spinal fusion and intervertebral disc spaces from L2-S1, postsurgical changes of the abdomen, atherosclerotic changes of the vessels, colonic diverticulosis without diverticulitis, right subcentimeter hypodense focus in the kidney that is too small to characterize, subcentimeter hypodense focus too small to characterize and unchanged from June 29, 2023. The patient was treated with IV fluids, acetaminophen ondansetron with improvement of the symptoms. The patient symptoms improved and was discharged from the emergency room in stable condition with a diagnosis of abdominal pain and advised to follow-up in the office for further workup and treatment.  ?  The patient was evaluated at [facility] emergency room for right shoulder pain radiating to the neck and right lower back pain x 2 days. The patient left the emergency room without being seen by a physician or midlevel provider.  The patient was evaluated at [facility] emergency room on April 8, 2024 for chest pain. The patient has a history of lupus, fibromyalgia, anemia, remote history for MI in 2013 who presents to the emergency room with chest pain x 1 day duration. The patient admitted to having diaphoresis. The patient had blood work and imaging studies performed in the emergency room to assess the symptoms. The blood work performed on April 8, 2024 revealed a low WBC count of 3.30 K/UL, no evidence of anemia with a hemoglobin/hematocrit level of 11.7/36.9, respectively, a normal platelet count of 299,000, a normal PT/INR/PTT of 11.3/0.99/30.9, respectively, and elevated BUN of 20 mg/dL, a low anion gap of 4 mmol/L, normal liver enzymes with a total bilirubin of 0.2 mg/dL, a normal alkaline phosphatase/AST/ALT of 55/22/28 U/L, respectively, a normal troponin level of 3.7 ng/L, a normal lactate level of 1.3 mmol/L and a normal lipase level of 22 U/L. The chest x-ray performed on April 8, 2024 revealed no acute findings. The EKG performed on April 8, 2024 revealed normal sinus rhythm with possible left atrial enlargement, right bundle branch block at a rate of 61 bpm. The patient was treated with sublingual nitroglycerin. The patient's symptoms improved and was discharged from the emergency room with a diagnosis of chest pain and advised to follow-up in the office for further workup and treatment.  The patient was evaluated in the [facility] emergency room for the abdominal pain. The patient had a CAT scan and blood work performed in the emergency room to assess the symptoms. There is a question of a hernia with large bowel that may be the cause of the abdominal pain. The patient was advised to go to the emergency room if the symptoms persist.  The patient was hospitalized at [facility] on June 29, 2023 for abdominal pain, constipation, nausea/vomiting and rectal bleeding. The patient had blood work and imaging studies performed in the emergency room to assess the symptoms. The blood work performed on June 29, 2023 revealed mild anemia with a hemoglobin of 11.1 g/dL, a normal hematocrit level of 34.7%, a normal PT/INR/PTT of 12.0/1.01/32.4, respectively, and elevated BUN of 27 mg/dL, a normal creatinine level of 0.86 mg/dL, a low blood glucose of 47 mg/dL, and elevated total protein level of 8.7 g/dL, normal liver enzymes with a total bilirubin of 0.3 mg/dL, a normal alkaline phosphatase/AST/ALT of 114/36/53 U/L, respectively, a normal lactate level of 1.6 mmol/L, a normal lipase level of 130 U/L. The urinalysis performed on June 29, 2023 revealed mild proteinuria of 15 mg/dL, trace bacteria, 0-2 hyaline casts, 250 mg/dL of glucose in the urine. The CAT scan of the abdomen and pelvis with IV contrast performed on June 29, 2023 revealed no acute CT findings. Also noted were spinal rods and pedicle screws transfix at L5-S1 with disc spacers present at these levels, postsurgical changes in the abdominal wall, atherosclerotic changes in the vessels, diverticulosis coli and coronary artery calcifications. The patient's symptoms improved. The diet was advanced. The patient denied any further episodes of GI bleeding or constipation. The last blood work performed on June 30, 2023 revealed a low WBC count of 3.76 K/UL, mild anemia with a hemoglobin of 11.2 g/dL, a normal hematocrit level of 35.2%, normal liver enzymes with a total bilirubin of 0.4 mg/dL, a normal alkaline phosphatase/AST/ALT of 101/27/40 U/L, respectively, and elevated TSH of 9.75u IU/mL. The patient was discharged in stable condition and advised to follow-up in the office for further work-up and treatment.  The patient was admitted to [facility] [place] on February 16, 2020 with 1 week history of abdominal pain. The blood work performed on February 20, 2020 revealed mild anemia with Hgb/Hct level of 10.7/33.0, respectively and an elevated chloride of 109 mmol/L. The abdominal ultrasound performed on February 19, 2020 revealed no gallstones, gallbladder wall thickening or pericholecystic fluid. The CAT scan of the chest, abdomen and pelvis with IV contrast performed on February 17, 2020 revealed no evidence of an aortic dissection.  The patient was previously [place] [facility] [place] 10, 2020 for abdominal pain. The patient was treated with medications and discharged. The blood work performed on January 11, 2020 revealed an elevated chloride level of 110 mmol/L, anemia with a hemoglobin/hematocrit level 10.5/34.3, respectively and a normalization of the WBC count of 4.32. The patient was observed with resolution of the symptoms and was discharged to followup in the office.  ?  Physical Exam:  General Appearance: Overweight, no acute distress  ENT: nose clear, ears unremarkable  Eyes: No enteric sclera, conjunctiva clear.  Neck: Supple, without masses  Respiratory: Breath sounds equal and bilateral, no wheezing no rales or rhonchi  Cardiovascular: S1-S2 audible, no murmur, no rubs or gallops  GI: (+) BS, soft, tender in the right lower quadrant, no rebound, no guarding, no masses  Liver: liver edge palpated  Musculo-skeletal: Good motor strength, poor range of motion, normal appearing extremities  Skin: Normal appearing skin, no jaundice, no rashes or nodules  Neurological: without focal motor or sensory deficits Patient is moving all extremities spontaneously and to command with normal muscle strength alert and oriented X3  Psychiatric: Good affect, not depressed, not anxious      Gastroenterology Summary    Upper Endoscopy: The upper endoscopy performed at the [facility] [place] on February 20, 2020 revealed a small hiatal hernia and mild diffuse gastritis with retained food and debris in the stomach suggestive of gastroparesis.    Colonoscopy: The colonoscopy to the cecum performed at the [facility] [place] on February 13, 2025 revealed severe left sided diverticulosis, a rigid, thickened, hypertrophied sigmoid colon secondary to diverticular disease, a very long and tortuous colon and small internal hemorrhoids. Random biopsies were taken of the cecum to assess for microscopic colitis. There were no polyps, masses, AVMs or colitis noted. The colonic pathology performed on February 13, 2025 revealed fragments of colonic mucosa with focal hyperplastic change (cecal biopsy).  ?  The colonoscopy to the terminal ileum performed at the [facility] [place] on February 13, 2020.revealed moderate left sided diverticulosis. There were no polyps, masses, AVMs or colitis noted. The pathology revealed unremarkable small intestinal mucosa with preserved villous pattern and lymphocytic aggregates (terminal ileum), patchy mild colitis without evidence of architectural changes or evidence of chronicity (cecum) and benign colonic mucosa with focal hyperplastic change (rectum).      Radiology Summary    CT: The CAT scan of the abdomen and pelvis with IV contrast performed on August 7, 2024 revealed no acute intra-abdominal findings. Also noted with degenerative changes of the bone, posterior spinal fusion and intravertebral disc spaces from L2-S1, postsurgical changes of the abdominal wall, atherosclerotic changes of the vessels, colonic diverticulosis without diverticulitis, right subcentimeter hypodense focus in the right kidney too small to characterize, a subcentimeter hypodense focus too small to characterize and unchanged from June 29, 2023.  ?  The CAT scan of the abdomen and pelvis with IV contrast a new left lower back fat-containing spigelian hernia possibly the source of the patient's pain performed on August 1, 2024 revealed otherwise no acute abdominal pelvic abnormalities. Also noted was unchanged segment 4 liver cyst, diverticulosis without diverticulitis, fallopian tube clips lumbar fixation hardware with bilateral iliac screws.  ?  ?  The CAT scan of the abdomen and pelvis with IV contrast performed on June 29, 2023 revealed no acute CT findings. Also noted were spinal rods and pedicle screws transfix at L5-S1 with disc spacers present at these levels, postsurgical changes in the abdominal wall, atherosclerotic changes in the vessels, diverticulosis coli and coronary artery calcifications.  ?  The CAT scan of the chest, abdomen and pelvis with IV contrast performed on February 17, 2020 revealed no evidence of an aortic dissection.  The CAT scan of the abdomen and pelvis with IV contrast performed on December 16, 2019 revealed heterogeneous attenuation in the external iliac and common femoral veins is likely related to mixing artifact. Also noted was constipation, sigmoid diverticulosis without diverticulitis, trace bilateral hydronephrosis likely related to distended urinary bladder. The doppler ultrasound of the lower extremities performed on December 27, 2019 revealed no evidence of deep venous thrombosis in either lower extremity.    Abdominal Sono: The abdominal ultrasound performed on February 19, 2020 revealed no gallstones, gallbladder wall thickening or pericholecystic fluid.  ?  The abdominal ultrasound performed on October 18, 2019 revealed no gallstones or pericholecystic fluid. The contracted state of the gallbladder limits assessment for gallbladder wall thickening. There is no evidence for intrahepatic or extrahepatic biliary duct dilatation. The pelvic ultrasound performed on October 18, 2019 revealed no evidence for an ovarian lesion.  ?  Active Problems  Abdominal discomfort (789.00) (R10.9)  Abdominal pain, acute, right lower quadrant (789.03,338.19) (R10.31)  Age-related osteoporosis without current pathological fracture (733.01) (M81.0)  Alternating constipation and diarrhea (787.99) (R19.8)  Anemia (285.9) (D64.9)  Aneurysm of artery (442.9) (I72.9)  Arthralgia (719.40) (M25.50)  Arthropathy of left knee (716.96) (M17.12)  Arthropathy of right hip (716.85) (M16.11)  Asthma, unspecified asthma severity, unspecified whether complicated, unspecified  whether persistent (493.90) (J45.909)  Biceps tendinitis of left upper extremity (726.12) (M75.22)  Bilateral leg edema (782.3) (R60.0)  Cervical spinal stenosis (723.0) (M48.02)  Constipation, unspecified constipation type (564.00) (K59.00)  COVID-19 virus infection (079.89) (U07.1)  CVA (cerebral vascular accident) (434.91) (I63.9)  Depression, major, in partial remission (296.25) (F32.4)  Deviated septum (470) (J34.2)  Diarrhea, unspecified type (787.91) (R19.7)  Dyspepsia (536.8) (R10.13)  Dysuria (788.1) (R30.0)  Elevated hemoglobin A1c (790.29) (R73.09)  False positive cristian (795.79) (R76.8)  Fibromyalgia (729.1) (M79.7)  Functional urinary incontinence (788.91) (R39.81)  GERD (gastroesophageal reflux disease) (530.81) (K21.9)  Hiatal hernia (553.3) (K44.9)  Hot flashes (782.62) (R23.2)  HTN (hypertension), benign (401.1) (I10)  Hypercholesterolemia with hypertriglyceridemia (272.2) (E78.2)  Hypopigmentation (709.00) (L81.9)  Hypothyroidism (244.9) (E03.9)  Insomnia (780.52) (G47.00)  Internal hemorrhoids (455.0) (K64.8)  Irritable bowel syndrome with constipation (564.1) (K58.1)  Irritable bowel syndrome with diarrhea (564.1) (K58.0)  Lateral epicondylitis of left elbow (726.32) (M77.12)  Left knee pain, unspecified chronicity (719.46) (M25.562)  Localized swelling of left forearm (782.2) (R22.32)  Lumbar radiculopathy (724.4) (M54.16)  Migraine (346.90) (G43.909)  Multiple thyroid nodules (241.1) (E04.2)  Neuropathy (355.9) (G62.9)  On methotrexate therapy (V58.69) (Z79.631)  Pain of both elbows (719.42) (M25.521,M25.522)  Personal history of systemic lupus erythematosus (SLE) (710.0) (M32.9)  Pes anserine bursitis (726.61) (M70.50)  Postmenopausal symptoms (627.9) (N95.9)  Primary localized osteoarthritis of both knees (715.16) (M17.0)  Primary osteoarthritis of left knee (715.16) (M17.12)  Primary osteoarthritis of right knee (715.16) (M17.11)  SLE (systemic lupus erythematosus) (710.0) (M32.9)  Spigelian hernia (553.29) (K43.9)  Subacromial bursitis of left shoulder joint (726.19) (M75.52)  Subungual hemorrhage of fingernail (923.3) (S60.10XA)  Vertigo (780.4) (R42)           ?  Past Medical History  Unreviewed Unverified: History of osteoporosis (V13.59) (Z87.39)  History of Arthropathy of left knee (716.96) (M17.12)  Bilateral conjunctivitis (372.30) (H10.9)  History of anemia (V12.3) (Z86.2)  History of blood transfusion (V15.89) (Z92.89)  History of breast lump (V13.89) (Z87.898)  History of cardiac disorder (V12.50) (Z86.79)  History of vertigo (V12.49) (Z87.898)  History of vertigo (V12.49) (Z87.898)  History of Left knee pain, unspecified chronicity (719.46) (M25.562)  History of Left knee pain, unspecified chronicity (719.46) (M25.562)           ?  Current Meds  AeroChamber Mini Chamber Device; USE AS DIRECTED  Albuterol Sulfate  (90 Base) MCG/ACT Inhalation Aerosol Solution; inhale 2 puffs  by mouth every 6 hours  Atorvastatin Calcium 20 MG Oral Tablet; TAKE 1 TABLET BY MOUTH EVERYDAY AT  BEDTIME  Azelastine HCl - 137 MCG/SPRAY Nasal Solution  Benzonatate 100 MG Oral Capsule; TAKE 1 CAPSULE 3 TIMES DAILY  Cholestyramine 4 GM Oral Packet; TAKE 4 GM Daily  Ciclopirox 8 % External Solution  clonazePAM 0.5 MG Oral Tablet; TAKE 1 TABLET TWICE DAILY AS NEEDED  Diclofenac Sodium 50 MG Oral Tablet Delayed Release; TAKE 1 TABLET BY MOUTH  TWICE A DAY WITH FOOD AS NEEDED  Diclofenac Sodium 50 MG Oral Tablet Delayed Release; Take one tablet twice daily  Dicyclomine HCl - 10 MG Oral Capsule; take 1 capsule by mouth three times a day  Ecotrin Low Strength 81 MG Oral Tablet Delayed Release; TAKE 1 TABLET DAILY  Effexor  MG Oral Capsule Extended Release 24 Hour; TAKE 1 CAPSULE ONCE  DAILY WITH FOOD  Euflexxa 20 MG/2ML Intra-articular Solution Prefilled Syringe; INJECT 2 MG Weekly x3  weeks  Famotidine Maximum Strength 20 MG Oral Tablet; TAKE 2 TABLET Twice daily  Folic Acid 1 MG Oral Tablet; TAKE 3 TABLET DAILY AS DIRECTED  Gabapentin 300 MG Oral Capsule  Hydroxychloroquine Sulfate 200 MG Oral Tablet; TAKE 1 TABLET BY MOUTH EVERY DAY  WITH FOOD  Hydroxychloroquine Sulfate 200 MG Oral Tablet; TAKE 1 TABLET BY MOUTH TWICE A  DAY WITH FOOD  Ibuprofen 600 MG Oral Tablet; TAKE 1 TABLET BY MOUTH TWICE DAILY WITH MEALS AS  NEEDED  Levothyroxine Sodium 100 MCG Oral Tablet; TAKE 1 TABLET BY MOUTH EVERY DAY  Linzess 145 MCG Oral Capsule; TAKE 1 CAPSULE BY MOUTH EVERY DAY  Meclizine HCl - 12.5 MG Oral Tablet; take 1 tablet by mouth twice a day if needed for  dizziness  Meloxicam 15 MG Oral Tablet; TAKE 1 TABLET EVERY DAY AS NEEDED  Methocarbamol 750 MG Oral Tablet; take 1 tablet by mouth twice a day if needed for pain  methylPREDNISolone 4 MG Oral Tablet Therapy Pack; Take as directed  Metoprolol Tartrate 25 MG Oral Tablet; TAKE 1 TABLET BY MOUTH TWICE A DAY  Nortriptyline HCl - 50 MG Oral Capsule; TAKE 1 CAPSULE AT BEDTIME  Pantoprazole Sodium 40 MG Oral Tablet Delayed Release; TAKE 1 TABLET BY MOUTH  EVERY DAY  PEG-3350/Electrolytes 236 GM Oral Solution Reconstituted; MIX AS DIRECTED AND  DRINK OVER 4 HOURS, START AT 4PM  Prazosin HCl - 2 MG Oral Capsule  predniSONE 50 MG Oral Tablet  Prolia 60 MG/ML Subcutaneous Solution Prefilled Syringe; 60mg/1ml to be injected every  six months  Simethicone 125 MG Oral Tablet Chewable; CHEW AND SWALLOW 1 TABLET 4 TIMES  DAILY, AFTER MEALS AND AT BEDTIME  Simethicone 180 MG Oral Capsule; TAKE 1 CAPSULE 4 TIMES DAILY AS NEEDED  Synvisc One 48 MG/6ML Intra-articular Solution Prefilled Syringe; Inject 2 MG  weekly x  weeks  tiZANidine HCl - 4 MG Oral Tablet; Take 1 tablet at bedtime, as directed  traZODone HCl - 100 MG Oral Tablet; TAKE 2 TABLET Daily  Tretinoin 0.05 % External Cream  Tylenol 325 MG Oral Capsule; Administer 2 tablets 30 minutes before infusion  Ubrelvy 100 MG Oral Tablet  Veozah 45 MG Oral Tablet; Take 1 tablet daily at the same time  Zoledronic Acid 5 MG/100ML Intravenous Solution; INFUSE 5MG INTRAVENOUSLY OVER  15 MINUTES AS DIRECTED           Allergies  Estrogens           ?  Vitals  Vital Signs  ?  Recorded: 07Mar2025 07:20AM  Systolic  129, LUE, Sitting  Diastolic  83, LUE, Sitting  Height  5 ft  Weight  166 lb  BMI Calculated  32.42 kg/m2  BSA Calculated  1.72 m2  Temperature  97.7 F  Heart Rate  80  O2 Saturation  97 %, Room Air  FiO2 Flow Rate  0 L/min, Room Air           ?  Assessment  Anemia (285.9) (D64.9)  GERD (gastroesophageal reflux disease) (530.81) (K21.9)  Hiatal hernia (553.3) (K44.9)  Dyspepsia (536.8) (R10.13)  Irritable bowel syndrome with diarrhea (564.1) (K58.0)  SLE (systemic lupus erythematosus) (710.0) (M32.9)  Diverticulosis (562.10) (K57.90)  Internal hemorrhoids (455.0) (K64.8)    Dyspepsia: The patient complains of dyspeptic symptoms. The patient was advised to continue to abide by an anti-gas (low FOD-MAP) diet. The patient was previously given a pamphlet for anti-gas (low FOD-MAP). The patient and I reviewed the anti-gas (low FOD-MAP) diet at length again. The patient is to continue on a trial of Simethicone one tablet 4 times a day p.r.n. abdominal pain and gas.  GERD: The patient was advised to avoid late-night meals and dietary indiscretions. The patient was advised to avoid fried and fatty foods. The patient was advised to abide by an anti-GERD diet. The patient was given a pamphlet for anti-GERD. The patient and I reviewed the anti-GERD diet at length. I recommend a trial of Pantoprazole 40 mg once a day x 3 months for the symptoms.  Anemia: The patient was found to have anemia on recent blood work. The patient denies any bright red blood per rectum, melena or hematemesis. I recommend an upper endoscopy to assess the anemia. The patient was told of the risks and benefits of the procedure. The patient was told of the risks of perforation, emergency surgery, bleeding, infections and missed lesions. The patient agreed and will schedule for the procedure. The patient is to be n.p.o. after midnight. The patient is to return for the procedure.  Upper Endoscopy: I recommend an upper endoscopy to assess for peptic ulcer disease versus esophagitis. The patient was told of the risks and benefits of the procedure. The patient was told of the risks of perforation, emergency surgery, bleeding, infections and missed lesions. The patient is told not to drive, drink alcohol, use recreational drugs, exercise, or work the day of the procedure. The patient was told of the need for an escort to accompany the patient home after the procedure. The patient is aware that the procedure may be cancelled if they fail to follow the directions. The patient agreed and will schedule for the procedure. The patient can take the antihypertensive medication with a sip of water one hour prior to the procedure. The patient is to be n.p.o. after midnight. The patient is to return for the procedure.  Diverticulosis: I recommend a high-fiber diet and avoid seeds. The patient is to consider a trial of Metamucil once a day for fiber supplementation. The patient is to also consider a trial of a probiotic such as Align once a day. The patient and I discussed the potential risk of diverticulitis and diverticular bleeding secondary to diverticular disease. The patient is aware of the importance of follow-up if these complications arise.  Internal Hemorrhoids: The patient is to consider starting a trial of Anusol H. C. suppositories one per rectum nightly and Anusol HC2.5% cream apply to affected area twice a day p.r.n. hemorrhoidal bleeding or pain. I also recommend a trial of Calmoseptine cream apply to affected area twice a day for hemorrhoidal discomfort. I recommend Tucks pads for the hemorrhoids. I recommend Sitz baths twice a day for the hemorrhoids. I recommend avoid wearing tight underwear and use boxers. I recommend avoid touching the perineal area. The patient agreed to followup.  I recommend a repeat colonoscopy in 10 years to reassess for colonic polyps unless symptomatic. The patient agreed and will follow up for the procedure.  Hiatal Hernia: The patient has a history of a hiatal hernia noted on prior upper endoscopy. The patient was advised to avoid late-night meals and dietary indiscretions. The patient was advised to avoid fried and fatty foods. The patient was advised to abide by an anti-GERD diet. The patient was given a pamphlet for anti-GERD. The patient and I reviewed the anti-GERD diet at length. I recommend a trial of Pantoprazole 40 mg once a day for 3 months for the symptoms.  Gastritis: The patient has a history of gastritis noted on prior upper endoscopy. The patient is to avoid nonsteroidal anti-inflammatory drugs and aspirin. I recommend a trial of pantoprazole 40 mg once a day for 3 months for the symptoms.  Gastroparesis: The patient has symptoms suggestive of gastroparesis. The patient may require a trial of a promotility agent for the symptoms pending the upper endoscopy results. I recommend a trial of Reglan (metoclopramide) 5 mg 4 times a day for 3 months. The patient and I had a long discussion regarding the risks of potential side effects of Reglan (metoclopramide). The patient was told of the risk of headaches, dizziness, diarrhea and permanent tremors. The patient was told to stop the medication immediately and call the office if any of these symptoms occur. The patient agrees.  Irritable Bowel Syndrome with diarrhea: The patient has symptoms suggestive of irritable bowel syndrome with diarrhea. The patient was advised to follow a low FOD-MAP diet for the irritable bowel syndrome. I recommend a trial of Dicyclomine 10 mg TID PRN for the abdominal pain and diarrhea. If the symptoms persist, the patient may require a trial of hyoscyamine 0.125 mg sublingual 3 times a day p.r.n. abdominal pain and diarrhea. If the symptoms persist, the patient may require a trial of Xifaxin 550 mg TID x 14 days for possible post infectious irritable bowel syndrome.  Follow-up: The patient is to follow-up in the office in 1 month to reassess the symptoms. The patient was told to call the office if any further problems.            ?  Plan  Anemia  Renew: Pantoprazole Sodium 40 MG Oral Tablet Delayed Release; TAKE 1 TABLET BY  MOUTH EVERY DAY  Upper GI Endoscopy; Status:Active; Requested for:07Mar2025;  Internal hemorrhoids  Start: Preparation H Soothing Relief 1 % External Cream; USE AS DIRECTED  Irritable bowel syndrome with diarrhea  Renew: Simethicone 180 MG Oral Capsule; TAKE 1 CAPSULE 4 TIMES DAILY AS  NEEDED

## 2025-03-18 NOTE — ASU DISCHARGE PLAN (ADULT/PEDIATRIC) - FINANCIAL ASSISTANCE
[facility] provides services at a reduced cost to those who are determined to be eligible through [facility]’s financial assistance program. Information regarding [facility]’s financial assistance program can be found by going to [email or web address] or by calling [phone].

## 2025-03-20 LAB — SURGICAL PATHOLOGY STUDY: SIGNIFICANT CHANGE UP

## 2025-03-24 ENCOUNTER — APPOINTMENT (OUTPATIENT)
Dept: GASTROENTEROLOGY | Facility: CLINIC | Age: 60
End: 2025-03-24
Payer: MEDICARE

## 2025-03-24 VITALS
DIASTOLIC BLOOD PRESSURE: 81 MMHG | SYSTOLIC BLOOD PRESSURE: 142 MMHG | HEIGHT: 60 IN | OXYGEN SATURATION: 98 % | BODY MASS INDEX: 32.59 KG/M2 | HEART RATE: 69 BPM | WEIGHT: 166 LBS | TEMPERATURE: 97.5 F

## 2025-03-24 DIAGNOSIS — D64.9 ANEMIA, UNSPECIFIED: ICD-10-CM

## 2025-03-24 DIAGNOSIS — K57.90 DIVERTICULOSIS OF INTESTINE, PART UNSPECIFIED, W/OUT PERFORATION OR ABSCESS W/OUT BLEEDING: ICD-10-CM

## 2025-03-24 DIAGNOSIS — R10.11 RIGHT UPPER QUADRANT PAIN: ICD-10-CM

## 2025-03-24 DIAGNOSIS — R10.13 EPIGASTRIC PAIN: ICD-10-CM

## 2025-03-24 DIAGNOSIS — R30.0 DYSURIA: ICD-10-CM

## 2025-03-24 DIAGNOSIS — K29.30 CHRONIC SUPERFICIAL GASTRITIS W/OUT BLEEDING: ICD-10-CM

## 2025-03-24 DIAGNOSIS — K58.9 IRRITABLE BOWEL SYNDROME, UNSPECIFIED: ICD-10-CM

## 2025-03-24 DIAGNOSIS — R10.12 RIGHT UPPER QUADRANT PAIN: ICD-10-CM

## 2025-03-24 PROCEDURE — 99214 OFFICE O/P EST MOD 30 MIN: CPT

## 2025-03-26 ENCOUNTER — NON-APPOINTMENT (OUTPATIENT)
Age: 60
End: 2025-03-26

## 2025-03-26 LAB
APPEARANCE: CLEAR
BILIRUBIN URINE: NEGATIVE
BLOOD URINE: NEGATIVE
COLOR: YELLOW
GLUCOSE QUALITATIVE U: NEGATIVE MG/DL
HEMOCCULT STL QL IA: NEGATIVE
KETONES URINE: NEGATIVE MG/DL
LEUKOCYTE ESTERASE URINE: NEGATIVE
NITRITE URINE: NEGATIVE
PH URINE: 5.5
PROTEIN URINE: NORMAL MG/DL
SPECIFIC GRAVITY URINE: >1.03
UROBILINOGEN URINE: 0.2 MG/DL

## 2025-03-26 NOTE — DISCHARGE NOTE NURSING/CASE MANAGEMENT/SOCIAL WORK - NSDCVIVACCINE_GEN_ALL_CORE_FT
Influenza , 2016/9/10 13:02 , Lisa Merida (RN)  Influenza , 2018/10/2 16:27 , Waylon Frazier (RN) yes

## 2025-03-31 ENCOUNTER — APPOINTMENT (OUTPATIENT)
Dept: INTERNAL MEDICINE | Facility: CLINIC | Age: 60
End: 2025-03-31
Payer: MEDICARE

## 2025-03-31 VITALS
BODY MASS INDEX: 33.38 KG/M2 | RESPIRATION RATE: 16 BRPM | DIASTOLIC BLOOD PRESSURE: 78 MMHG | HEIGHT: 60 IN | HEART RATE: 60 BPM | SYSTOLIC BLOOD PRESSURE: 129 MMHG | WEIGHT: 170 LBS | TEMPERATURE: 97.3 F | OXYGEN SATURATION: 99 %

## 2025-03-31 DIAGNOSIS — Z00.00 ENCOUNTER FOR GENERAL ADULT MEDICAL EXAMINATION W/OUT ABNORMAL FINDINGS: ICD-10-CM

## 2025-03-31 DIAGNOSIS — B35.1 TINEA UNGUIUM: ICD-10-CM

## 2025-03-31 PROCEDURE — 99396 PREV VISIT EST AGE 40-64: CPT

## 2025-03-31 PROCEDURE — 36415 COLL VENOUS BLD VENIPUNCTURE: CPT

## 2025-04-02 ENCOUNTER — APPOINTMENT (OUTPATIENT)
Dept: PODIATRY | Facility: CLINIC | Age: 60
End: 2025-04-02
Payer: MEDICARE

## 2025-04-02 ENCOUNTER — RX RENEWAL (OUTPATIENT)
Age: 60
End: 2025-04-02

## 2025-04-02 PROCEDURE — 11755 BIOPSY NAIL UNIT: CPT | Mod: TA

## 2025-04-02 PROCEDURE — 99203 OFFICE O/P NEW LOW 30 MIN: CPT | Mod: 25

## 2025-04-03 ENCOUNTER — APPOINTMENT (OUTPATIENT)
Dept: MRI IMAGING | Facility: CLINIC | Age: 60
End: 2025-04-03
Payer: MEDICARE

## 2025-04-03 LAB
25(OH)D3 SERPL-MCNC: 50.3 NG/ML
ALBUMIN SERPL ELPH-MCNC: 4.8 G/DL
ALP BLD-CCNC: 65 U/L
ALT SERPL-CCNC: 26 U/L
ANION GAP SERPL CALC-SCNC: 12 MMOL/L
APPEARANCE: CLEAR
AST SERPL-CCNC: 34 U/L
BACTERIA: ABNORMAL /HPF
BASOPHILS # BLD AUTO: 0.05 K/UL
BASOPHILS NFR BLD AUTO: 1.5 %
BILIRUB SERPL-MCNC: 0.3 MG/DL
BILIRUBIN URINE: NEGATIVE
BLOOD URINE: NEGATIVE
BUN SERPL-MCNC: 17 MG/DL
CALCIUM SERPL-MCNC: 9.1 MG/DL
CAST: 0 /LPF
CHLORIDE SERPL-SCNC: 103 MMOL/L
CHOLEST SERPL-MCNC: 182 MG/DL
CO2 SERPL-SCNC: 27 MMOL/L
COLOR: YELLOW
CREAT SERPL-MCNC: 0.78 MG/DL
CREAT SPEC-SCNC: 198 MG/DL
CRP SERPL-MCNC: <3 MG/L
EGFRCR SERPLBLD CKD-EPI 2021: 86 ML/MIN/1.73M2
EOSINOPHIL # BLD AUTO: 0.12 K/UL
EOSINOPHIL NFR BLD AUTO: 3.6 %
EPITHELIAL CELLS: 5 /HPF
ERYTHROCYTE [SEDIMENTATION RATE] IN BLOOD BY WESTERGREN METHOD: 34 MM/HR
ESTIMATED AVERAGE GLUCOSE: 114 MG/DL
GGT SERPL-CCNC: 40 U/L
GLUCOSE QUALITATIVE U: NEGATIVE MG/DL
GLUCOSE SERPL-MCNC: 93 MG/DL
HBA1C MFR BLD HPLC: 5.6 %
HCT VFR BLD CALC: 37 %
HCV AB SER QL: NONREACTIVE
HCV S/CO RATIO: 0.18 S/CO
HDLC SERPL-MCNC: 66 MG/DL
HGB BLD-MCNC: 11.3 G/DL
IMM GRANULOCYTES NFR BLD AUTO: 0 %
KETONES URINE: NEGATIVE MG/DL
LDLC SERPL-MCNC: 97 MG/DL
LEUKOCYTE ESTERASE URINE: NEGATIVE
LYMPHOCYTES # BLD AUTO: 0.96 K/UL
LYMPHOCYTES NFR BLD AUTO: 28.6 %
MAN DIFF?: NORMAL
MCHC RBC-ENTMCNC: 30.5 G/DL
MCHC RBC-ENTMCNC: 31.3 PG
MCV RBC AUTO: 102.5 FL
MICROALBUMIN 24H UR DL<=1MG/L-MCNC: 2.4 MG/DL
MICROALBUMIN/CREAT 24H UR-RTO: 12 MG/G
MICROSCOPIC-UA: NORMAL
MONOCYTES # BLD AUTO: 0.33 K/UL
MONOCYTES NFR BLD AUTO: 9.8 %
NEUTROPHILS # BLD AUTO: 1.9 K/UL
NEUTROPHILS NFR BLD AUTO: 56.5 %
NITRITE URINE: NEGATIVE
NONHDLC SERPL-MCNC: 115 MG/DL
PH URINE: 5.5
PLATELET # BLD AUTO: 343 K/UL
POTASSIUM SERPL-SCNC: 4.4 MMOL/L
PROT SERPL-MCNC: 7.1 G/DL
PROTEIN URINE: 30 MG/DL
RBC # BLD: 3.61 M/UL
RBC # FLD: 13.6 %
RED BLOOD CELLS URINE: NORMAL /HPF
REVIEW: NORMAL
SODIUM SERPL-SCNC: 141 MMOL/L
SPECIFIC GRAVITY URINE: 1.02
T3 SERPL-MCNC: 60 NG/DL
T4 FREE SERPL-MCNC: 1.8 NG/DL
TRIGL SERPL-MCNC: 103 MG/DL
TSH SERPL-ACNC: 2.1 UIU/ML
URATE SERPL-MCNC: 2.6 MG/DL
UROBILINOGEN URINE: 0.2 MG/DL
WBC # FLD AUTO: 3.36 K/UL
WHITE BLOOD CELLS URINE: 1 /HPF

## 2025-04-03 PROCEDURE — 73721 MRI JNT OF LWR EXTRE W/O DYE: CPT | Mod: LT

## 2025-04-05 ENCOUNTER — APPOINTMENT (OUTPATIENT)
Dept: INTERNAL MEDICINE | Facility: CLINIC | Age: 60
End: 2025-04-05
Payer: MEDICARE

## 2025-04-05 VITALS
SYSTOLIC BLOOD PRESSURE: 119 MMHG | DIASTOLIC BLOOD PRESSURE: 81 MMHG | HEART RATE: 68 BPM | WEIGHT: 170 LBS | HEIGHT: 60 IN | OXYGEN SATURATION: 99 % | TEMPERATURE: 97.6 F | RESPIRATION RATE: 16 BRPM | BODY MASS INDEX: 33.38 KG/M2

## 2025-04-05 DIAGNOSIS — E03.9 HYPOTHYROIDISM, UNSPECIFIED: ICD-10-CM

## 2025-04-05 DIAGNOSIS — J45.909 UNSPECIFIED ASTHMA, UNCOMPLICATED: ICD-10-CM

## 2025-04-05 DIAGNOSIS — I10 ESSENTIAL (PRIMARY) HYPERTENSION: ICD-10-CM
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
## 2025-04-05 PROCEDURE — 99213 OFFICE O/P EST LOW 20 MIN: CPT

## 2025-04-05 RX ORDER — AMOXICILLIN AND CLAVULANATE POTASSIUM 500; 125 MG/1; MG/1
500-125 TABLET, FILM COATED ORAL TWICE DAILY
Qty: 14 | Refills: 5 | Status: ACTIVE | COMMUNITY
Start: 2025-04-05 | End: 1900-01-01

## 2025-04-08 ENCOUNTER — APPOINTMENT (OUTPATIENT)
Dept: RHEUMATOLOGY | Facility: CLINIC | Age: 60
End: 2025-04-08
Payer: MEDICARE

## 2025-04-08 PROCEDURE — 99213 OFFICE O/P EST LOW 20 MIN: CPT | Mod: 93

## 2025-04-09 ENCOUNTER — APPOINTMENT (OUTPATIENT)
Dept: ORTHOPEDIC SURGERY | Facility: CLINIC | Age: 60
End: 2025-04-09
Payer: MEDICARE

## 2025-04-09 ENCOUNTER — RX RENEWAL (OUTPATIENT)
Age: 60
End: 2025-04-09

## 2025-04-09 VITALS — WEIGHT: 170 LBS | BODY MASS INDEX: 33.38 KG/M2 | HEIGHT: 60 IN

## 2025-04-09 DIAGNOSIS — M25.561 PAIN IN RIGHT KNEE: ICD-10-CM

## 2025-04-09 DIAGNOSIS — M25.562 PAIN IN RIGHT KNEE: ICD-10-CM

## 2025-04-09 DIAGNOSIS — G89.29 PAIN IN RIGHT KNEE: ICD-10-CM

## 2025-04-09 LAB — CALCOFLUOR WHITE SPEC: NORMAL

## 2025-04-09 PROCEDURE — 99213 OFFICE O/P EST LOW 20 MIN: CPT

## 2025-04-10 PROBLEM — M25.561 CHRONIC PAIN OF BOTH KNEES: Status: ACTIVE | Noted: 2025-04-10

## 2025-04-11 ENCOUNTER — APPOINTMENT (OUTPATIENT)
Dept: PODIATRY | Facility: CLINIC | Age: 60
End: 2025-04-11

## 2025-04-12 LAB — FUNGUS SKIN CULT: NORMAL

## 2025-04-14 ENCOUNTER — APPOINTMENT (OUTPATIENT)
Dept: GASTROENTEROLOGY | Facility: CLINIC | Age: 60
End: 2025-04-14
Payer: MEDICARE

## 2025-04-14 VITALS
HEART RATE: 78 BPM | HEIGHT: 60 IN | WEIGHT: 170 LBS | RESPIRATION RATE: 16 BRPM | SYSTOLIC BLOOD PRESSURE: 118 MMHG | OXYGEN SATURATION: 98 % | BODY MASS INDEX: 33.38 KG/M2 | DIASTOLIC BLOOD PRESSURE: 74 MMHG

## 2025-04-14 DIAGNOSIS — K57.90 DIVERTICULOSIS OF INTESTINE, PART UNSPECIFIED, W/OUT PERFORATION OR ABSCESS W/OUT BLEEDING: ICD-10-CM

## 2025-04-14 DIAGNOSIS — R10.9 UNSPECIFIED ABDOMINAL PAIN: ICD-10-CM

## 2025-04-14 PROCEDURE — 99214 OFFICE O/P EST MOD 30 MIN: CPT

## 2025-04-16 ENCOUNTER — APPOINTMENT (OUTPATIENT)
Dept: PODIATRY | Facility: CLINIC | Age: 60
End: 2025-04-16
Payer: MEDICARE

## 2025-04-16 PROCEDURE — 73630 X-RAY EXAM OF FOOT: CPT | Mod: RT

## 2025-04-16 PROCEDURE — 99214 OFFICE O/P EST MOD 30 MIN: CPT

## 2025-04-17 ENCOUNTER — APPOINTMENT (OUTPATIENT)
Dept: ORTHOPEDIC SURGERY | Facility: CLINIC | Age: 60
End: 2025-04-17

## 2025-04-17 VITALS — HEIGHT: 60 IN | BODY MASS INDEX: 33.38 KG/M2 | WEIGHT: 170 LBS

## 2025-04-17 DIAGNOSIS — M16.11 UNILATERAL PRIMARY OSTEOARTHRITIS, RIGHT HIP: ICD-10-CM

## 2025-04-17 DIAGNOSIS — M17.12 UNILATERAL PRIMARY OSTEOARTHRITIS, LEFT KNEE: ICD-10-CM

## 2025-04-17 PROCEDURE — 73562 X-RAY EXAM OF KNEE 3: CPT | Mod: LT

## 2025-04-17 PROCEDURE — 99215 OFFICE O/P EST HI 40 MIN: CPT

## 2025-04-19 ENCOUNTER — NON-APPOINTMENT (OUTPATIENT)
Age: 60
End: 2025-04-19

## 2025-04-19 PROBLEM — Z01.818 PREOPERATIVE CLEARANCE: Status: ACTIVE | Noted: 2025-04-19

## 2025-04-21 ENCOUNTER — NON-APPOINTMENT (OUTPATIENT)
Age: 60
End: 2025-04-21

## 2025-04-21 ENCOUNTER — APPOINTMENT (OUTPATIENT)
Dept: INTERNAL MEDICINE | Facility: CLINIC | Age: 60
End: 2025-04-21
Payer: MEDICARE

## 2025-04-21 VITALS
SYSTOLIC BLOOD PRESSURE: 150 MMHG | TEMPERATURE: 97.8 F | DIASTOLIC BLOOD PRESSURE: 89 MMHG | WEIGHT: 170 LBS | OXYGEN SATURATION: 99 % | RESPIRATION RATE: 16 BRPM | HEIGHT: 60 IN | BODY MASS INDEX: 33.38 KG/M2 | HEART RATE: 85 BPM

## 2025-04-21 DIAGNOSIS — E03.9 HYPOTHYROIDISM, UNSPECIFIED: ICD-10-CM

## 2025-04-21 DIAGNOSIS — I10 ESSENTIAL (PRIMARY) HYPERTENSION: ICD-10-CM

## 2025-04-21 DIAGNOSIS — R73.09 OTHER ABNORMAL GLUCOSE: ICD-10-CM

## 2025-04-21 DIAGNOSIS — Z01.818 ENCOUNTER FOR OTHER PREPROCEDURAL EXAMINATION: ICD-10-CM

## 2025-04-21 DIAGNOSIS — M54.16 RADICULOPATHY, LUMBAR REGION: ICD-10-CM

## 2025-04-21 PROCEDURE — 99213 OFFICE O/P EST LOW 20 MIN: CPT

## 2025-04-21 PROCEDURE — 36415 COLL VENOUS BLD VENIPUNCTURE: CPT

## 2025-04-22 DIAGNOSIS — R79.89 OTHER SPECIFIED ABNORMAL FINDINGS OF BLOOD CHEMISTRY: ICD-10-CM

## 2025-04-23 ENCOUNTER — RX RENEWAL (OUTPATIENT)
Age: 60
End: 2025-04-23

## 2025-04-23 ENCOUNTER — NON-APPOINTMENT (OUTPATIENT)
Age: 60
End: 2025-04-23

## 2025-04-23 LAB
ALBUMIN SERPL ELPH-MCNC: 4.6 G/DL
ALP BLD-CCNC: 81 U/L
ALT SERPL-CCNC: 46 U/L
ANION GAP SERPL CALC-SCNC: 10 MMOL/L
APTT BLD: 32.6 SEC
AST SERPL-CCNC: 41 U/L
BASOPHILS # BLD AUTO: 0.09 K/UL
BASOPHILS NFR BLD AUTO: 1.7 %
BILIRUB SERPL-MCNC: 0.2 MG/DL
BUN SERPL-MCNC: 16 MG/DL
CALCIUM SERPL-MCNC: 8.8 MG/DL
CHLORIDE SERPL-SCNC: 105 MMOL/L
CO2 SERPL-SCNC: 27 MMOL/L
CREAT SERPL-MCNC: 0.73 MG/DL
EGFRCR SERPLBLD CKD-EPI 2021: 94 ML/MIN/1.73M2
EOSINOPHIL # BLD AUTO: 0.08 K/UL
EOSINOPHIL NFR BLD AUTO: 1.5 %
ESTIMATED AVERAGE GLUCOSE: 117 MG/DL
FERRITIN SERPL-MCNC: 469 NG/ML
FOLATE SERPL-MCNC: 15.7 NG/ML
GLUCOSE SERPL-MCNC: 95 MG/DL
HBA1C MFR BLD HPLC: 5.7 %
HCT VFR BLD CALC: 35.6 %
HGB A MFR BLD: 97.6 %
HGB A2 MFR BLD: 2.4 %
HGB BLD-MCNC: 11.3 G/DL
HGB FRACT BLD-IMP: NORMAL
IMM GRANULOCYTES NFR BLD AUTO: 0.4 %
INR PPP: 0.97 RATIO
IRON SATN MFR SERPL: 31 %
IRON SERPL-MCNC: 90 UG/DL
LYMPHOCYTES # BLD AUTO: 1.2 K/UL
LYMPHOCYTES NFR BLD AUTO: 22.2 %
MAN DIFF?: NORMAL
MCHC RBC-ENTMCNC: 31.7 G/DL
MCHC RBC-ENTMCNC: 31.9 PG
MCV RBC AUTO: 100.6 FL
MONOCYTES # BLD AUTO: 0.43 K/UL
MONOCYTES NFR BLD AUTO: 7.9 %
NEUTROPHILS # BLD AUTO: 3.59 K/UL
NEUTROPHILS NFR BLD AUTO: 66.3 %
PLATELET # BLD AUTO: 331 K/UL
POTASSIUM SERPL-SCNC: 4.8 MMOL/L
PROT SERPL-MCNC: 7.3 G/DL
PT BLD: 11.5 SEC
RBC # BLD: 3.54 M/UL
RBC # FLD: 13.2 %
SODIUM SERPL-SCNC: 141 MMOL/L
TIBC SERPL-MCNC: 295 UG/DL
UIBC SERPL-MCNC: 204 UG/DL
VIT B12 SERPL-MCNC: >2000 PG/ML
WBC # FLD AUTO: 5.41 K/UL

## 2025-04-24 LAB
HBV CORE IGG+IGM SER QL: NONREACTIVE
HBV SURFACE AB SER QL: NONREACTIVE
HBV SURFACE AG SER QL: NONREACTIVE
HCV AB SER QL: NONREACTIVE
HCV S/CO RATIO: 0.17 S/CO
HEPATITIS A IGG ANTIBODY: NONREACTIVE

## 2025-04-30 ENCOUNTER — APPOINTMENT (OUTPATIENT)
Dept: GASTROENTEROLOGY | Facility: CLINIC | Age: 60
End: 2025-04-30
Payer: MEDICARE

## 2025-04-30 PROCEDURE — 91110 GI TRC IMG INTRAL ESOPH-ILE: CPT

## 2025-05-02 ENCOUNTER — RX RENEWAL (OUTPATIENT)
Age: 60
End: 2025-05-02

## 2025-05-13 ENCOUNTER — NON-APPOINTMENT (OUTPATIENT)
Age: 60
End: 2025-05-13

## 2025-05-15 ENCOUNTER — APPOINTMENT (OUTPATIENT)
Dept: RHEUMATOLOGY | Facility: CLINIC | Age: 60
End: 2025-05-15

## 2025-05-23 NOTE — H&P ADULT - TIME-BASED BILLING (NON-CRITICAL CARE)
Mother called and stated patient taking tranexamic acid three times  since nose bleed yesterday. Patient had slight nose bleed this morning- per mom just dripping for 1-2 minutes.  Instructed mom to continue tranexamic acid and to call if nose bleeds more than 15  minutes.  Mom stated they will be out of town this weekend. Instructed mom to page on call doctor or go to ED with nosebleeds while out of town.  Message sent via the protal with instructions to page on call doctor.  Mom verified understanding of plan.    Time-based billing (NON-critical care)

## 2025-05-27 ENCOUNTER — APPOINTMENT (OUTPATIENT)
Dept: INTERNAL MEDICINE | Facility: CLINIC | Age: 60
End: 2025-05-27
Payer: MEDICARE

## 2025-05-27 VITALS
TEMPERATURE: 98.1 F | HEART RATE: 79 BPM | BODY MASS INDEX: 31.28 KG/M2 | DIASTOLIC BLOOD PRESSURE: 71 MMHG | SYSTOLIC BLOOD PRESSURE: 109 MMHG | WEIGHT: 170 LBS | OXYGEN SATURATION: 98 % | RESPIRATION RATE: 16 BRPM | HEIGHT: 62 IN

## 2025-05-27 DIAGNOSIS — I10 ESSENTIAL (PRIMARY) HYPERTENSION: ICD-10-CM

## 2025-05-27 DIAGNOSIS — E03.9 HYPOTHYROIDISM, UNSPECIFIED: ICD-10-CM

## 2025-05-27 DIAGNOSIS — J45.909 UNSPECIFIED ASTHMA, UNCOMPLICATED: ICD-10-CM

## 2025-05-27 DIAGNOSIS — E78.2 MIXED HYPERLIPIDEMIA: ICD-10-CM

## 2025-05-27 DIAGNOSIS — R73.09 OTHER ABNORMAL GLUCOSE: ICD-10-CM

## 2025-05-27 PROCEDURE — 99213 OFFICE O/P EST LOW 20 MIN: CPT

## 2025-05-30 ENCOUNTER — NON-APPOINTMENT (OUTPATIENT)
Age: 60
End: 2025-05-30

## 2025-05-30 ENCOUNTER — APPOINTMENT (OUTPATIENT)
Dept: GASTROENTEROLOGY | Facility: CLINIC | Age: 60
End: 2025-05-30

## 2025-06-16 ENCOUNTER — APPOINTMENT (OUTPATIENT)
Dept: GASTROENTEROLOGY | Facility: CLINIC | Age: 60
End: 2025-06-16

## 2025-06-18 ENCOUNTER — OUTPATIENT (OUTPATIENT)
Dept: OUTPATIENT SERVICES | Facility: HOSPITAL | Age: 60
LOS: 1 days | End: 2025-06-18
Payer: MEDICARE

## 2025-06-18 ENCOUNTER — APPOINTMENT (OUTPATIENT)
Dept: OBGYN | Facility: CLINIC | Age: 60
End: 2025-06-18

## 2025-06-18 VITALS
RESPIRATION RATE: 17 BRPM | HEART RATE: 102 BPM | WEIGHT: 169.98 LBS | SYSTOLIC BLOOD PRESSURE: 135 MMHG | HEIGHT: 60 IN | DIASTOLIC BLOOD PRESSURE: 92 MMHG | TEMPERATURE: 99 F | OXYGEN SATURATION: 96 %

## 2025-06-18 DIAGNOSIS — Z98.51 TUBAL LIGATION STATUS: Chronic | ICD-10-CM

## 2025-06-18 DIAGNOSIS — Z01.818 ENCOUNTER FOR OTHER PREPROCEDURAL EXAMINATION: ICD-10-CM

## 2025-06-18 DIAGNOSIS — Z98.890 OTHER SPECIFIED POSTPROCEDURAL STATES: Chronic | ICD-10-CM

## 2025-06-18 DIAGNOSIS — Z98.1 ARTHRODESIS STATUS: Chronic | ICD-10-CM

## 2025-06-18 DIAGNOSIS — Z98.89 OTHER SPECIFIED POSTPROCEDURAL STATES: Chronic | ICD-10-CM

## 2025-06-18 DIAGNOSIS — M17.12 UNILATERAL PRIMARY OSTEOARTHRITIS, LEFT KNEE: ICD-10-CM

## 2025-06-18 LAB
ANION GAP SERPL CALC-SCNC: 14 MMOL/L — SIGNIFICANT CHANGE UP (ref 5–17)
BLD GP AB SCN SERPL QL: NEGATIVE — SIGNIFICANT CHANGE UP
BUN SERPL-MCNC: 22 MG/DL — SIGNIFICANT CHANGE UP (ref 7–23)
CALCIUM SERPL-MCNC: 9.6 MG/DL — SIGNIFICANT CHANGE UP (ref 8.4–10.5)
CHLORIDE SERPL-SCNC: 103 MMOL/L — SIGNIFICANT CHANGE UP (ref 96–108)
CO2 SERPL-SCNC: 23 MMOL/L — SIGNIFICANT CHANGE UP (ref 22–31)
CREAT SERPL-MCNC: 0.89 MG/DL — SIGNIFICANT CHANGE UP (ref 0.5–1.3)
EGFR: 73 ML/MIN/1.73M2 — SIGNIFICANT CHANGE UP
EGFR: 73 ML/MIN/1.73M2 — SIGNIFICANT CHANGE UP
GLUCOSE SERPL-MCNC: 98 MG/DL — SIGNIFICANT CHANGE UP (ref 70–99)
HCT VFR BLD CALC: 37.1 % — SIGNIFICANT CHANGE UP (ref 34.5–45)
HGB BLD-MCNC: 11.5 G/DL — SIGNIFICANT CHANGE UP (ref 11.5–15.5)
MCHC RBC-ENTMCNC: 29.9 PG — SIGNIFICANT CHANGE UP (ref 27–34)
MCHC RBC-ENTMCNC: 31 G/DL — LOW (ref 32–36)
MCV RBC AUTO: 96.4 FL — SIGNIFICANT CHANGE UP (ref 80–100)
NRBC # BLD AUTO: 0 K/UL — SIGNIFICANT CHANGE UP (ref 0–0)
NRBC # FLD: 0 K/UL — SIGNIFICANT CHANGE UP (ref 0–0)
NRBC BLD AUTO-RTO: 0 /100 WBCS — SIGNIFICANT CHANGE UP (ref 0–0)
PLATELET # BLD AUTO: 380 K/UL — SIGNIFICANT CHANGE UP (ref 150–400)
PMV BLD: 8.8 FL — SIGNIFICANT CHANGE UP (ref 7–13)
POTASSIUM SERPL-MCNC: 4.4 MMOL/L — SIGNIFICANT CHANGE UP (ref 3.5–5.3)
POTASSIUM SERPL-SCNC: 4.4 MMOL/L — SIGNIFICANT CHANGE UP (ref 3.5–5.3)
RBC # BLD: 3.85 M/UL — SIGNIFICANT CHANGE UP (ref 3.8–5.2)
RBC # FLD: 13.7 % — SIGNIFICANT CHANGE UP (ref 10.3–14.5)
RH IG SCN BLD-IMP: POSITIVE — SIGNIFICANT CHANGE UP
SODIUM SERPL-SCNC: 140 MMOL/L — SIGNIFICANT CHANGE UP (ref 135–145)
WBC # BLD: 5.01 K/UL — SIGNIFICANT CHANGE UP (ref 3.8–10.5)
WBC # FLD AUTO: 5.01 K/UL — SIGNIFICANT CHANGE UP (ref 3.8–10.5)

## 2025-06-18 PROCEDURE — 83036 HEMOGLOBIN GLYCOSYLATED A1C: CPT

## 2025-06-18 PROCEDURE — 86901 BLOOD TYPING SEROLOGIC RH(D): CPT

## 2025-06-18 PROCEDURE — 86900 BLOOD TYPING SEROLOGIC ABO: CPT

## 2025-06-18 PROCEDURE — 87640 STAPH A DNA AMP PROBE: CPT

## 2025-06-18 PROCEDURE — G0463: CPT

## 2025-06-18 PROCEDURE — 87641 MR-STAPH DNA AMP PROBE: CPT

## 2025-06-18 PROCEDURE — 86850 RBC ANTIBODY SCREEN: CPT

## 2025-06-18 PROCEDURE — 85027 COMPLETE CBC AUTOMATED: CPT

## 2025-06-18 PROCEDURE — 80048 BASIC METABOLIC PNL TOTAL CA: CPT

## 2025-06-18 NOTE — H&P PST ADULT - NSICDXPASTSURGICALHX_GEN_ALL_CORE_FT
PAST SURGICAL HISTORY:  History of vascular access device left chest bardport insetion - , removal-    S/P  section X 4- , , ,     S/P lumpectomy, left breast     S/P spinal fusion     S/P tubal ligation  and  with  reversal in
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
## 2025-06-18 NOTE — H&P PST ADULT - PROBLEM SELECTOR PLAN 1
Clear bilaterally, pupils equal, round and reactive to light. LEFT TOTAL KNEE REPLACEMENT WITH MERLIN ROBOT  Pre-op education provided - all questions answered   Chlorhex soap & instructions provided  Ortho ERP hydration instructions provided  Labs sent as per PST protocol

## 2025-06-18 NOTE — H&P PST ADULT - HISTORY OF PRESENT ILLNESS
58F from home, PMHx  hypertension, hyperlipidemia, Lupus, fibromyalgia, constipation, hypothyroidism, and Irritable bowel syndrome, p/w worsening abdominal pain x1d. Notes that she has been constipated  for 5-days and yesterday took a laxative with little to no improvement, normally takes MiraLAX and Dulcolax with little improvement. Then had an urgency to go to the bathroom but instead passed what she describes a bloody BM, a/w diffuse abdominal pain since yesterday. Patient reports diffuse abdominal pain and back pain since yesterday, reports nausea and vomiting. Patient states she strained had small hard bowel movements with notice of bright red blood and toilet earlier today.  Denies any fevers, chest pain, shortness of breath, dysuria, vaginal bleeding, vaginal discharge, numbness, weakness, or rash. 61 YO F PMH CAD, hypertension, hyperlipidemia, Lupus, fibromyalgia, hypothyroidism, Irritable bowel syndrome, anemia, depression, s/p spinal fusion surgery (5/5/2025), osteoarthritis, has failed conservative treatment including NSAIDs, injections and knee aspiration, presents to PST for scheduled LEFT TOTAL KNEE REPLACEMENT WITH MERLIN ROBOT 7/7/2025. Denies any palpitations, SOB, N/V, fever or chills.

## 2025-06-18 NOTE — H&P PST ADULT - ASSESSMENT
DASI score: >4 mets   DASI activity: ADLs, can walk 5 blocks s/p spine sx, limited by left knee pain  Loose teeth or denture: full upper, partial lower, denies loose teeth    CAPRINI SCORE    AGE RELATED RISK FACTORS                                                             [ ] Age 41-60 years                                            (1 Point)  [ ] Age: 61-74 years                                           (2 Points)                 [ ] Age= 75 years                                                (3 Points)             DISEASE RELATED RISK FACTORS                                                       [ ] Edema in the lower extremities                 (1 Point)                     [ ] Varicose veins                                               (1 Point)                                 [ ] BMI > 25 Kg/m2                                            (1 Point)                                  [ ] Serious infection (ie PNA)                            (1 Point)                     [ ] Lung disease ( COPD, Emphysema)            (1 Point)                                                                          [ ] Acute myocardial infarction                         (1 Point)                  [ ] Congestive heart failure (in the previous month)  (1 Point)         [ ] Inflammatory bowel disease                            (1 Point)                  [ ] Central venous access, PICC or Port               (2 points)       (within the last month)                                                                [ ] Stroke (in the previous month)                        (5 Points)    [ ] Previous or present malignancy                       (2 points)                                                                                                                                                         HEMATOLOGY RELATED FACTORS                                                         [ ] Prior episodes of VTE                                     (3 Points)                     [ ] Positive family history for VTE                      (3 Points)                  [ ] Prothrombin 12395 A                                     (3 Points)                     [ ] Factor V Leiden                                                (3 Points)                        [ ] Lupus anticoagulants                                      (3 Points)                                                           [ ] Anticardiolipin antibodies                              (3 Points)                                                       [ ] High homocysteine in the blood                   (3 Points)                                             [ ] Other congenital or acquired thrombophilia      (3 Points)                                                [ ] Heparin induced thrombocytopenia                  (3 Points)                                        MOBILITY RELATED FACTORS  [ ] Bed rest                                                         (1 Point)  [ ] Plaster cast                                                    (2 points)  [ ] Bed bound for more than 72 hours           (2 Points)    GENDER SPECIFIC FACTORS  [ ] Pregnancy or had a baby within the last month   (1 Point)  [ ] Post-partum < 6 weeks                                   (1 Point)  [ ] Hormonal therapy  or oral contraception   (1 Point)  [ ] History of pregnancy complications              (1 point)  [ ] Unexplained or recurrent              (1 Point)    OTHER RISK FACTORS                                           (1 Point)  [ ] BMI >40, smoking, diabetes requiring insulin, chemotherapy  blood transfusions and length of surgery over 2 hours    SURGERY RELATED RISK FACTORS  [ ]  Section within the last month     (1 Point)  [ ] Minor surgery                                                  (1 Point)  [ ] Arthroscopic surgery                                       (2 Points)  [ ] Planned major surgery lasting more            (2 Points)      than 45 minutes     [ ] Elective hip or knee joint replacement       (5 points)       surgery                                                TRAUMA RELATED RISK FACTORS  [ ] Fracture of the hip, pelvis, or leg                       (5 Points)  [ ] Spinal cord injury resulting in paralysis             (5 points)       (in the previous month)    [ ] Paralysis  (less than 1 month)                             (5 Points)  [ ] Multiple Trauma within 1 month                        (5 Points)    Total Score [        ]    Caprini Score 0-2: Low Risk, NO VTE prophylaxis required for most patients, encourage ambulation  Caprini Score 3-6: Moderate Risk , pharmacologic VTE prophylaxis is indicated for most patients (in the absence of contraindications)  Caprini Score Greater than or =7: High risk, pharmocologic VTE prophylaxis indicated for most patients (in the absence of contraindications)                               DASI score: >4 mets   DASI activity: ADLs, can walk 5 blocks s/p spine sx, limited by left knee pain  Loose teeth or denture: full upper, partial lower, denies loose teeth    CAPRINI SCORE    AGE RELATED RISK FACTORS                                                             [ ] Age 41-60 years                                            (1 Point)  [2 ] Age: 61-74 years                                           (2 Points)                 [ ] Age= 75 years                                                (3 Points)             DISEASE RELATED RISK FACTORS                                                       [ ] Edema in the lower extremities                 (1 Point)                     [ ] Varicose veins                                               (1 Point)                                 [ 1] BMI > 25 Kg/m2                                            (1 Point)                                  [ ] Serious infection (ie PNA)                            (1 Point)                     [ ] Lung disease ( COPD, Emphysema)            (1 Point)                                                                          [ ] Acute myocardial infarction                         (1 Point)                  [ ] Congestive heart failure (in the previous month)  (1 Point)         [ ] Inflammatory bowel disease                            (1 Point)                  [ ] Central venous access, PICC or Port               (2 points)       (within the last month)                                                                [ ] Stroke (in the previous month)                        (5 Points)    [ 2] Previous or present malignancy                       (2 points)                                                                                                                                                         HEMATOLOGY RELATED FACTORS                                                         [ ] Prior episodes of VTE                                     (3 Points)                     [ ] Positive family history for VTE                      (3 Points)                  [ ] Prothrombin 81947 A                                     (3 Points)                     [ ] Factor V Leiden                                                (3 Points)                        [ ] Lupus anticoagulants                                      (3 Points)                                                           [ ] Anticardiolipin antibodies                              (3 Points)                                                       [ ] High homocysteine in the blood                   (3 Points)                                             [ ] Other congenital or acquired thrombophilia      (3 Points)                                                [ ] Heparin induced thrombocytopenia                  (3 Points)                                        MOBILITY RELATED FACTORS  [ ] Bed rest                                                         (1 Point)  [ ] Plaster cast                                                    (2 points)  [ ] Bed bound for more than 72 hours           (2 Points)    GENDER SPECIFIC FACTORS  [ ] Pregnancy or had a baby within the last month   (1 Point)  [ ] Post-partum < 6 weeks                                   (1 Point)  [ ] Hormonal therapy  or oral contraception   (1 Point)  [ ] History of pregnancy complications              (1 point)  [ ] Unexplained or recurrent              (1 Point)    OTHER RISK FACTORS                                           (1 Point)  [ ] BMI >40, smoking, diabetes requiring insulin, chemotherapy  blood transfusions and length of surgery over 2 hours    SURGERY RELATED RISK FACTORS  [ ]  Section within the last month     (1 Point)  [ ] Minor surgery                                                  (1 Point)  [ ] Arthroscopic surgery                                       (2 Points)  [ ] Planned major surgery lasting more            (2 Points)      than 45 minutes     [ 5] Elective hip or knee joint replacement       (5 points)       surgery                                                TRAUMA RELATED RISK FACTORS  [ ] Fracture of the hip, pelvis, or leg                       (5 Points)  [ ] Spinal cord injury resulting in paralysis             (5 points)       (in the previous month)    [ ] Paralysis  (less than 1 month)                             (5 Points)  [ ] Multiple Trauma within 1 month                        (5 Points)    Total Score [    10    ]    Caprini Score 0-2: Low Risk, NO VTE prophylaxis required for most patients, encourage ambulation  Caprini Score 3-6: Moderate Risk , pharmacologic VTE prophylaxis is indicated for most patients (in the absence of contraindications)  Caprini Score Greater than or =7: High risk, pharmocologic VTE prophylaxis indicated for most patients (in the absence of contraindications)

## 2025-06-19 PROBLEM — K50.90 CROHN'S DISEASE, UNSPECIFIED, WITHOUT COMPLICATIONS: Chronic | Status: INACTIVE | Noted: 2021-04-08 | Resolved: 2025-06-18

## 2025-06-19 PROBLEM — I10 ESSENTIAL (PRIMARY) HYPERTENSION: Chronic | Status: INACTIVE | Noted: 2019-03-20 | Resolved: 2025-06-18

## 2025-06-19 LAB
A1C WITH ESTIMATED AVERAGE GLUCOSE RESULT: 5.6 % — SIGNIFICANT CHANGE UP (ref 4–5.6)
ESTIMATED AVERAGE GLUCOSE: 114 MG/DL — SIGNIFICANT CHANGE UP (ref 68–114)
MRSA PCR RESULT.: SIGNIFICANT CHANGE UP
S AUREUS DNA NOSE QL NAA+PROBE: DETECTED

## 2025-06-19 RX ORDER — MUPIROCIN 20 MG/G
2 OINTMENT TOPICAL TWICE DAILY
Qty: 1 | Refills: 0 | Status: ACTIVE | COMMUNITY
Start: 2025-06-19 | End: 1900-01-01

## 2025-06-21 ENCOUNTER — RX RENEWAL (OUTPATIENT)
Age: 60
End: 2025-06-21

## 2025-06-22 PROBLEM — Z01.818 PREOPERATIVE CLEARANCE: Status: ACTIVE | Noted: 2025-06-22

## 2025-06-23 ENCOUNTER — APPOINTMENT (OUTPATIENT)
Dept: INTERNAL MEDICINE | Facility: CLINIC | Age: 60
End: 2025-06-23

## 2025-06-27 ENCOUNTER — NON-APPOINTMENT (OUTPATIENT)
Age: 60
End: 2025-06-27

## 2025-07-01 PROBLEM — K21.9 GASTRO-ESOPHAGEAL REFLUX DISEASE WITHOUT ESOPHAGITIS: Chronic | Status: ACTIVE | Noted: 2025-06-18

## 2025-07-01 PROBLEM — K57.90 DIVERTICULOSIS OF INTESTINE, PART UNSPECIFIED, WITHOUT PERFORATION OR ABSCESS WITHOUT BLEEDING: Chronic | Status: ACTIVE | Noted: 2025-06-18

## 2025-07-01 PROBLEM — Z87.898 PERSONAL HISTORY OF OTHER SPECIFIED CONDITIONS: Chronic | Status: ACTIVE | Noted: 2025-06-18

## 2025-07-01 PROBLEM — H35.30 UNSPECIFIED MACULAR DEGENERATION: Chronic | Status: ACTIVE | Noted: 2025-06-18

## 2025-07-01 PROBLEM — I25.10 ATHEROSCLEROTIC HEART DISEASE OF NATIVE CORONARY ARTERY WITHOUT ANGINA PECTORIS: Chronic | Status: ACTIVE | Noted: 2025-06-18

## 2025-07-03 ENCOUNTER — OUTPATIENT (OUTPATIENT)
Dept: OUTPATIENT SERVICES | Facility: HOSPITAL | Age: 60
LOS: 1 days | End: 2025-07-03
Payer: MEDICARE

## 2025-07-03 ENCOUNTER — APPOINTMENT (OUTPATIENT)
Dept: CT IMAGING | Facility: CLINIC | Age: 60
End: 2025-07-03
Payer: MEDICARE

## 2025-07-03 DIAGNOSIS — M17.12 UNILATERAL PRIMARY OSTEOARTHRITIS, LEFT KNEE: ICD-10-CM

## 2025-07-03 DIAGNOSIS — Z98.89 OTHER SPECIFIED POSTPROCEDURAL STATES: Chronic | ICD-10-CM

## 2025-07-03 DIAGNOSIS — Z98.890 OTHER SPECIFIED POSTPROCEDURAL STATES: Chronic | ICD-10-CM

## 2025-07-03 DIAGNOSIS — Z98.1 ARTHRODESIS STATUS: Chronic | ICD-10-CM

## 2025-07-03 PROCEDURE — 73700 CT LOWER EXTREMITY W/O DYE: CPT | Mod: 26,LT

## 2025-07-03 PROCEDURE — 73700 CT LOWER EXTREMITY W/O DYE: CPT

## 2025-07-07 ENCOUNTER — INPATIENT (INPATIENT)
Facility: HOSPITAL | Age: 60
LOS: 1 days | Discharge: HOME HEALTH | DRG: 554 | End: 2025-07-09
Attending: ORTHOPAEDIC SURGERY | Admitting: ORTHOPAEDIC SURGERY
Payer: MEDICARE

## 2025-07-07 ENCOUNTER — APPOINTMENT (OUTPATIENT)
Dept: ORTHOPEDIC SURGERY | Facility: HOSPITAL | Age: 60
End: 2025-07-07

## 2025-07-07 ENCOUNTER — TRANSCRIPTION ENCOUNTER (OUTPATIENT)
Age: 60
End: 2025-07-07

## 2025-07-07 VITALS
TEMPERATURE: 98 F | RESPIRATION RATE: 18 BRPM | HEART RATE: 74 BPM | SYSTOLIC BLOOD PRESSURE: 127 MMHG | WEIGHT: 169.98 LBS | DIASTOLIC BLOOD PRESSURE: 82 MMHG | HEIGHT: 60 IN | OXYGEN SATURATION: 99 %

## 2025-07-07 DIAGNOSIS — Z98.89 OTHER SPECIFIED POSTPROCEDURAL STATES: Chronic | ICD-10-CM

## 2025-07-07 DIAGNOSIS — M17.12 UNILATERAL PRIMARY OSTEOARTHRITIS, LEFT KNEE: ICD-10-CM

## 2025-07-07 DIAGNOSIS — Z98.51 TUBAL LIGATION STATUS: Chronic | ICD-10-CM

## 2025-07-07 DIAGNOSIS — Z98.890 OTHER SPECIFIED POSTPROCEDURAL STATES: Chronic | ICD-10-CM

## 2025-07-07 DIAGNOSIS — Z98.1 ARTHRODESIS STATUS: Chronic | ICD-10-CM

## 2025-07-07 LAB — GLUCOSE BLDC GLUCOMTR-MCNC: 111 MG/DL — HIGH (ref 70–99)

## 2025-07-07 PROCEDURE — 97162 PT EVAL MOD COMPLEX 30 MIN: CPT

## 2025-07-07 PROCEDURE — 82962 GLUCOSE BLOOD TEST: CPT

## 2025-07-07 PROCEDURE — 27447 TOTAL KNEE ARTHROPLASTY: CPT | Mod: LT

## 2025-07-07 PROCEDURE — 97166 OT EVAL MOD COMPLEX 45 MIN: CPT

## 2025-07-07 PROCEDURE — 73560 X-RAY EXAM OF KNEE 1 OR 2: CPT | Mod: 26,LT

## 2025-07-07 PROCEDURE — 0055T BONE SRGRY CMPTR CT/MRI IMAG: CPT

## 2025-07-07 PROCEDURE — S2900 ROBOTIC SURGICAL SYSTEM: CPT | Mod: NC

## 2025-07-07 PROCEDURE — 73560 X-RAY EXAM OF KNEE 1 OR 2: CPT

## 2025-07-07 PROCEDURE — 20985 CPTR-ASST DIR MS PX: CPT

## 2025-07-07 DEVICE — COMP FEM CR CMNTLSS BEADED W/ PA SZ 3 LT: Type: IMPLANTABLE DEVICE | Site: LEFT | Status: FUNCTIONAL

## 2025-07-07 DEVICE — INSERT TIB BEARING TRIATHLON CS X3 SZ 3 9MM: Type: IMPLANTABLE DEVICE | Site: LEFT | Status: FUNCTIONAL

## 2025-07-07 DEVICE — MAKO BONE PIN 3.2MM X 140MM: Type: IMPLANTABLE DEVICE | Site: LEFT | Status: FUNCTIONAL

## 2025-07-07 DEVICE — TRIATHLON TIBIAL COMP SZ 3: Type: IMPLANTABLE DEVICE | Site: LEFT | Status: FUNCTIONAL

## 2025-07-07 DEVICE — MAKO BONE PIN 3.2MM X 110MM: Type: IMPLANTABLE DEVICE | Site: LEFT | Status: FUNCTIONAL

## 2025-07-07 RX ORDER — ONDANSETRON HCL/PF 4 MG/2 ML
4 VIAL (ML) INJECTION ONCE
Refills: 0 | Status: DISCONTINUED | OUTPATIENT
Start: 2025-07-07 | End: 2025-07-07

## 2025-07-07 RX ORDER — ASPIRIN 325 MG
81 TABLET ORAL
Refills: 0 | Status: DISCONTINUED | OUTPATIENT
Start: 2025-07-08 | End: 2025-07-09

## 2025-07-07 RX ORDER — POLYETHYLENE GLYCOL 3350 17 G/17G
17 POWDER, FOR SOLUTION ORAL AT BEDTIME
Refills: 0 | Status: DISCONTINUED | OUTPATIENT
Start: 2025-07-07 | End: 2025-07-09

## 2025-07-07 RX ORDER — GABAPENTIN 400 MG/1
1 CAPSULE ORAL
Refills: 0 | DISCHARGE

## 2025-07-07 RX ORDER — ACETAMINOPHEN 500 MG/5ML
1000 LIQUID (ML) ORAL ONCE
Refills: 0 | Status: COMPLETED | OUTPATIENT
Start: 2025-07-08 | End: 2025-07-08

## 2025-07-07 RX ORDER — ONDANSETRON HCL/PF 4 MG/2 ML
4 VIAL (ML) INJECTION EVERY 6 HOURS
Refills: 0 | Status: DISCONTINUED | OUTPATIENT
Start: 2025-07-07 | End: 2025-07-09

## 2025-07-07 RX ORDER — ACETAMINOPHEN 500 MG/5ML
1000 LIQUID (ML) ORAL ONCE
Refills: 0 | Status: COMPLETED | OUTPATIENT
Start: 2025-07-07 | End: 2025-07-07

## 2025-07-07 RX ORDER — OXYCODONE HYDROCHLORIDE 30 MG/1
10 TABLET ORAL
Refills: 0 | Status: DISCONTINUED | OUTPATIENT
Start: 2025-07-07 | End: 2025-07-08

## 2025-07-07 RX ORDER — TRAMADOL HYDROCHLORIDE 50 MG/1
50 TABLET, FILM COATED ORAL EVERY 6 HOURS
Refills: 0 | Status: DISCONTINUED | OUTPATIENT
Start: 2025-07-07 | End: 2025-07-09

## 2025-07-07 RX ORDER — SENNA 187 MG
2 TABLET ORAL AT BEDTIME
Refills: 0 | Status: DISCONTINUED | OUTPATIENT
Start: 2025-07-07 | End: 2025-07-09

## 2025-07-07 RX ORDER — LIDOCAINE HCL/PF 10 MG/ML
0.2 VIAL (ML) INJECTION ONCE
Refills: 0 | Status: DISCONTINUED | OUTPATIENT
Start: 2025-07-07 | End: 2025-07-07

## 2025-07-07 RX ORDER — CELECOXIB 50 MG/1
200 CAPSULE ORAL EVERY 12 HOURS
Refills: 0 | Status: DISCONTINUED | OUTPATIENT
Start: 2025-07-08 | End: 2025-07-09

## 2025-07-07 RX ORDER — ATORVASTATIN CALCIUM 80 MG/1
20 TABLET, FILM COATED ORAL AT BEDTIME
Refills: 0 | Status: DISCONTINUED | OUTPATIENT
Start: 2025-07-07 | End: 2025-07-09

## 2025-07-07 RX ORDER — SODIUM CHLORIDE 9 G/1000ML
1000 INJECTION, SOLUTION INTRAVENOUS
Refills: 0 | Status: DISCONTINUED | OUTPATIENT
Start: 2025-07-07 | End: 2025-07-07

## 2025-07-07 RX ORDER — KETOROLAC TROMETHAMINE 30 MG/ML
15 INJECTION, SOLUTION INTRAMUSCULAR; INTRAVENOUS EVERY 6 HOURS
Refills: 0 | Status: DISCONTINUED | OUTPATIENT
Start: 2025-07-07 | End: 2025-07-08

## 2025-07-07 RX ORDER — HYDROXYCHLOROQUINE SULFATE 200 MG/1
200 TABLET, FILM COATED ORAL
Refills: 0 | Status: DISCONTINUED | OUTPATIENT
Start: 2025-07-07 | End: 2025-07-09

## 2025-07-07 RX ORDER — GABAPENTIN 400 MG/1
400 CAPSULE ORAL
Refills: 0 | Status: DISCONTINUED | OUTPATIENT
Start: 2025-07-07 | End: 2025-07-09

## 2025-07-07 RX ORDER — CLONAZEPAM 0.5 MG/1
0.5 TABLET ORAL
Refills: 0 | Status: DISCONTINUED | OUTPATIENT
Start: 2025-07-07 | End: 2025-07-09

## 2025-07-07 RX ORDER — FENTANYL CITRATE-0.9 % NACL/PF 100MCG/2ML
25 SYRINGE (ML) INTRAVENOUS ONCE
Refills: 0 | Status: DISCONTINUED | OUTPATIENT
Start: 2025-07-07 | End: 2025-07-07

## 2025-07-07 RX ORDER — FENTANYL CITRATE-0.9 % NACL/PF 100MCG/2ML
50 SYRINGE (ML) INTRAVENOUS ONCE
Refills: 0 | Status: DISCONTINUED | OUTPATIENT
Start: 2025-07-07 | End: 2025-07-07

## 2025-07-07 RX ORDER — LEVOTHYROXINE SODIUM 300 MCG
100 TABLET ORAL DAILY
Refills: 0 | Status: DISCONTINUED | OUTPATIENT
Start: 2025-07-07 | End: 2025-07-09

## 2025-07-07 RX ORDER — CEFAZOLIN SODIUM IN 0.9 % NACL 3 G/100 ML
2000 INTRAVENOUS SOLUTION, PIGGYBACK (ML) INTRAVENOUS EVERY 8 HOURS
Refills: 0 | Status: COMPLETED | OUTPATIENT
Start: 2025-07-07 | End: 2025-07-07

## 2025-07-07 RX ORDER — ACETAMINOPHEN 500 MG/5ML
975 LIQUID (ML) ORAL EVERY 8 HOURS
Refills: 0 | Status: DISCONTINUED | OUTPATIENT
Start: 2025-07-08 | End: 2025-07-09

## 2025-07-07 RX ORDER — PRAZOSIN HYDROCHLORIDE 1 MG/1
1 CAPSULE ORAL
Refills: 0 | DISCHARGE

## 2025-07-07 RX ORDER — OXYCODONE HYDROCHLORIDE 30 MG/1
5 TABLET ORAL
Refills: 0 | Status: DISCONTINUED | OUTPATIENT
Start: 2025-07-07 | End: 2025-07-08

## 2025-07-07 RX ORDER — MAGNESIUM HYDROXIDE 400 MG/5ML
30 SUSPENSION ORAL DAILY
Refills: 0 | Status: DISCONTINUED | OUTPATIENT
Start: 2025-07-07 | End: 2025-07-09

## 2025-07-07 RX ORDER — TRAMADOL HYDROCHLORIDE 50 MG/1
50 TABLET, FILM COATED ORAL ONCE
Refills: 0 | Status: DISCONTINUED | OUTPATIENT
Start: 2025-07-07 | End: 2025-07-07

## 2025-07-07 RX ORDER — ACETAMINOPHEN 500 MG/5ML
1000 LIQUID (ML) ORAL ONCE
Refills: 0 | Status: DISCONTINUED | OUTPATIENT
Start: 2025-07-07 | End: 2025-07-07

## 2025-07-07 RX ORDER — DOXAZOSIN MESYLATE 8 MG/1
2 TABLET ORAL AT BEDTIME
Refills: 0 | Status: DISCONTINUED | OUTPATIENT
Start: 2025-07-07 | End: 2025-07-09

## 2025-07-07 RX ORDER — METOPROLOL SUCCINATE 50 MG/1
25 TABLET, EXTENDED RELEASE ORAL
Refills: 0 | Status: DISCONTINUED | OUTPATIENT
Start: 2025-07-07 | End: 2025-07-09

## 2025-07-07 RX ORDER — VENLAFAXINE HYDROCHLORIDE 37.5 MG/1
150 CAPSULE, EXTENDED RELEASE ORAL DAILY
Refills: 0 | Status: DISCONTINUED | OUTPATIENT
Start: 2025-07-07 | End: 2025-07-09

## 2025-07-07 RX ORDER — CEFAZOLIN SODIUM IN 0.9 % NACL 3 G/100 ML
2000 INTRAVENOUS SOLUTION, PIGGYBACK (ML) INTRAVENOUS ONCE
Refills: 0 | Status: COMPLETED | OUTPATIENT
Start: 2025-07-07 | End: 2025-07-07

## 2025-07-07 RX ADMIN — Medication 100 MILLIGRAM(S): at 16:12

## 2025-07-07 RX ADMIN — KETOROLAC TROMETHAMINE 15 MILLIGRAM(S): 30 INJECTION, SOLUTION INTRAMUSCULAR; INTRAVENOUS at 15:07

## 2025-07-07 RX ADMIN — Medication 100 MICROGRAM(S): at 12:42

## 2025-07-07 RX ADMIN — TRAMADOL HYDROCHLORIDE 50 MILLIGRAM(S): 50 TABLET, FILM COATED ORAL at 07:21

## 2025-07-07 RX ADMIN — OXYCODONE HYDROCHLORIDE 10 MILLIGRAM(S): 30 TABLET ORAL at 13:42

## 2025-07-07 RX ADMIN — KETOROLAC TROMETHAMINE 15 MILLIGRAM(S): 30 INJECTION, SOLUTION INTRAMUSCULAR; INTRAVENOUS at 16:00

## 2025-07-07 RX ADMIN — DOXAZOSIN MESYLATE 2 MILLIGRAM(S): 8 TABLET ORAL at 21:41

## 2025-07-07 RX ADMIN — Medication 500 MILLILITER(S): at 10:00

## 2025-07-07 RX ADMIN — ATORVASTATIN CALCIUM 20 MILLIGRAM(S): 80 TABLET, FILM COATED ORAL at 21:00

## 2025-07-07 RX ADMIN — OXYCODONE HYDROCHLORIDE 10 MILLIGRAM(S): 30 TABLET ORAL at 23:31

## 2025-07-07 RX ADMIN — VENLAFAXINE HYDROCHLORIDE 150 MILLIGRAM(S): 37.5 CAPSULE, EXTENDED RELEASE ORAL at 12:42

## 2025-07-07 RX ADMIN — KETOROLAC TROMETHAMINE 15 MILLIGRAM(S): 30 INJECTION, SOLUTION INTRAMUSCULAR; INTRAVENOUS at 22:00

## 2025-07-07 RX ADMIN — Medication 100 MILLIGRAM(S): at 23:26

## 2025-07-07 RX ADMIN — Medication 2 TABLET(S): at 21:00

## 2025-07-07 RX ADMIN — CLONAZEPAM 0.5 MILLIGRAM(S): 0.5 TABLET ORAL at 17:29

## 2025-07-07 RX ADMIN — Medication 1 APPLICATION(S): at 07:21

## 2025-07-07 RX ADMIN — OXYCODONE HYDROCHLORIDE 10 MILLIGRAM(S): 30 TABLET ORAL at 12:42

## 2025-07-07 RX ADMIN — Medication 400 MILLIGRAM(S): at 11:02

## 2025-07-07 RX ADMIN — GABAPENTIN 400 MILLIGRAM(S): 400 CAPSULE ORAL at 17:29

## 2025-07-07 RX ADMIN — KETOROLAC TROMETHAMINE 15 MILLIGRAM(S): 30 INJECTION, SOLUTION INTRAMUSCULAR; INTRAVENOUS at 21:00

## 2025-07-07 RX ADMIN — HYDROXYCHLOROQUINE SULFATE 200 MILLIGRAM(S): 200 TABLET, FILM COATED ORAL at 17:29

## 2025-07-07 RX ADMIN — Medication 400 MILLIGRAM(S): at 18:24

## 2025-07-07 RX ADMIN — Medication 500 MILLILITER(S): at 12:42

## 2025-07-07 RX ADMIN — METOPROLOL SUCCINATE 25 MILLIGRAM(S): 50 TABLET, EXTENDED RELEASE ORAL at 17:31

## 2025-07-07 RX ADMIN — Medication 25 MICROGRAM(S): at 09:35

## 2025-07-07 RX ADMIN — TRAMADOL HYDROCHLORIDE 50 MILLIGRAM(S): 50 TABLET, FILM COATED ORAL at 07:25

## 2025-07-07 RX ADMIN — Medication 40 MILLIGRAM(S): at 07:20

## 2025-07-07 RX ADMIN — POLYETHYLENE GLYCOL 3350 17 GRAM(S): 17 POWDER, FOR SOLUTION ORAL at 21:01

## 2025-07-07 RX ADMIN — Medication 25 MICROGRAM(S): at 10:00

## 2025-07-07 NOTE — DISCHARGE NOTE PROVIDER - NSDCMRMEDTOKEN_GEN_ALL_CORE_FT
albuterol 90 mcg/inh inhalation aerosol: 3 puff(s) inhaled prn  atorvastatin 20 mg oral tablet: 1 tab(s) orally once a day (at bedtime)  clonazePAM 0.5 mg oral tablet: 1 tab(s) orally 2 times a day  dicyclomine 10 mg oral capsule: 1 cap(s) orally 2 times a day  gabapentin 400 mg oral capsule: 1 cap(s) orally 2 times a day  hydroxychloroquine 200 mg oral tablet: 1 tab(s) orally 2 times a day (with meals)  levothyroxine 100 mcg (0.1 mg) oral tablet: 1 tab(s) orally once a day  metoprolol tartrate 25 mg oral tablet: 1 tab(s) orally 2 times a day  pantoprazole 40 mg oral delayed release tablet: 1 tab(s) orally once a day  prazosin 2 mg oral capsule: 1 cap(s) orally once a day (at bedtime)  traZODone 100 mg oral tablet: 3 tab(s) orally once a day (at bedtime)  Tylenol 500 mg oral tablet: 2 tab(s) orally 2 times a day  venlafaxine 150 mg oral capsule, extended release: 1 cap(s) orally once a day   albuterol 90 mcg/inh inhalation aerosol: 3 puff(s) inhaled prn  atorvastatin 20 mg oral tablet: 1 tab(s) orally once a day (at bedtime)  celecoxib 200 mg oral capsule: 1 cap(s) orally every 12 hours for mild pain  clonazePAM 0.5 mg oral tablet: 1 tab(s) orally 2 times a day  dicyclomine 10 mg oral capsule: 1 cap(s) orally 2 times a day  Ecotrin Adult Low Strength 81 mg oral delayed release tablet: 1 tab(s) orally 2 times a day for 6 weeks postop  gabapentin 400 mg oral capsule: 1 cap(s) orally 2 times a day  hydroxychloroquine 200 mg oral tablet: 1 tab(s) orally 2 times a day (with meals)  levothyroxine 100 mcg (0.1 mg) oral tablet: 1 tab(s) orally once a day  metoprolol tartrate 25 mg oral tablet: 1 tab(s) orally 2 times a day  Narcan 4 mg/0.1 mL nasal spray: 1 spray(s) intranasally every 2 to 3 minutes alternating nostrils, as needed for opioid overdose  oxyCODONE 5 mg oral tablet: 1 tab(s) orally every 4 hours as needed for  moderate pain . 2 tab(s) orally every 4 hours as needed for severe pain. MDD: 6  pantoprazole 40 mg oral delayed release tablet: 1 tab(s) orally once a day . CONTINUE WHILE TAKING ECOTRIN POSTOP  polyethylene glycol 3350 oral powder for reconstitution: 17 gram(s) orally once a day (at bedtime) as needed for  constipation  prazosin 2 mg oral capsule: 1 cap(s) orally once a day (at bedtime)  senna leaf extract oral tablet: 2 tab(s) orally once a day (at bedtime) as needed for  constipation  traMADol 50 mg oral tablet: 1 tab(s) orally every 8 hours as needed for  mild pain MDD: 3  traZODone 100 mg oral tablet: 3 tab(s) orally once a day (at bedtime)  Tylenol 500 mg oral tablet: 2 tab(s) orally every 8 hours for 7 days then as needed for mild pain thereafter  venlafaxine 150 mg oral capsule, extended release: 1 cap(s) orally once a day

## 2025-07-07 NOTE — PRE-ANESTHESIA EVALUATION ADULT - NSANTHPMHFT_GEN_ALL_CORE
59 YO F PMH CAD, hypertension, hyperlipidemia, Lupus, fibromyalgia, hypothyroidism, Irritable bowel syndrome, anemia, depression, s/p spinal fusion surgery (5/5/2025), osteoarthritis, has failed conservative treatment including NSAIDs, injections and knee aspiration, presents for scheduled LEFT TOTAL KNEE REPLACEMENT WITH MERLIN ROBOT 7/7/2025. Denies any palpitations, SOB, N/V, fever or chills.

## 2025-07-07 NOTE — CHART NOTE - NSCHARTNOTEFT_GEN_A_CORE
Resting without complaints.  No Chest Pain, SOB, N/V.    T(C): 36.2 (07-07-25 @ 11:00), Max: 36.4 (07-07-25 @ 06:05)  HR: 69 (07-07-25 @ 11:15) (61 - 77)  BP: 101/56 (07-07-25 @ 11:15) (91/53 - 127/82)  RR: 16 (07-07-25 @ 11:15) (13 - 19)  SpO2: 100% (07-07-25 @ 11:15) (92% - 100%)      Exam:  Alert and [place], No Acute Distress  Card: +S1/S2, RRR  Pulm: CTAB  Laterality: L Knee  Mepilex dressing c/d/i  Calves soft, non-tender bilaterally  +PF/DF/EHL/FHL  SILT  + DP pulse    Xray: S/P L Total Knee Arthroplasty, prosthesis in place and intact with no signs of beto-prosthetic Fx. Official impression reads pending.             A/P: 60y Female S/p L Total Knee Arthroplasty. VSS. NAD  -PT/OT-WBAT LLE  -FU AM labs tomorrow  -IS  -DVT PPx: ASA 81mg PO BID, SCDs, early OOB and ambulation.  -Pain Control  -Continue Current Tx  -Dispo planning PACU to Floor    Emery Cage PA-C  Orthopedic Surgery Team  Team Pager #[phone]

## 2025-07-07 NOTE — PHYSICAL THERAPY INITIAL EVALUATION ADULT - ADDITIONAL COMMENTS
owns straight cane and RW as per pt, resides in a  with spouse, +2 stairs to enter, one flight up to bedroom, PTA, pt amb (I), (I) with ADLs. owns rolling walker and 4 canes.

## 2025-07-07 NOTE — DISCHARGE NOTE PROVIDER - CARE PROVIDER_API CALL
Maycol Ochoa  Orthopaedic Surgery  [address], [address]  [place] [address]  Phone: [phone]  Fax: [phone]  Established Patient  Scheduled Appointment: 07/23/2025

## 2025-07-07 NOTE — OCCUPATIONAL THERAPY INITIAL EVALUATION ADULT - SOCIAL CONCERNS
patient presents for laparoscopic choley due to stones. pt denies current cp,sob,palp,cough,dysuria, fever.   can walk 2 fos and several blocks without sob.  pt states this is complete med/surg history  . None

## 2025-07-07 NOTE — DISCHARGE NOTE PROVIDER - NSDCFUADDINST_GEN_ALL_CORE_FT
Please call to schedule your post-operative follow up appointment with Dr. Ochoa for 2 weeks after hospital discharge.   Keep dressing clean, dry, and intact. Have doctor remove bandage at your post-operative follow up appointment.   Shower: with assistance. Keep the dressing away from the stream of water. Pat the dressing dry when coming out of the shower. No tub baths.   Continue to ambulate as tolerated to the LEFT LEG with a rolling walker.   Continue to take Aspirin 81mg twice daily x 6 weeks to help prevent blood clots. Please continue taking Protonix 40mg daily while taking aspirin for gastrointestinal protection.   Recommended to follow up with your primary care provider within 1-2 months of hospital discharge to discuss your recent surgery and any change to your medications.
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
## 2025-07-07 NOTE — OCCUPATIONAL THERAPY INITIAL EVALUATION ADULT - DIAGNOSIS, OT EVAL
Pt with decreased ADL status and impairments with functional mobility due to decreased ROM, strength, balance, weight shifting.

## 2025-07-07 NOTE — OCCUPATIONAL THERAPY INITIAL EVALUATION ADULT - PERTINENT HX OF CURRENT PROBLEM, REHAB EVAL
59 YO F PMH CAD, hypertension, hyperlipidemia, Lupus, fibromyalgia, hypothyroidism, Irritable bowel syndrome, anemia, depression, s/p spinal fusion surgery (5/5/2025), osteoarthritis, has failed conservative treatment including NSAIDs, injections and knee aspiration, presents to PST for scheduled LEFT TOTAL KNEE REPLACEMENT WITH MERLIN ROBOT 7/7/2025. Denies any palpitations, SOB, N/V, fever or chills. Pt s/p L TKR on 7/7.

## 2025-07-07 NOTE — PHYSICAL THERAPY INITIAL EVALUATION ADULT - ACTIVE RANGE OF MOTION EXAMINATION, REHAB EVAL
LLE mod limited d/t pain/bilateral upper extremity Active ROM was WFL (within functional limits)/Right LE Active ROM was WFL (within functional limits)/deficits as listed below

## 2025-07-07 NOTE — DISCHARGE NOTE PROVIDER - NSDCFUSCHEDAPPT_GEN_ALL_CORE_FT
Maycol Ochoa  [facility]  ORTHOSURG [address] [facility]  Scheduled Appointment: 07/23/2025    Shan Lamar  [facility]  GASTRO [address]  Scheduled Appointment: 09/26/2025    Roe Sales  [facility]  INTMED [address] [facility]  Scheduled Appointment: 09/29/2025

## 2025-07-07 NOTE — PHYSICAL THERAPY INITIAL EVALUATION ADULT - PERTINENT HX OF CURRENT PROBLEM, REHAB EVAL
59 YO F PMH CAD, hypertension, hyperlipidemia, Lupus, fibromyalgia, hypothyroidism, Irritable bowel syndrome, anemia, depression, s/p spinal fusion surgery (5/5/2025), osteoarthritis, has failed conservative treatment including NSAIDs, injections and knee aspiration, presents to PST for scheduled LEFT TOTAL KNEE REPLACEMENT WITH MERLIN ROBOT 7/7/2025. Denies any palpitations, SOB, N/V, fever or chills.   image pending

## 2025-07-07 NOTE — OCCUPATIONAL THERAPY INITIAL EVALUATION ADULT - GENERAL OBSERVATIONS, REHAB EVAL
Pt found semi supine, +sequential compression device, +bandage LLE, +external female catheter, +IV, NAD.

## 2025-07-07 NOTE — PRE-OP CHECKLIST - BP NONINVASIVE DIASTOLIC (MM HG)
Spoke with patient about current symptoms and birth plan. Pt tried taking 1-2 mg extra prednisone and nausea did not significantly improve. Is tolerating small, more frequent meals. Discussed with patient that she can take 1 -2 mg extra prednisone PRN during times of significant nausea.    Signed: Margo Donald MD     82

## 2025-07-07 NOTE — OCCUPATIONAL THERAPY INITIAL EVALUATION ADULT - ADDITIONAL COMMENTS
Pt lives in private home with 4 steps to enter, 14 steps to bedroom/bathroom, stall shower with shower chair. Pt owns rolling walker and shower chair. Prior to admission, pt was independent with ADLs, IADLs and functional mobility without DME. +, +reading glasses, +R hand dominant.

## 2025-07-08 ENCOUNTER — TRANSCRIPTION ENCOUNTER (OUTPATIENT)
Age: 60
End: 2025-07-08

## 2025-07-08 LAB
ANION GAP SERPL CALC-SCNC: 12 MMOL/L — SIGNIFICANT CHANGE UP (ref 5–17)
BUN SERPL-MCNC: 17 MG/DL — SIGNIFICANT CHANGE UP (ref 7–23)
CALCIUM SERPL-MCNC: 8.9 MG/DL — SIGNIFICANT CHANGE UP (ref 8.4–10.5)
CHLORIDE SERPL-SCNC: 102 MMOL/L — SIGNIFICANT CHANGE UP (ref 96–108)
CO2 SERPL-SCNC: 23 MMOL/L — SIGNIFICANT CHANGE UP (ref 22–31)
CREAT SERPL-MCNC: 0.75 MG/DL — SIGNIFICANT CHANGE UP (ref 0.5–1.3)
EGFR: 90 ML/MIN/1.73M2 — SIGNIFICANT CHANGE UP
EGFR: 90 ML/MIN/1.73M2 — SIGNIFICANT CHANGE UP
GLUCOSE SERPL-MCNC: 124 MG/DL — HIGH (ref 70–99)
HCT VFR BLD CALC: 28.5 % — LOW (ref 34.5–45)
HGB BLD-MCNC: 9 G/DL — LOW (ref 11.5–15.5)
MCHC RBC-ENTMCNC: 30.7 PG — SIGNIFICANT CHANGE UP (ref 27–34)
MCHC RBC-ENTMCNC: 31.6 G/DL — LOW (ref 32–36)
MCV RBC AUTO: 97.3 FL — SIGNIFICANT CHANGE UP (ref 80–100)
NRBC # BLD AUTO: 0 K/UL — SIGNIFICANT CHANGE UP (ref 0–0)
NRBC # FLD: 0 K/UL — SIGNIFICANT CHANGE UP (ref 0–0)
NRBC BLD AUTO-RTO: 0 /100 WBCS — SIGNIFICANT CHANGE UP (ref 0–0)
PLATELET # BLD AUTO: 271 K/UL — SIGNIFICANT CHANGE UP (ref 150–400)
PMV BLD: 9.4 FL — SIGNIFICANT CHANGE UP (ref 7–13)
POTASSIUM SERPL-MCNC: 4 MMOL/L — SIGNIFICANT CHANGE UP (ref 3.5–5.3)
POTASSIUM SERPL-SCNC: 4 MMOL/L — SIGNIFICANT CHANGE UP (ref 3.5–5.3)
RBC # BLD: 2.93 M/UL — LOW (ref 3.8–5.2)
RBC # FLD: 12.9 % — SIGNIFICANT CHANGE UP (ref 10.3–14.5)
SODIUM SERPL-SCNC: 137 MMOL/L — SIGNIFICANT CHANGE UP (ref 135–145)
WBC # BLD: 7.17 K/UL — SIGNIFICANT CHANGE UP (ref 3.8–10.5)
WBC # FLD AUTO: 7.17 K/UL — SIGNIFICANT CHANGE UP (ref 3.8–10.5)

## 2025-07-08 PROCEDURE — 97162 PT EVAL MOD COMPLEX 30 MIN: CPT

## 2025-07-08 PROCEDURE — 73560 X-RAY EXAM OF KNEE 1 OR 2: CPT

## 2025-07-08 PROCEDURE — 82962 GLUCOSE BLOOD TEST: CPT

## 2025-07-08 PROCEDURE — C9399: CPT

## 2025-07-08 PROCEDURE — 97166 OT EVAL MOD COMPLEX 45 MIN: CPT

## 2025-07-08 PROCEDURE — 97530 THERAPEUTIC ACTIVITIES: CPT

## 2025-07-08 PROCEDURE — 85027 COMPLETE CBC AUTOMATED: CPT

## 2025-07-08 PROCEDURE — 80048 BASIC METABOLIC PNL TOTAL CA: CPT

## 2025-07-08 PROCEDURE — 97116 GAIT TRAINING THERAPY: CPT

## 2025-07-08 RX ORDER — OXYCODONE HYDROCHLORIDE 30 MG/1
5 TABLET ORAL EVERY 4 HOURS
Refills: 0 | Status: DISCONTINUED | OUTPATIENT
Start: 2025-07-08 | End: 2025-07-09

## 2025-07-08 RX ORDER — OXYCODONE HYDROCHLORIDE 30 MG/1
10 TABLET ORAL EVERY 4 HOURS
Refills: 0 | Status: DISCONTINUED | OUTPATIENT
Start: 2025-07-08 | End: 2025-07-09

## 2025-07-08 RX ORDER — LACTULOSE 10 G/15ML
10 SOLUTION ORAL DAILY
Refills: 0 | Status: DISCONTINUED | OUTPATIENT
Start: 2025-07-08 | End: 2025-07-08

## 2025-07-08 RX ORDER — NALOXONE HYDROCHLORIDE 0.4 MG/ML
1 INJECTION, SOLUTION INTRAMUSCULAR; INTRAVENOUS; SUBCUTANEOUS
Qty: 1 | Refills: 0
Start: 2025-07-08

## 2025-07-08 RX ORDER — OXYCODONE HYDROCHLORIDE 30 MG/1
1 TABLET ORAL
Qty: 42 | Refills: 0
Start: 2025-07-08 | End: 2025-07-14

## 2025-07-08 RX ORDER — SENNA 187 MG
2 TABLET ORAL
Qty: 14 | Refills: 0
Start: 2025-07-08 | End: 2025-07-14

## 2025-07-08 RX ORDER — POLYETHYLENE GLYCOL 3350 17 G/17G
17 POWDER, FOR SOLUTION ORAL DAILY
Refills: 0 | Status: DISCONTINUED | OUTPATIENT
Start: 2025-07-08 | End: 2025-07-08

## 2025-07-08 RX ORDER — ASPIRIN 325 MG
1 TABLET ORAL
Qty: 84 | Refills: 0
Start: 2025-07-08 | End: 2025-08-18

## 2025-07-08 RX ORDER — POLYETHYLENE GLYCOL 3350 17 G/17G
17 POWDER, FOR SOLUTION ORAL
Qty: 119 | Refills: 0
Start: 2025-07-08 | End: 2025-07-14

## 2025-07-08 RX ORDER — ACETAMINOPHEN 500 MG/5ML
2 LIQUID (ML) ORAL
Qty: 0 | Refills: 0 | DISCHARGE

## 2025-07-08 RX ORDER — DIPHENHYDRAMINE HCL 12.5MG/5ML
25 ELIXIR ORAL ONCE
Refills: 0 | Status: COMPLETED | OUTPATIENT
Start: 2025-07-08 | End: 2025-07-08

## 2025-07-08 RX ORDER — BISACODYL 5 MG
5 TABLET, DELAYED RELEASE (ENTERIC COATED) ORAL EVERY 12 HOURS
Refills: 0 | Status: DISCONTINUED | OUTPATIENT
Start: 2025-07-08 | End: 2025-07-09

## 2025-07-08 RX ORDER — CELECOXIB 50 MG/1
1 CAPSULE ORAL
Qty: 28 | Refills: 0
Start: 2025-07-08 | End: 2025-07-21

## 2025-07-08 RX ORDER — TRAMADOL HYDROCHLORIDE 50 MG/1
1 TABLET, FILM COATED ORAL
Qty: 21 | Refills: 0
Start: 2025-07-08 | End: 2025-07-14

## 2025-07-08 RX ADMIN — ATORVASTATIN CALCIUM 20 MILLIGRAM(S): 80 TABLET, FILM COATED ORAL at 21:10

## 2025-07-08 RX ADMIN — HYDROXYCHLOROQUINE SULFATE 200 MILLIGRAM(S): 200 TABLET, FILM COATED ORAL at 05:33

## 2025-07-08 RX ADMIN — Medication 400 MILLIGRAM(S): at 11:56

## 2025-07-08 RX ADMIN — Medication 81 MILLIGRAM(S): at 18:33

## 2025-07-08 RX ADMIN — Medication 81 MILLIGRAM(S): at 05:33

## 2025-07-08 RX ADMIN — Medication 400 MILLIGRAM(S): at 02:02

## 2025-07-08 RX ADMIN — POLYETHYLENE GLYCOL 3350 17 GRAM(S): 17 POWDER, FOR SOLUTION ORAL at 21:11

## 2025-07-08 RX ADMIN — KETOROLAC TROMETHAMINE 15 MILLIGRAM(S): 30 INJECTION, SOLUTION INTRAMUSCULAR; INTRAVENOUS at 02:02

## 2025-07-08 RX ADMIN — Medication 40 MILLIGRAM(S): at 05:33

## 2025-07-08 RX ADMIN — GABAPENTIN 400 MILLIGRAM(S): 400 CAPSULE ORAL at 18:32

## 2025-07-08 RX ADMIN — OXYCODONE HYDROCHLORIDE 10 MILLIGRAM(S): 30 TABLET ORAL at 11:39

## 2025-07-08 RX ADMIN — OXYCODONE HYDROCHLORIDE 10 MILLIGRAM(S): 30 TABLET ORAL at 12:30

## 2025-07-08 RX ADMIN — CLONAZEPAM 0.5 MILLIGRAM(S): 0.5 TABLET ORAL at 05:32

## 2025-07-08 RX ADMIN — Medication 5 MILLIGRAM(S): at 18:33

## 2025-07-08 RX ADMIN — GABAPENTIN 400 MILLIGRAM(S): 400 CAPSULE ORAL at 05:33

## 2025-07-08 RX ADMIN — Medication 2 TABLET(S): at 21:10

## 2025-07-08 RX ADMIN — CLONAZEPAM 0.5 MILLIGRAM(S): 0.5 TABLET ORAL at 18:32

## 2025-07-08 RX ADMIN — KETOROLAC TROMETHAMINE 15 MILLIGRAM(S): 30 INJECTION, SOLUTION INTRAMUSCULAR; INTRAVENOUS at 10:30

## 2025-07-08 RX ADMIN — OXYCODONE HYDROCHLORIDE 10 MILLIGRAM(S): 30 TABLET ORAL at 18:42

## 2025-07-08 RX ADMIN — Medication 1000 MILLIGRAM(S): at 13:00

## 2025-07-08 RX ADMIN — Medication 100 MICROGRAM(S): at 05:32

## 2025-07-08 RX ADMIN — CELECOXIB 200 MILLIGRAM(S): 50 CAPSULE ORAL at 16:30

## 2025-07-08 RX ADMIN — KETOROLAC TROMETHAMINE 15 MILLIGRAM(S): 30 INJECTION, SOLUTION INTRAMUSCULAR; INTRAVENOUS at 09:29

## 2025-07-08 RX ADMIN — Medication 500 MILLILITER(S): at 05:33

## 2025-07-08 RX ADMIN — LACTULOSE 10 GRAM(S): 10 SOLUTION ORAL at 15:24

## 2025-07-08 RX ADMIN — OXYCODONE HYDROCHLORIDE 10 MILLIGRAM(S): 30 TABLET ORAL at 00:00

## 2025-07-08 RX ADMIN — DOXAZOSIN MESYLATE 2 MILLIGRAM(S): 8 TABLET ORAL at 21:10

## 2025-07-08 RX ADMIN — METOPROLOL SUCCINATE 25 MILLIGRAM(S): 50 TABLET, EXTENDED RELEASE ORAL at 18:33

## 2025-07-08 RX ADMIN — OXYCODONE HYDROCHLORIDE 10 MILLIGRAM(S): 30 TABLET ORAL at 00:31

## 2025-07-08 RX ADMIN — Medication 25 MILLIGRAM(S): at 16:33

## 2025-07-08 RX ADMIN — Medication 975 MILLIGRAM(S): at 18:34

## 2025-07-08 RX ADMIN — HYDROXYCHLOROQUINE SULFATE 200 MILLIGRAM(S): 200 TABLET, FILM COATED ORAL at 18:37

## 2025-07-08 RX ADMIN — CELECOXIB 200 MILLIGRAM(S): 50 CAPSULE ORAL at 15:24

## 2025-07-08 RX ADMIN — VENLAFAXINE HYDROCHLORIDE 150 MILLIGRAM(S): 37.5 CAPSULE, EXTENDED RELEASE ORAL at 11:39

## 2025-07-08 NOTE — DISCHARGE NOTE NURSING/CASE MANAGEMENT/SOCIAL WORK - PATIENT PORTAL LINK FT
You can access the FollowMyHealth Patient Portal offered by [facility] by registering at the following website: [email or web address]. By joining [employer]’s FollowMyHealth portal, you will also be able to view your health information using other applications (apps) compatible with our system.

## 2025-07-08 NOTE — DISCHARGE NOTE NURSING/CASE MANAGEMENT/SOCIAL WORK - NSDCVIVACCINE_GEN_ALL_CORE_FT
influenza, injectable, quadrivalent, preservative free; 10-Sep-2016 13:02; Lisa Merida (RN); [employer]; [id number]; IntraMuscular; Deltoid Left.; 0.5 milliLiter(s); VIS (VIS Published: 07-Aug-2015, VIS Presented: 10-Sep-2016);   influenza, injectable, quadrivalent, preservative free; 02-Oct-2018 16:27; Waylon Frazier (SHRUTHI); [employer]; 499DE (Exp. Date: 30-Jun-2019); IntraMuscular; Deltoid Left.; 0.5 milliLiter(s); VIS (VIS Published: 07-Aug-2015, VIS Presented: 02-Oct-2018);

## 2025-07-08 NOTE — PROGRESS NOTE ADULT - SUBJECTIVE AND OBJECTIVE BOX
SUBJECTIVE:   Patient seen and examined at bedside. Pt doing generally well.    Pain well controlled with medication  Worked with PT yesterday       OBJECTIVE:   Vital Signs Last 24 Hrs  T(C): 36.7 (08 Jul 2025 04:45), Max: 36.9 (07 Jul 2025 21:14)  T(F): 98 (08 Jul 2025 04:45), Max: 98.5 (07 Jul 2025 21:14)  HR: 82 (08 Jul 2025 04:45) (61 - 94)  BP: 104/61 (08 Jul 2025 04:45) (91/53 - 140/75)  BP(mean): 78 (07 Jul 2025 12:00) (70 - 80)  RR: 18 (08 Jul 2025 04:45) (13 - 19)  SpO2: 93% (08 Jul 2025 04:45) (92% - 100%)    Parameters below as of 08 Jul 2025 04:45  Patient On (Oxygen Delivery Method): room air        General: NAD  Neuro: Alert  and oriented  Resp: Non-labored breathing, no accessory muscle use  Left Lower extremity:         Skin intact         Dressing: clean/dry/intact            Sensation: SILT         Motor exam: + TA/GS/EHL/FHL       compartments soft         warm well perfused        +DP pulse        capillary refill <3 seconds   MSK: Moving all other extremities spontaneously    LABS:              MEDS:  MEDICATIONS  (PRN):  magnesium hydroxide Suspension 30 milliLiter(s) Oral daily PRN Constipation  ondansetron Injectable 4 milliGRAM(s) IV Push every 6 hours PRN Nausea and/or Vomiting  oxyCODONE    IR 5 milliGRAM(s) Oral every 3 hours PRN Moderate Pain (4 - 6)  oxyCODONE    IR 10 milliGRAM(s) Oral every 3 hours PRN Severe Pain (7 - 10)  traMADol 50 milliGRAM(s) Oral every 6 hours PRN Mild Pain (1 - 3)    acetaminophen     Tablet .. 975 milliGRAM(s) Oral every 8 hours  acetaminophen   IVPB .. 1000 milliGRAM(s) IV Intermittent once  aspirin enteric coated 81 milliGRAM(s) Oral two times a day  atorvastatin 20 milliGRAM(s) Oral at bedtime  celecoxib 200 milliGRAM(s) Oral every 12 hours  clonazePAM  Tablet 0.5 milliGRAM(s) Oral two times a day  doxazosin 2 milliGRAM(s) Oral at bedtime  gabapentin 400 milliGRAM(s) Oral two times a day  hydroxychloroquine 200 milliGRAM(s) Oral two times a day  ketorolac   Injectable 15 milliGRAM(s) IV Push every 6 hours  levothyroxine 100 MICROGram(s) Oral daily  magnesium hydroxide Suspension 30 milliLiter(s) Oral daily PRN  metoprolol tartrate 25 milliGRAM(s) Oral two times a day  ondansetron Injectable 4 milliGRAM(s) IV Push every 6 hours PRN  oxyCODONE    IR 5 milliGRAM(s) Oral every 3 hours PRN  oxyCODONE    IR 10 milliGRAM(s) Oral every 3 hours PRN  pantoprazole    Tablet 40 milliGRAM(s) Oral before breakfast  polyethylene glycol 3350 17 Gram(s) Oral at bedtime  senna 2 Tablet(s) Oral at bedtime  traMADol 50 milliGRAM(s) Oral every 6 hours PRN  venlafaxine XR. 150 milliGRAM(s) Oral daily      ASSESSMENT & PLAN:  Pt is a 60y y/o  Female   s/p Left TKA on 7/8 . Patient is hemodynamically stable; recovering well from orthopaedic standpoint.  -    Multimodal Pain control  -    DVT ppx: ASA   -    Resumed home meds  -    Check AM labs  -    Weight bearing status: WBAT   -    PT/OT  -    Dispo: Home pending stair negotiation        Orthopaedic Surgery  [facility] [id number]  [facility]        [id number]  [facility]  [id number]/ [phone]   SUBJECTIVE:   Patient seen and examined at bedside. Pt doing generally well but states her foot is numb. She thinks its because her foot was elevated without anything under her knee. No issues otherwise  Worked with PT yesterday.      OBJECTIVE:   Vital Signs Last 24 Hrs  T(C): 36.7 (08 Jul 2025 04:45), Max: 36.9 (07 Jul 2025 21:14)  T(F): 98 (08 Jul 2025 04:45), Max: 98.5 (07 Jul 2025 21:14)  HR: 82 (08 Jul 2025 04:45) (61 - 94)  BP: 104/61 (08 Jul 2025 04:45) (91/53 - 140/75)  BP(mean): 78 (07 Jul 2025 12:00) (70 - 80)  RR: 18 (08 Jul 2025 04:45) (13 - 19)  SpO2: 93% (08 Jul 2025 04:45) (92% - 100%)    Parameters below as of 08 Jul 2025 04:45  Patient On (Oxygen Delivery Method): room air        General: NAD  Neuro: Alert  and oriented  Resp: Non-labored breathing, no accessory muscle use  Left Lower extremity:         Skin intact         Dressing: clean/dry/intact            Sensation: dec sensation later/medial/dorsum foot, intact plantar foot          Motor exam: + TA/GS       +DP pulse  MSK: Moving all other extremities spontaneously    LABS:              MEDS:  MEDICATIONS  (PRN):  magnesium hydroxide Suspension 30 milliLiter(s) Oral daily PRN Constipation  ondansetron Injectable 4 milliGRAM(s) IV Push every 6 hours PRN Nausea and/or Vomiting  oxyCODONE    IR 5 milliGRAM(s) Oral every 3 hours PRN Moderate Pain (4 - 6)  oxyCODONE    IR 10 milliGRAM(s) Oral every 3 hours PRN Severe Pain (7 - 10)  traMADol 50 milliGRAM(s) Oral every 6 hours PRN Mild Pain (1 - 3)    acetaminophen     Tablet .. 975 milliGRAM(s) Oral every 8 hours  acetaminophen   IVPB .. 1000 milliGRAM(s) IV Intermittent once  aspirin enteric coated 81 milliGRAM(s) Oral two times a day  atorvastatin 20 milliGRAM(s) Oral at bedtime  celecoxib 200 milliGRAM(s) Oral every 12 hours  clonazePAM  Tablet 0.5 milliGRAM(s) Oral two times a day  doxazosin 2 milliGRAM(s) Oral at bedtime  gabapentin 400 milliGRAM(s) Oral two times a day  hydroxychloroquine 200 milliGRAM(s) Oral two times a day  ketorolac   Injectable 15 milliGRAM(s) IV Push every 6 hours  levothyroxine 100 MICROGram(s) Oral daily  magnesium hydroxide Suspension 30 milliLiter(s) Oral daily PRN  metoprolol tartrate 25 milliGRAM(s) Oral two times a day  ondansetron Injectable 4 milliGRAM(s) IV Push every 6 hours PRN  oxyCODONE    IR 5 milliGRAM(s) Oral every 3 hours PRN  oxyCODONE    IR 10 milliGRAM(s) Oral every 3 hours PRN  pantoprazole    Tablet 40 milliGRAM(s) Oral before breakfast  polyethylene glycol 3350 17 Gram(s) Oral at bedtime  senna 2 Tablet(s) Oral at bedtime  traMADol 50 milliGRAM(s) Oral every 6 hours PRN  venlafaxine XR. 150 milliGRAM(s) Oral daily      ASSESSMENT & PLAN:  Pt is a 60y y/o  Female   s/p Left TKA on 7/7 . Patient is hemodynamically stable; recovering well from orthopaedic standpoint.  -    Multimodal Pain control  -    DVT ppx: ASA   -    Resumed home meds  -    Check AM labs  -    Weight bearing status: WBAT   -    PT/OT  -    Dispo: Home pending stair negotiation        Orthopaedic Surgery  [facility] [id number]  [facility]        [id number]  [facility]  [id number]/ [phone]

## 2025-07-08 NOTE — DISCHARGE NOTE NURSING/CASE MANAGEMENT/SOCIAL WORK - FINANCIAL ASSISTANCE
[facility] provides services at a reduced cost to those who are determined to be eligible through [facility]’s financial assistance program. Information regarding [facility]’s financial assistance program can be found by going to [email or web address] or by calling [phone].

## 2025-07-09 ENCOUNTER — APPOINTMENT (OUTPATIENT)
Dept: HOME HEALTH SERVICES | Facility: HOME HEALTH | Age: 60
End: 2025-07-09

## 2025-07-09 VITALS
WEIGHT: 170 LBS | RESPIRATION RATE: 16 BRPM | HEART RATE: 112 BPM | BODY MASS INDEX: 33.38 KG/M2 | OXYGEN SATURATION: 98 % | TEMPERATURE: 98.7 F | SYSTOLIC BLOOD PRESSURE: 108 MMHG | HEIGHT: 60 IN | DIASTOLIC BLOOD PRESSURE: 72 MMHG

## 2025-07-09 VITALS
HEART RATE: 100 BPM | RESPIRATION RATE: 18 BRPM | SYSTOLIC BLOOD PRESSURE: 108 MMHG | OXYGEN SATURATION: 95 % | TEMPERATURE: 99 F | DIASTOLIC BLOOD PRESSURE: 72 MMHG

## 2025-07-09 PROCEDURE — 80048 BASIC METABOLIC PNL TOTAL CA: CPT

## 2025-07-09 PROCEDURE — 73560 X-RAY EXAM OF KNEE 1 OR 2: CPT

## 2025-07-09 PROCEDURE — 97116 GAIT TRAINING THERAPY: CPT

## 2025-07-09 PROCEDURE — 97166 OT EVAL MOD COMPLEX 45 MIN: CPT

## 2025-07-09 PROCEDURE — C1713: CPT

## 2025-07-09 PROCEDURE — 82962 GLUCOSE BLOOD TEST: CPT

## 2025-07-09 PROCEDURE — 85027 COMPLETE CBC AUTOMATED: CPT

## 2025-07-09 PROCEDURE — S2900: CPT

## 2025-07-09 PROCEDURE — C9399: CPT

## 2025-07-09 PROCEDURE — 97162 PT EVAL MOD COMPLEX 30 MIN: CPT

## 2025-07-09 PROCEDURE — 97530 THERAPEUTIC ACTIVITIES: CPT

## 2025-07-09 PROCEDURE — C1776: CPT

## 2025-07-09 RX ADMIN — OXYCODONE HYDROCHLORIDE 10 MILLIGRAM(S): 30 TABLET ORAL at 01:16

## 2025-07-09 RX ADMIN — CELECOXIB 200 MILLIGRAM(S): 50 CAPSULE ORAL at 04:57

## 2025-07-09 RX ADMIN — MAGNESIUM HYDROXIDE 30 MILLILITER(S): 400 SUSPENSION ORAL at 10:38

## 2025-07-09 RX ADMIN — Medication 975 MILLIGRAM(S): at 04:56

## 2025-07-09 RX ADMIN — GABAPENTIN 400 MILLIGRAM(S): 400 CAPSULE ORAL at 06:01

## 2025-07-09 RX ADMIN — HYDROXYCHLOROQUINE SULFATE 200 MILLIGRAM(S): 200 TABLET, FILM COATED ORAL at 04:56

## 2025-07-09 RX ADMIN — Medication 40 MILLIGRAM(S): at 04:57

## 2025-07-09 RX ADMIN — Medication 81 MILLIGRAM(S): at 04:56

## 2025-07-09 RX ADMIN — CLONAZEPAM 0.5 MILLIGRAM(S): 0.5 TABLET ORAL at 06:01

## 2025-07-09 RX ADMIN — OXYCODONE HYDROCHLORIDE 10 MILLIGRAM(S): 30 TABLET ORAL at 00:16

## 2025-07-09 RX ADMIN — Medication 975 MILLIGRAM(S): at 05:14

## 2025-07-09 RX ADMIN — CELECOXIB 200 MILLIGRAM(S): 50 CAPSULE ORAL at 05:14

## 2025-07-09 RX ADMIN — Medication 975 MILLIGRAM(S): at 11:26

## 2025-07-09 RX ADMIN — VENLAFAXINE HYDROCHLORIDE 150 MILLIGRAM(S): 37.5 CAPSULE, EXTENDED RELEASE ORAL at 11:24

## 2025-07-09 RX ADMIN — Medication 5 MILLIGRAM(S): at 10:38

## 2025-07-09 RX ADMIN — Medication 100 MICROGRAM(S): at 04:56

## 2025-07-09 RX ADMIN — OXYCODONE HYDROCHLORIDE 10 MILLIGRAM(S): 30 TABLET ORAL at 10:38

## 2025-07-09 NOTE — PROGRESS NOTE ADULT - SUBJECTIVE AND OBJECTIVE BOX
SUBJECTIVE:   Patient seen and examined at bedside. Pt doing generally well but states her foot numbness has resolved.    OBJECTIVE:  Vital Signs Last 24 Hrs  T(C): 37.2 (09 Jul 2025 04:34), Max: 37.2 (08 Jul 2025 23:40)  T(F): 98.9 (09 Jul 2025 04:34), Max: 98.9 (08 Jul 2025 23:40)  HR: 83 (09 Jul 2025 04:34) (60 - 84)  BP: 107/67 (09 Jul 2025 04:34) (104/65 - 117/57)  BP(mean): --  RR: 18 (09 Jul 2025 04:34) (18 - 18)  SpO2: 92% (09 Jul 2025 04:34) (92% - 96%)    Parameters below as of 09 Jul 2025 04:34  Patient On (Oxygen Delivery Method): room air        General: NAD  Neuro: Alert  and oriented  Resp: Non-labored breathing, no accessory muscle use  Left Lower extremity:         Skin intact         Dressing: clean/dry/intact            Sensation: ILT         Motor exam: + TA/GS/EHL/FHL       +DP pulse  MSK: Moving all other extremities spontaneously    LABS:              MEDS:  MEDICATIONS  (PRN):  magnesium hydroxide Suspension 30 milliLiter(s) Oral daily PRN Constipation  ondansetron Injectable 4 milliGRAM(s) IV Push every 6 hours PRN Nausea and/or Vomiting  oxyCODONE    IR 5 milliGRAM(s) Oral every 3 hours PRN Moderate Pain (4 - 6)  oxyCODONE    IR 10 milliGRAM(s) Oral every 3 hours PRN Severe Pain (7 - 10)  traMADol 50 milliGRAM(s) Oral every 6 hours PRN Mild Pain (1 - 3)    acetaminophen     Tablet .. 975 milliGRAM(s) Oral every 8 hours  acetaminophen   IVPB .. 1000 milliGRAM(s) IV Intermittent once  aspirin enteric coated 81 milliGRAM(s) Oral two times a day  atorvastatin 20 milliGRAM(s) Oral at bedtime  celecoxib 200 milliGRAM(s) Oral every 12 hours  clonazePAM  Tablet 0.5 milliGRAM(s) Oral two times a day  doxazosin 2 milliGRAM(s) Oral at bedtime  gabapentin 400 milliGRAM(s) Oral two times a day  hydroxychloroquine 200 milliGRAM(s) Oral two times a day  ketorolac   Injectable 15 milliGRAM(s) IV Push every 6 hours  levothyroxine 100 MICROGram(s) Oral daily  magnesium hydroxide Suspension 30 milliLiter(s) Oral daily PRN  metoprolol tartrate 25 milliGRAM(s) Oral two times a day  ondansetron Injectable 4 milliGRAM(s) IV Push every 6 hours PRN  oxyCODONE    IR 5 milliGRAM(s) Oral every 3 hours PRN  oxyCODONE    IR 10 milliGRAM(s) Oral every 3 hours PRN  pantoprazole    Tablet 40 milliGRAM(s) Oral before breakfast  polyethylene glycol 3350 17 Gram(s) Oral at bedtime  senna 2 Tablet(s) Oral at bedtime  traMADol 50 milliGRAM(s) Oral every 6 hours PRN  venlafaxine XR. 150 milliGRAM(s) Oral daily      ASSESSMENT & PLAN:  Pt is a 60y y/o  Female   s/p Left TKA on 7/7 . Patient is hemodynamically stable; recovering well from orthopaedic standpoint.  -    Multimodal Pain control  -    DVT ppx: ASA   -    Resumed home meds  -    Check AM labs  -    Weight bearing status: WBAT   -    PT/OT  -    Dispo: Home today        Orthopaedic Surgery  [facility] [phone]  [facility]        [id number]  [facility]  [id number]/ [phone]

## 2025-07-10 RX ORDER — OXYCODONE 5 MG/1
5 TABLET ORAL EVERY 4 HOURS
Refills: 0 | Status: ACTIVE | COMMUNITY
Start: 2025-07-10

## 2025-07-18 ENCOUNTER — APPOINTMENT (OUTPATIENT)
Dept: ORTHOPEDIC SURGERY | Facility: CLINIC | Age: 60
End: 2025-07-18
Payer: MEDICARE

## 2025-07-18 VITALS — WEIGHT: 163 LBS | HEIGHT: 60 IN | BODY MASS INDEX: 32 KG/M2

## 2025-07-18 PROCEDURE — 99024 POSTOP FOLLOW-UP VISIT: CPT

## 2025-07-18 PROCEDURE — 73562 X-RAY EXAM OF KNEE 3: CPT | Mod: LT

## 2025-07-21 RX ORDER — CELECOXIB 100 MG/1
100 CAPSULE ORAL TWICE DAILY
Qty: 30 | Refills: 0 | Status: ACTIVE | COMMUNITY
Start: 2025-07-21 | End: 1900-01-01

## 2025-07-21 RX ORDER — TRAMADOL HYDROCHLORIDE 50 MG/1
50 TABLET, COATED ORAL
Qty: 30 | Refills: 0 | Status: ACTIVE | COMMUNITY
Start: 2025-07-21 | End: 1900-01-01

## 2025-07-21 RX ORDER — OXYCODONE 5 MG/1
5 TABLET ORAL 3 TIMES DAILY
Qty: 24 | Refills: 0 | Status: ACTIVE | COMMUNITY
Start: 2025-07-21 | End: 1900-01-01

## 2025-07-22 NOTE — H&P ADULT - NSHPPOAPRESSUREULCER_GEN_ALL_CORE
[facility]   Cardiac Follow Up     Referring Provider:  Haily Romero, MICHAEL-C     Chief Complaint   Patient presents with    Hypertension    Aortic valve stenosis, etiology of cardiac valve disease un    Hyperlipidemia    Follow-up    Shortness of Breath        History of Present Illness:  Destini Duarte is a 78 y.o. female with a history of hypertension and hyperlipidemia, right ventricular hypertrophy. She has chronic bilateral lower extremity edema. Has a hx of sleep apnea. Echo 10/2022 showed normal EF, mild MR, mild to moderate MR, and RVSP was 40 mm Hg.    She established care with Dr Rosales (nephrology) 4/2/24.      Today, Destini is here for a 6 month follow up.  She is ambulating with a 4 point cane.  She injured her left arm on Monday.  She got 2 shots in it and is taking Tylenol as needed.  She is having trouble lifting it up and moving it.  Shoulder and upper arm tender to touch on exam today.  Her Son drove her here today.  States that she had bronchitis twice and was put on a machine and inhaler.  See has an appointment to see Dr. House in September.        Past Medical History:   has a past medical history of Hyperlipidemia and Hypertension.    Surgical History:   has a past surgical history that includes joint replacement (Bilateral).     Social History:   reports that she quit smoking about 55 years ago. Her smoking use included cigarettes. She has been exposed to tobacco smoke. She has never used smokeless tobacco. She reports that she does not drink alcohol and does not use drugs.     Family History:  family history is not on file.     Home Medications:  Prior to Admission medications    Medication Sig Start Date End Date Taking? Authorizing Provider   albuterol (ACCUNEB) 0.63 MG/3ML nebulizer solution  4/15/25  Yes ProviderAlis MD   Albuterol-Budesonide (AIRSUPRA) 90-80 MCG/ACT AERO SMARTSIG:Via Inhaler 3/10/25  Yes ProviderAlis MD   predniSONE (DELTASONE) 10 
no

## 2025-07-28 ENCOUNTER — RX RENEWAL (OUTPATIENT)
Age: 60
End: 2025-07-28

## 2025-07-28 NOTE — ED ADULT NURSE NOTE - OBJECTIVE STATEMENT
Breast [facility] Oncology Clinic  Location: [facility]      BREAST CANCER DIAGNOSIS  Malignant neoplasm of central portion of left breast in female, estrogen receptor positive, Clinical: Stage IV (cT4b, cN1(f), cM1, G2, ER+, TX+, HER2-)         CURRENT THERAPY  Anastrozole since 1/13/25, with goserelin. Ribociclib discontinued due to renal failure.    HISTORY OF PRESENT ILLNESS    Iva Pollock is a 53 y.o. woman with recent diagnosis of left sided breast cancer ER/TX+ with ulcerating mass which has been there for a while.  Initial staging scans showed some evidence of bone mets and confirmed by cerianna estradiol pet.     Patient is feeling okay other than worsening pain in her elbows, wrists, thumbs, ankles, and hips. Endorses hot flashes. Continues taking anastrazole daily. Denies fevers, chills, chest pain, dyspnea, nausea, vomiting, diarrhea, constipation, abdominal pain, dysuria, or oliguria. Most recent CT scan on 7/25 shows stable disease.      Review of Systems   Constitutional: Negative.  Negative for appetite change, chills, diaphoresis, fatigue and fever.   HENT:  Negative.  Negative for hearing loss and lump/mass.    Eyes: Negative.  Negative for eye problems and icterus.   Respiratory: Negative.  Negative for chest tightness, cough, hemoptysis, shortness of breath and wheezing.    Cardiovascular: Negative.  Negative for chest pain, leg swelling and palpitations.   Gastrointestinal:  Negative for abdominal distention, abdominal pain, blood in stool, constipation, diarrhea and nausea.   Endocrine: Positive for hot flashes.   Genitourinary:  Negative for bladder incontinence, difficulty urinating, dyspareunia, dysuria, frequency, hematuria, menstrual problem and vaginal bleeding (+heavy menstruation).    Musculoskeletal:  Positive for arthralgias. Negative for back pain, gait problem and myalgias.        +back and hip pain non-radiating   Neurological:  Negative for dizziness, extremity weakness, gait  "problem, headaches, numbness and seizures.   Hematological:  Negative for adenopathy. Does not bruise/bleed easily.   Psychiatric/Behavioral:  Negative for confusion, depression and sleep disturbance. The patient is not nervous/anxious.    All other systems reviewed and are negative.        Past Medical History:  has a past medical history of Breast cancer (2024), Diabetes mellitus, type 2 (Multi), Gout, unspecified (2014), Hypertension (7/10/2024), Metastatic cancer (Multi) (2024), Other conditions influencing health status (2013), Other general symptoms and signs (2019), Pain in left shoulder (2016), Pain in right foot (2016), Pain in right hip (2016), Pain in unspecified foot (2014), Pneumonia, unspecified organism (2016), and Thrombocytopenia.  Surgical History:   has a past surgical history that includes Appendectomy (2016) and Cholecystectomy.  Social History:   reports that she has never smoked. She has never used smokeless tobacco. She reports that she does not currently use alcohol after a past usage of about 1.0 standard drink of alcohol per week. She reports that she does not use drugs.  Family History:    Family History   Problem Relation Name Age of Onset    Diabetes Mother      Hypertension Father      Throat cancer Mother's Brother      Diabetes Maternal Grandmother      Diabetes Maternal Grandfather      Diabetes Paternal Grandmother      Diabetes Paternal Grandfather       Family Oncology History:  Cancer-related family history is not on file.      OBJECTIVE    VS / Pain:  BP (S) (!) 151/93   Pulse 69   Temp 37 °C (98.6 °F) (Temporal)   Resp 18   Ht 1.6 m (5' 2.99\")   Wt 79.1 kg (174 lb 6.1 oz)   SpO2 98%   BMI 30.90 kg/m²   BSA: 1.87 meters squared   Pain Scale: 3    Performance Status:  The ECOG performance scale today is ECO- Fully active, able to carry on all pre-disease performance w/o restriction.          Physical " Exam  Constitutional:       General: She is not in acute distress.     Appearance: She is not ill-appearing, toxic-appearing or diaphoretic.   HENT:      Nose: No congestion or rhinorrhea.      Mouth/Throat:      Pharynx: No posterior oropharyngeal erythema.     Cardiovascular:      Rate and Rhythm: Normal rate and regular rhythm.      Pulses: Normal pulses.      Heart sounds: Normal heart sounds. No murmur heard.     No gallop.   Pulmonary:      Breath sounds: Normal breath sounds.   Abdominal:      General: There is no distension.      Palpations: There is no mass.      Tenderness: There is no abdominal tenderness.     Musculoskeletal:         General: No swelling.      Cervical back: No rigidity.      Right lower leg: No edema.      Left lower leg: No edema.   Lymphadenopathy:      Cervical: No cervical adenopathy.     Skin:     General: Skin is warm.      Coloration: Skin is not cyanotic.      Findings: No bruising, ecchymosis or erythema.     Neurological:      General: No focal deficit present.      Mental Status: She is oriented to person, place, and time.      Cranial Nerves: Cranial nerves 2-12 are intact.      Motor: Motor function is intact.     Psychiatric:         Attention and Perception: Attention and perception normal.         Mood and Affect: Mood and affect normal.         Behavior: Behavior normal.         Thought Content: Thought content normal.         Judgment: Judgment normal.           Diagnostic Results   Narrative & Impression   Interpreted By:  Jacinto Moscoso,  and Janki Mcguire   STUDY:  CT CHEST ABDOMEN PELVIS W IV CONTRAST;  7/25/2025 9:30 am      INDICATION:  Signs/Symptoms:IV breast cancer restaging CT.  Per EMR: Metastatic left breast cancer ER/[place] positive with bone  metastases. Currently on anastrozole since 01/13/25.      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      ACCESSION NUMBER(S):  [id number]      ORDERING CLINICIAN:  HANNAH BAKER      TECHNIQUE:  CT of the chest, abdomen, and pelvis was performed.  Contiguous axial  images were obtained at 3 mm slice thickness through the chest,  abdomen and pelvis. Coronal and sagittal reconstructions at 3 mm  slice thickness were performed. 75 ML of Omnipaque 350 was  administered intravenously without immediate complication.      FINDINGS:  CHEST:      LUNG/PLEURA/LARGE AIRWAYS:  The trachea and main stem bronchi are patent with no endobronchial  lesion. No consolidation pulmonary edema pleural effusion or  pneumothorax. Mild bilateral dependent atelectasis in the mid lungs.  Stable sub 0.5 cm bilateral lung nodules (series 3, image 156 and  135). No new suspicious lung nodule.      VESSELS:  Aorta and pulmonary arteries are normal caliber.  No atherosclerotic  changes of the aorta are identified.  No coronary artery  calcifications are present.      HEART:  The heart is normal in size.  There is no pericardial effusion.      MEDIASTINUM AND CON:  No enlarged intrathoracic or axillary lymph node by CT size criteria.  The esophagus wall appears within normal limits.      CHEST WALL AND LOWER NECK:  Similar to prior left breast mass measuring 4.0 X 3.0 Cm (series 2,  image 37). Stable oval soft tissue nodule/lymph node in the lateral  aspect of the left breast, measuring 1.4 x 0.8 cm (series 900, image  91). Similar appearance of the right thyroid hypoattenuating nodule.  Right-sided chest IJ catheter terminating in the right  atrium.      ABDOMEN:      LIVER:  Liver is normal in mildly enlarged measuring 18.3 cm in craniocaudal  dimension with no focal lesions.      BILE DUCTS:  The intrahepatic and extrahepatic ducts are not dilated.      GALLBLADDER:  Gallbladder is surgically absent.      PANCREAS:  The pancreas appears unremarkable without evidence of ductal  dilatation or masses.      SPLEEN:  The spleen is normal in size. An accessory splenule is present.      ADRENAL GLANDS:  Bilateral adrenal glands appear normal.      KIDNEYS AND URETERS:  The kidneys are normal in size and enhance symmetrically. Stable  hypodense right renal lesion too small to definitely characterize, as  well as nonobstructing punctate calcifications of the right kidney.  No hydroureteronephrosis or nephroureterolithiasis is identified.      PELVIS:      BLADDER:  The urinary bladder is decompressed, limited for evaluation.      REPRODUCTIVE ORGANS:  Heterogeneous bulky enlargement of the uterus, extending into the mid  abdomen causing displacement of the adjacent bowel loops and bladder,  likely representing leiomyomas.      BOWEL:  The stomach and small bowel are normal in appearance without wall  thickening or obstruction. Focal rectal wall thickening  (series 902,  image 65) similar to prior. No large bowel dilatation. Appendix is  not definitely visualized but there is no adjacent fat stranding or  edema concerning for appendicitis.          VESSELS:  There is no aneurysmal dilatation of the abdominal aorta. The IVC  appears normal.      PERITONEUM/RETROPERITONEUM/LYMPH NODES:  There is no free or loculated fluid collection, no free  intraperitoneal air. The retroperitoneum appears normal.  No  abdominopelvic lymphadenopathy is present.      BONE AND SOFT TISSUE:  Interval increasing sclerosis of the metastatic bone lesions  throughout the axial and appendicular skeleton involving all  thoracolumbar vertebral bodies, consistent with treatment  response.  Similar to prior cortical breakthrough of the anterior L1 vertebral  body (series 2, image 103). The abdominal wall soft tissues appear  normal.      IMPRESSION:  Restaging scan of breast cancer when compared to prior CT chest,  abdomen, and pelvis dated 04/10/2025 and PET-CT dated 01/09/2025.      1. Overall stable disease including the left breast mass and osseous  metastases. No evidence of new disease.  2. Additional incidental/nonacute findings as described above.          I personally reviewed the images/study and I agree with the findings  as stated above by resident physician, Shayla Rao MD.  This study was interpreted at [place], [place].      MACRO:  None      Signed by: Jacinto Moscoso 7/27/2025 9:38 AM  Dictation workstation:   [id number]       LABORATORY/PATHOLOGY DATA    Lab on 07/28/2025   Component Date Value Ref Range Status    CA 27.29 07/28/2025 29.2  0.0 - 38.6 U/mL Final    Glucose 07/28/2025 99  74 - 99 mg/dL Final    Sodium 07/28/2025 142  136 - 145 mmol/L Final    Potassium 07/28/2025 3.7  3.5 - 5.3 mmol/L Final    Chloride 07/28/2025 106  98 - 107 mmol/L Final    Bicarbonate 07/28/2025 26  21 - 32 mmol/L Final    Anion Gap 07/28/2025 14  10 - 20 mmol/L Final    Urea Nitrogen 07/28/2025 10  6 - 23 mg/dL Final    Creatinine 07/28/2025 1.17 (H)  0.50 - 1.05 mg/dL Final    eGFR 07/28/2025 55 (L)  >60 mL/min/1.73m*2 Final    Calcium 07/28/2025 9.2  8.6 - 10.3 mg/dL Final    Albumin 07/28/2025 3.9  3.4 - 5.0 g/dL Final    Alkaline Phosphatase 07/28/2025 44  33 - 110 U/L Final    Total Protein 07/28/2025 6.9  6.4 - 8.2 g/dL Final    AST 07/28/2025 18  9 - 39 U/L Final    Bilirubin, Total 07/28/2025 0.4  0.0 - 1.2 mg/dL Final    ALT 07/28/2025 11  7 - 45 U/L Final    WBC 07/28/2025 3.6 (L)  4.4 - 11.3 x10*3/uL Final    nRBC 07/28/2025 0.0  0.0 - 0.0 /100 WBCs Final    RBC 07/28/2025 3.92 (L)  4.00 - 5.20 x10*6/uL Final     Hemoglobin 07/28/2025 11.3 (L)  12.0 - 16.0 g/dL Final    Hematocrit 07/28/2025 34.5 (L)  36.0 - 46.0 % Final    MCV 07/28/2025 88  80 - 100 fL Final    MCH 07/28/2025 28.8  26.0 - 34.0 pg Final    MCHC 07/28/2025 32.8  32.0 - 36.0 g/dL Final    RDW 07/28/2025 12.8  11.5 - 14.5 % Final    Platelets 07/28/2025 182  150 - 450 x10*3/uL Final    Neutrophils % 07/28/2025 70.3  40.0 - 80.0 % Final    Immature Granulocytes %, Automated 07/28/2025 0.3  0.0 - 0.9 % Final    Lymphocytes % 07/28/2025 19.6  13.0 - 44.0 % Final    Monocytes % 07/28/2025 8.1  2.0 - 10.0 % Final    Eosinophils % 07/28/2025 1.1  0.0 - 6.0 % Final    Basophils % 07/28/2025 0.6  0.0 - 2.0 % Final    Neutrophils Absolute 07/28/2025 2.52  1.20 - 7.70 x10*3/uL Final    Immature Granulocytes Absolute, Au* 07/28/2025 0.01  0.00 - 0.70 x10*3/uL Final    Lymphocytes Absolute 07/28/2025 0.70 (L)  1.20 - 4.80 x10*3/uL Final    Monocytes Absolute 07/28/2025 0.29  0.10 - 1.00 x10*3/uL Final    Eosinophils Absolute 07/28/2025 0.04  0.00 - 0.70 x10*3/uL Final    Basophils Absolute 07/28/2025 0.02  0.00 - 0.10 x10*3/uL Final      Lab Results   Component Value Date    LABCA2 29.2 07/28/2025    LABCA2 38.3 06/16/2025    LABCA2 34.0 05/12/2025    LABCA2 50.4 (H) 04/10/2025    LABCA2 73.2 (H) 03/10/2025          IMPRESSION/PLAN    Stage 4 ER+/[place]+/HER2- breast cancer with evidence of bone mets on CT positive on estradiol PET. Tolerating anastrozole/goserelin with response clinically, by ca27-29 and by CT scans. Continue q3 month zometa or switch to denosumab. Will not restart ribo due to renal toxicity. RTC in 3 months with PET/CT scan and left breast ultrasound.    Patient seen and discussed with attending Dr. Isis Cooper MD  Hematology/Oncology Fellow       pt c/o generalized abd pain /vomiting

## 2025-07-29 ENCOUNTER — NON-APPOINTMENT (OUTPATIENT)
Age: 60
End: 2025-07-29

## 2025-07-29 ENCOUNTER — LABORATORY RESULT (OUTPATIENT)
Age: 60
End: 2025-07-29

## 2025-07-29 ENCOUNTER — APPOINTMENT (OUTPATIENT)
Dept: RHEUMATOLOGY | Facility: CLINIC | Age: 60
End: 2025-07-29
Payer: MEDICARE

## 2025-07-29 VITALS
HEIGHT: 60 IN | OXYGEN SATURATION: 96 % | SYSTOLIC BLOOD PRESSURE: 127 MMHG | TEMPERATURE: 98.06 F | BODY MASS INDEX: 32.39 KG/M2 | DIASTOLIC BLOOD PRESSURE: 78 MMHG | HEART RATE: 72 BPM | WEIGHT: 165 LBS | RESPIRATION RATE: 16 BRPM

## 2025-07-29 DIAGNOSIS — Z96.652 PRESENCE OF LEFT ARTIFICIAL KNEE JOINT: ICD-10-CM

## 2025-07-29 DIAGNOSIS — M79.7 FIBROMYALGIA: ICD-10-CM

## 2025-07-29 DIAGNOSIS — M32.9 SYSTEMIC LUPUS ERYTHEMATOSUS, UNSPECIFIED: ICD-10-CM

## 2025-07-29 PROCEDURE — 99214 OFFICE O/P EST MOD 30 MIN: CPT

## 2025-07-29 PROCEDURE — G2211 COMPLEX E/M VISIT ADD ON: CPT

## 2025-07-30 LAB
25(OH)D3 SERPL-MCNC: 41.5 NG/ML
ALBUMIN SERPL ELPH-MCNC: 4.7 G/DL
ALP BLD-CCNC: 132 U/L
ALT SERPL-CCNC: 30 U/L
ANION GAP SERPL CALC-SCNC: 12 MMOL/L
APPEARANCE: ABNORMAL
AST SERPL-CCNC: 40 U/L
BASOPHILS # BLD AUTO: 0.05 K/UL
BASOPHILS NFR BLD AUTO: 1.4 %
BILIRUB SERPL-MCNC: 0.2 MG/DL
BILIRUBIN URINE: ABNORMAL
BLOOD URINE: NEGATIVE
BUN SERPL-MCNC: 16 MG/DL
C3 SERPL-MCNC: 146 MG/DL
C4 SERPL-MCNC: 31 MG/DL
CALCIUM SERPL-MCNC: 9.9 MG/DL
CHLORIDE SERPL-SCNC: 102 MMOL/L
CHOLEST SERPL-MCNC: 137 MG/DL
CO2 SERPL-SCNC: 26 MMOL/L
COLOR: NORMAL
CREAT SERPL-MCNC: 0.81 MG/DL
CREAT SPEC-SCNC: 264 MG/DL
CREAT/PROT UR: 0.1 RATIO
CRP SERPL-MCNC: <3 MG/L
EGFRCR SERPLBLD CKD-EPI 2021: 82 ML/MIN/1.73M2
EOSINOPHIL # BLD AUTO: 0.09 K/UL
EOSINOPHIL NFR BLD AUTO: 2.5 %
ERYTHROCYTE [SEDIMENTATION RATE] IN BLOOD BY WESTERGREN METHOD: 53 MM/HR
ESTIMATED AVERAGE GLUCOSE: 114 MG/DL
GLUCOSE QUALITATIVE U: NEGATIVE MG/DL
GLUCOSE SERPL-MCNC: 80 MG/DL
HBA1C MFR BLD HPLC: 5.6 %
HCT VFR BLD CALC: 35.5 %
HDLC SERPL-MCNC: 47 MG/DL
HGB BLD-MCNC: 10.8 G/DL
IMM GRANULOCYTES NFR BLD AUTO: 0.3 %
KETONES URINE: ABNORMAL MG/DL
LDLC SERPL-MCNC: 66 MG/DL
LEUKOCYTE ESTERASE URINE: ABNORMAL
LYMPHOCYTES # BLD AUTO: 0.83 K/UL
LYMPHOCYTES NFR BLD AUTO: 22.7 %
MAN DIFF?: NORMAL
MCHC RBC-ENTMCNC: 30.4 G/DL
MCHC RBC-ENTMCNC: 31 PG
MCV RBC AUTO: 102 FL
MONOCYTES # BLD AUTO: 0.37 K/UL
MONOCYTES NFR BLD AUTO: 10.1 %
NEUTROPHILS # BLD AUTO: 2.3 K/UL
NEUTROPHILS NFR BLD AUTO: 63 %
NITRITE URINE: NEGATIVE
NONHDLC SERPL-MCNC: 90 MG/DL
PH URINE: 5.5
PLATELET # BLD AUTO: 483 K/UL
POTASSIUM SERPL-SCNC: 5.1 MMOL/L
PROT SERPL-MCNC: 7.8 G/DL
PROT UR-MCNC: 37 MG/DL
PROTEIN URINE: 30 MG/DL
RBC # BLD: 3.48 M/UL
RBC # FLD: 13.5 %
SODIUM SERPL-SCNC: 140 MMOL/L
SPECIFIC GRAVITY URINE: >1.03
TRIGL SERPL-MCNC: 141 MG/DL
TSH SERPL-ACNC: 2.86 UIU/ML
URATE SERPL-MCNC: 3 MG/DL
UROBILINOGEN URINE: 1 MG/DL
VIT B12 SERPL-MCNC: 1532 PG/ML
WBC # FLD AUTO: 3.65 K/UL

## 2025-07-31 LAB
M TB IFN-G BLD-IMP: NEGATIVE
QUANTIFERON TB PLUS MITOGEN MINUS NIL: >10 IU/ML
QUANTIFERON TB PLUS NIL: 0.02 IU/ML
QUANTIFERON TB PLUS TB1 MINUS NIL: 0 IU/ML
QUANTIFERON TB PLUS TB2 MINUS NIL: 0 IU/ML

## 2025-07-31 RX ORDER — TRAMADOL HYDROCHLORIDE 50 MG/1
50 TABLET, COATED ORAL
Qty: 20 | Refills: 0 | Status: ACTIVE | COMMUNITY
Start: 2025-07-31 | End: 1900-01-01

## 2025-08-04 DIAGNOSIS — N39.0 URINARY TRACT INFECTION, SITE NOT SPECIFIED: ICD-10-CM

## 2025-08-04 LAB
ANA TITR SER: ABNORMAL
ANACR T: ABNORMAL

## 2025-08-04 RX ORDER — NITROFURANTOIN (MONOHYDRATE/MACROCRYSTALS) 25; 75 MG/1; MG/1
100 CAPSULE ORAL
Qty: 14 | Refills: 0 | Status: ACTIVE | COMMUNITY
Start: 2025-08-04 | End: 1900-01-01

## 2025-08-12 RX ORDER — TRAMADOL HYDROCHLORIDE 50 MG/1
50 TABLET, COATED ORAL
Qty: 30 | Refills: 0 | Status: ACTIVE | COMMUNITY
Start: 2025-08-12 | End: 1900-01-01

## 2025-08-19 ENCOUNTER — APPOINTMENT (OUTPATIENT)
Dept: OBGYN | Facility: CLINIC | Age: 60
End: 2025-08-19
Payer: MEDICARE

## 2025-08-19 VITALS
RESPIRATION RATE: 16 BRPM | WEIGHT: 168 LBS | OXYGEN SATURATION: 100 % | DIASTOLIC BLOOD PRESSURE: 73 MMHG | BODY MASS INDEX: 32.98 KG/M2 | SYSTOLIC BLOOD PRESSURE: 111 MMHG | TEMPERATURE: 208.94 F | HEART RATE: 56 BPM | HEIGHT: 60 IN

## 2025-08-19 DIAGNOSIS — M16.11 UNILATERAL PRIMARY OSTEOARTHRITIS, RIGHT HIP: ICD-10-CM

## 2025-08-19 DIAGNOSIS — R10.13 EPIGASTRIC PAIN: ICD-10-CM

## 2025-08-19 DIAGNOSIS — M25.562 PAIN IN RIGHT KNEE: ICD-10-CM

## 2025-08-19 DIAGNOSIS — K57.90 DIVERTICULOSIS OF INTESTINE, PART UNSPECIFIED, W/OUT PERFORATION OR ABSCESS W/OUT BLEEDING: ICD-10-CM

## 2025-08-19 DIAGNOSIS — M17.12 UNILATERAL PRIMARY OSTEOARTHRITIS, LEFT KNEE: ICD-10-CM

## 2025-08-19 DIAGNOSIS — Z87.898 PERSONAL HISTORY OF OTHER SPECIFIED CONDITIONS: ICD-10-CM

## 2025-08-19 DIAGNOSIS — M81.0 AGE-RELATED OSTEOPOROSIS W/OUT CURRENT PATHOLOGICAL FRACTURE: ICD-10-CM

## 2025-08-19 DIAGNOSIS — R10.11 RIGHT UPPER QUADRANT PAIN: ICD-10-CM

## 2025-08-19 DIAGNOSIS — R60.0 LOCALIZED EDEMA: ICD-10-CM

## 2025-08-19 DIAGNOSIS — G89.29 PAIN IN RIGHT KNEE: ICD-10-CM

## 2025-08-19 DIAGNOSIS — Z86.79 PERSONAL HISTORY OF OTHER DISEASES OF THE CIRCULATORY SYSTEM: ICD-10-CM

## 2025-08-19 DIAGNOSIS — Z87.19 PERSONAL HISTORY OF OTHER DISEASES OF THE DIGESTIVE SYSTEM: ICD-10-CM

## 2025-08-19 DIAGNOSIS — Z87.09 PERSONAL HISTORY OF OTHER DISEASES OF THE RESPIRATORY SYSTEM: ICD-10-CM

## 2025-08-19 DIAGNOSIS — R10.9 UNSPECIFIED ABDOMINAL PAIN: ICD-10-CM
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
## 2025-08-19 DIAGNOSIS — R19.8 OTHER SPECIFIED SYMPTOMS AND SIGNS INVOLVING THE DIGESTIVE SYSTEM AND ABDOMEN: ICD-10-CM

## 2025-08-19 DIAGNOSIS — R30.0 DYSURIA: ICD-10-CM

## 2025-08-19 DIAGNOSIS — M25.561 PAIN IN RIGHT KNEE: ICD-10-CM

## 2025-08-19 DIAGNOSIS — N93.0 POSTCOITAL AND CONTACT BLEEDING: ICD-10-CM

## 2025-08-19 DIAGNOSIS — M32.9 SYSTEMIC LUPUS ERYTHEMATOSUS, UNSPECIFIED: ICD-10-CM

## 2025-08-19 DIAGNOSIS — N76.0 ACUTE VAGINITIS: ICD-10-CM

## 2025-08-19 DIAGNOSIS — R10.12 RIGHT UPPER QUADRANT PAIN: ICD-10-CM

## 2025-08-19 DIAGNOSIS — Z86.73 PERSONAL HISTORY OF TRANSIENT ISCHEMIC ATTACK (TIA), AND CEREBRAL INFARCTION W/OUT RESIDUAL DEFICITS: ICD-10-CM

## 2025-08-19 DIAGNOSIS — U07.1 COVID-19: ICD-10-CM

## 2025-08-19 DIAGNOSIS — M25.562 PAIN IN LEFT KNEE: ICD-10-CM

## 2025-08-19 DIAGNOSIS — N95.0 POSTMENOPAUSAL BLEEDING: ICD-10-CM

## 2025-08-19 DIAGNOSIS — M75.22 BICIPITAL TENDINITIS, LEFT SHOULDER: ICD-10-CM

## 2025-08-19 DIAGNOSIS — R10.31 RIGHT LOWER QUADRANT PAIN: ICD-10-CM

## 2025-08-19 DIAGNOSIS — K29.30 CHRONIC SUPERFICIAL GASTRITIS W/OUT BLEEDING: ICD-10-CM

## 2025-08-19 PROCEDURE — 99204 OFFICE O/P NEW MOD 45 MIN: CPT

## 2025-08-19 PROCEDURE — 99459 PELVIC EXAMINATION: CPT

## 2025-08-21 ENCOUNTER — ASOB RESULT (OUTPATIENT)
Age: 60
End: 2025-08-21

## 2025-08-21 ENCOUNTER — APPOINTMENT (OUTPATIENT)
Dept: OBGYN | Facility: CLINIC | Age: 60
End: 2025-08-21
Payer: MEDICARE

## 2025-08-21 VITALS
WEIGHT: 168 LBS | OXYGEN SATURATION: 96 % | BODY MASS INDEX: 32.98 KG/M2 | RESPIRATION RATE: 16 BRPM | SYSTOLIC BLOOD PRESSURE: 147 MMHG | HEART RATE: 71 BPM | TEMPERATURE: 208.76 F | HEIGHT: 60 IN | DIASTOLIC BLOOD PRESSURE: 85 MMHG

## 2025-08-21 DIAGNOSIS — R19.00 INTRA-ABDOMINAL AND PELVIC SWELLING, MASS AND LUMP, UNSPECIFIED SITE: ICD-10-CM

## 2025-08-21 LAB
BV BACTERIA RRNA VAG QL NAA+PROBE: DETECTED
C GLABRATA RNA VAG QL NAA+PROBE: NOT DETECTED
C TRACH RRNA SPEC QL NAA+PROBE: NOT DETECTED
CANDIDA RRNA VAG QL PROBE: DETECTED
HPV HIGH+LOW RISK DNA PNL CVX: NOT DETECTED
N GONORRHOEA RRNA SPEC QL NAA+PROBE: NOT DETECTED
T VAGINALIS RRNA SPEC QL NAA+PROBE: NOT DETECTED

## 2025-08-21 PROCEDURE — 76830 TRANSVAGINAL US NON-OB: CPT

## 2025-08-21 PROCEDURE — 76856 US EXAM PELVIC COMPLETE: CPT | Mod: 59

## 2025-08-21 RX ORDER — FLUCONAZOLE 150 MG/1
150 TABLET ORAL
Qty: 2 | Refills: 0 | Status: ACTIVE | COMMUNITY
Start: 2025-08-21 | End: 1900-01-01

## 2025-08-21 RX ORDER — CEPHALEXIN 500 MG/1
500 TABLET ORAL TWICE DAILY
Qty: 14 | Refills: 0 | Status: ACTIVE | COMMUNITY
Start: 2025-08-21 | End: 1900-01-01

## 2025-08-21 RX ORDER — METRONIDAZOLE 500 MG/1
500 TABLET ORAL
Qty: 14 | Refills: 0 | Status: ACTIVE | COMMUNITY
Start: 2025-08-21 | End: 1900-01-01

## 2025-08-25 LAB — BACTERIA UR CULT: ABNORMAL

## 2025-08-28 ENCOUNTER — APPOINTMENT (OUTPATIENT)
Dept: ORTHOPEDIC SURGERY | Facility: CLINIC | Age: 60
End: 2025-08-28
Payer: MEDICARE

## 2025-08-28 VITALS — WEIGHT: 163 LBS | HEIGHT: 60 IN | BODY MASS INDEX: 32 KG/M2

## 2025-08-28 DIAGNOSIS — Z96.652 PRESENCE OF LEFT ARTIFICIAL KNEE JOINT: ICD-10-CM

## 2025-08-28 LAB — CYTOLOGY CVX/VAG DOC THIN PREP: ABNORMAL

## 2025-08-28 PROCEDURE — 73562 X-RAY EXAM OF KNEE 3: CPT | Mod: LT

## 2025-08-28 PROCEDURE — 99024 POSTOP FOLLOW-UP VISIT: CPT

## 2025-09-05 ENCOUNTER — APPOINTMENT (OUTPATIENT)
Dept: MRI IMAGING | Facility: CLINIC | Age: 60
End: 2025-09-05

## 2025-09-05 PROCEDURE — 72197 MRI PELVIS W/O & W/DYE: CPT

## 2025-09-05 PROCEDURE — A9585: CPT | Mod: JZ

## 2025-09-08 ENCOUNTER — APPOINTMENT (OUTPATIENT)
Dept: HOME HEALTH SERVICES | Facility: HOME HEALTH | Age: 60
End: 2025-09-08

## 2025-09-11 ENCOUNTER — APPOINTMENT (OUTPATIENT)
Dept: ORTHOPEDIC SURGERY | Facility: CLINIC | Age: 60
End: 2025-09-11
Payer: MEDICARE

## 2025-09-11 VITALS — HEIGHT: 60 IN | BODY MASS INDEX: 32 KG/M2 | WEIGHT: 163 LBS

## 2025-09-11 PROCEDURE — 73562 X-RAY EXAM OF KNEE 3: CPT | Mod: LT

## 2025-09-11 PROCEDURE — 99024 POSTOP FOLLOW-UP VISIT: CPT

## 2025-09-17 ENCOUNTER — APPOINTMENT (OUTPATIENT)
Dept: HOME HEALTH SERVICES | Facility: HOME HEALTH | Age: 60
End: 2025-09-17

## 2025-09-17 VITALS
RESPIRATION RATE: 14 BRPM | BODY MASS INDEX: 31.83 KG/M2 | HEART RATE: 62 BPM | OXYGEN SATURATION: 97 % | SYSTOLIC BLOOD PRESSURE: 110 MMHG | DIASTOLIC BLOOD PRESSURE: 60 MMHG | WEIGHT: 163 LBS

## 2025-09-17 DIAGNOSIS — F31.9 BIPOLAR DISORDER, UNSPECIFIED: ICD-10-CM

## 2025-09-17 DIAGNOSIS — Z96.652 PRESENCE OF LEFT ARTIFICIAL KNEE JOINT: ICD-10-CM

## 2025-09-17 DIAGNOSIS — K58.1 IRRITABLE BOWEL SYNDROME WITH CONSTIPATION: ICD-10-CM

## 2025-09-17 DIAGNOSIS — I72.9 ANEURYSM OF UNSPECIFIED SITE: ICD-10-CM

## 2025-09-17 DIAGNOSIS — M32.9 SYSTEMIC LUPUS ERYTHEMATOSUS, UNSPECIFIED: ICD-10-CM

## 2025-09-17 DIAGNOSIS — Z01.818 ENCOUNTER FOR OTHER PREPROCEDURAL EXAMINATION: ICD-10-CM

## 2025-09-17 DIAGNOSIS — J45.909 UNSPECIFIED ASTHMA, UNCOMPLICATED: ICD-10-CM

## 2025-09-17 DIAGNOSIS — Z79.631 LONG TERM (CURRENT) USE OF ANTIMETABOLITE AGENT: ICD-10-CM

## 2025-09-17 DIAGNOSIS — E03.9 HYPOTHYROIDISM, UNSPECIFIED: ICD-10-CM

## 2025-09-17 DIAGNOSIS — G47.00 INSOMNIA, UNSPECIFIED: ICD-10-CM

## 2025-09-17 DIAGNOSIS — B37.31 ACUTE CANDIDIASIS OF VULVA AND VAGINA: ICD-10-CM

## 2025-09-17 DIAGNOSIS — K58.0 IRRITABLE BOWEL SYNDROME WITH DIARRHEA: ICD-10-CM

## 2025-09-17 DIAGNOSIS — N76.0 ACUTE VAGINITIS: ICD-10-CM

## 2025-09-17 DIAGNOSIS — K57.90 DIVERTICULOSIS OF INTESTINE, PART UNSPECIFIED, W/OUT PERFORATION OR ABSCESS W/OUT BLEEDING: ICD-10-CM

## 2025-09-17 DIAGNOSIS — R30.0 DYSURIA: ICD-10-CM

## 2025-09-17 DIAGNOSIS — K21.9 GASTRO-ESOPHAGEAL REFLUX DISEASE W/OUT ESOPHAGITIS: ICD-10-CM

## 2025-09-17 DIAGNOSIS — F32.4 MAJOR DEPRESSIVE DISORDER, SINGLE EPISODE, IN PARTIAL REMISSION: ICD-10-CM

## 2025-09-17 DIAGNOSIS — F51.5 NIGHTMARE DISORDER: ICD-10-CM

## 2025-09-17 DIAGNOSIS — R73.09 OTHER ABNORMAL GLUCOSE: ICD-10-CM

## 2025-09-17 DIAGNOSIS — Z87.19 PERSONAL HISTORY OF OTHER DISEASES OF THE DIGESTIVE SYSTEM: ICD-10-CM

## 2025-09-17 DIAGNOSIS — M79.7 FIBROMYALGIA: ICD-10-CM

## 2025-09-17 DIAGNOSIS — E78.2 MIXED HYPERLIPIDEMIA: ICD-10-CM

## 2025-09-17 DIAGNOSIS — I10 ESSENTIAL (PRIMARY) HYPERTENSION: ICD-10-CM

## 2025-09-17 DIAGNOSIS — F43.12 NIGHTMARE DISORDER: ICD-10-CM

## 2025-09-17 RX ORDER — FLUCONAZOLE 150 MG/1
150 TABLET ORAL
Qty: 1 | Refills: 0 | Status: ACTIVE | COMMUNITY
Start: 2025-09-17 | End: 1900-01-01

## 2025-09-17 RX ORDER — METHOCARBAMOL 750 MG/1
750 TABLET, FILM COATED ORAL TWICE DAILY
Refills: 0 | Status: ACTIVE | COMMUNITY
Start: 2025-09-17

## 2025-09-17 RX ORDER — OLANZAPINE 2.5 MG/1
2.5 TABLET, FILM COATED ORAL
Refills: 0 | Status: ACTIVE | COMMUNITY
Start: 2025-09-17

## 2025-09-18 ENCOUNTER — TRANSCRIPTION ENCOUNTER (OUTPATIENT)
Age: 60
End: 2025-09-18

## 2025-09-18 ENCOUNTER — LABORATORY RESULT (OUTPATIENT)
Age: 60
End: 2025-09-18

## 2025-09-19 LAB
APPEARANCE: CLEAR
BILIRUBIN URINE: NEGATIVE
BLOOD URINE: NEGATIVE
COLOR: YELLOW
GLUCOSE QUALITATIVE U: NEGATIVE MG/DL
KETONES URINE: NEGATIVE MG/DL
LEUKOCYTE ESTERASE URINE: ABNORMAL
NITRITE URINE: POSITIVE
PH URINE: 5.5
PROTEIN URINE: 30 MG/DL
SPECIFIC GRAVITY URINE: 1.03
UROBILINOGEN URINE: 0.2 MG/DL

## 2025-09-21 LAB — BACTERIA UR CULT: NORMAL

## (undated) DEVICE — TOURNIQUET CUFF 34" DUAL PORT W PLC

## (undated) DEVICE — SUT POLYSORB 1-0 36" GS-21 UNDYED

## (undated) DEVICE — SUT QUILL PDO 0 45CM 36MM

## (undated) DEVICE — SUT POLYSORB 2-0 30" GS-21 UNDYED

## (undated) DEVICE — PACK TOTAL KNEE (2 PACKS)

## (undated) DEVICE — SUT PDO 2 1/2 CIRCLE 40MM NDL 45CM

## (undated) DEVICE — Device

## (undated) DEVICE — TUBING CANNULA SALTER LABS NASAL ADULT 7FT

## (undated) DEVICE — SOL IRR POUR NS 0.9% 500ML

## (undated) DEVICE — SYR LUER LOK 20CC

## (undated) DEVICE — VENODYNE/SCD SLEEVE CALF MEDIUM

## (undated) DEVICE — POSITIONER FOAM HEADREST (PINK)

## (undated) DEVICE — ADAPTER ENDO CHNL SINGLE USE

## (undated) DEVICE — RETRIEVER ROTH NET PLATINUM-UNIVERSAL

## (undated) DEVICE — KIT ENDO PROCEDURE CUST W/VLV

## (undated) DEVICE — FORMALIN CONTAINER MED

## (undated) DEVICE — TUBING IV SET GRAVITY 3Y 100" MACRO

## (undated) DEVICE — WOUND IRR IRRISEPT W 0.5 CHG

## (undated) DEVICE — CATH ELCTR GLIDE PRB 7FR

## (undated) DEVICE — WARMING BLANKET UPPER ADULT

## (undated) DEVICE — SNARE LOOP POLY DISP 30MM LOOP

## (undated) DEVICE — BITE BLOCK ADULT 20 X 27MM (GREEN)

## (undated) DEVICE — MAKO VIZADISC KNEE TRACKING KIT

## (undated) DEVICE — DRSG DERMABOND 0.7ML

## (undated) DEVICE — SUT QUILL MONODERM 2-0 3/8 CIRCLE 45CM

## (undated) DEVICE — TUBING SUCTION 20FT

## (undated) DEVICE — NDL INJ SCLERO INTERJECT 23G

## (undated) DEVICE — CLAMP BX HOT RAD JAW 3

## (undated) DEVICE — CONTAINER FORMALIN 10% 20ML

## (undated) DEVICE — NDL HYPO SAFE 22G X 1.5" (BLACK)

## (undated) DEVICE — SOLIDIFIER ISOLYZER 2000CC

## (undated) DEVICE — SPECIMEN CONTAINER 100ML

## (undated) DEVICE — SOL INJ NS 0.9% 500ML 1-PORT

## (undated) DEVICE — MASK O2 ADLT POM ELITE W/ MED AND HI CONCEN M TO M 10FT

## (undated) DEVICE — POSITIONER CARDIAC BUMP

## (undated) DEVICE — ELCTR PLASMA BLADE X 3.0S WIDE TIP

## (undated) DEVICE — POSITIONER FOAM EGG CRATE ULNAR 2PCS (PINK)

## (undated) DEVICE — FORCEP BIOPSY 2.5MM DISP

## (undated) DEVICE — MAKO BLADE NARROW

## (undated) DEVICE — VALVE ENDO SURESEAL II 0-5FR

## (undated) DEVICE — STERIS DEFENDO 3-PIECE KIT (AIR/WATER, SUCTION & BIOPSY VALVES)

## (undated) DEVICE — SYR LUER LOK 50CC

## (undated) DEVICE — BIOPSY FORCEP RADIAL JAW 4 STANDARD WITH NEEDLE

## (undated) DEVICE — FORCEP RADIAL JAW 4 W NDL 2.2MM 2.8MM 240CM ORANGE DISP

## (undated) DEVICE — SAW BLADE STRYKER SAGITTAL DUAL CUT 64X35X.89MM

## (undated) DEVICE — TUBING MEDI-VAC W MAXIGRIP CONNECTORS 1/4"X6'

## (undated) DEVICE — LUBRICATING JELLY ONESHOT 1.25OZ

## (undated) DEVICE — HOOD T7 NON-PEELAWAY

## (undated) DEVICE — SOL IRR POUR H2O 1000ML

## (undated) DEVICE — DRSG MEPILEX 10 X 30CM (4 X 12") AG

## (undated) DEVICE — SENSOR O2 FINGER ADULT

## (undated) DEVICE — DRAPE 3/4 SHEET W REINFORCEMENT 56X77"

## (undated) DEVICE — DRSG CURITY GAUZE SPONGE 4 X 4" 12-PLY